# Patient Record
Sex: MALE | Race: WHITE | NOT HISPANIC OR LATINO | Employment: OTHER | URBAN - METROPOLITAN AREA
[De-identification: names, ages, dates, MRNs, and addresses within clinical notes are randomized per-mention and may not be internally consistent; named-entity substitution may affect disease eponyms.]

---

## 2018-07-30 ENCOUNTER — TRANSFERRED RECORDS (OUTPATIENT)
Dept: HEALTH INFORMATION MANAGEMENT | Facility: CLINIC | Age: 73
End: 2018-07-30

## 2018-08-12 ENCOUNTER — TRANSFERRED RECORDS (OUTPATIENT)
Dept: HEALTH INFORMATION MANAGEMENT | Facility: CLINIC | Age: 73
End: 2018-08-12

## 2018-08-24 ENCOUNTER — TRANSFERRED RECORDS (OUTPATIENT)
Dept: HEALTH INFORMATION MANAGEMENT | Facility: CLINIC | Age: 73
End: 2018-08-24

## 2018-08-28 ENCOUNTER — TRANSFERRED RECORDS (OUTPATIENT)
Dept: HEALTH INFORMATION MANAGEMENT | Facility: CLINIC | Age: 73
End: 2018-08-28

## 2018-09-05 ENCOUNTER — TRANSFERRED RECORDS (OUTPATIENT)
Dept: HEALTH INFORMATION MANAGEMENT | Facility: CLINIC | Age: 73
End: 2018-09-05

## 2018-09-07 ENCOUNTER — TELEPHONE (OUTPATIENT)
Dept: BEHAVIORAL HEALTH | Facility: CLINIC | Age: 73
End: 2018-09-07

## 2018-09-07 NOTE — TELEPHONE ENCOUNTER
9-7-18 Anjelica Back. Medicare and BX[employer].    Also recv'd 45 page fax from Gianna Olmedo [facility], [phone] [place].  Forwarded clinical to Adult Dual [facility].  Pool message sen send to Dual [facility]. fb

## 2018-09-07 NOTE — TELEPHONE ENCOUNTER
9-7-18 [facility] Counselor Gianna ROMAN [phone]. Gianna is your contact for this client.  Dr. Dean Sher. Phone: [phone] Fax: [phone]    MH: Depression  CD: ETOH    Medical: faxing medical issues  Med: Faxing.    No Sx ideation.  Legal: June 2018 Off Probation [facility]  Level 2 sex offender.   I told them this will be reviewed on case by case basis.    discharged planned for 9-18-18.  Requested Anjelica. fb

## 2018-09-10 NOTE — TELEPHONE ENCOUNTER
Received Dr Order for DA from Gianna Olmedo, fx [phone] & ph [phone], faxed to [facility]. [facility]

## 2018-09-17 ENCOUNTER — MEDICAL CORRESPONDENCE (OUTPATIENT)
Dept: HEALTH INFORMATION MANAGEMENT | Facility: CLINIC | Age: 73
End: 2018-09-17

## 2018-09-18 ENCOUNTER — TRANSFERRED RECORDS (OUTPATIENT)
Dept: HEALTH INFORMATION MANAGEMENT | Facility: CLINIC | Age: 73
End: 2018-09-18

## 2018-09-19 ENCOUNTER — HOSPITAL ENCOUNTER (OUTPATIENT)
Dept: BEHAVIORAL HEALTH | Facility: CLINIC | Age: 73
Discharge: HOME | End: 2018-09-19
Attending: PSYCHIATRY & NEUROLOGY | Admitting: PSYCHIATRY & NEUROLOGY
Payer: MEDICARE

## 2018-09-19 ENCOUNTER — BEH TREATMENT PLAN (OUTPATIENT)
Dept: BEHAVIORAL HEALTH | Facility: CLINIC | Age: 73
End: 2018-09-19
Attending: PSYCHIATRY & NEUROLOGY

## 2018-09-19 DIAGNOSIS — F33.1 MAJOR DEPRESSIVE DISORDER, RECURRENT EPISODE, MODERATE WITH MELANCHOLIC FEATURES (H): ICD-10-CM

## 2018-09-19 PROCEDURE — 90791 PSYCH DIAGNOSTIC EVALUATION: CPT

## 2018-09-19 ASSESSMENT — PAIN SCALES - GENERAL: PAINLEVEL: NO PAIN (0)

## 2018-09-19 NOTE — PROGRESS NOTES
Acknowledgement of Current Treatment Plan       I have reviewed my treatment plan with my therapist / counselor on 9/19/18. I agree with the plan as it is written in the electronic health record.    Name Signature   Laurent Choi    Name of Therapist / Counselor    [facility] Morgan [facility], [facility]

## 2018-09-19 NOTE — PROGRESS NOTES
"Standard Diagnostic Assessment     CLIENT'S NAME: Laurent Choi  MRN:   [id number]  :   1945 AGE:73 year old SEX: male  ACCT. NUMBER: [id number]  DATE OF SERVICE: 18 Start Time:  12:00 End Time:  2:30      Home Phone [phone]   Work Phone Not on file.   Mobile [phone]     Preferred Phone: [phone]  May we leave a program related message? yes    Yes, the patient has been informed that any other mental health professional providing mental health services to me will need access to this Diagnostic Assessment in order to develop a treatment plan and receive payment.     Identifying Information:  Laurent Choi is a 73 year old, White,  male. Laurent attended the   alone.     Reason for Referral: Laurent was referred to MI/CD Treatment (MICD)  by Staff at [facility]. Laurent reports the reason for referral at this time is \" due to alcohol abuse, depression and anxiety and trauma\".    Laurent verbalizes the following treatment/discharge goals: \" To stay sober, because I do much better with my mental health when I am sober\"     Current Stressors/Losses/Disappointments:   Relationships:\" My spouse  a year ago last march, I feel that I have resolved the grief, but I am beginning a new relationship with a trans gendered person\"  Substance use: \" Alcohol causes stress and depression, I also have terrible withdrawals\"   Loss Disappointments: \" My relationships with my spouse\"    Per Client, Review of Symptoms:  Mood (Depression/Anxiety/Mariaelena/Anger): Gio reports that he often has a depressed mood, has little interest in activities and has fatigue.  He also reports that he is often nervous, tense and shaking due to his anxiety.  He believes that his depression and his anxiety symptoms are his biggest relapse triggers.   Thoughts: Gio reports that he spends a significant amount of time worrying.   Concentration/Memory: Gio reports that he has difficulty concentrating, trouble " remembering things and that he will forget chunks of time.    Appetite/Weight: (see also, Physical Health Screening below) Gio reports that he lost a lot of weight when he was drinking because he did not eat, but has since regained the weight and is at a normal weight.   Sleep: Gio reports that he has difficulty being able to stay asleep and that when he does wake up he does not feel rested.    Motivation/Energy: Gio reports that he has low energy and limited motivation  Behavior: Gio reports that he cries easily, will act out impulsively and make poor decisions.   Psychosis: Gio denies any symptoms in this life area.    Trauma: Gio reports that he experienced trauma when he went through sex offender treatment.  He reports that his  and the counselors of the offender program were abusive towards him and this was traumatic for him.  Other: Gio is classifieid as a level II sex offender.  He reports that he did not do anything wrong.  He reports that he had pictures on his computer starting in 2000, but was caught in 2008.  He reports that he never hurt anyone and that the laws in [place] are bogus.  He reports that his  and the counselors in the sex offender program that he went to were abusive towards him.  He reports that he was initially registered as a level I offender, but his probation was violated and he was moved to level II.  He reports that he got off of probation on June 3rd 2018 and that he has no further restrictions.   He was informed that we would need to verify this before we could place him in a program.     Mental Health History:  Laurent reports first onset of mental health symptoms most of my life  Laurent was first diagnosed 1969 in college  Laurent received the following mental health services in the past: case management, counseling, inpatient mental health services, medication(s) from physician / PCP and psychiatry.   Psychiatric Hospitalizations:  "[facility] 2 times, [facility] 2 times and  .  Last hospitalization was 2007.  Laurent denies a history of civil commitment.      Onset/Duration/Pattern of Symptoms noted above:  Laurent reports the following understanding of his diagnosis: Depression and anxiety    Gio endorses the following DSM 5 criteria for Major depressive disorder : depressed mood,  Most of the day, nearly every day; diminished interest or pleasure in activities; significant  weight loss; insomnia; psychomotor retardation; fatigue and loss of energy; feelings of worthlessness and guilt; diminished ability to think or concentrate; recurrent thoughts of death or suicide.     Gio endorses the following DSM 5 criteria for Generalized Anxiety Disorder: Excessive anxiety, more days that not, for the past 6 months; difficulty controlling worry; restlessness or feeling keyed up; fatigue; difficulty concentrating; irritability; muscle tension; sleep disturbance.         Personal Safety:    [place]-Suicide Severity Rating Scale   Suicide Ideation   1.) Have you ever wished you were dead or that you could go to sleep and not wake up?     Lifetime: Yes, (if yes, please discribe) : reports that he has thoughts on a regular basis,  And has thought of jumping off a bridge, but has no intention to follow through with these thoughts.  Past Month:  No   2.) Have you actually had any thoughts of killing yourself?   Lifetime:  Yes, (if yes, please discribe) :   \" When I was young I did have prolonged thoughts about suicide\"  Past Month:  Reports fleeting ideation at times.    3.) Have you been thinking about how you might do this?     Lifetime:  Yes, (if yes, please discribe) : \" Sometimes I would stand on a bridge all night, but never intended to jump\"  Past Month:  No   4.) Have you had these thoughts and had some intention of acting on them?     Lifetime:  Yes, (if yes, please discribe) : \" I would stand on a bridge, but never " "planned to jump\"  Past Month:  No   5.) Have you started to work out the details of how to kill yourself?   Lifetime:  Yes, (if yes, please discribe) : See above Past Month:  No   6.) Do you intend to carry out this plan?      Lifetime:  No Past Month:  No   Intensity of Ideation   Intensity of ideation (1 being least severe, 5 being most severe):     Lifetime:  3, description of Ideation: \" I've always had thoughts, but I have never tried to kill myself\"                                                                                                 Past Month:  2, description of Ideation: Reports that thoughts were fleeting \" it was a rough month\"    How often do you have these thoughts? 2-5 times in a week  \" I would have a thought of some kind or another\"    When you have the thoughts how long do they last?  Fleeting - few seconds or minutes   Can you stop thinking about killing yourself or wanting to die if you want to?  Easily able to control thoughts   Are there things - anyone or anything (i.e. family, Latter day, pain of death) that stopped you from wanting to die or acting on thoughts of suicide?  Protective factors definately stopped you from attempting suicide      What sort of reasons did you have for thinking about wanting to die or killing yourself (ie end pain, stop how you were feeling, get attention or reaction, revenge)?  Completely to end or stop the pain (youcouldn't go on living with the pain or how you were feeling)   Suicidal Behavior   (Suicide Attempt) - Have you made a suicide attempt?     Lifetime:  No Past Month: No   Have you engaged in self-harm (non-suicidal self-injury)?  No   (Interrupted Attempt) - Has there been a time when you started to do something to end your life but someone or something stopped you before you actually did anything?  No   (Aborted or Self-Interrupted Attempt) - Has there been a time when you started to do something to try to end your life but you stopped " yourself before you actually did anything?  Yes, (if yes, total number of aborted or self interrupted) : 1 time   (Preparatory Acts of Behavior) - Have you taken any steps towards making suicide attempt or preparing to kill yourself (such as collecting pills, getting a gun, giving valuables away or writing a suicide note)? Yes, (if yes, total number of preparatory acts : Went to a bridge with the thought of jumping.    Actual Lethality/Medical Damage:   0. No physical damage or very minor physical damage (e.g., surface scratches).   1. Minor physical damage (e.g., lethargic speech; first-degree burns; mild bleeding; sprains).  2. Moderate physical damage; medical attention needed (e.g., conscious but sleepy, somewhat responsive; second-degree burns; bleeding of major vessel).  3. Moderately severe physical damage; medical hospitalization and likely intensive care required (e.g., comatose with reflexes intact; third-degree burns less than 20% of body; extensive blood loss but can recover; major fractures).  4. Sever physical damage; medical hospitalization with intensive care required (e.g., comatose without reflexes; third-degree burs over 20% of body; extensive blood loss with unstable vital sign; major damage to a vital area).  5. Death    Reports a history of fleeting thoughts of suicide.  He reports one incident of going to a bridge with a thought of jumping, but denies that he planned to follow through.  He denies any current ideation, plans or intention.        2008  [employer]  Used with permission by Alexandra Summers, PhD.               Guide to C-SSRS Risk Ratings   NO IDEATION:  with no active thoughts IDEATION: with a wish to die. IDEATION: with active thoughts. Risk Ratings   If Yes No No 0 - Very Low Risk   If NA Yes No 1 - Low Risk   If NA Yes Yes 2 - Low/moderate risk   IDEATION: associated thoughts of methods without intent or plan INTENT: Intent to follow through on  suicide PLAN: Plan to follow through on suicide Risk Ratings cont...   If Yes No No 3 - Moderate Risk   If Yes Yes No 4 - High Risk   If Yes Yes Yes 5 - High Risk   The patient's ADDITIONAL RISK FACTORS and lack of PROTECTIVE FACTORS may increase their overall suicide risk ratings.      Additional Risk Factors:    Someone close to the patient (family member/friend) completed a suicide     Significant history of having untreated or poorly treated mental health symptoms     Tendency to be socially isolated and/or cut off from the support of others     A recent death of someone close to the patient and/or unresolved grief and loss issues     Significant history of trauma and/or abuse issues     A triggering event(s) leading to humiliation, shame or despair     Significant legal problems including being at risk of incarceration     not being able to work   Protective Factors: Reality testing ability, Positive coping skills, Positive problem-solving skills and Positive social support       Risk Status   Risk Rating: Past= 3 Moderate risk,  Currently= 1 Low risk  DA Staff:  JAYCEAR to Tx team.    Additional information to support suicide risk rating: There was no additional information to provide at this time.  Please see the above suicide risk rating information.       Additional Safety Questions:    Do you have a gun, weapons or other means (including medications) to harm yourself available to you? No   Do you take chances with your safety?   no   Have you ever thought about killing someone else? No   Have you ever heard voices telling you to harm yourself or others? No       Supports:   From whom do you receive support and how often? (family/friends/agency) New friend     Do your support people want/need education/resources? no        Is there anything in your life (current or history) that is satisfying to you (include leisure interests/hobbies)?   yes theatre, film and art, museums, opera.      Hope/Belief System:  Do  you believe things can get better? yes       Personal Safety Summary:          Laurent denies current fears or concerns for personal safety.    Completed safety coping plan? yes        Substance Use History:   MICD Addendum - Comprehensive Assessment     Detox: Laurent reports 45 times lifetime detox admissions. Date of last detox was August of 2018 at the following location: [facility], [facility] and [facility] detox for the following substances: Alcohol.  He reports that [facility] detox will not take him anymore, but did not know why.    Treatment of Substance Use Disorder:   Laurent is currently receiving the following services: CD Treatment at [facility] and one in the 80's, completed a Rule 25 Assessment and participate(s) in AA / NA     Laurent has received chemical dependency treatment in the past at [facility] and one in the 80's that he does not remember.    Dates: 1980  Program: Unsure  Dual: No:   CD Treatment: Inpatient  28 days  Length of Treatment: 28 days  Completion: Yes:   Court Order: No:   Period of Abstinence: unsure  Additional Comments:        Dates: 1990's three times at  Program: [facility]  Dual: No:   CD Treatment: Inpatient     Length of Treatment: 3 months for all treatments  Completion: Yes:  Twice and was discharged unsuccessful one time.   Court Order: No:   Period of Abstinence: stayed sober for about a year each time.   Additional Comments: none      Dates: 1990's  Program: [facility]  Dual: No:   CD Treatment: Inpatient  2 times  Length of Treatment: 10-15 days for both  Completion: Yes:   Court Order: No:   Period of Abstinence: 6 months  Additional Comments:     2008:  Reports that after this he went to outpatient at [facility] and was court ordered.  Client reports that he went to court one day and had alcohol on his breath and was court ordered to do treatment.       Dates:  "2018  Program:[facility]  Dual: Yes:   CD Treatment: Inpatient     Length of Treatment: 21 days  Completion: Yes:   Court Order: No:   Period of Abstinence: was just discharged on 9/18/18  Additional Comments:         Addiction Pharmacology: Laurent reports use of addiction pharmacology. vivitrol     Contraindicated Medication Use: Laurent denies current Rx/use of stimulant, benzodiazapine and/or narcotic medications.     Prioritization Status:   Laurent denies a history of substance use by injection.   Injection of substances occurred: Denies.     Laurent denies current pregnancy.    Discussed the general effects of drugs and alcohol on health and well-being.     Substances Use History:     Substances Used:       Age of First Use Last Use Date / Amount (if available)  Most Recent Pattern of Use & Duration    (both frequency & amounts)  Method of use:       Alcohol    yes beer and vodka     Age of 1st  Intoxication:    15  Date: 8/27/18  Amount: 6-12 beers and a 1/2 liter of Vodka    # Days Used Past 30 Days:  7 Binge drinking.  \" I would drink for 7-10 days and then I would sober up for 5-6 days and then I would binge again\"     6-12 beers per day and 1/2 liter to one liter of vodka.      Oral   Cocaine Powder/  Crack-Cocaine      no    Denies use     Cannabis/Marijuana/  Synthetic/Hashish       no    Denies use     Heroin    no    Denies use     Non-Prescription Methadone    no    Denies use     Other Opiates/Synthetics     no    Denies use     PCP/  Other Hallucinogens    no    Denies use     Meth/Amphetamine  Other Stimulants (Ecstasy/Other Club Drugs)    no    Denies use     Benzodiazapines    yes Valium          32 Date: Age 32  Amount: 1 10 mg pill    # Days Used Past 30 Days:  0    Daily for 1 year.  1 10 mg pill daily.   Oral   Ketamine/  Other Tranquilizers    no    Denies use     Barbiturates/  Sedatives/  Hypnotics    no    Denies use     Inhalants    no    Denies use     Over-the-Counter Drugs    no   " " Denies use     Other    no    Denies use     Nicotine/Tobacco    yes former smoker          16 Date: 2011  Amount: 2 packs    # Days Used Past 30 Days:  0    Daily 2 packs    Quit smoking in 2011  Smoke     Current/Hx of withdrawal symptoms:   Sweating (rapid pulse)  Agitation  Sad/depressed feeling  Irritability  Nausea/vomiting  Diarrhea  Fever  Confused/disrupted speech  Shaky/jittery  Headache  Muscle aches  Sensitive to noise  Dizziness  Dimished appetitie  Unable to eat  Anxiety/worried  Unable to sleep  Fatigue/extremely tired  High Blood Pressure    Current Risk of withdrawal (if yes, identify substance):  Client has not drank for approximately one month.  If he returns to use he is at high risk for withdrawal symptoms.     Client Impressions:   Laurent identifies his primary substance as: Alcohol.      Impact:  Laurent reports the following life areas have been negatively impacted by his substance use:  academic, family problems, occupational / vocational problems, relationship problems and sexual issues.     Laurent reports his substance use has been a problem and has been out of control.    Laurent associates his substance use with the following leisure activities: \" When leisure becomes stressful I drink\" .     Laurent attributes his relapses/continued use to: Anxiety and depression, over expectations, struggling to balance my work and social life.     Laurent reports his substance use and mental health have influenced each other in the following way(s): \" I am not able to be stable with my mental health  When I am drinking.\"    Concern/Support:  Laurent identifies the following people who have been concerned about his substance use and/or have been supportive of his recovery in the past 30 days: New friends    Laurent reports a history of trying to hide his substance use from others.       Laurent does not agree to have  contact collateral resources to obtain information.  Release of " "Information has not been obtained.    Recovery Goals:  Laurent reports a goal to be abstinent.     Laurent reports the following period(s) of abstinence: Lifetime:  3-4 years  In past 6 months:  one month     Laurent identifies the following coping skills/strategies to prevent relapse of substance use or mental health instability: \" I guess I don't have very good coping skills.  I try to exercise and relaxation\"      Laurent reports attending voluntary self-helps support groups.  Last attended in the last 60 days. Laurent does not have a sponsor.     DSM-5 Criteria Substance Use Disorder Criteria: At least two criteria must be met within a twelve month period.    Impaired Control:    Yes  Alcohol    1. Substance is taken in larger amounts or over a longer period than was intended.   Yes  Alcohol    2. There is a persistent desire or unsuccessful efforts to cut down or control use.   Yes  Alcohol    3. A great deal of time is spent in activities necessary to obtain substance, use substance, or recover from its effects.   Yes  Alcohol    4. Craving, or a strong desire or urge to use the substance.    Social Impairment:    Yes  Alcohol    5. Recurrent substance use resulting in failure to fulfill major role obligations at work, school or home.   Yes  Alcohol   6. Continued substance use despite having persistent or recurrent social or interpersonal problems caused or exacerbated by the effects of the substance.   Yes  Alcohol   7.Important social, occupational, or recreational activities are given up or reduced because of the substance use.      Risky Use:   Yes  Alcohol   8. Recurrent substance use in situations in which it is physically hazardous.   Yes    Alcohol 9. Substance use is continued despite knowledge of having a persistent or recurrent physical or psychological problem that is likely to have been caused or exacerbated by the substance.     Pharmacological:  Yes  Alcohol   10. Tolerance, as defined by " "either of the following:   a. A need for markedly increased amounts of the substance to achieve intoxication or  desired effect.   b. A markedly diminished effect with continued use of the same amount of the substance.  Yes  Alcohol   11. Withdrawal, as manifested by either of the following:     a. The characteristic withdrawal syndrome for the substance.   b. Substance (or a closely related substance) is taken to relieve or avoid withdrawal symptoms.     Mild: Presence of 2-3 symptoms  Moderate: Presence of 4-5 symptoms  Severe: Presence of 6 or more symptoms.    Specify if :  In early remission - no criteria met (except craving) for at least 3 months and less than 12 months  In sustained remission - no criteria met (except craving) for 12 months or longer    In a controlled environment - individual is in an environment where access to the substance is restricted.    Laurent met 11 of 11 criteria for a Alcohol use disorder, severe    Laurent does agree to follow treatment recommendations including abstinence and regular attendance.      Laurent reports motivation/primary condition leading to admission to treatment: \" To stay sober, because I do much better with my mental health when I am sober\"     Legal History:    Laurent reported that he has been involved with the legal system.   History of arrests, group home or residential include: He is registered as a level II sex offender.  He reports that in 2000 he had some nude picture on his computer and was caught in 2008 and was then registered as a level one sex offender.  He reports that his  and and the counselors in the offender program were abusive towards him and that his PO violated his probation and he was then moved to a level two sex offender.  He reports that he has completed his probation and has no restrictions at this time.     Laurent denies current/history of having a 's license revoked because of an incident involving alcohol or other " "substances. License is: Currently has a valid license.    Laurent denies currently being under the jurisdiction of the court or on probation or parole. Finished probation on June 3rd of 2018.     Legal Status involving Children:   Laurent denies having children under the age of 18.      Laurent denies current/history of losing his parental rights.     Laurent denies involvement of Child Protection Services.    ________________________________________________________________________    Life Situation (Employment/School/Finances/Basic Needs):  Laurent  is currently living alone in a apartment.   The safety/stability of this environment is described as: He reports that his apartment is safe and stable.     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  Laurent denies a history of gambling and denies a history of gambling treatment.     Laurent reports his highest level of education is college graduate  With a BA and then attended two years of grad school, but did not get an advanced degree.  Laurent did not identify any learning problems   Laurent describes academic performance as:adequate  Laurent describes school social experience as: \" Normal\"     Laurent denies concerns regarding his current ability to meet basic needs.     Social/Family History:  Laurent  reports he grew up in [place].   Laurent was the third born of 6 children.   Laurent reports his biological parents are     Laurent describes his childhood as \" I had anxiety issues as a child and panic attacks.  Otherwise it was very normal and peaceful\"   Laurent describes his current " "relationships with his family of origin as reports that he does not have much contact with his siblings \" they don't approve of me\"  Due to being homosexual, criminal history and they never liked my spouse.     Laurent identifies his relationship status as: .    Laurent identifies his sexual orientation as: same sex   Laurent denies sexual health concerns.     Laurent reports having 0 children.     Laurent describes the quantity/quality of his social relationships as minimal and sort of isolated and I kept my social relationships at a superficial level. 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       Significant Losses / Trauma / Abuse / Neglect Issues / Developmental Incidents:  Laurent reports significant loss/trauma/abuse/neglect issues/developmental incidents Reports that his spouse  approximately a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

## 2018-09-19 NOTE — PROGRESS NOTES
".  Initial Individual Treatment Plan     Patient: Laurent Choi   MRN: [id number]  : 1945  Age: 73 year old  Sex: male    Diagnostic Assessment Date / Date of Initial Individual Treatment Plan: 18      Immediate Health Concerns  Stented Coronary Artery, Pure Hypercholesterolemia, Atherosclerosis of native coronary artery without angina pectoris, gastroesophageal reflux disease with esophagitis, macular degeneration of retina, and hypertension.      Immediate Safety Concerns:  Reports some suicide ideation with no plan or intention.     Identify the issues to be addressed in treatment:  Symptom Management, Personal Safety, Community Resources/Discharge Planning, Abstinence/Relapse Prevention, Develop / Improve Independent Living Skills, Develop Socialization / Interpersonal Relationship Skills, Cultural / Spirituality and Physical Health     Client Initial Individualized Goals for Treatment\" To stay sober, because I do much better with my mental health when I am sober\" :     Initial Treatment suggestions for the client during the time between Diagnostic Assessment and completion of the Individualized Treatment Plan:  Follow Safety Plan  Abstain from Substance Use   Ask for more information, support and/or assistance as needed.  Follow up with providers/community supports as needed: N/A  Report increases or changes in symptoms to staff.  Report any personal safety concerns to staff.   Take medications as prescribed.  Report medication changes and/or side effects to staff.  Attend and participate in groups as scheduled or notify staff if unable to do so.  Report any use of substances to staff as this may impact your symptoms and/or personal safety.  Notify staff if you have any other issues that need to be addressed. This may include any current abuse / neglect / exploitation or other vulnerability.  Follow recommendations of your treatment team and discuss concerns if not in " agreement.     Treatment Team Responsible: MI/CD Treatment (MICD)      Therapeutic Interventions/Treatment Strategies may include:  Support, Redirection, Feedback, Limit/Boundaries, Safety Assessments, Structured Activity, Problem Solving, Clarification, Education, Motivational Enhancement and Relapse Prevention as needed.    GERHARD SELF MA, LPCC, LADC

## 2018-09-19 NOTE — PROGRESS NOTES
"  Adult Outpatient Mental Health Programs    MY COPING PLAN FOR SAFETY  NAME: Laurent Choi  MRN: [id number]      People and things that are most important to me & reasons for living: My art and my work, my goals and the things that I want to accomplish, the ability to have a new relationship with someone.               My relapse Warning Signs:\" impulsive, isolating, crying, not body could get a hold of me, I wouldn't be keeping up my appointments or able to do the necessary things\"   I will make my environment safer by: be around others  When in crisis, I will use the following coping skills:     Distress Tolerance Strategies:  relaxation activities: meditation, arts and crafts: exhibits of painting, drawings, abstract and realistic work and computer art. , listen to positive and upbeat music: jazz, blue grass, classical and R and B, watch a funny movie: classical and foreign movies, pray and paced breathing/progressive muscle relaxation    Physical Activities: go for a walk, exercise: walking, treadmill, gardening, meditation, deep breathing and stretching     Focus on helpful thoughts:  \"This is temporary\", \"I will get through this\" and remind myself of what is important to me: My art and my work, my goals and the things that I want to accomplish, the ability to have a new relationship with someone.  and self compassion statements like \" I'm a good person\"  I will use My Support System:     Personal Supports: I can ask for reminders, support, or for them to stay with me  Trusted Friend/s:  Jasbir Dsouza  Family Member/s :   Oldest sister~ Maria Luisa Cash                Professional Supports: I can ask for med changes, emergency appointments, help  Psychiatrist: None   Therapist: None    Other:None    Crisis Lines I can contact to discuss options and to access support:    Suicide Prevention Lifeline: 4-657-417-TALK (8768)    Crisis Text Line Service (available 24 hours a day, 7 days a week): Text [place] to " 937140    Call  **[facility] (995106) from a cell phone to talk to a team of professionals who can help you.  Crisis Services By [facility]: Phone Number:   [place]     [phone]   [place]    [phone]   [place]    [phone]   [place]    [phone]   [place]    [phone]   [place] [phone]   [place]     [phone]       Call 911 or go to my nearest emergency department    X  I will attend my Treatment Program and talk to my one of my therapists: Brittany AN   I agree that I will report any current / recent thoughts, impulses or plans of suicide,  homicide, self injurious behavior or substance use .   X  I agree that I will not act on the above symptoms, but will follow the above plan instead.

## 2018-09-20 ENCOUNTER — TELEPHONE (OUTPATIENT)
Dept: PSYCHIATRY | Facility: CLINIC | Age: 73
End: 2018-09-20

## 2018-09-20 RX ORDER — NITROGLYCERIN 80 MG/1
1 PATCH TRANSDERMAL DAILY
COMMUNITY
End: 2021-08-25

## 2018-09-20 RX ORDER — AMLODIPINE AND BENAZEPRIL HYDROCHLORIDE 10; 20 MG/1; MG/1
1 CAPSULE ORAL DAILY
COMMUNITY
End: 2022-04-19

## 2018-09-20 NOTE — TELEPHONE ENCOUNTER
"PSYCHIATRY CLINIC PHONE INTAKE     SERVICES REQUESTED / INTERESTED IN          AMP (medication management, therapy)    Presenting Problem and Brief History                              What would you like to be seen for? (brief description): The patient stated that his mood is starting to level out and improve, his concentration is better, and that his anxiety has improved as well (but said that it is still \"moderate\"). He attributes the improvement in his symptoms to sobriety. His drug of choice was alcohol (he stated that he never used any other substances), and he started drinking when he was 16. He plans on finding an AA group to attend and getting a sponsor. He currently lives alone in an apartment, but said that he has lots of neighbors and that living alone is a good thing for him right now. His partner who he used to live with passed away in March 2017. 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     Have you received a mental health diagnosis? Yes   Which one (s): depression, anxiety, KODY  Is there any history of developmental delay?  No   Are you currently seeing a mental health provider?  No             Do you have mental health records elsewhere?  Yes  Will you sign a release so we can obtain them?  Yes    Have you ever been hospitalized for psychiatric reasons?  Yes  Describe: The patient was most recently at [facility] for chemical dependency treatment for 21 days and discharged 2 days ago. He was in detox for 5 days prior to admission. He has previously been hospitalized at [facility] a couple times, [facility], and [facility].    Do you have current thoughts of self-harm?  No    Do you currently have thoughts of harming others?  No       Substance Use History     Do you have any history of alcohol / illicit drug use?  Yes  Describe: See above   Have you ever received treatment for this?  Yes    Describe: He has been to treatment 5 or 6 times " at [facility], [facility], [facility], and [facility]      Social History     Does the patient have a guardian?  No     Have you had an ACT team in last 12 months?  No     Do you have any current or past legal issues?  Yes  Describe: The patient is a Level 2 sex offender (not on parole anymore, but he has to register every year as a sex offender) -- he downloaded pornographic pictures of minors and was arrested in 2010, he went to treatment in 2011 at [facility] and graduated in 2014. The patient said that you can contact him for more information if necessary.  OK to leave a detailed voicmail?  Yes    Medical/ Surgical History                                  The patient has had stints put in his heart. He has an ulcer in his esophagus. Macular degeneration.       Medications             Current Outpatient Prescriptions   Medication Sig Dispense Refill     ACETAMINOPHEN PO Take 325 mg by mouth       amLODIPine-benazepril (LOTREL) 10-20 MG per capsule Take 1 capsule by mouth daily       ASPIRIN PO Take 81 mg by mouth daily       calcium-vitamin D 500-125 MG-UNIT TABS Take 1 tablet by mouth 2 times daily       DIPHENHYDRAMINE HCL PO Take 25 mg by mouth daily as needed       FLUOXETINE HCL PO Take 20 mg by mouth daily       LOSARTAN POTASSIUM PO Take 25 mg by mouth       MIRTAZAPINE PO Take 15 mg by mouth At Bedtime       nitroGLYcerin (NITRODUR) 0.4 MG/HR 24 hr patch Place 1 patch onto the skin daily       NITROGLYCERIN SL Take 0.4 mg by mouth       OMEPRAZOLE PO Take 20 mg by mouth every morning       ROSUVASTATIN CALCIUM PO Take 40 mg by mouth daily       TAMSULOSIN HCL PO Take 0.4 mg by mouth daily         DISPOSITION      The writer explained to the patient that due to his past legal issues, his information will have to be reviewed by the clinic's medical director. He expressed understanding of this. The writer emailed him a blank QUIN to obtain records from [facility], and the patient said that he will fill it out and  send it back as soon as he can. Routed to Alma Kelley MD, for review.  -Oumou Villar,

## 2018-09-21 ENCOUNTER — TELEPHONE (OUTPATIENT)
Dept: PSYCHIATRY | Facility: CLINIC | Age: 73
End: 2018-09-21

## 2018-09-21 NOTE — TELEPHONE ENCOUNTER
Spoke with Gio to inform him that he could start the program on Monday in the 9:00 track as he had requested at the assessment.  Talked with him about being here are 8:30 for an admission.  Also talked with him about getting an individual therapist set up.

## 2018-09-24 ENCOUNTER — TELEPHONE (OUTPATIENT)
Dept: PSYCHIATRY | Facility: CLINIC | Age: 73
End: 2018-09-24

## 2018-09-24 ENCOUNTER — HOSPITAL ENCOUNTER (OUTPATIENT)
Dept: BEHAVIORAL HEALTH | Facility: CLINIC | Age: 73
End: 2018-09-24
Attending: PSYCHIATRY & NEUROLOGY
Payer: MEDICARE

## 2018-09-24 VITALS — HEIGHT: 71 IN | BODY MASS INDEX: 24.22 KG/M2 | WEIGHT: 173 LBS

## 2018-09-24 PROBLEM — F33.1 MAJOR DEPRESSIVE DISORDER, RECURRENT EPISODE, MODERATE WITH MELANCHOLIC FEATURES (H): Status: ACTIVE | Noted: 2018-09-24

## 2018-09-24 LAB
AMPHETAMINES UR QL SCN: NEGATIVE
BARBITURATES UR QL: NEGATIVE
BENZODIAZ UR QL: NEGATIVE
CANNABINOIDS UR QL SCN: NEGATIVE
COCAINE UR QL: NEGATIVE
ETHANOL UR QL SCN: NEGATIVE
OPIATES UR QL SCN: NEGATIVE

## 2018-09-24 PROCEDURE — 80320 DRUG SCREEN QUANTALCOHOLS: CPT | Performed by: PSYCHIATRY & NEUROLOGY

## 2018-09-24 PROCEDURE — H2012 BEHAV HLTH DAY TREAT, PER HR: HCPCS

## 2018-09-24 PROCEDURE — 80307 DRUG TEST PRSMV CHEM ANLYZR: CPT | Performed by: PSYCHIATRY & NEUROLOGY

## 2018-09-24 ASSESSMENT — ANXIETY QUESTIONNAIRES
5. BEING SO RESTLESS THAT IT IS HARD TO SIT STILL: SEVERAL DAYS
2. NOT BEING ABLE TO STOP OR CONTROL WORRYING: SEVERAL DAYS
IF YOU CHECKED OFF ANY PROBLEMS ON THIS QUESTIONNAIRE, HOW DIFFICULT HAVE THESE PROBLEMS MADE IT FOR YOU TO DO YOUR WORK, TAKE CARE OF THINGS AT HOME, OR GET ALONG WITH OTHER PEOPLE: NOT DIFFICULT AT ALL
1. FEELING NERVOUS, ANXIOUS, OR ON EDGE: MORE THAN HALF THE DAYS
3. WORRYING TOO MUCH ABOUT DIFFERENT THINGS: SEVERAL DAYS
GAD7 TOTAL SCORE: 8
6. BECOMING EASILY ANNOYED OR IRRITABLE: SEVERAL DAYS
7. FEELING AFRAID AS IF SOMETHING AWFUL MIGHT HAPPEN: NOT AT ALL

## 2018-09-24 ASSESSMENT — PATIENT HEALTH QUESTIONNAIRE - PHQ9: 5. POOR APPETITE OR OVEREATING: MORE THAN HALF THE DAYS

## 2018-09-24 NOTE — PROGRESS NOTES
"RN Review of Medical History / Physical Health Screen  Outpatient Behavioral Programs      CLIENT'S NAME: Laurent Choi  MRN:   [id number]  :   1945 AGE:73 year old SEX: male    DATE OF DIAGNOSTIC ASSESSMENT: 2018  DATE OF ADMISSION: 2018   PROGRAM: MI/CD Treatment (MICD)      Following admission, the RN reviewed the following:    - Medical History / Physical Health Screen completed during the DA noted above.  - Immediate Health Concerns as noted on the Initial Individual Treatment Plan.     18 1300   Height and Weight   Height 5' 11\" (1.803 m)   Height Method Stated   Weight 173 lb (78.5 kg)   Weight Method Standing scale   BSA (Calculated - sq m) 1.98   BMI (Calculated) 24.18     BMI Review:  Was the patient informed of BMI? yes      Findings Normal, No Intervention       RN Recommendations include: Will continue to monitor.    Cori Marques RN on 2018 at 1:37 PM        "

## 2018-09-24 NOTE — PROGRESS NOTES
"Adult Mental Health Outpatient Group Therapy Progress Note     Client Initial Individualized Goals for Treatment: \" To stay sober, because I do much better with my mental health when I am sober\"       See Initial Treatment suggestions for the client during the time between Diagnostic Assessment and completion of the Master Individualized Treatment Plan.    Treatment Goals:     see above     Area of Treatment Focus:  Symptom Management, Community Resources/Discharge Planning and Abstinence/Relapse Prevention    Therapeutic Interventions/Treatment Strategies:  Support, Feedback, Structured Activity, Problem Solving, Clarification, Education and Motivational Enhancement Therapy    Response to Treatment Strategies:  Accepted Feedback, Listened, Focused on Goals, Attentive and Accepted Support    Name of Group:  Goal Setting 11:00 - 11:50     Group Participants: 5 of 7.      Description and Outcome:  Gio participated in group that focused on learning about the benefits and practicing of setting realistic goals for treatment and management of mental and chemical health, along with self reflection on accomplishments and practicing problem solving obstacles. This was his first goal setting group and he noted that he generally does not practice this for management, but verbalized that he understood the benefits of using this for symptom management as he recognizes that he does struggle with focus, direction, and follow through. He was open to set goals, but did require some assistance in making them measurable. He reported on focus on balancing some of his work and and social activities, focusing on ways he can get of of his house.  Client verbalized understanding of session content by as above - having direction and accountability for engagement in activities and management of health at this time.  Client would benefit from additional opportunities to practice and implement content from this session.  Will continue to " assess.    Is this a Weekly Review of the Progress on the Treatment Plan?  No

## 2018-09-24 NOTE — PROGRESS NOTES
Admission Date: 9/24/2018    The Initial Individual Treatment Plan was reviewed with Laurentjulian Thakur Jimbo upon admission.      Laurent reported his last use of substances as: Over a month ago.  No use since the assessment.     Identify any current concerns with potential to impact admission:     withdrawal/intoxication: Denies     medication/medical concerns: No concerns presently     immediate safety concerns:Denies     Other: Did get an appointment with addiction pharmacology department in order to get vivitrol prescribed.    DIMENSION  ADMIT RATING   1 Acute Intoxication / Withdrawal Potential 0   2 Biomedical Conditions & Complications 1   3 Emotional / Behavioral / Cognitive Conditions & Complications 2   4 Treatment Acceptance / Resistance: 1   5 Continued Use / Relapse Prevention 2   6 Recovery Environment 2         Completed by: Dina Morgan MA, LPCC, LADC

## 2018-09-24 NOTE — TELEPHONE ENCOUNTER
On 9/20/2018 the patient signed an QUIN authorizing the release of all pertinent records from [facility] to [facility] for the purpose of continuing care. This writer faxed this QUIN to [phone] on 9/24/2018, sent the original to scanning and held a copy in psychiatry until scanning completed. Dayanna Jones LPN

## 2018-09-24 NOTE — PROGRESS NOTES
"MICD Progress Note / Treatment Plan Review       Patient: Laurent Choi   MRN: [id number]  : 1945  Age: 73 year old  Sex: male    Week of: 2018-2018    Client Initial Individualized Goals for Treatment:  \" To stay sober, because I do much better with my mental health when I am sober\"     See Initial Treatment suggestions for the client during the time between Diagnostic Assessment and completion of the Master Individualized Treatment Plan.    Treatment Goals:    Treatment plan will be formulated on 10/1/18.    Active Psychiatric Symptoms Impairing:   Mood, Behavior and Perception    Area of Treatment Focus:  Symptom Management, Personal Safety, Community Resources/Discharge Planning and Abstinence/Relapse Prevention    Treatment Strategies:   Support, Feedback, Limit/Boundaries, Safety Assessments, Structured Activity, Problem Solving, Clarification, Education, Motivational Enhancement Therapy and Cognitive Behavioral Therapy    Patient Response:  Accepted Feedback, Gave Feedback, Listened, Focused on Goals, Attentive, Accepted Support and Alert      Dimension Risk Description and Outcome:    Dimension One: Acute Intoxication/Withdrawal Potential   Daily Note: 18: Gio reported last use as one month ago and no withdrawal symptoms. (DE) 2018: Gio reports continued sobriety.  (JACINDA)  2018:  Sobriety continued.  (JACINDA) 2018: Gio reports that he continues to maintain his sobriety.  (JACINDA) 18:  Gio reports that he is feeling \"proud\" and \"grateful\" to continue his sobriety.  (JACINDA)    Dimension Two: Biomedical Conditions and Complications  Daily Note: 18: Gio reported no physical health concerns. (DE) 2018:  No changes. (JACINDA) 2018:  Gio reports that he is sleeping poorly.  (JACINDA) 2018:  Denies physical health concerns at this time. (JACINDA)  18:  Gio reports that he woke up with joint pain.  (JACINDA)    Dimension Three: Emotional/Behavioral / Cognitive " "Conditions & Complications  Daily Note: 9/24/18 (9:00-9:50AM): Gio endorsed absence of suicidal ideation and self-injurious behavior urges. He described that he is \"doing good.\" He shared that he is apprehensive about starting this group. Writer asked what coping skills he uses and Gio shared that he walks and exercises. (DE) 9/25/2018 (10-10:50am):  Gio reports that he is feeling \"tired and anxious\". Today as he feels that he has to adjust his schedule for the program.  He states that he did not realize that the program was as lengthy as it is on a daily basis.  He reports that he took a long nap after leaving the program yesterday.  Today he states that he is not having any cravings to use or thoughts to self harm.  He reports that he does not need time in group.  (JACINDA)  9/26/2018:  (9-9:50am):  Gio checked in in psychotherapy to report that he is feeling tired and frustrated due to poor sleep.  He also states that he does not feel like being in the program but feels that it is \"in his best interest to be here\".  He currently denies thoughts to self harm and states that he does not want time in group.  (JACINDA)  9/27/2018: (9-9:50am):  Gio states that he is feeling \"glad and grateful\" that he is sober today.  He reports that he is feeling more motivated to work on some projects that he has been putting aside and feels that he has \"something to live for\".  He adamantly denies thoughts to self harm.  (JACINDA) 9/28/18: (9-9:50am):  Gio states that he is still thinking about whether or not he should stick with the program however he feels that he is getting some benefit from being in the program.  He denies thoughts to self harm at this time. (JACINDA)    Dimension Four: Treatment Acceptance / Resistance  Daily Note: 9/24/18: Gio participated in first group in program by sharing and listening. (DE) 9/25/2018: (Autobiography) 11-11:50am:  Gio listened attentively to his peer's autobiography and offered supportive feedback in the " "discussion that followed.  (JACINDA) 9/26/2018 Interpersonal Relationships (11-11:50am):  Gio did not participate in the group on \"Handling Conflict\".  He appeared to be following along with the group discussion but did not join in.  He would benefit from more information on conflict management.  (JACINDA) 9/28/2018 Emotions Management (10:00-10:50): Writer taught and facilitated discussion on radical acceptance skill. Gio participated in discussion by sharing examples and asking questions about the skill. Gio demonstrated understanding by identifying how he can work on accepting the fact that people die in life. Gio can benefit from further assistance in applying skill. (DE)       Dimension Five: Continued Use/Relapse Prevention  Daily Note: 9/24/18: Gio acknowledged that he thought about using marijuana as a way to cope with his stressors since he can't drink alcohol. Gio later added he realized after hearing other group members talk about their addiction to marijuana that this would not be a good idea. (DE) 9/25/2018:  Gio states that he is having minimal cravings to drink.  (JACINDA) 9/26/18: Relapse Prevention Group (10:00-10:50):  Gio was present for relapse prevention group on this date. Gio initially denied urges to use and then later reported \" I'm looking forward to the day when I can have a drink\".  Gio reported that he believes that once he has been sober for a while he will be able to drink socially.  Gio particpated in a discussion regarding the relapse of two group peers.  This can help address the treatment plan goal of abstaining from use.  Gio appeared to have a limited understanding of relapse prevention concepts, and would benefit from further education. (EK) 9/27/2018:  Gio states that he is feeling more committed to his sobriety today.  (JACINDA)  9/28/18:  Remains at high risk for relapse.  (JACINDA)    Dimension Six: Recovery Environment  Daily Note: 6/24/18: Gio lives alone in a senior living community. He " plans to search for AA meetings to attend near his home. (DE) 9/25/2018:  No changes at this time. (JACINDA)  9/26/2018:  Gio is encouraged to expand his sober support network.  (JACINDA)      Weekly Progress / Treatment Plan Review      Are there additional progress notes for this week? No    Are Treatment Plan Goals being addressed? Treatment plan will be formulated on 10/1/18.    Are treatment plan strategies to address goals effective? N/A    Are there any current contracts in place? N/A    Client: N/A     Client: N/A    Was client case reviewed with [place] MD and/or Selam Bee MD and the treatment team this week? yes    Psychotherapists contributing to this note:    Group: Group Psychotherapy Date/Time: 9/24/18 / 9:00 to 9:50; Participants: 5 of 7. ;   Facilitated by: Dia Wallace MA, [facility], [facility]     Group: Group Psychotherapy Date/Time: 9/25/2018 / 1000 to 1050; Participants: 6 of 7. Appointments;   Facilitated by: NIKKI Montez(JACINDA)    Group: Autobiography Date/Time: 9/25/2018 / 1100 to 1150; Participants: 7 of 7. ;   Facilitated by: NIKKI Montez(JACINDA)    Group: Relapse Prevention Date/Time: 9/26/18 / 10:00 to 10:50; Participants: 7 of 7.   Facilitated by: Dina Morgan MA, [facility], [facility] (EK)    Group: Group Psychotherapy Date/Time: 9/26/2018 / 900 to 950; Participants: 7 of 7. ;   Facilitated by: NIKKI Montez(JACINDA)    Group: Interpersonal Relationships Date/Time: 9/26/2018 / 1100 to 1150; Participants: 7 of 7. ;   Facilitated by: NIKKI Montez(JACINDA)    Group: Group Psychotherapy Date/Time: 9/27/2018 / 900 to 950; Participants: 6 of 7. Vacation;   Facilitated by: NIKKI Montez(JACINDA)    Group: Emotions Management Date/Time: 9/28/18 / 10:00 to 10:50; Participants: 6 of 7. ;   Facilitated by: Dia Wallace MA, [facility], [facility] (DE)     Group: Group Psychotherapy Date/Time: 9/28/2018 / 900 to 950; Participants: 6 of 7. Vacation;   Facilitated by: Brittany Lockwood,  NIKKI(JACINDA)

## 2018-09-25 ENCOUNTER — HOSPITAL ENCOUNTER (OUTPATIENT)
Dept: BEHAVIORAL HEALTH | Facility: CLINIC | Age: 73
End: 2018-09-25
Attending: PSYCHIATRY & NEUROLOGY
Payer: MEDICARE

## 2018-09-25 PROCEDURE — H2012 BEHAV HLTH DAY TREAT, PER HR: HCPCS

## 2018-09-25 ASSESSMENT — ANXIETY QUESTIONNAIRES: GAD7 TOTAL SCORE: 8

## 2018-09-25 ASSESSMENT — PATIENT HEALTH QUESTIONNAIRE - PHQ9: SUM OF ALL RESPONSES TO PHQ QUESTIONS 1-9: 8

## 2018-09-25 NOTE — PROGRESS NOTES
"Adult Mental Health Outpatient Group Therapy Progress Note     Client Initial Individualized Goals for Treatment: \" To stay sober, because I do much better with my mental health when I am sober\"       See Initial Treatment suggestions for the client during the time between Diagnostic Assessment and completion of the Master Individualized Treatment Plan.    Treatment Goals:     see above     Area of Treatment Focus:  Symptom Management, Community Resources/Discharge Planning and Abstinence/Relapse Prevention    Therapeutic Interventions/Treatment Strategies:  Support, Feedback, Structured Activity, Problem Solving, Clarification, Education and Motivational Enhancement Therapy    Response to Treatment Strategies:  Accepted Feedback, Listened, Focused on Goals, Attentive and Accepted Support    Name of Group:  Life Skills 9:00 - 9:50    Group Participants: 5 of 7.      Description and Outcome:  Gio attended and participated in an experiential Psychoeducation group where rehabilitative intervention focuses on learning, developing through practice, and generalizing independent living skills. The purpose of this group is to stabilize and manage mental health symptoms, reduce/eliminate substance use, and to improve participation and function in valued roles, routines, relationships. He was oriented to the group with focus on practicing skills building and working on ways to manage symptoms and follow through with activities of daily living. He was open to identify problem areas for himself, noting that he thought he had problems in most areas on the list and that he has been struggling with this. He did share more specifics, when asked re: how perfectionistic thinking has impacted him with engagement in, and follow through, with various tasks and activities. He noted some challenges for himself in his home environment. He was open to explore various options for what he could practice in the group. Client verbalized " understanding of session content by relating his challenges with daily living skills and goals he would like to work on. Client would benefit from additional opportunities to practice and implement content from this session.  Will continue to assess.    Is this a Weekly Review of the Progress on the Treatment Plan?  No

## 2018-09-26 ENCOUNTER — HOSPITAL ENCOUNTER (OUTPATIENT)
Dept: BEHAVIORAL HEALTH | Facility: CLINIC | Age: 73
End: 2018-09-26
Attending: PSYCHIATRY & NEUROLOGY
Payer: MEDICARE

## 2018-09-26 PROCEDURE — H2012 BEHAV HLTH DAY TREAT, PER HR: HCPCS

## 2018-09-27 ENCOUNTER — HOSPITAL ENCOUNTER (OUTPATIENT)
Dept: BEHAVIORAL HEALTH | Facility: CLINIC | Age: 73
End: 2018-09-27
Attending: PSYCHIATRY & NEUROLOGY
Payer: MEDICARE

## 2018-09-27 PROCEDURE — H2012 BEHAV HLTH DAY TREAT, PER HR: HCPCS

## 2018-09-27 NOTE — PROGRESS NOTES
"Adult Mental Health Outpatient Group Therapy Progress Note     Client Initial Individualized Goals for Treatment: \" To stay sober, because I do much better with my mental health when I am sober\"       See Initial Treatment suggestions for the client during the time between Diagnostic Assessment and completion of the Master Individualized Treatment Plan.    Treatment Goals:     see above     Area of Treatment Focus:  Symptom Management, Community Resources/Discharge Planning and Abstinence/Relapse Prevention    Therapeutic Interventions/Treatment Strategies:  Support, Feedback, Structured Activity, Problem Solving, Clarification, Education and Motivational Enhancement Therapy    Response to Treatment Strategies:  Accepted Feedback, Listened, Focused on Goals, Attentive and Accepted Support    Name of Group:  Life Skills 11:00 - 11:50    Group Participants: 5 of 7.      Description and Outcome:  Gio attended and participated in an experiential Psychoeducation group where rehabilitative intervention focuses on learning, developing through practice, and generalizing independent living skills. The purpose of this group is to stabilize and manage mental health symptoms, reduce/eliminate substance use, and to improve participation and function in valued roles, routines, relationships. Topic for the group was non-medication techniques that can be utilized to help improve concentration and other executive functioning skills of the prefrontal cortex. He was quiet, but engaged in the discussion and learning of these techniques. With prompts, he did share some challenges for himself. He was open to identify a technique he wanted to practice more, focusing on meditation and mindfulness practice. Client verbalized understanding of session content by relating content to his own challenges and some things he has tried in the past and noted how he noticed benefits for himself. Client would benefit from additional opportunities to " practice and implement content from this session.  Will continue to assess.    Is this a Weekly Review of the Progress on the Treatment Plan?  No

## 2018-09-28 ENCOUNTER — HOSPITAL ENCOUNTER (OUTPATIENT)
Dept: BEHAVIORAL HEALTH | Facility: CLINIC | Age: 73
End: 2018-09-28
Attending: PSYCHIATRY & NEUROLOGY
Payer: MEDICARE

## 2018-09-28 PROCEDURE — H2012 BEHAV HLTH DAY TREAT, PER HR: HCPCS

## 2018-09-28 NOTE — PROGRESS NOTES
"Adult Mental Health Outpatient Group Therapy Progress Note     Client Initial Individualized Goals for Treatment: \" To stay sober, because I do much better with my mental health when I am sober\"       See Initial Treatment suggestions for the client during the time between Diagnostic Assessment and completion of the Master Individualized Treatment Plan.    Treatment Goals:     see above     Area of Treatment Focus:  Symptom Management, Community Resources/Discharge Planning, Abstinence/Relapse Prevention, Develop / Improve Independent Living Skills and Develop Socialization / Interpersonal Relationship Skills    Therapeutic Interventions/Treatment Strategies:  Support, Feedback, Structured Activity, Problem Solving, Clarification, Education and Motivational Enhancement Therapy    Response to Treatment Strategies:  Accepted Feedback, Listened, Focused on Goals, Attentive and Accepted Support    Name of Group:  Life Skills 11:00 - 11:50     Group Participants: 5 of 7.      Description and Outcome:  Gio attended and participated in an experiential Psychoeducation group where rehabilitative intervention focuses on learning, developing through practice, and generalizing independent living skills. The purpose of this group is to stabilize and manage mental health symptoms, reduce/eliminate substance use, and to improve participation and function in valued roles, routines, relationships.  He was open to briefly engage in planning and structuring his time for the weekend and sharing some of his plans and interests with staff. As he talked of one of his interests, affect brightened as he shared and he talked about connecting with a friend doing this activity. He then chose to follow through with a goal he had set for this week and engaged in this activity for himself quietly for remainder of the session. Client verbalized understanding of session content by noting benefits of having structured time and support in focusing on " some of his goals.  Client would benefit from additional opportunities to practice and implement content from this session.      Is this a Weekly Review of the Progress on the Treatment Plan?  Yes.      Are Treatment Plan Goals being addressed?  Yes, continue treatment goals      Are Treatment Plan Strategies to Address Goals Effective?  Yes, continue treatment strategies      Are there any current contracts in place?  No

## 2018-10-01 ENCOUNTER — HOSPITAL ENCOUNTER (OUTPATIENT)
Dept: BEHAVIORAL HEALTH | Facility: CLINIC | Age: 73
End: 2018-10-01
Attending: PSYCHIATRY & NEUROLOGY
Payer: MEDICARE

## 2018-10-01 PROCEDURE — H2012 BEHAV HLTH DAY TREAT, PER HR: HCPCS

## 2018-10-01 NOTE — PROGRESS NOTES
"Adult Dual Disorder Progress Note / Treatment Plan Review       Patient: Laurent Choi   MRN: [id number]  : 1945  Age: 73 year old  Sex: male    Week of: 10/1/18-10/5/18    Client Initial Individualized Goals for Treatment:   \" To stay sober, because I do much better with my mental health when I am sober\"     See Initial Treatment suggestions for the client during the time between Diagnostic Assessment and completion of the Master Individualized Treatment Plan.    Treatment Goals:  Client will notify staff when needing assistance to develop or implement a coping plan to manage suicidal or self injurious urges.  Client will use coping plan for safety, as needed.    Gio will take time 1-2/week in psychotherapy to idenitfy current symptoms and stressors and to develop a coping strategy for each.      Gio will have 0 instances of chemical use reporting outcomes to the group daily.      Gio will take time 1-2/week in psychotherapy to identify ways he can reduce isolation and restore balance to his daily routine.    Will enlist involvement of support network by setting up an appointment with an individual therapist by the target date.    Gio will attend one AA meeting per week reporting outcomes to the group weekly.     Active Psychiatric Symptoms Impairing:   Thought, Mood, Behavior and Perception    Area of Treatment Focus:  Symptom Management, Personal Safety, Community Resources/Discharge Planning and Abstinence/Relapse Prevention    Treatment Strategies:   Support, Feedback, Limit/Boundaries, Safety Assessments, Structured Activity, Problem Solving, Clarification, Education, Motivational Enhancement Therapy and Cognitive Behavioral Therapy    Patient Response:  Accepted Feedback, Gave Feedback, Listened, Focused on Goals, Attentive, Accepted Support and Alert      Dimension Risk Description and Outcome:    Dimension One: Acute Intoxication/Withdrawal Potential   Daily Note:  10/1/2018: Gio reports " "that he continues to maintain his sobriety. (JACINDA)  10/2/2018:  Sobriety maintained.  (JACINDA)  10/3/2018:  Gio reports continued sobriety.  (JACINDA)  10/4/2018:  Gio reports that he continues to maintain his sobriety.  (JACINDA) 10/5/18: iGo states that he continues to stay sober.  (JACINDA)    Dimension Two: Biomedical Conditions and Complications  Daily Note:  10/1/2018:  No new physical health concerns reported.  (JACINDA)  10/3/2018:  Denies any changes in physical health status.  (JACINDA) 10/5/18: Gio reports that he is feeling \"exhausted\" today.  He reports that he slept well last night.  (JACINDA)    Dimension Three: Emotional/Behavioral / Cognitive Conditions & Complications  Daily Note:  10/1/2018: (11-11:50am):  Gio reports that he had a \"good weekend\" and was able to maintain his sobriety.  Gio reports that he is staying busy by spending time with peers and going to the theatre.  He denies thoughts to self harm at this time. Gio states that he is still feeling ambivalent about being in the program but plans to keep coming to group. (JACINDA) 10/2/2018: (9-9:50am):  Gio reports that he is \"feeling pretty good today\" as he was able to sleep through the night.  He states that he is feeling less \"frazzled\" and anxious as he has been maintaining his sobriety and improving his sleep.  He denies current thoughts to self harm.  (JACINDA)  10/3/2018: (9-9:50am):  Gio reports that he is feeling \"euphoric\" and has been putting his energy into his work.  Gio states that he is feeling hopeful about the future and is committed to his sobriety.  He denies thoughts to self harm.  (JACINDA)  10/4/2018: (9-9:50am):  Gio states that he is feeling \"laid back\" today as a result of getting good sleep.  He denies current stressors or thoughts to self harm.  (JACINDA) 10/5/18: (9-9:50am):  Gio reports that he \"did not want to come to group today\" but states that he knows he needed to follow through in order to maintain his sobriety.  He denies thoughts to self harm and " "states that he has plans for the weekend.  He did not wish to take time in group.  (JACINDA)    Dimension Four: Treatment Acceptance / Resistance  Daily Note: 10/1/2018: Goal Setting  (9-9:50am):  Gio completed his Weekly Goal Setting sheet and states that he fulfilled all his goals from the previous week.  He would like to focus on expanding his sober support network by attending AA or another community based support group.  Gio states that he would like to continue to work on regulating his sleep. (JACINDA) 10/2/2018: Autobiography (11-11:50am):  Gio listened attentively to his peer's autobiography and offered supportive feedback in the discussion that followed.  (JACINDA) 10/3/2018 Emotions Management (11:00-11:50): Writer taught and facilitated discussion on identifying values,how living in accordance with values impacts emotional well-being, and how mental health symptoms and substance use impacts ability to stick to values. Gio participated by identifying values he would like to focus on and identifying ways he can live out values. Gio expressed understanding of topic. Gio can benefit from further assistance in managing symptoms that impact ability to live out values. (DE) 10/4/2018:  Gio participated in the group discussion in psychotherapy and shared that he feels that it is disrespectful when other members of the group arrive late for psychotherapy.  (JACINDA)10/4/18 MICD Education Gio was actively engaged in active mindfulness experiential.  He verbalized understanding of session. (SS) 10/5/18: Interpersonal Relationships (11-11:50am):  Gio had a difficult time staying awake during the \"Handling Conflict\" video.  He did not participate in the group discussion.  (JACINDA)    Dimension Five: Continued Use/Relapse Prevention  Daily Note:  10/1/2018:  Moderate to high risk for relapse.  (JACINDA)  10/2/2018: Pt attended Relapse Prevemtion Group wher pt was given an assignment to write a \"Good-Bye\" letter to pts substance(s) of " choice. Pt was active in group discussion and verbalized an understanding of the material (JORGE). 10/3/2018:   Moderate risk for relapse.  (JACINDA)  10/4/18:  Gio states that he is still feeling committed to maintaining his sobriety.  (JACINDA)    Dimension Six: Recovery Environment  Daily Note:  10/1/2018:  Encouraged to attend AA this evening.  (JACINDA)  10/2/2018:  Gio states that he will attend at least one AA meeting per week.  (JACINDA)  10/4/2018:  Gio is encouraged to expand his sober support network.  (JACINDA)  10/5/18:  Gio states that he has plans to spend time with sober peers this weekend.  (JACINDA)      Weekly Progress / Treatment Plan Review      Are there additional progress notes for this week? No    Are Treatment Plan Goals being addressed? Yes, continue treatment goals    Are treatment plan strategies to address goals effective? Yes, continue treatment strategies    Are there any current contracts in place? No    Client: Agrees with treatment plan changes     Client: Received copy of revised treatment plan    Was client case reviewed with [place] MD and/or Selam Bee MD and the treatment team this week? yes    Psychotherapists contributing to this note:    Group: Goal Setting Date/Time: 10/1/2018 / 900 to 950; Participants: 5 of 7. Vacation, No call/no show;   Facilitated by: NIKKI Montez(JACINDA)    Group: Group Psychotherapy Date/Time: 10/1/2018 / 1100 to 1150; Participants: 3 of 7. Vacation, No call/no show, appointments    Facilitated by: NIKKI Montez (JACINDA)    Group: Relapse Prevention Date/Time: 10/2/2018 / 1000 to 1050; Participants: 5 of 7.    Facilitated by: Andrew Flores MA [facility] (JORGE)    Group: Group Psychotherapy Date/Time: 10/2/2018 / 900 to 950; Participants: 5 of 7. Vacation/appointments;   Facilitated by: NIKKI Montez(JACINDA)    Group: Autobiography Date/Time: 10/2/2018 / 1100 to 1150; Participants: 5 of 7. Vacation/appointments;   Facilitated by: Brittany Lockwood  NIKKI(JACINDA)    Group: Group Psychotherapy Date/Time: 10/3/2018 / 900 to 950; Participants: 5 of 7. Inpatient, No call/no show;   Facilitated by: NIKKI Montez(JACINDA)    Group: Emotions Management Date/Time: 10/3/18 / 11:00 to 11:50; Participants: 3 of 7. ;   Facilitated by: Dia Wallace MA, [facility], [facility] (DE)     Group: Group Psychotherapy Date/Time: 10/4/2018 / 900 to 950; Participants: 6 of 7. Hospitalization ;   Facilitated by: NIKKI Montez(JACINDA)    Group: Group Psychotherapy Date/Time: 10/5/18 / 900 to 950; Participants: 7 of 7. ;   Facilitated by: NIKKI Montez(JACINDA)    Group: Interpersonal Relationships Date/Time: 10/5/18 / 1100 to 1150; Participants: 7 of 7. ;   Facilitated by: NIKKI Montez(JACINDA)    Group: MICD Education Date/Time: 10 / 04/2018 1100 to 1150; Participants: 6 of 7. ;   [facility]ilitated by: [facility] ()

## 2018-10-02 ENCOUNTER — HOSPITAL ENCOUNTER (OUTPATIENT)
Dept: BEHAVIORAL HEALTH | Facility: CLINIC | Age: 73
End: 2018-10-02
Attending: PSYCHIATRY & NEUROLOGY
Payer: MEDICARE

## 2018-10-02 PROCEDURE — H2012 BEHAV HLTH DAY TREAT, PER HR: HCPCS

## 2018-10-02 NOTE — PROGRESS NOTES
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 No. Were they invited? No. Gio does not wish to have any family involvement at this time    Areas of Vulnerability:  Suicidal Ideation   Anxiety  Depressive symptoms   Sensory deficit: needs glasses   Physical/medical: heart disease   Trauma/Abuse/Neglect    Long-Term Goals:  Knowledge about illness and management of symptoms   Maintenance of personal safety   Maintenance of sobriety   Effective management of impulsivity   Increased involvement in AA     Abuse Prevention Plan:  Safe, therapeutic environment   Safety coping plan as needed   Education regarding illness and skill development   Coordination with care providers   Impluse control education and intervention   Medication adjustment/management (MI/CD)   Monitor for use of substances    Discharge Criteria:  Satisfactory progress toward treatment goals   Improvement re: identified problems and symptoms   Ability to continue recovery at next level of service   Has a discharge  plan in place   Has safety/coping plan in place   Ability to maintain sobriety  Share autobiography   Complete goodbye letter assignment   Complete coping cards   Regular attendance as scheduled     Areas of Treatment Focus            Area of Treatment Focus:   Personal Safety  Start Date:    10/1/18    Dimension:   III. Emotional / Behavioral Condition    Description:    Gio has experienced suicidal ideation in the past but denies any current SI/SIB.      Goal:  Target Date: 11/26/18 Status: COMPLETED  Client will notify staff when needing assistance to develop or implement a coping plan to manage suicidal or self injurious urges.  Client will use coping plan for safety, as needed.      Progress:   10/29/18  Gio reports that he continues to not experience any of these thoughts. Will CONTINUE goal while in the program.     11/1/18   As above. Goal COMPLETED    Treatment Strategies:   Assist clients in establishing / strengthening support network  Assist to identify treatment goals  Assess / reassess for appropriate therapy program involvement, encourage participation in therapies  Assess / reassess level of potential for harm to self or others  Engage in safety planning when indicated  Facilitate increased self awareness  Teach adaptive coping skills and communication skills        Area of Treatment Focus:   Symptom Stabilization and Management  Start Date:    10/1/18    Dimension:   II. Biomedical Conditions and III. Emotional / Behavioral Condition    Description:    Gio's symptoms include depressed mood, irritability, insomnia, anhedonia, fatigue, low energy, poor concentration, muscle tension, and thoughts of death/suicide.      Goal:  Target Date: 11/26/18 Status: COMPLETED  Gio will take time 1-2/week in psychotherapy to idenitfy current symptoms and stressors and to develop a coping strategy for each.       In Life Skills groups, Gio will identify a skill he has used to help follow through with a goal or  activity of daily living, noting benefits for his mental health, once each week.      Progress:   10/29/18   #1: Gio reports that he has been making progress with this goal. Will CONTINUE this goal for further development.  #2: Gio has been able to follow through with many of his goals. Making progress and will CONTINUE for further development.    11/1/18  #1: Goal COMPLETED.  #2: Goal COMPLETED as written.    Treatment Strategies:   Assist clients in establishing / strengthening support network  Assist to identify treatment goals  Assess / reassess level of potential for harm to self or others  Engage in safety planning when indicated  Facilitate increased self awareness  Teach adaptive coping skills and communication skills        Area of Treatment Focus:   Abstinence / Relapse Prevention  Start Date:    10/1/18    Dimension:   I. Acute Intoxication / Withdrawal Potential, IV. Treatment Acceptance / Resistance and V. Relapse    Description:    Gio's use of chemicals has impacted his relationships, his academics, and has exacerbated his depression and anxiety.    Goal:     Target Date: 11/26/18        Status: COMPLETED  Gio will have 0 instances of chemical use reporting outcomes to the group daily.      Gio will take time 1-2/week in psychotherapy or life skills groups to identify ways he can reduce isolation and restore balance to his daily routine.      Progress:   10/29/18  #1: Gio has remained sober and reports that he is very proud of this and feeling confident about his long term abstinence for himself at this time. Will CONTINUE this goal while in treatment.    #2: Gio is making progress with this. He is working to restore balance and focus on his creativity. Will CONTINUE goal for further development.   11/1/18  #1: As above. Goal COMPLETED.  #2: As above. Goal COMPLETED    Treatment Strategies:   Assist clients in establishing / strengthening support network  Assist to identify treatment goals  Facilitate  increased self awareness  Provide education regarding relapse prevention  Teach adaptive coping skills and communication skills        Area of Treatment Focus:   Community Resources / Support and Discharge Planning  Start Date:    10/1/18    Dimension:   VI. Recovery Environment    Description:    Gio would like to expand his sober support network in the community.    Goal: Target Date: 11/26/18 Status: COMPLETED  Will enlist involvement of support network by setting up an appointment with an individual therapist by the target date.    Gio will attend one AA meeting per week reporting outcomes to the group weekly.      Progress:   10/29/18  #1: Gio has appointments set for psychiatry. He will set up an appointment with [facility] this week, as they have contacted him to set this up. CONTINUE goal until discharge from the program.    #2: Gio has not attending AA at this time, but would like to CONTINUE this goal to still talk about exploring this options for aftercare.   11/1/18    #1: Goal COMPLETED. Gio reported that he did set up individual therapy.  #2: Goal STOPPED. He discharged from program a couple of days earlier than initially planned.    Treatment Strategies:   Assist clients in establishing / strengthening support network  Assist to identify treatment goals  Assist with discharge planning  Facilitate increased self awareness  Teach adaptive coping skills and communication skills

## 2018-10-02 NOTE — PROGRESS NOTES
Adult Dual Disorder Program:   Acknowledgement of Current Treatment Plan       I have reviewed my treatment plan with my therapist / counselor on 10/1/18.   I agree with the plan as it is written in the electronic health record.    Name:      Signature:  Laurent Mills MD  Psychiatrist    Brittany Lockwood, [facility]  Psychotherapist    Dia Wallace, [facility], [facility]  Psychotherapist    Cori Marques RN, PHN  Nurse Liaison    Joie Torres OTR/L  Occupational Therapist    Jasbir Flores MA,   Psychotherapist    Selam Bee MD  Psychiatrist/Medical Director

## 2018-10-03 ENCOUNTER — HOSPITAL ENCOUNTER (OUTPATIENT)
Dept: BEHAVIORAL HEALTH | Facility: CLINIC | Age: 73
End: 2018-10-03
Attending: PSYCHIATRY & NEUROLOGY
Payer: MEDICARE

## 2018-10-03 PROCEDURE — H2012 BEHAV HLTH DAY TREAT, PER HR: HCPCS

## 2018-10-04 ENCOUNTER — HOSPITAL ENCOUNTER (OUTPATIENT)
Dept: BEHAVIORAL HEALTH | Facility: CLINIC | Age: 73
End: 2018-10-04
Attending: PSYCHIATRY & NEUROLOGY
Payer: MEDICARE

## 2018-10-04 PROCEDURE — H2012 BEHAV HLTH DAY TREAT, PER HR: HCPCS

## 2018-10-04 NOTE — PROGRESS NOTES
"Adult Mental Health Outpatient Group Therapy Progress Note     Client Initial Individualized Goals for Treatment: \" To stay sober, because I do much better with my mental health when I am sober\"       See Initial Treatment suggestions for the client during the time between Diagnostic Assessment and completion of the Master Individualized Treatment Plan.    Treatment Goals:     see above     Area of Treatment Focus:  Symptom Management, Community Resources/Discharge Planning and Abstinence/Relapse Prevention    Therapeutic Interventions/Treatment Strategies:  Support, Feedback, Structured Activity, Problem Solving, Clarification, Education and Motivational Enhancement Therapy    Response to Treatment Strategies:  Accepted Feedback, Listened, Focused on Goals, Attentive and Accepted Support    Name of Group:  Life Skills 10:00 - 10:50    Group Participants: 4 of 7.      Description and Outcome:  Gio attended and participated in an experiential Psychoeducation group where rehabilitative intervention focuses on learning, developing through practice, and generalizing independent living skills. The purpose of this group is to stabilize and manage mental health symptoms, reduce/eliminate substance use, and to improve participation and function in valued roles, routines, relationships.  Topic for group was mindfulness practice and clients were provided education on a variety of types and identified a few that they are familiar with. Focus was on practice of distraction and relaxation with 2 different types of structured techniques. He was open to try one of the techniques and engaged in throughout the session. He was quite social and talkative and he engaged in the technique, focusing on information he has learned in the past re: use of creativity for management and some things he has practiced in the past for himself.  Client verbalized understanding of session content by relating his own experiences with techniques he has " learned and tried to practice in the past. He would benefit from focusing on practicing some of these techniques at home at this time and will work toward implementing these toward management.      Is this a Weekly Review of the Progress on the Treatment Plan?  No

## 2018-10-05 ENCOUNTER — HOSPITAL ENCOUNTER (OUTPATIENT)
Dept: BEHAVIORAL HEALTH | Facility: CLINIC | Age: 73
End: 2018-10-05
Attending: PSYCHIATRY & NEUROLOGY
Payer: MEDICARE

## 2018-10-05 PROCEDURE — H2012 BEHAV HLTH DAY TREAT, PER HR: HCPCS

## 2018-10-05 NOTE — PROGRESS NOTES
"Adult Mental Health Outpatient Group Therapy Progress Note     Client Initial Individualized Goals for Treatment: \" To stay sober, because I do much better with my mental health when I am sober\"       See Initial Treatment suggestions for the client during the time between Diagnostic Assessment and completion of the Master Individualized Treatment Plan.    Treatment Goals:  In Life Skills groups, Gio will identify a skill he has used to help follow through with a goal or activity of daily living, noting benefits for his mental health, once each week.  Will enlist involvement of support network by setting up an appointment with an individual therapist by the target date.  Gio will attend one AA meeting per week reporting outcomes to the group weekly.    Gio will take time 1-2/week in psychotherapy or life skills groups to identify ways he can reduce isolation and restore balance to his daily routine.  Gio will take time 1-2/week in psychotherapy to idenitfy current symptoms and stressors and to develop a coping strategy for each.            Area of Treatment Focus:  Symptom Management, Community Resources/Discharge Planning, Abstinence/Relapse Prevention, Develop / Improve Independent Living Skills and Develop Socialization / Interpersonal Relationship Skills    Therapeutic Interventions/Treatment Strategies:  Support, Feedback, Structured Activity, Problem Solving, Clarification, Education and Motivational Enhancement Therapy    Response to Treatment Strategies:  Accepted Feedback, Listened, Focused on Goals, Attentive and Accepted Support    Name of Group:  Life Skills 10:00 - 10:50     Group Participants: 7 of 7.      Description and Outcome:  Gio attended and participated in an experiential Psychoeducation group where rehabilitative intervention focuses on learning, developing through practice, and generalizing independent living skills. The purpose of this group is to stabilize and manage mental health symptoms, " reduce/eliminate substance use, and to improve participation and function in valued roles, routines, relationships.  He noted plan for the session to focus on his individual practice and goal of being more social and reaching out to some people that he had not been doing that with. He was focused for the session, appeared more alert and with good grooming this session. Staff checked in and he noted that he had been able to sleep better that the previous night and was feeling more rested with stable mood. Client verbalized understanding of session content by noting benefits of having structured time and support in focusing on some of his goals as he does struggle at times to do these outside of treatment at this time. He is open to explore techniques to use to start to generalize these skills and practice at home.  Client would benefit from additional opportunities to practice and implement content from this session.      Is this a Weekly Review of the Progress on the Treatment Plan?  Yes.      Are Treatment Plan Goals being addressed?  Yes, continue treatment goals      Are Treatment Plan Strategies to Address Goals Effective?  Yes, continue treatment strategies      Are there any current contracts in place?  No

## 2018-10-08 ENCOUNTER — TELEPHONE (OUTPATIENT)
Dept: PSYCHIATRY | Facility: CLINIC | Age: 73
End: 2018-10-08

## 2018-10-08 ENCOUNTER — HOSPITAL ENCOUNTER (OUTPATIENT)
Dept: BEHAVIORAL HEALTH | Facility: CLINIC | Age: 73
End: 2018-10-08
Attending: PSYCHIATRY & NEUROLOGY
Payer: MEDICARE

## 2018-10-08 PROCEDURE — H2012 BEHAV HLTH DAY TREAT, PER HR: HCPCS

## 2018-10-08 NOTE — PROGRESS NOTES
"Adult Dual Disorder Progress Note / Treatment Plan Review       Patient: Laurent Choi   MRN: [id number]  : 1945  Age: 73 year old  Sex: male    Week of: 10/8/18-10/11/18    Client Initial Individualized Goals for Treatment:   \"To stay sober, because I do much better with my mental health when I am sober\"     See Initial Treatment suggestions for the client during the time between Diagnostic Assessment and completion of the Master Individualized Treatment Plan.    Treatment Goals:  Client will notify staff when needing assistance to develop or implement a coping plan to manage suicidal or self injurious urges.  Client will use coping plan for safety, as needed.    Gio will take time 1-2/week in psychotherapy to idenitfy current symptoms and stressors and to develop a coping strategy for each.      Gio will have 0 instances of chemical use reporting outcomes to the group daily.      Gio will take time 1-2/week in psychotherapy to identify ways he can reduce isolation and restore balance to his daily routine.    Will enlist involvement of support network by setting up an appointment with an individual therapist by the target date.    Gio will attend one AA meeting per week reporting outcomes to the group weekly.     Active Psychiatric Symptoms Impairing:   Thought, Mood, Behavior and Perception    Area of Treatment Focus:  Symptom Management, Personal Safety, Community Resources/Discharge Planning and Abstinence/Relapse Prevention    Treatment Strategies:   Support, Feedback, Limit/Boundaries, Safety Assessments, Structured Activity, Problem Solving, Clarification, Education, Motivational Enhancement Therapy and Cognitive Behavioral Therapy    Patient Response:  Accepted Feedback, Listened, Attentive, Accepted Support and Alert      Dimension Risk Description and Outcome:    Dimension One: Acute Intoxication/Withdrawal Potential   Daily Note:  10/8/2018: Gio reports that he was able to maintain his " "sobriety over the weekend.  (JACINDA)  10/9/2018:  Gio reports continued sobriety. (JACINDA)  10/10/2018:  Gio states that he continues to maintain his sobriety.  (JACINDA)    Dimension Two: Biomedical Conditions and Complications  Daily Note: 10/8/2018: No new physical health concerns reported.  (JACINDA)  10/9/2018:  Gio reports 0 changes.  (JACINDA)  10/10/2018:  Gio reports that he is feeling \"achy\" today.  (JACINDA)    Dimension Three: Emotional/Behavioral / Cognitive Conditions & Complications  Daily Note: 10/8/2018: (9-9:50am): Gio reports that he \"is not sure how he's feeling today\".  He reports that the weekend was \"uneventful\" and that he mostly stayed home instead of going out with friends to the theatre.  Gio states that he was able to talk to a few friends on the phone over the weekend for support.  He denies thoughts to self harm. (JACINDA)  10/9/2018: (10-10:50am): Gio reports that he is feeling \"neither here nor there\" today.  He states that he is feeling \"spacey\" and is not too grounded.  He denies thoughts to self harm and states that he does not need time in group.  (JACINDA)  10/10/2018: (10-10:50am):  Gio reports that he is feeling \"defeated\" and is having \"somewhat of an existential crisis today\".  He shares that he is still feeling grateful to be sober so that is \"a good thing\".  He denies thoughts to self harm and states that he does not wish to take time in group.  (JACINDA)    Dimension Four: Treatment Acceptance / Resistance  Daily Note: 10/8/2018:  Gio needed prompts to participate in group psychotherapy.  (JACINDA)  10/9/2018: Autobiography (11-11:50am):  Gio listened attentively to his peer's autobiography and offered supportive feedback in the discussion that followed.  (JACINDA) 10/10/2018:  Interpersonal Relationships (11-11:50am):  Gio participated in the group on \"Control in Relationships\" stating that he feels that gender adds \"another layer of difficulty\" to this topic.  He did not share any personal information but " "appeared to be attentive to the group conversation.  (JACINDA)    Dimension Five: Continued Use/Relapse Prevention  Daily Note: 10/8/2018:  Gio reports that he had some cravings to drink over the weekend.  (JACINDA)  10/9/2018:  Gio states that he is not having any cravings to drink at this time.  (JACINDA) 10/10/2018: Pt attended Relapse prevention group where we discussed the process of relapse. Pt was active in discussion. Pt reports his last use was 6 weeks ago and denies urges. Pt believes Vivitrol is helpful in controlling his cravings. Pt was read his \"good-Bye letter\" to alcohol and participated in discussion following the reading. Pt verbalized an understanding of the material presented (JORGE).     Dimension Six: Recovery Environment  Daily Note:  10/8/2018:  Did not attend AA over the weekend.  Reports that he stayed home and isolated.  (JACINDA)  10/9/2018:  Gio reports that he is staying in contact with friends over the phone.  (JACINDA)  10/10/2018:  Encouraged to expand sober support network.  (JACINDA)      Weekly Progress / Treatment Plan Review      Are there additional progress notes for this week? No    Are Treatment Plan Goals being addressed? Yes, continue treatment goals    Are treatment plan strategies to address goals effective? Yes, continue treatment strategies    Are there any current contracts in place? No    Client: No changes at this time.       Client: N/A    Was client case reviewed with [place] MD and/or Selam Bee MD and the treatment team this week? yes    Psychotherapists contributing to this note:    Group: Group Psychotherapy Date/Time: 10/8/2018 / 900 to 950; Participants: 3 of 7. No call, no show/ Appointments;   Facilitated by:  NIKKI Montez([facility])    Group: Group Psychotherapy Date/Time: 10/9/2018 / 1000 to 1050; Participants: 6 of 6. ;   Facilitated by: NIKKI Montez(JACINDA)    Group: Autobiography Date/Time: 10/9/2018 / 1100 to 1150; Participants: 6 of 6. ;   Facilitated by: " NIKKI Montez(JACINDA)    Group: 10/10/2018 Date/Time: 0900 to 0950 ; Participants: 5 of 7. ;   Facilitated by: Andrew Flores MA, LP [facility] (JORGE)    Group: Group Psychotherapy Date/Time: 10/10/2018 / 1000 to 1050; Participants: 6 of 7. No call, no show;   Facilitated by: NIKKI Montez(JACINDA)    Group: Interpersonal Relationships Date/Time: 10/10/2018 / 1100 to 1150; Participants: 6 of 7. No call, no show;   Facilitated by: NIKKI Montez(JACINDA)

## 2018-10-08 NOTE — PROGRESS NOTES
"Adult Mental Health Outpatient Group Therapy Progress Note     Client Initial Individualized Goals for Treatment: \" To stay sober, because I do much better with my mental health when I am sober\"       See Initial Treatment suggestions for the client during the time between Diagnostic Assessment and completion of the Master Individualized Treatment Plan.    Treatment Goals:    In Life Skills groups, Gio will identify a skill he has used to help follow through with a goal or activity of daily living, noting benefits for his mental health, once each week.  Will enlist involvement of support network by setting up an appointment with an individual therapist by the target date.  Gio will attend one AA meeting per week reporting outcomes to the group weekly.    Gio will take time 1-2/week in psychotherapy or life skills groups to identify ways he can reduce isolation and restore balance to his daily routine.  Gio will take time 1-2/week in psychotherapy to idenitfy current symptoms and stressors and to develop a coping strategy for each.         Area of Treatment Focus:  Symptom Management, Community Resources/Discharge Planning and Abstinence/Relapse Prevention    Therapeutic Interventions/Treatment Strategies:  Support, Feedback, Structured Activity, Problem Solving, Clarification, Education and Motivational Enhancement Therapy    Response to Treatment Strategies:  Accepted Feedback, Listened, Focused on Goals, Attentive and Accepted Support    Name of Group:  Goal Setting 11:00 - 11:50     Group Participants: 4 of 7.      Description and Outcome:  Gio participated in group that focused on learning about the benefits and practicing of setting realistic goals for treatment and management of mental and chemical health, along with self reflection on accomplishments and practicing problem solving obstacles. He reported that he had followed through with some of his goals, but tended to initially focus on his challenges. He " needed some assistance to make his goal more specific and measurable. He did note a focus to work on balance with his activities, as he is spending a lot of time with his work and continues to struggle to be social. He noted no attendance to an AA meeting, but was open to peer's suggestions re: just going and how this could be a first step for being more social with others. Client verbalized understanding of session content by noting some accomplishments and being open to the process of setting goals into manageable steps, noting these seemed realistic for him to try to do. Client would benefit from additional opportunities to practice and implement content from this session.      Is this a Weekly Review of the Progress on the Treatment Plan?  No

## 2018-10-08 NOTE — TELEPHONE ENCOUNTER
On 10/5/2018, 54 pages of incoming records were received from [facility]. This writer put the originals in Dr. Saldana's folder for review, the originals will be sent to scanning when Dr. Saldana is done reviewing them and a message was routed to Dr. Saldana. Dayanna Jones LPN

## 2018-10-09 ENCOUNTER — HOSPITAL ENCOUNTER (OUTPATIENT)
Dept: BEHAVIORAL HEALTH | Facility: CLINIC | Age: 73
End: 2018-10-09
Attending: PSYCHIATRY & NEUROLOGY
Payer: MEDICARE

## 2018-10-09 PROCEDURE — H2012 BEHAV HLTH DAY TREAT, PER HR: HCPCS

## 2018-10-09 PROCEDURE — 90792 PSYCH DIAG EVAL W/MED SRVCS: CPT | Performed by: PSYCHIATRY & NEUROLOGY

## 2018-10-09 NOTE — H&P
"Admitted:     10/09/2018      INITIAL PSYCHIATRIC EVALUATION      PATIENT IDENTIFICATION:  This is a 73-year-old  white male.      CHIEF COMPLAINT:  The patient is in his third week of the [facility] day treatment program.  He was referred here after completing 21 days of residential chemical dependency treatment at [facility].      HISTORY OF PRESENT ILLNESS:  This patient has had 4 or 5 psychiatric hospitalizations in the past, but he denies any history of suicide attempts.  He has had problems with depression since college, and says that Prozac has generally been very effective for him.  He says that over the past couple of weeks his mood has been \"pretty good\" and he attributes this to sobriety and being back on his Prozac.  He says \"when I drink I go way down in my mood.\"  He says he has been sober now for 6 weeks and \"I'm starting to feel normal and productive again.\"  His drinking tends to be in binges lasting 5-10 days, during which he does not eat or drink, and then he gets very sick and then will go about 5 days without drinking.  His longest period of sobriety since college was 4 years, and this occurred from [id number].  He then returned to social drinking for a couple of years but this spring his drinking became excessive again.  He has not had any difficulty with illicit drugs.  He has done chemical dependency treatment 4-5 times, plus a couple of outpatient programs.  He says that he has had problems with anxiety since grade school and has had panic attacks in the past but he has not had these lately.  He says his anxiety lately has been \"low-grade and very workable.\"  He says that Remeron 15 mg at bedtime is helpful for his anxiety and sleep, and the Prozac is helpful as well.  The patient is glad he is in the program here, and says that he has not been having significant cravings for alcohol.  He got a Vivitrol injection about 1 month ago, and he believes that this has helped reduce his cravings " a great deal.  However, he is concerned about a rash that he developed after getting the injection on his ankles and calves, and he shows me this today.  It is a dry, red, scaly rash, with no concerns for infection, although he does have some edema in his lower legs.  He says he has tried Antabuse in the past but used alcohol while on it, and does not feel that it was helpful.      REVIEW OF SYSTEMS:  A 10-point review of systems was completed.  All systems were negative except as noted above.      PAST MEDICAL HISTORY:  The patient denies any history of liver problems.  He does have hypertension, and takes amlodipine and losartan for that.  He takes omeprazole for GERD.  He was started on Flomax for urinary retention.  He has a primary care doctor outside of the [place] network.      SOCIAL HISTORY:  The patient lives alone in an apartment.  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  He is , and late in life had a second marriage with a man.  He was in a 30-year relationship with this man prior to that.  This partner  last year, and the patient becomes a bit tearful when relating this.      FAMILY HISTORY:  The patient is not aware of any family history of mental health problems, chemical dependence or suicides.      MENTAL STATUS EXAMINATION:  His general appearance is neat, clean and well groomed.  He makes good eye contact and has no psychomotor agitation or retardation.  His speech is of normal rate, tone and volume.  His thought process is linear and logical.  He has no suicidal or homicidal ideations.  There are no symptoms of psychosis.  His associations are intact.  His insight and judgment are good.  He is oriented to time, place and person.  His recent and remote memory are intact.  His attention span and concentration are fair.  His language is appropriate and his fund of  knowledge is average.  His mood is reportedly euthymic and stable.  His affect is calm and appropriate.  His muscle strength and tone are normal, as are his gait and station.      DIAGNOSES:   Axis I:     1.  Major depressive disorder, recurrent, in partial remission.   2.  Generalized anxiety disorder.   3.  History of panic disorder.   4.  Alcohol use disorder, severe.   Axis II:  Deferred.   Axis III:  Hypertension, gastroesophageal reflux disease, urinary retention, no history of liver problems to his knowledge.   Axis IV:  Social isolation, lack of primary support, loss of his partner of 30 years last year and resulting grief.   Axis V:  Global Assessment of Functioning on this assessment is 60.      PLAN:  The patient [facility].  He is participating well according to staff, and he feels he is benefiting from the program.  He plans to continue to completion.  He would like to continue on his Prozac 20 mg daily and Remeron 15 mg at bedtime, as he feels that they are very helpful for depression, anxiety and sleep.  He has an appointment next week in the [facility] here on the [facility], in the professional building, for followup of his Vivitrol injection, which he started last month.  He developed a rash following that injection, and he will bring this up at his appointment next week.  He does feel the Vivitrol has been very helpful in reducing cravings, so he is hopeful that he can continue it.  He is advised to abstain from alcohol and illicit drugs, and he states that this is his plan.  He was further advised to go to an ER or call a crisis number should his symptoms worsen, or if SI or HI should occur.  The patient was agreeable to the above plan and contracted for safety.         FABI WOLF MD             D: 10/09/2018   T: 10/09/2018   MT: HANH      Name:     ENRIKE KENNEY   MRN:      [id number]        Account:      [id number]   :       1945        Admitted:     10/09/2018                   Document: [id number]

## 2018-10-09 NOTE — PROGRESS NOTES
"Adult Mental Health Outpatient Group Therapy Progress Note     Client Initial Individualized Goals for Treatment: \" To stay sober, because I do much better with my mental health when I am sober\"       See Initial Treatment suggestions for the client during the time between Diagnostic Assessment and completion of the Master Individualized Treatment Plan.    Treatment Goals:  In Life Skills groups, Gio will identify a skill he has used to help follow through with a goal or activity of daily living, noting benefits for his mental health, once each week.  Will enlist involvement of support network by setting up an appointment with an individual therapist by the target date.  Gio will attend one AA meeting per week reporting outcomes to the group weekly.    Gio will take time 1-2/week in psychotherapy or life skills groups to identify ways he can reduce isolation and restore balance to his daily routine.  Gio will take time 1-2/week in psychotherapy to idenitfy current symptoms and stressors and to develop a coping strategy for each.           Area of Treatment Focus:  Symptom Management, Community Resources/Discharge Planning and Abstinence/Relapse Prevention    Therapeutic Interventions/Treatment Strategies:  Support, Feedback, Structured Activity, Problem Solving, Clarification, Education and Motivational Enhancement Therapy    Response to Treatment Strategies:  Accepted Feedback, Listened, Focused on Goals, Attentive and Accepted Support    Name of Group:  Life Skills 9:00 - 9:50    Group Participants: 6 of 7.      Description and Outcome:  Gio attended and participated in an experiential Psychoeducation group where rehabilitative intervention focuses on learning, developing through practice, and generalizing independent living skills. The purpose of this group is to stabilize and manage mental health symptoms, reduce/eliminate substance use, and to improve participation and function in valued roles, routines, " relationships. He initiated his goal and technique for the session, but was also open to try 2 other techniques for himself. He practiced one with a peer and noted benefits of helping him feel more alert and focused. He also tried a sensory modulation technique with vestibular sense of alerting and to also help his posture when he spends extended periods of time sitting at his desk doing work tasks. He noted that after some time, he was able to note benefits for himself. He then focused on an individual technique and goal to follow through on some activities for himself. Client verbalized understanding of session content by full engagement in trying techniques for himself and noting benefits for himself. Client would benefit from additional opportunities to practice and implement content from this session.    Is this a Weekly Review of the Progress on the Treatment Plan?  No

## 2018-10-10 ENCOUNTER — HOSPITAL ENCOUNTER (OUTPATIENT)
Dept: BEHAVIORAL HEALTH | Facility: CLINIC | Age: 73
End: 2018-10-10
Attending: PSYCHIATRY & NEUROLOGY
Payer: MEDICARE

## 2018-10-10 PROCEDURE — H2012 BEHAV HLTH DAY TREAT, PER HR: HCPCS

## 2018-10-11 NOTE — PROGRESS NOTES
"Adult Dual Disorder Progress Note / Treatment Plan Review       Patient: Laurent Choi   MRN: [id number]  : 1945  Age: 73 year old  Sex: male    Week of: 10/15/18-10/19/18    Client Initial Individualized Goals for Treatment:  \"To stay sober, because I do much better with my mental health when I am sober\"     See Initial Treatment suggestions for the client during the time between Diagnostic Assessment and completion of the Master Individualized Treatment Plan.    Treatment Goals:  Client will notify staff when needing assistance to develop or implement a coping plan to manage suicidal or self injurious urges.  Client will use coping plan for safety, as needed.    Gio will take time 1-2/week in psychotherapy to idenitfy current symptoms and stressors and to develop a coping strategy for each.      Gio will have 0 instances of chemical use reporting outcomes to the group daily.      Gio will take time 1-2/week in psychotherapy to identify ways he can reduce isolation and restore balance to his daily routine.    Will enlist involvement of support network by setting up an appointment with an individual therapist by the target date.    Gio will attend one AA meeting per week reporting outcomes to the group weekly.     Active Psychiatric Symptoms Impairing:   Thought, Mood, Behavior and Perception    Area of Treatment Focus:  Symptom Management, Personal Safety, Community Resources/Discharge Planning and Abstinence/Relapse Prevention    Treatment Strategies:   Support, Redirection, Feedback, Limit/Boundaries, Safety Assessments, Structured Activity, Problem Solving, Clarification, Education, Motivational Enhancement Therapy and Cognitive Behavioral Therapy    Patient Response:  Accepted Feedback, Gave Feedback, Listened, Focused on Goals, Attentive, Accepted Support and Alert      Dimension Risk Description and Outcome:    Dimension One: Acute Intoxication/Withdrawal Potential   Daily Note: 10/15/18: Gio " reported continued sobriety and no withdrawal symptoms. (DE) 10/16/18: Gio reported continued sobriety and no withdrawal symptoms. (DE) 10/18/18: Gio reported continued sobriety and no withdrawal symptoms. (DE) 10/19/2018:  Gio reports continued sobriety.  (JACINDA)    Dimension Two: Biomedical Conditions and Complications  Daily Note: 10/15/18: Gio reported no physical health changes. (DE) 10/16/18: Gio reported no physical health concerns. (DE) 10/18/18: Gio reported no physical health changes. (DE) 10/19/2018:  No changes at this time.  (JACINDA)    Dimension Three: Emotional/Behavioral / Cognitive Conditions & Complications  Daily Note: 10/15/18 (9:00-9:50AM): Gio endorsed absence of suicidal ideation and self-injurious behavior urges. Gio described feeling tired but sober. Gio shared that he stayed busy with activities al though he was feeling tired. He shared that he also engaged in a social activity with a friend. (DE) 10/16/18 (10:00-10:50AM): Gio endorsed absence of suicidal ideation and self-injurious behavior urges. He described feeling tired today but also hopeful. He shared in group that he realized how coming to group has helped him but he also knows he has not consistently worked on the specific goals he has established. He shared that he feels unstructured and that he focuses on only group and work. Gio took time in group to share that he plans to implement a more balanced routine and structure to his day which includes social activities, sleep routine, and exercise. He received reinforcement and validation from the group. (DE) 10/17/2018: pt missed the first two groups due to psychiatry appointment here. He reports feeling good, likes the charles weather (JORGE). 10/18/18 (9:00-9:50AM): Gio endorsed absence of suicidal ideation and self-injurious behavior urges. He described feeling tired, exhausted, defeated and overwhelmed. He refused to take time in group to discuss more about his emotions. He noted that he  "plans to go for a walk today. (DE) 10/19/2018: (10-10:50am):  Gio reports that it is \"a decent day\" today and that he is looking forward to the weekend.  Gio states that he has plans to \"relax\" and will go to an AA meeting on Saturday.  He takes time to discuss his feelings about being estranged from family.  Gio states that he has come to a place of acceptance that he will not repair some of the relationships he has with them.  He is planning on seeing his sister on Monday and feels grateful to have this time with her.  Gio denies thoughts to self harm.  (JACINDA)     Dimension Four: Treatment Acceptance / Resistance  Daily Note: 10/15/18: Gio declined need for time in group and did not offer feedback to peers. (DE) 10/16/18: Gio participated actively in group. (DE) 10/18/2018 Emotions Management (10:00-10:50): Writer taught and facilitated group on purpose of emotions, specifically why emotions are necessary and how they benefit us. Teaching also included myths about emotions and ways to challenge myths. Gio participated minimally in group. When asked what he learned about the topic Gio responded that he learned that emotions \"are what we are.\"  Gio's understanding of topic is unclear. Gio can benefit from further teaching on benefits of emotions, specifically more painful emotions. (DE) 10/19/2018:  Autobiography (11-11:50am):  Gio listened attentively to his peer's autobiography.  He offered supportive feedback in the discussion that followed.  (JACINDA)    Dimension Five: Continued Use/Relapse Prevention  Daily Note: 10/15/18: Gio noted that he had a \"brief thought\" of using and is unsure what caused the thought. He shared he was busy with activities so did not act on thought or urge. (DE) 10/16/18: Gio reported no urges to use. (DE) 10/18/18: Gio reported no urges to use. (DE) 10/19/2018:  Gio states that he is \"enjoying his sobriety\" and plans to attend an AA meeting this weekend.  (JACINDA)    Dimension Six: Recovery " Environment  Daily Note: 10/15/18: Gio noted no changes to his environment. (DE) 10/16/18: Gio identifies need to increase social interaction and have a balanced routine. (DE) 10/18/18: Gio reported no changes. (DE)       Weekly Progress / Treatment Plan Review      Are there additional progress notes for this week? No    Are Treatment Plan Goals being addressed? Yes, continue treatment goals    Are treatment plan strategies to address goals effective? Yes, continue treatment strategies    Are there any current contracts in place? No    Client: No changes at this time.       Client: N/A    Was client case reviewed with [place] MD and/or Selam Bee MD and the treatment team this week? Yes, discussed difficulty following through with goals     Psychotherapists contributing to this note:    Group: Group Psychotherapy Date/Time: 10/15/18 / 9:00 to 9:50; Participants: 5 of 7. ;   Facilitated by: Dia Wallace MA, LPCC, LAD (DE)     Group: Group Psychotherapy Date/Time: 10/16/18 / 10:00 to 10:50; Participants: 6 of 7. ;   [facility]: [facility]     Group: Relapse Prevention Date/Time: 10/17/2018,   / 1000 to 1050; Participants: 6 of 7.    Facilitated by: Andrew Flores MA, LP [facility]    Group: Psychotherapy Date/Time: 10/17/2018 , 1100 to 1150; Participants: 7 of 7.    Facilitated by: Andrew Flores MA, LP [facility]    Group: Group Psychotherapy Date/Time: 10/18/18 / 9:00 to 9:50; Participants: 7 of 7. ;   Facilitated by: Dia Wallace [facility]     Group: Emotions Management Date/Time: 10/18/18 / 10:00 to 10:50; Participants: 7 of 7. ;   Facilitated by: Dia Wallace MA [facility] (DE)     Group: Group Psychotherapy Date/Time: 10/19/2018 / 1000 to 1050; Participants: 5 of 7. Appointments/no call, no show;   Facilitated by: NIKKI Montez(JACINDA)    Group: Autobiography Date/Time: 10/19/2018 / 1100 to 1150; Participants: 5 of 7. Appointments/no call, no show;   Facilitated  by: NIKKI Montez(JACINDA)

## 2018-10-12 NOTE — TELEPHONE ENCOUNTER
Writer called pt because pt was a no-call no-show on 10/12/2018 and staff was checking on pt safety. Pt did not answer, writer left a voicemail message requesting pt to call back.    Cori Marques RN on 10/12/2018 at 12:07 PM

## 2018-10-15 ENCOUNTER — OFFICE VISIT (OUTPATIENT)
Dept: PSYCHIATRY | Facility: CLINIC | Age: 73
End: 2018-10-15
Attending: PSYCHOLOGIST
Payer: MEDICARE

## 2018-10-15 ENCOUNTER — HOSPITAL ENCOUNTER (OUTPATIENT)
Dept: BEHAVIORAL HEALTH | Facility: CLINIC | Age: 73
End: 2018-10-15
Attending: PSYCHIATRY & NEUROLOGY
Payer: MEDICARE

## 2018-10-15 DIAGNOSIS — F10.20 ALCOHOL DEPENDENCE, CONTINUOUS (H): Primary | ICD-10-CM

## 2018-10-15 PROCEDURE — H2012 BEHAV HLTH DAY TREAT, PER HR: HCPCS

## 2018-10-15 NOTE — MR AVS SNAPSHOT
After Visit Summary   10/15/2018    Laurent Choi    MRN: [id number]           Patient Information     Date Of Birth          1945        Visit Information        Provider Department      10/15/2018 1:00 PM Susi Gilbert, PhD [facility]        Today's Diagnoses     Alcohol dependence, continuous (H)    -  1       Follow-ups after your visit        Your next 10 appointments already scheduled     Oct 22, 2018  9:00 AM CDT   Treatment with DDP GROUP ONE   [facility] ([facility])    [facility] [address] [address]   [phone]            Oct 23, 2018  9:00 AM CDT   Treatment with DDP GROUP ONE   [facility] ([facility])    [facility] [address] [address]   [phone]            Oct 24, 2018  9:00 AM CDT   Treatment with DDP GROUP ONE   [facility] ([facility])    [facility] [address] [address]   [phone]            Oct 25, 2018  9:00 AM CDT   Treatment with DDP GROUP ONE   [facility] ([facility])    [facility] [address] [address]   [phone]            Oct 26, 2018  9:00 AM CDT   Treatment with DDP GROUP ONE   [facility] ([facility])    [facility] [address] [address]   [phone]            Oct 29, 2018  9:00 AM CDT   Treatment with DDP GROUP ONE   [facility] ([facility])    [facility] [address] [address]   [phone]            Oct 30, 2018  9:00 AM CDT   Treatment with DDP GROUP ONE   [facility] ([facility])    [address] [place] [address]   [phone]            Oct 31, 2018  9:00 AM CDT   Treatment with DDP GROUP ONE   [facility] ([facility])    [address] [address]   [phone]            2018  9:00 AM CDT   Treatment with DDP GROUP ONE   [facility] ([facility])    [place] [address] [address]   [phone]            2018  9:00 AM CDT   Treatment with DDP GROUP ONE   [facility] ([facility])    [address] [address]   [phone]              Who to contact     Please call your clinic at [phone] to:    Ask questions about your health    Make or cancel appointments    Discuss your medicines    Learn about your test results    Speak to your doctor            Additional Information About Your Visit        [employer] Information     [facility] is an electronic gateway that provides easy, online access to your medical records. With [facility], you can request a clinic appointment, read your test results, renew a prescription or communicate with your care team.     To sign up for [facility] visit the website at [email or web address]   You will be asked to enter the access code listed below, as well as some personal information. Please follow the directions to create your username and password.     Your access code is: [email or web address]  Expires: 2018 11:51 AM     Your access code will  in 90 days. If you need help or a new code, please contact your [facility] or call [phone] for assistance.        Care EveryWhere ID     This is your Care EveryWhere ID. This could be used by other organizations to access your [place] medical records  [id number]         Blood Pressure from Last 3 Encounters:    10/17/18 122/73    Weight from Last 3 Encounters:   10/17/18 80.6 kg (177 lb 12.8 oz)   09/24/18 78.5 kg (173 lb)              Today, you had the following     No orders found for display       Primary Care Provider    None Specified       No primary provider on file.        Equal Access to Services     FABRIZIO GOLD : Hadii aad ku hadnormcachorro Soca, waaxda luqadaha, qaybta kaalmada marybeth, kathleen álvarez . So [facility] [phone].    ATENCIÓN: Si habla español, tiene a medina disposición servicios gratuitos de asistencia lingüística. Daniiame al [phone].    We comply with applicable federal civil rights laws and [place] laws. We do not discriminate on the basis of race, color, national origin, age, disability, sex, sexual orientation, or gender identity.            Thank you!     Thank you for choosing [facility]  for your care. Our goal is always to provide you with excellent care. Hearing back from our patients is one way we can continue to improve our services. Please take a few minutes to complete the written survey that you may receive in the mail after your visit with us. Thank you!             Your Updated Medication List - Protect others around you: Learn how to safely use, store and throw away your medicines at www.disposemymeds.org.          This list is accurate as of 10/15/18 11:59 PM.  Always use your most recent med list.                   Brand Name Dispense Instructions for use Diagnosis    ACETAMINOPHEN PO      Take 325 mg by mouth        amLODIPine-benazepril 10-20 MG per capsule    LOTREL     Take 1 capsule by mouth daily        ASPIRIN PO      Take 81 mg by mouth daily        calcium-vitamin D 500-125 MG-UNIT Tabs      Take 1 tablet by mouth 2 times daily        DIPHENHYDRAMINE HCL PO      Take 25 mg by mouth daily as needed        FLUOXETINE HCL PO      Take 20 mg by mouth daily        LOSARTAN POTASSIUM PO      Take 25 mg by mouth        MIRTAZAPINE PO       Take 15 mg by mouth At Bedtime        * nitroGLYcerin 0.4 MG/HR 24 hr patch    NITRODUR     Place 1 patch onto the skin daily        * NITROGLYCERIN SL      Take 0.4 mg by mouth        OMEPRAZOLE PO      Take 20 mg by mouth every morning        ROSUVASTATIN CALCIUM PO      Take 40 mg by mouth daily        TAMSULOSIN HCL PO      Take 0.4 mg by mouth daily        * Notice:  This list has 2 medication(s) that are the same as other medications prescribed for you. Read the directions carefully, and ask your doctor or other care provider to review them with you.

## 2018-10-15 NOTE — PROGRESS NOTES
"Department of Psychiatry  Addiction Medicine Program ([facility]) Diagnostic Assessment    Date of Service: 10/15/2018  Care Provider: Susi Gilbert, PhD, LP  Diagnostic interview time with patient: 60 minutes    DSM-5 DIAGNOSES:   Alcohol use disorder, severe  Major Depressive Disorder, in partial remission    IDENTIFYING DATA:  Laurent Choi is a 73 year old male who prefers the name Gio. He presented for the current evaluation as part of the intake process for the Addiction Medicine Program ([facility]). The following information was obtained through a clinical interview. Gio is currently engaged in [place] [facility]/ CD program (starting his 4th week). Reported having received prior diagnoses of AUD, anxiety, and depression.     CHIEF COMPLAINT: \"anxiety, depression, addiction to alcohol which has been a lifelong struggle.\"    MENTAL HEALTH HISTORY AND DIAGNOSTIC INTERVIEW:   The [employer] Neuropsychiatric Interview was completed to aid in diagnostic assessment of current psychological functioning.    Dropped out of college due to depression. Did not see alcohol as a major problem at this time. By 1980, alcohol use was a \"major problem.\" Has had a few times of 2 years of sobriety up to 4 years. Relapse typically occurs because he will try to drink socially, which that will escalate into problematic drinking. Typical drinking pattern involves 5-10 days heavy drinking, detox, followed by 5-10 days sobriety.     Depression: Currently described mood as \"upbeat.\" Improved mood due to sobriety and finding more meaning in life. Described energy as \"good,\" denied depressed mood, hopelessness, anhedonia, appetite changes, feeling worthless or guilty. Reported some concentration and sleep difficulties that have been improving in the past week now that pt has been sober for 6 weeks. Denied suicide ideation. Prior to this recent incident of sobriety, reported depression tied to his heavy drinking. Reported past SI when " "drinking.     Anxiety: Reported anxiety that began around 5th grade. When younger, experienced what may have been panic attacks but denied any within the past 10 years. Experienced high anxiety when withdrawing from alcohol. Now that he is sober, anxiety is \"pretty good.\" Reported some discomfort in social situations, but denied distress or significant impact on his life. Is able to go to the theatre despite large crowds and enjoy himself.     Mariaelena/hypomania: Denied    Obsessions: Denied    Compulsions: Denied    Trauma/ PTSD:   In past year, death of partner and a difficult legal situation. Spent 5-6 weeks in [facility] last Spring. Pt reported that police came into house and aggressively torn it up searching for pornography. Following this incident he felt more fearful. Denied other symptoms consistent with PTSD.     Psychosis: Denied    Nicotine:  Age of first use: 16  Pattern of use: Daily  Date of last use: 8 years ago    Alcohol:  Age of first use: 16 (drinking regularly)  Pattern of use: Daily drinking (all day every day, but could not articulate precise amount)  Date of last use: 6 weeks ago    Denied regular use of other illicit drugs. Denied gambling or pornography addiction.     PREVIOUS PSYCHIATRIC HISTORY:   - Currently in [place] MI/ CD program  - 3 years of legally mandated group treatment for sex offenders  - Multiple inpatient hospitalizations (e.g, [facility] 2x, [facility] in [place]) depression; most recently in 2007/8 at [place]  - 4 inpatient treatments for AUD (e.g., [facility], [facility], [facility])  - >40x in detox    PSYCHIATRIC MEDICATIONS:  Prozac 40mg  Remeron 15mg   Vivitrol    FAMILY PSYCHIATRIC HISTORY:  None pt could recall     SOCIAL HISTORY:  Family Environment: \"Very normal family\"; 5 siblings  Academic: Graduated college, then completed 2 years at [employer] 2 years. Then came to [place] to study visual art.   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  Social relationships: Pt feels isolated but is working on making friends; has one friend in his building who he goes to events with; very little contact with his family  Leisure time and interests: Spends free time creating art, which he really enjoys.    Children: None  Marital status: Heterosexual marriage that lasted 3 years. Then in a relationship for over 30 years with an man who  . They were  in . Pt believes he is coping well, \"the grief has lifted.\"  Legal:  was convicted as a sex offender for watching child pornography. Went through sex offender treatment, but did not like it so dropped out and as a result went to [facility] for 20 months. Spring, 2017 probationary period was ending and  came in and searched his apartment. Violated his probation for having Internet access spent 6 more weeks in [facility].     MENTAL STATUS EXAM:   Patient was dressed casually and appeared his stated age. He was oriented to person, place, and time. Patient was cooperative and answered the questions asked by the examiner. His self-reported mood was  upbeat  and affect was congruent to mood. Speech was normal in tone, rate and volume. Thought process was linear, logical, and goal-oriented. Her attention was appropriate.    PLAN:   - Recommend attend part 2 of AMP assessment with medical provider on 10/17; treatment recommendations will be formulated follow completion of this assessment      "

## 2018-10-15 NOTE — PROGRESS NOTES
"Adult Mental Health Outpatient Group Therapy Progress Note     Client Initial Individualized Goals for Treatment: \" To stay sober, because I do much better with my mental health when I am sober\"       See Initial Treatment suggestions for the client during the time between Diagnostic Assessment and completion of the Master Individualized Treatment Plan.    Treatment Goals:    In Life Skills groups, Gio will identify a skill he has used to help follow through with a goal or activity of daily living, noting benefits for his mental health, once each week.  Will enlist involvement of support network by setting up an appointment with an individual therapist by the target date.  Gio will attend one AA meeting per week reporting outcomes to the group weekly.    Gio will take time 1-2/week in psychotherapy or life skills groups to identify ways he can reduce isolation and restore balance to his daily routine.  Gio will take time 1-2/week in psychotherapy to idenitfy current symptoms and stressors and to develop a coping strategy for each.         Area of Treatment Focus:  Symptom Management, Community Resources/Discharge Planning and Abstinence/Relapse Prevention    Therapeutic Interventions/Treatment Strategies:  Support, Feedback, Structured Activity, Problem Solving, Clarification, Education and Motivational Enhancement Therapy    Response to Treatment Strategies:  Accepted Feedback, Listened, Focused on Goals, Attentive and Accepted Support    Name of Group:  Goal Setting 10/15/18 11:00 - 11:50     Group Participants: 5 of 7.      Description and Outcome:  Gio participated in group that focused on learning about the benefits and practicing of setting realistic goals for treatment and management of mental and chemical health, along with self reflection on accomplishments and practicing problem solving obstacles. He reported that he had difficulty in following through on any of his weekly goals. He noted that he had been " able to go to bed at consistent time for a few days, and then stayed up later on the weekend. He reported he is spending time with his creative pursuits, but limited on other things. He needed assistance in setting more realistic and measurable goals for himself, focusing on trying to exercise twice and engagement in some other tasks. Affect was slightly flat with low energy, but he was engaged in the group. Client verbalized understanding of session content by noting benefits of trying to engage in setting goals, but report of his struggles with balance and follow through. Client would benefit from additional opportunities to practice and implement content from this session, focusing on techniques to help with motivation as he struggles to identify things that will help him.      Is this a Weekly Review of the Progress on the Treatment Plan?  No

## 2018-10-16 ENCOUNTER — HOSPITAL ENCOUNTER (OUTPATIENT)
Dept: BEHAVIORAL HEALTH | Facility: CLINIC | Age: 73
End: 2018-10-16
Attending: PSYCHIATRY & NEUROLOGY
Payer: MEDICARE

## 2018-10-16 PROCEDURE — H2012 BEHAV HLTH DAY TREAT, PER HR: HCPCS

## 2018-10-16 NOTE — PROGRESS NOTES
"Adult Mental Health Outpatient Group Therapy Progress Note     Client Initial Individualized Goals for Treatment: \" To stay sober, because I do much better with my mental health when I am sober\"       See Initial Treatment suggestions for the client during the time between Diagnostic Assessment and completion of the Master Individualized Treatment Plan.    Treatment Goals:  In Life Skills groups, Gio will identify a skill he has used to help follow through with a goal or activity of daily living, noting benefits for his mental health, once each week.  Will enlist involvement of support network by setting up an appointment with an individual therapist by the target date.  Gio will attend one AA meeting per week reporting outcomes to the group weekly.    Gio will take time 1-2/week in psychotherapy or life skills groups to identify ways he can reduce isolation and restore balance to his daily routine.  Gio will take time 1-2/week in psychotherapy to idenitfy current symptoms and stressors and to develop a coping strategy for each.           Area of Treatment Focus:  Symptom Management, Community Resources/Discharge Planning and Abstinence/Relapse Prevention    Therapeutic Interventions/Treatment Strategies:  Support, Feedback, Structured Activity, Problem Solving, Clarification, Education and Motivational Enhancement Therapy    Response to Treatment Strategies:  Accepted Feedback, Listened, Focused on Goals, Attentive and Accepted Support    Name of Group:  Life Skills 10/16/18 9:00 - 9:50    Group Participants: 6 of 8.      Description and Outcome:  Gio attended and participated in an experiential Psychoeducation group where rehabilitative intervention focuses on learning, developing through practice, and generalizing independent living skills. The purpose of this group is to stabilize and manage mental health symptoms, reduce/eliminate substance use, and to improve participation and function in valued roles, " "routines, relationships. He noted that he did not follow through with his goals he had set for yesterday, having gone home and took a nap vs doing them. He discussed ways he might work on motivation for himself. He had missed the topic last week and was open to staff doing brief education on external and internal motivators and how he might use the program and his peers and being in treatment for his accountability and motivation to follow through with his treatment goals. He noted another plan for after treatment today and talked with peers about checking in with them tomorrow. He noted that with the sun out now, he also felt \"more motivated\" to do things. He noted having an appointment with Addiction Medicine provider tomorrow where he can ask for refills for medications as needed. He noted that he had not understood what this appointment was initially for when it was made for him. Client verbalized understanding of session content by engagement in thinking about some ways to improve motivation. He also engaged in one of his goals to increase balance by engagement in a different type of activity that he has not been doing at home. Client would benefit from additional opportunities to practice and implement content from this session.    Is this a Weekly Review of the Progress on the Treatment Plan?  No    "

## 2018-10-17 ENCOUNTER — TELEPHONE (OUTPATIENT)
Dept: BEHAVIORAL HEALTH | Facility: CLINIC | Age: 73
End: 2018-10-17

## 2018-10-17 ENCOUNTER — ALLIED HEALTH/NURSE VISIT (OUTPATIENT)
Dept: PSYCHIATRY | Facility: CLINIC | Age: 73
End: 2018-10-17
Attending: PSYCHIATRY & NEUROLOGY
Payer: MEDICARE

## 2018-10-17 ENCOUNTER — HOSPITAL ENCOUNTER (OUTPATIENT)
Dept: BEHAVIORAL HEALTH | Facility: CLINIC | Age: 73
End: 2018-10-17
Attending: PSYCHIATRY & NEUROLOGY
Payer: MEDICARE

## 2018-10-17 ENCOUNTER — TELEPHONE (OUTPATIENT)
Dept: PSYCHIATRY | Facility: CLINIC | Age: 73
End: 2018-10-17

## 2018-10-17 VITALS
BODY MASS INDEX: 24.8 KG/M2 | WEIGHT: 177.8 LBS | HEART RATE: 82 BPM | SYSTOLIC BLOOD PRESSURE: 122 MMHG | DIASTOLIC BLOOD PRESSURE: 73 MMHG

## 2018-10-17 DIAGNOSIS — Z23 NEED FOR PROPHYLACTIC VACCINATION AND INOCULATION AGAINST INFLUENZA: Primary | ICD-10-CM

## 2018-10-17 DIAGNOSIS — Z23 NEED FOR PROPHYLACTIC VACCINATION AND INOCULATION AGAINST INFLUENZA: ICD-10-CM

## 2018-10-17 DIAGNOSIS — F10.21 ALCOHOL USE DISORDER, SEVERE, IN EARLY REMISSION (H): Primary | ICD-10-CM

## 2018-10-17 DIAGNOSIS — F10.21 ALCOHOL USE DISORDER, SEVERE, IN EARLY REMISSION (H): ICD-10-CM

## 2018-10-17 PROCEDURE — 90662 IIV NO PRSV INCREASED AG IM: CPT | Mod: ZF

## 2018-10-17 PROCEDURE — G0463 HOSPITAL OUTPT CLINIC VISIT: HCPCS | Mod: ZF

## 2018-10-17 PROCEDURE — G0008 ADMIN INFLUENZA VIRUS VAC: HCPCS | Mod: ZF

## 2018-10-17 PROCEDURE — 96372 THER/PROPH/DIAG INJ SC/IM: CPT | Mod: ZF

## 2018-10-17 PROCEDURE — 25000128 H RX IP 250 OP 636: Mod: ZF

## 2018-10-17 PROCEDURE — H2012 BEHAV HLTH DAY TREAT, PER HR: HCPCS

## 2018-10-17 RX ORDER — MIRTAZAPINE 15 MG/1
15 TABLET, FILM COATED ORAL AT BEDTIME
Qty: 30 TABLET | Refills: 1 | Status: SHIPPED | OUTPATIENT
Start: 2018-10-17 | End: 2019-03-06

## 2018-10-17 RX ORDER — FLUOXETINE 10 MG/1
20 TABLET, FILM COATED ORAL DAILY
Qty: 30 TABLET | Refills: 1 | Status: SHIPPED | OUTPATIENT
Start: 2018-10-17 | End: 2018-10-18

## 2018-10-17 ASSESSMENT — PAIN SCALES - GENERAL: PAINLEVEL: NO PAIN (0)

## 2018-10-17 NOTE — MR AVS SNAPSHOT
After Visit Summary   10/17/2018    Laurent Choi    MRN: [id number]           Patient Information     Date Of Birth          1945        Visit Information        Provider Department      10/17/2018 8:10 AM Leatha Saldana MD [facility]        Today's Diagnoses     Alcohol use disorder, severe, in early remission (H)    -  1      Care Instructions    Thank you for coming to the PSYCHIATRY CLINIC.    Lab Testing:  If you had lab testing today and your results are reassuring or normal they will be mailed to you or sent through edPULSE within 7 days.   If the lab tests need quick action we will call you with the results.  The phone number we will call with results is # [phone] (home) . If this is not the best number please call our clinic and change the number.    Medication Refills:  If you need any refills please call your pharmacy and they will contact us. Our fax number for refills is [phone]. Please allow three business for refill processing.   If you need to  your refill at a new pharmacy, please contact the new pharmacy directly. The new pharmacy will help you get your medications transferred.     Scheduling:  If you have any concerns about today's visit or wish to schedule another appointment please call our office during normal business hours [phone] (8-5:00 M-F)    Contact Us:  Please call [phone] during business hours (8-5:00 M-F).  If after clinic hours, or on the weekend, please call  [phone].    Financial Assistance [phone]  [facility] Billing [phone]  [facility]illing [phone]  Medical Records [phone]      MENTAL HEALTH CRISIS NUMBERS:  [facility]:   [facility] - [phone]   Crisis Residence [facility] - [phone]   Walk-In Counseling Center [facility] - [phone]   COPE 24/7 [place] Mobile Team for Adults - [[phone]]; Child - [[phone]]     Crisis Connection -  [phone]     [facility]:   [facility] - [phone]   Walk-in counseling [facility] - [phone]   Walk-in counseling [facility] - [phone]   Crisis Residence [facility] - [phone]   Urgent Care Adult Mental Health:   --Drop-in, 24/7 crisis line, and [place] Mobile Team [[phone]]    CRISIS TEXT LINE: Text 846-426 from anywhere, anytime, any crisis 24/7;    OR SEE www.crisistextline.org     Poison Control Center - [phone]    CHILD: [facility] needs assessment team - [phone]     [facility] - [phone]; or [place] Lif[facility] - [phone]    If you have a medical emergency please call 911or go to the nearest ER.                    _____________________________________________    Again thank you for choosing PSYCHIATRY CLINIC and please let us know how we can best partner with you to improve you and your family's health.  You may be receiving a survey in the mail regarding this appointment. We would love to have your feedback, both positive and negative, so please fill out the survey and return it using the provided envelope. The survey is done by an external company, so your answers are anonymous.             Follow-ups after your visit        Follow-up notes from your care team     Return in about 4 weeks (around 11/14/2018).      Your next 10 appointments already scheduled     Oct 18, 2018  9:00 AM CDT   Treatment with DDP GROUP ONE   [facility] ([facility])    [address] [place] [address]   [phone]            Oct 19, 2018  9:00 AM CDT   Treatment with DDP GROUP ONE   [facility] ([facility])    [address] [place] [address]   [phone]            Oct 22, 2018  9:00 AM CDT   Treatment with DDP GROUP ONE   [facility] ([facility])    [address] [address]   [phone]            Oct 23, 2018  9:00 AM CDT   Treatment with DDP GROUP ONE   [facility] ([facility])    [facility] [address] [address]   [phone]            Oct 24, 2018  9:00 AM CDT   Treatment with DDP GROUP ONE   [facility] ([facility])    [facility] [address] [address]   [phone]            Oct 25, 2018  9:00 AM CDT   Treatment with DDP GROUP ONE   [facility] ([facility])    [facility] [address] [address]   [phone]            Oct 26, 2018  9:00 AM CDT   Treatment with DDP GROUP ONE   [facility] ([facility])    [facility] [address] [address]   [phone]            Oct 29, 2018  9:00 AM CDT   Treatment with DDP GROUP ONE   [facility] ([facility])    [facility] [address] [address]   [phone]            Oct 30, 2018  9:00 AM CDT   Treatment with DDP GROUP ONE   [facility] ([facility])    [facility] [address] [address]   [phone]            Oct 31, 2018  9:00 AM CDT   Treatment with DDP GROUP ONE   [facility] ([facility])    [facility] [address] [address]   [phone]              Who to contact     Please call your clinic at [phone] to:    Ask questions about your health    Make or cancel appointments    Discuss your medicines    Learn about your test results    Speak to your doctor             Additional Information About Your Visit        CakeStyle Information     CakeStyle is an electronic gateway that provides easy, online access to your medical records. With CakeStyle, you can request a clinic appointment, read your test results, renew a prescription or communicate with your care team.     To sign up for [employer]Style visit the website at [email or web address]   You will be asked to enter the access code listed below, as well as some personal information. Please follow the directions to create your username and password.     Your access code is: [email or web address]  Expires: 2018 11:51 AM     Your access code will  in 90 days. If you need help or a new code, please contact your [facility] or call [phone] for assistance.        Care EveryWhere ID     This is your Care EveryWhere ID. This could be used by other organizations to access your [place] medical records  [id number]        Your Vitals Were     Pulse BMI (Body Mass Index)                82 24.8 kg/m2           Blood Pressure from Last 3 Encounters:   10/17/18 122/73    Weight from Last 3 Encounters:   10/17/18 80.6 kg (177 lb 12.8 oz)   18 78.5 kg (173 lb)              Today, you had the following     No orders found for display         Today's Medication Changes          These changes are accurate as of 10/17/18  9:48 AM.  If you have any questions, ask your nurse or doctor.               Start taking these medicines.        Dose/Directions    FLUoxetine 10 MG tablet   Commonly known as:  PROzac   Used for:  Alcohol use disorder, severe, in early remission (H)   Started by:  Leatha Saldana MD        Dose:  20 mg   Take 2 tablets (20 mg) by mouth daily   Quantity:  30 tablet   Refills:  1       naltrexone 380 MG Susr   Commonly known as:  VIVITROL   Used for:  Alcohol use disorder, severe, in early remission (H)   Started by:  Leatha Saldana MD        Dose:  380 mg   Inject 380 mg into  the muscle once for 1 dose   Quantity:  380 mg   Refills:  0         These medicines have changed or have updated prescriptions.        Dose/Directions    * mirtazapine 15 MG tablet   Commonly known as:  REMERON   This may have changed:  You were already taking a medication with the same name, and this prescription was added. Make sure you understand how and when to take each.   Used for:  Alcohol use disorder, severe, in early remission (H)   Changed by:  Leatha Saldana MD        Dose:  15 mg   Take 1 tablet (15 mg) by mouth At Bedtime   Quantity:  30 tablet   Refills:  1       * MIRTAZAPINE PO   This may have changed:  Another medication with the same name was added. Make sure you understand how and when to take each.   Changed by:  Leatha Saldana MD        Dose:  15 mg   Take 15 mg by mouth At Bedtime   Refills:  0       * Notice:  This list has 2 medication(s) that are the same as other medications prescribed for you. Read the directions carefully, and ask your doctor or other care provider to review them with you.         Where to get your medicines      These medications were sent to [facility] [place], [place] - [address], [place] [address]     Phone:  [phone]     FLUoxetine 10 MG tablet    mirtazapine 15 MG tablet         Some of these will need a paper prescription and others can be bought over the counter.  Ask your nurse if you have questions.     You don't need a prescription for these medications     naltrexone 380 MG Susr                Primary Care Provider    None Specified       No primary provider on file.        Equal Access to Services     [facility] AH: Hadii bettina Saldana, waaxda lunati, qaybta kaalmada marybeth, kathleen smith. So [facility] [phone].    ATENCIÓN: Si habla español, tiene a medina disposición servicios gratuitos de asistencia lingüística. Llame al [phone].    We comply with applicable  federal civil rights laws and [place] laws. We do not discriminate on the basis of race, color, national origin, age, disability, sex, sexual orientation, or gender identity.            Thank you!     Thank you for choosing [facility]INIC  for your care. Our goal is always to provide you with excellent care. Hearing back from our patients is one way we can continue to improve our services. Please take a few minutes to complete the written survey that you may receive in the mail after your visit with us. Thank you!             Your Updated Medication List - Protect others around you: Learn how to safely use, store and throw away your medicines at www.disposemymeds.org.          This list is accurate as of 10/17/18  9:48 AM.  Always use your most recent med list.                   Brand Name Dispense Instructions for use Diagnosis    ACETAMINOPHEN PO      Take 325 mg by mouth        amLODIPine-benazepril 10-20 MG per capsule    LOTREL     Take 1 capsule by mouth daily        ASPIRIN PO      Take 81 mg by mouth daily        calcium-vitamin D 500-125 MG-UNIT Tabs      Take 1 tablet by mouth 2 times daily        DIPHENHYDRAMINE HCL PO      Take 25 mg by mouth daily as needed        FLUoxetine 10 MG tablet    PROzac    30 tablet    Take 2 tablets (20 mg) by mouth daily    Alcohol use disorder, severe, in early remission (H)       FLUOXETINE HCL PO      Take 20 mg by mouth daily        LOSARTAN POTASSIUM PO      Take 25 mg by mouth        * mirtazapine 15 MG tablet    REMERON    30 tablet    Take 1 tablet (15 mg) by mouth At Bedtime    Alcohol use disorder, severe, in early remission (H)       * MIRTAZAPINE PO      Take 15 mg by mouth At Bedtime        naltrexone 380 MG Susr    VIVITROL    380 mg    Inject 380 mg into the muscle once for 1 dose    Alcohol use disorder, severe, in early remission (H)       * nitroGLYcerin 0.4 MG/HR 24 hr patch    NITRODUR     Place 1 patch onto the skin daily        * NITROGLYCERIN SL       Take 0.4 mg by mouth        OMEPRAZOLE PO      Take 20 mg by mouth every morning        ROSUVASTATIN CALCIUM PO      Take 40 mg by mouth daily        TAMSULOSIN HCL PO      Take 0.4 mg by mouth daily        * Notice:  This list has 4 medication(s) that are the same as other medications prescribed for you. Read the directions carefully, and ask your doctor or other care provider to review them with you.

## 2018-10-17 NOTE — TELEPHONE ENCOUNTER
Writer contacted patient to inquire about absence from group. Left message requesting return phone call.     Dia Wallace, [facility], [facility]  10/17/2018

## 2018-10-17 NOTE — PATIENT INSTRUCTIONS
Thank you for coming to the PSYCHIATRY CLINIC.    Lab Testing:  If you had lab testing today and your results are reassuring or normal they will be mailed to you or sent through [email or web address] within 7 days.   If the lab tests need quick action we will call you with the results.  The phone number we will call with results is # [phone] (home) . If this is not the best number please call our clinic and change the number.    Medication Refills:  If you need any refills please call your pharmacy and they will contact us. Our fax number for refills is [phone]. Please allow three business for refill processing.   If you need to  your refill at a new pharmacy, please contact the new pharmacy directly. The new pharmacy will help you get your medications transferred.     Scheduling:  If you have any concerns about today's visit or wish to schedule another appointment please call our office during normal business hours [phone] (8-5:00 M-F)    Contact Us:  Please call [phone] during business hours (8-5:00 M-F).  If after clinic hours, or on the weekend, please call  [phone].    Financial Assistance [phone]  [facility] Billing [phone]  [facility] Billing [phone]  Medical Records [phone]      MENTAL HEALTH CRISIS NUMBERS:  [facility]:   [facility] [place] - [phone]   Crisis Residence [facility] - [phone]   Walk-In Counseling [facility] [phone]   COPE 24/7 [place] Mobile Team for Adults - [[phone]]; Child - [[phone]]     Crisis Connection - [phone]     [facility]:   [facility] - [phone]   Walk-in counseling [facility] - [phone]   Walk-in counseling [facility] - [phone]   Crisis Residence [facility] - [phone]   Urgent Care Adult Mental Health:   --Drop-in, 24/7 crisis line, and [place] Mobile Team [[phone]]    CRISIS TEXT  LINE: Text 741-985 from anywhere, anytime, any crisis 24/7;    OR SEE www.crisistextline.org     Poison Control Center - [phone]    CHILD: [facility] needs assessment team - [phone]     [facility] - [phone]; or [employer] Lifeline - [phone]    If you have a medical emergency please call 911or go to the nearest ER.                    _____________________________________________    Again thank you for choosing PSYCHIATRY CLINIC and please let us know how we can best partner with you to improve you and your family's health.  You may be receiving a survey in the mail regarding this appointment. We would love to have your feedback, both positive and negative, so please fill out the survey and return it using the provided envelope. The survey is done by an external company, so your answers are anonymous.

## 2018-10-17 NOTE — MR AVS SNAPSHOT
After Visit Summary   10/17/2018    Laurent Choi    MRN: [id number]           Patient Information     Date Of Birth          1945        Visit Information        Provider Department      10/17/2018 10:15 AM Nurse, [facility]        Today's Diagnoses     Alcohol use disorder, severe, in early remission (H)           Follow-ups after your visit        Follow-up notes from your care team     Return in 4 weeks (on 11/14/2018) for Medication management.      Your next 10 appointments already scheduled     Oct 18, 2018  9:00 AM CDT   Treatment with [facility] ONE   [facility] ([facility])    [address] [place] [address]   [phone]            Oct 19, 2018  9:00 AM CDT   Treatment with DDP GROUP ONE   [facility] ([facility])    [address] [place] [address]   [phone]            Oct 22, 2018  9:00 AM CDT   Treatment with DDP GROUP ONE   [facility] ([facility])    [address] [place] [address]   [phone]            Oct 23, 2018  9:00 AM CDT   Treatment with DDP GROUP ONE   [facility] ([facility])    [address] [place] [address]   [phone]            Oct 24, 2018  9:00 AM CDT   Treatment with DDP GROUP ONE   [facility] ([facility])    [facility] [address] [address]   [phone]            Oct 25, 2018  9:00 AM CDT   Treatment with DDP GROUP ONE   [facility] ([facility])    [facility] [address] [address]   [phone]            Oct 26, 2018  9:00 AM CDT   Treatment with  DDP GROUP ONE   [facility] ([facility])    [address] [address]   [phone]            Oct 29, 2018  9:00 AM CDT   Treatment with DDP GROUP ONE   [facility] ([facility])    [place] [address] [address]   [phone]            Oct 30, 2018  9:00 AM CDT   Treatment with DDP GROUP ONE   [facility] ([facility])    [place] [address] [address]   [phone]            Oct 31, 2018  9:00 AM CDT   Treatment with DDP GROUP ONE   [facility] ([facility])    [facility] [address] [address]   [phone]              Who to contact     Please call your clinic at [phone] to:    Ask questions about your health    Make or cancel appointments    Discuss your medicines    Learn about your test results    Speak to your doctor            Additional Information About Your Visit        P[employer] Information     [employer]ter is an electronic gateway that provides easy, online access to your medical records. With Red[employer]ter, you can request a clinic appointment, read your test results, renew a prescription or communicate with your care team.     To sign up for [employer]ter visit the website at [email or web address]   You will be asked to enter the access code listed below, as well as some personal information. Please follow the directions to create your username and password.     Your access code is: [email or web address]  Expires: 2018 11:51 AM     Your access code will  in 90 days. If you need help or a new code, please contact your [facility] or call [phone] for assistance.        Care EveryWhere ID     This is your Care EveryWhere ID. This could be used by  other organizations to access your [place] medical records  [id number]         Blood Pressure from Last 3 Encounters:   10/17/18 122/73    Weight from Last 3 Encounters:   10/17/18 80.6 kg (177 lb 12.8 oz)   09/24/18 78.5 kg (173 lb)              Today, you had the following     No orders found for display         Today's Medication Changes          These changes are accurate as of 10/17/18  1:06 PM.  If you have any questions, ask your nurse or doctor.               Start taking these medicines.        Dose/Directions    FLUoxetine 10 MG tablet   Commonly known as:  PROzac   Used for:  Alcohol use disorder, severe, in early remission (H)   Started by:  Leatha Saldana MD        Dose:  20 mg   Take 2 tablets (20 mg) by mouth daily   Quantity:  30 tablet   Refills:  1       naltrexone 380 MG Susr   Commonly known as:  VIVITROL   Used for:  Alcohol use disorder, severe, in early remission (H)   Started by:  Leatha Saldana MD        Dose:  380 mg   Inject 380 mg into the muscle once for 1 dose   Quantity:  380 mg   Refills:  0         These medicines have changed or have updated prescriptions.        Dose/Directions    * mirtazapine 15 MG tablet   Commonly known as:  REMERON   This may have changed:  You were already taking a medication with the same name, and this prescription was added. Make sure you understand how and when to take each.   Used for:  Alcohol use disorder, severe, in early remission (H)   Changed by:  Leatha Saldana MD        Dose:  15 mg   Take 1 tablet (15 mg) by mouth At Bedtime   Quantity:  30 tablet   Refills:  1       * MIRTAZAPINE PO   This may have changed:  Another medication with the same name was added. Make sure you understand how and when to take each.   Changed by:  Leatha Saldana MD        Dose:  15 mg   Take 15 mg by mouth At Bedtime   Refills:  0       * Notice:  This list has 2 medication(s) that are the same as other medications prescribed for you. Read the directions  carefully, and ask your doctor or other care provider to review them with you.         Where to get your medicines      These medications were sent to [facility] [place], [place] - [address]  [address], [place] [address]     Phone:  [phone]     FLUoxetine 10 MG tablet    mirtazapine 15 MG tablet         Some of these will need a paper prescription and others can be bought over the counter.  Ask your nurse if you have questions.     You don't need a prescription for these medications     naltrexone 380 MG Susr                Primary Care Provider    None Specified       No primary provider on file.        Equal Access to Services     [facility]: Hadii aad ku hadasho Soomaali, waaxda luqadaha, qaybta kaalmada ademary, kathleen álvarez . So [facility] [phone].    ATENCIÓN: Si habla español, tiene a medina disposición servicios gratuitos de asistencia lingüística. [facility] [phone].    We comply with applicable federal civil rights laws and [place] laws. We do not discriminate on the basis of race, color, national origin, age, disability, sex, sexual orientation, or gender identity.            Thank you!     Thank you for choosing [facility]  for your care. Our goal is always to provide you with excellent care. Hearing back from our patients is one way we can continue to improve our services. Please take a few minutes to complete the written survey that you may receive in the mail after your visit with us. Thank you!             Your Updated Medication List - Protect others around you: Learn how to safely use, store and throw away your medicines at www.disposemymeds.org.          This list is accurate as of 10/17/18  1:06 PM.  Always use your most recent med list.                   Brand Name Dispense Instructions for use Diagnosis    ACETAMINOPHEN PO      Take 325 mg by mouth        amLODIPine-benazepril 10-20 MG per capsule    LOTREL     Take 1  capsule by mouth daily        ASPIRIN PO      Take 81 mg by mouth daily        calcium-vitamin D 500-125 MG-UNIT Tabs      Take 1 tablet by mouth 2 times daily        DIPHENHYDRAMINE HCL PO      Take 25 mg by mouth daily as needed        FLUoxetine 10 MG tablet    PROzac    30 tablet    Take 2 tablets (20 mg) by mouth daily    Alcohol use disorder, severe, in early remission (H)       FLUOXETINE HCL PO      Take 20 mg by mouth daily        LOSARTAN POTASSIUM PO      Take 25 mg by mouth        * mirtazapine 15 MG tablet    REMERON    30 tablet    Take 1 tablet (15 mg) by mouth At Bedtime    Alcohol use disorder, severe, in early remission (H)       * MIRTAZAPINE PO      Take 15 mg by mouth At Bedtime        naltrexone 380 MG Susr    VIVITROL    380 mg    Inject 380 mg into the muscle once for 1 dose    Alcohol use disorder, severe, in early remission (H)       * nitroGLYcerin 0.4 MG/HR 24 hr patch    NITRODUR     Place 1 patch onto the skin daily        * NITROGLYCERIN SL      Take 0.4 mg by mouth        OMEPRAZOLE PO      Take 20 mg by mouth every morning        ROSUVASTATIN CALCIUM PO      Take 40 mg by mouth daily        TAMSULOSIN HCL PO      Take 0.4 mg by mouth daily        * Notice:  This list has 4 medication(s) that are the same as other medications prescribed for you. Read the directions carefully, and ask your doctor or other care provider to review them with you.

## 2018-10-17 NOTE — TELEPHONE ENCOUNTER
- Received incoming call from pharmacy     Pharmacy would like to confirm the patients Prozac 20 mg daily dose. Patient arrived the pharmacy requesting a 40 mg dose. Due to unsigned office visit will route to provider to confirm the dose.

## 2018-10-17 NOTE — PROGRESS NOTES

## 2018-10-17 NOTE — TELEPHONE ENCOUNTER
Imm/Inj            Juan Francisco Villegas 1 minute ago (10:05 AM)                 Oleksandr Branhaml has been secured to treat dx F10.21Alcohol dependence, in remission. Patient is good to go for his injection today.     Thank you,     Juan Francisco Villegas    - [facility]   [facility]   [email or web address]  [email or web address]   Office: [phone]  Fax: [phone]   (Routing comment)                  - Deborah Blanchard LPN notified of the approval for the patient to receive injection in clinic   - Will route to provider as [place]

## 2018-10-17 NOTE — TELEPHONE ENCOUNTER
- Writer notified by provider patient prescribed naltrexone (VIVITROL) 380 MG SUSR to receive in clinic. Will confirm if indeed covered by the insurance. Will route to [facility].

## 2018-10-17 NOTE — TELEPHONE ENCOUNTER
On 10/17/18, I page fax was received from [facility] stating pt had received a flu vaccine on 10/12/2018.Deborah Blanchard LPN

## 2018-10-17 NOTE — NURSING NOTE
"Laurent Choi,  1945, presented to the clinic today at the request of Dr. Saldana,  ordering provider for long-acting injectable Vivitrol 380 mg.    OBSERVATIONS:  1.  Appearance: Casually dressed in clean pants, t-shirt with button up shirt over top. Unshaven and messy hair.This writer asked what side of his glute he received his last injection since this is the first on in this clinic. Pt stated \"You can use my right side today.\" Pt asked to receive the flu shot also.  2.  Mood: Good, talkative  3.  Affect: Congruent  4.  Attitude: Good, engaged, friendly  5.  Cooperation: Full, pt said he would sign up for MY Chart when he got home.  6.  Side Effects: Pt stated he had one day with breakthrough cravings, but was able to dismiss it. This writer encouraged pt to call or MyChart the clinic nurse or his provider with any additional breakthrough  Craving issues he might experience between now and next injection. Pt acknowledged understanding.  7.  Education: Reinforced that we as a clinic are here to help him.   8. Next appointment: 18 -  Dr. Saldana and 1110 injection and 18 with Dr. Saldana and 1110 injection    The service provided today was rendered under the supervising provider of the day, Dr. Guidry, who was available for consultation as needed.    "

## 2018-10-17 NOTE — PROGRESS NOTES
"  ----------------------------------------------------------------------------------------------------------  [facility], [place]   Psychiatry Clinic New Patient Evaluation  Addiction Psychiatry                        IDENTIFICATION   Laurent \"Gio\" Blaze Choi is a 73 year old male who was referred by self for evaluation of alcohol use disorder and desire to continue Vivitrol.  History was provided by patient who was a good historian.       CHIEF COMPLAINT         \" I want to continue on Vivitrol \"      HISTORY OF PRESENT ILLNESS     The patient has a past psychiatric/substance use history of alcohol use disorder, Major Depressive Disorder and Generalized Anxiety Disorder.    Gio has been in detox over 40 times and treatment about 5 times.  His first treatment was in 1980. He gave up daily drinking in late 1990's, then began to binge drink.  His pattern recently  has been drinking 1-2 weeks and not eating most of that time, and getting very sick. He then will stop drinking, will be sober for only 1-2 weeks.  Alcohol has caused withdrawal and tolerance, drinking 6-10 beers and 1 pint a day when on a binge. Alcohol has affected relationships, has affected health with getting sick, not eating and getting depressed. He has lost lots of weight this year due to his drinking. Many attempts to quit have not been sucusseful. He has spent much of his days drinking when on a binge.    He was recently at [facility] multiple times in the last few months for detox and started Vivitrol injection on 9/18/18 and thinks this has been helpful with cravings.  He reports no desire to drink except one time and that was fleeting. He acquired a  rash just a week ago, swollen, in ankles and lower calves and wonders if this could be from Vivitrol.  This would have been 3 weeks after the injection.  Rash is scales and patchy, sore, starting to get better. Not itchy, primarily on ankles.  He does have some exzema " in lower legs anyway (little patches) but never has had the rash before.  He does have some ankle swelling also, but states the rash and sensitivity in the skin is different, but it is improving. We discussed that it does not sound like it would be from the Vivitrol.  He does not find the rash too bothersome and would like to proceed with Vivitrol.  We will watch and see if rash worsens.    He also mentions he is having some fuzzy, high pitched ringing that he is hearing, both sides but maybe left > right, more in evening when things are quiet.  Not really bothering him but going on for a week.  We again discussed this is not likely the Vivitrol and he should watch this.        He is unsure if he tried Vivitrol in the past, thinks maybe 10 years ago but then the doctor moved further away and he did nto follow up.  He tried Antabuse in late 's.  He would take it a few times per week and not daily and would time drinking around it.     Typically relapses have occurred due to thinking he can drink socially because he did well for a while.  He was able to drink 2-3 beers a day for a year and felt he could control it.  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 His partner  in 2017.  The grief from partner's death is resolving, took about a year to improve, for a while he was more depressed, cried a lot.  Had stressful legal situations recently too and he thinks these are resolved now.  Denies any other big stressors currently.     PSYCHIATRIC SYMPTOMS    Gio denies feeling sad, feels mood is good.  However, mood has improved in his 6 weeks of sobriety.  A few months ago he would say he was depressed. Very happy to be sober.  Grateful, able to enjoy things, sleep is good, appetite is good.  Has some social anxiety that is long standing.  States it was years going to AA before he could even say anything.  He does better  "one on one.  Denies any SI/ SIB.  No razia, no hallucinations.    Anxiety has been good lately. When he does have anxiety it is  \"very low grade\" and he feels restlessness, tightness in chest.  Not feeling this now. No panic attacks at all.  No racing thoughts or worry.     He has a history of depression and anxiety, but these have typically gone away when he has been sober and are definitely worse with drinking.  He has been on Prozac 40 mg for many years but sometimes forgot to take his medications on binges.  Remeron 15 mg was restarted 6 weeks ago when last at [facility], but he was on this in the past also.  Remeron is helpful with his sleep. He does not wake up at all, wakes up less agitated.  Tends to nap 30 min to an hour during the day and stay up quite late.         SUBSTANCE USE HISTORY                                                               Primary Drug Used:Alcohol     Detailed Substance Use History:    Substance Age first use Quality/character Age max use Most recent use   Alcohol 16 Daily drinking recently 6 weeks ago   Cannabis 27 Would hallucinate on it and did not like it, tried it numerous socially none 40's   Cocaine Early 30's Tried one time, doesn't rememner it because too drunk none none   Stimulants none none none none   Opioids Early 30's Got girlfriends Rx and ended up in detox due to mixing with alcohol none none   Sedatives 30's Benzos \"were wonderful\", 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 none In his 30's   Hallucinogens none none none none   Inhalants none none none none   Other none none none none   OTC drugs none none none none   Nicotine 18 1-2 packs per day After retired 8 years ago     Chronology of use (pattern, progression, abstinence periods):   Was a daily drinker starting at age 16.  First treatment in 1980 and in late 1990's started binge drinking.  1-2 weeks of drinking followed by 1-2 " "weeks of sobriety.  Has been to detox at least 40 times and treatment 5 times.  Sobriety periods:  1 of 4 years, 2-3 of 2 years and many of less than a year.  His last longer sober period was in [id number].  His last treatment for alcohol was at least 10 years ago.       Treatment History:    First treatment in 1980.  Last treatment 10 years ago.  4-5 treatments total, currently at [facility] with 2 weeks to go.      Substance Use Pharmacotherapies: (History of Antabuse, Methadone, Naltrexone,Vivitrol,  Suboxone, Campral):  Tried antabuse in late 1990's and he would stop taking and \"drink around it\".       RECENT SYMPTOMS   [PSYCH ROS]     PANIC ATTACK:  denies   ANXIETY:  none currently  DEPRESSION:  improved recently but did have the following, depressed mood, anhedonia and low energy;  DENIES- suicidal ideation  DYSREGULATION:  \none;  DENIES- suicidal ideation, violent ideation and SIB  PSYCHOSIS:  none;  DENIES- none  MARILEE/HYPOMANIA:  none;  DENIES- none  TRAUMA RELATED:  none  EATING DISORDER:  none  COMPULSIVE:  none  Grief:  improving     PSYCHIATRIC HISTORY     Previous diagnoses: Major Depressive Disorder, Generalized Anxiety Disorder    Narrative Summary: Has had 3-4 psych hospitalizations two at [facility] and one at [facility].  Did have a 72 hour hold at one point.  All for depression, SI in the setting of drinking.  2007/8 was last one. Tried numerous serotonin specific reuptake inhibitor's in past and Prozac has worked best, been on it for a very long time.  Has been on 40 mg in Prozac and Remeron 15 mg for a long time, Remeron was recently restarted.  Was at one time on 80 mg Prozac which helped with some obsessive thinking.  He was on tricyclics a long time ago and did not do well on those.   Thinks he was given vivitrol about 10 years ago.  Not sure if it helped.  He stopped going to dr because the doctor moved 45 min away.      Dr. Corona at [facility] prescribes his medications for the " "last few years.      Symptoms in relation to substance use (symptoms present without use?): depression    Childhood behavioral problems: denies  Past court commitments: denies  SIB [method, most recent]- none  Violence/Aggression Hx- none  Eating Disorder: none    Suicidal Ideation Hx [passive, active]- History of with drinking but denies any time recently  Suicide Attempt [#, recent, method]:   #- N/A   Most Recent- N/A    Psych Hosp [ #, most recent, committed]- none  Outpatient Programs [ DBT, Day Treatment, Eating Disorder Tx etc] : Currently in [facility], 4 weeks in, 2 weeks to go.     PAST PSYCH MED TRIALS     OR complete the table to the extent possible:    Drug /  Start Date Dose (mg) Helpful Adverse Effects   DC Reason / Date   effecxor   Doesn't remember    Prozac 40 mg YES  Still taking   A number of other SSRI's   Didn't work    wellbutrin   Made him anxious      Tricyclics   Tricyclics were \"horrendous\" Didn't help       SOCIAL HISTORY                                                                         Financial Support- retired  HOBBY:  Computer generated art now, used to paint   Living Situation/Family/Relationships- lives alone in a condo, has a sister he talks to and sees in frequently, has a few friends in the building he lives in   Trauma History (self-report)- None  Legal- some legal issues this past year but he thinks that is resolved (didn't say what); records show prior issues with child pornography and served time in assisted remotely.   Recovery Support- Attended AA in past, has only been to one AA meeting this year; plans to start attending.  We discussed going to one this week, consider getting a sponsor.  He has never worked with a sponsor or done steps.     Early History/Education- Grew up in [place]. 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      FAMILY HISTORY                                                                       patient reported     Family Mental Health History-  Substance Use Problems -   Medical-   Will ask at next appointment    MEDICAL / SURGICAL HISTORY                                     Neurologic Hx:   [head injury/ LOC/ seizure/ other]-   None  Patient Active Problem List   Diagnosis     Major depressive disorder, recurrent episode, moderate with melancholic features (H)     Other Medical Hx: hypertension, cholesterolemia, recent elevation of  PSA,  history of stent for CAD, GERD, macular degeneration    MEDICAL ROS:    ROS: 10 point ROS neg other than the symptoms noted above in the HPI, does have some urinary obstruction, started flowmax this summer.         ALLERGY                                Review of patient's allergies indicates no known allergies.  MEDICATIONS                               Current Outpatient Prescriptions   Medication Sig Dispense Refill     ACETAMINOPHEN PO Take 325 mg by mouth       amLODIPine-benazepril (LOTREL) 10-20 MG per capsule Take 1 capsule by mouth daily       ASPIRIN PO Take 81 mg by mouth daily       calcium-vitamin D 500-125 MG-UNIT TABS Take 1 tablet by mouth 2 times daily       DIPHENHYDRAMINE HCL PO Take 25 mg by mouth daily as needed       FLUOXETINE HCL PO Take 20 mg by mouth daily       LOSARTAN POTASSIUM PO Take 25 mg by mouth       MIRTAZAPINE PO Take 15 mg by mouth At Bedtime       nitroGLYcerin (NITRODUR) 0.4 MG/HR 24 hr patch Place 1 patch onto the skin daily       NITROGLYCERIN SL Take 0.4 mg by mouth       OMEPRAZOLE PO Take 20 mg by mouth every morning       ROSUVASTATIN CALCIUM PO Take 40 mg by mouth daily       TAMSULOSIN HCL PO Take 0.4 mg by mouth daily         VITALS     MENTAL STATUS EXAM                                                           Orientation: full, x3  Alertness: alert   Appearance: well groomed and casually groomed  Behavior/Demeanor:  cooperative, pleasant and calm, with good  eye contact   Speech: regular rate and rhythm  Language: intact  Psychomotor: normal or unremarkable  Mood: dysthymic  Affect: full range and appropriate; was congruent to mood; was congruent to content  Thought Process/Associations: unremarkable  Thought Content:  Denies suicidal and violent ideation, delusions and preoccupations  Perception:    Denies auditory hallucinations and visual hallucinations  Insight: good  Judgment: good  Cognition: does  appear grossly intact; formal cognitive testing was not done  Gait: Normal   MSK: extremities normal, no peripheral edema    LABS and DATA   Creatinine, BUN, GFR, LFT's all normal on 8/28/18 labs from [facility]    PHQ9 TODAY = 3   On 10/17/18; CAGE= 3  (no to did people annoy you)  PHQ-9 SCORE 9/24/2018   Total Score 8          RATING SCALES:  PHQ9    SUBSTANCE USE/PSYCHIATRIC DIAGNOSES                                                                                                    Alcohol Use Disorder, severe.    Major Depressive Disorder, recurrent, in partial remission.  Generalized Anxiety Disorder.      ASSESSMENT                                           See 'Plan' section for specific dosing info             This patient is a 73 year old male who has alcohol use disorder since 1980 with a handful of years of sobriety since.  He has been in detox more than 40 times and 4-5 treatments, current in the Dual Program here.  Depression worsens with drinking and now he has 6 weeks of sobriety and his mood is improving.  He wants to continue with Vivitrol, which appears to help with cravings and appears motivated to stay sober.  He would also like to stay on his Prozac 40 mg and needs a refill for Remeron 15 mg for before bed.  He will get his Vivitrol injection today.        Further diagnostic clarification is not needed.  There are no medical comorbidities which impact this treatment [his liver function tests from [facility]  hospital a month ago were completely in normal range].    [place] [facility]IT[facility] PRO[facility] [] was checked today:  indicates no controlled subtances reported in previous 12 months.     PLAN                                                                                                       1) PSYCHOTROPIC MEDICATIONS:   - Prozac 40 mg daily, refill given today ( 20 mg two pills daily)   -Remeron 15 mg at bedtime, refill given today   -Vivitrol injection monthly, given today     2) THERAPY:    - Currently in the [facility] Program here at [place], continue to attend.    -Start attending AA at least one meeting a week, and increase to 2-3 meetings per week when   treatment is over.  Encouraged to get a sponsor.     -Spend time with friends and family and do not isolate.           3) NEXT DUE:  LABS- None needed.  Labs from [facility] were reviewed from 8/28/2018.  Liver and kidney function normal.  Normal CBC/CMP.                                  4) REFERRALS:    None currently    5) RTC: 4 weeks    6) CRISIS NUMBERS:   Provided routinely in AVS.         TREATMENT RISK STATEMENT:  The risks, benefits, alternatives and potential adverse effects have been explained and are understood by the pt. The pt agrees to the treatment plan with the ability to do so. The pt knows to call the clinic for any problems or to access emergency care if needed.  Medical and CD concerns are documented above.  Psychotropic drug interaction check was done, including changes made today, and is discussed above.    ADDICTION FELLOW: Leatha Saldana      Patient was seen and staffed in clinic with Dr. Bee who will sign the note.    Supervisor is Dr. Bee    I saw the patient with the fellow, and participated in key portions of the service, including the mental status examination and developing the plan of care. I reviewed key portions of the history with the fellow. I agree with the findings and plan as documented in this note.    Selam EDUARDO  Specker

## 2018-10-18 ENCOUNTER — HOSPITAL ENCOUNTER (OUTPATIENT)
Dept: BEHAVIORAL HEALTH | Facility: CLINIC | Age: 73
End: 2018-10-18
Attending: PSYCHIATRY & NEUROLOGY
Payer: MEDICARE

## 2018-10-18 DIAGNOSIS — F10.21 ALCOHOL USE DISORDER, SEVERE, IN EARLY REMISSION (H): ICD-10-CM

## 2018-10-18 PROCEDURE — H2012 BEHAV HLTH DAY TREAT, PER HR: HCPCS

## 2018-10-18 RX ORDER — FLUOXETINE 20 MG/1
40 TABLET, FILM COATED ORAL DAILY
Qty: 60 TABLET | Refills: 3 | Status: SHIPPED | OUTPATIENT
Start: 2018-10-18 | End: 2018-12-12

## 2018-10-18 NOTE — TELEPHONE ENCOUNTER
Yes, that was an error.  It was supposed to be 20 mg 2 tabs per day.  I am not sure what happened with the script.  I have changed it if you can let the patient know.     Thanks,    Leatha

## 2018-10-18 NOTE — TELEPHONE ENCOUNTER
- Writer returned a call to [facility] and spoke with pharmacistRafia. Order called into [facility] for Prozac 20 mg tab- take 2 tab (40 mg) daily #60 with 3 refills. Unable to locate script printed by provider.     Call to patient   - Patient called at [phone]  - No answer at number provided   - LVM, requesting a call back   - Clinic number provided

## 2018-10-18 NOTE — PROGRESS NOTES
"Adult Mental Health Outpatient Group Therapy Progress Note     Client Initial Individualized Goals for Treatment: \" To stay sober, because I do much better with my mental health when I am sober\"       See Initial Treatment suggestions for the client during the time between Diagnostic Assessment and completion of the Master Individualized Treatment Plan.    Treatment Goals:     see above     Area of Treatment Focus:  Symptom Management, Community Resources/Discharge Planning and Abstinence/Relapse Prevention    Therapeutic Interventions/Treatment Strategies:  Support, Feedback, Structured Activity, Problem Solving, Clarification, Education and Motivational Enhancement Therapy    Response to Treatment Strategies:  Accepted Feedback, Listened, Focused on Goals, Attentive and Accepted Support    Name of Group:  Life Skills 10/18/18 11:00 - 11:50    Group Participants: 6 of 7.      Description and Outcome:  Gio attended and participated in an experiential Psychoeducation group where rehabilitative intervention focuses on learning, developing through practice, and generalizing independent living skills. The purpose of this group is to stabilize and manage mental health symptoms, reduce/eliminate substance use, and to improve participation and function in valued roles, routines, relationships. Topic for the session was to begin to develop a relapse management kit for both mental and chemical health by creating individualized coping cards. He was engaged in the process, noting that early warning signs for him were not engaging in personal grooming and hygiene tasks. He was more verbal and affect was brighter, using some humor. He also reported in on follow through with one of his goals he had set earlier in the week, noting benefits for himself. Client verbalized understanding of session content by report of having coping cards to look at would help with memory problems and not being able to recall coping skills to use in " the moment.  Client would benefit from additional opportunities to practice and implement content from this session.      Is this a Weekly Review of the Progress on the Treatment Plan?  No

## 2018-10-19 ENCOUNTER — HOSPITAL ENCOUNTER (OUTPATIENT)
Dept: BEHAVIORAL HEALTH | Facility: CLINIC | Age: 73
End: 2018-10-19
Attending: PSYCHIATRY & NEUROLOGY
Payer: MEDICARE

## 2018-10-19 PROCEDURE — H2012 BEHAV HLTH DAY TREAT, PER HR: HCPCS

## 2018-10-19 NOTE — PROGRESS NOTES
"Adult Mental Health Outpatient Group Therapy Progress Note     Client Initial Individualized Goals for Treatment: \" To stay sober, because I do much better with my mental health when I am sober\"       See Initial Treatment suggestions for the client during the time between Diagnostic Assessment and completion of the Master Individualized Treatment Plan.    Treatment Goals:  In Life Skills groups, Gio will identify a skill he has used to help follow through with a goal or activity of daily living, noting benefits for his mental health, once each week.  Will enlist involvement of support network by setting up an appointment with an individual therapist by the target date.  Gio will attend one AA meeting per week reporting outcomes to the group weekly.    Gio will take time 1-2/week in psychotherapy or life skills groups to identify ways he can reduce isolation and restore balance to his daily routine.  Gio will take time 1-2/week in psychotherapy to idenitfy current symptoms and stressors and to develop a coping strategy for each.            Area of Treatment Focus:  Symptom Management, Community Resources/Discharge Planning, Abstinence/Relapse Prevention, Develop / Improve Independent Living Skills and Develop Socialization / Interpersonal Relationship Skills    Therapeutic Interventions/Treatment Strategies:  Support, Feedback, Structured Activity, Problem Solving, Clarification, Education and Motivational Enhancement Therapy    Response to Treatment Strategies:  Accepted Feedback, Listened, Focused on Goals, Attentive and Accepted Support    Name of Group:  Life Skills   10/19/18 9:00 - 9:50     Group Participants: 4 of 7.      Description and Outcome:  Gio attended and participated in an experiential Psychoeducation group where rehabilitative intervention focuses on learning, developing through practice, and generalizing independent living skills. The purpose of this group is to stabilize and manage mental health " symptoms, reduce/eliminate substance use, and to improve participation and function in valued roles, routines, relationships.  Topic for the group was focused on practicing planning and time management skills for unstructured time outside of treatment, along with identifying skills being used to help manage both mental and chemical health. He engaged in this, noting that he did struggle a bit with identifying some options of activities for him to engage in. He did note focus on trying to follow through with some of his treatment goals, focusing on exercise and getting out. He did note plans for an AA meeting and asked about the location of one that he knew about and had attended in the past. He set goal to follow through with this. Client verbalized understanding of session content by noting benefits of having structured time and support in focusing on some of his goals as he does struggle at times to do these outside of treatment at this time. He is open to explore techniques to use to start to generalize these skills and practice at home.  Client would benefit from additional opportunities to practice and implement content from this session.      Is this a Weekly Review of the Progress on the Treatment Plan?  Yes.      Are Treatment Plan Goals being addressed?  Yes, continue treatment goals      Are Treatment Plan Strategies to Address Goals Effective?  Yes, continue treatment strategies      Are there any current contracts in place?  No

## 2018-10-19 NOTE — PROGRESS NOTES
"Adult Dual Disorder Progress Note / Treatment Plan Review       Patient: Laurent Choi   MRN: [id number]  : 1945  Age: 73 year old  Sex: male    Week of: 10/22/18-10/26/18     Client Initial Individualized Goals for Treatment:  \"To stay sober, because I do much better with my mental health when I am sober\"     See Initial Treatment suggestions for the client during the time between Diagnostic Assessment and completion of the Master Individualized Treatment Plan.    Treatment Goals:  Client will notify staff when needing assistance to develop or implement a coping plan to manage suicidal or self injurious urges.  Client will use coping plan for safety, as needed.    Gio will take time 1-2/week in psychotherapy to idenitfy current symptoms and stressors and to develop a coping strategy for each.      Gio will have 0 instances of chemical use reporting outcomes to the group daily.      Gio will take time 1-2/week in psychotherapy to identify ways he can reduce isolation and restore balance to his daily routine.    Will enlist involvement of support network by setting up an appointment with an individual therapist by the target date.    Gio will attend one AA meeting per week reporting outcomes to the group weekly.     Active Psychiatric Symptoms Impairing:   Thought, Mood, Behavior and Perception    Area of Treatment Focus:  Symptom Management, Personal Safety, Community Resources/Discharge Planning and Abstinence/Relapse Prevention    Treatment Strategies:   Support, Feedback, Limit/Boundaries, Safety Assessments, Structured Activity, Problem Solving, Clarification, Education, Motivational Enhancement Therapy and Cognitive Behavioral Therapy    Patient Response:  Accepted Feedback, Gave Feedback, Listened, Focused on Goals, Attentive, Accepted Support and Alert      Dimension Risk Description and Outcome:    Dimension One: Acute Intoxication/Withdrawal Potential   Daily Note:  10/23/2018:  Gio reports " "that he continues to maintain his sobriety at this time.  (JACINDA)  10/25/2018:  Gio reports continued sobriety at this time. (JACINDA)  10/26/2018:  Sobriety maintained.  (JACINDA)    Dimension Two: Biomedical Conditions and Complications  Daily Note:  10/23/2018:  Gio states that he is making time to take walks throughout the week to improve his physical health. (JACINDA)  10/25/2018:  Gio reports that he has been getting better sleep.  (JACINDA)  10/26/2018:  No new concerns reported.  (JACINDA)    Dimension Three: Emotional/Behavioral / Cognitive Conditions & Complications  Daily Note: 10/23/2018: (10-10:50am): Goi states that he is feeling \"hard pressed to get work done\" as he has been taking time off to meet up with his sister.  Gio states that he decided not to attend AA as he instead went to spend time with a friend.  He denies any thoughts to self harm and states that his mood has been even keeled.  (JACINDA)  10/25/2018:  (10-10:50am):  Gio reports that he is \"feeling upbeat today\".  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  Gio states that his sister has asked to see his work however he does not feel comfortable showing it to her and will have to create a boundary around this.  He currently denies thoughts to self harm.  (JACINDA)  10/26/2018: (9-9:50am):  Gio reports that he is feeling \"happy\" today and that he is grateful for his sobriety.  He denies any bothersome symptoms or thoughts to self harm.  Gio states that he does not need time in group today.  (JACINDA)    Dimension Four: Treatment Acceptance / Resistance  Daily Note: 10/23/2018 MICD Education (9:00-9:50AM): Writer taught and facilitated discussion on the DBT states of mind (emotion mind, wise mind, and reason mind). Topic included discussion on how the states of mind impact our behavior and thoughts as well as ways we can use skills to move to batista mind. Gio participated in discussion by " "sharing examples of behaviors and thoughts in each state of mind category. Gio also participated by sharing how people tend to rationalize thoughts and behaviors that occur from emotion mind. He did not share specifically how he can apply teaching to his own life. Gio can benefit from further instruction on ways to identify what state of mind he is in and how to use skills to achieve wise mind. (DE) 10/25/2018 Emotions Management (9:00-9:50): Writer taught on model of emotions including interpretations of events, action urges, actions, and results. Gio participated in teaching by sharing examples when prompted. Gio expressed understanding by sharing how he learned that mindfulness can assist in putting situations into perspective. Gio can benefit from further assistance increasing mindfulness of emotions and skills to effectively challenge interpretations and respond to events. (DE) 10/26/2018: Interpersonal Relationships (11-11:50am):  Gio completed his Relationship Map.  He states that he is content with his friendships but is hoping that his recovery efforts bring him closer to his family in the up coming months.  (JACINDA)    Dimension Five: Continued Use/Relapse Prevention  Daily Note: 10/23/2018:  Gio states that he remains committed to his sobriety at this time.  (JACINDA)  10/25/2018:  Moderate risk for relapse.  Gio states that his Vivitrol shot takes care of his cravings.  (JACINDA)  10/26/2018: Gio plans to avoid using peers and venues this weekend.  He rates his risk for relapse as \"low\".  (JACINDA)    Dimension Six: Recovery Environment  Daily Note:  10/23/2018:  No changes at this time.  Gio has not attended AA over the weekend as recently planned. (JACINDA)  10/25/2018:  Encouraged to expand sober support network.  (JACINDA)    Weekly Progress / Treatment Plan Review      Are there additional progress notes for this week? No    Are Treatment Plan Goals being addressed? Yes, continue treatment goals    Are treatment plan " strategies to address goals effective? Yes, continue treatment strategies    Are there any current contracts in place? No    Client: No changes at this time.       Client: N/A    Was client case reviewed with [place] MD and/or Selam Bee MD and the treatment team this week? yes    Psychotherapists contributing to this note:    Group: MICD Education Date/Time: 10/23/18  / 9:00 to 9:50; Participants: 5 of 7. ;   Facilitated by: Dia Wallace MA, [facility], [facility] ([place])     Group: Group Psychotherapy Date/Time: 10/23/2018 / 1000 to 1050; Participants: 5 of 7. No call, no show;   Facilitated by: NIKKI Montez(JACINDA)    Group: Emotions Management Date/Time: 10/25/18 / 9:00 to 9:50; Participants: 5 of 6. ;   Facilitated by: Dia Wallace MA, [facility], [facility] ([place])     Group: Group Psychotherapy Date/Time: 10/25/2018 / 1000 to 1050; Participants: 4 of 6. Appointments;   Facilitated by: NIKKI Montez(JACINDA)    Group: Group Psychotherapy Date/Time: 10/26/2018 / 900 to 950; Participants: 6 of 6. ;   Facilitated by: NIKKI Montez(JACINDA)    Group: Interpersonal Relationships Date/Time: 10/26/2018 / 1100 to 1150; Participants: 6 of 6. ;   Facilitated by: NIKKI Montez(JCAINDA)

## 2018-10-23 ENCOUNTER — HOSPITAL ENCOUNTER (OUTPATIENT)
Dept: BEHAVIORAL HEALTH | Facility: CLINIC | Age: 73
End: 2018-10-23
Attending: PSYCHIATRY & NEUROLOGY
Payer: MEDICARE

## 2018-10-23 PROCEDURE — H2012 BEHAV HLTH DAY TREAT, PER HR: HCPCS

## 2018-10-23 NOTE — PROGRESS NOTES
"Adult Mental Health Outpatient Group Therapy Progress Note     Client Initial Individualized Goals for Treatment: \" To stay sober, because I do much better with my mental health when I am sober\"       See Initial Treatment suggestions for the client during the time between Diagnostic Assessment and completion of the Master Individualized Treatment Plan.    Treatment Goals:  In Life Skills groups, Gio will identify a skill he has used to help follow through with a goal or activity of daily living, noting benefits for his mental health, once each week.  Will enlist involvement of support network by setting up an appointment with an individual therapist by the target date.  Gio will attend one AA meeting per week reporting outcomes to the group weekly.    Gio will take time 1-2/week in psychotherapy or life skills groups to identify ways he can reduce isolation and restore balance to his daily routine.  Gio will take time 1-2/week in psychotherapy to idenitfy current symptoms and stressors and to develop a coping strategy for each.           Area of Treatment Focus:  Symptom Management, Community Resources/Discharge Planning and Abstinence/Relapse Prevention    Therapeutic Interventions/Treatment Strategies:  Support, Feedback, Structured Activity, Problem Solving, Clarification, Education and Motivational Enhancement Therapy    Response to Treatment Strategies:  Accepted Feedback, Listened, Focused on Goals, Attentive and Accepted Support    Name of Group:  Life Skills 10/23/18 11:00 -11:50    Group Participants: 5 of 7      Description and Outcome:  Gio attended and participated in an experiential Psychoeducation group where rehabilitative intervention focuses on learning, developing through practice, and generalizing independent living skills. The purpose of this group is to stabilize and manage mental health symptoms, reduce/eliminate substance use, and to improve participation and function in valued roles, " "routines, relationships. Topic for the group was learning about various time management techniques for engagement in daily living activities, and learning techniques to help with motivation, procrastination and management of perfectionistic thinking for initiating and follow through. He listened to the brief discussion and highlighted techniques for management of procrastination, but did not identify any specific things that he might try to use for management for himself. He noted that he does tend to follow through with things and prefers to have things be more \"free flowing\" for himself. He did focus on ways he might work on regularly scheduling social activities for himself.  Client verbalized understanding of session content by report that this is not an area that is as challenging for him in many of his activities, but able to utilize ideas for some areas of his life management.        Is this a Weekly Review of the Progress on the Treatment Plan?  No    "

## 2018-10-25 ENCOUNTER — HOSPITAL ENCOUNTER (OUTPATIENT)
Dept: BEHAVIORAL HEALTH | Facility: CLINIC | Age: 73
End: 2018-10-25
Attending: PSYCHIATRY & NEUROLOGY
Payer: MEDICARE

## 2018-10-25 PROCEDURE — H2012 BEHAV HLTH DAY TREAT, PER HR: HCPCS

## 2018-10-25 NOTE — PROGRESS NOTES
"Adult Mental Health Outpatient Group Therapy Progress Note     Client Initial Individualized Goals for Treatment: \" To stay sober, because I do much better with my mental health when I am sober\"       See Initial Treatment suggestions for the client during the time between Diagnostic Assessment and completion of the Master Individualized Treatment Plan.    Treatment Goals:     see above     Area of Treatment Focus:  Symptom Management, Community Resources/Discharge Planning and Abstinence/Relapse Prevention    Therapeutic Interventions/Treatment Strategies:  Support, Feedback, Structured Activity, Problem Solving, Clarification, Education and Motivational Enhancement Therapy    Response to Treatment Strategies:  Accepted Feedback, Listened, Focused on Goals, Attentive and Accepted Support    Name of Group:  Life Skills 10/25/18 11:00 - 11:50    Group Participants: 4 of 6.      Description and Outcome:  Gio attended and participated in an experiential Psychoeducation group where rehabilitative intervention focuses on learning, developing through practice, and generalizing independent living skills. The purpose of this group is to stabilize and manage mental health symptoms, reduce/eliminate substance use, and to improve participation and function in valued roles, routines, relationships. Topic for the group was leisure with focus on the definition, benefits, challenges as it relates to mental and chemical health, and problem solving these with ways to make changes and try to implement some strategies for healthful engagement in daily life. He chose benefits he was wanting for himself and noted that from the list offered, he chose those that were about being along and doing by himself. He was able to identify some activities that he does engage in, but also was open to explore some other activities from a resource list that might help him expand his repertoire. Client verbalized understanding of session content by " being able to note the difference between leisure and work activities and how he can utilize these types of activities for management. Client would benefit from additional opportunities to practice and implement content from this session, focusing on sober leisure for himself as he noted his current activities are associated with drinking and are triggers for himself.      Is this a Weekly Review of the Progress on the Treatment Plan?  No

## 2018-10-26 ENCOUNTER — HOSPITAL ENCOUNTER (OUTPATIENT)
Dept: BEHAVIORAL HEALTH | Facility: CLINIC | Age: 73
End: 2018-10-26
Attending: PSYCHIATRY & NEUROLOGY
Payer: MEDICARE

## 2018-10-26 PROCEDURE — H2012 BEHAV HLTH DAY TREAT, PER HR: HCPCS

## 2018-10-26 NOTE — PROGRESS NOTES
"Adult Mental Health Outpatient Group Therapy Progress Note     Client Initial Individualized Goals for Treatment: \" To stay sober, because I do much better with my mental health when I am sober\"       See Initial Treatment suggestions for the client during the time between Diagnostic Assessment and completion of the Master Individualized Treatment Plan.    Treatment Goals:  In Life Skills groups, Gio will identify a skill he has used to help follow through with a goal or activity of daily living, noting benefits for his mental health, once each week.  Will enlist involvement of support network by setting up an appointment with an individual therapist by the target date.  Gio will attend one AA meeting per week reporting outcomes to the group weekly.    Gio will take time 1-2/week in psychotherapy or life skills groups to identify ways he can reduce isolation and restore balance to his daily routine.  Gio will take time 1-2/week in psychotherapy to idenitfy current symptoms and stressors and to develop a coping strategy for each.            Area of Treatment Focus:  Symptom Management, Community Resources/Discharge Planning, Abstinence/Relapse Prevention, Develop / Improve Independent Living Skills and Develop Socialization / Interpersonal Relationship Skills    Therapeutic Interventions/Treatment Strategies:  Support, Feedback, Structured Activity, Problem Solving, Clarification, Education and Motivational Enhancement Therapy    Response to Treatment Strategies:  Accepted Feedback, Listened, Focused on Goals, Attentive and Accepted Support    Name of Group:  Life Skills   10/26/18 10:00 - 10:50     Group Participants: 6 of 6    Description and Outcome:  Gio attended and participated in an experiential Psychoeducation group where rehabilitative intervention focuses on learning, developing through practice, and generalizing independent living skills. The purpose of this group is to stabilize and manage mental health " "symptoms, reduce/eliminate substance use, and to improve participation and function in valued roles, routines, relationships.  Topic for the group was focused on practicing planning and time management skills for unstructured time outside of treatment, along with identifying skills being used to help manage both mental and chemical health. He joked that he wanted to have a \"mindless\" weekend vs being mindful and more active and social. He was able to note some specific alone and quiet activities that would be relaxing and beneficial to him. He noted that he would try to go to the AA meeting again this weekend, which he had not followed through with the previous weekend. He talked of a few obstacles and noted that he would just try to \"think\" about going. Client verbalized understanding of session content by engagement in the process as suggested, although he tended to be more focused on not doing this and desire to just \"wing it\". Client would benefit from additional opportunities to practice and implement content from this session as he does struggle with planning and follow through at times.      Is this a Weekly Review of the Progress on the Treatment Plan?  Yes.      Are Treatment Plan Goals being addressed?  Yes, continue treatment goals      Are Treatment Plan Strategies to Address Goals Effective?  Yes, continue treatment strategies      Are there any current contracts in place?  No        "

## 2018-10-26 NOTE — PROGRESS NOTES
"Adult Dual Disorder Progress Note / Treatment Plan Review       Patient: Laurent Choi   MRN: [id number]  : 1945  Age: 73 year old  Sex: male    Week of: 10/29/18-18    Client Initial Individualized Goals for Treatment:  \"To stay sober, because I do much better with my mental health when I am sober\"     See Initial Treatment suggestions for the client during the time between Diagnostic Assessment and completion of the Master Individualized Treatment Plan.    Treatment Goals:  Client will notify staff when needing assistance to develop or implement a coping plan to manage suicidal or self injurious urges.  Client will use coping plan for safety, as needed.    Gio will take time 1-2/week in psychotherapy to idenitfy current symptoms and stressors and to develop a coping strategy for each.      Gio will have 0 instances of chemical use reporting outcomes to the group daily.      Gio will take time 1-2/week in psychotherapy to identify ways he can reduce isolation and restore balance to his daily routine.    Will enlist involvement of support network by setting up an appointment with an individual therapist by the target date.    Gio will attend one AA meeting per week reporting outcomes to the group weekly.     Active Psychiatric Symptoms Impairing:   Thought, Mood, Behavior and Perception    Area of Treatment Focus:  Symptom Management, Personal Safety, Community Resources/Discharge Planning and Abstinence/Relapse Prevention    Treatment Strategies:   Support, Feedback, Limit/Boundaries, Safety Assessments, Structured Activity, Problem Solving, Clarification, Education, Motivational Enhancement Therapy and Cognitive Behavioral Therapy    Patient Response:  Accepted Feedback, Gave Feedback, Listened, Focused on Goals, Attentive, Accepted Support and Alert      Dimension Risk Description and Outcome:    Dimension One: Acute Intoxication/Withdrawal Potential   Daily Note:  10/30/2018:  Gio reports " "that he has achieved 2 months of sobriety.  (JACINDA) 11/1/2018:  Gio has been sober for the entire duration of the program.  (JACINDA)    Dimension Two: Biomedical Conditions and Complications  Daily Note: 10/30/2018:  Gio states that he has a rash on his feet which he feels is a reaction to his medication.  He plans to address this with his primary care physician.  (JACINDA)  11/1/2018:  No changes.  (JACINDA)    Dimension Three: Emotional/Behavioral / Cognitive Conditions & Complications  Daily Note:  10/30/2018:  (10-10:50am):  Gio reports that he is feeling \"happy and working to his hardest potential\" today.   He shares that he is getting a lot done at home and feels accomplished.  He currently denies thoughts to self harm.  (JACINDA)  11/1/2018:  (10-10:50am):  Gio reports that he is feeling good today and is positive about the future.  He expresses pride at successfully completing the program and intends to work full time and follow up with his individual therapist weekly for aftercare.  Gio denies thoughts to self harm.  (JACINDA)    Dimension Four: Treatment Acceptance / Resistance  Daily Note:  10/30/2018:  Gio offered supportive feedback to his peers.  (JACINDA) 11/1/2018 Emotions Management (9:00-9:50): Writer taught and facilitated discussion on misinterpretations of events (cogitive distortions) and ways to reframe and challenge distortions. Gio participated actively in discussion by sharing examples and reading out loud to the group. Gio expressed understanding by sharing that he noticed how judgments are the root of his \"problems.\"  Gio can benefit from continued assistance in identifying and reframing cognitive distortions. (DE) 11/1/2018:  Gio has successfully completed the program.  (JACINDA)    Dimension Five: Continued Use/Relapse Prevention  Daily Note:  10/30/2018:  Gio reports that he is having minimal cravings to drink at this time.  (JACINDA)  11/1/2018:  Gio states that he plans to maintain his sobriety in \"the long term\".  " (JACINDA)    Dimension Six: Recovery Environment  Daily Note:  10/30/2018:  No change.  (JACINDA)  11/1/2018:  Gio plans to follow up with his PCP and his individual therapist for after care.  (JACINDA)      Weekly Progress / Treatment Plan Review      Are there additional progress notes for this week? No    Are Treatment Plan Goals being addressed? Yes, Gio has completed his Treatment Plan Goals and has completed the program.      Are treatment plan strategies to address goals effective? Client discharged    Are there any current contracts in place? No    Client: Agrees with D/C plan.     Client: Received copy of D/C Instruction Sheet.    Was client case reviewed with [place] MD and/or Selam Bee MD and the treatment team this week? yes    Psychotherapists contributing to this note:    Group: Group Psychotherapy Date/Time: 10/30/2018 / 1000 to 1050; Participants: 5 of 7. Appointments;   Facilitated by: NIKKI Montez(JACINDA)      Group: Emotions Management Date/Time: 11/1/18 / 9:00 to 9:50; Participants: 6 of 7. ;   Facilitated by: Dia Wallace MA, [facility], [facility] ([place])     Group: Group Psychotherapy Date/Time: 11/1/2018 / 1000 to 1050; Participants: 6 of 7. Appointments;   Facilitated by: NIKKI Montez(JACINDA)

## 2018-10-29 ENCOUNTER — HOSPITAL ENCOUNTER (OUTPATIENT)
Dept: BEHAVIORAL HEALTH | Facility: CLINIC | Age: 73
End: 2018-10-29
Attending: PSYCHIATRY & NEUROLOGY
Payer: MEDICARE

## 2018-10-29 PROCEDURE — H2012 BEHAV HLTH DAY TREAT, PER HR: HCPCS

## 2018-10-29 NOTE — PROGRESS NOTES
"Adult Mental Health Outpatient Group Therapy Progress Note     Client Initial Individualized Goals for Treatment: \" To stay sober, because I do much better with my mental health when I am sober\"       See Initial Treatment suggestions for the client during the time between Diagnostic Assessment and completion of the Master Individualized Treatment Plan.    Treatment Goals:  In Life Skills groups, Gio will identify a skill he has used to help follow through with a goal or activity of daily living, noting benefits for his mental health, once each week.  Will enlist involvement of support network by setting up an appointment with an individual therapist by the target date.  Gio will attend one AA meeting per week reporting outcomes to the group weekly.    Gio will take time 1-2/week in psychotherapy or life skills groups to identify ways he can reduce isolation and restore balance to his daily routine.  Gio will take time 1-2/week in psychotherapy to idenitfy current symptoms and stressors and to develop a coping strategy for each.           Area of Treatment Focus:  Symptom Management, Community Resources/Discharge Planning and Abstinence/Relapse Prevention    Therapeutic Interventions/Treatment Strategies:  Support, Feedback, Structured Activity, Problem Solving, Clarification, Education and Motivational Enhancement Therapy    Response to Treatment Strategies:  Accepted Feedback, Listened, Focused on Goals, Attentive and Accepted Support    Name of Group:  Life Skills 10/29/18 9:00 -9:50    Group Participants: 7 of 7      Description and Outcome:  Gio attended and participated in an experiential Psychoeducation group where rehabilitative intervention focuses on learning, developing through practice, and generalizing independent living skills. The purpose of this group is to stabilize and manage mental health symptoms, reduce/eliminate substance use, and to improve participation and function in valued roles, routines, " relationships.  Topic for the group was practicing follow through with goals and plans and focus on practicing distraction interventions. He noted that he had followed through wit many of his plans and was more engaged in relaxation activities and his work activities. He reported that he had not attended AA as he slept instead. Client verbalized understanding of session content by report that he is engaging in activities of his choice and making future plans with others. He did not his own distraction technique that he practice at the end of the session.      Is this a Weekly Review of the Progress on the Treatment Plan?  No

## 2018-10-29 NOTE — PROGRESS NOTES
"Group Therapy Progress Notes     Area of Treatment Focus:  Symptom Management, Abstinence/Relapse Prevention and Develop / Improve Independent Living Skills    Therapeutic Interventions/Treatment Strategies:  Support, Feedback and Clarification    Response to Treatment Strategies:  Accepted Feedback, Listened, Focused on Goals, Attentive, Accepted Support and Alert    Name of Group:  Psychotherapy     Progress Note  Gio reports that he slept more than usual over the weekend but he still feels tired today. He also feels a sense of accomplishment because he got a lot of work done, completing two projects. He also reports feeling \"satisfied\". He says he has no suicidal thoughts, no urges to self harm or to use chemicals.          Is this a Weekly Review of the Progress on the Treatment Plan?  No     "

## 2018-10-29 NOTE — PROGRESS NOTES
Adult Dual Disorder Program:   Acknowledgement of Current Treatment Plan       I have reviewed my treatment plan with my therapist / counselor on 10/29/18.   I agree with the plan as it is written in the electronic health record.    Name:      Signature:  Laurent Mills MD  Psychiatrist    Brittany Lockwood, [facility]  Psychotherapist    Dia Wallace, [facility], [facility]  Psychotherapist    Cori Marques RN, PHN  Nurse Liaison    Joie Torres OTR/L  Occupational Therapist    Jasbir Flores MA,   Psychotherapist    Selam Bee MD  Psychiatrist/Medical Director

## 2018-10-30 ENCOUNTER — HOSPITAL ENCOUNTER (OUTPATIENT)
Dept: BEHAVIORAL HEALTH | Facility: CLINIC | Age: 73
End: 2018-10-30
Attending: PSYCHIATRY & NEUROLOGY
Payer: MEDICARE

## 2018-10-30 PROCEDURE — H2012 BEHAV HLTH DAY TREAT, PER HR: HCPCS

## 2018-10-30 NOTE — PROGRESS NOTES
"Adult Mental Health Outpatient Group Therapy Progress Note         Client Initial Individualized Goals for Treatment: \" To stay sober, because I do much better with my mental health when I am sober\"         See Initial Treatment suggestions for the client during the time between Diagnostic Assessment and completion of the Master Individualized Treatment Plan.     Treatment Goals:  In Life Skills groups, Gio will identify a skill he has used to help follow through with a goal or activity of daily living, noting benefits for his mental health, once each week.  Will enlist involvement of support network by setting up an appointment with an individual therapist by the target date.  Gio will attend one AA meeting per week reporting outcomes to the group weekly.    Gio will take time 1-2/week in psychotherapy or life skills groups to identify ways he can reduce isolation and restore balance to his daily routine.  Gio will take time 1-2/week in psychotherapy to idenitfy current symptoms and stressors and to develop a coping strategy for each.              Area of Treatment Focus:  Symptom Management, Community Resources/Discharge Planning and Abstinence/Relapse Prevention     Therapeutic Interventions/Treatment Strategies:  Support, Feedback, Structured Activity, Problem Solving, Clarification, Education and Motivational Enhancement Therapy     Response to Treatment Strategies:  Accepted Feedback, Listened, Focused on Goals, Attentive and Accepted Support     Name of Group:  Life Skills 10/30/2018   9:00 -9:50     Group Participants: 4 of 7       Description and Outcome:  Gio attended and participated in an experiential Psychoeducation group where rehabilitative intervention focuses on learning, developing through practice, and generalizing independent living skills. The purpose of this group is to stabilize and manage mental health symptoms, reduce/eliminate substance use, and to improve participation and function in " valued roles, routines, relationships.  Topic for the group was practicing follow through with goals and plans and focus on practicing distraction interventions. 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  He talked to the group about his ability to vote and how he thought that human dignity was important.    Client verbalized understanding of session content by report that he is engaging in activities of his choice and making future plans with others. He did not his own distraction technique that he practice at the end of the session.        Is this a Weekly Review of the Progress on the Treatment Plan?  No

## 2018-11-01 ENCOUNTER — HOSPITAL ENCOUNTER (OUTPATIENT)
Dept: BEHAVIORAL HEALTH | Facility: CLINIC | Age: 73
End: 2018-11-01
Attending: PSYCHIATRY & NEUROLOGY
Payer: MEDICARE

## 2018-11-01 PROCEDURE — H2012 BEHAV HLTH DAY TREAT, PER HR: HCPCS

## 2018-11-01 ASSESSMENT — PATIENT HEALTH QUESTIONNAIRE - PHQ9
5. POOR APPETITE OR OVEREATING: NOT AT ALL
SUM OF ALL RESPONSES TO PHQ QUESTIONS 1-9: 3

## 2018-11-01 ASSESSMENT — ANXIETY QUESTIONNAIRES
7. FEELING AFRAID AS IF SOMETHING AWFUL MIGHT HAPPEN: NOT AT ALL
6. BECOMING EASILY ANNOYED OR IRRITABLE: NOT AT ALL
IF YOU CHECKED OFF ANY PROBLEMS ON THIS QUESTIONNAIRE, HOW DIFFICULT HAVE THESE PROBLEMS MADE IT FOR YOU TO DO YOUR WORK, TAKE CARE OF THINGS AT HOME, OR GET ALONG WITH OTHER PEOPLE: NOT DIFFICULT AT ALL
2. NOT BEING ABLE TO STOP OR CONTROL WORRYING: NOT AT ALL
1. FEELING NERVOUS, ANXIOUS, OR ON EDGE: SEVERAL DAYS
5. BEING SO RESTLESS THAT IT IS HARD TO SIT STILL: NOT AT ALL
GAD7 TOTAL SCORE: 1
3. WORRYING TOO MUCH ABOUT DIFFERENT THINGS: NOT AT ALL

## 2018-11-01 NOTE — DISCHARGE SUMMARY
MICD Discharge Summary/Instructions     Patient: Laurent Choi  MRN: [id number]   : 1945 Age: 73 year old Sex: male   -  Focus of Treatment / Discharge Recommendations    Personal Safety/ Management of Symptoms    * Follow your safety plan.  Report increased symptoms to your care team  and /or go to the nearest Emergency Department.    * Call crisis lines as needed    [facility] [phone]                [facility] [phone]  [facility] [phone]              [facility] [phone]  [facility] [phone]              [facility] [phone]  [facility] [phone]          National Suicide Prevention [phone]  [facility] [phone]            Suicide Prevention [phone]  [facility] [phone]    Abstinence/Relapse Prevention  * Take all medicines as directed.  Carry a current list of medicines with you.  * Use coping skills: Art, Exercise, Urge Surfing, Grounding, Mindfulness, AA, Reach out to others  * Do not use illicit (street) drugs, controlled substances (narcotics) or alcohol.    Develop/Improve Independent Living/Socialization Skills: Increase Sober Support Network    Community Resources/Supports and Discharge Planning:    Follow up with psychiatrist / main caregiver: Dr. West at     Next visit: Call to schedule at [phone]    Follow up with your therapist: Weekly  Next visit: Call to schedule    Go to group therapy and / or support groups at: , [facility]    See your medical doctor about:  Annual physicals    Other:  Gio has completed the Adult Dual Disorder Program.  He will be following up with his primary care physician, Dr. West, and seeing his individual therapist weekly for aftercare.  If you have questions or need additional resources, please contact Brittany Locwkood at [phone].      Your treatment team appreciates having the opportunity to work with you and wishes you the  best.    Client Signature:_______________________   Date / Time:___________  NIKKI Montez

## 2018-11-01 NOTE — PROGRESS NOTES
"Adult Mental Health Outpatient Group Therapy Progress Note     Client Initial Individualized Goals for Treatment: \" To stay sober, because I do much better with my mental health when I am sober\"       See Initial Treatment suggestions for the client during the time between Diagnostic Assessment and completion of the Master Individualized Treatment Plan.    Treatment Goals:  In Life Skills groups, Gio will identify a skill he has used to help follow through with a goal or activity of daily living, noting benefits for his mental health, once each week.  Will enlist involvement of support network by setting up an appointment with an individual therapist by the target date.  Gio will attend one AA meeting per week reporting outcomes to the group weekly.    Gio will take time 1-2/week in psychotherapy or life skills groups to identify ways he can reduce isolation and restore balance to his daily routine.  Gio will take time 1-2/week in psychotherapy to idenitfy current symptoms and stressors and to develop a coping strategy for each.           Area of Treatment Focus:  Symptom Management, Community Resources/Discharge Planning and Abstinence/Relapse Prevention    Therapeutic Interventions/Treatment Strategies:  Support, Feedback, Structured Activity, Problem Solving, Clarification, Education and Motivational Enhancement Therapy    Response to Treatment Strategies:  Accepted Feedback, Listened, Focused on Goals, Attentive and Accepted Support    Name of Group:  Life Skills 11/1/18  11:00-11:50    Group Participants: 6 of 7      Description and Outcome:  Gio attended and participated in an experiential Psychoeducation group where rehabilitative intervention focuses on learning, developing through practice, and generalizing independent living skills. The purpose of this group is to stabilize and manage mental health symptoms, reduce/eliminate substance use, and to improve participation and function in valued roles, " "routines, relationships.  Emphasis of group was placed on an experiential, structured group activity which was used to facilitate participation and prompt reflection and discussion on a variety of topics including self-support skills, mindfulness, management of stress and emotions, strategies for finding balance, leisure and finding and using resources. Group concluded with time for each individual to share reaction to content. Client request closing ceremony as today was his last day. He discussed transitioning to individual therapy, the productivity of sobriety, having his \"ducks in a row\", plans for attending sobriety support groups. He shared and received positive feedback about the program. He demonstrated slightly reduced working memory toward the end of group, forgetting questions and asking them to be repeated. He identified plans for regular exercise and attention to his spiritual being. He endorsed the value of play and leisure. His participation and ability to identify plans and coping techniques demonstrated understanding content. His affect was bright and he was supportive and supported.    Is this a Weekly Review of the Progress on the Treatment Plan?  No    "

## 2018-11-02 ASSESSMENT — ANXIETY QUESTIONNAIRES: GAD7 TOTAL SCORE: 1

## 2018-11-02 NOTE — DISCHARGE SUMMARY
Adult Dual Disorder Program Discharge Summary / Instructions     Patient: Laurent Choi MRN: [id number]  : 1945 Age:  73 year old Sex:  male    Admission Date: 18  Discharge Date:     Reason for Discharge: Completed treatment            Prognosis: Guarded      Client Progress Toward AchievingTreatment Plan Goals / Dimensions Risk Scale       Laurent attended 69 of 87 scheduled MICD groups. Absences were due to appointments, oversleeping, illness, and spending time with family.  Gio initially expressed discontent with the program, stating that he did not feel that he needed to be here and did not benefit from the set curriculum.  Despite his feelings, he continued to maintain attendance and gradually found himself increasing his participation in programming and making connections with his peers.  Gio worked on reducing his isolation, maintaining his sobriety, and challenging his negative thoughts throughout the program.  He discovered that he is more productive and inspired to work on his art with prolonged sobriety and improved mood.  Gio was able to complete the program and states that he feels more committed to his sobriety moving forward.  He plans to follow up with his Primary Care Physician and individual therapist for aftercare.      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 Emotional / Behavioral Condition    Description:    Gio has experienced suicidal ideation in the past but denies any current SI/SIB.      Goal:  Target Date: 18 Status: COMPLETED  Client will notify staff  when needing assistance to develop or implement a coping plan to manage suicidal or self injurious urges.  Client will use coping plan for safety, as needed.      Progress:   10/29/18  Gio reports that he continues to not experience any of these thoughts. Will CONTINUE goal while in the program.     11/1/18   As above. Goal COMPLETED    Treatment Strategies:   Assist clients in establishing / strengthening support network  Assist to identify treatment goals  Assess / reassess for appropriate therapy program involvement, encourage participation in therapies  Assess / reassess level of potential for harm to self or others  Engage in safety planning when indicated  Facilitate increased self awareness  Teach adaptive coping skills and communication skills        Area of Treatment Focus:   Symptom Stabilization and Management  Start Date:    10/1/18    Dimension:   II. Biomedical Conditions and III. Emotional / Behavioral Condition    Description:    Gio's symptoms include depressed mood, irritability, insomnia, anhedonia, fatigue, low energy, poor concentration, muscle tension, and thoughts of death/suicide.      Goal:  Target Date: 11/26/18 Status: COMPLETED  Gio will take time 1-2/week in psychotherapy to idenitfy current symptoms and stressors and to develop a coping strategy for each.       In Life Skills groups, Gio will identify a skill he has used to help follow through with a goal or activity of daily living, noting benefits for his mental health, once each week.      Progress:   10/29/18   #1: Gio reports that he has been making progress with this goal. Will CONTINUE this goal for further development.  #2: Gio has been able to follow through with many of his goals. Making progress and will CONTINUE for further development.    11/1/18  #1: Goal COMPLETED.  #2: Goal COMPLETED as written.    Treatment Strategies:   Assist clients in establishing / strengthening support network  Assist to identify treatment  goals  Assess / reassess level of potential for harm to self or others  Engage in safety planning when indicated  Facilitate increased self awareness  Teach adaptive coping skills and communication skills        Area of Treatment Focus:   Abstinence / Relapse Prevention  Start Date:    10/1/18    Dimension:   I. Acute Intoxication / Withdrawal Potential, IV. Treatment Acceptance / Resistance and V. Relapse    Description:    Gio's use of chemicals has impacted his relationships, his academics, and has exacerbated his depression and anxiety.    Goal:     Target Date: 11/26/18        Status: COMPLETED  Gio will have 0 instances of chemical use reporting outcomes to the group daily.      Gio will take time 1-2/week in psychotherapy or life skills groups to identify ways he can reduce isolation and restore balance to his daily routine.      Progress:   10/29/18  #1: Gio has remained sober and reports that he is very proud of this and feeling confident about his long term abstinence for himself at this time. Will CONTINUE this goal while in treatment.    #2: Gio is making progress with this. He is working to restore balance and focus on his creativity. Will CONTINUE goal for further development.   11/1/18  #1: As above. Goal COMPLETED.  #2: As above. Goal COMPLETED    Treatment Strategies:   Assist clients in establishing / strengthening support network  Assist to identify treatment goals  Facilitate increased self awareness  Provide education regarding relapse prevention  Teach adaptive coping skills and communication skills        Area of Treatment Focus:   Community Resources / Support and Discharge Planning  Start Date:    10/1/18    Dimension:   VI. Recovery Environment    Description:    Gio would like to expand his sober support network in the community.    Goal: Target Date: 11/26/18 Status: COMPLETED  Will enlist involvement of support network by setting up an appointment with an individual therapist by the  target date.    Gio will attend one AA meeting per week reporting outcomes to the group weekly.      Progress:   10/29/18  #1: Gio has appointments set for psychiatry. He will set up an appointment with [facility] this week, as they have contacted him to set this up. CONTINUE goal until discharge from the program.    #2: Gio has not attending AA at this time, but would like to CONTINUE this goal to still talk about exploring this options for aftercare.   11/1/18    #1: Goal COMPLETED. Gio reported that he did set up individual therapy.  #2: Goal STOPPED. He discharged from program a couple of days earlier than initially planned.    Treatment Strategies:   Assist clients in establishing / strengthening support network  Assist to identify treatment goals  Assist with discharge planning  Facilitate increased self awareness  Teach adaptive coping skills and communication skills          Admit Discharge   PHQ-9 8 3   RODRIGO-7 8 1   CGI 5/4 3/3       DIMENSION  ADMIT RATING DISCHARGE RATING   1 Acute Intoxication / Withdrawal Potential 0 0   2 Biomedical Conditions & Complications 2 2   3 Emotional / Behavioral / Cognitive Conditions & Complications 2 1   4 Treatment Acceptance / Resistance: 3 1   5 Continued Use / Relapse Prevention 3 2   6 Recovery Environment 2 1       Additional Comments: Gio plans to see his PCP for medication management and will attend individual therapy weekly for aftercare.    Completed By: NIKKI Montez

## 2018-11-14 ENCOUNTER — ALLIED HEALTH/NURSE VISIT (OUTPATIENT)
Dept: PSYCHIATRY | Facility: CLINIC | Age: 73
End: 2018-11-14
Attending: PSYCHIATRY & NEUROLOGY
Payer: MEDICARE

## 2018-11-14 VITALS
HEART RATE: 88 BPM | DIASTOLIC BLOOD PRESSURE: 75 MMHG | BODY MASS INDEX: 25.13 KG/M2 | WEIGHT: 180.2 LBS | SYSTOLIC BLOOD PRESSURE: 129 MMHG

## 2018-11-14 DIAGNOSIS — F10.21 ALCOHOL USE DISORDER, SEVERE, IN EARLY REMISSION (H): Primary | ICD-10-CM

## 2018-11-14 DIAGNOSIS — F10.21 ALCOHOL USE DISORDER, SEVERE, IN EARLY REMISSION (H): ICD-10-CM

## 2018-11-14 PROCEDURE — 25000128 H RX IP 250 OP 636: Mod: ZF

## 2018-11-14 PROCEDURE — G0463 HOSPITAL OUTPT CLINIC VISIT: HCPCS | Mod: ZF

## 2018-11-14 PROCEDURE — 96372 THER/PROPH/DIAG INJ SC/IM: CPT | Mod: ZF

## 2018-11-14 ASSESSMENT — PAIN SCALES - GENERAL: PAINLEVEL: NO PAIN (0)

## 2018-11-14 ASSESSMENT — PATIENT HEALTH QUESTIONNAIRE - PHQ9: SUM OF ALL RESPONSES TO PHQ QUESTIONS 1-9: 3

## 2018-11-14 NOTE — NURSING NOTE
"Laurent Choi,  1945, presented to the clinic today at the request of Dr. Saldana,  ordering provider for long-acting injectable Vivitrol 380 mg.    OBSERVATIONS:  1.  Appearance: Casually dressed in clean slacks, button shirt. This writer asked about any break through cravings and pt Stated \" about a week before my next shot, I do get some cravings, but I am able to disregard them. Someone told me that there was a pill I could take if I get those cravings. This writer explained that oral naltrexone is available and he should talk to Dr. Saldana regarding it. Writer also told pt that it would be reported back to Dr. Saldana. Pt also had a concern about recent weight gain, again, writer would report it to provider, which was done after patient left the clinic.  2.  Mood: Good, talkative  3.  Affect: Congruent  4.  Attitude: Good, friendly  5.  Cooperation: Full  6.  Side Effects: Some breakthrough cravings and increased weight concerns. Writer did discuss this with provider.  7.  Education: Pt to use My Chart to talk with Dr. Saldana or her RN  8. Next appointment: 18 at 1040 with injection to follow    The service provided today was rendered under the supervising provider of the day, Dr. Guidry, who was available for consultation as needed.    "

## 2018-11-14 NOTE — MR AVS SNAPSHOT
After Visit Summary   11/14/2018    Laurent Choi    MRN: [id number]           Patient Information     Date Of Birth          1945        Visit Information        Provider Department      11/14/2018 10:40 AM Leatha Saldana MD Psychiatry Clinic        Today's Diagnoses     Alcohol use disorder, severe, in early remission (H)           Follow-ups after your visit        Follow-up notes from your care team     Return in about 4 weeks (around 12/12/2018).      Your next 10 appointments already scheduled     Dec 12, 2018 10:40 AM CST   Chemical Dependency Follow Up with Leatha Saldana MD   Psychiatry Clinic ([facility])    [place] [id number] [address] [phone]            Dec 12, 2018 11:00 AM CST   Nurse Visit with [facility] Psychiatry Nurse   Psychiatry Clinic ([facility])    [place] [id number]  [facility] [address] [phone]              Who to contact     Please call your clinic at [phone] to:    Ask questions about your health    Make or cancel appointments    Discuss your medicines    Learn about your test results    Speak to your doctor            Additional Information About Your Visit        [employer] Information     [employer] gives you secure access to your electronic health record. If you see a primary care provider, you can also send messages to your care team and make appointments. If you have questions, please call your primary care clinic.  If you do not have a primary care provider, please call [phone] and they will assist you.      [employer] is an electronic gateway that provides easy, online access to your medical records. With [employer], you can request a clinic appointment, read your test results, renew a prescription or communicate with your care team.     To access your existing account, please contact your [facility] or call [phone] for assistance.        Care EveryWhere ID     This is your Care EveryWhere ID. This could be used by other organizations to access your [place] medical records  [id number]        Your Vitals Were     Pulse BMI (Body Mass Index)                88 25.13 kg/m2           Blood Pressure from Last 3 Encounters:   11/14/18 129/75   10/17/18 122/73    Weight from Last 3 Encounters:   11/14/18 81.7 kg (180 lb 3.2 oz)   10/17/18 80.6 kg (177 lb 12.8 oz)   09/24/18 78.5 kg (173 lb)              Today, you had the following     No orders found for display         Today's Medication Changes          These changes are accurate as of 11/14/18 11:59 PM.  If you have any questions, ask your nurse or doctor.               These medicines have changed or have updated prescriptions.        Dose/Directions    naltrexone 380 MG Susr   Commonly known as:  VIVITROL   This may have changed:  when to take this   Used for:  Alcohol use disorder, severe, in early remission (H)   Changed by:  Leatha Saldana MD        Dose:  380 mg   Inject 380 mg into the muscle every 30 days   Quantity:  380 mg   Refills:  0            Where to get your medicines      Some of these will need a paper prescription and others can be bought over the counter.  Ask your nurse if you have questions.     You don't need a prescription for these medications     naltrexone 380 MG Susr                Primary Care Provider    None Specified       No primary provider on file.        Equal Access to Services     FABRIZIO GOLD AH: Clinton Saldana, gila villarreal, kathleen coffey . [facility] [phone].    ATENCIÓN: Si habla español, tiene a medina disposición servicios gratuitos de asistencia lingüística. Jonathan al [phone].    We comply with applicable federal civil rights laws and [place] laws. We do not discriminate on the basis of race, color, national origin, age,  disability, sex, sexual orientation, or gender identity.            Thank you!     Thank you for choosing PSYCHIATRY CLINIC  for your care. Our goal is always to provide you with excellent care. Hearing back from our patients is one way we can continue to improve our services. Please take a few minutes to complete the written survey that you may receive in the mail after your visit with us. Thank you!             Your Updated Medication List - Protect others around you: Learn how to safely use, store and throw away your medicines at www.disposemymeds.org.          This list is accurate as of 11/14/18 11:59 PM.  Always use your most recent med list.                   Brand Name Dispense Instructions for use Diagnosis    ACETAMINOPHEN PO      Take 325 mg by mouth        amLODIPine-benazepril 10-20 MG per capsule    LOTREL     Take 1 capsule by mouth daily        ASPIRIN PO      Take 81 mg by mouth daily        calcium-vitamin D 500-125 MG-UNIT Tabs      Take 1 tablet by mouth 2 times daily        DIPHENHYDRAMINE HCL PO      Take 25 mg by mouth daily as needed        FLUoxetine 20 MG tablet     60 tablet    Take 2 tablets (40 mg) by mouth daily    Alcohol use disorder, severe, in early remission (H)       FLUOXETINE HCL PO      Take 20 mg by mouth daily        LOSARTAN POTASSIUM PO      Take 25 mg by mouth        * mirtazapine 15 MG tablet    REMERON    30 tablet    Take 1 tablet (15 mg) by mouth At Bedtime    Alcohol use disorder, severe, in early remission (H)       * MIRTAZAPINE PO      Take 15 mg by mouth At Bedtime        naltrexone 380 MG Susr    VIVITROL    380 mg    Inject 380 mg into the muscle every 30 days    Alcohol use disorder, severe, in early remission (H)       * nitroGLYcerin 0.4 MG/HR 24 hr patch    NITRODUR     Place 1 patch onto the skin daily        * NITROGLYCERIN SL      Take 0.4 mg by mouth        OMEPRAZOLE PO      Take 20 mg by mouth every morning        ROSUVASTATIN CALCIUM PO      Take 40  mg by mouth daily        TAMSULOSIN HCL PO      Take 0.4 mg by mouth daily        * Notice:  This list has 4 medication(s) that are the same as other medications prescribed for you. Read the directions carefully, and ask your doctor or other care provider to review them with you.

## 2018-11-14 NOTE — PROGRESS NOTES
"  ----------------------------------------------------------------------------------------------------------  [facility]   Psychiatry Clinic   Addiction Medicine                        IDENTIFICATION   Laurent \"Gio\" Blaze Choi is a 73 year old male who was referred by self for evaluation of alcohol use disorder and desire to continue Vivitrol.  History was provided by patient who was a good historian. He is here for a follow up visit. His initial visit was 10/17/18.        CHIEF COMPLAINT        \" I am still sober and feeling good\"     Brief Summary     The patient has a past psychiatric/substance use history of alcohol use disorder, Major Depressive Disorder and Generalized Anxiety Disorder.    Gio has been in detox over 40 times and treatment about 5 times.  His first treatment was in 1980. He gave up daily drinking in late 1990's, then began to binge drink.  His pattern recently  has been drinking 1-2 weeks and not eating most of that time, and getting very sick. He then will stop drinking, will be sober for only 1-2 weeks.  Alcohol has caused withdrawal and tolerance, drinking 6-10 beers and 1 pint a day when on a binge. Alcohol has affected relationships, has affected health with getting sick, not eating and getting depressed. He has lost lots of weight this year due to his drinking. Many attempts to quit have not been sucusseful. He has spent much of his days drinking when on a binge.    Interval History  Gio reports he is doing well since he was last seen.  He has not had any alcohol and his cravings are few and far between.  He feels the vivitrol is helpful and would like to continue with these injections.  He completed his IOP at [facility] a week and a half ago and felt he was ready to be done.  Although he knows he needs to work on not isolating he was ready to be done with groups.  He has not attended an AA meeting yet.  We discussed this and he can commit to " attending the meeting on Thursday afternoon, which would be tomorrow.  Then he will try a new meeting on Monday afternoon, which someone from his recent treatment told him about.  He can only commit to attend one meeting per week. 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  He is staying up until midnight working and then needs time to wind down so often is not going to sleep until 1 or 2 am.  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  He states he is not afraid of down time but admits he does not give himself much.  He did attend a theater event with a friend and plans to have Thanksgiving with this same friend.      He reports the ringing in his ears and the rash on his legs is improving.  He still has some swelling in his feet and we discussed getting compression stockings.  We did not think these things were related to vivitrol but he finds it reassuring they are getting better.      Gio discussed that he is a sex-offender and the nature of his offense.  He reports he had a CD with pornography on it that was left in the rafters of the basement of a house he owned.  When he moved the next occupant found the CD and gave it to police.  He reports this experience was awful, he was treated horribly and he heard stories from other people like him how poorly they were treated.  He served 20 months in penitentiary.  When he only had a few months of probation left he was arrested and sent to penitentiary for a few weeks for having internet access. He feels there is much injustice and some of these charges are taken too far, such as for public urination, and he contacted a  today who has been working with sex offenders.  He does not feel right now that he wants to see a therapist.      We discussed the importance of him attending AA and being social, seeing friends and feeling connected.  He expressed understanding and agreed.  Although  "he wants to isolate often he knows he needs to be around people at least some. We discussed his sleep schedule and he agreed to try and quit working earlier and get to sleep earlier.     Gio denies any sadness, states his mood remains good.  He knows he feels better when he stays sober and say she remains motivated to do so. His sleep is good other than his schedule.       He continues to take his 40 mg prozac and his 15 mg of Remeron.         SUBSTANCE USE HISTORY                                                               Primary Drug Used:Alcohol     Detailed Substance Use History:    Substance Age first use Quality/character Age max use Most recent use   Alcohol 16 Daily drinking recently 6 weeks ago   Cannabis 27 Would hallucinate on it and did not like it, tried it numerous socially none 40's   Cocaine Early 30's Tried one time, doesn't rememner it because too drunk none none   Stimulants none none none none   Opioids Early 30's Got girlfriends Rx and ended up in detox due to mixing with alcohol none none   Sedatives 30's Benzos \"were wonderful\", 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 none In his 30's   Hallucinogens none none none none   Inhalants none none none none   Other none none none none   OTC drugs none none none none   Nicotine 18 1-2 packs per day After retired 8 years ago         Treatment History:    First treatment in 1980.  Last treatment 10 years ago.  4-5 treatments total, currently at [facility] with 2 weeks to go.      Substance Use Pharmacotherapies:   Tried antabuse in late 1990's and he would stop taking and \"drink around it\".       RECENT SYMPTOMS   [PSYCH ROS]     PANIC ATTACK:  denies   ANXIETY:  none currently  DEPRESSION:  improved recently but did have the following, depressed mood, anhedonia and low energy;  DENIES- suicidal ideation  DYSREGULATION:  \none;  DENIES- suicidal ideation, " violent ideation and SIB  PSYCHOSIS:  none;  DENIES- none  MARILEE/HYPOMANIA:  none;  DENIES- none  TRAUMA RELATED:  none  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    Living Situation/Family/Relationships- lives alone in a condo, has a sister he talks to and sees in frequently, has a few friends in the building he lives in   Trauma History (self-report)- None  Legal- some legal issues this past year but he thinks that is resolved (didn't say what); records show prior issues with child pornography and served time in care home remotely.   Recovery Support- Attended AA in past, has only been to one AA meeting this year; plans to start attending.  We discussed going to one this week, consider getting a sponsor.  He has never worked with a sponsor or done steps.     Early History/Education- Grew up in [place]. Went to college and finished college with BA in literature, was also ministry and studied 2 years and realized he was not meant for it , then studied art at [employer] for a year.           ALLERGY                                Review of patient's allergies indicates no known allergies.  MEDICATIONS                               Current Outpatient Prescriptions   Medication Sig Dispense Refill     ACETAMINOPHEN PO Take 325 mg by mouth       amLODIPine-benazepril (LOTREL) 10-20 MG per capsule Take 1 capsule by mouth daily       ASPIRIN PO Take 81 mg by mouth daily       calcium-vitamin D 500-125 MG-UNIT TABS Take 1 tablet by mouth 2 times daily       DIPHENHYDRAMINE HCL PO Take 25 mg by mouth daily as needed       FLUoxetine 20 MG tablet Take 2 tablets (40 mg) by mouth daily 60 tablet 3     FLUOXETINE HCL PO Take 20 mg by mouth daily       LOSARTAN POTASSIUM PO Take 25 mg by  mouth       mirtazapine (REMERON) 15 MG tablet Take 1 tablet (15 mg) by mouth At Bedtime 30 tablet 1     MIRTAZAPINE PO Take 15 mg by mouth At Bedtime       naltrexone (VIVITROL) 380 MG SUSR Inject 380 mg into the muscle every 30 days 380 mg 0     nitroGLYcerin (NITRODUR) 0.4 MG/HR 24 hr patch Place 1 patch onto the skin daily       NITROGLYCERIN SL Take 0.4 mg by mouth       OMEPRAZOLE PO Take 20 mg by mouth every morning       ROSUVASTATIN CALCIUM PO Take 40 mg by mouth daily       TAMSULOSIN HCL PO Take 0.4 mg by mouth daily       [DISCONTINUED] naltrexone (VIVITROL) 380 MG SUSR Inject 380 mg into the muscle once for 1 dose 380 mg 0       VITALS   /75, pulse 88, weight 180 lbs, pain 0/10  MENTAL STATUS EXAM                                                           Orientation: full, x3  Alertness: alert   Appearance: well groomed and casually groomed, smiles more today  Behavior/Demeanor: cooperative, pleasant and calm, with good  eye contact   Speech: regular rate and rhythm  Language: intact  Psychomotor: normal or unremarkable  Mood: dysthymic  Affect: full range and appropriate; was congruent to mood; was congruent to content  Thought Process/Associations: unremarkable  Thought Content:  Denies suicidal and violent ideation, delusions and preoccupations  Perception:    Denies auditory hallucinations and visual hallucinations  Insight: good  Judgment: good  Cognition: does  appear grossly intact; formal cognitive testing was not done  Gait: Normal   MSK: extremities normal, no peripheral edema    LABS and DATA   Creatinine, BUN, GFR, LFT's all normal on 8/28/18 labs from [facility]    PHQ9 TODAY = 3   On 10/17/18; CAGE= 5  (no to did people annoy you)  PHQ-9 SCORE 9/24/2018 10/17/2018 11/1/2018   Total Score 8 3 3          RATING SCALES:  PHQ9, not done today    SUBSTANCE USE/PSYCHIATRIC DIAGNOSES                                                                                                    Alcohol  Use Disorder, severe, in early remission on vivitrol.    Major Depressive Disorder, recurrent, in partial remission.  Generalized Anxiety Disorder.      ASSESSMENT                                           See 'Plan' section for specific dosing info             This patient is a 73 year old male who has alcohol use disorder since 1980 with a handful of years of sobriety since.  He has been in detox more than 40 times and 4-5 treatments, [facility].  Depression worsens with drinking and now he has 10 weeks of sobriety and his mood continues to improve.  He will work on his sleep schedule, taking only very short naps if needed and getting to bed earlier. He is agreeable to attend at least one AA meeting per week and be social with friends.   He wants to continue with Vivitrol, which appears to help with cravings and appears motivated to stay sober.  He will  stay on his Prozac 40 mg and Remeron 15 mg for before bed and reports mood is doing well but he does tend to want to isolate.  He will get his Vivitrol injection today.  We will check his liver functions at next visit.        Further diagnostic clarification is not needed.  There are no medical comorbidities which impact this treatment [his liver function tests from [facility] a month ago were completely in normal range].    [place] [facility] [] was checked today:  indicates no controlled subtances reported in previous 12 months.     PLAN                                                                                                       1) PSYCHOTROPIC MEDICATIONS:No changes today   - Prozac 40 mg daily, refill given today ( 20 mg two pills daily)   -Remeron 15 mg at bedtime   -Vivitrol injection monthly, given today (his 3rd one)    2) THERAPY:    - Completed [facility] less than 2 weeks ago.     -Start attending AA at least one meeting a week, ideally he will attend a minimum of 2 meetings per week.  Encouraged to get a sponsor.     -Spend time with friends and family and do not isolate.           3) NEXT DUE:  LABS- Check liver function at next visit.                                    4) REFERRALS:    None currently    5) RTC: 4 weeks    6) CRISIS NUMBERS:   Provided routinely in AVS.         TREATMENT RISK STATEMENT:  The risks, benefits, alternatives and potential adverse effects have been explained and are understood by the pt. The pt agrees to the treatment plan with the ability to do so. The pt knows to call the clinic for any problems or to access emergency care if needed.  Medical and CD concerns are documented above.  Psychotropic drug interaction check was done, including changes made today, and is discussed above.    ADDICTION FELLOW: Leatha Saldana      Patient was seen and staffed in clinic with Dr. Bee who will sign the note.    Supervisor is Dr. Bee     I saw the patient with the fellow, and participated in key portions of the service, including the mental status examination and developing the plan of care. I reviewed key portions of the history with the fellow. I agree with the findings and plan as documented in this note.    Selam Bee

## 2018-11-14 NOTE — NURSING NOTE
Chief Complaint   Patient presents with     Recheck Medication     Alcohol use disorder, severe, in early remission (H)

## 2018-11-14 NOTE — MR AVS SNAPSHOT
After Visit Summary   11/14/2018    Laurent Choi    MRN: [id number]           Patient Information     Date Of Birth          1945        Visit Information        Provider Department      11/14/2018 11:00 AM Nurse, [facility]        Today's Diagnoses     Alcohol use disorder, severe, in early remission (H)    -  1       Follow-ups after your visit        Follow-up notes from your care team     Return in 4 weeks (on 12/12/2018) for Medication management.      Your next 10 appointments already scheduled     Dec 12, 2018 10:40 AM CST   Chemical Dependency Follow Up with Leatha Saldana MD   Psychiatry Clinic ([facility])    [place] [id number] [address] [phone]            Dec 12, 2018 11:00 AM CST   Nurse Visit with [facility] Psychiatry Nurse   Psychiatry Clinic ([facility])    [place] [id number] [address] [address] [phone]              Who to contact     Please call your clinic at [phone] to:    Ask questions about your health    Make or cancel appointments    Discuss your medicines    Learn about your test results    Speak to your doctor            Additional Information About Your Visit        [employer] the [employer] Information     [employer] gives you secure access to your electronic health record. If you see a primary care provider, you can also send messages to your care team and make appointments. If you have questions, please call your primary care clinic.  If you do not have a primary care provider, please call [phone] and they will assist you.      [employer] is an electronic gateway that provides easy, online access to your medical records. With [employer], you can request a clinic appointment, read your test results, renew a prescription or communicate with your care team.     To access your existing account, please contact your [facility]  [place] [facility] or call [phone] for assistance.        Care EveryWhere ID     This is your Care EveryWhere ID. This could be used by other organizations to access your [place] medical records  [id number]         Blood Pressure from Last 3 Encounters:   11/14/18 129/75   10/17/18 122/73    Weight from Last 3 Encounters:   11/14/18 81.7 kg (180 lb 3.2 oz)   10/17/18 80.6 kg (177 lb 12.8 oz)   09/24/18 78.5 kg (173 lb)              Today, you had the following     No orders found for display         Today's Medication Changes          These changes are accurate as of 11/14/18  2:54 PM.  If you have any questions, ask your nurse or doctor.               These medicines have changed or have updated prescriptions.        Dose/Directions    naltrexone 380 MG Susr   Commonly known as:  VIVITROL   This may have changed:  when to take this   Used for:  Alcohol use disorder, severe, in early remission (H)   Changed by:  Leatha Saldana MD        Dose:  380 mg   Inject 380 mg into the muscle every 30 days   Quantity:  380 mg   Refills:  0            Where to get your medicines      Some of these will need a paper prescription and others can be bought over the counter.  Ask your nurse if you have questions.     You don't need a prescription for these medications     naltrexone 380 MG Susr                Primary Care Provider    None Specified       No primary provider on file.        Equal Access to Services     FABRIZIO GOLD AH: Clinton Saldana, waamelia villarreal, qaybalberta kakathleen wiseman. So [facility] [phone].    ATENCIÓN: Si habla español, tiene a medina disposición servicios gratuitos de asistencia lingüística. Llame al [phone].    We comply with applicable federal civil rights laws and [place] laws. We do not discriminate on the basis of race, color, national origin, age, disability, sex, sexual orientation, or gender identity.            Thank  you!     Thank you for choosing PSYCHIATRY CLINIC  for your care. Our goal is always to provide you with excellent care. Hearing back from our patients is one way we can continue to improve our services. Please take a few minutes to complete the written survey that you may receive in the mail after your visit with us. Thank you!             Your Updated Medication List - Protect others around you: Learn how to safely use, store and throw away your medicines at www.disposemymeds.org.          This list is accurate as of 11/14/18  2:54 PM.  Always use your most recent med list.                   Brand Name Dispense Instructions for use Diagnosis    ACETAMINOPHEN PO      Take 325 mg by mouth        amLODIPine-benazepril 10-20 MG per capsule    LOTREL     Take 1 capsule by mouth daily        ASPIRIN PO      Take 81 mg by mouth daily        calcium-vitamin D 500-125 MG-UNIT Tabs      Take 1 tablet by mouth 2 times daily        DIPHENHYDRAMINE HCL PO      Take 25 mg by mouth daily as needed        FLUoxetine 20 MG tablet     60 tablet    Take 2 tablets (40 mg) by mouth daily    Alcohol use disorder, severe, in early remission (H)       FLUOXETINE HCL PO      Take 20 mg by mouth daily        LOSARTAN POTASSIUM PO      Take 25 mg by mouth        * mirtazapine 15 MG tablet    REMERON    30 tablet    Take 1 tablet (15 mg) by mouth At Bedtime    Alcohol use disorder, severe, in early remission (H)       * MIRTAZAPINE PO      Take 15 mg by mouth At Bedtime        naltrexone 380 MG Susr    VIVITROL    380 mg    Inject 380 mg into the muscle every 30 days    Alcohol use disorder, severe, in early remission (H)       * nitroGLYcerin 0.4 MG/HR 24 hr patch    NITRODUR     Place 1 patch onto the skin daily        * NITROGLYCERIN SL      Take 0.4 mg by mouth        OMEPRAZOLE PO      Take 20 mg by mouth every morning        ROSUVASTATIN CALCIUM PO      Take 40 mg by mouth daily        TAMSULOSIN HCL PO      Take 0.4 mg by mouth daily         * Notice:  This list has 4 medication(s) that are the same as other medications prescribed for you. Read the directions carefully, and ask your doctor or other care provider to review them with you.

## 2018-12-12 ENCOUNTER — ALLIED HEALTH/NURSE VISIT (OUTPATIENT)
Dept: PSYCHIATRY | Facility: CLINIC | Age: 73
End: 2018-12-12
Attending: PSYCHIATRY & NEUROLOGY
Payer: MEDICARE

## 2018-12-12 ENCOUNTER — TELEPHONE (OUTPATIENT)
Dept: PSYCHIATRY | Facility: CLINIC | Age: 73
End: 2018-12-12

## 2018-12-12 VITALS
HEART RATE: 98 BPM | BODY MASS INDEX: 25.19 KG/M2 | DIASTOLIC BLOOD PRESSURE: 69 MMHG | SYSTOLIC BLOOD PRESSURE: 103 MMHG | WEIGHT: 180.6 LBS

## 2018-12-12 DIAGNOSIS — F10.21 ALCOHOL USE DISORDER, SEVERE, IN EARLY REMISSION (H): ICD-10-CM

## 2018-12-12 DIAGNOSIS — F10.21 ALCOHOL USE DISORDER, SEVERE, IN EARLY REMISSION (H): Primary | ICD-10-CM

## 2018-12-12 PROCEDURE — G0463 HOSPITAL OUTPT CLINIC VISIT: HCPCS | Mod: ZF

## 2018-12-12 PROCEDURE — 25000128 H RX IP 250 OP 636: Mod: ZF | Performed by: PSYCHIATRY & NEUROLOGY

## 2018-12-12 PROCEDURE — 96372 THER/PROPH/DIAG INJ SC/IM: CPT | Mod: ZF

## 2018-12-12 RX ORDER — FLUOXETINE 20 MG/1
60 TABLET, FILM COATED ORAL DAILY
Qty: 60 TABLET | Refills: 1 | Status: SHIPPED | OUTPATIENT
Start: 2018-12-12 | End: 2018-12-12

## 2018-12-12 RX ADMIN — NALTREXONE 380 MG: KIT at 12:55

## 2018-12-12 ASSESSMENT — PAIN SCALES - GENERAL: PAINLEVEL: NO PAIN (0)

## 2018-12-12 NOTE — TELEPHONE ENCOUNTER
12/12/2018 - VORB - Continue with Vivitrol 380 mg, q28d, IM Injection, per Dr. Saldana.Deborah Blanchard LPN

## 2018-12-12 NOTE — PROGRESS NOTES
"  ----------------------------------------------------------------------------------------------------------  [facility], [place]   Psychiatry Clinic   Addiction Medicine                        IDENTIFICATION   Laurent \"Gio\" Blaze Choi is a 73 year old male who was referred by self for evaluation of alcohol use disorder and desire to continue Vivitrol.  History was provided by patient who was a good historian. He is here for a follow up visit. His initial visit was 10/17/18 and last visit was 11/14/18.        CHIEF COMPLAINT        \" I am still sober\"     Brief Summary     The patient has a past psychiatric/substance use history of alcohol use disorder, Major Depressive Disorder and Generalized Anxiety Disorder.    Gio has been in detox over 40 times and treatment about 5 times.  His first treatment was in 1980. He gave up daily drinking in late 1990's, then began to binge drink.  His pattern recently  has been drinking 1-2 weeks and not eating most of that time, and getting very sick. He then will stop drinking, will be sober for only 1-2 weeks.  Alcohol has caused withdrawal and tolerance, drinking 6-10 beers and 1 pint a day when on a binge. Alcohol has affected relationships, has affected health with getting sick, not eating and getting depressed. He has lost lots of weight this year due to his drinking. Many attempts to quit have not been sucusseful. He has spent much of his days drinking when on a binge.    Interval History    He had a craving to drink about two weeks ago and was almost out the door but he stopped himself, not sure of what triggered this.  He does recall that he was telling himself that he could handle just one drink. He used his tools and questioned this thought and told himself he could not handle one drink.   The craving went away but he does think of alcohol often but has not otherwise come close to drinking. He does think the Naltrexone helps.       When " questioned about his mood he is understated in his answers.  He states initially his mood is maybe not as good as it was.  With more coaxing he admits his mood is down.  He has been feeling more sad and is having passive suicidal ideation 3-4 times per week. He has no plan to kill himself and says he would not do that. He does not desire to hurt himself or others.  He feels his mood is volatile.  He finds that little things can upset him.  If someone frowns at him he takes it personally.  He feels he is being percsecuted. He is sleeping too much, sometimes 12 hours. If he gets frustrated he will go back to bed to escape.     His mood is maybe down, a little sad at times, appetite is good, doing some cooking, enjoys cooking, and he has gotten out to do some things with friend (theater and yoga on Sundays).  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  He starts something and then doesn't finish and goes onto another project but doesn't finish that either.      His sleep pattern has been that he usually falls asleep after 1-2 AM,  gets up at 11 or 12pm, naps again at 4-5  PM, sometimes taking a 2 hour nap.  He wonders if naltrexone is afecting sleep or moods.  He has been taking 1/2 tab of Remeron instead of a full tablet.  He is feeling more irritable as well.      Gio went to one AA meeting but felt more upset afterwards as his phone went off during the meeting and  he feels he said something silly in the meeting and was beating himself up on th way home and has not gotten the nerve to go back to a meeting.  We discussed this and noted how he is being very hard on himself, expecting perfection and had negative self talk.          He reports the ringing in his ears and the rash on his legs is improving.  He still has some swelling in his feet and we discussed getting  compression stockings but has not gotten these yet.  He continues to say these problems are getting better and we are not certain they are related to vivitrol.        We discussed the importance of him attending AA and being social, seeing friends and feeling connected. He did commit to attending an AA meeting tomorrow and another one in 5 days. We discussed his sleep schedule and he agreed to try and keep afternoon naps to no more than 20-30 minutes and to try and get to sleep earlier and set an alarm if needed to wake after 8 hours.      We discussed that he is now dealing with life sober and initially there can be an elation when first sober and he now has do the routine of life and does not have the escape he had previously with alcohol.  We discussed mood and sleep and his reaction to the AA meeting and he did agree to go to therapy.  There is a relapse prevention group that will be started soon by Susi Gilbert and he said he would like to do this, even though he does not readily love groups.  Also we discussed that individual therapy would be helpful and he agrees.  He did try to go to therapy a few times and did not connect with the therapist and felt that the therapist did not want to see him.       He has been taking the prozac 40 mg.  He did go up to 80 mg of prozac during a sober period previously.  He has been taking 7.5 mg of Remeron in the interval.     He has gone to yoga on a few Sundays with his friend (he found out he has a free [employer] membership) and is finding that helpful to notice how he feels. He was encouraged to continue to attend yoga. We discussed noticing negative self talk and questioning it.  We also discussed for him to check in a few times a day regarding how he feels.  He can close his eyes and try to name any emotion he feels and then let it grow and feel it.  Then let it flow through him.  He can also journal about how he feels.  We discussed that he used to have alcohol to avoid how  "he feels and he has to learn to feel and let things go as well. Gio seemed to find these things are true and maybe found some comfort in understanding why things have been harder for him lately.  He does admit he should see a therapist. He was resistant to this at his last visit but is now open to therapy.       His partner  over a year ago and he feels his grief is improved but he has not really processed this loss fully either.        SUBSTANCE USE HISTORY                                                               Primary Drug Used:Alcohol     Detailed Substance Use History:    Substance Age first use Quality/character Age max use Most recent use   Alcohol 16 Daily drinking recently 6 weeks ago   Cannabis 27 Would hallucinate on it and did not like it, tried it numerous socially none 40's   Cocaine Early 30's Tried one time, doesn't rememner it because too drunk none none   Stimulants none none none none   Opioids Early 's Got girlfriends Rx and ended up in detox due to mixing with alcohol none none   Sedatives 30's Benzos \"were wonderful\", 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 none In his 30's   Hallucinogens none none none none   Inhalants none none none none   Other none none none none   OTC drugs none none none none   Nicotine 18 1-2 packs per day After retired 8 years ago         Treatment History:    First treatment in .  Last treatment 10 years ago.  4-5 treatments total, currently at [facility] with 2 weeks to go.      Substance Use Pharmacotherapies:   Tried antabuse in late  and he would stop taking and \"drink around it\".       RECENT SYMPTOMS   [PSYCH ROS]     PANIC ATTACK:  denies   ANXIETY:  none currently   DEPRESSION:  Worsened in the interval, some sadness, sleeping more, decreased focus, more irritable, has had some passive suicidal ideation, denies that he would kill or harm himself, " no plan.   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    Living Situation/Family/Relationships- lives alone in a condo, has a sister he talks to and sees in frequently, has a few friends in the building he lives in   Trauma History (self-report)- None  Legal- some legal issues this past year but he thinks that is resolved (didn't say what); records show prior issues with child pornography and served time in assisted remotely.   Recovery Support- Attended AA in past, has only been to one AA meeting this year; plans to start attending.  We discussed going to one this week, consider getting a sponsor.  He has never worked with a sponsor or done steps.     Early History/Education- G[place] up in [place]. 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           ALLERGY                                Patient has no known allergies.  MEDICATIONS                               Current Outpatient Medications   Medication Sig Dispense Refill     ACETAMINOPHEN PO Take 325 mg by mouth       amLODIPine-benazepril (LOTREL) 10-20 MG per capsule Take 1 capsule by mouth daily       ASPIRIN PO Take 81 mg by mouth daily       calcium-vitamin D 500-125 MG-UNIT TABS Take 1 tablet by mouth 2 times daily       DIPHENHYDRAMINE HCL PO Take 25 mg by mouth daily as needed       FLUoxetine 20 MG tablet Take 2 tablets (40 mg) by mouth daily 60 tablet 3     FLUOXETINE HCL PO Take 20 mg by mouth daily       LOSARTAN POTASSIUM PO Take 25 mg by mouth        mirtazapine (REMERON) 15 MG tablet Take 1 tablet (15 mg) by mouth At Bedtime 30 tablet 1     MIRTAZAPINE PO Take 15 mg by mouth At Bedtime       naltrexone (VIVITROL) 380 MG SUSR Inject 380 mg into the muscle every 30 days 380 mg 0     NITROGLYCERIN SL Take 0.4 mg by mouth       OMEPRAZOLE PO Take 20 mg by mouth every morning       ROSUVASTATIN CALCIUM PO Take 40 mg by mouth daily       TAMSULOSIN HCL PO Take 0.4 mg by mouth daily       nitroGLYcerin (NITRODUR) 0.4 MG/HR 24 hr patch Place 1 patch onto the skin daily         VITALS   /75, pulse 88, weight 180 lbs, pain 0/10  MENTAL STATUS EXAM                                                           Orientation: full, x3  Alertness: alert   Appearance: well groomed and casually groomed, smiles more today  Behavior/Demeanor: cooperative, pleasant and calm, with good  eye contact   Speech: regular rate and rhythm  Language: intact  Psychomotor: normal or unremarkable  Mood: dysthymic  Affect: full range and appropriate; was congruent to mood; was congruent to content  Thought Process/Associations: unremarkable  Thought Content:  Denies suicidal and violent ideation, delusions and preoccupations  Perception:    Denies auditory hallucinations and visual hallucinations  Insight: good  Judgment: good  Cognition: does  appear grossly intact; formal cognitive testing was not done  Gait: Normal   MSK: extremities normal, no peripheral edema    LABS and DATA   Creatinine, BUN, GFR, LFT's all normal on 8/28/18 labs [facility]      PHQ9 TODAY = not done today  On 10/17/18; CAGE= 5  (no to did people annoy you)  PHQ-9 SCORE 9/24/2018 10/17/2018 11/1/2018   PHQ-9 Total Score 8 3 3          RATING SCALES:  PHQ9, not done today    SUBSTANCE USE/PSYCHIATRIC DIAGNOSES                                                                                                    Alcohol Use Disorder, severe, in early remission on vivitrol.    Major Depressive Disorder, recurrent,  worsened in the interval.   Generalized Anxiety Disorder.      ASSESSMENT                                           See 'Plan' section for specific dosing info             This patient is a 73 year old male who has alcohol use disorder since 1980 with a handful of years of sobriety since.  He has been in detox more than 40 times and 4-5 treatments, now 4 months sober.  Depression was initially improved with sobriety but worsened in the past month.  He will work on his sleep schedule, taking only very short naps if needed and getting to bed earlier. He is agreeable to attend at least 1-2 AA meeting per week and be social with friends.   He wants to continue with Vivitrol, which appears to help with cravings and appears motivated to stay sober.  He will  increase Prozac to 60 mg and we will stop the remeron due to his excessive sleep.    He will get his Vivitrol injection today.  He is agreeable to see a therapist.  I recommend an individual therapist as well as relapse prevention group.  Susi Gilbert was briefed on this patient and she will have him contacted to start relapse prevention group which is scheduled to start 1/3/19.         Further diagnostic clarification is not needed.  There are no medical comorbidities which impact this treatment [his liver function tests from [facility] a month ago were completely in normal range].    [place] PRES[facility] MONIT[facility]ING PROGRAM [] was checked today:  indicates no controlled subtances reported in previous 12 months.     PLAN                                                                                                       1) PSYCHOTROPIC MEDICATIONS:No changes today   - Increase Prozac to 60 mg, increased from 40 mg.     -Discontinue Remeron due to excessive sleep.     -Vivitrol injection monthly, given today     2) THERAPY:    - Gio will be contacted to start Relapse Prevention Group with Susi Gilbert, Susi is aware to contact him.  To start 1/3/19.   -At  follow up will assess how group is going and see if Susi has recommendations for individual therapy.       -Start attending AA at least one meeting a week, ideally he will attend a minimum of 2 meetings per week. Encouraged to get a sponsor. He committed to attend an AA meeting tomorrow and in 5 days.     -Spend time with friends and family and do not isolate.           3) NEXT DUE:  LABS- consider liver function tests                                   4) REFERRALS:    None currently    5) RTC: 4 weeks    6) CRISIS NUMBERS:   Provided routinely in AVS.         TREATMENT RISK STATEMENT:  The risks, benefits, alternatives and potential adverse effects have been explained and are understood by the pt. The pt agrees to the treatment plan with the ability to do so. The pt knows to call the clinic for any problems or to access emergency care if needed.  Medical and CD concerns are documented above.  Psychotropic drug interaction check was done, including changes made today, and is discussed above.    ADDICTION FELLOW: Leatha Saldana      Patient was seen and staffed in clinic with Dr. Bee who will sign the note.    Supervisor is Dr. Bee     I saw the patient with the fellow, and participated in key portions of the service, including the mental status examination and developing the plan of care. I reviewed key portions of the history with the fellow. I agree with the findings and plan as documented in this note.    Selam Bee      PSYCHIATRY CLINIC INDIVIDUAL PSYCHOTHERAPY NOTE                                                  [16]   Start time - 10:45          End time - 11:03  Date last reviewed - 12/12/18       Date next due - 3/12/19 (or 12 months if Medicare)    Subjective: This supportive psychotherapy session addressed issues related to goals of therapy  and current stressors consisting of  mood and excessive sleep and negative self talk.  Patient's reaction: Contemplation in the context of mental  status appropriate for ambulatory setting.  Progress: will assess at next visit.   Plan: RTC 1 month  Psychotherapy services during this visit included  myself and the patient.   TREATMENT  PLAN          SYMPTOMS; PROBLEMS   MEASURABLE GOALS;    FUNCTIONAL IMPROVEMENT INTERVENTIONS;   GAINS MADE DISCHARGE CRITERIA   Substance Use: alcohol    make a plan to manage 2-3 anxiety-provoking situations, reduce feeling overwhelmed/ improve decision making skills and recognize delusional thinking increase coping skills   Recognition that he has skills as was able to not use when craving was strong.  Build confidence that can stay sober.    sustained sobriety and reduction in cravings   Depression: depressed mood, low energy, concentration problems and excessive sleep   reduce depressive symptoms, reduce suicidal thoughts, report feeling more positive about self , develop strategies for thought distraction when ruminating and make a plan to manage 2-3 anxiety-provoking situations Awareness of feelings  Recognition that excessive sleep and lack of focus may be signs of depression  Awareness of negative self talk  Discussed checking in with feelings 2-3 times per day, letting feelings come up and name them, let them grow and then let them go, also can try journaling marked symptom improvement, symptom resolution and significant improvement in self-reports for 5 consecutive visits

## 2018-12-12 NOTE — NURSING NOTE
"Laurent Choi,  1945, presented to the clinic today at the request of Dr. Saldana,  ordering provider for long-acting injectable Vivitrol 380 mg.    OBSERVATIONS:  1.  Appearance: Casually dressed in clean jeans, pullover and jacket. Pt had concerns regarding breakthrough cravings. Pt stated \"I had a really bad craving a couple of weeks ago, but I was able to work through it and I did not drink.\" \"I talked to Dr. Saldana about maybe starting the naltrexone pill, but she said to give the injections a few more months before talking about the pills.\" Writer encouraged pt to either call the clinic or use [facility] to talk to a nurse regarding these cravings. Pt acknowledged understanding.   2.  Mood: Good  3.  Affect: Congruent  4.  Attitude: Good  5.  Cooperation: Full  6.  Side Effects: Slight pain at injection site.   7.  Education: Ice at injection site for pain.  8. Next appointment: 2019 - Dr. Saldana 1040 and injection to follow at 1115    The service provided today was rendered under the supervising provider of the day, Dr. Guidry, who was available for consultation as needed.    "

## 2018-12-12 NOTE — TELEPHONE ENCOUNTER
Deborah Blanchard LPN asked that writer double check naltrexone (Vivitrol) 380 mg suspension and medication orders prior to administration. Writer reviewed med orders and it appears it was inadvertently e-prescribed to [facility] in [place]. Writer called pharmacy at [phone] to have med discontinued. TORB given to pharmacist, Tarah. Tarah also stated that current Rx for Prozac tabs is not covered by insurance, but capsules are covered. Writer agreed to send new Rx for the capsules to avoid the insurance issue. Tarah also confirmed that despite the past Rx in October being for tabs, capsules were dispensed by the pharmacy.    Med tab changed to reflect the changes mentioned above. Notified provider of this.

## 2019-01-03 ENCOUNTER — OFFICE VISIT (OUTPATIENT)
Dept: PSYCHIATRY | Facility: CLINIC | Age: 74
End: 2019-01-03
Attending: PSYCHOLOGIST
Payer: COMMERCIAL

## 2019-01-03 DIAGNOSIS — F10.21 ALCOHOL USE DISORDER, SEVERE, IN EARLY REMISSION (H): Primary | ICD-10-CM

## 2019-01-09 ENCOUNTER — ALLIED HEALTH/NURSE VISIT (OUTPATIENT)
Dept: PSYCHIATRY | Facility: CLINIC | Age: 74
End: 2019-01-09
Attending: PSYCHIATRY & NEUROLOGY
Payer: COMMERCIAL

## 2019-01-09 VITALS
DIASTOLIC BLOOD PRESSURE: 86 MMHG | SYSTOLIC BLOOD PRESSURE: 159 MMHG | WEIGHT: 179.6 LBS | HEART RATE: 81 BPM | BODY MASS INDEX: 25.05 KG/M2

## 2019-01-09 DIAGNOSIS — F10.21 ALCOHOL USE DISORDER, SEVERE, IN EARLY REMISSION (H): ICD-10-CM

## 2019-01-09 DIAGNOSIS — F10.21 ALCOHOL USE DISORDER, SEVERE, IN EARLY REMISSION (H): Primary | ICD-10-CM

## 2019-01-09 PROCEDURE — 25000128 H RX IP 250 OP 636: Mod: ZF | Performed by: PSYCHIATRY & NEUROLOGY

## 2019-01-09 PROCEDURE — G0463 HOSPITAL OUTPT CLINIC VISIT: HCPCS | Mod: ZF

## 2019-01-09 PROCEDURE — 96372 THER/PROPH/DIAG INJ SC/IM: CPT | Mod: ZF

## 2019-01-09 RX ORDER — FLUOXETINE 40 MG/1
80 CAPSULE ORAL DAILY
Qty: 60 CAPSULE | Refills: 3 | Status: SHIPPED | OUTPATIENT
Start: 2019-01-09 | End: 2019-04-24

## 2019-01-09 RX ADMIN — NALTREXONE 380 MG: KIT at 12:26

## 2019-01-09 ASSESSMENT — PATIENT HEALTH QUESTIONNAIRE - PHQ9: SUM OF ALL RESPONSES TO PHQ QUESTIONS 1-9: 9

## 2019-01-09 ASSESSMENT — PAIN SCALES - GENERAL: PAINLEVEL: NO PAIN (0)

## 2019-01-09 NOTE — NURSING NOTE
Chief Complaint   Patient presents with     Recheck Medication     Alcohol use disorder, severe, in early remission

## 2019-01-09 NOTE — PATIENT INSTRUCTIONS
Thank you for coming to the PSYCHIATRY CLINIC.    Lab Testing:  If you had lab testing today and your results are reassuring or normal they will be mailed to you or sent through [employer] within 7 days.   If the lab tests need quick action we will call you with the results.  The phone number we will call with results is # [phone] (home) . If this is not the best number please call our clinic and change the number.    Medication Refills:  If you need any refills please call your pharmacy and they will contact us. Our fax number for refills is [phone]. Please allow three business for refill processing.   If you need to  your refill at a new pharmacy, please contact the new pharmacy directly. The new pharmacy will help you get your medications transferred.     Scheduling:  If you have any concerns about today's visit or wish to schedule another appointment please call our office during normal business hours [phone] (8-5:00 M-F)    Contact Us:  Please call [phone] during business hours (8-5:00 M-F).  If after clinic hours, or on the weekend, please call  [phone].    Financial Assistance [phone]  [facility] [phone]  [email or web address] Billing [phone]  Medical Records [phone]      MENTAL HEALTH CRISIS NUMBERS:  [facility]:   [facility] - [phone]   Crisis Residence [facility] - [phone]   Walk-In Counseling [facility] [phone]   COPE 24/7 [facility] Mobile Team for Adults - [[phone]]; Child - [[phone]]     Crisis Connection - [phone]     [facility]:   [facility] - [phone]   Walk-in counseling [facility] - [phone]   Walk-in counseling [facility] - [phone]   Crisis Residence [facility] - [phone]   Urgent Care Adult Mental Health:   --Drop-in, 24/7 crisis line, and [place] Mobile Team [[phone]]    CRISIS TEXT  LINE: Text [phone] from anywhere, anytime, any crisis 24/7;    OR SEE [email or web address].org     Poison Control Center - [phone]    CHILD: [facility] needs assessment team - [phone]     [facility] - [phone]; or [employer] [facility] - [phone]    If you have a medical emergency please call 911or go to the nearest ER.                    _____________________________________________    Again thank you for choosing PSYCHIATRY CLINIC and please let us know how we can best partner with you to improve you and your family's health.  You may be receiving a survey in the mail regarding this appointment. We would love to have your feedback, both positive and negative, so please fill out the survey and return it using the provided envelope. The survey is done by an external company, so your answers are anonymous.

## 2019-01-09 NOTE — NURSING NOTE
"Laurent Choi,  1945, presented to the clinic today at the request of Dr. Saldana, ordering provider for long-acting injectable Vivitrol 380 mg.    Observations:  1. Appearance: Wearing clean slacks, button down shirt and weather appropriate jacket.   2. Mood: Good, very talkative, smiling and joking with writer  3. Affect: Congruent  4. Attitude: Good  5. Cooperation: Full  6. Side Effects: Stated \"my side effects that I have been having have been diminishing. Things have been going really well\"  7. Education: Ice at injection site and to contact provider or provider's RN with any concern's  8. Next Appointment: 19 at 1430 with Dr. Saldana and injection to follow at 1500    The service provider today was rendered under the supervising provider of the day, Dr. Kelley, who was available for consultation as needed.    "

## 2019-01-09 NOTE — PROGRESS NOTES
"  ----------------------------------------------------------------------------------------------------------  [facility], [place]   Psychiatry Clinic   Addiction Medicine                        IDENTIFICATION   Laurent \"Gio\" Blaze Choi is a 73 year old male who was referred by self for evaluation of alcohol use disorder and desire to continue Vivitrol.  History was provided by patient who was a good historian. He is here for a follow up visit with his initial visit on 10/17/18 and last visit was 12/12/18.        CHIEF COMPLAINT       \"It was a rough month\".      Brief Summary     The patient has a past psychiatric/substance use history of alcohol use disorder, Major Depressive Disorder and Generalized Anxiety Disorder.    Gio has been in detox over 40 times and treatment about 5 times.  His first treatment was in 1980. He gave up daily drinking in late 1990's, then began to binge drink.  His pattern recently  has been drinking 1-2 weeks and not eating most of that time, and getting very sick. He then will stop drinking, will be sober for only 1-2 weeks.  Alcohol has caused withdrawal and tolerance, drinking 6-10 beers and 1 pint a day when on a binge. Alcohol has affected relationships, has affected health with getting sick, not eating and getting depressed. He has lost lots of weight this year due to his drinking. Many attempts to quit have not been sucusseful. He has spent much of his days drinking when on a binge.    Interval History    He had a craving to drink again about two weeks ago on a Saturday night.  He tried to tell himself he could have one drink but he managed to talk himself out of this.  He recognized last month when he was about to walk out the door and drink that this was also a Saturday night.  We discussed trying to have some plans for Saturday nights so he can stay busy.      Gio says this last month was \"rough\" but also states the last 10 days or so have been " better with his mood.  He did increase his dose of prozac.  He does feel less down now.  He continues to struggle with sleeping too much.  He discontinued the Remeron and maybe has a little more issue with falling asleep but overall he still goes to late, but is trying to go to sleep by midnight.  However, he will still sleep until noon the next day, 12 hours of sleep is not unusual for him.  He will often still want to nap later in the day and sometimes he will for 1-2 hours.  He has been exercising more, using the treadmill in the gym or getting outside if it is [place] and [place], which he knows helps.  He has noticed if he wants to nap but instead exercises he feels more energy and does not need to nap.  He denies any SIB and is not currently having any SI.  He states if he did have any SI he would not act on it.     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  He sometimes feels his work is not valuable and he lacks motivation.  He feels afraid that he will get in trouble for his artwork as he is expressing his frustrations with the legal system and his sexual predator charges in his artwork.  He discusses at some length the injustice in how those accused of child pornography or sexual predator charges are treated.  He is considering getting involved with some political groups and giving speeches.  He discusses a friend he has who is a dual citizen from [place] who is in detention for such charges and who maintains his innocence.  He wants to move to [place] with this friend when he can because that way he can escape the label that will forever be on him here.  He is no longer on probation but still worries the police can come into his apartment and take his computer.  He reports he has had 4 computers confiscated through the years and has never gotten them back.        He has seen a friend and one of his sisters recently.  He knows it is good for him to socialize.  He does state that aside from this one  "sister, his whole family has disowned him due to the legal charges.    His sleep pattern has been that he usually falls asleep after 1-2 AM,  gets up at 11 or 12pm, naps again at 4-5  PM, sometimes taking a 2 hour nap.  He wonders if naltrexone is afecting sleep or moods.  He has been taking 1/2 tab of Remeron instead of a full tablet.  He is feeling more irritable as well.         He reports the ringing in his ears and the rash on his legs is improving again but he noted increased ringing in the ears after his last vivitrol injection but it has subsided.  He has concerns that his increased sleep and his decreased motivation are also from the Vivitrol.  We discussed this and do not think that is likely but he should continue to monitor.  He asked how long he should be on Vivitrol and we stated for at least 6 months to a year.  We can assess at each visit.        He attended the relapse prevention group with Susi Gilbert last week and enjoyed it and plans to continue.  There were only 2 people in attendance so this week Susi is attempting to recruit more people and it will resume next week.     He has not made it to yoga in the interval.  We will check in on AA meetings at his next visit.       Treatment History:    First treatment in 1980.  Last treatment at [facility] finished  November 2018.     Substance Use Pharmacotherapies:   Tried antabuse in late 1990's and he would stop taking and \"drink around it\".       RECENT SYMPTOMS   [PSYCH ROS]     PANIC ATTACK:  denies   ANXIETY:  none currently   DEPRESSION:  Worsened in the interval but improving the past 10 days.  Sleeping excessively and decreased energy and motivation but feels this is improving.  Denies SI/SIB.   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    Living Situation/Family/Relationships- lives alone in a condo, has a sister he talks to and sees in frequently, has a few friends in the building he lives in   Trauma History (self-report)- None  Legal- some legal issues this past year but he thinks that is resolved (didn't say what); records show prior issues with child pornography and served time in assisted remotely. He feels much injustice over his treatment in the legal system.   Recovery Support- needs to start attending regularly    Early History/Education- Grew up in [place]. 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           ALLERGY                                Patient has no known allergies.  MEDICATIONS                               Current Outpatient Medications   Medication Sig Dispense Refill     ACETAMINOPHEN PO Take 325 mg by mouth       amLODIPine-benazepril (LOTREL) 10-20 MG per capsule Take 1 capsule by mouth daily       ASPIRIN PO Take 81 mg by mouth daily       calcium-vitamin D 500-125 MG-UNIT TABS Take 1 tablet by mouth 2 times daily       DIPHENHYDRAMINE HCL PO Take 25 mg by mouth daily as needed       FLUoxetine (PROZAC) 20 MG capsule Take 3 capsules (60 mg) by mouth daily 90 capsule 1     FLUOXETINE HCL PO Take 20 mg by mouth daily       LOSARTAN POTASSIUM PO Take 25 mg by mouth       mirtazapine (REMERON) 15 MG tablet Take 1 tablet (15 mg) by mouth At Bedtime 30 tablet 1     MIRTAZAPINE PO Take 15 mg by mouth At Bedtime       naltrexone (VIVITROL) 380 MG SUSR Inject 380 mg into the muscle every 30 days 380 mg 11     nitroGLYcerin (NITRODUR) 0.4 MG/HR 24 hr patch Place 1 patch onto the skin daily       NITROGLYCERIN SL Take 0.4 mg  by mouth       OMEPRAZOLE PO Take 20 mg by mouth every morning       ROSUVASTATIN CALCIUM PO Take 40 mg by mouth daily       TAMSULOSIN HCL PO Take 0.4 mg by mouth daily         VITALS   /75, pulse 88, weight 180 lbs, pain 0/10  MENTAL STATUS EXAM                                                           Orientation: full, x3  Alertness: alert   Appearance: well groomed and casually groomed, smiles and laughs more today, appears more animated than last visit.   Behavior/Demeanor: cooperative, pleasant and calm, with good  eye contact   Speech: regular rate and rhythm  Language: intact  Psychomotor: normal or unremarkable  Mood: down, but improving.   Affect: full range and appropriate; was congruent to mood; was congruent to content  Thought Process/Associations: unremarkable  Thought Content:  Denies suicidal and violent ideation, delusions and preoccupations  Perception:    Denies auditory hallucinations and visual hallucinations  Insight: good  Judgment: good  Cognition: does  appear grossly intact; formal cognitive testing was not done  Gait: Normal   MSK: extremities normal, no peripheral edema    LABS and DATA   Creatinine, BUN, GFR, LFT's all normal on 8/28/18 labs from [facility]      PHQ9 TODAY  1/919 = 9 (trouble with sleep 3and energy issue 2)  PHQ-9 SCORE 9/24/2018 10/17/2018 11/1/2018   PHQ-9 Total Score 8 3 3          RATING SCALES:  PHQ9, see above    SUBSTANCE USE/PSYCHIATRIC DIAGNOSES                                                                                                    Alcohol Use Disorder, severe, in early remission on vivitrol.    Major Depressive Disorder, recurrent, worsened in the interval.   Generalized Anxiety Disorder.      ASSESSMENT                                           See 'Plan' section for specific dosing info             This patient is a 73 year old male who has alcohol use disorder since 1980 with a handful of years of sobriety since.  He has been in detox more  than 40 times and 4-5 treatments, now 4 months sober.  Depression was initially improved with sobriety but worsened in the past month.  He will work on his sleep schedule, taking only very short naps if needed and getting to bed earlier and continue with exercise.   He wants to continue with Vivitrol, which appears to help with cravings and appears motivated to stay sober.  He will  increase Prozac to 80 mg and not restart Remeron.    He will get his Vivitrol injection today.   He will continue with the relapse prevention group weekly.         Further diagnostic clarification is not needed.  There are no medical comorbidities which impact this treatment.    [place] PRESCRIPTION MONITORING PROGRAM [] was checked today:  indicates no controlled subtances reported in previous 12 months.     PLAN                                                                                                       1) PSYCHOTROPIC MEDICATIONS:No changes today   - Increase Prozac from 60 to 80 mg, last increase was a month ago (40mg-60mg).       -Vivitrol injection monthly, given today.  Gio thinks he has some side effects such as increased sleep, decreased motivation, ringing in ears and rash on lower legs/ankles from Vivitrol.  He will monitor these issues. We discussed unlikely related but to monitor and now benefit outweighs the risk.      2) THERAPY:    - Gio attended the Relapse Prevention Group with Susi Gilbert on 1/3/19 and plans to continue this weekly, although there is no group this week but it will resume next week.    -Consider sleep psychologist and individual therapist in future.         -Start attending AA at least one meeting a week, ideally he will attend a minimum of 2 meetings per week.    -Spend time with friends and family and do not isolate.  Try to have plans for Saturday nights and be aware of triggers.           3) NEXT DUE:  LABS- none                                  4) REFERRALS:    None currently    5) RTC: 4  weeks    6) CRISIS NUMBERS:   Provided routinely in AVS.         TREATMENT RISK STATEMENT:  The risks, benefits, alternatives and potential adverse effects have been explained and are understood by the pt. The pt agrees to the treatment plan with the ability to do so. The pt knows to call the clinic for any problems or to access emergency care if needed.  Medical and CD concerns are documented above.  Psychotropic drug interaction check was done, including changes made today, and is discussed above.    ADDICTION FELLOW: Leatha Saldana      Patient was seen and staffed in clinic with Dr. Bee who will sign the note.    Supervisor is Dr. Bee      I saw the patient with the fellow, and participated in key portions of the service, including the mental status examination and developing the plan of care. I reviewed key portions of the history with the fellow. I agree with the findings and plan as documented in this note.    Selam Bee        PSYCHIATRY CLINIC INDIVIDUAL PSYCHOTHERAPY NOTE                                                  [16]   Start time - 10:45          End time - 11:15  Date last reviewed - 12/12/18       Date next due - 3/12/19 (or 12 months if Medicare)    Subjective: This supportive psychotherapy session addressed issues related to goals of therapy  and current stressors consisting of  mood and excessive sleep and negative self talk.  Patient's reaction: Contemplation in the context of mental status appropriate for ambulatory setting.  Progress: will assess at next visit.   Plan: RTC 1 month  Psychotherapy services during this visit included  myself and the patient.   TREATMENT  PLAN          SYMPTOMS; PROBLEMS   MEASURABLE GOALS;    FUNCTIONAL IMPROVEMENT INTERVENTIONS;   GAINS MADE DISCHARGE CRITERIA   Substance Use: alcohol    make a plan to manage 2-3 anxiety-provoking situations, reduce feeling overwhelmed/ improve decision making skills and recognize delusional thinking increase  coping skills   Recognition that he has skills as was able to not use when craving was strong.  Build confidence that can stay sober.    sustained sobriety and reduction in cravings   Depression: depressed mood, low energy, concentration problems and excessive sleep   reduce depressive symptoms, reduce suicidal thoughts, report feeling more positive about self , develop strategies for thought distraction when ruminating and make a plan to manage 2-3 anxiety-provoking situations Awareness of feelings  Recognition that excessive sleep and lack of focus may be signs of depression  Awareness of negative self talk  Discussed checking in with feelings 2-3 times per day, letting feelings come up and name them, let them grow and then let them go, also can try journaling marked symptom improvement, symptom resolution and significant improvement in self-reports for 5 consecutive visits

## 2019-01-09 NOTE — PROGRESS NOTES
Relapse Prevention Group for Substance Use Disorders                                Time of Service: 4:00 - 5:00pm  Length of Appointment: 60 mins  Care Providers: Susi Gilbert, PhD, LP  Total number of patients present: 2    DSM-5 Diagnosis: Alcohol Use Disorder, severe, in early remission     GROUP CONTENT:  Writer oriented patients to structure of group and reviewed rules. Today's introductory session focused on individual members formulating their individual goals for group. Writer also provided psychoeducation about CBT model.     PATIENT ENGAGEMENT: Active and engaged    MENTAL STATUS EXAM:   Appearance: Casually dressed and appropriately groomed   Motor activity: Within normal range  Speech rate: Within normal range  Speech volume: Within normal range  Speech articulation:  Within normal range  Speech coherence:  Within normal range  Speech spontaneity:  Within normal range  Affect (objective appearance):  Euthymic  Thought process: Linear, logical, goal-oriented  Suicide/ violence risk: Not individually assessed given group content, but no signs of risk were observed    PLAN:   Continue to engage in Relapse Prevention Group weekly on Thursdays at 4pm

## 2019-01-17 ENCOUNTER — OFFICE VISIT (OUTPATIENT)
Dept: PSYCHIATRY | Facility: CLINIC | Age: 74
End: 2019-01-17
Attending: PSYCHOLOGIST
Payer: COMMERCIAL

## 2019-01-17 DIAGNOSIS — F32.9 MDD (MAJOR DEPRESSIVE DISORDER): ICD-10-CM

## 2019-01-17 DIAGNOSIS — F41.1 GAD (GENERALIZED ANXIETY DISORDER): ICD-10-CM

## 2019-01-17 DIAGNOSIS — F10.21 ALCOHOL USE DISORDER, SEVERE, IN EARLY REMISSION (H): Primary | ICD-10-CM

## 2019-01-23 NOTE — PROGRESS NOTES
Relapse Prevention Group for Substance Use Disorders                                Time of Service: 4:00 - 5:00pm  Length of Appointment: 60 mins  Care Providers: Susi Gilbert, PhD, LP  Total number of patients present: 3    DSM-5 Diagnosis:   Alcohol Use Disorder, severe, in early remission   Major Depressive Disorder, recurrent (per chart review)  Generalized Anxiety Disorder (per chart review)    GROUP CONTENT:  Writer oriented patients to structure of group and reviewed rules. Writer then provided psychoeducation about behavioral activation and helped patients identify pleasant activities.     PATIENT ENGAGEMENT: Active and engaged. For HW, Gio committed to: 1) Going to the  for a swim aerobic class on Friday morning    MENTAL STATUS EXAM:   Appearance: Casually dressed and appropriately groomed   Motor activity: Within normal range  Speech rate: Within normal range  Speech volume: Within normal range  Speech articulation:  Within normal range  Speech coherence:  Within normal range  Speech spontaneity:  Within normal range  Affect (objective appearance):  Euthymic  Thought process: Linear, logical, goal-oriented  Suicide/ violence risk: Not individually assessed given group content, but no signs of risk were observed    PLAN:   Continue to engage in Relapse Prevention Group weekly on Thursdays at 4pm

## 2019-01-31 ENCOUNTER — OFFICE VISIT (OUTPATIENT)
Dept: PSYCHIATRY | Facility: CLINIC | Age: 74
End: 2019-01-31
Attending: PSYCHOLOGIST
Payer: COMMERCIAL

## 2019-01-31 DIAGNOSIS — F10.21 ALCOHOL USE DISORDER, SEVERE, IN EARLY REMISSION (H): Primary | ICD-10-CM

## 2019-02-06 ENCOUNTER — TELEPHONE (OUTPATIENT)
Dept: PSYCHIATRY | Facility: CLINIC | Age: 74
End: 2019-02-06

## 2019-02-06 ENCOUNTER — OFFICE VISIT (OUTPATIENT)
Dept: PSYCHIATRY | Facility: CLINIC | Age: 74
End: 2019-02-06
Attending: PSYCHIATRY & NEUROLOGY
Payer: COMMERCIAL

## 2019-02-06 VITALS
DIASTOLIC BLOOD PRESSURE: 79 MMHG | WEIGHT: 177 LBS | BODY MASS INDEX: 24.69 KG/M2 | SYSTOLIC BLOOD PRESSURE: 121 MMHG | HEART RATE: 91 BPM

## 2019-02-06 DIAGNOSIS — F10.20 ALCOHOL DEPENDENCE, CONTINUOUS (H): Primary | ICD-10-CM

## 2019-02-06 DIAGNOSIS — F10.21 ALCOHOL USE DISORDER, SEVERE, IN EARLY REMISSION (H): Primary | ICD-10-CM

## 2019-02-06 PROCEDURE — 25000128 H RX IP 250 OP 636: Mod: ZF | Performed by: PSYCHIATRY & NEUROLOGY

## 2019-02-06 PROCEDURE — G0463 HOSPITAL OUTPT CLINIC VISIT: HCPCS | Mod: ZF,25

## 2019-02-06 PROCEDURE — 96372 THER/PROPH/DIAG INJ SC/IM: CPT | Mod: ZF

## 2019-02-06 RX ADMIN — NALTREXONE 380 MG: KIT at 16:38

## 2019-02-06 ASSESSMENT — PAIN SCALES - GENERAL: PAINLEVEL: NO PAIN (0)

## 2019-02-06 NOTE — PROGRESS NOTES
"  ----------------------------------------------------------------------------------------------------------  [facility], [place]   Psychiatry Clinic   Addiction Medicine                        IDENTIFICATION   Laurent \"Gio\" Blaze Choi is a 73 year old male who was referred by self for evaluation of alcohol use disorder and desire to continue Vivitrol.  History was provided by patient who was a good historian. He is here for a follow up visit with his initial visit on 10/17/18 and last visit was 1/9/2019.       CHIEF COMPLAINT       \"I had a few slips this month\".      Brief Summary     The patient has a past psychiatric/substance use history of alcohol use disorder, Major Depressive Disorder and Generalized Anxiety Disorder.    Gio has been in detox over 40 times and treatment about 5 times.  His first treatment was in 1980. He gave up daily drinking in late 1990's, then began to binge drink.  His pattern recently  has been drinking 1-2 weeks and not eating most of that time, and getting very sick. He then will stop drinking, will be sober for only 1-2 weeks.  Alcohol has caused withdrawal and tolerance, drinking 6-10 beers and 1 pint a day when on a binge. Alcohol has affected relationships, has affected health with getting sick, not eating and getting depressed. He has lost lots of weight this year due to his drinking. Many attempts to quit have not been sucusseful. He has spent much of his days drinking when on a binge.    Interval History    Gio reports he had two separate occasions this month where he drank.  A few weeks ago he drank 4 beers on a Saturday night and a week ago on a Tuesday he drank 6 beers. He did not find the drinking pleasurable at all and was able to stop, which is different than previously where he would often continue on a binge for week or two.  We discussed that the Vivitrol likely helped to curb how much he drank, and he thinks this is true.    He has " concerns about the Vivitrol, believing it is causing him to sleep excessively still.  He will sleep 12 or more hours and also take a nap during the day.  This is his main concern.  Previously he had some swelling in the ankles and some ringing in the ears which are still there some but not bothersome.  We have discussed that these symptoms are not likely related to Vivitrol, but currently the benefit of Vivitrol appears to outweigh any of these side effects.  Gio agrees and wants to continue the Vivitrol.  We discussed his relapses and did a chain analysis for one of the days.  He recalls he watched a movie that was upsetting, he put his coat on and off multiple times, we discussed what he could have done differently, such as not watch emotionally intense movies, distract by cooking, calling someone.  He realizes he has no one really to call in instances like that, which we pointed out is why he should go to AA and get a sponsor and he connected with this and said he would go.    He has been hesitant to go to AA meetings because the last time he went he beat himself up for thinking he said some stupid things there.  He feels a little intimidated by AA, and we can explore this more at future visits if he continues to be resistant, but he did say he is planning on going to a LGBTQ meeting on Saturday morning and also a Monday meeting where he knows someone who attends.    He has been going to the weekly relapse prevention group on Thursdays with Susi Gilbert and is enjoying this.  I spoke with Susi and she said Gio participates well and has regular attendance.   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 We discussed other creative things he can do, such as cook, listen to music, go to art exhibits, museums. He is still isolating but did a few social things this month.  He started a  "weekly swim exercises class that he is enjoying and is still walking on the treadmill.     He denies any SI/SIB.    He has been feeling more anxious and has had more cravings.  He notices he looks at the clock in the evening and notes if the liquor store is still open and feels relief when he knows the store is closed. He has been feeling a little more sad this month and still feels less motivation and energy.     He increased his dose of prozac to 80 mg in the interval and has not noticed any negative or positive effects of this.     We discussed getting out of bed after a max of 10 hours of sleep, set an alarm and if there is an afternoon nap to limit that to 30 minutes.  He agrees.       Treatment History:    First treatment in 1980.  Last treatment at [facility] finished  November 2018.     Substance Use Pharmacotherapies:   Tried antabuse in late 1990's and he would stop taking and \"drink around it\".       RECENT SYMPTOMS   [PSYCH ROS]     PANIC ATTACK:  denies   ANXIETY:  none currently   DEPRESSION:  Still has low mood.   Sleeping excessively and decreased energy and motivation.  Denies SI/SIB.   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    Living Situation/Family/Relationships- lives alone in a condo, has a sister he talks to and sees in frequently, has a few friends in the building he lives in   Trauma History (self-report)- None  Legal- some legal issues this past year but he thinks that is resolved (didn't say what); records show prior issues with child pornography and served time in [facility]. He feels much injustice over his " treatment in the legal system.   Recovery Support- needs to start attending regularly    Early History/Education- Grew up in [place]. Went to college and finished college with BA in literature, was also ministry and studied 2 years and realized he was not meant for it , then studied art at [employer] for a year.           ALLERGY                                Patient has no known allergies.  MEDICATIONS                               Current Outpatient Medications   Medication Sig Dispense Refill     ACETAMINOPHEN PO Take 325 mg by mouth       amLODIPine-benazepril (LOTREL) 10-20 MG per capsule Take 1 capsule by mouth daily       ASPIRIN PO Take 81 mg by mouth daily       calcium-vitamin D 500-125 MG-UNIT TABS Take 1 tablet by mouth 2 times daily       DIPHENHYDRAMINE HCL PO Take 25 mg by mouth daily as needed       FLUoxetine (PROZAC) 40 MG capsule Take 2 capsules (80 mg) by mouth daily 60 capsule 3     FLUOXETINE HCL PO Take 20 mg by mouth daily       LOSARTAN POTASSIUM PO Take 25 mg by mouth       mirtazapine (REMERON) 15 MG tablet Take 1 tablet (15 mg) by mouth At Bedtime 30 tablet 1     MIRTAZAPINE PO Take 15 mg by mouth At Bedtime       naltrexone (VIVITROL) 380 MG SUSR Inject 380 mg into the muscle every 30 days 380 mg 11     nitroGLYcerin (NITRODUR) 0.4 MG/HR 24 hr patch Place 1 patch onto the skin daily       NITROGLYCERIN SL Take 0.4 mg by mouth       OMEPRAZOLE PO Take 20 mg by mouth every morning       ROSUVASTATIN CALCIUM PO Take 40 mg by mouth daily       TAMSULOSIN HCL PO Take 0.4 mg by mouth daily         VITALS   /75, pulse 88, weight 180 lbs, pain 0/10  MENTAL STATUS EXAM                                                           Orientation: full, x3  Alertness: alert   Appearance: well groomed and casually groomed, smiles and laughs at times   Behavior/Demeanor: cooperative, pleasant and calm, with good  eye contact   Speech: regular rate and rhythm  Language: intact  Psychomotor: normal  or unremarkable  Mood: depressed  Affect: full range and appropriate; was congruent to mood; was congruent to content  Thought Process/Associations: unremarkable  Thought Content:  Denies suicidal and violent ideation, delusions and preoccupations  Perception:    Denies auditory hallucinations and visual hallucinations  Insight: good  Judgment: good  Cognition: does  appear grossly intact; formal cognitive testing was not done  Gait: Normal   MSK: extremities normal, no peripheral edema    LABS and DATA   Creatinine, BUN, GFR, LFT's all normal on 8/28/18 labs from [facility]      PHQ9 TODAY  1/919 = 9 (trouble with sleep 3and energy issue 2)  PHQ-9 SCORE 10/17/2018 11/1/2018 1/9/2019   PHQ-9 Total Score 3 3 9          RATING SCALES:  PHQ9, see above    SUBSTANCE USE/PSYCHIATRIC DIAGNOSES                                                                                                    Alcohol Use Disorder, severe, with interval brief episodes of retuning to drinking.   Major Depressive Disorder, recurrent, worsened in the interval.   Generalized Anxiety Disorder.      ASSESSMENT                                           See 'Plan' section for specific dosing info             This patient is a 73 year old male who has alcohol use disorder since 1980 with a handful of years of sobriety since.  He has been in detox more than 40 times and 4-5 treatments.  He had two episodes of returning to drinking of 4 beers on day and 6 the other. Continues to have cravings, but noted that drinking was not pleasurable on Vivitrol, and noted he drank less and was able to stop.  Depression continues as he works on limiting his excessive sleep and finding a routine that involves going to AA and being more social. He has not been going to AA and this was discussed, that a sponsor can help when he is having cravings.  He will work on his sleep schedule, taking only very short naps if needed and getting to bed earlier and continue with  "exercise.   He wants to continue with Vivitrol, which appears to help with cravings and appears motivated to stay sober.  He will contimnue Prozac 80 mg.  He will get his Vivitrol injection today.   He will continue with the relapse prevention group weekly and attend 2 AA meetings per week.  He said he would go to one either on Sat or this Monday to start.         Further diagnostic clarification is not needed.  There are no medical comorbidities which impact this treatment.    [place] PRESCRIPTION MONITORING PROGRAM [] was checked today:  indicates no controlled subtances reported in previous 12 months.     PLAN                                                                                                       1) PSYCHOTROPIC MEDICATIONS:No changes today   - Continue Prozac 80 mg, last increase was a month ago (60-80 mg).       -Vivitrol injection monthly, given today.  Gio thinks he has some side effects such as increased sleep, decreased motivation, ringing in ears and rash on lower legs/ankles from Vivitrol.  He will monitor these issues. We discussed unlikely related but to monitor and now benefit outweighs the risk.     -Consider discussing adding antabuse at next visit for days when cravings are higher.  He used remotely and \"drank around it\" but appears motivated now to stay sober so could be helpful.     2) THERAPY:    - Gio attends the Relapse Prevention Group with Susi Gilbert weekly and this is encouraged.      -Start attending AA at least one meeting a week, get a sponsor, ideally he will attend a minimum of 2 meetings per week.    -Spend time with friends and family and do not isolate.  Try to have plans for Saturday nights and be aware of triggers.    -Consider sleep psychologist and individual therapist in future.             3) NEXT DUE:  LABS- consider LFT's                                   4) REFERRALS:    None currently    5) RTC: 4 weeks    6) CRISIS NUMBERS:   Provided routinely in AVS.     "     TREATMENT RISK STATEMENT:  The risks, benefits, alternatives and potential adverse effects have been explained and are understood by the pt. The pt agrees to the treatment plan with the ability to do so. The pt knows to call the clinic for any problems or to access emergency care if needed.  Medical and CD concerns are documented above.  Psychotropic drug interaction check was done, including changes made today, and is discussed above.    ADDICTION FELLOW: Leatha EDUARDO [facility]      Patient was seen and staffed in clinic with Dr. Bee who will sign the note.    Supervisor is Dr. Bee      I saw the patient with the fellow, and participated in key portions of the service, including the mental status examination and developing the plan of care. I reviewed key portions of the history with the fellow. I agree with the findings and plan as documented in this note.    Selam Bee            PSYCHIATRY CLINIC INDIVIDUAL PSYCHOTHERAPY NOTE                                                  [16]   Start time - 3:00     End time - 3:20  Date last reviewed - 12/12/18       Date next due - 3/12/19 (or 12 months if Medicare)    Subjective: This supportive psychotherapy session addressed issues related to goals of therapy  and current stressors consisting of  recent relapses, excessive sleep and attending AA.  Patient's reaction: Prepared to execute in the context of mental status appropriate for ambulatory setting.  Progress: will assess at next visit.   Plan: RTC 1 month  Psychotherapy services during this visit included  myself and the patient.   TREATMENT  PLAN          SYMPTOMS; PROBLEMS   MEASURABLE GOALS;    FUNCTIONAL IMPROVEMENT INTERVENTIONS;   GAINS MADE DISCHARGE CRITERIA   Substance Use: alcohol    make a plan to manage 2-3 anxiety-provoking situations, reduce feeling overwhelmed/ improve decision making skills and recognize delusional thinking 2 recent relapses of short one day use.  Completed behavirol chain  analysis and realized triggers of watching emotionally intense movie, and not calling someone, or not distracting with another activity.  Agrees to go to AA, not watch upsetting things, and do activities he enjoys such as cooking, going to see artwork.    sustained sobriety and reduction in cravings   Depression: depressed mood, low energy, concentration problems and excessive sleep   reduce depressive symptoms, reduce suicidal thoughts, report feeling more positive about self , develop strategies for thought distraction when ruminating and make a plan to manage 2-3 anxiety-provoking situations Awareness of feelings  Recognition that excessive sleep and lack of focus may be signs of depression  Awareness of negative self talk  Reach out to friends to do social activities, such as go to the museum.  marked symptom improvement, symptom resolution and significant improvement in self-reports for 5 consecutive visits

## 2019-02-06 NOTE — NURSING NOTE
"Laurent Choi,  1945, presented to the clinic today at the request of Dr. Saldana,  ordering provider for long-acting injectable Vivitrol 380 mg.    OBSERVATIONS:  1.  Appearance: Casually dressed in clean jeans and long sleeve shirt, with winter jacket. Pt was unshaven and unkempt hair. Pt stated \"I really like all the staff here at this clinic. The doctor's, you nurses, and the girls that take my blood pressure and all are so nice and that makes me feel better when I come in. I really like Susi (Ofelia) my therapist for group.\"  2.  Mood: Fair, pt mentioned that he \"messed up, but I was able to stop. They weren't enjoyable.\" Writer asked pt if he has access to My Chart, which he acknowledged he does. Writer explained that it is easiest to communicate with Dr. Saldana and her nurse through My Chart than it is to call the Clinic and the long hold times. Pt acknowledged understanding.  3.  Affect: Moderate  4.  Attitude: Fair  5.  Cooperation: Full   6.  Side Effects: Denied  7.  Education: Ice at injection site for pain  8. Next appointment: 3/6/2019 1430 Dr. Saldana and 1530 injection to follow    The service provided today was rendered under the supervising provider of the day, Dr Guidry, who was available for consultation as needed.    "

## 2019-02-07 ENCOUNTER — OFFICE VISIT (OUTPATIENT)
Dept: PSYCHIATRY | Facility: CLINIC | Age: 74
End: 2019-02-07
Attending: PSYCHOLOGIST
Payer: COMMERCIAL

## 2019-02-07 DIAGNOSIS — F10.21 ALCOHOL USE DISORDER, SEVERE, IN EARLY REMISSION (H): Primary | ICD-10-CM

## 2019-02-13 NOTE — PROGRESS NOTES
Relapse Prevention Group for Substance Use Disorders                                Time of Service: 4:00 - 5:00pm  Length of Appointment: 60 mins  Care Providers: Susi Gilbert, PhD, LP  Total number of patients present: 3    DSM-5 Diagnosis:   Alcohol Use Disorder, severe, in early remission   Major Depressive Disorder, recurrent (per chart review)  Generalized Anxiety Disorder (per chart review)    GROUP CONTENT:  Group leaders oriented patients to structure of group and reviewed rules. Group leaders led patients in discussion of the relapse prevention strategies for addiction.     PATIENT ENGAGEMENT: Active and engaged. Gio reported that he successfully completed his HW of going to [employer] class.    MENTAL STATUS EXAM:   Appearance: Casually dressed and appropriately groomed   Motor activity: Within normal range  Speech rate: Within normal range  Speech volume: Within normal range  Speech articulation:  Within normal range  Speech coherence:  Within normal range  Speech spontaneity:  Within normal range  Affect (objective appearance):  Euthymic  Thought process: Linear, logical, goal-oriented  Suicide/ violence risk: Not individually assessed given group content, but no signs of risk were observed    PLAN:   Continue to engage in Relapse Prevention Group weekly on Thursdays at 4pm

## 2019-02-13 NOTE — PROGRESS NOTES
Relapse Prevention Group for Substance Use Disorders                                Time of Service: 4:00 - 5:00pm  Length of Appointment: 60 mins  Care Providers: Susi Gilbert, PhD, LP  Total number of patients present: 2 (3 group members were scheduled, but 1 unexpectedly no-showed appointment)    DSM-5 Diagnosis:   Alcohol Use Disorder, severe, in early remission   Major Depressive Disorder, recurrent (per chart review)  Generalized Anxiety Disorder (per chart review)    GROUP CONTENT:  Group leaders oriented patients to structure of group and reviewed rules. Group leaders led patients in discussion of the relapse prevention strategies for addiction with a focus on relationship between mood and substance use.     PATIENT ENGAGEMENT: Active and engaged.    MENTAL STATUS EXAM:   Appearance: Casually dressed and appropriately groomed   Motor activity: Within normal range  Speech rate: Within normal range  Speech volume: Within normal range  Speech articulation:  Within normal range  Speech coherence:  Within normal range  Speech spontaneity:  Within normal range  Affect (objective appearance):  Euthymic  Thought process: Linear, logical, goal-oriented  Suicide/ violence risk: Not individually assessed given group content, but no signs of risk were observed    PLAN:   Continue to engage in Relapse Prevention Group weekly on Thursdays at 4pm

## 2019-02-14 ENCOUNTER — OFFICE VISIT (OUTPATIENT)
Dept: PSYCHIATRY | Facility: CLINIC | Age: 74
End: 2019-02-14
Attending: PSYCHOLOGIST
Payer: COMMERCIAL

## 2019-02-14 DIAGNOSIS — F10.21 ALCOHOL USE DISORDER, SEVERE, IN EARLY REMISSION (H): Primary | ICD-10-CM

## 2019-02-15 NOTE — PROGRESS NOTES
Relapse Prevention Group for Substance Use Disorders                                Time of Service: 4:00 - 5:00pm  Length of Appointment: 60 mins  Care Providers: Susi Gilbert, PhD, LP and psychiatry resident Elias Ackerman MD  Total number of patients present: 3    DSM-5 Diagnosis:   Alcohol Use Disorder, severe, in early remission   Major Depressive Disorder, recurrent (per chart review)  Generalized Anxiety Disorder (per chart review)    GROUP CONTENT:  Group leaders oriented patients to structure of group and reviewed rules. Group leaders led patients in discussion of the relapse prevention strategies for addiction with a focus on strategies for finding sober communities. Helped Gio identify barriers to complete his homework of attending AA meeting.     PATIENT ENGAGEMENT: Active and engaged. For HW, Gio agreed to attend 1 AA meeting.     MENTAL STATUS EXAM:   Appearance: Casually dressed and appropriately groomed   Motor activity: Within normal range  Speech rate: Within normal range  Speech volume: Within normal range  Speech articulation:  Within normal range  Speech coherence:  Within normal range  Speech spontaneity:  Within normal range  Affect (objective appearance):  Euthymic  Thought process: Linear, logical, goal-oriented  Suicide/ violence risk: Not individually assessed given group content, but no signs of risk were observed    PLAN:   Continue to engage in Relapse Prevention Group weekly on Thursdays at 4pm

## 2019-02-21 ENCOUNTER — OFFICE VISIT (OUTPATIENT)
Dept: PSYCHIATRY | Facility: CLINIC | Age: 74
End: 2019-02-21
Attending: PSYCHOLOGIST
Payer: COMMERCIAL

## 2019-02-21 DIAGNOSIS — F10.21 ALCOHOL USE DISORDER, SEVERE, IN EARLY REMISSION (H): Primary | ICD-10-CM

## 2019-02-22 NOTE — PROGRESS NOTES
"  Relapse Prevention Group for Substance Use Disorders                                Time of Service: 4:00 - 5:00pm  Length of Appointment: 60 mins  Care Providers: Susi Gilbert, PhD, LP and psychiatry resident Elias Ackerman MD  Total number of patients present: 2 (4 group members were scheduled, but 2 unexpectedly no-showed)    DSM-5 Diagnosis:   Alcohol Use Disorder, severe, in early remission   Major Depressive Disorder, recurrent (per chart review)  Generalized Anxiety Disorder (per chart review)    GROUP CONTENT:  Group leaders oriented patients to structure of group and reviewed rules. Group leaders led patients in discussion of the relapse prevention strategies for addiction with a focus behavioral activation. Completed \"pleasure predicting\" worksheet together.    PATIENT ENGAGEMENT: Active and engaged. For HW, Gio agreed to attend 2 AA meetings and [place] to workout.    MENTAL STATUS EXAM:   Appearance: Casually dressed and appropriately groomed   Motor activity: Within normal range  Speech rate: Within normal range  Speech volume: Within normal range  Speech articulation:  Within normal range  Speech coherence:  Within normal range  Speech spontaneity:  Within normal range  Affect (objective appearance):  Euthymic  Thought process: Linear, logical, goal-oriented  Suicide/ violence risk: Not individually assessed given group content, but no signs of risk were observed    PLAN:   Continue to engage in Relapse Prevention Group weekly on Thursdays at 4pm  "

## 2019-02-28 ENCOUNTER — OFFICE VISIT (OUTPATIENT)
Dept: PSYCHIATRY | Facility: CLINIC | Age: 74
End: 2019-02-28
Attending: PSYCHOLOGIST
Payer: COMMERCIAL

## 2019-02-28 DIAGNOSIS — F10.20 ALCOHOL DEPENDENCE, CONTINUOUS (H): Primary | ICD-10-CM

## 2019-03-01 NOTE — PROGRESS NOTES
"  Relapse Prevention Group for Substance Use Disorders                                Time of Service: 4:05 - 5:00pm  Length of Appointment: 55 mins  Care Providers: Susi Gilbert, PhD, LP  Total number of patients present: 5    DSM-5 Diagnosis:   Alcohol Use Disorder, severe, in early remission   Major Depressive Disorder, recurrent (per chart review)  Generalized Anxiety Disorder (per chart review)    GROUP CONTENT:   oriented patients to structure of group and reviewed rules.  led patients in discussion of relapse prevention strategies for addiction with a focus on identifying strategies to cope with cravings. Reviewed \"facts about cravings\" handout.     PATIENT ENGAGEMENT: Active and engaged. Reported alcohol use 2 days over the weekend and group members offered support. For HW, Gio agreed to attend AA meetings during the week.     MENTAL STATUS EXAM:   Appearance: Casually dressed and appropriately groomed   Motor activity: Within normal range  Speech rate: Within normal range  Speech volume: Within normal range  Speech articulation:  Within normal range  Speech coherence:  Within normal range  Speech spontaneity:  Within normal range  Affect (objective appearance):  Euthymic  Thought process: Linear, logical, goal-oriented  Suicide/ violence risk: Not individually assessed given group content, but no signs of risk were observed    PLAN:   Continue to engage in Relapse Prevention Group weekly on Thursdays at 4pm  "

## 2019-03-06 ENCOUNTER — OFFICE VISIT (OUTPATIENT)
Dept: PSYCHIATRY | Facility: CLINIC | Age: 74
End: 2019-03-06
Attending: PSYCHIATRY & NEUROLOGY
Payer: COMMERCIAL

## 2019-03-06 ENCOUNTER — ALLIED HEALTH/NURSE VISIT (OUTPATIENT)
Dept: PSYCHIATRY | Facility: CLINIC | Age: 74
End: 2019-03-06
Payer: COMMERCIAL

## 2019-03-06 VITALS
SYSTOLIC BLOOD PRESSURE: 134 MMHG | HEART RATE: 92 BPM | WEIGHT: 178.6 LBS | DIASTOLIC BLOOD PRESSURE: 80 MMHG | BODY MASS INDEX: 24.91 KG/M2

## 2019-03-06 DIAGNOSIS — F32.1 CURRENT MODERATE EPISODE OF MAJOR DEPRESSIVE DISORDER, UNSPECIFIED WHETHER RECURRENT (H): Primary | ICD-10-CM

## 2019-03-06 DIAGNOSIS — F10.21 ALCOHOL USE DISORDER, SEVERE, IN EARLY REMISSION (H): Primary | ICD-10-CM

## 2019-03-06 PROCEDURE — 96372 THER/PROPH/DIAG INJ SC/IM: CPT | Mod: ZF

## 2019-03-06 PROCEDURE — 25000128 H RX IP 250 OP 636: Mod: ZF | Performed by: PSYCHIATRY & NEUROLOGY

## 2019-03-06 PROCEDURE — G0463 HOSPITAL OUTPT CLINIC VISIT: HCPCS | Mod: ZF

## 2019-03-06 RX ORDER — ARIPIPRAZOLE 2 MG/1
TABLET ORAL
Qty: 30 TABLET | Refills: 1 | Status: SHIPPED | OUTPATIENT
Start: 2019-03-06 | End: 2019-03-27

## 2019-03-06 RX ADMIN — NALTREXONE 380 MG: KIT at 16:04

## 2019-03-06 ASSESSMENT — PATIENT HEALTH QUESTIONNAIRE - PHQ9: SUM OF ALL RESPONSES TO PHQ QUESTIONS 1-9: 14

## 2019-03-06 ASSESSMENT — PAIN SCALES - GENERAL: PAINLEVEL: NO PAIN (0)
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
## 2019-03-06 NOTE — NURSING NOTE
"Laurent Choi,  1945, presented to the clinic today at the request of Dr. Saldana,  ordering provider for long-acting injectable Vivitrol 380 mg.    OBSERVATIONS:  1.  Appearance: Casually dressed in clean pants and sweatshirt. Left hand/arm visably shaking. Pt stated \"I'm hoping to get on the pill form of this medication, but I'll be coming in next month for the shot again.\"  2.  Mood: Fair, Patient stated \"It seems like I'm tired all the time, but the  Doesn't think it's from the shots.\"   3.  Affect: Fair  4.  Attitude: Fair, pt stated \"I really like the group I'm in for therapy.\"  5.  Cooperation: Full  6.  Side Effects: Pt stated, \"tiredness, but I think the shot does help me.\"  7.  Education: None needed  8. Next appointment: 2019 at 1500     The service provided today was rendered under the supervising provider of the day, Dr. Guidry, who was available for consultation as needed.    "

## 2019-03-06 NOTE — PATIENT INSTRUCTIONS
Thank you for coming to the PSYCHIATRY CLINIC.    Lab Testing:  If you had lab testing today and your results are reassuring or normal they will be mailed to you or sent through [employer] within 7 days.   If the lab tests need quick action we will call you with the results.  The phone number we will call with results is # [phone] (home) . If this is not the best number please call our clinic and change the number.    Medication Refills:  If you need any refills please call your pharmacy and they will contact us. Our fax number for refills is [phone]. Please allow three business for refill processing.   If you need to  your refill at a new pharmacy, please contact the new pharmacy directly. The new pharmacy will help you get your medications transferred.     Scheduling:  If you have any concerns about today's visit or wish to schedule another appointment please call our office during normal business hours [phone] (8-5:00 M-F)    Contact Us:  Please call [phone] during business hours (8-5:00 M-F).  If after clinic hours, or on the weekend, please call  [phone].    Financial Assistance [phone]  [employer] Billing [phone]  [employer] Billing [phone]  Medical Records [phone]      MENTAL HEALTH CRISIS NUMBERS:  [facility]:   [facility] - [phone]   Crisis Residence [facility] - [phone]   Walk-In Counseling [facility] [phone]   COPE 24/7 [place] Mobile Team for Adults - [[phone]]; Child - [[phone]]     Crisis Connection - [phone]     [facility]:   [facility] - [phone]   Walk-in counseling [facility] - [phone]   Walk-in counseling [facility] - [phone]   Crisis Residence [facility] - [phone]   Urgent Care Adult Mental Health:   --Drop-in, 24/7 crisis line, and [place] Mobile Team [[phone]]    CRISIS TEXT  LINE: Text [phone] from anywhere, anytime, any crisis 24/7;    OR SEE www.crisistextline.org     Poison Control Center - [phone]    CHILD: [facility] needs assessment team - [phone]     [facility] [employer] - [phone]; or [employer] [facility] - [phone]    If you have a medical emergency please call 911or go to the nearest ER.                    _____________________________________________    Again thank you for choosing PSYCHIATRY CLINIC and please let us know how we can best partner with you to improve you and your family's health.  You may be receiving a survey in the mail regarding this appointment. We would love to have your feedback, both positive and negative, so please fill out the survey and return it using the provided envelope. The survey is done by an external company, so your answers are anonymous.

## 2019-03-07 ENCOUNTER — OFFICE VISIT (OUTPATIENT)
Dept: PSYCHIATRY | Facility: CLINIC | Age: 74
End: 2019-03-07
Attending: PSYCHOLOGIST
Payer: COMMERCIAL

## 2019-03-07 ENCOUNTER — TELEPHONE (OUTPATIENT)
Dept: PSYCHIATRY | Facility: CLINIC | Age: 74
End: 2019-03-07

## 2019-03-07 DIAGNOSIS — F10.20 ALCOHOL DEPENDENCE, CONTINUOUS (H): Primary | ICD-10-CM

## 2019-03-07 NOTE — TELEPHONE ENCOUNTER
On 3/6/2019, the patient gave Dr. Saldana forms from [employer] [place] - Tier Exception request. The patient signed an QUIN for release of information to complete form. The QUIN and completed forms were sent to scanning and copies are held in Psych until scanning is complete. The completed forms were returned to this writer on 3/7/2019 and faxed to [employer] at [phone].Deborah Blanchard LPN

## 2019-03-12 NOTE — TELEPHONE ENCOUNTER
Writer received incoming call from clinic review pharmacist Kosta from [facility]. Per Kosta informing this writer the tier exception for naltrexone oral formulation should be approved. We should be receiving an approval letter within 1-2 days. Writer agreed with the plan. Post reviewing the chart, appears patient will need an active order for the medication.     Will route to provider

## 2019-03-13 NOTE — TELEPHONE ENCOUNTER
Forms ([employer] - Tier Exception Request and QUIN)      Leatha Saldana MD   You; Selam Bee MD 14 hours ago (5:50 PM)      Gio decided at the last visit to stay on Vivitrol, so we are not switching him to oral at this point.  We will assess at his next visit in a few weeks.  Thanks.      Routing Comment      No further action needed by this writer

## 2019-03-13 NOTE — PROGRESS NOTES
Relapse Prevention Group for Substance Use Disorders                                Time of Service: 4:05 - 5:00pm  Length of Appointment: 55 mins  Care Providers: Susi Gilbert, PhD, LP and psychiatry resident Elias Ackerman MD  Total number of patients present: 3    DSM-5 Diagnosis:   Alcohol Use Disorder, severe, in early remission   Major Depressive Disorder, recurrent (per chart review)  Generalized Anxiety Disorder (per chart review)    GROUP CONTENT:  Group leaders oriented patients to structure of group and reviewed rules. Group leaders led patients in discussion of relapse prevention strategies for addiction with a focus on identifying triggers and coping with specific triggers.       PATIENT ENGAGEMENT: Active and engaged. For HW, Gio agreed to 1) continue to attend AA and 2) attend morning [employer] class    MENTAL STATUS EXAM:   Appearance: Casually dressed and appropriately groomed   Motor activity: Within normal range  Speech rate: Within normal range  Speech volume: Within normal range  Speech articulation:  Within normal range  Speech coherence:  Within normal range  Speech spontaneity:  Within normal range  Affect (objective appearance):  Euthymic  Thought process: Linear, logical, goal-oriented  Suicide/ violence risk: Not individually assessed given group content, but no signs of risk were observed    PLAN:   Continue to engage in Relapse Prevention Group weekly on Thursdays at 4pm

## 2019-03-14 ENCOUNTER — TELEPHONE (OUTPATIENT)
Dept: PSYCHIATRY | Facility: CLINIC | Age: 74
End: 2019-03-14

## 2019-03-14 ENCOUNTER — OFFICE VISIT (OUTPATIENT)
Dept: PSYCHIATRY | Facility: CLINIC | Age: 74
End: 2019-03-14
Attending: PSYCHOLOGIST
Payer: COMMERCIAL

## 2019-03-14 DIAGNOSIS — F10.20 ALCOHOL DEPENDENCE, CONTINUOUS (H): Primary | ICD-10-CM

## 2019-03-14 NOTE — TELEPHONE ENCOUNTER
Writer received an approval letter for Naltrexone HCL 50 mg Tablet from [facility].     Effective Dates: 1/1/19 - 3/14/20  Case Number: [id number]    Per encounter note on 3/7/19 the patient is requesting to stay on Vivitrol injection. Writer sent the original letter to scanning and kept a copy in psychiatry until scanning complete.

## 2019-03-19 NOTE — PROGRESS NOTES
Relapse Prevention Group for Substance Use Disorders                                Time of Service: 4:05 - 5:00pm  Length of Appointment: 55 mins  Care Providers: Susi Gilbert, PhD, LP and psychiatry resident Elias Ackerman MD  Total number of patients present: 2 (note: 3 group members were scheduled, but 1 unexpectedly no-showed appointment)    DSM-5 Diagnosis:   Alcohol Use Disorder, severe, in early remission   Major Depressive Disorder, recurrent (per chart review)  Generalized Anxiety Disorder (per chart review)    GROUP CONTENT:  Group leaders oriented patients to structure of group and reviewed rules. Group leaders led patients in discussion of relapse prevention strategies for addiction with a focus on coping with specific triggers.       PATIENT ENGAGEMENT: Active and engaged.    MENTAL STATUS EXAM:   Appearance: Casually dressed and appropriately groomed   Motor activity: Within normal range  Speech rate: Within normal range  Speech volume: Within normal range  Speech articulation:  Within normal range  Speech coherence:  Within normal range  Speech spontaneity:  Within normal range  Affect (objective appearance):  Euthymic  Thought process: Linear, logical, goal-oriented  Suicide/ violence risk: Not individually assessed given group content, but no signs of risk were observed    PLAN:   Continue to engage in Relapse Prevention Group weekly on Thursdays at 4pm

## 2019-03-21 ENCOUNTER — OFFICE VISIT (OUTPATIENT)
Dept: PSYCHIATRY | Facility: CLINIC | Age: 74
End: 2019-03-21
Attending: PSYCHOLOGIST
Payer: COMMERCIAL

## 2019-03-21 DIAGNOSIS — F10.20 ALCOHOL DEPENDENCE, CONTINUOUS (H): Primary | ICD-10-CM

## 2019-03-26 NOTE — PROGRESS NOTES
Relapse Prevention Group for Substance Use Disorders                                Time of Service: 4:05 - 5:00pm  Length of Appointment: 55 mins  Care Providers: Susi Gilbert, PhD, LP  Total number of patients present: 4    DSM-5 Diagnosis:   Alcohol Use Disorder, severe, in early remission   Major Depressive Disorder, recurrent (per chart review)  Generalized Anxiety Disorder (per chart review)    GROUP CONTENT:   oriented patients to structure of group and reviewed rules.  led patients in discussion of relapse prevention strategies for addiction with a focus on setting small behavioral goals to improve mood.      PATIENT ENGAGEMENT: Active and engaged. For HW, plans to attend AA meeting.     MENTAL STATUS EXAM:   Appearance: Casually dressed and appropriately groomed   Motor activity: Within normal range  Speech rate: Within normal range  Speech volume: Within normal range  Speech articulation:  Within normal range  Speech coherence:  Within normal range  Speech spontaneity:  Within normal range  Affect (objective appearance):  Euthymic  Thought process: Linear, logical, goal-oriented  Suicide/ violence risk: Not individually assessed given group context, but no signs of risk were observed    PLAN:   Continue to engage in Relapse Prevention Group weekly on Thursdays at 4pm

## 2019-03-27 ENCOUNTER — OFFICE VISIT (OUTPATIENT)
Dept: PSYCHIATRY | Facility: CLINIC | Age: 74
End: 2019-03-27
Attending: PSYCHOLOGIST
Payer: COMMERCIAL

## 2019-03-27 VITALS
BODY MASS INDEX: 24.85 KG/M2 | DIASTOLIC BLOOD PRESSURE: 83 MMHG | SYSTOLIC BLOOD PRESSURE: 123 MMHG | WEIGHT: 178.2 LBS | HEART RATE: 99 BPM

## 2019-03-27 DIAGNOSIS — G47.00 INSOMNIA, UNSPECIFIED TYPE: Primary | ICD-10-CM

## 2019-03-27 DIAGNOSIS — F32.1 CURRENT MODERATE EPISODE OF MAJOR DEPRESSIVE DISORDER, UNSPECIFIED WHETHER RECURRENT (H): ICD-10-CM

## 2019-03-27 PROCEDURE — G0463 HOSPITAL OUTPT CLINIC VISIT: HCPCS | Mod: ZF

## 2019-03-27 RX ORDER — LANOLIN ALCOHOL/MO/W.PET/CERES
CREAM (GRAM) TOPICAL
Qty: 90 TABLET | Refills: 1 | Status: SHIPPED | OUTPATIENT
Start: 2019-03-27 | End: 2021-01-06

## 2019-03-27 RX ORDER — ARIPIPRAZOLE 5 MG/1
TABLET ORAL
Qty: 30 TABLET | Refills: 1 | Status: SHIPPED | OUTPATIENT
Start: 2019-03-27 | End: 2019-04-24

## 2019-03-27 ASSESSMENT — PATIENT HEALTH QUESTIONNAIRE - PHQ9: SUM OF ALL RESPONSES TO PHQ QUESTIONS 1-9: 15

## 2019-03-27 ASSESSMENT — PAIN SCALES - GENERAL: PAINLEVEL: NO PAIN (0)

## 2019-03-27 NOTE — PROGRESS NOTES
"  ----------------------------------------------------------------------------------------------------------  [facility], [place]   Psychiatry Clinic   Addiction Medicine                        IDENTIFICATION   Laurent \"Gio\" Blaze Choi is a 73 year old male who was referred by self for evaluation of alcohol use disorder and desire to continue Vivitrol.  History was provided by patient who was a good historian. He is here for a follow up visit with his initial visit on 10/17/18 and last visit was 2/6/19.         CHIEF COMPLAINT      \"I was feeling better but this week I feel more down\".       Brief Summary     The patient has a past psychiatric/substance use history of alcohol use disorder, Major Depressive Disorder and Generalized Anxiety Disorder.    Gio has been in detox over 40 times and treatment about 5 times.  His first treatment was in 1980. He gave up daily drinking in late 1990's, then began to binge drink.  His pattern recently  has been drinking 1-2 weeks and not eating most of that time, and getting very sick. He then will stop drinking, will be sober for 1-2 weeks.  Alcohol has caused withdrawal and tolerance, drinking 6-10 beers and 1 pint a day when on a binge. Alcohol has affected relationships, has affected health with getting sick, not eating and getting depressed.     Interval History      Gio continues to think Vivitrol is making him sleep excessively but he knows it helps him from drinking.  He has had no cravings or use of alcohol in the the past 3 weeks.   He is going to one AA meeting that he is actually enjoying and he got a sponsor in the interval and has met with him.  He is getting assignments to read the big book from his sponsor and he is finding it hard to focus when reading so might get an audio version of the big book.   We discussed how far he has come, now going to meetings, having a sponsor and continuing to attend the relapse prevention group and " he admits he almost can't believe he is doing these things.  He was feeling better last week and noticed then that his sleep and ringing in ears were less so he is considering that maybe it is not the Vivitrol that are causing these things.  He wants to continue on the Vivitrol for now.  He would have to find out how much the oral naltrexone would cost him before he would consider making the switch to the oral form as he is on a limited budget and the Vivitrol does not cost him anything.  He is going to the theater with a friend from his condo this weekend.         Gio's mood was improved the last two weeks but this week he feels more down again.  He had more energy and more motivation for 3-5 days last week and actually did some art work and framed 3 projects.  He thought maybe the Abilify was helping, but this week he feels a lack of focus and low energy and motivation again.  He denies any SI/SIB.  He has had some insomnia, waking up after 4 or 5 hours and then not able to fall asleep so then he will be in a bed a long time, trying to sleep an hour at a time.  He is still taking 1-3 hour naps per day. We discussed going to bed at the same time and also getting up at the same time regardless of how he slept so he can set a routine that his body gets used to and also talked about other sleep hygiene issues such as not using the bed for anything other than sleep, getting out of bed after 20-30 min of not being able to fall asleep.  Also he was told to not take naps but if he needs to he should set an alarm and limit these to 30 minutes.   Overall he does feel he is improving slowly, but the excessive sleep and low motivation continue to be the most problematic issues.  He says he does not feel as sad lately.  He asks if Remeron should be started again but at a higher dose.  We discussed if he got some benefit from Abilify that it would be best to go up in that first.  We can always consider adding Remeron back in if  "Abilify does not give adequate benefit.  He is maxed out on his Prozac at 80 mg.      Treatment History:    First treatment in 1980.  Last treatment at [facility] finished  November 2018.     Substance Use Pharmacotherapies:   Tried antabuse in late 1990's and he would stop taking and \"drink around it\".       RECENT SYMPTOMS   [PSYCH ROS]     PANIC ATTACK:  denies   ANXIETY:  none currently   DEPRESSION:  Still has low mood but was improved last week.   Sleeping excessively and decreased energy and motivation.  Denies SI/SIB.   DYSREGULATION:  \none;    PSYCHOSIS:  none;  DENIES- none  MARILEE/HYPOMANIA:  none;  DENIES- none  TRAUMA RELATED:  Doing better in the interval.   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    Living Situation/Family/Relationships- lives alone in a condo, has a sister he talks to and sees in frequently, has a few friends in the building he lives in   Trauma History (self-report)- None  Legal- some legal issues this past year but he thinks that is resolved (didn't say what); records show prior issues with child pornography and served time in [facility]. He feels much injustice over his treatment in the legal system.   Recovery Support- just started going to one AA meeting a week and attends Relapse prevention group once per week.     Early History/Education- Grew up in [place]. Went to college and finished college with BA in literature, was also in [employer] and studied 2 years and realized he was not meant for it , then studied art at [employer] for a year.           ALLERGY                                Patient has no known allergies.  MEDICATIONS                               Current Outpatient Medications   Medication Sig Dispense " Refill     ACETAMINOPHEN PO Take 325 mg by mouth       amLODIPine-benazepril (LOTREL) 10-20 MG per capsule Take 1 capsule by mouth daily       ARIPiprazole (ABILIFY) 2 MG tablet Take one 2 mg tab daily 30 tablet 1     ASPIRIN PO Take 81 mg by mouth daily       calcium-vitamin D 500-125 MG-UNIT TABS Take 1 tablet by mouth 2 times daily       DIPHENHYDRAMINE HCL PO Take 25 mg by mouth daily as needed       FLUoxetine (PROZAC) 40 MG capsule Take 2 capsules (80 mg) by mouth daily 60 capsule 3     LOSARTAN POTASSIUM PO Take 25 mg by mouth       naltrexone (VIVITROL) 380 MG SUSR Inject 380 mg into the muscle every 30 days 380 mg 11     nitroGLYcerin (NITRODUR) 0.4 MG/HR 24 hr patch Place 1 patch onto the skin daily       NITROGLYCERIN SL Take 0.4 mg by mouth       OMEPRAZOLE PO Take 20 mg by mouth every morning       ROSUVASTATIN CALCIUM PO Take 40 mg by mouth daily       TAMSULOSIN HCL PO Take 0.4 mg by mouth daily         VITALS   /83   Pulse 99   Wt 80.8 kg (178 lb 3.2 oz)   BMI 24.85 kg/m      MENTAL STATUS EXAM                                                           Orientation: full, x3  Alertness: alert   Appearance: well groomed and casually groomed, smiles and laughs at times   Behavior/Demeanor: cooperative, pleasant and calm, with good  eye contact   Speech: regular rate and rhythm  Language: intact  Psychomotor: normal or unremarkable  Mood: depressed  Affect: full range and appropriate; was congruent to mood; was congruent to content  Thought Process/Associations: unremarkable  Thought Content:  Denies suicidal and violent ideation, delusions and preoccupations  Perception:    Denies auditory hallucinations and visual hallucinations  Insight: good  Judgment: good  Cognition: does  appear grossly intact; formal cognitive testing was not done  Gait: Normal   MSK: extremities normal, no peripheral edema    LABS and DATA   Creatinine, BUN, GFR, LFT's all normal on 8/28/18 labs from [facility]      PHQ9  TODAY  3/27/19 = 15 (trouble with sleep 3and energy issue 3, at least one for all other categories)  PHQ-9 SCORE 11/1/2018 1/9/2019 3/6/2019   PHQ-9 Total Score 3 9 14          RATING SCALES:  PHQ9, see above    SUBSTANCE USE/PSYCHIATRIC DIAGNOSES                                                                                                    Alcohol Use Disorder, severe, in early remission.   Major Depressive Disorder, recurrent, fluctuating in the interval.   Generalized Anxiety Disorder.   R/o PTSD  (partner suicide attempts,his incarcerations)     ASSESSMENT                                           See 'Plan' section for specific dosing info             This patient is a 73 year old male who has alcohol use disorder since 1980 with a handful of years of sobriety since.  He has been in detox more than 40 times and 4-5 treatments.  He has had any cravings or drinking in the interval, but did have a few brief 1-2 day episodes of returning to drinking in the past few months but Vivitrol appears to have helped stop these episodes.     Depression continues as he works on limiting his excessive sleep and finding a routine that involves going to AA and being more social. We discussed that his fatigue is likely due to depression. He had some improvement for a few weeks with Abilify which was started 3 weeks ago.  He has started attending AA and has 3 people's numbers from there and feels comfortable with them that he would call if needed.   He wants to continue with Vivitrol, which appears to help with cravings and appears motivated to stay sober but may want to switch to oral form in the future if he feels he keeps having side effects and depending on the cost.  He will get his Vivitrol injection next week.   He will continue with the relapse prevention group weekly and attend 1-2 AA meetings per week.         Further diagnostic clarification is not needed.  There are no medical comorbidities which impact this  treatment.    [place] PRESCRIPTION MONITORING PROGRAM [] was checked today:  indicates no controlled subtances reported in previous 12 months.     PLAN                                                                                                       1) PSYCHOTROPIC MEDICATIONS:No changes today   - Continue Prozac 80 mg, last increase was almost 2 months ago (60-80 mg).     -Increase Abilify to 5 mg daily for depression.      -Vivitrol injection monthly, will get next week, appointment scheduled.         2) THERAPY:    - Gio attends the Relapse Prevention Group with Susi Gilbert weekly and this is encouraged.      -Attend  AA at 1-2 meetings a week,continue working with sponsor, do assignments from sponsor.    -Spend time with friends and family and do not isolate.  Try to have plans for Saturday nights and be aware of triggers.    -Consider individual therapist in future-will address again at next visit.             3) NEXT DUE:  LABS- consider LFT's                                   4) REFERRALS:    None currently    5) RTC: 4 weeks    6) CRISIS NUMBERS:   Provided routinely in AVS.         TREATMENT RISK STATEMENT:  The risks, benefits, alternatives and potential adverse effects have been explained and are understood by the pt. The pt agrees to the treatment plan with the ability to do so. The pt knows to call the clinic for any problems or to access emergency care if needed.  Medical and CD concerns are documented above.  Psychotropic drug interaction check was done, including changes made today, and is discussed above.    ADDICTION FELLOW: Leatha Saldana      Patient was seen and staffed in clinic with Dr. Bee who will sign the note.    Supervisor is Dr. Bee     I saw the patient with the fellow, and participated in key portions of the service, including the mental status examination and developing the plan of care. I reviewed key portions of the history with the fellow. I agree with the findings and  plan as documented in this note.    Selam Bee          PSYCHIATRY CLINIC INDIVIDUAL PSYCHOTHERAPY NOTE                                                  [16]   Start time - 10:20     End time - 10:40  Date last reviewed - 12/2018       Date next due - 3/12/19 (or 12 months if Medicare)    Subjective: This supportive psychotherapy session addressed issues related to goals of therapy  and current stressors consisting of  recent relapses, excessive sleep and sleep hygiene, previous trauma with partner and attending AA.  Patient's reaction: Prepared to execute in the context of mental status appropriate for ambulatory setting.  Progress: will assess at next visit.   Plan: RTC 1 month  Psychotherapy services during this visit included  myself and the patient.   TREATMENT  PLAN          SYMPTOMS; PROBLEMS   MEASURABLE GOALS;    FUNCTIONAL IMPROVEMENT INTERVENTIONS;   GAINS MADE DISCHARGE CRITERIA   Substance Use: alcohol    make a plan to manage 2-3 anxiety-provoking situations, reduce feeling overwhelmed/ improve decision making skills and recognize delusional thinking 2 recent relapses of short one day use.  Completed behavirol chain analysis and realized triggers of watching emotionally intense movie, and not calling someone, or not distracting with another activity.  Continue AA, not watch upsetting things, and do activities he enjoys such as cooking, going to see artwork.    sustained sobriety and reduction in cravings   Depression: depressed mood, low energy, concentration problems and excessive sleep   reduce depressive symptoms, reduce suicidal thoughts, report feeling more positive about self , develop strategies for thought distraction when ruminating and make a plan to manage 2-3 anxiety-provoking situations Awareness of feelings  Recognition that excessive sleep and lack of focus may be signs of depression  Awareness of negative self talk  Reach out to friends to do social activities, such as go to the  museum.  Will call a friend if feeling down or doesn't know what to do. Will not take naps, will go to bed and wake up at same time.  marked symptom improvement, symptom resolution and significant improvement in self-reports for 5 consecutive visits

## 2019-03-27 NOTE — NURSING NOTE
Chief Complaint   Patient presents with     Recheck Medication     Current moderate episode of major depressive disorder, unspecified whether recurrent (H)

## 2019-03-27 NOTE — PATIENT INSTRUCTIONS
Thank you for coming to the PSYCHIATRY CLINIC.    Lab Testing:  If you had lab testing today and your results are reassuring or normal they will be mailed to you or sent through [facility] [employer] within 7 days.   If the lab tests need quick action we will call you with the results.  The phone number we will call with results is # [phone] (home) . If this is not the best number please call our clinic and change the number.    Medication Refills:  If you need any refills please call your pharmacy and they will contact us. Our fax number for refills is [phone]. Please allow three business for refill processing.   If you need to  your refill at a new pharmacy, please contact the new pharmacy directly. The new pharmacy will help you get your medications transferred.     Scheduling:  If you have any concerns about today's visit or wish to schedule another appointment please call our office during normal business hours [phone] (8-5:00 M-F)    Contact Us:  Please call [phone] during business hours (8-5:00 M-F).  If after clinic hours, or on the weekend, please call  [phone].    Financial Assistance [phone]  [facility] [phone]  [facility] [phone]  Medical Records [phone]      MENTAL HEALTH CRISIS NUMBERS:  [facility]:   [facility] - [phone]   Crisis Residence [facility] - [phone]   Walk-In Counseling [facility] [phone]   COPE 24/7 [place] Mobile Team for Adults - [[phone]]; Child - [[phone]]        [facility]:   [facility] - [phone]   Walk-in counseling [facility] - [phone]   Walk-in counseling [facility] - [phone]   Crisis Residence [facility] - [phone]   Urgent Care Adult Mental Health:   --Drop-in, 24/7 crisis line, and [place] Mobile Team [[phone]]    CRISIS TEXT LINE: Text [phone] from anywhere,  anytime, any crisis 24/7;    OR SEE [email or web address]     Poison Control Center - [phone]    CHILD: [facility] needs assessment team - [phone]     [facility] - [phone]; or [facility] - [phone]    If you have a medical emergency please call 911or go to the nearest ER.                    _____________________________________________    Again thank you for choosing PSYCHIATRY CLINIC and please let us know how we can best partner with you to improve you and your family's health.  You may be receiving a survey in the mail regarding this appointment. We would love to have your feedback, both positive and negative, so please fill out the survey and return it using the provided envelope. The survey is done by an external company, so your answers are anonymous.

## 2019-03-28 ENCOUNTER — OFFICE VISIT (OUTPATIENT)
Dept: PSYCHIATRY | Facility: CLINIC | Age: 74
End: 2019-03-28
Attending: PSYCHOLOGIST
Payer: COMMERCIAL

## 2019-03-28 DIAGNOSIS — F10.20 ALCOHOL DEPENDENCE, CONTINUOUS (H): Primary | ICD-10-CM

## 2019-04-01 NOTE — PROGRESS NOTES
Relapse Prevention Group for Substance Use Disorders                                Time of Service: 4:15 - 5:00pm (patient presented late to group)  Length of Appointment: 55 mins  Care Providers: Susi Gilbert, PhD,  and Elias Ackerman MD (psychiatry resident)  Total number of patients present: 3    DSM-5 Diagnosis:   Alcohol Use Disorder, severe, in early remission   Major Depressive Disorder, recurrent (per chart review)  Generalized Anxiety Disorder (per chart review)    GROUP CONTENT:   oriented patients to structure of group and reviewed rules.  led patients in discussion of relapse prevention strategies for addiction with a focus on identifying a sober community.      PATIENT ENGAGEMENT: Active and engaged.     MENTAL STATUS EXAM:   Appearance: Casually dressed and appropriately groomed   Motor activity: Within normal range  Speech rate: Within normal range  Speech volume: Within normal range  Speech articulation:  Within normal range  Speech coherence:  Within normal range  Speech spontaneity:  Within normal range  Affect (objective appearance):  Euthymic  Thought process: Linear, logical, goal-oriented  Suicide/ violence risk: Not individually assessed given group context, but no signs of risk were observed    PLAN:   Continue to engage in Relapse Prevention Group weekly on Thursdays at 4pm

## 2019-04-08 ENCOUNTER — TELEPHONE (OUTPATIENT)
Dept: PSYCHIATRY | Facility: CLINIC | Age: 74
End: 2019-04-08

## 2019-04-09 ENCOUNTER — TELEPHONE (OUTPATIENT)
Dept: PSYCHIATRY | Facility: CLINIC | Age: 74
End: 2019-04-09

## 2019-04-09 NOTE — TELEPHONE ENCOUNTER
Yes, 50 mg daily by mouth is good and he can take what he has at home.  Please tell him to take it every day.    Thank you.  Leatha

## 2019-04-09 NOTE — TELEPHONE ENCOUNTER
" RE: Vivitrol injection   Received: Yesterday   Message Contents   Leatha Saldana MD Koon, Teresa, LPN; Venus Vanegas RN Cc: Selam Bee MD             I saw your message that Gio wants to go without vivitrol this month.  Does he want oral naltrexone?  My concern is this is sudden in the sense that he never indicated at all that he would just try a month with no naltrexone at all.     Venus or Deborah are you able to check in with him and see if he wants the oral form at least? If you feel I need to talk to him let me know but I won't be able to check my messages until [facility]     Thanks much.   Leatha      Previous Messages      ----- Message -----   From: Deborah Blanchard LPN   Sent: 4/4/2019   3:33 PM   To: Leatha Saldana MD   Subject: Vivitrol injection                               Hi,     Gio cancelled his scheduled Vivitrol injection today via [employer]. I reached out to him via [employer] to reschedule. I will keep you updated     Thanks!     Deborah         Writer placed a call to patient at [phone]. Patient reports wanting to hold off on the Vivitrol injection for one month and be reassessed by the provider during the next office visit. Patient reports staying sober but did mention experiencing craving. Went on by stating the cravings are tolerable and not alarming. He also shared some improvements in fatigue, sleep and ringing in the ears since stopping the injection. \" I was just very depressed and fatigue when I was getting the injection and I would like to try to go without it for a month.\" He also reports improvement in mood. Denies safety concerns at this time. Willing to try naltrexone. Reports previously Naltrexone 50 mg- take 1 tab daily was prescribed by PCP for cravings. He has never took the medication but had a bottle at home last refilled on 6/15/18 with 11 additional refills. Patient willing to start the medication if approved by provider. Writer " informed patient to hold off on starting the medication until heard back from provider. Patient agreed with the plan.     Writer to provider

## 2019-04-10 NOTE — TELEPHONE ENCOUNTER
Writer placed a call to patient at [phone]. Relayed providers approval to start Naltrexone 50 mg daily from bottle at home. Patient agreed with the plan. Writer informed patient to call back in a week with for an update.     No further action needed by this writer.

## 2019-04-17 NOTE — TELEPHONE ENCOUNTER
Writer placed a call to patient at [phone] to follow up one week after starting Naltrexone. Patient has not started the medication as prescribed and was hoping to stay off med for 3 weeks. Reports the ringing in the ears and sleep have improved since discontinuing Vivitrol injection. Also reports experiencing difficulty concentrating when completing tasks. Stated being sober since and continues to participate in relapse prevention group. Denies safety concerns at this time. Patient agreed to start Naltrexone today and will update provider as the next office visit in a week. Writer agreed with the plan.    Routed to provider as [place]

## 2019-04-17 NOTE — TELEPHONE ENCOUNTER
Medication Question (Naltrexone )      Leatha Saldana MD  You 6 minutes ago (2:30 PM)      Thank you    Routing comment      No further action needed by this writer

## 2019-04-24 ENCOUNTER — OFFICE VISIT (OUTPATIENT)
Dept: PSYCHIATRY | Facility: CLINIC | Age: 74
End: 2019-04-24
Attending: PSYCHIATRY & NEUROLOGY
Payer: COMMERCIAL

## 2019-04-24 VITALS
SYSTOLIC BLOOD PRESSURE: 128 MMHG | WEIGHT: 176 LBS | BODY MASS INDEX: 24.55 KG/M2 | HEART RATE: 80 BPM | DIASTOLIC BLOOD PRESSURE: 78 MMHG

## 2019-04-24 DIAGNOSIS — F33.1 MODERATE EPISODE OF RECURRENT MAJOR DEPRESSIVE DISORDER (H): Primary | ICD-10-CM

## 2019-04-24 DIAGNOSIS — F10.21 ALCOHOL USE DISORDER, SEVERE, IN EARLY REMISSION (H): ICD-10-CM

## 2019-04-24 DIAGNOSIS — F32.1 CURRENT MODERATE EPISODE OF MAJOR DEPRESSIVE DISORDER, UNSPECIFIED WHETHER RECURRENT (H): ICD-10-CM

## 2019-04-24 PROCEDURE — G0463 HOSPITAL OUTPT CLINIC VISIT: HCPCS | Mod: ZF

## 2019-04-24 RX ORDER — ARIPIPRAZOLE 5 MG/1
TABLET ORAL
Qty: 45 TABLET | Refills: 0 | Status: SHIPPED | OUTPATIENT
Start: 2019-04-24 | End: 2019-05-22

## 2019-04-24 RX ORDER — ESCITALOPRAM OXALATE 5 MG/1
5 TABLET ORAL DAILY
Qty: 50 TABLET | Refills: 0 | Status: SHIPPED | OUTPATIENT
Start: 2019-04-24 | End: 2019-05-22

## 2019-04-24 RX ORDER — GABAPENTIN 100 MG/1
100 CAPSULE ORAL 3 TIMES DAILY
Qty: 90 CAPSULE | Refills: 0 | Status: SHIPPED | OUTPATIENT
Start: 2019-04-24 | End: 2019-05-22

## 2019-04-24 ASSESSMENT — PAIN SCALES - GENERAL: PAINLEVEL: NO PAIN (0)

## 2019-04-24 NOTE — PROGRESS NOTES
"  ----------------------------------------------------------------------------------------------------------  [facility], [facility]   Addiction Medicine                        IDENTIFICATION   Laurent \"Gio\" Blaze Choi is a 73 year old male who was referred by self for evaluation of alcohol use disorder and desire to continue Vivitrol.  History was provided by patient who was a good historian. He is here for a follow up visit with his initial visit on 10/17/18 and last visit was 3/27/19.         CHIEF COMPLAINT      \"I stopped Vivitrol but I am still really tired and I am having more cravings\".       Brief Summary     The patient has a past psychiatric/substance use history of alcohol use disorder, Major Depressive Disorder and Generalized Anxiety Disorder.    Gio has been in detox over 40 times and treatment about 5 times.  His first treatment was in 1980. He gave up daily drinking in late 1990's, then began to binge drink.  His pattern recently  has been drinking 1-2 weeks and not eating most of that time, and getting very sick. He then will stop drinking, will be sober for 1-2 weeks.  Alcohol has caused withdrawal and tolerance, drinking 6-10 beers and 1 pint a day when on a binge. Alcohol has affected relationships, has affected health with getting sick, not eating and getting depressed.     Interval History    In the interval I was notified by nursing staff that Gio decided to not get his Vivitrol injection the week after he was seen.   I asked the nurse to call him and offer oral naltrexone, and he said he would try this, so this was ordered.      He only took the naltrexone orally for one day and had increased ringing in his ears so he stopped it. He does notice more cravings but has been able to deal with these but did drink on one night.   He drank 4 beers on a Saturday night, with Saturdays always being hard for him.  He only bought a 6 pack but gave 2 " "away.  He realizes this is risky.  He says \"this sort of thing might happen now and then\".  However, he has been going to 2 AA meetings per week and knows this is good for him.  He got his 1 month medallion the other day.  He has not gone to relapse prevention group for 2 weeks, one week because it was the day he was supposed to get the injection and he didn't want to be talked into it.  The other he says it was cancelled, but he intends on going tomorrow.      His mood and sleep pattern is not significantly changed. He denies suicidal thoughts, he just wants to feel more rested and have more motivation.  He feels his focus/concentration is bad, he is distracted easy.   He has not been exercising as much lately but did walk yesterday. He endorses anhedonia.   His sleep is maybe a little better at night.  Has been able to sleep 4-6 hours.  He is taking a nap for an hour for most days because he is exhausted but not taking longer naps like he was before. .  He is taking melatonin at night which also seems to help.   He still feels fatigued all the time and realizes now it wasn't the Vivitrol making him so tired.  However he still does not want to start it again due to the ringing of the ears that is made worse for 1-2 weeks.       He has been able to do some artwork and did feel the increased dose of the Abilify gave him an energy boost for a week but that wore off.       Gio shared some pictures of his digital artwork today that he had on his phone.            Treatment History:    First treatment in 1980.  Last treatment at [facility] finished  November 2018.     Substance Use Pharmacotherapies:   Tried antabuse in late 1990's and he would stop taking and \"drink around it\".       RECENT SYMPTOMS   [PSYCH ROS]     PANIC ATTACK:  denies   ANXIETY:  none currently   DEPRESSION:  Still has low mood.  Sleeping improved a little but still has decreased energy and motivation.  Denies SI/SIB.   DYSREGULATION:  " \none;    PSYCHOSIS:  none;  DENIES- none  MARILEE/HYPOMANIA:  none;  DENIES- none  TRAUMA RELATED:  Doing better in the interval.   EATING DISORDER:  none  COMPULSIVE:  none  Grief:  improving              ALLERGY                                Patient has no known allergies.  MEDICATIONS                               Current Outpatient Medications   Medication Sig Dispense Refill     ACETAMINOPHEN PO Take 325 mg by mouth       amLODIPine-benazepril (LOTREL) 10-20 MG per capsule Take 1 capsule by mouth daily       ARIPiprazole (ABILIFY) 5 MG tablet Take one 5 mg tab daily 30 tablet 1     ASPIRIN PO Take 81 mg by mouth daily       calcium-vitamin D 500-125 MG-UNIT TABS Take 1 tablet by mouth 2 times daily       DIPHENHYDRAMINE HCL PO Take 25 mg by mouth daily as needed       FLUoxetine (PROZAC) 40 MG capsule Take 2 capsules (80 mg) by mouth daily 60 capsule 3     LOSARTAN POTASSIUM PO Take 25 mg by mouth       melatonin 3 MG tablet Take 1-3 tabs before bed PRN 90 tablet 1     naltrexone (VIVITROL) 380 MG SUSR Inject 380 mg into the muscle every 30 days 380 mg 11     nitroGLYcerin (NITRODUR) 0.4 MG/HR 24 hr patch Place 1 patch onto the skin daily       NITROGLYCERIN SL Take 0.4 mg by mouth       OMEPRAZOLE PO Take 20 mg by mouth every morning       ROSUVASTATIN CALCIUM PO Take 40 mg by mouth daily       TAMSULOSIN HCL PO Take 0.4 mg by mouth daily         VITALS   /78   Pulse 80   Wt 79.8 kg (176 lb)   BMI 24.55 kg/m        MENTAL STATUS EXAM                                                           Orientation: full, x3  Alertness: alert   Appearance: well groomed and casually groomed, smiles and laughs at times   Behavior/Demeanor: cooperative, pleasant and calm, with good  eye contact   Speech: regular rate and rhythm  Language: intact  Psychomotor: normal or unremarkable  Mood: depressed  Affect: full range and appropriate; was congruent to mood; was congruent to content  Thought Process/Associations:  unremarkable  Thought Content:  Denies suicidal and violent ideation, delusions and preoccupations  Perception:    Denies auditory hallucinations and visual hallucinations  Insight: good  Judgment: good  Cognition: does  appear grossly intact; formal cognitive testing was not done  Gait: Normal   MSK: extremities normal, no peripheral edema    LABS and DATA   Creatinine, BUN, GFR, LFT's all normal on 8/28/18 labs from [facility]      PHQ9 TODAY = Not done  PHQ-9 SCORE 1/9/2019 3/6/2019 3/27/2019   PHQ-9 Total Score 9 14 15          RATING SCALES:  PHQ9, see above    SUBSTANCE USE/PSYCHIATRIC DIAGNOSES                                                                                                    Alcohol Use Disorder, severe, in early remission.   Major Depressive Disorder, recurrent, fluctuating in the interval.   Generalized Anxiety Disorder.   R/o PTSD  (partner suicide attempts,his incarcerations)     ASSESSMENT                                           See 'Plan' section for specific dosing info             This patient is a 73 year old male who has alcohol use disorder since 1980 with a handful of years of sobriety since.  He has been in detox more than 40 times and 4-5 treatments.  He stopped taking Vivitrol injections 3 weeks ago and only took oral naltrexone once due to ringing in the ears and he does not want to restart this. He does note increased cravings since stopping and one night of drinking 4 beers.  We discussed other medications to help with cravings and he was open to trying gabapentin. Depression continues and his sleep is improving slightly with using melatonin and limiting his naps and keeping regular sleep and wake times.  He is going to 2  AA meetings per week and usually the relapse prevention group but did not go the past 2 weeks but will go tomorrow. We again discussed that his fatigue is likely due to depression but he should also consider getting a sleep study to assure we are not  missing another sleep problem.  He was told to discuss this with his primary care doctor. We discussed he has been on Prozac for over a decade and he is at the max dose so we recommend switching to another serotonin specific reuptake inhibitor.  He is agreeable and the taper was discussed. We will also increase his Abilify as he feels there is a some benefit.         Further diagnostic clarification is not needed.  There are no medical comorbidities which impact this treatment.    [place] PRESCRIPTION MONITORING PROGRAM [] was checked today:  indicates no controlled subtances reported in previous 12 months.     PLAN                                                                                                       1) PSYCHOTROPIC MEDICATIONS:No changes today   - Discontinue prozac, go down by 20mg every 2 days then stop.    -Start Lexapro when down to 20 mg of prozac, start Lexapro at 5 mg daily x 1 week and then go up to 10 mg daily.      -Increase Abilify to 7.5 mg  daily for depression (take 1.5 of 5 mg tabs)   -Gabapentin 100 mg TID will be started to help with anxiety and alcohol cravings. He can start with taking 1-2 per day for a few days then go to 3 per day. We can adjust upward further at future visits.        -Discontinue naltrexone/vivitrol as patient wants to stop due to side effects.         2) THERAPY:    - Gio attends the Relapse Prevention Group with Susi Gilbert weekly and this is encouraged.      -Attend  AA at 1-2 meetings a week,continue working with sponsor, do assignments from sponsor.    -Spend time with friends and family and do not isolate.  Try to have plans for Saturday nights and be aware of triggers.    -Consider individual therapist in future-will address again at next visit.             3) NEXT DUE:  LABS- consider LFT's                                   4) REFERRALS:    Recommend getting a sleep study--talk to primary care about this to rule out another sleep problem affecting  quality of sleep.     5) RTC: 4 weeks    6) CRISIS NUMBERS:   Provided routinely in AVS.         TREATMENT RISK STATEMENT:  The risks, benefits, alternatives and potential adverse effects have been explained and are understood by the pt. The pt agrees to the treatment plan with the ability to do so. The pt knows to call the clinic for any problems or to access emergency care if needed.  Medical and CD concerns are documented above.  Psychotropic drug interaction check was done, including changes made today, and is discussed above.    ADDICTION FELLOW: Leatha Saldana      Patient was seen and staffed in clinic with Dr. Bee who will sign the note.    Supervisor is Dr. Bee     I saw the patient with the fellow, and participated in key portions of the service, including the mental status examination and developing the plan of care. I reviewed key portions of the history with the fellow. I agree with the findings and plan as documented in this note.    Selam Bee          PSYCHIATRY CLINIC INDIVIDUAL PSYCHOTHERAPY NOTE                                                  [16]   Start time - 1:10     End time - 1:30  Date last reviewed - 12/2018       Date next due - 6/12/19 (or 12 months if Medicare)    Subjective: This supportive psychotherapy session addressed issues related to goals of therapy  and current stressors consisting of  recent relapses, excessive sleep and sleep hygiene, and attending AA.  Patient's reaction: Prepared to execute in the context of mental status appropriate for ambulatory setting.  Progress: will assess at next visit.   Plan: RTC 1 month  Psychotherapy services during this visit included  myself and the patient.   TREATMENT  PLAN          SYMPTOMS; PROBLEMS   MEASURABLE GOALS;    FUNCTIONAL IMPROVEMENT INTERVENTIONS;   GAINS MADE DISCHARGE CRITERIA   Substance Use: alcohol    make a plan to manage 2-3 anxiety-provoking situations, reduce feeling overwhelmed/ improve decision making  skills and recognize delusional thinking 1 recent day of use.  Call sponsor, Continue AA, not watch upsetting things, and do activities he enjoys such as cooking, going to see artwork.    sustained sobriety and reduction in cravings   Depression: depressed mood, low energy, concentration problems and excessive sleep   reduce depressive symptoms, reduce suicidal thoughts, report feeling more positive about self , develop strategies for thought distraction when ruminating and make a plan to manage 2-3 anxiety-provoking situations Awareness of feelings  Recognition that excessive sleep and lack of focus may be signs of depression  Awareness of negative self talk  Reach out to friends to do social activities, such as go to the museum.  Will call a friend if feeling down or doesn't know what to do. Will not take naps, will go to bed and wake up at same time.  marked symptom improvement, symptom resolution and significant improvement in self-reports for 5 consecutive visits

## 2019-04-24 NOTE — PATIENT INSTRUCTIONS
Thank you for coming to the PSYCHIATRY CLINIC.    Lab Testing:  If you had lab testing today and your results are reassuring or normal they will be mailed to you or sent through [employer] within 7 days.   If the lab tests need quick action we will call you with the results.  The phone number we will call with results is # [phone] (home) . If this is not the best number please call our clinic and change the number.    Medication Refills:  If you need any refills please call your pharmacy and they will contact us. Our fax number for refills is [phone]. Please allow three business for refill processing.   If you need to  your refill at a new pharmacy, please contact the new pharmacy directly. The new pharmacy will help you get your medications transferred.     Scheduling:  If you have any concerns about today's visit or wish to schedule another appointment please call our office during normal business hours [phone] (8-5:00 M-F)    Contact Us:  Please call [phone] during business hours (8-5:00 M-F).  If after clinic hours, or on the weekend, please call  [phone].    Financial Assistance [phone]  [employer] Billing [phone]  [facility] Billing [phone]  Medical Records [phone]      MENTAL HEALTH CRISIS NUMBERS:  [facility]:   [facility] - [phone]   Crisis Residence [facility] - [phone]   Walk-In Counseling [facility] [phone]   COPE 24/7 [place] Mobile Team for Adults - [[phone]]; Child - [[phone]]        [facility]:   [facility] - [phone]   Walk-in counseling [facility] - [phone]   Walk-in counseling [facility] - [phone]   Crisis Residence [facility] - [phone]   Urgent Care Adult Mental Health:   --Drop-in, 24/7 crisis line, and [place] Mobile Team [[phone]]    CRISIS TEXT LINE: Text [phone] from anywhere,  anytime, any crisis 24/7;    OR SEE [email or web address]     Poison Control Center - [phone]    CHILD: [facility] needs assessment team - [phone]     [facility] - [phone]; or [employer] [facility] - [phone]    If you have a medical emergency please call 911or go to the nearest ER.                    _____________________________________________    Again thank you for choosing PSYCHIATRY CLINIC and please let us know how we can best partner with you to improve you and your family's health.  You may be receiving a survey in the mail regarding this appointment. We would love to have your feedback, both positive and negative, so please fill out the survey and return it using the provided envelope. The survey is done by an external company, so your answers are anonymous.

## 2019-04-24 NOTE — NURSING NOTE
Chief Complaint   Patient presents with     Recheck Medication     Current moderate episode of major depressive disorder, unspecified whether recurrent

## 2019-04-25 ENCOUNTER — OFFICE VISIT (OUTPATIENT)
Dept: PSYCHIATRY | Facility: CLINIC | Age: 74
End: 2019-04-25
Attending: PSYCHOLOGIST
Payer: COMMERCIAL

## 2019-04-25 DIAGNOSIS — F10.21 ALCOHOL USE DISORDER, SEVERE, IN EARLY REMISSION (H): Primary | ICD-10-CM

## 2019-04-25 DIAGNOSIS — F33.1 MODERATE EPISODE OF RECURRENT MAJOR DEPRESSIVE DISORDER (H): ICD-10-CM

## 2019-04-25 NOTE — PROGRESS NOTES
Relapse Prevention Group for Substance Use Disorders                                Time of Service: 4:00 - 5:00   Length of Appointment: 60 mins  Care Providers: Susi Gilbert, PhD, LP  Total number of patients present: 3    DSM-5 Diagnosis:   Alcohol Use Disorder, severe, in early remission   Major Depressive Disorder, recurrent (per chart review)  Generalized Anxiety Disorder (per chart review)    GROUP CONTENT:   oriented patients to structure of group and reviewed rules.  led patients in discussion of relapse prevention strategies for addiction.      PATIENT ENGAGEMENT: Active and engaged. For HW, Gio agreed to go to [employer] aerobic class tomorrow at 9am.     MENTAL STATUS EXAM:   Appearance: Casually dressed and appropriately groomed   Motor activity: Within normal range  Speech rate: Within normal range  Speech volume: Within normal range  Speech articulation:  Within normal range  Speech coherence:  Within normal range  Speech spontaneity:  Within normal range  Affect (objective appearance):  Euthymic  Thought process: Linear, logical, goal-oriented  Suicide/ violence risk: Not individually assessed given group context, but no signs of risk were observed    PLAN:   Continue to engage in Relapse Prevention Group weekly on Thursdays at 4pm

## 2019-05-02 ENCOUNTER — OFFICE VISIT (OUTPATIENT)
Dept: PSYCHIATRY | Facility: CLINIC | Age: 74
End: 2019-05-02
Attending: PSYCHOLOGIST
Payer: COMMERCIAL

## 2019-05-02 DIAGNOSIS — F33.1 MODERATE EPISODE OF RECURRENT MAJOR DEPRESSIVE DISORDER (H): Primary | ICD-10-CM

## 2019-05-02 NOTE — PROGRESS NOTES
Relapse Prevention Group for Substance Use Disorders                                Time of Service: 4:00 - 5:00   Length of Appointment: 60 mins  Care Providers: Susi Gilbert, PhD, LP  Total number of patients present: 4    DSM-5 Diagnosis:   Alcohol Use Disorder, severe, in early remission   Major Depressive Disorder, recurrent (per chart review)  Generalized Anxiety Disorder (per chart review)    GROUP CONTENT:   oriented patients to structure of group and reviewed rules.  led patients in discussion of relapse prevention strategies for addiction with a focus on increasing social support.     PATIENT ENGAGEMENT: Active and engaged. For HW, Gio agreed to contact his sponsors and re-engage with an AA meeting. He also plans to ask his friend to stop drinking around him. This is the same friend who he drank alcohol with earlier in the month.     MENTAL STATUS EXAM:   Appearance: Casually dressed and appropriately groomed   Motor activity: Within normal range  Speech rate: Within normal range  Speech volume: Within normal range  Speech articulation:  Within normal range  Speech coherence:  Within normal range  Speech spontaneity:  Within normal range  Affect (objective appearance):  Euthymic  Thought process: Linear, logical, goal-oriented  Suicide/ violence risk: Not individually assessed given group context, but no signs of risk were observed    PLAN:   Continue to engage in Relapse Prevention Group weekly on Thursdays at 4pm

## 2019-05-09 ENCOUNTER — OFFICE VISIT (OUTPATIENT)
Dept: PSYCHIATRY | Facility: CLINIC | Age: 74
End: 2019-05-09
Attending: PSYCHOLOGIST
Payer: COMMERCIAL

## 2019-05-09 DIAGNOSIS — F10.21 ALCOHOL USE DISORDER, SEVERE, IN EARLY REMISSION (H): Primary | ICD-10-CM

## 2019-05-13 NOTE — PROGRESS NOTES
"  Relapse Prevention Group for Substance Use Disorders                                Time of Service: 4:00 - 5:00   Length of Appointment: 60 mins  Care Providers: Susi Gilbert, PhD, LP  Total number of patients present: 4    DSM-5 Diagnosis:   Alcohol Use Disorder, severe, in early remission   Major Depressive Disorder, recurrent (per chart review)  Generalized Anxiety Disorder (per chart review)    GROUP CONTENT:   oriented patients to structure of group and reviewed rules.  led patients in discussion of relapse prevention strategies for addiction with a focus on managing interpersonal conflict.      PATIENT ENGAGEMENT: Active and engaged. Successfully spoke with his friend about not bringing alcohol to his apartment, and the conversation went well. For HW, Gio plans to spend time in his garden, exercise, and \"be good to myself\" by eating well.     MENTAL STATUS EXAM:   Appearance: Casually dressed and appropriately groomed   Motor activity: Within normal range  Speech rate: Within normal range  Speech volume: Within normal range  Speech articulation:  Within normal range  Speech coherence:  Within normal range  Speech spontaneity:  Within normal range  Affect (objective appearance):  Euthymic  Thought process: Linear, logical, goal-oriented  Suicide/ violence risk: Not individually assessed given group context, but no signs of risk were observed    PLAN:   Continue to engage in Relapse Prevention Group weekly on Thursdays at 4pm  "

## 2019-05-21 ENCOUNTER — TELEPHONE (OUTPATIENT)
Dept: PSYCHIATRY | Facility: CLINIC | Age: 74
End: 2019-05-21

## 2019-05-21 NOTE — TELEPHONE ENCOUNTER
Writer received incoming call from Tameka from [facility] wanting to confirm a diagnosis code of Abilify 10 mg tab. Writer informed her patient is prescribed Abilify 5 mg tab- take 1.5 tab daily. Tameka verbalized understanding and informed this writer pharmacy has been running 5 mg tab which is going though.

## 2019-05-22 ENCOUNTER — ALLIED HEALTH/NURSE VISIT (OUTPATIENT)
Dept: PSYCHIATRY | Facility: CLINIC | Age: 74
End: 2019-05-22
Attending: PSYCHIATRY & NEUROLOGY
Payer: COMMERCIAL

## 2019-05-22 ENCOUNTER — TELEPHONE (OUTPATIENT)
Dept: PSYCHIATRY | Facility: CLINIC | Age: 74
End: 2019-05-22

## 2019-05-22 VITALS
BODY MASS INDEX: 24.99 KG/M2 | SYSTOLIC BLOOD PRESSURE: 104 MMHG | WEIGHT: 179.2 LBS | HEART RATE: 87 BPM | DIASTOLIC BLOOD PRESSURE: 69 MMHG

## 2019-05-22 DIAGNOSIS — F32.1 CURRENT MODERATE EPISODE OF MAJOR DEPRESSIVE DISORDER, UNSPECIFIED WHETHER RECURRENT (H): Primary | ICD-10-CM

## 2019-05-22 DIAGNOSIS — F10.21 ALCOHOL USE DISORDER, SEVERE, IN EARLY REMISSION (H): ICD-10-CM

## 2019-05-22 DIAGNOSIS — F10.21 ALCOHOL USE DISORDER, SEVERE, IN EARLY REMISSION (H): Primary | ICD-10-CM

## 2019-05-22 DIAGNOSIS — F32.1 CURRENT MODERATE EPISODE OF MAJOR DEPRESSIVE DISORDER, UNSPECIFIED WHETHER RECURRENT (H): ICD-10-CM

## 2019-05-22 DIAGNOSIS — F10.21 ACUTE ALCOHOLIC INTOXICATION IN ALCOHOLISM, IN REMISSION (H): Primary | ICD-10-CM

## 2019-05-22 DIAGNOSIS — F33.1 MODERATE EPISODE OF RECURRENT MAJOR DEPRESSIVE DISORDER (H): ICD-10-CM

## 2019-05-22 PROCEDURE — 25000128 H RX IP 250 OP 636: Mod: ZF | Performed by: PSYCHIATRY & NEUROLOGY

## 2019-05-22 PROCEDURE — G0463 HOSPITAL OUTPT CLINIC VISIT: HCPCS | Mod: ZF,25

## 2019-05-22 PROCEDURE — 96372 THER/PROPH/DIAG INJ SC/IM: CPT | Mod: ZF

## 2019-05-22 RX ORDER — GABAPENTIN 300 MG/1
CAPSULE ORAL
Qty: 60 CAPSULE | Refills: 1 | Status: SHIPPED | OUTPATIENT
Start: 2019-05-22 | End: 2019-07-25

## 2019-05-22 RX ORDER — ARIPIPRAZOLE 10 MG/1
TABLET ORAL
Qty: 30 TABLET | Refills: 1 | Status: SHIPPED | OUTPATIENT
Start: 2019-05-22 | End: 2019-05-22

## 2019-05-22 RX ORDER — ESCITALOPRAM OXALATE 10 MG/1
TABLET ORAL
Qty: 30 TABLET | Refills: 1 | Status: SHIPPED | OUTPATIENT
Start: 2019-05-22 | End: 2019-06-19 | Stop reason: DRUGHIGH

## 2019-05-22 RX ORDER — GABAPENTIN 100 MG/1
100 CAPSULE ORAL 3 TIMES DAILY
Qty: 90 CAPSULE | Refills: 0 | Status: CANCELLED | OUTPATIENT
Start: 2019-05-22

## 2019-05-22 RX ORDER — ARIPIPRAZOLE 10 MG/1
TABLET ORAL
Qty: 30 TABLET | Refills: 1 | Status: SHIPPED | OUTPATIENT
Start: 2019-05-22 | End: 2019-06-19

## 2019-05-22 RX ORDER — ARIPIPRAZOLE 10 MG/1
10 TABLET ORAL DAILY
Qty: 30 TABLET | Refills: 1 | Status: SHIPPED | OUTPATIENT
Start: 2019-05-22 | End: 2019-06-19

## 2019-05-22 RX ADMIN — NALTREXONE 380 MG: KIT at 15:16

## 2019-05-22 ASSESSMENT — PATIENT HEALTH QUESTIONNAIRE - PHQ9: SUM OF ALL RESPONSES TO PHQ QUESTIONS 1-9: 9

## 2019-05-22 ASSESSMENT — PAIN SCALES - GENERAL: PAINLEVEL: NO PAIN (0)

## 2019-05-22 NOTE — PROGRESS NOTES
"  ----------------------------------------------------------------------------------------------------------  [facility], [place]   Psychiatry Clinic   Addiction Medicine                        IDENTIFICATION   Laurent \"Gio\" Blaze Choi is a 73 year old male who was referred by self for evaluation of alcohol use disorder and desire to continue Vivitrol.  History was provided by patient who was a good historian. He is here for a follow up visit with his initial visit on 10/17/18 and last visit was 4/24/19.        CHIEF COMPLAINT      \"I am feeling better and less depressed\"     Brief Summary     The patient has a past psychiatric/substance use history of alcohol use disorder, Major Depressive Disorder and Generalized Anxiety Disorder.    Gio has been in detox over 40 times and treatment about 5 times.  His first treatment was in 1980. He gave up daily drinking in late 1990's, then began to binge drink.  His pattern recently  has been drinking 1-2 weeks and not eating most of that time, and getting very sick. He then will stop drinking, will be sober for 1-2 weeks.  Alcohol has caused withdrawal and tolerance, drinking 6-10 beers and 1 pint a day when on a binge. Alcohol has affected relationships, has affected health with getting sick, not eating and getting depressed.     Interval History    At the last visit we told the patient to taper off prozac and to start lexapro.  We also increased abilify to 7.5 mg daily and started gabapentin.      Gio feels better, he has better focus, concentration and feels more optimistic.  He planted his garden and has had moments of being productive, with mental clarity, able to start projects and stay on them.  He did skip AA this past week as well as his relapse prevention group as he feels he needed a break.  He feels he might need a new AA sponsor who is willing to work with him where he is at.   He does not fully believe in AA, we discussed smart " "recovery or getting a different sponsor.  His sleep is better.  He is sleeping through the night more, if he does wake up can more often go back to sleep.  He is napping for an hour each day and has not hurt his sleep at night.  He was told to make sure he does not sleep more than an hour, ideally not more than 30 minutes.   He feels the medication changes have helped.  He did feels a little down the first week of decreasing the prozac and starting the lexapro but since the second week has felt better.  He does have occasional bad days where he is not motivated or productive and feels a little lost as to what to do, but those days are fewer and not as bad as before.  He denies any SI.  His abilify with taking 1.5 tabs, increased in cost a lot.  We discussed going up to 10 mg and just taking one tab.       He has drank alcohol on about 3 occasions in the past month, about a 6 pack each time.  He was able to stop.  These episodes were  by at least a week.  He knows his cravings are greater and he has been almost out the door a number of times but was able to stop.   Reason has stopped him, he knows where it will go.  He is willing to try Vivitrol again as he thinks he can better tolerate any side effects and he knows it helps his cravings.  He really does not want to drink.  He will keep going to his meetings and the relapse prevention group.           Treatment History:    First treatment in 1980.  Last treatment at [facility] finished  November 2018.     Substance Use Pharmacotherapies:   Tried antabuse in late 1990's and he would stop taking and \"drink around it\".       RECENT SYMPTOMS   [PSYCH ROS]     PANIC ATTACK:  denies   ANXIETY:  none currently   DEPRESSION:  Improving mood. Sleep, motivation and energy are improving.   Denies SI/SIB.   DYSREGULATION:  \none;    PSYCHOSIS:  none;  DENIES- none  MARILEE/HYPOMANIA:  none;  DENIES- none  TRAUMA RELATED:  Doing better in the interval.   EATING " DISORDER:  none  COMPULSIVE:  none  Grief:  improving              ALLERGY                                Patient has no known allergies.  MEDICATIONS                               Current Outpatient Medications   Medication Sig Dispense Refill     ACETAMINOPHEN PO Take 325 mg by mouth       amLODIPine-benazepril (LOTREL) 10-20 MG per capsule Take 1 capsule by mouth daily       ARIPiprazole (ABILIFY) 5 MG tablet Take one and a half tabs daily 45 tablet 0     ASPIRIN PO Take 81 mg by mouth daily       calcium-vitamin D 500-125 MG-UNIT TABS Take 1 tablet by mouth 2 times daily       DIPHENHYDRAMINE HCL PO Take 25 mg by mouth daily as needed       escitalopram (LEXAPRO) 5 MG tablet Take 1 tablet (5 mg) by mouth daily When prozac is down to 20 mg per day, Start 1 tablet daily for 7 days then increase to 2 tablets daily. 50 tablet 0     FLUoxetine (PROZAC) 20 MG capsule Take 1 capsule (20 mg) by mouth daily Take 3 capsules (60 mg) x 2 days, then 2 capsules x 2 days then 1 capsule for 2 days then discontinue. 12 capsule 0     gabapentin (NEURONTIN) 100 MG capsule Take 1 capsule (100 mg) by mouth 3 times daily Take 1-2 capsules for the first 2 days then can increase to 3 capsules daily. 90 capsule 0     LOSARTAN POTASSIUM PO Take 25 mg by mouth       melatonin 3 MG tablet Take 1-3 tabs before bed PRN 90 tablet 1     nitroGLYcerin (NITRODUR) 0.4 MG/HR 24 hr patch Place 1 patch onto the skin daily       NITROGLYCERIN SL Take 0.4 mg by mouth       OMEPRAZOLE PO Take 20 mg by mouth every morning       ROSUVASTATIN CALCIUM PO Take 40 mg by mouth daily       TAMSULOSIN HCL PO Take 0.4 mg by mouth daily         VITALS   /69   Pulse 87   Wt 81.3 kg (179 lb 3.2 oz)   BMI 24.99 kg/m        MENTAL STATUS EXAM                                                           Orientation: full, x3  Alertness: alert   Appearance: well groomed and casually groomed, smiles and laughs at times   Behavior/Demeanor: cooperative, pleasant  and calm, with good  eye contact   Speech: regular rate and rhythm  Language: intact  Psychomotor: normal or unremarkable  Mood: depressed  Affect: full range and appropriate; was congruent to mood; was congruent to content  Thought Process/Associations: unremarkable  Thought Content:  Denies suicidal and violent ideation, delusions and preoccupations  Perception:    Denies auditory hallucinations and visual hallucinations  Insight: good  Judgment: good  Cognition: does  appear grossly intact; formal cognitive testing was not done  Gait: Normal   MSK: extremities normal, no peripheral edema    LABS and DATA   Creatinine, BUN, GFR, LFT's all normal on 8/28/18 labs from [facility]      PHQ9 TODAY = 9, improved from previous  PHQ-9 SCORE 1/9/2019 3/6/2019 3/27/2019   PHQ-9 Total Score 9 14 15          RATING SCALES:  PHQ9, see above    SUBSTANCE USE/PSYCHIATRIC DIAGNOSES                                                                                                    Alcohol Use Disorder, severe, in early remission with some brief episodes of drinking.   Major Depressive Disorder, recurrent, improving.   Generalized Anxiety Disorder, improving.   R/o PTSD  (partner suicide attempts,his incarcerations)     ASSESSMENT                                           See 'Plan' section for specific dosing info             This patient is a 73 year old male who has alcohol use disorder since 1980 with a handful of years of sobriety since.  He has been in detox more than 40 times and 4-5 treatments. Vivitrol was used previously with good response regarding cravings but Gio thought it was making his depression worse.  Since stopping the Vivitrol he realizes it may not have been affecting his mood as he thought so he would like to try again as he has been having strong cravings and has drank a few times in the interval.  He has responded well to medication changes made at the last visit where we tapered off his prozac and started  lexapro, started low dose gabapentin which he took 300 mg at bedtime, and we increased his Abilify to 7.5 mg.  His cost of Abilify went up significantly so we will increase to 10 mg so he can take one pill.  He feels less depressed, is sleeping better, has more mental clarity and feels more productive, but is still not exactly where he wants to be.  He still has some bad days but those seem to be improving.  We will continue with the plan below and see how he is affected by the Vivitrol this month.        Further diagnostic clarification is not needed.  There are no medical comorbidities which impact this treatment.    [place] PRESCRIPTION MONITORING PROGRAM [] was checked today:  indicates no controlled subtances reported in previous 12 months.     PLAN                                                                                                       1) PSYCHOTROPIC MEDICATIONS:No changes today   -Prozac was discontinued at last visit.    -Continue Lexapro 10 mg daily.      -Increase Abilify to 10 mg daily.     -Increase gabapentin to 300 mg, up to TID PRN, he has been using it at bedtime primarily.        -Resume vivitirol injection due to improved mood, energy and we will see how he responds with any side effects but this has helped cravings significantly in the past.        2) THERAPY:    - Gio attends the Relapse Prevention Group with Susi Gilbert weekly and this is encouraged.      -Attend  AA at 1-2 meetings a week,continue working with sponsor, do assignments from sponsor.    -Spend time with friends and family and do not isolate.  Try to have plans for Saturday nights and be aware of triggers.    -Consider individual therapist in future, does not want to do right now.             3) NEXT DUE:  LABS- consider LFT's                                   4) REFERRALS:    Recommend getting a sleep study--talk to primary care about this to rule out another sleep problem affecting quality of sleep.     5) RTC: 4  weeks    6) CRISIS NUMBERS:   Provided routinely in AVS.         TREATMENT RISK STATEMENT:  The risks, benefits, alternatives and potential adverse effects have been explained and are understood by the pt. The pt agrees to the treatment plan with the ability to do so. The pt knows to call the clinic for any problems or to access emergency care if needed.  Medical and CD concerns are documented above.  Psychotropic drug interaction check was done, including changes made today, and is discussed above.    ADDICTION FELLOW: Leatha Saldana      Patient was seen and staffed in clinic with Dr. Bee who will sign the note.    Supervisor is Dr. Bee     I saw the patient with the fellow, and participated in key portions of the service, including the mental status examination and developing the plan of care. I reviewed key portions of the history with the fellow. I agree with the findings and plan as documented in this note.    Selam Bee            PSYCHIATRY CLINIC INDIVIDUAL PSYCHOTHERAPY NOTE                                                  [16]   Start time - 1:40   End time - 1:58  Date last reviewed - 12/2018       Date next due - 6/12/19 (or 12 months if Medicare)    Subjective: This supportive psychotherapy session addressed issues related to goals of therapy  and current stressors consisting of  recent relapses, excessive sleep and sleep hygiene, and attending AA.  Patient's reaction: Prepared to execute in the context of mental status appropriate for ambulatory setting.  Progress: will assess at next visit.   Plan: RTC 1 month  Psychotherapy services during this visit included  myself and the patient.   TREATMENT  PLAN          SYMPTOMS; PROBLEMS   MEASURABLE GOALS;    FUNCTIONAL IMPROVEMENT INTERVENTIONS;   GAINS MADE DISCHARGE CRITERIA   Substance Use: alcohol    make a plan to manage 2-3 anxiety-provoking situations, reduce feeling overwhelmed/ improve decision making skills and recognize delusional  thinking 3 episodes of use. Will get Vivitrol again.  Call sponsor, Continue AA, not watch upsetting things, and do activities he enjoys such as cooking, going to see artwork.    sustained sobriety and reduction in cravings   Depression: depressed mood, low energy, concentration problems and excessive sleep   reduce depressive symptoms, reduce suicidal thoughts, report feeling more positive about self , develop strategies for thought distraction when ruminating and make a plan to manage 2-3 anxiety-provoking situations Awareness of feelings  Recognition that excessive sleep and lack of focus may be signs of depression.  Will monitor mood this month with Vivitrol.   Awareness of negative self talk  Reach out to friends to do social activities, such as go to the museum.  Will call a friend if feeling down or doesn't know what to do. Will not take naps, will go to bed and wake up at same time.  marked symptom improvement, symptom resolution and significant improvement in self-reports for 5 consecutive visits

## 2019-05-22 NOTE — TELEPHONE ENCOUNTER
Will reach out to finical specialist to confirm if Vivitrol 380 mg IM every 28 days is covered to be administered in clinic

## 2019-05-22 NOTE — NURSING NOTE
"Laurent Choi,  1945, presented to the clinic today at the request of Dr. Saldana, ordering provider for long-acting injectable Vivitrol 380 mg.    OBSERVATIONS:  1.  Appearance: Casually dressed in clean jeans, tshirt and light weight jacket. Writer asked pt when was the last time he had anything to drink and pt stated \"I had a six pack last Friday (6 days ago). Pt said he will contact clinic through My Chart for any questions or concerns before next clinic appointment. Writer agreed.  2.  Mood: Fair - Pt stated \"I'm glad I get to start these shots again. I can't afford the pills and I can't do this on my own without some help.\" Writer agreed.  3.  Affect: Flat  4.  Attitude: Fair  5.  Cooperation: Full  6.  Side Effects: Pt instructed to use My Chart or call clinic for any concerns or questions. Patient agreed.  7.  Education: Ice at injection site for pain.  8. Next appointment: 2019 1330 Dr. Saldana injection after at 1400    The service provided today was rendered under the supervising provider of the day, Dr. Guidry, who was available for consultation as needed.    "

## 2019-05-22 NOTE — TELEPHONE ENCOUNTER
[facility] Call Center    Phone Message    May a detailed message be left on voicemail: yes    Reason for Call: Other: Pharmacy called stating the prescription sent over for aripiprazole will not be covered by ins, and wondering if the prescription can be sent over in a different dosage. [facility]      Action Taken: Other: Venus Vanegas

## 2019-05-22 NOTE — TELEPHONE ENCOUNTER
Medication Question (Abilify )     [facility]  You 26 minutes ago (2:36 PM)      We changed his Abilify dose to 10 mg today.  He was taking 1.5 mg of 5 mg tabs before for a total of 7.5 mg.      Routing comment      Writer placed a call to patient at [phone] and spoke with pharmacist Linsey and confirmed patient is taking Abilify 10 mg daily. Linsey requested this writer to submit a new order. Writer agreed with the plan.     - Abilify 10 mg tab- take 1 tab daily #30 with 1 refill   - Med tab changed to reflect this     No further action needed by this writer

## 2019-05-22 NOTE — TELEPHONE ENCOUNTER
Received incoming phone call from Rula with [facility] [place], [place] - [address] [place] [address]. She was calling to clarify the order as the pt's insurance will only cover 1 tablet daily. Writer reconfirmed with the pharmacy that the pt is to be taking Abilify 10mg daily. The order was corrected to reflect this, however, it was inadvertently ordered as local print, and so the pharmacy has not received the correct order. Writer requested pharmacy cancel out the order for Abilify 10mg tablets with instructions to take 1.5 tabs daily.     Writer resubmitted correct order electronically to pharmacy.

## 2019-05-22 NOTE — TELEPHONE ENCOUNTER
Medication Question (Abilify )     [facility]  You 45 minutes ago (2:36 PM)      We changed his Abilify dose to 10 mg today.  He was taking 1.5 mg of 5 mg tabs before for a total of 7.5 mg.      Routing comment      Pharmacy notified

## 2019-05-23 ENCOUNTER — OFFICE VISIT (OUTPATIENT)
Dept: PSYCHIATRY | Facility: CLINIC | Age: 74
End: 2019-05-23
Attending: PSYCHOLOGIST
Payer: COMMERCIAL

## 2019-05-23 DIAGNOSIS — F10.21 ALCOHOL USE DISORDER, SEVERE, IN EARLY REMISSION (H): Primary | ICD-10-CM

## 2019-05-24 NOTE — PROGRESS NOTES
Relapse Prevention Group for Substance Use Disorders                                Time of Service: 4:00 - 5:00   Length of Appointment: 60 mins  Care Providers: Susi Gilbert, PhD, LP  Total number of patients present: 4    DSM-5 Diagnosis:   Alcohol Use Disorder, severe, in early remission   Major Depressive Disorder, recurrent (per chart review)  Generalized Anxiety Disorder (per chart review)    GROUP CONTENT:   oriented patients to structure of group and reviewed rules.  led patients in discussion of relapse prevention strategies for addiction with a focus on relationship between mood and alcohol use.      PATIENT ENGAGEMENT: Active and engaged. Gio drank 4 beer on Friday, which he stated is a typical pattern ever 2ish weeks when experiencing low mood. For HW, Gio plans to attend recovery yoga tomorrow.     MENTAL STATUS EXAM:   Appearance: Casually dressed and appropriately groomed   Motor activity: Within normal range  Speech rate: Within normal range  Speech volume: Within normal range  Speech articulation:  Within normal range  Speech coherence:  Within normal range  Speech spontaneity:  Within normal range  Affect (objective appearance):  Euthymic  Thought process: Linear, logical, goal-oriented  Suicide/ violence risk: Not individually assessed given group context, but no signs of risk were observed    PLAN:   Continue to engage in Relapse Prevention Group weekly on Thursdays at 4pm

## 2019-06-06 ENCOUNTER — OFFICE VISIT (OUTPATIENT)
Dept: PSYCHIATRY | Facility: CLINIC | Age: 74
End: 2019-06-06
Attending: PSYCHOLOGIST
Payer: COMMERCIAL

## 2019-06-06 DIAGNOSIS — F10.21 ALCOHOL USE DISORDER, SEVERE, IN EARLY REMISSION (H): Primary | ICD-10-CM

## 2019-06-19 ENCOUNTER — ALLIED HEALTH/NURSE VISIT (OUTPATIENT)
Dept: PSYCHIATRY | Facility: CLINIC | Age: 74
End: 2019-06-19
Attending: PSYCHIATRY & NEUROLOGY
Payer: COMMERCIAL

## 2019-06-19 ENCOUNTER — TELEPHONE (OUTPATIENT)
Dept: PSYCHIATRY | Facility: CLINIC | Age: 74
End: 2019-06-19

## 2019-06-19 VITALS
DIASTOLIC BLOOD PRESSURE: 75 MMHG | SYSTOLIC BLOOD PRESSURE: 116 MMHG | WEIGHT: 187 LBS | BODY MASS INDEX: 26.08 KG/M2 | HEART RATE: 84 BPM

## 2019-06-19 DIAGNOSIS — F10.21 ACUTE ALCOHOLIC INTOXICATION IN ALCOHOLISM, IN REMISSION (H): ICD-10-CM

## 2019-06-19 DIAGNOSIS — K21.9 GASTROESOPHAGEAL REFLUX DISEASE WITHOUT ESOPHAGITIS: Primary | ICD-10-CM

## 2019-06-19 DIAGNOSIS — F32.1 CURRENT MODERATE EPISODE OF MAJOR DEPRESSIVE DISORDER, UNSPECIFIED WHETHER RECURRENT (H): ICD-10-CM

## 2019-06-19 DIAGNOSIS — F33.1 MODERATE EPISODE OF RECURRENT MAJOR DEPRESSIVE DISORDER (H): ICD-10-CM

## 2019-06-19 DIAGNOSIS — F10.21 ALCOHOL USE DISORDER, SEVERE, IN EARLY REMISSION (H): Primary | ICD-10-CM

## 2019-06-19 PROCEDURE — 96372 THER/PROPH/DIAG INJ SC/IM: CPT | Mod: ZF

## 2019-06-19 PROCEDURE — G0463 HOSPITAL OUTPT CLINIC VISIT: HCPCS | Mod: ZF

## 2019-06-19 PROCEDURE — 25000128 H RX IP 250 OP 636: Mod: ZF | Performed by: PSYCHIATRY & NEUROLOGY

## 2019-06-19 RX ORDER — ESCITALOPRAM OXALATE 20 MG/1
TABLET ORAL
Qty: 30 TABLET | Refills: 3 | Status: SHIPPED | OUTPATIENT
Start: 2019-06-19 | End: 2019-10-24

## 2019-06-19 RX ORDER — NICOTINE POLACRILEX 4 MG/1
GUM, CHEWING ORAL
Qty: 60 TABLET | Refills: 1 | Status: SHIPPED | OUTPATIENT
Start: 2019-06-19 | End: 2019-06-19

## 2019-06-19 RX ORDER — ARIPIPRAZOLE 10 MG/1
TABLET ORAL
Qty: 30 TABLET | Refills: 1 | Status: SHIPPED | OUTPATIENT
Start: 2019-06-19 | End: 2019-07-25

## 2019-06-19 RX ORDER — NICOTINE POLACRILEX 4 MG/1
GUM, CHEWING ORAL
Qty: 60 TABLET | Refills: 1 | Status: SHIPPED | OUTPATIENT
Start: 2019-06-19 | End: 2023-04-08

## 2019-06-19 RX ADMIN — NALTREXONE 380 MG: KIT at 14:30

## 2019-06-19 ASSESSMENT — PAIN SCALES - GENERAL: PAINLEVEL: NO PAIN (0)

## 2019-06-19 NOTE — TELEPHONE ENCOUNTER
[facility] Pharmacy Issue   Received: Today   Message Contents   [facility] Psychiatry [facility]   Phone Number: [phone]             VIvitrol - wondering if it's supposed to be in an clinic shot since it has a $600 copay     Caller: [address] [facility], [phone]      Writer placed a call to pharmacist Rula and asked her discontinue naltrexone (VIVITROL) 380 MG SUSR- Inject 380 mg into the muscle every 30 days - Intramuscular with 11 refills. Patient received this injection today in clinic which is covered. Med tab changed to reflect this.      No further action needed by this writer

## 2019-06-19 NOTE — PROGRESS NOTES
Relapse Prevention Group for Substance Use Disorders                                Time of Service: 4:00 - 5:00   Length of Appointment: 60 mins  Care Providers: Susi Gilbert, PhD, LP  Total number of patients present: 3    DSM-5 Diagnosis:   Alcohol Use Disorder, severe, in early remission   Major Depressive Disorder, recurrent (per chart review)  Generalized Anxiety Disorder (per chart review)    GROUP CONTENT:   oriented patients to structure of group and reviewed rules.  led patients in discussion of relapse prevention strategies for addiction with a focus on identifying and challenging unhelpful thoughts related to drinking.      PATIENT ENGAGEMENT: Active and engaged. .     MENTAL STATUS EXAM:   Appearance: Casually dressed and appropriately groomed   Motor activity: Within normal range  Speech rate: Within normal range  Speech volume: Within normal range  Speech articulation:  Within normal range  Speech coherence:  Within normal range  Speech spontaneity:  Within normal range  Affect (objective appearance):  Euthymic  Thought process: Linear, logical, goal-oriented  Suicide/ violence risk: Not individually assessed given group context, but no signs of risk were observed    PLAN:   Gio reported that due to finances he needed to take a break from Relapse Prevention Group. Writer encouraged him to re-engage in the future if he is able to.

## 2019-06-19 NOTE — PATIENT INSTRUCTIONS
Take up to 2 omeprazole as needed to help with heartburn  Increasing lexapro to 20 mg today  Return in 4 weeks

## 2019-06-19 NOTE — TELEPHONE ENCOUNTER
[facility] - Continue with 380 mg Vivitrol, IM Injection, q28d. Dr. Saldana also entered 12 refills.Deborah Blanchard LPN

## 2019-06-19 NOTE — PROGRESS NOTES
"  ----------------------------------------------------------------------------------------------------------  [facility], [place]   Psychiatry Clinic   Addiction Medicine                        IDENTIFICATION   Laurent \"Gio\" Blaze Choi is a 73 year old male who was referred by self for evaluation of alcohol use disorder and desire to continue Vivitrol.  History was provided by patient who was a good historian. He is here for a follow up visit with his initial visit on 10/17/18 and last visit was 5/22/19.        CHIEF COMPLAINT      \"I am feeling better and less depressed\"     Brief Summary     The patient has a past psychiatric/substance use history of alcohol use disorder, Major Depressive Disorder and Generalized Anxiety Disorder.    Gio has been in detox over 40 times and treatment about 5 times.  His first treatment was in 1980. He gave up daily drinking in late 1990's, then began to binge drink.  His pattern recently  has been drinking 1-2 weeks and not eating most of that time, and getting very sick. He then will stop drinking, will be sober for 1-2 weeks.  Alcohol has caused withdrawal and tolerance, drinking 6-10 beers and 1 pint a day when on a binge. Alcohol has affected relationships, has affected health with getting sick, not eating and getting depressed.     Interval History    At Gio's last visit we increased his Abilify dose from 7.6 yo 10 mg and increased his gabapentin dose.  He also restarted Vivitrol after taking a few months off as he thought it was making him very tired.      The VIvitrol this month has gone well. He did not really have any ringing in his ears or rash on his legs.  He knows it helps with cravings and he has not had any cravings and had not had any drinks.  He did have 3 episodes of drinking a 6 pack each time last month when he was off the Vivitrol so this is an improvement. He has been drooling some and he thinks it is one of his meds but It is " better if he keeps his teeth in and is not that bothersome.  We went up on the Abilify from 7.5 to 10 mg and thought he was doing better but this week feels less motivated.  He feels irritable and angry but he keeps it inside.  He will curse quietly with no one around.  He has been going to AA but getting annoyed with meetings the last few weeks as he gets sick of hearing everyone's stories.  He is open to looking into [facility].  He has been going to the relapse prevention group but now he owes money for his medical bills and does not want to increase what he has to pay.  He plans to stop the group and will restart it in a few weeks or months when he gets caught up with his payments..  He will go to 2 meetings per week with AA or [facility], which he said he wanted to try.  He went out with AA people last week and enjoyed that.  He is an introvert and it is hard for him to want to be social but he knows he should not isolate.  He has been having acid reflux lately and is already on omeprazole 20 mg daily.  He is wondering if his medications could be causing this.  He has also gained weight and has been  making him more desserts and has gained 9 lbs in the past month.  He has been baking a lot lately.  We discussed not eating late and not eating big meals, he could even raise his head some with extra pillows. He could also go up to 40 mg of Omeprazole and see if that helps but he should check with his doctor if he wants to keep taking 40 mg.    He feels his mood is ok sometimes and sometimes he feels down, his main complaint is being tired and feeling low energy.  His sleep is better, he is able to sleep more continuously and his anxiety has been better. He does feel like the lexapro is helping and has been at 10 mg for about 2 months now after we tapered him off Prozac.  He was agreeable to go up to 20 mg of Lexapro since he is still feeling low in energy and sometimes feeling down.    He has had some  "stress due to financial concerns but he seems to feel he has a plan for how to deal with things. Despite feeling stressed by financial issues he did not drink and we discussed this and that he is learning new ways to cope.           Treatment History:    First treatment in 1980.  Last treatment at [facility] finished  November 2018.     Substance Use Pharmacotherapies:   Tried antabuse in late 1990's and he would stop taking and \"drink around it\".       RECENT SYMPTOMS   [PSYCH ROS]     PANIC ATTACK:  denies   ANXIETY:  none currently   DEPRESSION:  Improving mood. Sleep, motivation and energy are improving.   Denies SI/SIB.   DYSREGULATION:  \none;    PSYCHOSIS:  none;  DENIES- none  MARILEE/HYPOMANIA:  none;  DENIES- none  TRAUMA RELATED:  Doing better in the interval.   EATING DISORDER:  none  COMPULSIVE:  none  Grief:  improving              ALLERGY                                Patient has no known allergies.  MEDICATIONS                               Current Outpatient Medications   Medication Sig Dispense Refill     ACETAMINOPHEN PO Take 325 mg by mouth       amLODIPine-benazepril (LOTREL) 10-20 MG per capsule Take 1 capsule by mouth daily       ARIPiprazole (ABILIFY) 10 MG tablet Take one tab (10 mg) PO daily 30 tablet 1     ARIPiprazole (ABILIFY) 10 MG tablet Take 1 tablet (10 mg) by mouth daily 30 tablet 1     ASPIRIN PO Take 81 mg by mouth daily       calcium-vitamin D 500-125 MG-UNIT TABS Take 1 tablet by mouth 2 times daily       DIPHENHYDRAMINE HCL PO Take 25 mg by mouth daily as needed       escitalopram (LEXAPRO) 10 MG tablet Take one tablet daily. 30 tablet 1     FLUoxetine (PROZAC) 20 MG capsule Take 1 capsule (20 mg) by mouth daily Take 3 capsules (60 mg) x 2 days, then 2 capsules x 2 days then 1 capsule for 2 days then discontinue. 12 capsule 0     gabapentin (NEURONTIN) 300 MG capsule Take 1 capsule before bed, can take it up to 3 times a day as needed. 60 capsule 1     LOSARTAN " POTASSIUM PO Take 25 mg by mouth       melatonin 3 MG tablet Take 1-3 tabs before bed PRN 90 tablet 1     naltrexone (VIVITROL) 380 MG SUSR Inject 380 mg into the muscle every 28 days 380 mg 0     nitroGLYcerin (NITRODUR) 0.4 MG/HR 24 hr patch Place 1 patch onto the skin daily       NITROGLYCERIN SL Take 0.4 mg by mouth       OMEPRAZOLE PO Take 20 mg by mouth every morning       ROSUVASTATIN CALCIUM PO Take 40 mg by mouth daily       TAMSULOSIN HCL PO Take 0.4 mg by mouth daily         VITALS   /75   Pulse 84   Wt 84.8 kg (187 lb)   BMI 26.08 kg/m        MENTAL STATUS EXAM                                                           Orientation: full, x3  Alertness: alert   Appearance: well groomed and casually groomed, smiles and laughs at times   Behavior/Demeanor: cooperative, pleasant and calm, with good  eye contact   Speech: regular rate and rhythm  Language: intact  Psychomotor: normal or unremarkable  Mood: depressed  Affect: full range and appropriate; was congruent to mood; was congruent to content  Thought Process/Associations: unremarkable  Thought Content:  Denies suicidal and violent ideation, delusions and preoccupations  Perception:    Denies auditory hallucinations and visual hallucinations  Insight: good  Judgment: good  Cognition: does  appear grossly intact; formal cognitive testing was not done  Gait: Normal   MSK: extremities normal, no peripheral edema    LABS and DATA   Creatinine, BUN, GFR, LFT's all normal on 8/28/18 labs from [facility]      PHQ9 TODAY = 10  PHQ-9 SCORE 3/6/2019 3/27/2019 5/22/2019   PHQ-9 Total Score 14 15 9          RATING SCALES:  PHQ9, see above    SUBSTANCE USE/PSYCHIATRIC DIAGNOSES                                                                                                    Alcohol Use Disorder, severe, in early remission with some brief episodes of drinking.   Major Depressive Disorder, recurrent, improving.   Generalized Anxiety Disorder, improving.   R/o  PTSD  (partner suicide attempts,his incarcerations)     ASSESSMENT                                           See 'Plan' section for specific dosing info             This patient is a 73 year old male who has alcohol use disorder since 1980 with a handful of years of sobriety since.  He has been in detox more than 40 times and 4-5 treatments. Vivitrol was used previously with good response regarding cravings but Gio thought it was making his depression worse but he has restarted it last month and realizes the Vivitrol helps with cravings and he does not think it makes him more depressed.  He has responded well to medication changes made over the last two visits where we tapered off his prozac and started lexapro at 10 mg, started gabapentin which he takes 300 mg at bedtime and maybe once more in the day, and we increased his Abilify to 10 mg.  He feels less depressed, is sleeping better, has more mental clarity and feels more productive, but is still not exactly where he wants to be.  He still has some bad days but those seem to be improving.  We will continue with the plan below and see how he is affected by the Vivitrol this month.        Further diagnostic clarification is not needed.  There are no medical comorbidities which impact this treatment.    [place] PRESCRIPTION MONITORING PROGRAM [] was checked today:  indicates no controlled subtances reported in previous 12 months.     PLAN                                                                                                       1) PSYCHOTROPIC MEDICATIONS:No changes today     -Increase Lexapro to 20 mg daily.      -Continue Abilify at 10 mg daily.     -Continue gabapentin to 300 mg, up to TID PRN, he has been using it at bedtime primarily and sometimes once more in the daytime.       -Omeprazole was refilled as increased reflux recently, unclear if due to meds, can take 20-40 mg bj, usually gets from primary but may run out if using 40 mg. He should  consult with primary care if reflux is not improved or he needs to continue to take 40 mg to manage.      -Continue vivitirol injection, due today.      2) THERAPY:    - Gio attends the Relapse Prevention Group with Susi Gilbert most weeks but plans to take a hiatus due to financial concerns as he has a copay.  He plans to return in a few weeks or months, once he pays off some of his medical bills.     -Attend  AA at 1-2 meetings a week,continue working with sponsor, do assignments from sponsor.  Also encouraged to try a [facility] meeting and see if he finds those helpful. He will look up meeting times online.    -Spend time with friends and family and do not isolate.  Try to have plans for Saturday nights and be aware of triggers.    -Consider individual therapist in future, does not want to do right now.             3) NEXT DUE:  LABS- consider LFT's                                   4) REFERRALS:    Recommend getting a sleep study--talk to primary care about this to rule out another sleep problem affecting quality of sleep.     5) RTC: 4 weeks    6) CRISIS NUMBERS:   Provided routinely in AVS.         TREATMENT RISK STATEMENT:  The risks, benefits, alternatives and potential adverse effects have been explained and are understood by the pt. The pt agrees to the treatment plan with the ability to do so. The pt knows to call the clinic for any problems or to access emergency care if needed.  Medical and CD concerns are documented above.  Psychotropic drug interaction check was done, including changes made today, and is discussed above.    ADDICTION FELLOW: Leatha Saldana      Patient was not seen by Dr. Bee,  who will co-sign the note.    Supervisor is Dr. Bee               PSYCHIATRY CLINIC INDIVIDUAL PSYCHOTHERAPY NOTE                                                  [16]   Start time - 1:42  End time - 2:00  Date last reviewed - 12/2018       Date next due - 6/12/19 (or 12 months if  Medicare)    Subjective: This supportive psychotherapy session addressed issues related to goals of therapy  and current stressors consisting of  recent relapses, excessive sleep and sleep hygiene, and attending AA.  Patient's reaction: Prepared to execute in the context of mental status appropriate for ambulatory setting.  Progress: will assess at next visit.   Plan: RTC 1 month  Psychotherapy services during this visit included  myself and the patient.   TREATMENT  PLAN          SYMPTOMS; PROBLEMS   MEASURABLE GOALS;    FUNCTIONAL IMPROVEMENT INTERVENTIONS;   GAINS MADE DISCHARGE CRITERIA   Substance Use: alcohol    make a plan to manage 2-3 anxiety-provoking situations, reduce feeling overwhelmed/ improve decision making skills and recognize delusional thinking 3 episodes of use. Continue Vivitrol. Call sponsor, Continue AA, not watch upsetting things, and do activities he enjoys such as cooking, going to see artwork.    sustained sobriety and reduction in cravings   Depression: depressed mood, low energy, concentration problems and excessive sleep   reduce depressive symptoms, reduce suicidal thoughts, report feeling more positive about self , develop strategies for thought distraction when ruminating and make a plan to manage 2-3 anxiety-provoking situations Awareness of feelings  Recognition that excessive sleep and lack of focus may be signs of depression.  Will monitor mood this month with Vivitrol.   Awareness of negative self talk  Reach out to friends to do social activities, such as go to the museum.  Will call a friend if feeling down or doesn't know what to do. Will not take naps, will go to bed and wake up at same time.   Monitor food intake as recent weight gain over last month.  marked symptom improvement, symptom resolution and significant improvement in self-reports for 5 consecutive visits   I saw the patient with the fellow, and participated in key portions of the service, including the mental  status examination and developing the plan of care. I reviewed key portions of the history with the fellow. I agree with the findings and plan as documented in this note.    Selam Bee

## 2019-06-19 NOTE — NURSING NOTE
"Laurent Choi,  1945, presented to the clinic today at the request of Dr. Saldana,  ordering provider for long-acting injectable Vivitrol 380 mg.    OBSERVATIONS:  1.  Appearance: Casually dressed in weather appropriate clothing. Good body hygiene  2.  Mood: Good - Pt stated \"I haven't had anything to drink for 5 to 6 weeks now. I haven't had any cravings at all. The Vivitrol is really working great.  3.  Affect: Confruent  4.  Attitude: Good  5.  Cooperation: Full  6.  Side Effects: Denied  7.  Education: Call clinic or use MyChart for any concerns between appointments  8. Next appointment: 2019 1330 with Dr. Saldana and injection to follow at 1400    The service provided today was rendered under the supervising provider of the day, Dr. Guidyr, who was available for consultation as needed.    "

## 2019-06-20 ASSESSMENT — PATIENT HEALTH QUESTIONNAIRE - PHQ9: SUM OF ALL RESPONSES TO PHQ QUESTIONS 1-9: 10

## 2019-07-17 ENCOUNTER — ALLIED HEALTH/NURSE VISIT (OUTPATIENT)
Dept: PSYCHIATRY | Facility: CLINIC | Age: 74
End: 2019-07-17
Attending: PSYCHIATRY & NEUROLOGY
Payer: COMMERCIAL

## 2019-07-17 ENCOUNTER — OFFICE VISIT - HEALTHEAST (OUTPATIENT)
Dept: FAMILY MEDICINE | Facility: CLINIC | Age: 74
End: 2019-07-17

## 2019-07-17 VITALS
HEART RATE: 86 BPM | WEIGHT: 188.4 LBS | DIASTOLIC BLOOD PRESSURE: 78 MMHG | SYSTOLIC BLOOD PRESSURE: 122 MMHG | BODY MASS INDEX: 26.28 KG/M2

## 2019-07-17 DIAGNOSIS — R06.9 ABNORMALITY OF BREATHING: ICD-10-CM

## 2019-07-17 DIAGNOSIS — F10.20 UNCOMPLICATED ALCOHOL DEPENDENCE (H): ICD-10-CM

## 2019-07-17 DIAGNOSIS — F10.21 ALCOHOL USE DISORDER, SEVERE, IN EARLY REMISSION (H): ICD-10-CM

## 2019-07-17 DIAGNOSIS — I25.10 ATHEROSCLEROSIS OF NATIVE CORONARY ARTERY OF NATIVE HEART WITHOUT ANGINA PECTORIS: ICD-10-CM

## 2019-07-17 DIAGNOSIS — R73.03 PREDIABETES: ICD-10-CM

## 2019-07-17 DIAGNOSIS — F32.1 CURRENT MODERATE EPISODE OF MAJOR DEPRESSIVE DISORDER, UNSPECIFIED WHETHER RECURRENT (H): ICD-10-CM

## 2019-07-17 DIAGNOSIS — M79.89 LOCALIZED SWELLING OF BOTH LOWER EXTREMITIES: ICD-10-CM

## 2019-07-17 DIAGNOSIS — F33.1 MAJOR DEPRESSIVE DISORDER, RECURRENT, MODERATE (H): ICD-10-CM

## 2019-07-17 DIAGNOSIS — R60.9 EDEMA, UNSPECIFIED TYPE: ICD-10-CM

## 2019-07-17 DIAGNOSIS — Z95.5 STENTED CORONARY ARTERY: ICD-10-CM

## 2019-07-17 DIAGNOSIS — F10.21 ALCOHOL USE DISORDER, SEVERE, IN EARLY REMISSION (H): Primary | ICD-10-CM

## 2019-07-17 DIAGNOSIS — F10.20 SEVERE ALCOHOL USE DISORDER (H): ICD-10-CM

## 2019-07-17 LAB
ALBUMIN SERPL-MCNC: 4 G/DL (ref 3.5–5)
ALP SERPL-CCNC: 44 U/L (ref 45–120)
ALT SERPL W P-5'-P-CCNC: 16 U/L (ref 0–45)
ANION GAP SERPL CALCULATED.3IONS-SCNC: 10 MMOL/L (ref 5–18)
AST SERPL W P-5'-P-CCNC: 16 U/L (ref 0–40)
BILIRUB SERPL-MCNC: 0.4 MG/DL (ref 0–1)
BUN SERPL-MCNC: 13 MG/DL (ref 8–28)
CALCIUM SERPL-MCNC: 9.1 MG/DL (ref 8.5–10.5)
CHLORIDE BLD-SCNC: 107 MMOL/L (ref 98–107)
CO2 SERPL-SCNC: 23 MMOL/L (ref 22–31)
CREAT SERPL-MCNC: 0.88 MG/DL (ref 0.7–1.3)
GFR SERPL CREATININE-BSD FRML MDRD: >60 ML/MIN/1.73M2
GGT SERPL-CCNC: 10 U/L (ref 0–50)
GLUCOSE BLD-MCNC: 89 MG/DL (ref 70–125)
INR PPP: 0.97 (ref 0.9–1.1)
POTASSIUM BLD-SCNC: 4.8 MMOL/L (ref 3.5–5)
PROT SERPL-MCNC: 6.9 G/DL (ref 6–8)
SODIUM SERPL-SCNC: 140 MMOL/L (ref 136–145)
TSH SERPL DL<=0.005 MIU/L-ACNC: 0.63 UIU/ML (ref 0.3–5)
VIT B12 SERPL-MCNC: 294 PG/ML (ref 213–816)

## 2019-07-17 PROCEDURE — 25000128 H RX IP 250 OP 636: Mod: ZF | Performed by: PSYCHIATRY & NEUROLOGY

## 2019-07-17 PROCEDURE — G0463 HOSPITAL OUTPT CLINIC VISIT: HCPCS | Mod: 25

## 2019-07-17 PROCEDURE — 96372 THER/PROPH/DIAG INJ SC/IM: CPT | Mod: ZF

## 2019-07-17 RX ADMIN — NALTREXONE 380 MG: KIT at 15:14

## 2019-07-17 ASSESSMENT — MIFFLIN-ST. JEOR: SCORE: 1588.24

## 2019-07-17 ASSESSMENT — PATIENT HEALTH QUESTIONNAIRE - PHQ9: SUM OF ALL RESPONSES TO PHQ QUESTIONS 1-9: 7

## 2019-07-17 ASSESSMENT — PAIN SCALES - GENERAL: PAINLEVEL: NO PAIN (0)

## 2019-07-17 NOTE — PATIENT INSTRUCTIONS
Thank you for coming to the PSYCHIATRY CLINIC.    Lab Testing:  If you had lab testing today and your results are reassuring or normal they will be mailed to you or sent through [facility] within 7 days.   If the lab tests need quick action we will call you with the results.  The phone number we will call with results is # [phone] (home) . If this is not the best number please call our clinic and change the number.    Medication Refills:  If you need any refills please call your pharmacy and they will contact us. Our fax number for refills is [phone]. Please allow three business for refill processing.   If you need to  your refill at a new pharmacy, please contact the new pharmacy directly. The new pharmacy will help you get your medications transferred.     Scheduling:  If you have any concerns about today's visit or wish to schedule another appointment please call our office during normal business hours [phone] (8-5:00 M-F)    Contact Us:  Please call [phone] during business hours (8-5:00 M-F).  If after clinic hours, or on the weekend, please call  [phone].    Financial Assistance [phone]  [facility] Billing [phone]  Central Billing Office, [facility]: [phone]  [facility] Billing [phone]  Medical Records [phone]      MENTAL HEALTH CRISIS NUMBERS:  [facility]:   [facility] - [phone]   Crisis Residence [facility] - [phone]   Walk-In Counseling [facility] [phone]   COPE 24/7 [facility] Mobile Team for Adults - [[phone]]; Child - [[phone]]        [facility]:   [facility] - [phone]   Walk-in counseling [facility] - [phone]   Walk-in counseling [facility] - [phone]   Crisis Residence [facility] - [phone]   Urgent Care Adult Mental Health:   --Drop-in, 24/7 crisis line, and [place] Mobile Team  [[phone]]    CRISIS TEXT LINE: Text [phone] from anywhere, anytime, any crisis 24/7;    OR SEE [email or web address]     Poison Control Center - [phone]    CHILD: [facility] needs assessment team - [phone]     [facility] - [phone]; or [facility] Lif[facility] - [phone]    If you have a medical emergency please call 911or go to the nearest ER.                    _____________________________________________    Again thank you for choosing PSYCHIATRY CLINIC and please let us know how we can best partner with you to improve you and your family's health.  You may be receiving a survey in the mail regarding this appointment. We would love to have your feedback, both positive and negative, so please fill out the survey and return it using the provided envelope. The survey is done by an external company, so your answers are anonymous.

## 2019-07-17 NOTE — NURSING NOTE
Laurent Choi,  1945, presented to the clinic today at the request of Dr. Saldana,  ordering provider for long-acting injectable Vivitrol 380 mg.    OBSERVATIONS:  1.  Appearance: Casually dressed in clean jeans and polo shirt   2.  Mood: Good, talkative and friendly  3.  Affect: Congruent  4.  Attitude: Good, engaged  5.  Cooperation: Full  6.  Side Effects: Denied  7.  Education: None needed  8. Next appointment: 2019 at 1100    The service provided today was rendered under the supervising provider of the day, Dr. Guidry, who was available for consultation as needed.]

## 2019-07-17 NOTE — PROGRESS NOTES
"  ----------------------------------------------------------------------------------------------------------  [facility], [place]   Psychiatry Clinic Progress Note  Addiction Medicine                        IDENTIFICATION   Laurent \"Gio\" Blaze Choi is a 73 year old male who was referred by self for evaluation of alcohol use disorder and desire to continue Vivitrol.  History was provided by patient who was a good historian. He is here for a follow up visit with his initial visit on 10/17/18 and last visit was 6/19/19.        CHIEF COMPLAINT      \"I am feeling better and less depressed\"     Brief Summary     The patient has a past psychiatric/substance use history of alcohol use disorder, Major Depressive Disorder and Generalized Anxiety Disorder.    Gio has been in detox over 40 times and treatment about 5 times.  His first treatment was in 1980. He gave up daily drinking in late 1990's, then began to binge drink.  His pattern recently  has been drinking 1-2 weeks and not eating most of that time, and getting very sick. He then will stop drinking, will be sober for 1-2 weeks.  Alcohol has caused withdrawal and tolerance, drinking 6-10 beers and 1 pint a day when on a binge. Alcohol has affected relationships, has affected health with getting sick, not eating and getting depressed.     Interval History  Gio is feeling \"...more mellow\". He thinks his medications are helping him and knows the Vivitrol really helps with cravings. He thought vivitrol caused ringing in his ears in the past but has not been experiencing that lately. He denies any use of alcohol in the interval but did have some cravings during a stressful get together with his family. He had not seen his siblings for 12 years so was quite nervous.  He reports the visit went well and he feels that he would like to continue to see them more regularly.  His family was drinking around him but he said it did not really bother " him.  We discussed relapse prevention and what he can do when he is around others who are drinking.  He went fishing with his brothers and really enjoyed that.   He was given positive feedback about how he was able to get through a stressful situation without alcohol. Going to AA once a week.  He was encouraged to go twice a week or once is ok if he restarts the relapse prevention group here, which he has taken a break from due to the cost, but he says he will restart that very soon.         He is having issues with drooling and thinks this is from Abilify and we discussed it could be.  We discussed cutting down to 5 mg as he has been at 10 mg.    He is still having fatigue and thinks the Abilify also makes him tired and this is possible, so he can try to take it later in the day.  He is sleeping better at night  , 6 hour blocks, still taking naps of around 1-2 hours during the day, going to bed around 11pm.  He wakes up feeling refreshed..  This is much improved as sleep was a major issue for much of the time he has been coming here.  He asks if Abilify can cause weight gain. He gained 10 lbs when he was weighed here last month but was baking quite a bit.  He has been watching what he eats this past month and did not gain any weight.  We discussed th Abilify is not likely what caused his weight gain and to continue to monitor his food intake.     His acid reflux is overall doing better.     His mood is better, he is less agitated. We increased his Lexapro to 20 mg at his last visit.  He had been on prozac previously for many years.  He is still bothered by fatigue at times but has been noticing he is getting short of breath when walking and can only walk 1/2 mile versus 2 miles or so.  He has a history of cardiac stents and saw  today and will have a stress test next month.   NHe denies chest pain.  He has an appointment to see cardiologist next month.  We discussed that if he has a narrowing in one of his heart  "vessels this would cause fatigAt Gio's last visit we increased his Abilify dose from 7.4 yo 10 mg and increased his gabapentin dose.  He also restarue.  He denies any SI/SIB.  .           Treatment History:    First treatment in 1980.  Last treatment at [facility] finished  November 2018.     Substance Use Pharmacotherapies:   Tried antabuse in late 1990's and he would stop taking and \"drink around it\".       RECENT SYMPTOMS   [PSYCH ROS]     PANIC ATTACK:  denies   ANXIETY:  none currently   DEPRESSION:  Improving mood. Sleep, motivation and energy are improving.   Denies SI/SIB.   DYSREGULATION:  \none;    PSYCHOSIS:  none;  DENIES- none  MARILEE/HYPOMANIA:  none;  DENIES- none  TRAUMA RELATED:  Doing better in the interval.   EATING DISORDER:  none  COMPULSIVE:  none  Grief:  improving              ALLERGY                                Patient has no known allergies.  MEDICATIONS                               Current Outpatient Medications   Medication Sig Dispense Refill     ACETAMINOPHEN PO Take 325 mg by mouth       amLODIPine-benazepril (LOTREL) 10-20 MG per capsule Take 1 capsule by mouth daily       ARIPiprazole (ABILIFY) 10 MG tablet Take one tab (10 mg) PO daily 30 tablet 1     ASPIRIN PO Take 81 mg by mouth daily       calcium-vitamin D 500-125 MG-UNIT TABS Take 1 tablet by mouth 2 times daily       DIPHENHYDRAMINE HCL PO Take 25 mg by mouth daily as needed       escitalopram (LEXAPRO) 20 MG tablet Take one tablet daily. 30 tablet 3     gabapentin (NEURONTIN) 300 MG capsule Take 1 capsule before bed, can take it up to 3 times a day as needed. 60 capsule 1     LOSARTAN POTASSIUM PO Take 25 mg by mouth       melatonin 3 MG tablet Take 1-3 tabs before bed PRN 90 tablet 1     naltrexone (VIVITROL) 380 MG SUSR Inject 380 mg into the muscle every 30 days 380 mg 11     nitroGLYcerin (NITRODUR) 0.4 MG/HR 24 hr patch Place 1 patch onto the skin daily       NITROGLYCERIN SL Take 0.4 mg by mouth       " omeprazole 20 MG tablet Take 1-2 daily 60 tablet 1     ROSUVASTATIN CALCIUM PO Take 40 mg by mouth daily       TAMSULOSIN HCL PO Take 0.4 mg by mouth daily         VITALS   /78   Pulse 86   Wt 85.5 kg (188 lb 6.4 oz)   BMI 26.28 kg/m        MENTAL STATUS EXAM                                                           Orientation: full, x3  Alertness: alert   Appearance: well groomed and casually groomed, smiles and laughs at times   Behavior/Demeanor: cooperative, pleasant and calm, with good  eye contact   Speech: regular rate and rhythm  Language: intact  Psychomotor: normal or unremarkable  Mood: depressed  Affect: full range and appropriate; was congruent to mood; was congruent to content  Thought Process/Associations: unremarkable  Thought Content:  Denies suicidal and violent ideation, delusions and preoccupations  Perception:    Denies auditory hallucinations and visual hallucinations  Insight: good  Judgment: good  Cognition: does  appear grossly intact; formal cognitive testing was not done  Gait: Normal   MSK: extremities normal, no peripheral edema    LABS and DATA   Creatinine, BUN, GFR, LFT's all normal on 8/28/18 labs from [facility]      PHQ9 TODAY =7, improved from 10  PHQ-9 SCORE 3/27/2019 5/22/2019 6/20/2019   PHQ-9 Total Score 15 9 10          RATING SCALES:  PHQ9, see above    SUBSTANCE USE/PSYCHIATRIC DIAGNOSES                                                                                                    Alcohol Use Disorder, severe, in early remission with some brief episodes of drinking, none recently.   Major Depressive Disorder, recurrent, improving.   Generalized Anxiety Disorder, improving.   R/o PTSD  (partner suicide attempts,his incarcerations)     ASSESSMENT                                           See 'Plan' section for specific dosing info             This patient is a 73 year old male who has alcohol use disorder since 1980 with a handful of years of sobriety since.  He  has been in detox more than 40 times and 4-5 treatments. Vivitrol was used previously with good response regarding cravings but Gio thought it was making his depression worse but he has restarted it last month and realizes the Vivitrol helps with cravings and he does not think it makes him more depressed.  He has responded well to medication changes made over the last two visits where we tapered off his prozac and started lexapro which was increased to 20 mg last month, started gabapentin which he takes 300 mg at bedtime and maybe once more in the day.  We will have him decrease the Abilify to 5 mg daily due to drooling.  He feels less depressed, is sleeping better, has more mental clarity and feels more productive, and this has improved further over the past month.    We will continue with the plan below.         Further diagnostic clarification is not needed.  There are no medical comorbidities which impact this treatment.    [place] PRESCRIPTION MONITORING PROGRAM [] was checked today:  indicates no controlled subtances reported in previous 12 months.     PLAN                                                                                                       1) PSYCHOTROPIC MEDICATIONS:No changes today     -Continue Lexapro 20 mg daily.      -decrease Abilify to 5 mg daily.     -Continue gabapentin to 300 mg, up to TID PRN, he has been using it at bedtime primarily and sometimes once more in the daytime.       -Omeprazole was refilled as increased reflux recently, unclear if due to meds, can take 20-40 mg bj, usually gets from primary but may run out if using 40 mg. He should consult with primary care if reflux is not improved or he needs to continue to take 40 mg to manage.      -Continue vivitirol injection, due today.      2) THERAPY:    - Gio attends the Relapse Prevention [facility] with Susi Gilbert most weeks but plans to take a hiatus due to financial concerns as he has a copay.  He plans to return in a few  weeks or months, once he pays off some of his medical bills.     -Attend  AA at 1-2 meetings a week,continue working with sponsor, do assignments from sponsor.  Also encouraged to try a [facility] meeting and see if he finds those helpful. He will look up meeting times online.    -Spend time with friends and family and do not isolate.  Try to have plans for Saturday nights and be aware of triggers.    -Consider individual therapist in future, does not want to do right now.             3) NEXT DUE:  LABS- consider LFT's                                   4) REFERRALS:    Recommend getting a sleep study--talk to primary care about this to rule out another sleep problem affecting quality of sleep, sleep is now improving but still consider due to long standing issues with sleep.     5) RTC: 4 weeks    6) CRISIS NUMBERS:   Provided routinely in AVS.         TREATMENT RISK STATEMENT:  The risks, benefits, alternatives and potential adverse effects have been explained and are understood by the pt. The pt agrees to the treatment plan with the ability to do so. The pt knows to call the clinic for any problems or to access emergency care if needed.  Medical and CD concerns are documented above.  Psychotropic drug interaction check was done, including changes made today, and is discussed above.    ADDICTION FELLOW: Leatha Saldana      Patient was seen by Dr. Bee,  who will co-sign the note.    Supervisor is Dr. Bee     I saw the patient with the fellow, and participated in key portions of the service, including the mental status examination and developing the plan of care. I reviewed key portions of the history with the fellow. I agree with the findings and plan as documented in this note.    Selam Bee                  PSYCHIATRY CLINIC INDIVIDUAL PSYCHOTHERAPY NOTE                                                  [16]   Start time - 1:42  End time - 2:00  Date last reviewed - 7/17/19       Date next due -  1/17/20 (or 12 months if Medicare)    Subjective: This supportive psychotherapy session addressed issues related to goals of therapy  and current stressors consisting of  recent relapses, excessive sleep and sleep hygiene, and attending AA.  Patient's reaction: Executing the plan.  Progress: good  Plan: RTC 1 month  Psychotherapy services during this visit included  myself and the patient.   TREATMENT  PLAN          SYMPTOMS; PROBLEMS   MEASURABLE GOALS;    FUNCTIONAL IMPROVEMENT INTERVENTIONS;   GAINS MADE DISCHARGE CRITERIA   Substance Use: alcohol    No use of alcohol  Using relapse prevention skills  Do not isolate  Continue with vivitrol No use of alcohol in interval  Handled stressful situations around family who were drinking  Has been more social  Realizes vivitrol helps cravings sustained sobriety and reduction in cravings   Depression: depressed mood, low energy, concentration problems and excessive sleep   reduce depressive symptoms, reduce suicidal thoughts, report feeling more positive about self , develop strategies for thought distraction when ruminating and make a plan to manage 2-3 anxiety-provoking situations Awareness of feelings  Recognition that excessive sleep and lack of focus may be signs of depression.    Awareness of negative self talk  Reach out to friends to do social activities, such as go to the museum.  Will call a friend if feeling down or doesn't know what to do.will go to bed and wake up at same time.   Monitor food intake as recent weight gain over last month.  marked symptom improvement, symptom resolution and significant improvement in self-reports for 5 consecutive visits

## 2019-07-18 ENCOUNTER — COMMUNICATION - HEALTHEAST (OUTPATIENT)
Dept: FAMILY MEDICINE | Facility: CLINIC | Age: 74
End: 2019-07-18

## 2019-07-18 LAB — NT-PROBNP SERPL-MCNC: 59 PG/ML (ref 0–125)

## 2019-07-25 RX ORDER — ARIPIPRAZOLE 5 MG/1
TABLET ORAL
Qty: 30 TABLET | Refills: 1 | Status: SHIPPED | OUTPATIENT
Start: 2019-07-25 | End: 2019-09-04 | Stop reason: SINTOL

## 2019-07-25 RX ORDER — GABAPENTIN 300 MG/1
CAPSULE ORAL
Qty: 60 CAPSULE | Refills: 1 | Status: SHIPPED | OUTPATIENT
Start: 2019-07-25 | End: 2019-11-12

## 2019-08-01 ENCOUNTER — HOSPITAL ENCOUNTER (OUTPATIENT)
Dept: NUCLEAR MEDICINE | Facility: CLINIC | Age: 74
Discharge: HOME | End: 2019-08-01
Attending: FAMILY MEDICINE

## 2019-08-01 ENCOUNTER — HOSPITAL ENCOUNTER (OUTPATIENT)
Dept: CARDIOLOGY | Facility: CLINIC | Age: 74
Discharge: HOME | End: 2019-08-01
Attending: FAMILY MEDICINE

## 2019-08-01 DIAGNOSIS — Z95.5 STENTED CORONARY ARTERY: ICD-10-CM

## 2019-08-01 DIAGNOSIS — I25.10 ATHEROSCLEROSIS OF NATIVE CORONARY ARTERY OF NATIVE HEART WITHOUT ANGINA PECTORIS: ICD-10-CM

## 2019-08-01 LAB
CV STRESS CURRENT BP HE: NORMAL
CV STRESS CURRENT HR HE: 101
CV STRESS CURRENT HR HE: 102
CV STRESS CURRENT HR HE: 103
CV STRESS CURRENT HR HE: 105
CV STRESS CURRENT HR HE: 106
CV STRESS CURRENT HR HE: 111
CV STRESS CURRENT HR HE: 111
CV STRESS CURRENT HR HE: 123
CV STRESS CURRENT HR HE: 124
CV STRESS CURRENT HR HE: 124
CV STRESS CURRENT HR HE: 126
CV STRESS CURRENT HR HE: 126
CV STRESS CURRENT HR HE: 137
CV STRESS CURRENT HR HE: 140
CV STRESS CURRENT HR HE: 80
CV STRESS CURRENT HR HE: 81
CV STRESS CURRENT HR HE: 82
CV STRESS CURRENT HR HE: 83
CV STRESS CURRENT HR HE: 85
CV STRESS CURRENT HR HE: 86
CV STRESS CURRENT HR HE: 89
CV STRESS CURRENT HR HE: 90
CV STRESS CURRENT HR HE: 91
CV STRESS CURRENT HR HE: 95
CV STRESS CURRENT HR HE: 98
CV STRESS DEVIATION TIME HE: NORMAL
CV STRESS ECHO PERCENT HR HE: NORMAL
CV STRESS EXERCISE STAGE HE: NORMAL
CV STRESS EXERCISE STAGE REACHED HE: NORMAL
CV STRESS FINAL RESTING BP HE: NORMAL
CV STRESS FINAL RESTING HR HE: 85
CV STRESS MAX HR HE: 139
CV STRESS MAX TREADMILL GRADE HE: 14
CV STRESS MAX TREADMILL SPEED HE: 3.4
CV STRESS PEAK DIA BP HE: NORMAL
CV STRESS PEAK SYS BP HE: NORMAL
CV STRESS PHASE HE: NORMAL
CV STRESS PROTOCOL HE: NORMAL
CV STRESS RESTING PT POSITION HE: NORMAL
CV STRESS RESTING PT POSITION HE: NORMAL
CV STRESS ST DEVIATION AMOUNT HE: NORMAL
CV STRESS ST DEVIATION ELEVATION HE: NORMAL
CV STRESS ST EVELATION AMOUNT HE: NORMAL
CV STRESS TEST TYPE HE: NORMAL
CV STRESS TOTAL STAGE TIME MIN 1 HE: NORMAL
NUC STRESS EJECTION FRACTION: >75 %
STRESS ECHO BASELINE BP: NORMAL
STRESS ECHO BASELINE HR: 82
STRESS ECHO CALCULATED PERCENT HR: 94 %
STRESS ECHO LAST STRESS BP: NORMAL
STRESS ECHO LAST STRESS HR: 137
STRESS ECHO POST ESTIMATED WORKLOAD: 7.9
STRESS ECHO POST EXERCISE DUR MIN: 6
STRESS ECHO POST EXERCISE DUR SEC: 30
STRESS ECHO TARGET HR: 126

## 2019-08-13 ENCOUNTER — OFFICE VISIT - HEALTHEAST (OUTPATIENT)
Dept: CARDIOLOGY | Facility: CLINIC | Age: 74
End: 2019-08-13

## 2019-08-13 DIAGNOSIS — E78.2 MIXED HYPERLIPIDEMIA: ICD-10-CM

## 2019-08-13 DIAGNOSIS — R06.09 DOE (DYSPNEA ON EXERTION): ICD-10-CM

## 2019-08-13 DIAGNOSIS — I10 ESSENTIAL HYPERTENSION: ICD-10-CM

## 2019-08-13 DIAGNOSIS — I25.84 CORONARY ARTERY DISEASE DUE TO CALCIFIED CORONARY LESION: ICD-10-CM

## 2019-08-13 DIAGNOSIS — I25.10 CORONARY ARTERY DISEASE DUE TO CALCIFIED CORONARY LESION: ICD-10-CM

## 2019-08-13 ASSESSMENT — MIFFLIN-ST. JEOR: SCORE: 1576.9

## 2019-08-14 ENCOUNTER — ALLIED HEALTH/NURSE VISIT (OUTPATIENT)
Dept: PSYCHIATRY | Facility: CLINIC | Age: 74
End: 2019-08-14
Attending: PSYCHIATRY & NEUROLOGY
Payer: COMMERCIAL

## 2019-08-14 DIAGNOSIS — F10.21 ALCOHOL USE DISORDER, SEVERE, IN EARLY REMISSION (H): Primary | ICD-10-CM

## 2019-08-14 PROCEDURE — 96372 THER/PROPH/DIAG INJ SC/IM: CPT | Mod: ZF

## 2019-08-14 PROCEDURE — 25000128 H RX IP 250 OP 636: Mod: ZF | Performed by: PSYCHIATRY & NEUROLOGY

## 2019-08-14 RX ADMIN — NALTREXONE 380 MG: KIT at 11:00

## 2019-08-14 NOTE — NURSING NOTE
"Clinic Administered Medication Documentation      Injectable Medication Documentation    Patient was given Vivitrol 380 mg. Prior to medication administration, verified patients identity using patient s name and date of birth. Please see MAR and medication order for additional information. Patient instructed to remain in clinic for 15 minutes and report any adverse reaction to staff immediately .      Was entire vial of medication used? Yes  Vial/Syringe: Single dose vial  Expiration Date:  2021  Was this medication supplied by the patient? No      Laurent Choi,  1945, presented to the clinic today at the request of Dr. Lin,  ordering provider for long-acting injectable Vivitrol 380 mg.    OBSERVATIONS:  1.  Appearance: Casually dressed in clean jeans & button shirt. Pt stated \"I had a couple of thoughts about drinking, but I was able to handle it.\" Writer asked if pt had any oral naltrexone to take for the cravings and pt said \"no, I don't need it. I've been able to deal with it.\" Writer asked if pt would like writer to talk to Dr. Lin about refills and pt stated \"no, I've been able to talk myself through it.\" Writer verified next appt with Dr. Lin on 19,  pt was aware of appt. Writer asked pt to call the clinic or use My Chart if he needs to get a refill on his oral naltrexone or for any other concerns. Pt agreed  2.  Mood: Good, talkative, engaged  3.  Affect: Congruent  4.  Attitude: Good, friendly, talkative  5.  Cooperation: Ful  6.  Side Effects: See above  7.  Education: Call clinic or use My Chart if needed between appointments. Pt agreed  8. Next appointment: 2019 at 1100    The service provided today was rendered under the supervising provider of the day, Dr. Guidry, who was available for consultation as needed.        "

## 2019-09-04 ENCOUNTER — OFFICE VISIT (OUTPATIENT)
Dept: PSYCHIATRY | Facility: CLINIC | Age: 74
End: 2019-09-04
Attending: PSYCHIATRY & NEUROLOGY
Payer: COMMERCIAL

## 2019-09-04 VITALS
HEART RATE: 106 BPM | DIASTOLIC BLOOD PRESSURE: 82 MMHG | SYSTOLIC BLOOD PRESSURE: 119 MMHG | WEIGHT: 187 LBS | BODY MASS INDEX: 26.08 KG/M2

## 2019-09-04 DIAGNOSIS — F10.21 ALCOHOL USE DISORDER, SEVERE, IN EARLY REMISSION (H): ICD-10-CM

## 2019-09-04 DIAGNOSIS — R53.82 CHRONIC FATIGUE: Primary | ICD-10-CM

## 2019-09-04 DIAGNOSIS — F33.1 MAJOR DEPRESSIVE DISORDER, RECURRENT EPISODE, MODERATE WITH MELANCHOLIC FEATURES (H): ICD-10-CM

## 2019-09-04 LAB
ALBUMIN SERPL-MCNC: 3.5 G/DL (ref 3.4–5)
ALP SERPL-CCNC: 49 U/L (ref 40–150)
ALT SERPL W P-5'-P-CCNC: 21 U/L (ref 0–70)
ANION GAP SERPL CALCULATED.3IONS-SCNC: 8 MMOL/L (ref 3–14)
AST SERPL W P-5'-P-CCNC: 17 U/L (ref 0–45)
BILIRUB SERPL-MCNC: 0.9 MG/DL (ref 0.2–1.3)
BUN SERPL-MCNC: 12 MG/DL (ref 7–30)
CALCIUM SERPL-MCNC: 8.2 MG/DL (ref 8.5–10.1)
CHLORIDE SERPL-SCNC: 107 MMOL/L (ref 94–109)
CO2 SERPL-SCNC: 25 MMOL/L (ref 20–32)
CREAT SERPL-MCNC: 0.78 MG/DL (ref 0.66–1.25)
GFR SERPL CREATININE-BSD FRML MDRD: 90 ML/MIN/{1.73_M2}
GLUCOSE SERPL-MCNC: 123 MG/DL (ref 70–99)
POTASSIUM SERPL-SCNC: 4 MMOL/L (ref 3.4–5.3)
PROT SERPL-MCNC: 7 G/DL (ref 6.8–8.8)
SODIUM SERPL-SCNC: 140 MMOL/L (ref 133–144)
TSH SERPL DL<=0.005 MIU/L-ACNC: 0.46 MU/L (ref 0.4–4)

## 2019-09-04 PROCEDURE — 84443 ASSAY THYROID STIM HORMONE: CPT | Performed by: PSYCHIATRY & NEUROLOGY

## 2019-09-04 PROCEDURE — 36415 COLL VENOUS BLD VENIPUNCTURE: CPT | Performed by: PSYCHIATRY & NEUROLOGY

## 2019-09-04 PROCEDURE — 80053 COMPREHEN METABOLIC PANEL: CPT | Performed by: PSYCHIATRY & NEUROLOGY

## 2019-09-04 ASSESSMENT — PAIN SCALES - GENERAL: PAINLEVEL: NO PAIN (0)

## 2019-09-04 ASSESSMENT — PATIENT HEALTH QUESTIONNAIRE - PHQ9: SUM OF ALL RESPONSES TO PHQ QUESTIONS 1-9: 10

## 2019-09-04 NOTE — PROGRESS NOTES
"  ----------------------------------------------------------------------------------------------------------  [facility], [place]   Psychiatry Clinic Progress Note  Addiction Medicine                        IDENTIFICATION   Laurent \"Gio\" Blaze Choi is a 73 year old male who was referred by self for evaluation of alcohol use disorder and desire to continue Vivitrol.  History was provided by patient who was a good historian. He is here for a follow up visit with his initial visit on 10/17/18 and last visit was 7/17/19.        CHIEF COMPLAINT      \"I am not doing too well. I'm sleeping all the time\"     Brief Summary     The patient has a past psychiatric/substance use history of alcohol use disorder, MDD and RODRIGO.    Gio has been in detox over 40 times and treatment about 5 times.  His first treatment was in 1980. He gave up daily drinking in late 1990's, then began to binge drink.  In recent years, his pattern has been drinking 1-2 weeks, not eating most of that time, and getting very sick. He then will stop drinking, will be sober for 1-2 weeks.  Alcohol has caused withdrawal and tolerance, drinking 6-10 beers and 1 pint a day when on a binge. Alcohol has affected relationships, has affected health with getting sick, not eating and getting depressed.     Interval History  Gio is feeling increasing fatigue. He has a hard time falling asleep at night but after he falls asleep he will sleep for up to 12 hours at a time. After that he can take an additional hours long nap in the afternoon. He also struggles to get started on art projects. He is motivated to do the projects but can't muster the energy to get working on them. He also reports a lack of inspiration. When he is able to do work he just doesn't feel any spark from it.    He wonders whether his fatigue is from the vivitrol. We reviewed his past notes together and noted that even though he stopped vivitrol last Spring his fatigue " "did not change. He also complains of continued drooling from aripiprazole despite the recent decrease. It is not clear from prior notes that aripiprazole had the mood augmenting effect that was hoped for.    He does note relapse to drinking since his last visit. He had a 6 pack about a week ago, but felt nothing as happens when he is on vivitrol. Shortly after his previous visit he had 2 beers from  [employer] with the same results. He always regrets drinking and stops after his binges. He does say that he wishes that he could pass out from drinking as a way of avoiding the fatigue and lack of inspiration he has. He denies that he wants to die and that he has had any other suicidal thoughts.        Treatment History:    First treatment in 1980.  Last treatment at [facility] finished  November 2018.     Substance Use Pharmacotherapies:   Tried antabuse in late 1990's and he would stop taking and \"drink around it\".       RECENT SYMPTOMS   [PSYCH ROS]     PANIC ATTACK:  denies   ANXIETY:  none currently   DEPRESSION:  Improving mood. Sleep, motivation and energy are improving.   Denies SI/SIB.   DYSREGULATION:  \none;    PSYCHOSIS:  none;  DENIES- none  MARILEE/HYPOMANIA:  none;  DENIES- none  TRAUMA RELATED:  Doing better in the interval.   EATING DISORDER:  none  COMPULSIVE:  none  Grief:  none       ALLERGY                                Patient has no known allergies.  MEDICATIONS                               Current Outpatient Medications   Medication Sig Dispense Refill     ACETAMINOPHEN PO Take 325 mg by mouth       amLODIPine-benazepril (LOTREL) 10-20 MG per capsule Take 1 capsule by mouth daily       ARIPiprazole (ABILIFY) 5 MG tablet Take one tab (5mg) PO daily 30 tablet 1     ASPIRIN PO Take 81 mg by mouth daily       calcium-vitamin D 500-125 MG-UNIT TABS Take 1 tablet by mouth 2 times daily       DIPHENHYDRAMINE HCL PO Take 25 mg by mouth daily as needed       escitalopram (LEXAPRO) 20 MG tablet " Take one tablet daily. 30 tablet 3     gabapentin (NEURONTIN) 300 MG capsule Take 1 capsule before bed, can take it up to 3 times a day as needed. 60 capsule 1     LOSARTAN POTASSIUM PO Take 25 mg by mouth       melatonin 3 MG tablet Take 1-3 tabs before bed PRN 90 tablet 1     naltrexone (VIVITROL) 380 MG SUSR Inject 380 mg into the muscle every 30 days 380 mg 11     nitroGLYcerin (NITRODUR) 0.4 MG/HR 24 hr patch Place 1 patch onto the skin daily       NITROGLYCERIN SL Take 0.4 mg by mouth       omeprazole 20 MG tablet Take 1-2 daily 60 tablet 1     ROSUVASTATIN CALCIUM PO Take 40 mg by mouth daily       TAMSULOSIN HCL PO Take 0.4 mg by mouth daily         VITALS   /82   Pulse 106   Wt 84.8 kg (187 lb)   BMI 26.08 kg/m        MENTAL STATUS EXAM                                                           Orientation: full, x3  Alertness: alert   Appearance: well groomed and casually groomed,   Behavior/Demeanor: cooperative, pleasant and calm, with good  eye contact   Speech: slowed  Language: intact  Psychomotor: normal or unremarkable, mildly slowed  Mood: low  Affect: full range and appropriate; was congruent to mood; was congruent to content  Thought Process/Associations: unremarkable  Thought Content:  Denies suicidal and violent ideation, delusions and preoccupations  Perception:    Denies auditory hallucinations and visual hallucinations  Insight: good  Judgment: good  Cognition: does  appear grossly intact; formal cognitive testing was not done  Gait: Normal   MSK: extremities normal, no peripheral edema    LABS and DATA     PHQ9 TODAY =10, from 7 at past visit.  Feeling tired and poor sleep gives 6 points.  PHQ-9 SCORE 5/22/2019 6/20/2019 7/17/2019   PHQ-9 Total Score 9 10 7       SUBSTANCE USE/PSYCHIATRIC DIAGNOSES                                                                                                    Alcohol Use Disorder, severe, in early remission with some brief episodes of drinking.    Major Depressive Disorder, recurrent, improving.   Generalized Anxiety Disorder, improving.   R/o PTSD  (partner suicide attempts,his incarcerations)     ASSESSMENT                                           See 'Plan' section for specific dosing info             This patient is a 73 year old male who has alcohol use disorder since 1980 with a handful of years of sobriety since.  He has been in detox more than 40 times and 4-5 treatments. He continues to have intermittent use, but while on vivitrol this does not provide the feeling it did previously and does not become consistent. He had 2 one-day relapses since his last visit. He has severe fatigue with an unclear source. He expresses low mood, but does have motivation that his fatigue impedes. He is working with his primary care to assess for medical causes of his fatigue, though worsening depression is possible.     #AUD: Severe, without complete remission  #MDD: Current depressed mood and meets criteria for moderate episode based on poor sleep and fatigue. Unclear if these are the result of depression or another cause.  #Fatigue: unclear cause. He should investigate breathing and sleep troubles with PCP. Will check CMP and TSH today.      [place] PRESCRIPTION MONITORING PROGRAM [] was checked today:  indicates no controlled subtances reported in previous 12 months.     PLAN                                                                                                       1) PSYCHOTROPIC MEDICATIONS:   -Continue Lexapro 20 mg daily.      - Stop Abilify because of minimal response to depression and side effects of drooling and possibly fatigue.     -Continue gabapentin to 300 mg, up to TID PRN, he has been using it at bedtime primarily and sometimes once more in the daytime.         -Continue vivitirol injection monthly as due    2) THERAPY:    - Gio has attended the Relapse Prevention Group with Susi Gilbert but took a break break due to financial concerns as  he has a copay.  He says he will look at his finances and call to get back into the group.    - Continue to attend  [facility] on Monday's. Continue to encourage to try a Smart Recovery meeting and see if he finds those helpful. He will look up meeting times online.    -Spend time with friends and family and do not isolate. Try to have plans for Saturday nights and be aware of triggers.    -Consider individual therapist in future, does not want to do right now.             3) NEXT DUE:  LABS- will check LFTs since they have not been checked on vivitrol and TSH for fatigue                                  4) REFERRALS:    Recommend getting a sleep study--talk to primary care about this to rule out another sleep problem affecting quality of sleep, sleep is now improving but still consider due to long standing issues with sleep.     5) RTC: 4 weeks    6) CRISIS NUMBERS:   Provided routinely in AVS.       TREATMENT RISK STATEMENT:  The risks, benefits, alternatives and potential adverse effects have been explained and are understood by the pt. The pt agrees to the treatment plan with the ability to do so. The pt knows to call the clinic for any problems or to access emergency care if needed.  Medical and CD concerns are documented above.  Psychotropic drug interaction check was done, including changes made today, and is discussed above.    ADDICTION FELLOW: Michael Lin      Patient was seen by Dr. Bee,  who will co-sign the note.    Supervisor is Dr. Bee     I saw the patient with the fellow, and participated in key portions of the service, including the mental status examination and developing the plan of care. I reviewed key portions of the history with the fellow. I agree with the findings and plan as documented in this note.    Selam Bee MD            PSYCHIATRY CLINIC INDIVIDUAL PSYCHOTHERAPY NOTE                                                  [16]   Start time - 1:42  End time - 2:00  Date last  reviewed - 7/17/19       Date next due - 1/17/20 (or 12 months if Medicare)    Subjective: This supportive psychotherapy session addressed issues related to goals of therapy  and current stressors consisting of  recent relapses, excessive sleep and sleep hygiene, and attending AA.  Patient's reaction: Executing the plan.  Progress: good  Plan: RTC 1 month  Psychotherapy services during this visit included  myself and the patient.   TREATMENT  PLAN          SYMPTOMS; PROBLEMS   MEASURABLE GOALS;    FUNCTIONAL IMPROVEMENT INTERVENTIONS;   GAINS MADE DISCHARGE CRITERIA   Substance Use: alcohol    No use of alcohol  Using relapse prevention skills  Do not isolate  Continue with vivitrol No use of alcohol in interval  Handled stressful situations around family who were drinking  Has been more social  Realizes vivitrol helps cravings sustained sobriety and reduction in cravings   Depression: depressed mood, low energy, concentration problems and excessive sleep   reduce depressive symptoms, reduce suicidal thoughts, report feeling more positive about self , develop strategies for thought distraction when ruminating and make a plan to manage 2-3 anxiety-provoking situations Awareness of feelings  Recognition that excessive sleep and lack of focus may be signs of depression.    Awareness of negative self talk  Reach out to friends to do social activities, such as go to the museum.  Will call a friend if feeling down or doesn't know what to do.will go to bed and wake up at same time.   Monitor food intake as recent weight gain over last month.  marked symptom improvement, symptom resolution and significant improvement in self-reports for 5 consecutive visits

## 2019-09-11 ENCOUNTER — ALLIED HEALTH/NURSE VISIT (OUTPATIENT)
Dept: PSYCHIATRY | Facility: CLINIC | Age: 74
End: 2019-09-11
Payer: COMMERCIAL

## 2019-09-11 DIAGNOSIS — F10.21 ALCOHOL USE DISORDER, SEVERE, IN EARLY REMISSION (H): Primary | ICD-10-CM

## 2019-09-11 PROCEDURE — 96372 THER/PROPH/DIAG INJ SC/IM: CPT | Mod: ZF

## 2019-09-11 PROCEDURE — 25000128 H RX IP 250 OP 636: Mod: ZF | Performed by: PSYCHIATRY & NEUROLOGY

## 2019-09-11 RX ADMIN — NALTREXONE 380 MG: KIT at 11:30

## 2019-09-11 NOTE — NURSING NOTE
"  Clinic Administered Medication Documentation      Injectable Medication Documentation    Patient was given Vivitrol 380 mg. Prior to medication administration, verified patients identity using patient s name and date of birth. Please see MAR and medication order for additional information. Patient instructed to remain in clinic for 15 minutes and report any adverse reaction to staff immediately .      Was entire vial of medication used? Yes  Vial/Syringe: Single dose vial  Expiration Date:  2021  Was this medication supplied by the patient? No      Laurent Choi,  1945, presented to the clinic today at the request of Dr. Lin,  ordering provider for long-acting injectable Vivitrol 380 mg.    OBSERVATIONS:  1.  Appearance: Casually dressed in clean jeans, polo shirt and light weight jacket. Writer asked pt when was the last time he had anything to drink, pt stated \"I had 2 beers one day and another time I had a six-pack within the last month. It really didn't do anything and I felt bad after I drank.\"  \"I've been really fatigued also and I've been getting really winded lately. Like I am right now, from the lobby to this room, short of breath.\" \" I have an appointment with my PCP next week so I'll talk to him about it.\"Pt also stated, \"I stopped the Abilify and I have a little more energy, but I am still feeling real tired. I go to bed between 11:00pm - midnight and I'm sleeping until 11:00 or noon. If I take a nap in the afternoon, I can sleep upwards of 2 to 3 hours, but if I lay down around dinner time I sleep about an hour, and I don't have any problem going to sleep when I go to bed for the night.\" \"The drooling has decreased somewhat, so I think it might have been the Abilify.\" Pt does not have any SI or HI.  2.  Mood: Fair  3.  Affect: Flat  4.  Attitude: Fair  5.  Cooperation: Full  6.  Side Effects: See above  7.  Education: Continue to use MyChart to contact the clinic, pt agreed  8. Next " appointment: 10/9/2019 at 1330    The service provided today was rendered under the supervising provider of the day, Dr. Guidry, who was available for consultation as needed.

## 2019-09-17 ENCOUNTER — OFFICE VISIT - HEALTHEAST (OUTPATIENT)
Dept: FAMILY MEDICINE | Facility: CLINIC | Age: 74
End: 2019-09-17

## 2019-09-17 ENCOUNTER — COMMUNICATION - HEALTHEAST (OUTPATIENT)
Dept: ADMINISTRATIVE | Facility: CLINIC | Age: 74
End: 2019-09-17

## 2019-09-17 ENCOUNTER — AMBULATORY - HEALTHEAST (OUTPATIENT)
Dept: PULMONOLOGY | Facility: OTHER | Age: 74
End: 2019-09-17

## 2019-09-17 DIAGNOSIS — I10 HYPERTENSION, ESSENTIAL: ICD-10-CM

## 2019-09-17 DIAGNOSIS — R06.02 SOB (SHORTNESS OF BREATH): ICD-10-CM

## 2019-09-17 DIAGNOSIS — J43.9 PULMONARY EMPHYSEMA, UNSPECIFIED EMPHYSEMA TYPE (H): ICD-10-CM

## 2019-09-17 DIAGNOSIS — R53.83 FATIGUE, UNSPECIFIED TYPE: ICD-10-CM

## 2019-09-29 ENCOUNTER — HEALTH MAINTENANCE LETTER (OUTPATIENT)
Age: 74
End: 2019-09-29

## 2019-10-09 ENCOUNTER — OFFICE VISIT (OUTPATIENT)
Dept: PSYCHIATRY | Facility: CLINIC | Age: 74
End: 2019-10-09
Attending: PSYCHIATRY & NEUROLOGY
Payer: COMMERCIAL

## 2019-10-09 VITALS
SYSTOLIC BLOOD PRESSURE: 137 MMHG | WEIGHT: 188.6 LBS | HEART RATE: 109 BPM | DIASTOLIC BLOOD PRESSURE: 80 MMHG | BODY MASS INDEX: 26.3 KG/M2

## 2019-10-09 DIAGNOSIS — F10.21 ALCOHOL USE DISORDER, SEVERE, IN EARLY REMISSION (H): Primary | ICD-10-CM

## 2019-10-09 PROCEDURE — G0463 HOSPITAL OUTPT CLINIC VISIT: HCPCS | Mod: 25

## 2019-10-09 PROCEDURE — 25000128 H RX IP 250 OP 636: Mod: ZF | Performed by: PSYCHIATRY & NEUROLOGY

## 2019-10-09 PROCEDURE — 96372 THER/PROPH/DIAG INJ SC/IM: CPT | Mod: ZF

## 2019-10-09 RX ADMIN — NALTREXONE 380 MG: KIT at 14:00

## 2019-10-09 ASSESSMENT — PATIENT HEALTH QUESTIONNAIRE - PHQ9: SUM OF ALL RESPONSES TO PHQ QUESTIONS 1-9: 11

## 2019-10-09 ASSESSMENT — PAIN SCALES - GENERAL: PAINLEVEL: NO PAIN (0)

## 2019-10-09 NOTE — PROGRESS NOTES
"  ----------------------------------------------------------------------------------------------------------  [facility], [place]   Psychiatry Clinic Progress Note  Addiction Medicine                        IDENTIFICATION   Laurent \"Gio\" Blaze Choi is a 73 year old male who was referred by self for evaluation of alcohol use disorder and desire to continue Vivitrol.  History was provided by patient who was a good historian. He is here for a follow up visit with his initial visit on 10/17/18 and last visit was 9/4/19.        CHIEF COMPLAINT      \"I'm doing fine today, but not much has changed\"     Brief Summary     The patient has a past psychiatric/substance use history of alcohol use disorder, MDD and RODRIGO.    Gio has been in detox over 40 times and treatment about 5 times.  His first treatment was in 1980. He gave up daily drinking in late 1990's, then began to binge drink.  In recent years, his pattern has been drinking 1-2 weeks, not eating most of that time, and getting very sick. He then will stop drinking, will be sober for 1-2 weeks. Alcohol has caused withdrawal and tolerance, drinking 6-10 beers and 1 pint a day when on a binge. Alcohol has affected relationships, has affected health with getting sick, not eating and getting depressed.     Interval History  Still feeling tired and unmotivated all the time.     He buys a couple of beers a week at  [employer]. There he can buy just 2 or 3 beers instead of a whole 6 pack. He was typically doing it Friday or Saturday nights when he was feeling most desperate for some purpose. He reports that he is 8 out of 10 for wanting to quit drinking and 5/10 in his belief that he can do it. He thinks attending meetings can move him to 7/10.    There is a SMART recovery meeting on Saturday mornings near [facility] that he will look into. There is a men's AA group on Saturday morning as well that he has been interested in.     He has " "arranged for some financial assistance from the business office at [facility] to go to Dr. Gilbert's group.    Gio continues to feel fatigue and sleep over 12 hours a day. He still has not gotten a sleep study from his doctor, though he was asked to get PFTs. He wasn't sure he is going to follow through and get them because he's not sure anything can be done anyway    He again asks whether his fatigue is from the vivitrol. We reviewed that even though he stopped vivitrol last Spring his fatigue did not change.       Treatment History:    First treatment in 1980.  Last treatment at [facility] finished  November 2018.     Substance Use Pharmacotherapies:   Tried antabuse in late 1990's and he would stop taking and \"drink around it\".       RECENT SYMPTOMS   [PSYCH ROS]     PANIC ATTACK:  denies   ANXIETY:  none currently   DEPRESSION:  Improving mood. Sleep, motivation and energy are improving.   Denies SI/SIB.   DYSREGULATION:  \none;    PSYCHOSIS:  none;  DENIES- none  MARILEE/HYPOMANIA:  none;  DENIES- none  TRAUMA RELATED:  none.   EATING DISORDER:  none  COMPULSIVE:  none  Grief:  none       ALLERGY                                Patient has no known allergies.  MEDICATIONS                               Current Outpatient Medications   Medication Sig Dispense Refill     ACETAMINOPHEN PO Take 325 mg by mouth       amLODIPine-benazepril (LOTREL) 10-20 MG per capsule Take 1 capsule by mouth daily       ASPIRIN PO Take 81 mg by mouth daily       calcium-vitamin D 500-125 MG-UNIT TABS Take 1 tablet by mouth 2 times daily       DIPHENHYDRAMINE HCL PO Take 25 mg by mouth daily as needed       escitalopram (LEXAPRO) 20 MG tablet Take one tablet daily. 30 tablet 3     gabapentin (NEURONTIN) 300 MG capsule Take 1 capsule before bed, can take it up to 3 times a day as needed. 60 capsule 1     LOSARTAN POTASSIUM PO Take 25 mg by mouth       melatonin 3 MG tablet Take 1-3 tabs before bed PRN 90 tablet 1     naltrexone " (VIVITROL) 380 MG SUSR Inject 380 mg into the muscle every 30 days 380 mg 11     nitroGLYcerin (NITRODUR) 0.4 MG/HR 24 hr patch Place 1 patch onto the skin daily       NITROGLYCERIN SL Take 0.4 mg by mouth       omeprazole 20 MG tablet Take 1-2 daily 60 tablet 1     ROSUVASTATIN CALCIUM PO Take 40 mg by mouth daily       TAMSULOSIN HCL PO Take 0.4 mg by mouth daily         VITALS   /80   Pulse 109   Wt 85.5 kg (188 lb 9.6 oz)   BMI 26.30 kg/m        MENTAL STATUS EXAM                                                           Orientation: full, x3  Alertness: alert   Appearance: well groomed and casually groomed,   Behavior/Demeanor: cooperative, pleasant and calm, with good  eye contact   Speech: slowed  Language: intact  Psychomotor: normal or unremarkable,  Mood: low  Affect: full range and appropriate; was congruent to mood; was congruent to content  Thought Process/Associations: unremarkable  Thought Content:  Denies suicidal and violent ideation, delusions and preoccupations  Perception:    Denies auditory hallucinations and visual hallucinations  Insight: good  Judgment: good  Cognition: does  appear grossly intact; formal cognitive testing was not done  Gait: Normal   MSK: extremities normal, no peripheral edema    LABS and DATA     PHQ9 TODAY =11, from 9 at past visit.  Feeling tired and poor sleep gives 6 points.  PHQ-9 SCORE 7/17/2019 9/4/2019 10/9/2019   PHQ-9 Total Score 7 10 11       SUBSTANCE USE/PSYCHIATRIC DIAGNOSES                                                                                                    Alcohol Use Disorder, severe, in early remission with some brief episodes of drinking.   Major Depressive Disorder, recurrent, improving.   Generalized Anxiety Disorder, improving.   R/o PTSD  (partner suicide attempts,his incarcerations)     ASSESSMENT                                           See 'Plan' section for specific dosing info             This patient is a 73 year old  male who has alcohol use disorder since 1980 with a handful of years of sobriety since.  He has been in detox more than 40 times and 4-5 treatments. He continues to have intermittent use, but while on vivitrol this does not provide the feeling it did previously and does not become consistent. He had weekly instances of drinking since his last visit. He has severe fatigue with an unclear source, but concerning for sleep apnea. He is working with his primary care to assess for medical causes of his fatigue, though worsening depression is possible.     #AUD: Severe, without complete remission  #MDD: Current depressed mood and meets criteria for moderate episode based on poor sleep and fatigue. Unclear if these are the result of depression or another cause.  #Fatigue: unclear cause. Encouraged again to investigate breathing and sleep troubles with PCP..      [place] PRESCRIPTION MONITORING PROGRAM [] was checked today:  indicates no controlled subtances reported in previous 12 months.     PLAN                                                                                                       1) PSYCHOTROPIC MEDICATIONS:   -Continue Lexapro 20 mg daily.       -Continue gabapentin to 300 mg, up to TID PRN, he has been using it at bedtime primarily and sometimes once more in the daytime.         -Continue vivitirol injection monthly as due    2) THERAPY:    - Gio has attended the Relapse Prevention Group with Susi Gilbert but took a break due to financial concerns as he has a copay. Since his last visit he has called and been told he will get financial assistance to go to the group. He plans to return to the group     - Continue to attend  AA on Monday's. Made a plan to attend SMART recovery on Saturdays. And make it by setting an alarm.   -Spend time with friends and family and do not isolate. Try to have plans for Saturday nights and be aware of triggers.    -Consider individual therapist in future, does not want to do  right now.         Set an alarm for Saturday morning to go to the SMART recovery meeting.     3) NEXT DUE:  LABS- none                                4) REFERRALS:    Recommend getting a sleep study--talk to primary care about this to rule out another sleep problem affecting quality of sleep, sleep is now improving but still consider due to long standing issues with sleep.     5) RTC: 4 weeks    6) CRISIS NUMBERS:   Provided routinely in AVS.       TREATMENT RISK STATEMENT:  The risks, benefits, alternatives and potential adverse effects have been explained and are understood by the pt. The pt agrees to the treatment plan with the ability to do so. The pt knows to call the clinic for any problems or to access emergency care if needed.  Medical and CD concerns are documented above.  Psychotropic drug interaction check was done, including changes made today, and is discussed above.    ADDICTION FELLOW: Michael Lin      Patient was seen by Dr. Bee,  who will co-sign the note.    Supervisor is Dr. Bee     I saw the patient with the fellow, and participated in key portions of the service, including the mental status examination and developing the plan of care. I reviewed key portions of the history with the fellow. I agree with the findings and plan as documented in this note.    Selam Bee MD        PSYCHIATRY CLINIC INDIVIDUAL PSYCHOTHERAPY NOTE                                                  [16]   Start time - 1:10  End time - 1:30  Date last reviewed - 10/9/19       Date next due - 1/17/20 (or 12 months if Medicare)    Subjective: This supportive psychotherapy session addressed issues related to goals of therapy  and current stressors consisting of  recent relapses, excessive fatigue and time asleep, and attending AA.  Patient's reaction: Executing the plan.  Progress: good  Plan: RTC 1 month  Psychotherapy services during this visit included  myself and the patient.   TREATMENT  PLAN           SYMPTOMS; PROBLEMS   MEASURABLE GOALS;    FUNCTIONAL IMPROVEMENT INTERVENTIONS;   GAINS MADE DISCHARGE CRITERIA   Substance Use: alcohol    No use of alcohol  Using relapse prevention skills  Do not isolate  Continue with vivitrol Has relapsed to intermittent use of alcohol  Feels he is isolating and not going to meetings  Realizes vivitrol helps cravings sustained sobriety and reduction in cravings   Depression: depressed mood, low energy, concentration problems and excessive sleep   reduce depressive symptoms, reduce suicidal thoughts, report feeling more positive about self , develop strategies for thought distraction when ruminating and make a plan to manage 2-3 anxiety-provoking situations Awareness of feelings  Recognition that fatigue may come from lack of sleep or poor quality sleep. Plans to talk to PCP about sleep and breathing studies   Reach out to friends to do social activities, such as go to the museum.  Will call a friend if feeling down or doesn't know what to do..  marked symptom improvement, symptom resolution and significant improvement in self-reports for 5 consecutive visits

## 2019-10-09 NOTE — PATIENT INSTRUCTIONS
Go to the SMART Recovery meeting on Saturday mornings.    Rejoin Dr. Gilbert's group therapy now that you have received financial assistance.      For your fatigue  Get the pulmonary function tests recommended by your doctor.  Tell you primary care doctor about your difficulty breathing at night and your fatigue during the day. I think you should be tested for sleep apnea.    Thank you for coming to the PSYCHIATRY CLINIC.    Lab Testing:  If you had lab testing today and your results are reassuring or normal they will be mailed to you or sent through [employer] within 7 days.   If the lab tests need quick action we will call you with the results.  The phone number we will call with results is # [phone] (home) . If this is not the best number please call our clinic and change the number.    Medication Refills:  If you need any refills please call your pharmacy and they will contact us. Our fax number for refills is [phone]. Please allow three business for refill processing.   If you need to  your refill at a new pharmacy, please contact the new pharmacy directly. The new pharmacy will help you get your medications transferred.     Scheduling:  If you have any concerns about today's visit or wish to schedule another appointment please call our office during normal business hours [phone] (8-5:00 M-F)    Contact Us:  Please call [phone] during business hours (8-5:00 M-F).  If after clinic hours, or on the weekend, please call  [phone].    Financial Assistance [phone]  [facility] Billing [phone]  [place] Billing Office, [facility]: [phone]  [facility] Billing [phone]  Medical Records [phone]      MENTAL HEALTH CRISIS NUMBERS:  [facility]:   [facility] - [phone]   Crisis Residence [facility] - [phone]   Walk-In Counseling Center [facility] - [phone]   COPE 24/7 [place] Mobile Team for Adults - [[phone]]; Child -  [[phone]]        [facility]:   [facility] - [phone]   Walk-in counseling [facility] - [phone]   Walk-in counseling [facility] - [phone]   Crisis Residence [facility] - [phone]   Urgent Care Adult Mental Health:   --Drop-in, 24/7 crisis line, and [facility] Mobile Team [[phone]]    CRISIS TEXT LINE: Text [phone] from anywhere, anytime, any crisis 24/7;    OR SEE [email or web address]     Poison Control Center - [phone]    CHILD: [facility] needs assessment team - [phone]     [facility] Lifeline - [phone]; or [facility] Lifeline - [phone]    If you have a medical emergency please call 911or go to the nearest ER.                    _____________________________________________    Again thank you for choosing PSYCHIATRY CLINIC and please let us know how we can best partner with you to improve you and your family's health.  You may be receiving a survey in the mail regarding this appointment. We would love to have your feedback, both positive and negative, so please fill out the survey and return it using the provided envelope. The survey is done by an external company, so your answers are anonymous.

## 2019-10-09 NOTE — NURSING NOTE
"Clinic Administered Medication Documentation      Injectable Medication Documentation    Patient was given Vivitrol 380 mg. Prior to medication administration, verified patients identity using patient s name and date of birth. Please see MAR and medication order for additional information. Patient instructed to remain in clinic for 15 minutes and report any adverse reaction to staff immediately .      Was entire vial of medication used? Yes  Vial/Syringe: Single dose vial  Expiration Date:  2022  Was this medication supplied by the patient? No      Gio Choi,  1945, presented to the clinic today at the request of Dr. Lin,  ordering provider for long-acting injectable Vivitrol 380 mg.    OBSERVATIONS:  1.  Appearance: Casually dressed in clean jeans and button shirt. Pt stated he \"had a few beers this past Monday. (10/7/19) Pt stated, \"I got no pleasure out of drinking the beers. Which is good, so I know the shot is working.\" Writer encouraged going to AA which pt said he usually goes to on , just not this past Monday. Pt said he really likes his new Dr (Delia) since Dr Leatha Saldana is no longer here in clinic.  2.  Mood: Good, talkative, pleasant  3.  Affect: Congruent  4.  Attitude: Good, engaged, good eye contact  5.  Cooperation: Full  6.  Side Effects: Denied  7.  Education: Call or use My Chart to communicate with Clinic for any questions or concerns. Pt agreed  8. Next appointment: 2019 at 1100    The service provided today was rendered under the supervising provider of the day, Dr. Guidry, who was available for consultation as needed.        "

## 2019-10-14 ENCOUNTER — COMMUNICATION - HEALTHEAST (OUTPATIENT)
Dept: FAMILY MEDICINE | Facility: CLINIC | Age: 74
End: 2019-10-14

## 2019-10-14 DIAGNOSIS — G47.10 SLEEPING EXCESSIVE: ICD-10-CM

## 2019-10-14 DIAGNOSIS — R53.83 FATIGUE, UNSPECIFIED TYPE: ICD-10-CM

## 2019-10-17 ENCOUNTER — OFFICE VISIT (OUTPATIENT)
Dept: PSYCHIATRY | Facility: CLINIC | Age: 74
End: 2019-10-17
Attending: PSYCHOLOGIST
Payer: COMMERCIAL

## 2019-10-17 DIAGNOSIS — F10.20 ALCOHOL DEPENDENCE, CONTINUOUS (H): Primary | ICD-10-CM

## 2019-10-21 ENCOUNTER — RECORDS - HEALTHEAST (OUTPATIENT)
Dept: ADMINISTRATIVE | Facility: OTHER | Age: 74
End: 2019-10-21

## 2019-10-21 ENCOUNTER — OFFICE VISIT - HEALTHEAST (OUTPATIENT)
Dept: PULMONOLOGY | Facility: OTHER | Age: 74
End: 2019-10-21

## 2019-10-21 ENCOUNTER — RECORDS - HEALTHEAST (OUTPATIENT)
Dept: PULMONOLOGY | Facility: OTHER | Age: 74
End: 2019-10-21

## 2019-10-21 DIAGNOSIS — R06.02 SHORTNESS OF BREATH: ICD-10-CM

## 2019-10-21 DIAGNOSIS — R91.8 PULMONARY NODULES: ICD-10-CM

## 2019-10-21 DIAGNOSIS — R53.83 FATIGUE, UNSPECIFIED TYPE: ICD-10-CM

## 2019-10-21 DIAGNOSIS — R06.02 SOB (SHORTNESS OF BREATH): ICD-10-CM

## 2019-10-21 DIAGNOSIS — J43.9 PULMONARY EMPHYSEMA, UNSPECIFIED EMPHYSEMA TYPE (H): ICD-10-CM

## 2019-10-21 ASSESSMENT — MIFFLIN-ST. JEOR: SCORE: 1589.15

## 2019-10-21 NOTE — PROGRESS NOTES
Relapse Prevention Group for Substance Use Disorders                                Time of Service: 4:05 - 5:00   Length of Appointment: 55 mins  Care Providers: Susi Gilbert, PhD, LP  Total number of patients present: 3    DSM-5 Diagnosis:   Alcohol Use Disorder, severe  Major Depressive Disorder, recurrent (per chart review)  Generalized Anxiety Disorder (per chart review)    GROUP CONTENT:  Writer led patients in discussion of relapse prevention strategies for addiction with a focus on how to say no to peer pressure whether subtle or overt.      PATIENT ENGAGEMENT: Active and engaged. For HW, Gio plans to attend 1 group during the week.     MENTAL STATUS EXAM:   Appearance: Casually dressed and appropriately groomed   Motor activity: Within normal range  Speech rate: Within normal range  Speech volume: Within normal range  Speech articulation:  Within normal range  Speech coherence:  Within normal range  Speech spontaneity:  Within normal range  Affect (objective appearance):  Euthymic  Thought process: Linear, logical, goal-oriented  Suicide/ violence risk: Not individually assessed given group context, but no signs of risk were observed    PLAN:   - Attend Relapse Prevention Group on Thursdays at 4pm   - Gio requested referral for individual therapy. Writer will schedule him with psychology learner for therapy.

## 2019-10-24 ENCOUNTER — OFFICE VISIT (OUTPATIENT)
Dept: PSYCHIATRY | Facility: CLINIC | Age: 74
End: 2019-10-24
Attending: PSYCHOLOGIST
Payer: COMMERCIAL

## 2019-10-24 DIAGNOSIS — F33.1 MODERATE EPISODE OF RECURRENT MAJOR DEPRESSIVE DISORDER (H): ICD-10-CM

## 2019-10-24 DIAGNOSIS — F10.20 ALCOHOL DEPENDENCE, CONTINUOUS (H): Primary | ICD-10-CM

## 2019-10-24 NOTE — PROGRESS NOTES
Relapse Prevention Group for Substance Use Disorders                                Time of Service: 4:05 - 5:00   Length of Appointment: 55 mins  Care Providers: Susi Gilbert, PhD, LP  Total number of patients present: 3    DSM-5 Diagnosis:   Alcohol Use Disorder, severe  Major Depressive Disorder, recurrent (per chart review)  Generalized Anxiety Disorder (per chart review)    GROUP CONTENT:  Writer led patients in discussion of relapse prevention strategies for addiction with a focus on coping with slips.      PATIENT ENGAGEMENT: Active and engaged. For HW, Gio plans to  1x as a BA goal to improve mood.     MENTAL STATUS EXAM:   Appearance: Casually dressed and appropriately groomed   Motor activity: Within normal range  Speech rate: Within normal range  Speech volume: Within normal range  Speech articulation:  Within normal range  Speech coherence:  Within normal range  Speech spontaneity:  Within normal range  Affect (objective appearance):  Euthymic  Thought process: Linear, logical, goal-oriented  Suicide/ violence risk: Not individually assessed given group context, but no signs of risk were observed    PLAN:   - Attend Relapse Prevention Group on Thursdays at 4pm

## 2019-10-25 NOTE — TELEPHONE ENCOUNTER
Medication requested: escitalopram (LEXAPRO) 20 MG tablet  Last refilled: 9/23/19  Qty: 30      Last seen: 10/9/19  RTC: 4 weeks  Cancel: 0  No-show: 0  Next appt: 11/20/19    Refill decision:   Refill pended and routed to the provider for review/determination due to   Last note from 10/9/19 is not signed

## 2019-10-30 ENCOUNTER — OFFICE VISIT (OUTPATIENT)
Dept: PSYCHIATRY | Facility: CLINIC | Age: 74
End: 2019-10-30
Attending: PSYCHOLOGIST
Payer: COMMERCIAL

## 2019-10-30 DIAGNOSIS — F10.20 ALCOHOL USE DISORDER, SEVERE, DEPENDENCE (H): ICD-10-CM

## 2019-10-30 DIAGNOSIS — F33.2 SEVERE EPISODE OF RECURRENT MAJOR DEPRESSIVE DISORDER, WITHOUT PSYCHOTIC FEATURES (H): Primary | ICD-10-CM

## 2019-10-30 DIAGNOSIS — F41.1 GAD (GENERALIZED ANXIETY DISORDER): ICD-10-CM

## 2019-10-30 RX ORDER — ESCITALOPRAM OXALATE 20 MG/1
20 TABLET ORAL DAILY
Qty: 30 TABLET | Refills: 0 | Status: SHIPPED | OUTPATIENT
Start: 2019-10-30 | End: 2019-11-20

## 2019-10-30 ASSESSMENT — PATIENT HEALTH QUESTIONNAIRE - PHQ9: SUM OF ALL RESPONSES TO PHQ QUESTIONS 1-9: 17

## 2019-10-31 ENCOUNTER — OFFICE VISIT (OUTPATIENT)
Dept: PSYCHIATRY | Facility: CLINIC | Age: 74
End: 2019-10-31
Attending: PSYCHOLOGIST
Payer: COMMERCIAL

## 2019-10-31 DIAGNOSIS — F10.20 ALCOHOL USE DISORDER, SEVERE, DEPENDENCE (H): Primary | ICD-10-CM

## 2019-11-06 ENCOUNTER — OFFICE VISIT (OUTPATIENT)
Dept: PSYCHIATRY | Facility: CLINIC | Age: 74
End: 2019-11-06
Attending: PSYCHOLOGIST
Payer: COMMERCIAL

## 2019-11-06 ENCOUNTER — ALLIED HEALTH/NURSE VISIT (OUTPATIENT)
Dept: PSYCHIATRY | Facility: CLINIC | Age: 74
End: 2019-11-06
Payer: COMMERCIAL

## 2019-11-06 DIAGNOSIS — F10.21 ALCOHOL USE DISORDER, SEVERE, IN EARLY REMISSION (H): Primary | ICD-10-CM

## 2019-11-06 DIAGNOSIS — F10.20 ALCOHOL USE DISORDER, SEVERE, DEPENDENCE (H): ICD-10-CM

## 2019-11-06 DIAGNOSIS — Z23 NEED FOR PROPHYLACTIC VACCINATION AND INOCULATION AGAINST INFLUENZA: ICD-10-CM

## 2019-11-06 DIAGNOSIS — F33.2 SEVERE EPISODE OF RECURRENT MAJOR DEPRESSIVE DISORDER, WITHOUT PSYCHOTIC FEATURES (H): Primary | ICD-10-CM

## 2019-11-06 DIAGNOSIS — F41.1 GAD (GENERALIZED ANXIETY DISORDER): ICD-10-CM

## 2019-11-06 PROCEDURE — G0008 ADMIN INFLUENZA VIRUS VAC: HCPCS | Mod: ZF

## 2019-11-06 PROCEDURE — 90662 IIV NO PRSV INCREASED AG IM: CPT | Mod: ZF

## 2019-11-06 PROCEDURE — 25000128 H RX IP 250 OP 636: Mod: ZF | Performed by: PSYCHIATRY & NEUROLOGY

## 2019-11-06 PROCEDURE — 96372 THER/PROPH/DIAG INJ SC/IM: CPT | Mod: ZF

## 2019-11-06 PROCEDURE — G0463 HOSPITAL OUTPT CLINIC VISIT: HCPCS

## 2019-11-06 PROCEDURE — 25000128 H RX IP 250 OP 636: Mod: ZF

## 2019-11-06 RX ADMIN — NALTREXONE 380 MG: KIT at 11:30
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
## 2019-11-06 NOTE — PROGRESS NOTES
SESSION TYPE: Individual psychotherapy  LENGTH OF APPOINTMENT: 55 minutes  APPOINTMENT TIME: 10:00 am - 10:55 am  PARTICIPANTS: Writer (Lynda Olmedo MA) and patient  SUPERVISOR: Susi Gilbert, PhD, LP    DSM-5 DIAGNOSIS  Major Depressive Disorder, Recurrent, Severe  Generalized Anxiety Disorder  Alcohol Use Disorder, Severe  R/O Social Anxiety Disorder    SUBJECTIVE: Mr. Choi reported a good week. He described engaging in activating activities, such as exercise and attending AA meetings. He reported continued fatigue and trouble sleeping. Mr. Choi stated he would like to stop taking his Vivitrol because he thinks it is contributing to his depressed mood.    TREATMENT: This session was spent treatment planning and goal setting. Mr. Choi identified the following treatment goals: to obtain better sleep patterns, get off of Vivitrol, improve overall general health, increase motivation and overall life satisfaction, decrease depressive symptoms, maintain sobriety and reengage with his hobbies. Mr. Choi was introduced to CBT concepts. His homework is to think about the CBT skills he is learning in his group therapy for his alcohol use disorder and how he can apply those skills to achieve his desired treatment goals.    ASSESSMENT: Mr. Choi arrived on-time for the session. He was well groomed and appropriately dressed for the session. He appeared his stated age. He engaged in spontaneous conversation with the therapist. Eye contact was appropriate. Speech and thought were unremarkable/fluent/linear and organized throughout the session. Mr. Choi demonstrated some insight into triggers for his substance use.     PLAN:    -RTC 1 week for psychotherapy   -Think about CBT application to achieve his treatment goals   -Continue medication management with addiction medicine fellow    I did not see this pt directly. This pt was discussed with me in individual psychotherapy supervision, and I agree with the plan  as documented.     Susi Gilbert, Ph.D., L.P.

## 2019-11-06 NOTE — NURSING NOTE
Clinic Administered Medication Documentation    MEDICATION LIST:   Injectable Medication Documentation    Patient was given Fluzone. Prior to medication administration, verified patients identity using patient s name and date of birth. Please see MAR and medication order for additional information. Patient instructed to remain in clinic for 15 minutes and report any adverse reaction to staff immediately .      Was entire vial of medication used? Yes  Vial/Syringe: Syringe  Expiration Date:  5/6/20  Was this medication supplied by the patient? No

## 2019-11-06 NOTE — NURSING NOTE
"Clinic Administered Medication Documentation      Injectable Medication Documentation    Patient was given Vivitrol 380 mg. Prior to medication administration, verified patients identity using patient s name and date of birth. Please see MAR and medication order for additional information. Patient instructed to remain in clinic for 15 minutes and report any adverse reaction to staff immediately .      Was entire vial of medication used? Yes  Vial/Syringe: Single dose vial  Expiration Date:  2021  Was this medication supplied by the patient? No      Gio Choi,  1945, presented to the clinic today at the request of Dr. Lin,  ordering provider for long-acting injectable Vivitrol 380 mg.    OBSERVATIONS:  1.  Appearance: Casually dressed in clean jeans, flannel shirt and jacket. Pt stated \"it's been 6 weeks since my last drink\" Writer congratulated pt  2.  Mood: Good, talkative, engaged  3.  Affect: Congruent  4.  Attitude: Good, good eye contact, pleasant  5.  Cooperation: Full  6.  Side Effects: Denied  7.  Education: Ice at injection site for pain. Contact clinic via My Chart or call with any questions or concerns. Pt agreed  8. Next appointment: 2019 at 1100    The service provided today was rendered under the supervising provider of the day, Dr. Guidry, who was available for consultation as needed.        "

## 2019-11-11 NOTE — PROGRESS NOTES
Relapse Prevention Group for Substance Use Disorders                                Time of Service: 4:05 - 5:00   Length of Appointment: 55 mins  Care Providers: Susi Gilbert, PhD, LP  Total number of patients present: 2 (4 group members were scheduled but 2 no-showed)    DSM-5 Diagnosis:   Alcohol Use Disorder, severe  Major Depressive Disorder, recurrent (per chart review)  Generalized Anxiety Disorder (per chart review)    GROUP CONTENT:  Writer led patients in discussion of relapse prevention strategies for addiction with a focus on identification of unhelpful thoughts.     PATIENT ENGAGEMENT: Active and engaged.     MENTAL STATUS EXAM:   Appearance: Casually dressed and appropriately groomed   Motor activity: Within normal range  Speech rate: Within normal range  Speech volume: Within normal range  Speech articulation:  Within normal range  Speech coherence:  Within normal range  Speech spontaneity:  Within normal range  Affect (objective appearance):  Euthymic  Thought process: Linear, logical, goal-oriented  Suicide/ violence risk: Not individually assessed given group context, but no signs of risk were observed    PLAN:   - Attend Relapse Prevention Group on Thursdays at 4pm   - Individual therapy with Lynda Olmedo MA  - Medication management with addiction medicine fellow

## 2019-11-12 DIAGNOSIS — F10.21 ALCOHOL USE DISORDER, SEVERE, IN EARLY REMISSION (H): ICD-10-CM

## 2019-11-13 ENCOUNTER — OFFICE VISIT (OUTPATIENT)
Dept: PSYCHIATRY | Facility: CLINIC | Age: 74
End: 2019-11-13
Attending: PSYCHOLOGIST
Payer: COMMERCIAL

## 2019-11-13 DIAGNOSIS — F41.1 GAD (GENERALIZED ANXIETY DISORDER): ICD-10-CM

## 2019-11-13 DIAGNOSIS — F33.2 SEVERE EPISODE OF RECURRENT MAJOR DEPRESSIVE DISORDER, WITHOUT PSYCHOTIC FEATURES (H): Primary | ICD-10-CM

## 2019-11-13 DIAGNOSIS — F10.20 ALCOHOL USE DISORDER, SEVERE, DEPENDENCE (H): ICD-10-CM

## 2019-11-13 RX ORDER — GABAPENTIN 300 MG/1
CAPSULE ORAL
Qty: 60 CAPSULE | Refills: 1 | Status: SHIPPED | OUTPATIENT
Start: 2019-11-13 | End: 2019-11-20

## 2019-11-13 NOTE — PROGRESS NOTES
"SESSION TYPE: Individual psychotherapy  LENGTH OF APPOINTMENT: 57 minutes  APPOINTMENT TIME: 10:00 am - 10:57 am  PARTICIPANTS: Writer (Lynda Olmedo MA) and patient  SUPERVISOR: Susi Gilbert, PhD, LP    DSM-5 DIAGNOSIS  Major Depressive Disorder, Recurrent, Severe  Generalized Anxiety Disorder  Alcohol Use Disorder, Severe  R/O Social Anxiety Disorder    SUBJECTIVE: Mr. Choi reported a \"busy\" week. He described engaging in activating activities, such as exercise, attending AA meetings, viewing a movie, going to a concert, and going to the txtr. He reported continued fatigue and trouble sleeping. Mr. Choi stated he would like to stop taking his Vivitrol because he thinks it is contributing to his fatigue. The writer encourage Mr. Choi to continue taking his Vivitrol until otherwise recommended by his prescribing doctor.    TREATMENT: This session was spent discussing Mr. Choi's morals and values. Mr. Choi used the session to express his frustrations around his experience being labeled as a sexual offender. He discussed feeling ostracized by that title and by identifying as melendrez. Mr. Choi expressed an attraction to pubescent young boys, noting he is \"proud\" of his attraction. Mr. Choi discussed his attempts to reconnect with his family. He was challenged to contact his sister regarding Thanksgiving plans. 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 He noted his art is very personal and he feels vulnerable showing it to others.    ASSESSMENT: Mr. Choi arrived on-time for the session. He was well groomed and appropriately dressed for the session. He appeared his stated age. He engaged in spontaneous conversation with the therapist. Eye contact was appropriate. Speech and thought were unremarkable/fluent/linear and organized throughout the session. Mr. Choi appeared irritable and upset when discussing his label and experience as " a sexual offender.    PLAN:    -RTC 1 week for psychotherapy   -Attempt other forms of exercise, such as swimming and/or yoga   -Continue medication management with addiction medicine fellow    I did not see this pt directly. This pt was discussed with me in individual psychotherapy supervision, and I agree with the plan as documented.     Susi Gilbert, Ph.D., L.P.

## 2019-11-13 NOTE — TELEPHONE ENCOUNTER
Last seen: 10/9/19  RTC: 4 weeks   Cancel: none   No-show: none   Next appt: 11/20/19    Incoming refill from pharmacy via Rx auth     Medication requested: gabapentin (NEURONTIN) 300 MG capsule  Directions: Take 1 capsule before bed, can take it up to 3 times a day as needed  Qty: 60  Last refilled: 9/23/19, 7/25/19, 7/12/19 per      Will route to provider for approval

## 2019-11-14 ENCOUNTER — OFFICE VISIT (OUTPATIENT)
Dept: PSYCHIATRY | Facility: CLINIC | Age: 74
End: 2019-11-14
Attending: PSYCHOLOGIST
Payer: COMMERCIAL

## 2019-11-14 DIAGNOSIS — F10.20 ALCOHOL USE DISORDER, SEVERE, DEPENDENCE (H): Primary | ICD-10-CM

## 2019-11-14 ASSESSMENT — PATIENT HEALTH QUESTIONNAIRE - PHQ9: SUM OF ALL RESPONSES TO PHQ QUESTIONS 1-9: 11

## 2019-11-18 NOTE — PROGRESS NOTES
Relapse Prevention Group for Substance Use Disorders                                Time of Service: 4:05 - 5:00   Length of Appointment: 55 mins  Care Providers: Susi Gilbert, PhD, LP  Total number of patients present: 3    DSM-5 Diagnosis:   Alcohol Use Disorder, severe  Major Depressive Disorder, recurrent (per chart review)  Generalized Anxiety Disorder (per chart review)    GROUP CONTENT:  Writer led patients in discussion of relapse prevention strategies for addiction with a focus on strategies sober support, barriers to doing so, and strategies for overcoming barriers.      PATIENT ENGAGEMENT: Active and engaged.    MENTAL STATUS EXAM:   Appearance: Casually dressed and appropriately groomed   Motor activity: Within normal range  Speech rate: Within normal range  Speech volume: Within normal range  Speech articulation:  Within normal range  Speech coherence:  Within normal range  Speech spontaneity:  Within normal range  Affect (objective appearance):  Euthymic  Thought process: Linear, logical, goal-oriented  Suicide/ violence risk: Not individually assessed given group context, but no signs of risk were observed    PLAN:   - Attend Relapse Prevention Group on Thursdays at 4pm   - Individual therapy with Lynda Olmedo MA  - Medication management with addiction medicine fellow

## 2019-11-20 ENCOUNTER — OFFICE VISIT (OUTPATIENT)
Dept: PSYCHIATRY | Facility: CLINIC | Age: 74
End: 2019-11-20
Attending: PSYCHOLOGIST
Payer: COMMERCIAL

## 2019-11-20 ENCOUNTER — OFFICE VISIT (OUTPATIENT)
Dept: PSYCHIATRY | Facility: CLINIC | Age: 74
End: 2019-11-20
Attending: PSYCHIATRY & NEUROLOGY
Payer: COMMERCIAL

## 2019-11-20 VITALS
BODY MASS INDEX: 25.8 KG/M2 | DIASTOLIC BLOOD PRESSURE: 78 MMHG | SYSTOLIC BLOOD PRESSURE: 120 MMHG | HEART RATE: 88 BPM | WEIGHT: 185 LBS

## 2019-11-20 DIAGNOSIS — F10.21 ALCOHOL USE DISORDER, SEVERE, IN EARLY REMISSION (H): ICD-10-CM

## 2019-11-20 DIAGNOSIS — F10.21 ALCOHOL USE DISORDER, SEVERE, IN EARLY REMISSION (H): Primary | ICD-10-CM

## 2019-11-20 DIAGNOSIS — F33.2 SEVERE EPISODE OF RECURRENT MAJOR DEPRESSIVE DISORDER, WITHOUT PSYCHOTIC FEATURES (H): ICD-10-CM

## 2019-11-20 DIAGNOSIS — F41.1 GAD (GENERALIZED ANXIETY DISORDER): ICD-10-CM

## 2019-11-20 DIAGNOSIS — F33.1 MODERATE EPISODE OF RECURRENT MAJOR DEPRESSIVE DISORDER (H): ICD-10-CM

## 2019-11-20 PROCEDURE — G0463 HOSPITAL OUTPT CLINIC VISIT: HCPCS | Mod: ZF

## 2019-11-20 RX ORDER — ESCITALOPRAM OXALATE 20 MG/1
20 TABLET ORAL DAILY
Qty: 90 TABLET | Refills: 3 | Status: SHIPPED | OUTPATIENT
Start: 2019-11-20 | End: 2020-12-02

## 2019-11-20 RX ORDER — GABAPENTIN 300 MG/1
300 CAPSULE ORAL
Qty: 60 CAPSULE | Refills: 1
Start: 2019-11-20 | End: 2020-01-30

## 2019-11-20 ASSESSMENT — PAIN SCALES - GENERAL: PAINLEVEL: NO PAIN (0)

## 2019-11-20 NOTE — PROGRESS NOTES
"SESSION TYPE: Individual psychotherapy  LENGTH OF APPOINTMENT: 58 minutes  APPOINTMENT TIME: 1:00 pm - 1:58 pm  PARTICIPANTS: Writer (Lynda Olmedo MA) and patient  SUPERVISOR: Susi Gilbert, PhD, LP    DSM-5 DIAGNOSIS  Major Depressive Disorder, Recurrent, Severe  Generalized Anxiety Disorder  Alcohol Use Disorder, Severe  R/O Social Anxiety Disorder    SUBJECTIVE: Mr. Choi reported a \"less active\" week. He stated he slept more this week, attributing it to a busy week the previous week.     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 Much of his artwork revolves around his sexual identity and how it is viewed by society. Mr. Choi processed his feelings of vulnerability surrounding showing the writer his art. He explored his passivity and conflict avoidance behaviors. He was challenged to talk more about his own personal experiences, rather than deflecting questions by talking about other people and their opinions. Mr. Choi was provided the opportunity to ask the writer questions, so as to aid in safety building. He asked the question, \"Do you think I am a bad person?\" This question was explored.    ASSESSMENT: Mr. Choi arrived on-time for the session. He was well groomed and appropriately dressed for the session. He appeared his stated age. He engaged in spontaneous conversation with the therapist. Eye contact was appropriate. Speech and thought were unremarkable/fluent/linear and organized throughout the session. Mr. Choi has good insight into his personal deflections.    PLAN:    -RTC 1 week for psychotherapy   -Go to the [facility] on Friday and bake banana bread   -Continue medication management with addiction medicine fellow    I did not see this pt directly. This pt was discussed with me in individual psychotherapy supervision, and I agree with the plan as documented.     Susi Gilbert, Ph.D., L.P.  "

## 2019-11-20 NOTE — PROGRESS NOTES
"  ----------------------------------------------------------------------------------------------------------  [facility], [place]   Psychiatry Clinic Progress Note  Addiction Medicine                        IDENTIFICATION   Laurent \"Gio\" Blaze Choi is a 73 year old male who was referred by self for evaluation of alcohol use disorder and desire to continue Vivitrol.  History was provided by patient who was a good historian. He is here for a follow up visit with his initial visit on 10/17/18 and last visit was 10/9/19.        CHIEF COMPLAINT      \"Doing much better\"     Brief Summary     The patient has a past psychiatric/substance use history of alcohol use disorder, MDD and RODRIGO.    Gio has been in detox over 40 times and treatment about 5 times.  His first treatment was in 1980. He gave up daily drinking in late 1990's, then began to binge drink.  In recent years, his pattern has been drinking 1-2 weeks, not eating most of that time, and getting very sick. He then will stop drinking, will be sober for 1-2 weeks. Alcohol has caused withdrawal and tolerance, drinking 6-10 beers and 1 pint a day when on a binge. Alcohol has affected relationships, has affected health with getting sick, not eating and getting depressed.     Interval History  He reports feeling much better than he had been. His depression is much improved.    He has been sober for the entire 6 weeks since the last appointment. He is getting up and getting to his AA meetings. He goes to one meeting a week on Monday nights. He did go to the [facility] meeting once but it was across town and early in the morning, so he's not going to do it for now.  He isn't having many cravings at all. He is trying to do more things and stay more active. He is trying to do some cooking, going to some theater, going to concerts with friends in his building. He is also doing the treadmill and plans to start doing aerobics at the . He is " "aiming for 3 times per week.     He has started going to weekly therapy at our clinic with a new psychologist. He has also started attending Dr. Gilbert's group.    Normally goes to bed around 1am and gets out of bed around noon, but even then it is very hard to get up. He is tired a lot and he really have to motivate himself to do any activity. He feels rejuvenated by a nap sometimes. \"I think the depression is better but the fatigue isn't.\"He attended his appointment with the pulmonologist who didn't have an explanation for his fatigue.He has also made an appointment with sleep doctor. It is not until January of February.       Treatment History:    First treatment in 1980.  Last treatment at [facility] finished  November 2018.     Substance Use Pharmacotherapies:   Tried antabuse in late 1990's and he would stop taking and \"drink around it\".     Psych pharm:  He was on prozac for about 10 years prior to January 2019 when he was switched to lexapro.     Had a trial of aripiprazole in Spring/Summer 2019, but stopped because it was making him drool.    Gabapentin was started in Spring 2019 during a period of abstinence for anxiety.     RECENT SYMPTOMS   [PSYCH ROS]     PANIC ATTACK:  denies   ANXIETY:  none currently   DEPRESSION:  Improving mood. Sleep, motivation and energy are improving.   Denies SI/SIB.   DYSREGULATION:  \none;    PSYCHOSIS:  none;  DENIES- none  MARILEE/HYPOMANIA:  none;  DENIES- none  TRAUMA RELATED:  none.   EATING DISORDER:  none  COMPULSIVE:  none  Grief:  none       ALLERGY                                Patient has no known allergies.  MEDICATIONS                               Current Outpatient Medications   Medication Sig Dispense Refill     ACETAMINOPHEN PO Take 325 mg by mouth       amLODIPine-benazepril (LOTREL) 10-20 MG per capsule Take 1 capsule by mouth daily       ASPIRIN PO Take 81 mg by mouth daily       calcium-vitamin D 500-125 MG-UNIT TABS Take 1 tablet by mouth 2 " times daily       DIPHENHYDRAMINE HCL PO Take 25 mg by mouth daily as needed       escitalopram (LEXAPRO) 20 MG tablet Take 1 tablet (20 mg) by mouth daily 30 tablet 0     gabapentin (NEURONTIN) 300 MG capsule TAKE 1 CAPSULE BY MOUTH BEFORE BED. CAN TAKE UP TO THREE TIMES A DAY AS NEEDED 60 capsule 1     LOSARTAN POTASSIUM PO Take 25 mg by mouth       melatonin 3 MG tablet Take 1-3 tabs before bed PRN 90 tablet 1     naltrexone (VIVITROL) 380 MG SUSR Inject 380 mg into the muscle every 30 days 380 mg 11     nitroGLYcerin (NITRODUR) 0.4 MG/HR 24 hr patch Place 1 patch onto the skin daily       NITROGLYCERIN SL Take 0.4 mg by mouth       omeprazole 20 MG tablet Take 1-2 daily 60 tablet 1     ROSUVASTATIN CALCIUM PO Take 40 mg by mouth daily       TAMSULOSIN HCL PO Take 0.4 mg by mouth daily         VITALS   /78   Pulse 88   Wt 83.9 kg (185 lb)   BMI 25.80 kg/m        MENTAL STATUS EXAM                                                           Orientation: full, x3  Alertness: alert   Appearance: well groomed and casually groomed,   Behavior/Demeanor: cooperative, pleasant and calm, with good  eye contact   Speech: slowed  Language: intact  Psychomotor: normal or unremarkable,  Mood: low  Affect: full range and appropriate; was congruent to mood; was congruent to content  Thought Process/Associations: unremarkable  Thought Content:  Denies suicidal and violent ideation, delusions and preoccupations  Perception:    Denies auditory hallucinations and visual hallucinations  Insight: good  Judgment: good  Cognition: does  appear grossly intact; formal cognitive testing was not done  Gait: Normal   MSK: extremities normal, no peripheral edema    LABS and DATA     PHQ9 TODAY =11, from 9 at past visit.  Feeling tired and poor sleep gives 6 points.  PHQ-9 SCORE 10/9/2019 10/30/2019 11/13/2019   PHQ-9 Total Score 11 17 11       SUBSTANCE USE/PSYCHIATRIC DIAGNOSES                                                                                                     Alcohol Use Disorder, severe, in early remission with some brief episodes of drinking.   Major Depressive Disorder, recurrent, improving.   Generalized Anxiety Disorder, improving.   R/o PTSD  (partner suicide attempts,his incarcerations)     ASSESSMENT                                           See 'Plan' section for specific dosing info             This patient is a 73 year old male who has alcohol use disorder since 1980 with a handful of years of sobriety since.  He has been in detox more than 40 times and 4-5 treatments. He continues to have intermittent use, but while on vivitrol this does not provide the feeling it did previously and does not become consistent. He has 6 weeks of sobriety since his last visit. He has severe fatigue with an unclear source, but concerning for sleep apnea. He is working with his primary care to assess for medical causes of his fatigue. The fatigue did not improve with improved mood, it is likely organic.     #AUD: Severe, without complete remission  #MDD: Current depressed mood and meets criteria for mild episode, but only based on poor sleep and fatigue, which may be unrelated.  #Fatigue: unclear cause. Encouraged again to investigate sleep troubles with PCP and specialist.    [place] PRESCRIPTION MONITORING PROGRAM [] was checked today:  indicates no controlled subtances reported in previous 12 months.     PLAN                                                                                                       1) PSYCHOTROPIC MEDICATIONS:   -Continue Lexapro 20 mg daily.       Reduce gabapentin to 300 mg,QHS PRN         -Continue vivitrol injection monthly as due    2) THERAPY:    - Gio got financial assistance and has started attending Dr. Gilbert's group again.    -Started seeing a therapist in our office weekly. He likes it so far.     - Continue to attend  AA on Monday's. Attended a [facility] on Saturday, but will stop because  it's too far and too early.   -Has been successfully spending more time with people.   3) NEXT DUE:  LABS- none                                4) REFERRALS:    Recommend getting a sleep study--talk to primary care about this to rule out another sleep problem affecting quality of sleep, sleep is now improving but still consider due to long standing issues with sleep.     5) RTC: 8 weeks    6) CRISIS NUMBERS:   Provided routinely in AVS.       TREATMENT RISK STATEMENT:  The risks, benefits, alternatives and potential adverse effects have been explained and are understood by the pt. The pt agrees to the treatment plan with the ability to do so. The pt knows to call the clinic for any problems or to access emergency care if needed.  Medical and CD concerns are documented above.  Psychotropic drug interaction check was done, including changes made today, and is discussed above.    ADDICTION FELLOW: Michael Lin      Patient was seen by Dr. Bee,  who will co-sign the note.    Supervisor is Dr. Bee     I saw the patient with the fellow, and participated in key portions of the service, including the mental status examination and developing the plan of care. I reviewed key portions of the history with the fellow. I agree with the findings and plan as documented in this note.    Selam Bee MD        PSYCHIATRY CLINIC INDIVIDUAL PSYCHOTHERAPY NOTE                                                  [16]   Start time - 1:10  End time - 1:30  Date last reviewed - 10/9/19       Date next due - 1/17/20 (or 12 months if Medicare)    Subjective: This supportive psychotherapy session addressed issues related to goals of therapy  and current stressors consisting of  recent relapses, excessive fatigue and time asleep, and attending AA.  Patient's reaction: Executing the plan.  Progress: good  Plan: RTC 1 month  Psychotherapy services during this visit included  myself and the patient.   TREATMENT  PLAN          SYMPTOMS;  PROBLEMS   MEASURABLE GOALS;    FUNCTIONAL IMPROVEMENT INTERVENTIONS;   GAINS MADE DISCHARGE CRITERIA   Substance Use: alcohol    No use of alcohol  Using relapse prevention skills  Do not isolate  Continue with vivitrol Has relapsed to intermittent use of alcohol  Feels he is isolating and not going to meetings  Realizes vivitrol helps cravings sustained sobriety and reduction in cravings   Depression: depressed mood, low energy, concentration problems and excessive sleep   reduce depressive symptoms, reduce suicidal thoughts, report feeling more positive about self , develop strategies for thought distraction when ruminating and make a plan to manage 2-3 anxiety-provoking situations Awareness of feelings  Recognition that fatigue may come from lack of sleep or poor quality sleep. Plans to talk to PCP about sleep and breathing studies   Reach out to friends to do social activities, such as go to the museum.  Will call a friend if feeling down or doesn't know what to do..  marked symptom improvement, symptom resolution and significant improvement in self-reports for 5 consecutive visits

## 2019-11-21 ASSESSMENT — PATIENT HEALTH QUESTIONNAIRE - PHQ9
SUM OF ALL RESPONSES TO PHQ QUESTIONS 1-9: 9
SUM OF ALL RESPONSES TO PHQ QUESTIONS 1-9: 9

## 2019-12-04 ENCOUNTER — OFFICE VISIT (OUTPATIENT)
Dept: PSYCHIATRY | Facility: CLINIC | Age: 74
End: 2019-12-04
Attending: PSYCHOLOGIST
Payer: COMMERCIAL

## 2019-12-04 ENCOUNTER — ALLIED HEALTH/NURSE VISIT (OUTPATIENT)
Dept: PSYCHIATRY | Facility: CLINIC | Age: 74
End: 2019-12-04
Payer: COMMERCIAL

## 2019-12-04 DIAGNOSIS — F33.2 SEVERE EPISODE OF RECURRENT MAJOR DEPRESSIVE DISORDER, WITHOUT PSYCHOTIC FEATURES (H): ICD-10-CM

## 2019-12-04 DIAGNOSIS — F10.21 ALCOHOL USE DISORDER, SEVERE, IN EARLY REMISSION (H): Primary | ICD-10-CM

## 2019-12-04 DIAGNOSIS — F41.1 GAD (GENERALIZED ANXIETY DISORDER): ICD-10-CM

## 2019-12-04 PROCEDURE — 96372 THER/PROPH/DIAG INJ SC/IM: CPT | Mod: ZF

## 2019-12-04 PROCEDURE — 25000128 H RX IP 250 OP 636: Mod: ZF | Performed by: PSYCHIATRY & NEUROLOGY

## 2019-12-04 RX ADMIN — NALTREXONE 380 MG: KIT at 14:00

## 2019-12-04 NOTE — PROGRESS NOTES
"SESSION TYPE: Individual psychotherapy  LENGTH OF APPOINTMENT: 58 minutes  APPOINTMENT TIME: 1:00 pm - 1:58 pm  PARTICIPANTS: Writer (Lynda Olmedo MA) and patient  SUPERVISOR: Susi Gilbert, PhD, LP    DSM-5 DIAGNOSIS  Major Depressive Disorder, Recurrent, Severe  Generalized Anxiety Disorder  Alcohol Use Disorder, Severe  R/O Social Anxiety Disorder    SUBJECTIVE: Mr. Choi stated he was supposed to spend Thanksgiving with his sister, but due to the weather he was unable to. However, he stated he went to a [employer] meal and a movie with a friend instead. He described not sleeping well, but noted he is feeling more energetic and hopeful despite his difficulties sleeping. He stated he did not go to the [facility], but has continued to exercise two or three times per week.    TREATMENT: This session was spent discussing vulnerability. Mr. Choi noted he attempts to avoid conflict as he is \"frightened\" by it. He discussed his fear of speaking in group settings, as he worries he is incapable of articulating himself well and worries others might  him. Mr. Choi discussed that his art is often inspired by his anger, frustration, and sadness. The writer encouraged Mr. Choi to attempt some art inspired by his newfound hope.    ASSESSMENT: Mr. Choi arrived on-time for the session. He was well groomed and appropriately dressed for the session. He appeared his stated age. He engaged in spontaneous conversation with the therapist. Eye contact was appropriate. Speech and thought were unremarkable/fluent/linear and organized throughout the session.     PLAN:    -RTC 1 week for psychotherapy   -Try a new AA meeting   -Do some baking   -Clean house   -Continue medication management with addiction medicine fellow    I did not see this pt directly. This pt was discussed with me in individual psychotherapy supervision, and I agree with the plan as documented.     Susi Gilbert, Ph.D., L.P.  "

## 2019-12-04 NOTE — NURSING NOTE
"Clinic Administered Medication Documentation      Injectable Medication Documentation    Patient was given Vivitrol 380 mg. Prior to medication administration, verified patients identity using patient s name and date of birth. Please see MAR and medication order for additional information. Patient instructed to remain in clinic for 15 minutes and report any adverse reaction to staff immediately .      Was entire vial of medication used? Yes  Vial/Syringe: Single dose vial  Expiration Date:  2022  Was this medication supplied by the patient? No      Gio Choi,  1945, presented to the clinic today at the request of Dr. Lin,  ordering provider for long-acting injectable Vivitrol 380 mg.    OBSERVATIONS:  1.  Appearance: Casually dressed in weather appropriate clothing. Unshaven. Pt shared that it has been \"it's been months since I last had anything to drink.\"  2.  Mood: Good, talkative, engaged  3.  Affect: Congruent  4.  Attitude: Good, friendly, good eye contact  5.  Cooperation: Full  6.  Side Effects: Denied  7.  Education: Ice at injection site for pain  8. Next appointment: 2020 at 1400  9. Location: [facility]    The service provided today was rendered under the supervising provider of the day, Dr. Guidry, who was available for consultation as needed.      "

## 2019-12-12 ENCOUNTER — OFFICE VISIT (OUTPATIENT)
Dept: PSYCHIATRY | Facility: CLINIC | Age: 74
End: 2019-12-12
Attending: PSYCHOLOGIST
Payer: COMMERCIAL

## 2019-12-12 DIAGNOSIS — F10.21 ALCOHOL USE DISORDER, SEVERE, IN EARLY REMISSION (H): Primary | ICD-10-CM

## 2019-12-16 NOTE — PROGRESS NOTES
Relapse Prevention Group for Substance Use Disorders                                Time of Service: 3:55 - 4:35   Length of Appointment: 40 mins  Care Providers: Susi Gilbert, PhD, LP  Total number of patients present: 2 (3 scheduled, but 1 patient did not attend)    DSM-5 Diagnosis:   Alcohol Use Disorder, severe  Major Depressive Disorder, recurrent (per chart review)  Generalized Anxiety Disorder (per chart review)    GROUP CONTENT: Writer led patients in discussion of relapse prevention strategies for addiction with a focus on identifying a sponsor and barriers to engaging with self-help groups.      PATIENT ENGAGEMENT: Active and engaged. For HW, Gio agreed to reach out to his sponsor for coffee.    MENTAL STATUS EXAM:   Appearance: Casually dressed and appropriately groomed   Motor activity: Within normal range  Speech rate: Within normal range  Speech volume: Within normal range  Speech articulation:  Within normal range  Speech coherence:  Within normal range  Speech spontaneity:  Within normal range  Affect (objective appearance):  Euthymic  Thought process: Linear, logical, goal-oriented  Suicide/ violence risk: Not individually assessed given group context, but no signs of risk were observed    PLAN:   - Attend Relapse Prevention Group on Thursdays at 4pm   - Individual therapy with Lynda Olmedo MA  - Medication management with addiction medicine fellow

## 2019-12-18 ENCOUNTER — OFFICE VISIT (OUTPATIENT)
Dept: PSYCHIATRY | Facility: CLINIC | Age: 74
End: 2019-12-18
Attending: PSYCHOLOGIST
Payer: COMMERCIAL

## 2019-12-18 DIAGNOSIS — F33.2 SEVERE EPISODE OF RECURRENT MAJOR DEPRESSIVE DISORDER, WITHOUT PSYCHOTIC FEATURES (H): ICD-10-CM

## 2019-12-18 DIAGNOSIS — F10.21 ALCOHOL USE DISORDER, SEVERE, IN EARLY REMISSION (H): Primary | ICD-10-CM

## 2019-12-18 DIAGNOSIS — F41.1 GAD (GENERALIZED ANXIETY DISORDER): ICD-10-CM

## 2019-12-18 NOTE — PROGRESS NOTES
"SESSION TYPE: Individual psychotherapy  LENGTH OF APPOINTMENT: 53 minutes  APPOINTMENT TIME: 12:58 pm - 1:51 pm  PARTICIPANTS: Writer (Lynda Olmedo MA) and patient  SUPERVISOR: Susi Gilbert, PhD, LP    DSM-5 DIAGNOSIS  Major Depressive Disorder, Recurrent, Severe  Generalized Anxiety Disorder  Alcohol Use Disorder, Severe  R/O Social Anxiety Disorder    SUBJECTIVE: Mr. Choi reported feeling \"better\" this past week, noting he has felt more energetic and less fatigued. He stated he has been able to get several of his household chores completed. He described spending time with a neighbor and has plans to spend Vinh with this neighbor. He reported he is exercising one to two times per week. He noted he has lost some weight due to exercising and eating one meal a day.    TREATMENT: Mr. Choi spent the majority of the session discussing the Vinh season. He reminisced on past Christmases and discussed how Vinh is usually a difficult time for him. He became tearful when discussing his past Christmases with his  partner, Chriss. Mr. Choi noted he is feeling optimistic about this [place] and has plans in place to spend the holiday with someone. Mr. Choi stated he dislikes his AA group's steps, but finds support in the members of the group.     ASSESSMENT: Mr. Choi arrived on-time for the session. He was well groomed and appropriately dressed for the session. He appeared his stated age. He engaged in spontaneous conversation with the therapist. Eye contact was appropriate. Speech and thought were unremarkable/fluent/linear and organized throughout the session. He denied SI.     PLAN:    -RTC in January, due to the holidays interfering with usual appointment times.    -Utilize his support network throughout the holiday season   -Continue medication management with addiction medicine fellow    I did not see this pt directly. This pt was discussed with me in individual psychotherapy " supervision, and I agree with the plan as documented.     Susi Gilbert, Ph.D., L.P.

## 2020-01-02 ENCOUNTER — ALLIED HEALTH/NURSE VISIT (OUTPATIENT)
Dept: PSYCHIATRY | Facility: CLINIC | Age: 75
End: 2020-01-02
Payer: COMMERCIAL

## 2020-01-02 DIAGNOSIS — F10.20 ALCOHOL USE DISORDER, SEVERE, DEPENDENCE (H): Primary | ICD-10-CM

## 2020-01-02 DIAGNOSIS — F10.21 ALCOHOL USE DISORDER, SEVERE, IN EARLY REMISSION (H): ICD-10-CM

## 2020-01-02 PROCEDURE — 25000128 H RX IP 250 OP 636: Mod: ZF | Performed by: PSYCHIATRY & NEUROLOGY

## 2020-01-02 PROCEDURE — 96372 THER/PROPH/DIAG INJ SC/IM: CPT | Mod: ZF

## 2020-01-02 RX ADMIN — NALTREXONE 380 MG: KIT at 14:00

## 2020-01-02 NOTE — NURSING NOTE
Clinic Administered Medication Documentation      Injectable Medication Documentation    Patient was given Vivitrol 380 mg. Prior to medication administration, verified patients identity using patient s name and date of birth. Please see MAR and medication order for additional information. Patient instructed to remain in clinic for 15 minutes and report any adverse reaction to staff immediately .      Was entire vial of medication used? Yes  Vial/Syringe: Single dose vial  Expiration Date:  2022  Was this medication supplied by the patient? No      Gio Choi,  1945, presented to the clinic today at the request of Dr. Lin,  ordering provider for long-acting injectable Vivitrol 380 mg.    OBSERVATIONS:  1.  Appearance: Casually dressed in clean pants and sweat shirt. Unshaven, unkempt hair. Pt shared that he is happy the Holidays are over. Pt did not get any cravings over the holidays  2.  Mood: Good, talkative, engaged  3.  Affect: Congruent  4.  Attitude: Good, friendly, good eye contact  5.  Cooperation: Full  6.  Side Effects: Denied  7.  Education: Ice at injection site for pain  8. Next appointment: 2020 at 1530  9. [facility]    The service provided today was rendered under the supervising provider of the day, Dr. Kelley, who was available for consultation as needed.

## 2020-01-08 ENCOUNTER — OFFICE VISIT (OUTPATIENT)
Dept: PSYCHIATRY | Facility: CLINIC | Age: 75
End: 2020-01-08
Attending: PSYCHOLOGIST
Payer: COMMERCIAL

## 2020-01-08 DIAGNOSIS — F10.21 ALCOHOL USE DISORDER, SEVERE, IN EARLY REMISSION (H): Primary | ICD-10-CM

## 2020-01-08 DIAGNOSIS — F33.2 SEVERE EPISODE OF RECURRENT MAJOR DEPRESSIVE DISORDER, WITHOUT PSYCHOTIC FEATURES (H): ICD-10-CM

## 2020-01-08 DIAGNOSIS — F41.1 GAD (GENERALIZED ANXIETY DISORDER): ICD-10-CM

## 2020-01-15 ENCOUNTER — OFFICE VISIT (OUTPATIENT)
Dept: PSYCHIATRY | Facility: CLINIC | Age: 75
End: 2020-01-15
Attending: PSYCHOLOGIST
Payer: COMMERCIAL

## 2020-01-15 DIAGNOSIS — F41.1 GAD (GENERALIZED ANXIETY DISORDER): ICD-10-CM

## 2020-01-15 DIAGNOSIS — F33.2 SEVERE EPISODE OF RECURRENT MAJOR DEPRESSIVE DISORDER, WITHOUT PSYCHOTIC FEATURES (H): ICD-10-CM

## 2020-01-15 DIAGNOSIS — F10.21 ALCOHOL USE DISORDER, SEVERE, IN EARLY REMISSION (H): Primary | ICD-10-CM

## 2020-01-15 NOTE — PROGRESS NOTES
"SESSION TYPE: Individual psychotherapy  LENGTH OF APPOINTMENT: 55 minutes  APPOINTMENT TIME: 12:55 pm - 1:50 pm  PARTICIPANTS: Writer (Lynda Olmedo MA) and patient  SUPERVISOR: Susi Gilbert, PhD, LP    DSM-5 DIAGNOSIS  Major Depressive Disorder, Recurrent, Severe  Generalized Anxiety Disorder  Alcohol Use Disorder, Severe  R/O Social Anxiety Disorder    SUBJECTIVE: Mr. Choi reported having a \"bad\" week. He stated he felt \"generally okay\" but had a \"rough\" week.  He stated he felt more fatigued this week. He described sleeping more and exercising less. He reported not having water for a few days due to some maintenance issues where he lives.     TREATMENT: Mr. Choi discussed his dissatisfaction with his most recent doctor visit. He was challenged to address his dissatisfaction in an assertive, rather than passive, manner. He engaged in a role play activity to help him practice what he would like to say to his doctor. Mr. Choi discussed his attempts at writing poetry. He identified three topics for his poem, including life without water, whether to care for others or not, and being old and opinionated. Mr. Choi explored times in his life when he felt cared for by others and when he cared for others. Mr. Choi gained insight into the lack of care he shows for himself. He reported feeling as though \"most people don't like me,\" only to later to realize that \"in truth, I don't like me.\" This sentiment will be explored in greater depth in future sessions.    ASSESSMENT: Mr. Choi arrived on-time for the session. He was well groomed and appropriately dressed for the session. He appeared his stated age. He engaged in spontaneous conversation with the therapist. Eye contact was appropriate. Speech and thought were unremarkable/fluent/linear and organized throughout the session. He denied SI. Mr. Choi demonstrated further insight into his low self-image.    PLAN:    -RTC in 1 week   -For homework, write a " poem about his self-hate, go to the [facility], and schedule an appointment with his doctor.   -Continue medication management with addiction medicine fellow      I did not see this pt directly. This pt was discussed with me in individual psychotherapy supervision, and I agree with the plan as documented.     Susi Gilbert, Ph.D., L.P.

## 2020-01-16 ENCOUNTER — OFFICE VISIT (OUTPATIENT)
Dept: PSYCHIATRY | Facility: CLINIC | Age: 75
End: 2020-01-16
Attending: PSYCHOLOGIST
Payer: COMMERCIAL

## 2020-01-16 ENCOUNTER — OFFICE VISIT - HEALTHEAST (OUTPATIENT)
Dept: SLEEP MEDICINE | Facility: CLINIC | Age: 75
End: 2020-01-16

## 2020-01-16 DIAGNOSIS — G47.8 NON-RESTORATIVE SLEEP: ICD-10-CM

## 2020-01-16 DIAGNOSIS — G47.62 SLEEP RELATED LEG CRAMPS: ICD-10-CM

## 2020-01-16 DIAGNOSIS — G47.00 FREQUENT NOCTURNAL AWAKENING: ICD-10-CM

## 2020-01-16 DIAGNOSIS — R06.00 DYSPNEA, PAROXYSMAL NOCTURNAL: ICD-10-CM

## 2020-01-16 DIAGNOSIS — R53.82 CHRONIC FATIGUE: ICD-10-CM

## 2020-01-16 DIAGNOSIS — R06.00 PND (PAROXYSMAL NOCTURNAL DYSPNEA): ICD-10-CM

## 2020-01-16 DIAGNOSIS — R53.82 CHRONIC FATIGUE: Primary | ICD-10-CM

## 2020-01-16 DIAGNOSIS — F10.21 ALCOHOL USE DISORDER, SEVERE, IN EARLY REMISSION (H): Primary | ICD-10-CM

## 2020-01-16 ASSESSMENT — MIFFLIN-ST. JEOR: SCORE: 1572.36

## 2020-01-16 ASSESSMENT — PATIENT HEALTH QUESTIONNAIRE - PHQ9: SUM OF ALL RESPONSES TO PHQ QUESTIONS 1-9: 8

## 2020-01-17 NOTE — PROGRESS NOTES
Relapse Prevention Group for Substance Use Disorders                                Time of Service: 4:00 - 4:30   Length of Appointment: 30 mins  Care Providers: Susi Gilbert, PhD, LP  Total number of patients present: 2 (3 scheduled, but 1 patient did not attend)    DSM-5 Diagnosis:   Alcohol Use Disorder, severe  Major Depressive Disorder, recurrent (per chart review)  Generalized Anxiety Disorder (per chart review)    GROUP CONTENT: Writer led patients in discussion of relapse prevention strategies for addiction with a focus on challenging unhelpful thinking.     PATIENT ENGAGEMENT: Active and engaged. [facility]     MENTAL STATUS EXAM:   Appearance: Casually dressed and appropriately groomed   Motor activity: Within normal range  Speech rate: Within normal range  Speech volume: Within normal range  Speech articulation:  Within normal range  Speech coherence:  Within normal range  Speech spontaneity:  Within normal range  Affect (objective appearance):  Euthymic  Thought process: Linear, logical, goal-oriented  Suicide/ violence risk: Not individually assessed given group context, but no signs of risk were observed    PLAN:   - Attend Relapse Prevention Group on Thursdays at 4pm   - Individual therapy with Lynda Olmedo MA  - Medication management with addiction medicine fellow

## 2020-01-22 ENCOUNTER — OFFICE VISIT (OUTPATIENT)
Dept: PSYCHIATRY | Facility: CLINIC | Age: 75
End: 2020-01-22
Attending: PSYCHOLOGIST
Payer: COMMERCIAL

## 2020-01-22 DIAGNOSIS — F10.21 ALCOHOL USE DISORDER, SEVERE, IN EARLY REMISSION (H): ICD-10-CM

## 2020-01-22 DIAGNOSIS — F33.2 SEVERE EPISODE OF RECURRENT MAJOR DEPRESSIVE DISORDER, WITHOUT PSYCHOTIC FEATURES (H): Primary | ICD-10-CM

## 2020-01-22 DIAGNOSIS — F41.1 GAD (GENERALIZED ANXIETY DISORDER): ICD-10-CM

## 2020-01-23 ENCOUNTER — OFFICE VISIT - HEALTHEAST (OUTPATIENT)
Dept: FAMILY MEDICINE | Facility: CLINIC | Age: 75
End: 2020-01-23

## 2020-01-23 ENCOUNTER — OFFICE VISIT (OUTPATIENT)
Dept: PSYCHIATRY | Facility: CLINIC | Age: 75
End: 2020-01-23
Attending: PSYCHOLOGIST
Payer: COMMERCIAL

## 2020-01-23 DIAGNOSIS — F10.21 ALCOHOL USE DISORDER, SEVERE, IN EARLY REMISSION (H): Primary | ICD-10-CM

## 2020-01-23 DIAGNOSIS — M19.019 OSTEOARTHRITIS OF SHOULDER, UNSPECIFIED LATERALITY, UNSPECIFIED OSTEOARTHRITIS TYPE: ICD-10-CM

## 2020-01-23 DIAGNOSIS — F10.20 UNCOMPLICATED ALCOHOL DEPENDENCE (H): ICD-10-CM

## 2020-01-23 DIAGNOSIS — J43.9 PULMONARY EMPHYSEMA, UNSPECIFIED EMPHYSEMA TYPE (H): ICD-10-CM

## 2020-01-23 DIAGNOSIS — F33.1 MAJOR DEPRESSIVE DISORDER, RECURRENT, MODERATE (H): ICD-10-CM

## 2020-01-23 ASSESSMENT — ANXIETY QUESTIONNAIRES
5. BEING SO RESTLESS THAT IT IS HARD TO SIT STILL: NOT AT ALL
IF YOU CHECKED OFF ANY PROBLEMS ON THIS QUESTIONNAIRE, HOW DIFFICULT HAVE THESE PROBLEMS MADE IT FOR YOU TO DO YOUR WORK, TAKE CARE OF THINGS AT HOME, OR GET ALONG WITH OTHER PEOPLE: SOMEWHAT DIFFICULT
4. TROUBLE RELAXING: NOT AT ALL
GAD7 TOTAL SCORE: 2
3. WORRYING TOO MUCH ABOUT DIFFERENT THINGS: SEVERAL DAYS
6. BECOMING EASILY ANNOYED OR IRRITABLE: NOT AT ALL
1. FEELING NERVOUS, ANXIOUS, OR ON EDGE: SEVERAL DAYS
2. NOT BEING ABLE TO STOP OR CONTROL WORRYING: NOT AT ALL
7. FEELING AFRAID AS IF SOMETHING AWFUL MIGHT HAPPEN: NOT AT ALL

## 2020-01-23 ASSESSMENT — PATIENT HEALTH QUESTIONNAIRE - PHQ9: SUM OF ALL RESPONSES TO PHQ QUESTIONS 1-9: 5

## 2020-01-27 NOTE — PROGRESS NOTES
"SESSION TYPE: Individual psychotherapy  LENGTH OF APPOINTMENT: 45 minutes  APPOINTMENT TIME: 9:58 am - 10:43 am  PARTICIPANTS: Writer (Lynda Olmedo MA) and patient  SUPERVISOR: Susi Gilbert, PhD, LP    DSM-5 DIAGNOSIS  Major Depressive Disorder, Recurrent, Severe  Generalized Anxiety Disorder  Alcohol Use Disorder, Severe  R/O Social Anxiety Disorder    SUBJECTIVE: Mr. Choi reported feeling \"very exhausted\" this week, which he attributed to having a \"busy\" weekend with his neighbors. He reported reaching out to someone for a ride to , only to discover there was no meeting that day. He stated he will be talking to his AA on Monday to begin working on step four.     TREATMENT: Mr. Choi stated he dislikes himself when he feels trapped, helpless, hopeless, and stuck. 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 Mr. Choi reported he is \"ready to get started unstucking.\" He reported having goals for the summer, including gardening with his neighbor and going swimming.    ASSESSMENT: Mr. Choi arrived on-time for the session. He was well groomed and appropriately dressed for the session. He appeared his stated age. He engaged in spontaneous conversation with the therapist. Eye contact was appropriate. Speech and thought were unremarkable/fluent/linear and organized throughout the session. He denied SI.     PLAN:    -RTC in 1 week   -For homework, go to the [facility].   -Continue medication management with addiction medicine fellow      I did not see this pt directly. This pt was discussed with me in individual psychotherapy supervision, and I agree with the plan as documented.     Susi Gilbert, Ph.D., L.P.  "

## 2020-01-29 ENCOUNTER — OFFICE VISIT (OUTPATIENT)
Dept: PSYCHIATRY | Facility: CLINIC | Age: 75
End: 2020-01-29
Attending: PSYCHOLOGIST
Payer: COMMERCIAL

## 2020-01-29 DIAGNOSIS — F33.2 SEVERE EPISODE OF RECURRENT MAJOR DEPRESSIVE DISORDER, WITHOUT PSYCHOTIC FEATURES (H): Primary | ICD-10-CM

## 2020-01-29 DIAGNOSIS — F41.1 GAD (GENERALIZED ANXIETY DISORDER): ICD-10-CM

## 2020-01-29 DIAGNOSIS — F10.21 ALCOHOL USE DISORDER, SEVERE, IN EARLY REMISSION (H): ICD-10-CM

## 2020-01-29 NOTE — PROGRESS NOTES
"SESSION TYPE: Individual psychotherapy  LENGTH OF APPOINTMENT: 50 minutes  APPOINTMENT TIME: 1:07 pm - 1:57 pm  PARTICIPANTS: Writer (Lynda Olmedo MA) and patient  SUPERVISOR: Susi Gilbert, PhD, LP    DSM-5 DIAGNOSIS  Major Depressive Disorder, Recurrent, Severe  Generalized Anxiety Disorder  Alcohol Use Disorder, Severe    SUBJECTIVE: Mr. Choi reported experiencing a \"hard\" week, describing feeling \"exhausted\" for much of the week. His primary complaint was fatigue.  He reported wanting to stop taking his Vivitrol, as he is no longer an \"alcoholic.\" Mr. Choi endorsed some passive ideation around killing people from his past. He denied any plan or intent. No identifiable victim was named. A risk assessment was completed and Mr. Choi is considered to be of low risk to take homicidal action. He denied any suicidal ideation, plan, or intent.    TREATMENT: Mr. Choi stated he wanted to \"bolt\" from the addiction medicine clinic and start over with his life. He identified being labeled as an alcoholic as his primary reason for wanting to \"bolt.\" Mr. Choi defined \"alcoholic\" for the writer, and discussed all the ways in which he was not an alcoholic (as his definition carried a negative connotation). The writer spent part of the session working with Mr. Choi to redefine alcoholic in a manner that he felt fit with him better. Mr. Choi stated he often has the thought, \"I want to delete myself.\" The writer engaged Mr. Choi in a thought-challenging exercise. Mr. Choi admitted to having issues with authority and disliking when he has no control over things in his life. Mr. Choi generated a lists of the things over which he did have control and the things over which he did not have control. He was encouraged to focus on the areas of his life that he is able to control.    ASSESSMENT: Mr. Choi arrived on-time for the session. He was well groomed and appropriately dressed for the session. He " appeared his stated age. He engaged in spontaneous conversation with the therapist. Eye contact was appropriate. Speech and thought were unremarkable/fluent/linear and organized throughout the session.    PLAN:    -RTC in 1 week   -For homework, create a list of the hate messages he tells himself.   -Continue medication management with addiction medicine fellow      I did not see this pt directly. This pt was discussed with me in individual psychotherapy supervision, and I agree with the plan as documented.     Susi Gilbert, Ph.D., L.P.

## 2020-01-30 ENCOUNTER — OFFICE VISIT (OUTPATIENT)
Dept: PSYCHIATRY | Facility: CLINIC | Age: 75
End: 2020-01-30
Attending: PSYCHOLOGIST
Payer: COMMERCIAL

## 2020-01-30 ENCOUNTER — ALLIED HEALTH/NURSE VISIT (OUTPATIENT)
Dept: PSYCHIATRY | Facility: CLINIC | Age: 75
End: 2020-01-30
Payer: COMMERCIAL

## 2020-01-30 DIAGNOSIS — F10.21 ALCOHOL USE DISORDER, SEVERE, IN EARLY REMISSION (H): ICD-10-CM

## 2020-01-30 DIAGNOSIS — F33.2 SEVERE EPISODE OF RECURRENT MAJOR DEPRESSIVE DISORDER, WITHOUT PSYCHOTIC FEATURES (H): Primary | ICD-10-CM

## 2020-01-30 DIAGNOSIS — F10.21 ALCOHOL USE DISORDER, SEVERE, IN EARLY REMISSION (H): Primary | ICD-10-CM

## 2020-01-30 PROCEDURE — 25000128 H RX IP 250 OP 636: Mod: ZF | Performed by: PSYCHIATRY & NEUROLOGY

## 2020-01-30 PROCEDURE — 96372 THER/PROPH/DIAG INJ SC/IM: CPT | Mod: ZF

## 2020-01-30 RX ORDER — GABAPENTIN 300 MG/1
CAPSULE ORAL
Qty: 30 CAPSULE | Refills: 1 | Status: SHIPPED | OUTPATIENT
Start: 2020-01-30 | End: 2020-03-18

## 2020-01-30 RX ADMIN — NALTREXONE 380 MG: KIT at 15:30

## 2020-01-30 NOTE — TELEPHONE ENCOUNTER
Received RF request from refill team     Last seen: 11/20/2019  RTC: 8 weeks  Cancel: one - 1/15/2020  No-show: none  Next appt: 3/18/2020     Medication requested: gabapentin (NEURONTIN) 300 MG capsule  Directions: Take 1 capsule (300 mg) by mouth nightly as needed (for sleep)  Qty: 60  Last Rx written 11/20/2019 for 60 ds with 1 rf (total 4 months) - No Print Out per med tab     [place]  checked: 11/13 Rx filled on 11/13 and 12/11 for #60, 20 ds    Plan per 11/20 office visit:     -Continue Lexapro 20 mg daily.       - Reduce gabapentin to 300 mg,QHS PRN        -Continue vivitrol injection monthly as due       Per med tab, Rx written on 11/20 was not printed out or called in.  Confirmed with Rafia, pharmacist at [facility], that there was no Rx called in with the above date.  She said the last Rx received was dated 11/13, and was filled on that date, and again on 12/11.    Sent replacement Rx for 30 ds with 1 rf electronically to the pharmacy and routed to provider for FYI.    Called and left VM for patient informing him that refill has been sent to the pharmacy.  Invited him to call back for questions.

## 2020-01-30 NOTE — NURSING NOTE
"Clinic Administered Medication Documentation      Injectable Medication Documentation    Patient was given Vivitrol 380 mg. Prior to medication administration, verified patients identity using patient s name and date of birth. Please see MAR and medication order for additional information. Patient instructed to remain in clinic for 15 minutes and report any adverse reaction to staff immediately .      Was entire vial of medication used? Yes  Vial/Syringe: Single dose vial  Expiration Date:  2022  Was this medication supplied by the patient? No      Gio Choi,  1945, presented to the clinic today at the request of Dr. Lin,  ordering provider for long-acting injectable Vivitrol 380 mg.    OBSERVATIONS:  1.  Appearance: Casually dressed in clean, weather appropriate clothing. Unshaven, messy hair. Pt shared that \"it has been several months since I last had a drink. I try to go to AA every week, but I wasn't able to last week.\" Pt reported that \"I've been feeling kind of low lately. I don't like these cloudy days. It's suppose to get to 40 degrees with sunshine on  and I'm really looking forward to that. I really want to start my garden. I've been looking at a lot of catalogs.\"  2.  Mood: Good, talkative, engaged  3.  Affect: Congruent  4.  Attitude: Good, pt shared that he enjoys going to Group Therapy with Susi every week  5.  Cooperation: Full  6.  Side Effects: Denied  7.  Education: Call the Clinic or use My Chart for any questions or concerns. Pt agreed  8. Next appointment: 2020 at 1530 and 3/26/2020 at 1530  9. Location: [facility]    The service provided today was rendered under the supervising provider of the day, Dr. Guidry, who was available for consultation as needed.        "

## 2020-02-04 NOTE — PROGRESS NOTES
Individual Psychotherapy Session                                Date of Service: 1/23/2020   Length of Appointment: 4-4:50 (50 minutes)  Care Provider: Susi Gilbert, PhD, LP  Participants: Patient and writer    DSM-5 Diagnoses: DSM-5 Diagnosis:   Alcohol Use Disorder, severe, in early remission   Major Depressive Disorder, recurrent (per chart review)  Generalized Anxiety Disorder (per chart review)     SUBJECTIVE: Gio continues to report abstinence from alcohol. Mood improving over the past few weeks. He is experiencing significant fatigue.     TREATMENT: Gio presented to group, but other group members no-showed. I offered an individual session instead, which Gio agreed to. Gio wanted to focus on sleep and we identified strategies to improve sleep hygiene.     ASSESSMENT: Pt was well groomed and appropriately dressed for the session. Pt engaged in spontaneous conversation with writer. Eye contact was appropriate. Speech and thought were unremarkable/fluent/linear/goal-directed/organized throughout the session. Pt denied suicidal and homicidal ideation.     PLAN: Continue to engage in Relapse Prevention Group weekly on Thursdays at 4pm    Performed and documented by: Susi Gilbert, PhD, LP

## 2020-02-05 ENCOUNTER — OFFICE VISIT (OUTPATIENT)
Dept: PSYCHIATRY | Facility: CLINIC | Age: 75
End: 2020-02-05
Attending: PSYCHOLOGIST
Payer: COMMERCIAL

## 2020-02-05 DIAGNOSIS — F10.21 ALCOHOL USE DISORDER, SEVERE, IN EARLY REMISSION (H): ICD-10-CM

## 2020-02-05 DIAGNOSIS — F41.1 GAD (GENERALIZED ANXIETY DISORDER): ICD-10-CM

## 2020-02-05 DIAGNOSIS — F33.2 SEVERE EPISODE OF RECURRENT MAJOR DEPRESSIVE DISORDER, WITHOUT PSYCHOTIC FEATURES (H): Primary | ICD-10-CM

## 2020-02-09 NOTE — PROGRESS NOTES
Relapse Prevention Group for Substance Use Disorders                                Time of Service: 4:05 - 5:00   Length of Appointment: 55 mins  Care Providers: Susi Gilbert, PhD, LP  Total number of patients present: 3 (one group member needed to leave after 10 minutes)     DSM-5 Diagnosis:   Alcohol Use Disorder, severe  Major Depressive Disorder, recurrent (per chart review)  Generalized Anxiety Disorder (per chart review)    GROUP CONTENT: Group members introduced themselves to new group member, and we identified short-term goals.      PATIENT ENGAGEMENT: Active and engaged.    MENTAL STATUS EXAM:   Appearance: Casually dressed and appropriately groomed   Motor activity: Within normal range  Speech rate: Within normal range  Speech volume: Within normal range  Speech articulation:  Within normal range  Speech coherence:  Within normal range  Speech spontaneity:  Within normal range  Affect (objective appearance):  Euthymic  Thought process: Linear, logical, goal-oriented  Suicide/ violence risk: Not individually assessed given group context, but no signs of risk were observed    PLAN:   - Attend Relapse Prevention Group on Thursdays at 4pm   - Individual therapy with Lynda Olmedo MA  - Medication management with addiction medicine fellow

## 2020-02-12 ENCOUNTER — OFFICE VISIT (OUTPATIENT)
Dept: PSYCHIATRY | Facility: CLINIC | Age: 75
End: 2020-02-12
Attending: PSYCHOLOGIST
Payer: COMMERCIAL

## 2020-02-12 DIAGNOSIS — F41.1 GAD (GENERALIZED ANXIETY DISORDER): ICD-10-CM

## 2020-02-12 DIAGNOSIS — F10.21 ALCOHOL USE DISORDER, SEVERE, IN EARLY REMISSION (H): ICD-10-CM

## 2020-02-12 DIAGNOSIS — F33.2 SEVERE EPISODE OF RECURRENT MAJOR DEPRESSIVE DISORDER, WITHOUT PSYCHOTIC FEATURES (H): Primary | ICD-10-CM

## 2020-02-12 NOTE — PROGRESS NOTES
"SESSION TYPE: Individual psychotherapy  LENGTH OF APPOINTMENT: 56 minutes  APPOINTMENT TIME: 1:00 pm - 1:56 pm  PARTICIPANTS: Writer (Lynda Olmedo MA) and patient  SUPERVISOR: Susi Gilbert, PhD, LP    DSM-5 DIAGNOSIS  Major Depressive Disorder, Recurrent, Severe  Generalized Anxiety Disorder  Alcohol Use Disorder, Severe    SUBJECTIVE: Mr. Choi stated he spent much of his week sleeping, noting he has been having difficulties waking up in the morning. He reported he missed his AA meeting on Monday due to oversleeping. However, he stated he met with an AA member on Tuesday, noting the AA member expressed concern for Mr. Choi as Mr. Choi has not been attending meetings on a regular basis.     TREATMENT: Mr. Choi spent the session processing his relationship with his ex-partner, Chriss. He recanted the \"ups and downs\" he experienced with Chriss, including the many near death experiences Mr. Choi witnessed. 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 When questioned on where he is drawing inspiration for his artwork, Mr. Choi reported he continues to draw his inspiration from his anger towards sex offender treatment. Mr. Choi was challenged to move beyond this anger towards happiness and contentment. Mr. Choi agreed to work on moving past his anger rather than perpetuating his cycle of anger.    ASSESSMENT: Mr. Choi arrived on-time for the session. He was well groomed and appropriately dressed for the session. He appeared his stated age. He engaged in spontaneous conversation with the therapist. Eye contact was appropriate. Speech and thought were unremarkable/fluent/linear and organized throughout the session. Mr. Choi has spent many of his sessions engaging in reminiscence of his past life events. He appears to fixate on his relationship with Chriss.    PLAN:    -RTC in 1 week   -For homework, begin working on art projects.   -Continue medication management with " addiction medicine fellow      I did not see this pt directly. This pt was discussed with me in individual psychotherapy supervision, and I agree with the plan as documented.     Susi Gilbert, Ph.D., L.P.

## 2020-02-17 NOTE — PROGRESS NOTES
"SESSION TYPE: Individual psychotherapy  LENGTH OF APPOINTMENT: 56 minutes  APPOINTMENT TIME: 1:00 pm - 1:56 pm  PARTICIPANTS: Writer (Lynda Olmedo MA) and patient  SUPERVISOR: Susi Gilbert, PhD, LP    DSM-5 DIAGNOSIS  Major Depressive Disorder, Recurrent, Severe  Generalized Anxiety Disorder  Alcohol Use Disorder, Severe    SUBJECTIVE: Mr. Choi stated he attended his AA meeting on Monday. He also attended a concert with a neighbor over the weekend. Mr. Choi reported he has not been exercising over the past two weeks. He noted he would like to resume exercising. He stated his sleep study is scheduled for next Thursday; therefore, he will not be attending group therapy next week.     TREATMENT: The writer asked Mr. Choi if he would participate in an activity which would require him to address his unsaid feelings towards Chriss. However, Mr. Choi stated he did not want to do the activity because he began to feel anxious. Mr. Choi discussed his insecurity around others and his internal self-hate, endorsing a self-loathing internal dialogue. The writer engaged Mr. Choi in a therapuetic activity that asked Mr. Choi to identify the people in his life who support him and channel their feedback to him. From this activity he was able to identify positive self-talk statements he can tell himself. Mr. Choi described feeling \"positive tension\" at the conclusion of the session.    ASSESSMENT: Mr. Choi arrived on-time for the session. He was well groomed and appropriately dressed for the session. He appeared his stated age. He engaged in spontaneous conversation with the therapist. Eye contact was appropriate. Speech and thought were unremarkable/fluent/linear and organized throughout the session. Mr. Choi was an active participant throughout the therapeutic session.     PLAN:    -RTC in 1 week   -For homework, go to the [facility] on either Monday or Tuesday and engage in positive self-talk using the " identified statements               discussed in session.    -Continue medication management with addiction medicine fellow      I did not see this pt directly. This pt was discussed with me in individual psychotherapy supervision, and I agree with the plan as documented.     Susi Gilbert, Ph.D., L.P.

## 2020-02-19 ENCOUNTER — OFFICE VISIT (OUTPATIENT)
Dept: PSYCHIATRY | Facility: CLINIC | Age: 75
End: 2020-02-19
Attending: PSYCHOLOGIST
Payer: COMMERCIAL

## 2020-02-19 ENCOUNTER — THERAPY VISIT (OUTPATIENT)
Dept: SLEEP MEDICINE | Facility: CLINIC | Age: 75
End: 2020-02-19
Payer: COMMERCIAL

## 2020-02-19 DIAGNOSIS — F10.21 ALCOHOL USE DISORDER, SEVERE, IN EARLY REMISSION (H): ICD-10-CM

## 2020-02-19 DIAGNOSIS — G47.8 NON-RESTORATIVE SLEEP: ICD-10-CM

## 2020-02-19 DIAGNOSIS — R53.82 CHRONIC FATIGUE: ICD-10-CM

## 2020-02-19 DIAGNOSIS — R06.00 DYSPNEA, PAROXYSMAL NOCTURNAL: ICD-10-CM

## 2020-02-19 DIAGNOSIS — F33.2 SEVERE EPISODE OF RECURRENT MAJOR DEPRESSIVE DISORDER, WITHOUT PSYCHOTIC FEATURES (H): Primary | ICD-10-CM

## 2020-02-19 DIAGNOSIS — G47.00 FREQUENT NOCTURNAL AWAKENING: ICD-10-CM

## 2020-02-19 DIAGNOSIS — F41.1 GAD (GENERALIZED ANXIETY DISORDER): ICD-10-CM

## 2020-02-19 DIAGNOSIS — G47.62 SLEEP RELATED LEG CRAMPS: ICD-10-CM

## 2020-02-19 PROCEDURE — 95810 POLYSOM 6/> YRS 4/> PARAM: CPT | Performed by: INTERNAL MEDICINE

## 2020-02-19 NOTE — PROGRESS NOTES
"SESSION TYPE: Individual psychotherapy  LENGTH OF APPOINTMENT: 55 minutes  APPOINTMENT TIME: 1:00 pm - 1:55 pm  PARTICIPANTS: Writer (Lynda Olmedo MA) and patient  SUPERVISOR: Susi Gilbert, PhD, LP    DSM-5 DIAGNOSIS  Major Depressive Disorder, Recurrent, Severe  Generalized Anxiety Disorder  Alcohol Use Disorder, Severe    SUBJECTIVE: Mr. Choi reported he \"pretty much stayed at home\" during the past week. He stated he was asked to go to a breakfast Thursday morning, but is unsure if he will be able to attend due to his sleep study appointment this evening. He reported he did not attend his AA meeting on Monday because he \"forgot it was Monday.\" He did not complete his homework assignments for week, noting he felt tired.     TREATMENT: Mr. Choi spent the session discussing reconnecting with his sister and increasing his social support network. Ways to increase his motivation were discussed. 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 Reasons for why he could not do this were identified and challenged. Mr. Choi was challenged on his lack of motivation and incomplete homework assignments. He received this feedback well and appeared determined to make changes. Mr. Choi spent some time exploring his internalized self-hate. He appeared to gain more insight on this subject.    ASSESSMENT: Mr. Choi arrived on-time for the session. He was well groomed and appropriately dressed for the session. He appeared his stated age. He engaged in spontaneous conversation with the therapist. Eye contact was appropriate. Speech and thought were unremarkable/fluent/linear and organized throughout the session. Mr. Choi was an active participant throughout the therapeutic session.     PLAN:    -RTC in 1 week   -For homework, watch a video depicting someone who harbored self-hate.   -Continue medication management with addiction medicine fellow      I did not see this pt directly. This pt was discussed with me in " individual psychotherapy supervision, and I agree with the plan as documented.     Susi Gilbert, Ph.D., L.P.

## 2020-02-19 NOTE — PATIENT INSTRUCTIONS
Thank you for coming to the PSYCHIATRY CLINIC.    Lab Testing:  If you had lab testing today and your results are reassuring or normal they will be mailed to you or sent through [employer] within 7 days.   If the lab tests need quick action we will call you with the results.  The phone number we will call with results is # [phone] (home) . If this is not the best number please call our clinic and change the number.    Medication Refills:  If you need any refills please call your pharmacy and they will contact us. Our fax number for refills is [phone]. Please allow three business for refill processing.   If you need to  your refill at a new pharmacy, please contact the new pharmacy directly. The new pharmacy will help you get your medications transferred.     Scheduling:  If you have any concerns about today's visit or wish to schedule another appointment please call our office during normal business hours [phone] (8-5:00 M-F)    Contact Us:  Please call [phone] during business hours (8-5:00 M-F).  If after clinic hours, or on the weekend, please call  [phone].    Financial Assistance [phone]  [facility] Billing [phone]  Central Billing Office, [facility]: [phone]  [facility] Billing [phone]  Medical Records [phone]      MENTAL HEALTH CRISIS NUMBERS:  [facility]:   [facility] - [phone]   Crisis Residence [facility] - [phone]   Walk-In Counseling [facility] [phone]   COPE 24/7 [facility] Mobile Team for Adults - [[phone]]; Child - [[phone]]        [facility]:   [facility] - [phone]   Walk-in counseling [facility] - [phone]   Walk-in counseling [facility] - [phone]   Crisis Residence [facility] - [phone]   Urgent Care Adult Mental Health:   --Drop-in, 24/7 crisis line, and [place] Mobile Team  [[phone]]    CRISIS TEXT LINE: Text 919-347 from anywhere, anytime, any crisis 24/7;    OR SEE www.crisistextline.org     National Suicide Prevention Lifeline: 282-099-CIAO (309-141-0166)    Poison Control Center - [phone]    CHILD: [facility] needs assessment team - [phone]     [facility] Lifeline - [phone]; or [facility] Lifeline - [phone]    If you have a medical emergency please call 911or go to the nearest ER.                    _____________________________________________    Again thank you for choosing [facility]AT[facility] CLINIC and please let us know how we can best partner with you to improve you and your family's health.  You may be receiving a survey regarding this appointment. We would love to have your feedback, both positive and negative. The survey is done by an external company, so your answers are anonymous.

## 2020-02-20 ENCOUNTER — COMMUNICATION - HEALTHEAST (OUTPATIENT)
Dept: PULMONOLOGY | Facility: OTHER | Age: 75
End: 2020-02-20

## 2020-02-20 NOTE — TELEPHONE ENCOUNTER
----- Message from [facility] sent at 9/21/2018  8:42 AM CDT -----  Regarding: DDP New Start  Gio Kelsey will be starting in the DDP on 9/24/18  Track: 9 am track  Provider: Dr Mills  Insurance: BCBS and medicare  Does insurance need a pre cert:No   Obstetric HPI:  PPROM abx D#9. No issues overnight. Patient denies HA, vision changes, SOB, CP, RUQ pain.     Patient reports no contractions, active fetal movement, no vaginal bleeding or loss of fluid.     Objective:     Vital Signs (Most Recent):  Temp: 98.1 °F (36.7 °C) (02/20/20 0312)  Pulse: 91 (02/20/20 0318)  Resp: 16 (02/20/20 0312)  BP: 122/79 (02/20/20 0312)  SpO2: 98 % (02/20/20 0313) Vital Signs (24h Range):  Temp:  [96.3 °F (35.7 °C)-98.2 °F (36.8 °C)] 98.1 °F (36.7 °C)  Pulse:  [] 91  Resp:  [16-19] 16  SpO2:  [96 %-99 %] 98 %  BP: (120-139)/(79-91) 122/79     Weight: 83.6 kg (184 lb 4.9 oz)  Body mass index is 32.65 kg/m².    PM FHT: Cat 2 (overall reassuring) 140 bpm, moderate BTBV, + accels, intermittent variable decels  TOCO: No contractions/irritability    No intake or output data in the 24 hours ending 02/20/20 0700    Cervical Exam: deferred      Significant Labs:  Recent Lab Results     None        Physical Exam:   Constitutional: She is oriented to person, place, and time. She appears well-developed and well-nourished. No distress.    HENT:   Head: Normocephalic and atraumatic.    Eyes: Conjunctivae and EOM are normal.    Neck: Normal range of motion. Neck supple. No thyromegaly present.    Cardiovascular: Normal rate and regular rhythm.     Pulmonary/Chest: Effort normal. No respiratory distress.        Abdominal: Soft. There is no tenderness.   Gravid, no fundal tenderness             Musculoskeletal: Normal range of motion and moves all extremeties. She exhibits no edema or tenderness.       Neurological: She is alert and oriented to person, place, and time.    Skin: Skin is warm and dry. No rash noted.    Psychiatric: She has a normal mood and affect. Her behavior is normal. Judgment and thought content normal.

## 2020-02-24 ENCOUNTER — HOSPITAL ENCOUNTER (OUTPATIENT)
Dept: CT IMAGING | Facility: CLINIC | Age: 75
Discharge: HOME | End: 2020-02-24
Attending: INTERNAL MEDICINE

## 2020-02-24 ENCOUNTER — AMBULATORY - HEALTHEAST (OUTPATIENT)
Dept: SLEEP MEDICINE | Facility: CLINIC | Age: 75
End: 2020-02-24

## 2020-02-24 DIAGNOSIS — R91.8 PULMONARY NODULES: ICD-10-CM

## 2020-02-25 LAB — SLPCOMP: NORMAL

## 2020-02-26 ENCOUNTER — OFFICE VISIT (OUTPATIENT)
Dept: PSYCHIATRY | Facility: CLINIC | Age: 75
End: 2020-02-26
Attending: PSYCHOLOGIST
Payer: COMMERCIAL

## 2020-02-26 DIAGNOSIS — F33.2 SEVERE EPISODE OF RECURRENT MAJOR DEPRESSIVE DISORDER, WITHOUT PSYCHOTIC FEATURES (H): Primary | ICD-10-CM

## 2020-02-26 DIAGNOSIS — F41.1 GAD (GENERALIZED ANXIETY DISORDER): ICD-10-CM

## 2020-02-26 DIAGNOSIS — F10.21 ALCOHOL USE DISORDER, SEVERE, IN EARLY REMISSION (H): ICD-10-CM

## 2020-02-27 ENCOUNTER — ALLIED HEALTH/NURSE VISIT (OUTPATIENT)
Dept: PSYCHIATRY | Facility: CLINIC | Age: 75
End: 2020-02-27
Payer: COMMERCIAL

## 2020-02-27 ENCOUNTER — OFFICE VISIT - HEALTHEAST (OUTPATIENT)
Dept: PULMONOLOGY | Facility: OTHER | Age: 75
End: 2020-02-27

## 2020-02-27 DIAGNOSIS — F17.211 CIGARETTE NICOTINE DEPENDENCE IN REMISSION: ICD-10-CM

## 2020-02-27 DIAGNOSIS — F10.21 ALCOHOL USE DISORDER, SEVERE, IN EARLY REMISSION (H): Primary | ICD-10-CM

## 2020-02-27 PROCEDURE — 96372 THER/PROPH/DIAG INJ SC/IM: CPT | Mod: ZF

## 2020-02-27 PROCEDURE — 25000128 H RX IP 250 OP 636: Mod: ZF | Performed by: PSYCHIATRY & NEUROLOGY

## 2020-02-27 RX ADMIN — NALTREXONE 380 MG: KIT at 15:30

## 2020-02-27 NOTE — NURSING NOTE
Clinic Administered Medication Documentation      Injectable Medication Documentation    Patient was given Vivitrol 380 mg. Prior to medication administration, verified patients identity using patient s name and date of birth. Please see MAR and medication order for additional information. Patient instructed to remain in clinic for 15 minutes and report any adverse reaction to staff immediately .      Was entire vial of medication used? Yes  Vial/Syringe: Single dose vial  Expiration Date:  2022  Was this medication supplied by the patient? No      Gio Choi,  1945, presented to the clinic today at the request of Dr. Lin,  ordering provider for long-acting injectable Vivitrol 380 mg.    OBSERVATIONS:  1.  Appearance: Casually dressed in clean, weather appropriate clothing. Pt shared that he has not had any break through cravings.   2.  Mood: Good, talkative, engaged  3.  Affect: Congruent  4.  Attitude: Good, friendly, good eye contact  5.  Cooperation: Full  6.  Side Effects: Denied  7.  Education: Call clinic or use My Chart for any questions or concerns, pt agreed.  8. Next appointment: 3/26/2020  9. Location: [facility]    The service provided today was rendered under the supervising provider of the day, Dr. Allen, who was available for consultation as needed.

## 2020-03-04 ENCOUNTER — OFFICE VISIT (OUTPATIENT)
Dept: PSYCHIATRY | Facility: CLINIC | Age: 75
End: 2020-03-04
Attending: PSYCHOLOGIST
Payer: COMMERCIAL

## 2020-03-04 DIAGNOSIS — F41.1 GAD (GENERALIZED ANXIETY DISORDER): ICD-10-CM

## 2020-03-04 DIAGNOSIS — F33.2 SEVERE EPISODE OF RECURRENT MAJOR DEPRESSIVE DISORDER, WITHOUT PSYCHOTIC FEATURES (H): Primary | ICD-10-CM

## 2020-03-04 DIAGNOSIS — F10.20 ALCOHOL USE DISORDER, SEVERE, DEPENDENCE (H): ICD-10-CM

## 2020-03-04 NOTE — PROGRESS NOTES
"SESSION TYPE: Individual psychotherapy  LENGTH OF APPOINTMENT: 53 minutes  APPOINTMENT TIME: 1:00 pm - 1:53 pm  PARTICIPANTS: Writer (Lynda Olmedo MA) and patient  SUPERVISOR: Susi Gilbert, PhD, LP    DSM-5 DIAGNOSIS  Major Depressive Disorder, Recurrent, Severe  Generalized Anxiety Disorder  Alcohol Use Disorder, Severe    SUBJECTIVE: Mr. Choi reported having a \"good\" week, noting his energy seems to be improving. He stated he met with his AA sponsor before his meeting on Monday. 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     TREATMENT: Mr. Choi stated he met with his sister over the weekend. He noted, \"It was a disaster.\" He stated his sister seemed preoccupied and as though she did not want to be there with him. He attributed her coldness to his sister not liking him.  Examined accuracy of this belief. The writer challenged Mr. Choi to not simply assume what others are thinking or what their actions mean.Ways to be assertive and ask when confused were discussed. Mr. Choi discussed past jobs and identified what about each job contributed to his self-hate internal messages and which made him feel better. The remainder of the session was spent discussing sleep health and ways for Ms. Choi to plan his days in advance so he can get what he needs done without feeling overwhelmed.    ASSESSMENT: Mr. Choi arrived on-time for the session. He was well groomed and appropriately dressed for the session. He appeared his stated age. He engaged in spontaneous conversation with the therapist. Eye contact was appropriate. Speech and thought were unremarkable/fluent/linear and organized throughout the session. Mr. Choi demonstrated insight into the necessity to get his sleep onto a routine schedule.    PLAN:    -RTC in 1 week   -For homework, follow schedule planned for Thursday.   -Continue medication management with addiction medicine " fellow      I did not see this pt directly. This pt was discussed with me in individual psychotherapy supervision, and I agree with the plan as documented.     Susi Gilbert, Ph.D., L.P.

## 2020-03-05 ENCOUNTER — OFFICE VISIT (OUTPATIENT)
Dept: PSYCHIATRY | Facility: CLINIC | Age: 75
End: 2020-03-05
Attending: PSYCHOLOGIST
Payer: COMMERCIAL

## 2020-03-05 DIAGNOSIS — F10.20 ALCOHOL DEPENDENCE, CONTINUOUS (H): Primary | ICD-10-CM

## 2020-03-09 NOTE — PROGRESS NOTES
SESSION TYPE: Individual psychotherapy  LENGTH OF APPOINTMENT: 53 minutes  APPOINTMENT TIME: 1:07 pm - 2:00 pm  PARTICIPANTS: Writer (Lynda Olmedo MA) and patient  SUPERVISOR: Susi Gilbert, PhD, LP    DSM-5 DIAGNOSIS  Major Depressive Disorder, Recurrent, Severe  Generalized Anxiety Disorder  Alcohol Use Disorder, Severe, in early remission    SUBJECTIVE: Mr. Choi stated he completed his sleep study last week. He noted he suspects the results will be normal. He will find out the results in two weeks. Mr. Choi stated he completed his goals for the week, noting he resonated with the assignment. He stated he appreciated the differentiation between humility and humiliation, noting he needs to work to apply that to his own life. He stated his neighbor hugged him on his birthday, stating he really appreciated and enjoyed the hug.     TREATMENT: Mr. Choi spent the session discussing his newfound enthusiasm for creating art. He stated his energy continues to improve, which allows him to spend more time on his art. He discussed the times when he quit art and discovered a common theme. He stated he often quit art in order to rediscover himself. Mr. Choi stated he feels undeserving of the success that art has brought him. He noted at times he sets expectations for himself he cannot fulfill. Ways to set more reasonable expectations were identified.    ASSESSMENT: Mr. Choi arrived on-time for the session. He was well groomed and appropriately dressed for the session. He appeared his stated age. He engaged in spontaneous conversation with the therapist. Eye contact was appropriate. Speech and thought were unremarkable/fluent/linear and organized throughout the session. Mr. Choi was an active participant throughout the therapeutic session.     PLAN:    -RTC in 1 week   -For homework, attend all of his schedule events and practice vulnerability with his sister   -Continue medication management with addiction  medicine fellow      I did not see this pt directly. This pt was discussed with me in individual psychotherapy supervision, and I agree with the plan as documented.     Susi Gilbert, Ph.D., L.P.

## 2020-03-09 NOTE — PROGRESS NOTES
Relapse Prevention Group for Substance Use Disorders                                Time of Service: 4:05 - 5:00   Length of Appointment: 55 mins  Care Providers: Susi Gilbert, PhD, LP  Total number of patients present: 3    DSM-5 Diagnosis:   Alcohol Use Disorder, severe  Major Depressive Disorder, recurrent (per chart review)  Generalized Anxiety Disorder (per chart review)    GROUP CONTENT: I led group in a discussion of coping with setbacks, and increasing sober support.      PATIENT ENGAGEMENT: Active and engaged.    MENTAL STATUS EXAM:   Appearance: Casually dressed and appropriately groomed   Motor activity: Within normal range  Speech rate: Within normal range  Speech volume: Within normal range  Speech articulation:  Within normal range  Speech coherence:  Within normal range  Speech spontaneity:  Within normal range  Affect (objective appearance):  Euthymic  Thought process: Linear, logical, goal-oriented  Suicide/ violence risk: Not individually assessed given group context, but no signs of risk were observed    PLAN:   - Attend Relapse Prevention Group on Thursdays at 4pm   - Individual therapy with Lynda Olmedo MA  - Medication management with addiction medicine fellow

## 2020-03-11 ENCOUNTER — OFFICE VISIT (OUTPATIENT)
Dept: PSYCHIATRY | Facility: CLINIC | Age: 75
End: 2020-03-11
Attending: PSYCHOLOGIST
Payer: COMMERCIAL

## 2020-03-11 DIAGNOSIS — F33.2 SEVERE EPISODE OF RECURRENT MAJOR DEPRESSIVE DISORDER, WITHOUT PSYCHOTIC FEATURES (H): Primary | ICD-10-CM

## 2020-03-11 DIAGNOSIS — F10.21 ALCOHOL USE DISORDER, SEVERE, IN EARLY REMISSION (H): ICD-10-CM

## 2020-03-11 DIAGNOSIS — F41.1 GAD (GENERALIZED ANXIETY DISORDER): ICD-10-CM

## 2020-03-11 NOTE — PATIENT INSTRUCTIONS
Sleep Tips    -Stop caffeine intake early in the afternoon  -Stop screen time at least 2 hours before bed  -Sleep in a cool, dark room  -When you have trouble falling asleep, after 30 min. Of restless sleep, get out of bed and read or journal until you are feeling tired again.  -Keep a journal of you sleep    Remember to be mindful of the length and time of day of your naps!

## 2020-03-11 NOTE — PROGRESS NOTES
"SESSION TYPE: Individual psychotherapy  LENGTH OF APPOINTMENT: 53 minutes  APPOINTMENT TIME: 1:00 pm - 1:53 pm  PARTICIPANTS: Writer (Lynda Olmedo MA) and patient  SUPERVISOR: Susi Gilbert, PhD, LP    DSM-5 DIAGNOSIS  Major Depressive Disorder, Recurrent, Severe  Generalized Anxiety Disorder  Alcohol Use Disorder, Severe    SUBJECTIVE: Mr. 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 He reported taking precautions due to COVID-19. He stated he will be cutting down on his budget so he can afford more art supplies. Mr. Choi stated he did not complete his homework for the week, noting he was unable to stick to the schedule planned. He reported he has had a terrible week regarding his sleep habits.    TREATMENT: This session was spent exploring Mr. Choi's sleep habits. He discussed engaging in a substantial amount of screen time just before bed. Ways to improve sleep hygiene were discussed, including limiting screen time and caffeine intake before bed as well as improving nap hygiene. The writer demonstrated ways for Mr. Choi to journal about his sleep. Mr. Choi and the writer discussed a plan for termination of therapeutic services given symptoms improvement. Weekly sessions will continue until April, at which time biweekly sessions will begin. Starting in June sessions will move to once per month, with termination occurring in July. Adjustments to the plan can be made either to increase or decrease sessions, or to terminate earlier than planned. This will be determined based on symptom presence and severity.     ASSESSMENT: Mr. Choi arrived on-time for the session. He was well groomed and appropriately dressed for the session. He appeared his stated age. He engaged in spontaneous conversation with the therapist. Eye contact was appropriate. Speech and thought were unremarkable/fluent/linear and organized throughout the session. Mr." Toddelenarr appeared optimistic about life.    PLAN:    -RTC in 1 week   -For homework, begin sleep journal   -Continue medication management with addiction medicine fellow    I did not see this pt directly. This pt was discussed with me in individual psychotherapy supervision, and I agree with the plan as documented.     Susi Gilbert, Ph.D., L.P.

## 2020-03-12 ENCOUNTER — OFFICE VISIT - HEALTHEAST (OUTPATIENT)
Dept: SLEEP MEDICINE | Facility: CLINIC | Age: 75
End: 2020-03-12

## 2020-03-12 DIAGNOSIS — G47.10 HYPERSOMNIA: ICD-10-CM

## 2020-03-12 DIAGNOSIS — G47.34 SLEEP RELATED HYPOXIA: ICD-10-CM

## 2020-03-12 DIAGNOSIS — G47.8 UPPER AIRWAY RESISTANCE SYNDROME: ICD-10-CM

## 2020-03-12 ASSESSMENT — MIFFLIN-ST. JEOR: SCORE: 1572.36

## 2020-03-14 ENCOUNTER — TELEPHONE (OUTPATIENT)
Dept: PSYCHIATRY | Facility: CLINIC | Age: 75
End: 2020-03-14

## 2020-03-14 NOTE — TELEPHONE ENCOUNTER
The writer messaged Mr. Choi via [employer] to inform him of the move to telehealth services. Instructions for accessing these services were included.

## 2020-03-15 ENCOUNTER — HEALTH MAINTENANCE LETTER (OUTPATIENT)
Age: 75
End: 2020-03-15

## 2020-03-18 ENCOUNTER — ALLIED HEALTH/NURSE VISIT (OUTPATIENT)
Dept: PSYCHIATRY | Facility: CLINIC | Age: 75
End: 2020-03-18
Attending: PSYCHIATRY & NEUROLOGY
Payer: COMMERCIAL

## 2020-03-18 DIAGNOSIS — F10.21 ALCOHOL USE DISORDER, SEVERE, IN EARLY REMISSION (H): ICD-10-CM

## 2020-03-18 RX ORDER — GABAPENTIN 300 MG/1
CAPSULE ORAL
Qty: 90 CAPSULE | Refills: 3 | Status: SHIPPED | OUTPATIENT
Start: 2020-03-18 | End: 2021-01-06

## 2020-03-18 NOTE — PROGRESS NOTES
"  ----------------------------------------------------------------------------------------------------------  [facility], [place]   Psychiatry Clinic Progress Note  Addiction Medicine                        IDENTIFICATION   Laurent \"Gio\" Blaze Choi is a 73 year old male who was referred by self for evaluation of alcohol use disorder and desire to continue Vivitrol.  History was provided by patient who was a good historian. He is here for a follow up visit with his initial visit on 10/17/18 and last visit was 10/9/19.        CHIEF COMPLAINT      \"I'm doing fine, I'm doing good\"     Brief Summary     The patient has a past psychiatric/substance use history of alcohol use disorder, MDD and RODRIGO.    Gio has been in detox over 40 times and treatment about 5 times.  His first treatment was in 1980. He gave up daily drinking in late 1990's, then began to binge drink.  In recent years, his pattern has been drinking 1-2 weeks, not eating most of that time, and getting very sick. He then will stop drinking, will be sober for 1-2 weeks. Alcohol has caused withdrawal and tolerance, drinking 6-10 beers and 1 pint a day when on a binge. Alcohol has affected relationships, has affected health with getting sick, not eating and getting depressed.     Interval History    Fatigue has improved to some extent. I have gone back to work at home. 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 He is feels much better and is feeling rewarded about his work.     He has been completely sober for over 5 months. He has a sponsor that he has been meeting every week. Their meeting was canceled this week because the [employer] was closed. He was attending one meeting per week, but they have now been cancelled because of corona virus. His sponsor was his for a few months, but they hadn't been meeting that often until Gio started missing AA meetings and his sponsor wanted " "to get him on track.     He has been seeing Lynda Olmedo who is an intern here and a student at [employer]. He has found these meetings very helpful. They have made a plan about how to go on with their sessions. Unfortunately, they had to cancel today's appointment as they figure out what to do around the corona virus. This is the first therapist he has seen since college.     They are still working on some things including his sleep, but he feels like his attitude has really improved.      He has started going to weekly therapy at our clinic with a new psychologist. He has also started attending Dr. Gilbert's group.    Normally goes to bed around 1am and gets out of bed around noon, but even then it is very hard to get up. He is tired a lot and he really have to motivate himself to do any activity. He feels rejuvenated by a nap sometimes. \"I think the depression is better but the fatigue isn't.\"He attended his appointment with the pulmonologist who didn't have an explanation for his fatigue.He has also made an appointment with sleep doctor. It is not until January of February.       Treatment History:    First treatment in 1980.  Last treatment at [facility] finished  November 2018.     Substance Use Pharmacotherapies:   Tried antabuse in late 1990's and he would stop taking and \"drink around it\".     Psych pharm:  He was on prozac for about 10 years prior to January 2019 when he was switched to lexapro.     Had a trial of aripiprazole in Spring/Summer 2019, but stopped because it was making him drool.    Gabapentin was started in Spring 2019 during a period of abstinence for anxiety.     RECENT SYMPTOMS   [PSYCH ROS]     PANIC ATTACK:  denies   ANXIETY:  none currently   DEPRESSION:  Improving mood. Sleep, motivation and energy are improving.   Denies SI/SIB.   DYSREGULATION:  \none;    PSYCHOSIS:  none;  DENIES- none  MARILEE/HYPOMANIA:  none;  DENIES- none  TRAUMA RELATED:  none.   EATING DISORDER:  " none  COMPULSIVE:  none  Grief:  none       ALLERGY                                Patient has no known allergies.  MEDICATIONS                               Current Outpatient Medications   Medication Sig Dispense Refill     ACETAMINOPHEN PO Take 325 mg by mouth       amLODIPine-benazepril (LOTREL) 10-20 MG per capsule Take 1 capsule by mouth daily       ASPIRIN PO Take 81 mg by mouth daily       calcium-vitamin D 500-125 MG-UNIT TABS Take 1 tablet by mouth 2 times daily       DIPHENHYDRAMINE HCL PO Take 25 mg by mouth daily as needed       escitalopram (LEXAPRO) 20 MG tablet Take 1 tablet (20 mg) by mouth daily 90 tablet 3     gabapentin (NEURONTIN) 300 MG capsule Take 1 capsule by mouth at bedtime as needed. 30 capsule 1     LOSARTAN POTASSIUM PO Take 25 mg by mouth       melatonin 3 MG tablet Take 1-3 tabs before bed PRN 90 tablet 1     naltrexone (VIVITROL) 380 MG SUSR Inject 380 mg into the muscle every 30 days 380 mg 11     nitroGLYcerin (NITRODUR) 0.4 MG/HR 24 hr patch Place 1 patch onto the skin daily       NITROGLYCERIN SL Take 0.4 mg by mouth       omeprazole 20 MG tablet Take 1-2 daily 60 tablet 1     ROSUVASTATIN CALCIUM PO Take 40 mg by mouth daily       TAMSULOSIN HCL PO Take 0.4 mg by mouth daily         VITALS   There were no vitals taken for this visit.      MENTAL STATUS EXAM                                                           Orientation: full, x3  Alertness: alert   Appearance: unable to assess,   Behavior/Demeanor: cooperative, pleasant and calm, with unable to assess eye contact   Speech: slowed  Language: intact  Psychomotor: unable to assess,  Mood: good  Affect: full range and appropriate; was congruent to mood; was congruent to content  Thought Process/Associations: unremarkable  Thought Content:  Denies suicidal and violent ideation, delusions and preoccupations  Perception:    Denies auditory hallucinations and visual hallucinations  Insight: good  Judgment: good  Cognition: does   appear grossly intact; formal cognitive testing was not done  Gait: Normal   MSK: extremities normal, no peripheral edema    LABS and DATA     PHQ9 TODAY =11, from 9 at past visit.  Feeling tired and poor sleep gives 6 points.  PHQ-9 SCORE 11/20/2019 11/20/2019 1/15/2020   PHQ-9 Total Score 9 9 8       SUBSTANCE USE/PSYCHIATRIC DIAGNOSES                                                                                                    Alcohol Use Disorder, severe, in early remission with some brief episodes of drinking.   Major Depressive Disorder, recurrent, improving.   Generalized Anxiety Disorder, improving.   R/o PTSD  (partner suicide attempts,his incarcerations)     ASSESSMENT                                           See 'Plan' section for specific dosing info             This patient is a 73 year old male who has alcohol use disorder since 1980 with a handful of years of sobriety since.  He has been in detox more than 40 times and 4-5 treatments. He continues to have intermittent use, but while on vivitrol this does not provide the feeling it did previously and does not become consistent. He has 6 weeks of sobriety since his last visit. He has severe fatigue with an unclear source, but concerning for sleep apnea. He is working with his primary care to assess for medical causes of his fatigue. The fatigue did not improve with improved mood, it is likely organic.     #AUD: Severe, without complete remission  #MDD: Current depressed mood and meets criteria for mild episode, but only based on poor sleep and fatigue, which may be unrelated.  #Fatigue: unclear cause. Encouraged again to investigate sleep troubles with PCP and specialist.    [place] PRESCRIPTION MONITORING PROGRAM [] was checked today:  indicates no controlled subtances reported in previous 12 months.     PLAN                                                                                                       1) PSYCHOTROPIC MEDICATIONS:   -  Continue Lexapro 20 mg daily.       - Continue gabapentin to 300 mg,QHS PRN         - Continue vivitrol injection monthly as due   - Recently started taking melatonin 3mg at night    2) THERAPY:    - Gio got financial assistance and has started attending Dr. Gilbert's group again.    - Is attending therapy with Lynda Olmedo in our clinic and really likes it.    - Continue to attend  AA on Monday's and see his sponsor    3) NEXT DUE:  LABS- none                                4) REFERRALS:    Recommend getting a sleep study--talk to primary care about this to rule out another sleep problem affecting quality of sleep, sleep is now improving but still consider due to long standing issues with sleep.     5) RTC: 8 weeks    6) CRISIS NUMBERS:   Provided routinely in AVS.       TREATMENT RISK STATEMENT:  The risks, benefits, alternatives and potential adverse effects have been explained and are understood by the pt. The pt agrees to the treatment plan with the ability to do so. The pt knows to call the clinic for any problems or to access emergency care if needed.  Medical and CD concerns are documented above.  Psychotropic drug interaction check was done, including changes made today, and is discussed above.    ADDICTION FELLOW: Michael Lin      Patient was not seen by Dr. Bee,  who will co-sign the note.    Supervisor is Dr. Bee     I did not see this pt directly. I have reviewed the documentation, and I agree with the fellow's plan of care.    Selam Bee MD      Type of service: Telemedicine Office Visit for AUD  Time of service:     Date: 3/18/20    Time Service Began: 2:35    Time Service Ended: 2:59  Reason that telemedicine is appropriate: corona virus travel restrictions  Mode of transmission: phone  Location of originating and distant sites:    Originating site (patient location): home    Distant site (provider site): office    Patient has agreed to receiving services via telemedicine  technology.      PSYCHIATRY CLINIC INDIVIDUAL PSYCHOTHERAPY NOTE                                                  [16]   Start time - 2:35  End time - 2:59  Date last reviewed - 3/18/20       Date next due - 5/17/20 (or 12 months if Medicare)    Subjective: This supportive psychotherapy session addressed issues related to goals of therapy  and current stressors consisting of  recent relapses, excessive fatigue and time asleep, and attending AA.  Patient's reaction: Executing the plan.  Progress: good  Plan: RTC 3 month  Psychotherapy services during this visit included  myself and the patient.   TREATMENT  PLAN          SYMPTOMS; PROBLEMS   MEASURABLE GOALS;    FUNCTIONAL IMPROVEMENT INTERVENTIONS;   GAINS MADE DISCHARGE CRITERIA   Substance Use: alcohol    No use of alcohol  Using relapse prevention skills  Do not isolate  Continue with vivitrol Has relapsed to intermittent use of alcohol  Feels he is isolating and not going to meetings  Realizes vivitrol helps cravings sustained sobriety and reduction in cravings   Depression: depressed mood, low energy, concentration problems and excessive sleep   reduce depressive symptoms, reduce suicidal thoughts, report feeling more positive about self , develop strategies for thought distraction when ruminating and make a plan to manage 2-3 anxiety-provoking situations Awareness of feelings  Recognition that fatigue may come from lack of sleep or poor quality sleep. Plans to talk to PCP about sleep and breathing studies   Reach out to friends to do social activities, such as go to the museum.  Will call a friend if feeling down or doesn't know what to do..  marked symptom improvement, symptom resolution and significant improvement in self-reports for 5 consecutive visits

## 2020-03-20 ENCOUNTER — COMMUNICATION - HEALTHEAST (OUTPATIENT)
Dept: FAMILY MEDICINE | Facility: CLINIC | Age: 75
End: 2020-03-20

## 2020-03-20 DIAGNOSIS — R73.03 PREDIABETES: ICD-10-CM

## 2020-03-25 ENCOUNTER — TELEPHONE (OUTPATIENT)
Dept: PSYCHIATRY | Facility: CLINIC | Age: 75
End: 2020-03-25

## 2020-03-26 ENCOUNTER — ALLIED HEALTH/NURSE VISIT (OUTPATIENT)
Dept: PSYCHIATRY | Facility: CLINIC | Age: 75
End: 2020-03-26
Payer: COMMERCIAL

## 2020-03-26 ENCOUNTER — TELEPHONE (OUTPATIENT)
Dept: PSYCHIATRY | Facility: CLINIC | Age: 75
End: 2020-03-26

## 2020-03-26 DIAGNOSIS — F10.21 ALCOHOL USE DISORDER, SEVERE, IN EARLY REMISSION (H): Primary | ICD-10-CM

## 2020-03-26 PROCEDURE — 96372 THER/PROPH/DIAG INJ SC/IM: CPT | Mod: ZF

## 2020-03-26 PROCEDURE — 25000128 H RX IP 250 OP 636: Mod: ZF | Performed by: PSYCHIATRY & NEUROLOGY

## 2020-03-26 RX ADMIN — NALTREXONE 380 MG: KIT at 14:30

## 2020-03-26 NOTE — NURSING NOTE
Clinic Administered Medication Documentation      Injectable Medication Documentation    Patient was given Vivitrol 380 mg. Prior to medication administration, verified patients identity using patient s name and date of birth. Please see MAR and medication order for additional information. Patient instructed to remain in clinic for 15 minutes and report any adverse reaction to staff immediately .      Was entire vial of medication used? Yes  Vial/Syringe: Single dose vial  Expiration Date:  2022  Was this medication supplied by the patient? No      Gio Choi,  1945, presented to the clinic today at the request of Dr. Lin,  ordering provider for long-acting injectable Vivitrol 380 mg.    OBSERVATIONS:  1.  Appearance: Casually dressed in weather appropriate clothing. Pt shared that his last drink of alcohol was Oct. 2019 over 5 months.  2.  Mood: Good, talkative, friendly  3.  Affect: Congruent  4.  Attitude: Good, engaged, good eye contact  5.  Cooperation: Full  6.  Side Effects: Denied  7.  Education: Ice at injection site for pain if needed. Call or use [employer] to contact the clinic with any questions or concerns. Pt agreed  8. Next appointment: 2020  9. Location: [facility]    The service provided today was rendered under the supervising provider of the day, Dr. Kelley, who was available for consultation as needed.

## 2020-04-01 ENCOUNTER — VIRTUAL VISIT (OUTPATIENT)
Dept: PSYCHIATRY | Facility: CLINIC | Age: 75
End: 2020-04-01
Attending: PSYCHOLOGIST
Payer: COMMERCIAL

## 2020-04-01 DIAGNOSIS — F10.21 ALCOHOL USE DISORDER, SEVERE, IN EARLY REMISSION (H): ICD-10-CM

## 2020-04-01 DIAGNOSIS — F33.41 RECURRENT MAJOR DEPRESSIVE DISORDER, IN PARTIAL REMISSION (H): ICD-10-CM

## 2020-04-01 DIAGNOSIS — F41.1 GAD (GENERALIZED ANXIETY DISORDER): Primary | ICD-10-CM

## 2020-04-03 NOTE — PROGRESS NOTES
"TELEHEALTH PSYCHOTHERAPY PROGRESS NOTE    Video- Visit Details  Type of service:  video visit for psychotherapy  Time of service:    Date:  4/1/20    Reason for video visit: Patient unable to travel due to Covid-19    Originating Site (patient location): Patient's home    Distant Site (provider location): Remote location    Mode of Communication:  Video Conference via [email or web address]    SESSION TYPE: Individual psychotherapy  LENGTH OF APPOINTMENT: 30 minutes  APPOINTMENT TIME: 2:00 pm - 2:30 pm  PARTICIPANTS: Writer (Lynda Olmedo MA) and patient  SUPERVISOR: Susi Gilbert, PhD, LP    DSM-5 DIAGNOSIS  Generalized Anxiety Disorder  Alcohol Use Disorder, Severe, In early remission  Major Depressive Disorder, Recurrent, In partial remission    SUBJECTIVE: Mr. Choi stated he met with Dr. Lin to discuss his medications. He said it \"went fine.\" He reported an increase in his energy level. 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 He reported he has been communicating with his sister regularly via email.    TREATMENT: Mr. Choi stated he has been anxious about the news and COVID-19, stating he is concerned about his health. This seems reasonable given the current climate. He reported he has been cooking at home. The majority of the session was spent discussing Mr. Choi's new sleep routine. He reported he has not been taking naps on most days which he finds helps him get to sleep earlier at night.     ASSESSMENT: Mr. Choi arrived on-time for the session. He was well groomed and appropriately dressed for the session. He appeared his stated age. He engaged in spontaneous conversation with the therapist. Eye contact was appropriate. Speech and thought were unremarkable/fluent/linear and organized throughout the session. Mr. Choi optimistic about his new sleep routine. He chose to end the session early, stating \"I'm doing good. I have nothing else.\"    PLAN:    -RTC in 2 weeks   -For " homework, continue sleep routine and engage in baking.   -Continue medication management with addiction medicine fellow    I did not see this pt directly. This pt was discussed with me in individual psychotherapy supervision, and I agree with the plan as documented.     Susi Gilbert, Ph.D., L.P.

## 2020-04-03 NOTE — TELEPHONE ENCOUNTER
Mr. Choi did not attend his appointment today. The writer called Mr. Choi to inquire if he was having technical difficulties. Mr. Choi stated he had not yet set up for doxy[employer]me. The writer discussed the process of engaging in telehealth. Mr. Choi stated he would look at the directions sent to him in [facility] and would set up [email or web address] for his next appointment on 4/1/20.

## 2020-04-15 ENCOUNTER — VIRTUAL VISIT (OUTPATIENT)
Dept: PSYCHIATRY | Facility: CLINIC | Age: 75
End: 2020-04-15
Attending: PSYCHOLOGIST
Payer: COMMERCIAL

## 2020-04-15 DIAGNOSIS — F41.1 GAD (GENERALIZED ANXIETY DISORDER): Primary | ICD-10-CM

## 2020-04-15 DIAGNOSIS — F33.41 RECURRENT MAJOR DEPRESSIVE DISORDER, IN PARTIAL REMISSION (H): ICD-10-CM

## 2020-04-15 DIAGNOSIS — F10.21 ALCOHOL USE DISORDER, SEVERE, IN EARLY REMISSION (H): ICD-10-CM

## 2020-04-23 ENCOUNTER — ALLIED HEALTH/NURSE VISIT (OUTPATIENT)
Dept: PSYCHIATRY | Facility: CLINIC | Age: 75
End: 2020-04-23
Payer: COMMERCIAL

## 2020-04-23 DIAGNOSIS — F10.21 ALCOHOL USE DISORDER, SEVERE, IN EARLY REMISSION (H): Primary | ICD-10-CM

## 2020-04-23 PROCEDURE — 25000128 H RX IP 250 OP 636: Mod: ZF | Performed by: PSYCHIATRY & NEUROLOGY

## 2020-04-23 PROCEDURE — 96372 THER/PROPH/DIAG INJ SC/IM: CPT

## 2020-04-23 RX ADMIN — NALTREXONE 380 MG: KIT at 16:07

## 2020-04-23 NOTE — NURSING NOTE
"Clinic Administered Medication Documentation      Injectable Medication Documentation    Patient was given Vivitrol 380MG. Prior to medication administration, verified patients identity using patient s name and date of birth. Please see MAR and medication order for additional information. Patient instructed to Due to the pandemic and the need for social distancing, the pt was allowed to leave the clinic after his injection. Pt was instructed to contact the clinic at [phone] if he experienced any adverse reaction. .      Was entire vial of medication used? Yes  Vial/Syringe: Single dose vial  Expiration Date:  22  Was this medication supplied by the patient? No      Laurent Choi,  1945, presented to the clinic today at the request of Dr. Lin,  ordering provider for long-acting injectable Vivitrol 380MG.    OBSERVATIONS:  1.  Appearance: good personal hygiene, dressed appropriately for the weather in jeans and a sweatshirt. Pt denied any cravings and was proud of the fact that he's been sober for 6 months.   3.  Affect: congruent  4.  Attitude: good  5.  Cooperation: full  6.  Side Effects: pt. denied - pt stated \"this medication has been good for me\".  7.  Education: instructed pt to use ice or heat for injection site discomfort.   8. Next appointment: 20 [id number]  [place] [facility] [place]    The service provided today was rendered under the supervising provider of the day, Dr. Kelley, who was available for consultation as needed.      "

## 2020-04-24 NOTE — PROGRESS NOTES
"TELEHEALTH PSYCHOTHERAPY PROGRESS NOTE    Video- Visit Details  Type of service:  video visit for psychotherapy  Time of service:    Date:  4/15/20    Reason for video visit: Patient unable to travel due to Covid-19    Originating Site (patient location): Patient's home    Distant Site (provider location): Remote location    Mode of Communication:  Video Conference via [email or web address]    SESSION TYPE: Individual psychotherapy  LENGTH OF APPOINTMENT: 41 minutes  APPOINTMENT TIME: 2:02 pm - 2:43 pm  PARTICIPANTS: Writer (Lynda Olmedo MA) and patient  SUPERVISOR: Susi Gilbert, PhD, LP    DSM-5 DIAGNOSIS  Generalized Anxiety Disorder  Alcohol Use Disorder, Severe, In early remission  Major Depressive Disorder, Recurrent, In partial remission    SUBJECTIVE: Mr. Choi stated he has returned to taking naps, which have disrupted his sleep routine. He declared his fatigue \"cured.\" He reported texting with his AA sponsor. Mr. Choi is currently six months sober.     TREATMENT: Mr. Choi discussed his stress around having to stay indoors all of the time. Mr. Choi is currently six months sober. He stated he has been thinking about drinking alcohol. He reported his triggers have been anxiety and isolation. He does not have alcohol in the home and stated he is committed to staying sober. Interventions to prevent alcohol use were provided. The writer provided Mr. Choi with links to online AA resources and meditation exercises. Finally, Mr. 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     ASSESSMENT: Mr. Choi arrived on-time for the session. He was well groomed and appropriately dressed for the session. He appeared his stated age. He engaged in spontaneous conversation with the therapist. Eye contact was appropriate. Speech and thought were unremarkable/fluent/linear and organized throughout the session.     PLAN:    -RTC in 2 weeks   -For homework, utilize " online AA resources and engage in meditation practices   -Continue medication management with addiction medicine fellow    I did not see this pt directly. This pt was discussed with me in individual psychotherapy supervision, and I agree with the plan as documented.     Susi Gilbert, Ph.D., L.P.

## 2020-04-29 ENCOUNTER — VIRTUAL VISIT (OUTPATIENT)
Dept: PSYCHIATRY | Facility: CLINIC | Age: 75
End: 2020-04-29
Attending: PSYCHOLOGIST
Payer: COMMERCIAL

## 2020-04-29 DIAGNOSIS — F41.1 GAD (GENERALIZED ANXIETY DISORDER): ICD-10-CM

## 2020-04-29 DIAGNOSIS — F10.21 ALCOHOL USE DISORDER, SEVERE, IN EARLY REMISSION (H): Primary | ICD-10-CM

## 2020-05-02 NOTE — PROGRESS NOTES
"TELEHEALTH PSYCHOTHERAPY PROGRESS NOTE    Video- Visit Details  Type of service:  video visit for psychotherapy  Time of service:    Date:  4/29/20    Reason for video visit: Patient unable to travel due to Covid-19    Originating Site (patient location): Patient's home    Distant Site (provider location): Remote location    Mode of Communication:  Video Conference via [email or web address]    SESSION TYPE: Individual psychotherapy  LENGTH OF APPOINTMENT: 53 minutes  APPOINTMENT TIME: 2:00 pm - 2:53 pm  PARTICIPANTS: Writer (Lynda Omledo MA) and patient  SUPERVISOR: Susi Gilbert, PhD, LP    DSM-5 DIAGNOSIS  Generalized Anxiety Disorder  Alcohol Use Disorder, Severe, In early remission  Major Depressive Disorder, Recurrent, In partial remission    SUBJECTIVE: Mr. Choi reported having an \"okay\" mood. He report anxiety surrounding the COVID-19 pandemic. He reported feeling more easily annoyed. He stated he has been sleeping more, a lack of motivation, feeling discouraged, more anxious, and struggling to find the positive in the world. He denied depressed mood or suicidal ideation, plan, or intent. Mr. Choi did not complete his homework for the week.       TREATMENT: Mr. Choi discussed his struggles to find the positive in the world. He reported heightened anxiety. He stated there are new rules in his community garden. He stated these are aggravating. Strategies to operate within the new rules in a manner that still brings him pleasure were explored. Mr. Choi discussed struggles with a friend, noting his friend does not wear a mask and ignores Mr. Choi's boundaries. Strategies to assert his boundaries were provided. Mr. Choi denied any alcohol cravings, but stated he watched a movie with alcohol in it which then made him crave alcohol. Mr. Choi reviewed his relapse prevention plan. The writer provided Mr. Choi with suggestions to help him stay connected with people in order to decrease his anxiety. He stated " he would attempt to video call with a friend.     ASSESSMENT: Mr. Choi arrived on-time for the session. He was well groomed and appropriately dressed for the session. He appeared his stated age. He engaged in spontaneous conversation with the therapist. Eye contact was appropriate. Speech and thought were unremarkable/fluent/linear and organized throughout the session.     PLAN:    -RTC in 2 weeks   -For homework, utilize online AA resources and engage in meditation practices              -Video call with a friend   -Continue medication management with addiction medicine fellow    I did not see this pt directly. This pt was discussed with me in individual psychotherapy supervision, and I agree with the plan as documented.     Susi Gilbert, Ph.D., L.P.

## 2020-05-13 ENCOUNTER — VIRTUAL VISIT (OUTPATIENT)
Dept: PSYCHIATRY | Facility: CLINIC | Age: 75
End: 2020-05-13
Attending: PSYCHOLOGIST
Payer: COMMERCIAL

## 2020-05-13 DIAGNOSIS — F10.21 ALCOHOL USE DISORDER, SEVERE, IN EARLY REMISSION (H): ICD-10-CM

## 2020-05-13 DIAGNOSIS — F33.41 RECURRENT MAJOR DEPRESSIVE DISORDER, IN PARTIAL REMISSION (H): ICD-10-CM

## 2020-05-13 DIAGNOSIS — F41.1 GAD (GENERALIZED ANXIETY DISORDER): Primary | ICD-10-CM

## 2020-05-16 NOTE — PROGRESS NOTES
"TELEHEALTH PSYCHOTHERAPY PROGRESS NOTE    SESSION TYPE: Video visit for individual psychotherapy  LENGTH OF APPOINTMENT: 44 minutes  APPOINTMENT TIME: 2:06 pm - 2:50 pm  PARTICIPANTS: Writer (Lynda Olmedo MA) and patient  SUPERVISOR: Susi Gilbert, PhD, LP    Reason for video visit: Patient unable to travel due to Covid-19    Originating Site (patient location): Patient's home    Distant Site (provider location): Remote location    Mode of Communication:  Video Conference via [email or web address]    Patient has given verbal consent for video visit? Yes    DSM-5 DIAGNOSIS  Generalized Anxiety Disorder  Alcohol Use Disorder, Severe, In early remission  Major Depressive Disorder, Recurrent, In partial remission    SUBJECTIVE: Mr. Choi reported having an \"very good\" week. He reported an decrease in cravings for alcohol and improved mood, including a decrease in distressing anxiety. He reported staying busy with gardening. He did not complete his homework.      TREATMENT: Mr. Choi reported continued exercise, and the writer encouraged him to get outside every day provided he was able. The writer engaged in behavioral activation exercises with Mr. Choi to continue to have him cook, bake, clean, and do art in order to feel less couped-up in his apartment. Mr. Choi endorsed poor sleep hygiene. The writer spent the session creating a sleep routine with Mr. Choi, including adjusting the amount of lighting in his apartment at certain times of the day, limiting the use of electronics at night, exercise, and if he must nap, to do it earlier in the day.    ASSESSMENT: Mr. Choi arrived on-time for the session. He was well groomed and appropriately dressed for the session. He appeared his stated age. He engaged in spontaneous conversation with the therapist. Eye contact was appropriate. Speech and thought were unremarkable/fluent/linear and organized throughout the session.     PLAN:    -RTC in 2 weeks   -Video chat with a " friend    -Track sleep   -Implement sleep strategies   -Continue medication management with addiction medicine fellow    I did not see this pt directly. This pt was discussed with me in individual psychotherapy supervision, and I agree with the plan as documented.     Susi Gilbert, Ph.D., L.P.

## 2020-05-20 ENCOUNTER — TELEPHONE (OUTPATIENT)
Dept: PSYCHIATRY | Facility: CLINIC | Age: 75
End: 2020-05-20

## 2020-05-20 ENCOUNTER — VIRTUAL VISIT (OUTPATIENT)
Dept: PSYCHIATRY | Facility: CLINIC | Age: 75
End: 2020-05-20
Attending: PSYCHIATRY & NEUROLOGY
Payer: COMMERCIAL

## 2020-05-20 DIAGNOSIS — F33.40 RECURRENT MAJOR DEPRESSIVE DISORDER, IN REMISSION (H): ICD-10-CM

## 2020-05-20 DIAGNOSIS — F10.21 ALCOHOL USE DISORDER, SEVERE, IN EARLY REMISSION (H): Primary | ICD-10-CM

## 2020-05-20 ASSESSMENT — PAIN SCALES - GENERAL: PAINLEVEL: NO PAIN (0)
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
## 2020-05-20 NOTE — TELEPHONE ENCOUNTER
Writer spoke with pt via phone, reminding him that he is scheduled tomorrow at 1000 for his Vivitrol injection. Writer also reminded him to come to the clinic by himself as we are allowing only patients in the clinic area due to Covid 19. Jaylin Parada LPN

## 2020-05-21 ENCOUNTER — ALLIED HEALTH/NURSE VISIT (OUTPATIENT)
Dept: PSYCHIATRY | Facility: CLINIC | Age: 75
End: 2020-05-21
Payer: COMMERCIAL

## 2020-05-21 DIAGNOSIS — F10.21 ALCOHOL USE DISORDER, SEVERE, IN EARLY REMISSION (H): Primary | ICD-10-CM

## 2020-05-21 PROCEDURE — 25000128 H RX IP 250 OP 636: Mod: ZF | Performed by: PSYCHIATRY & NEUROLOGY

## 2020-05-21 PROCEDURE — 96372 THER/PROPH/DIAG INJ SC/IM: CPT | Mod: ZF

## 2020-05-21 RX ADMIN — NALTREXONE 380 MG: KIT at 09:30
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
## 2020-05-27 ENCOUNTER — VIRTUAL VISIT (OUTPATIENT)
Dept: PSYCHIATRY | Facility: CLINIC | Age: 75
End: 2020-05-27
Attending: PSYCHOLOGIST
Payer: COMMERCIAL

## 2020-05-27 DIAGNOSIS — F41.1 GAD (GENERALIZED ANXIETY DISORDER): Primary | ICD-10-CM

## 2020-05-27 DIAGNOSIS — F33.40 RECURRENT MAJOR DEPRESSIVE DISORDER, IN REMISSION (H): ICD-10-CM

## 2020-05-27 DIAGNOSIS — F10.21 ALCOHOL USE DISORDER, SEVERE, IN EARLY REMISSION (H): ICD-10-CM
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
## 2020-06-09 ENCOUNTER — COMMUNICATION - HEALTHEAST (OUTPATIENT)
Dept: FAMILY MEDICINE | Facility: CLINIC | Age: 75
End: 2020-06-09

## 2020-06-09 DIAGNOSIS — I10 HYPERTENSION, ESSENTIAL: ICD-10-CM

## 2020-06-17 ENCOUNTER — VIRTUAL VISIT (OUTPATIENT)
Dept: PSYCHIATRY | Facility: CLINIC | Age: 75
End: 2020-06-17
Attending: PSYCHOLOGIST
Payer: COMMERCIAL

## 2020-06-17 DIAGNOSIS — F10.21 ALCOHOL USE DISORDER, SEVERE, IN EARLY REMISSION (H): ICD-10-CM

## 2020-06-17 DIAGNOSIS — F41.1 GAD (GENERALIZED ANXIETY DISORDER): Primary | ICD-10-CM

## 2020-06-17 DIAGNOSIS — F33.40 RECURRENT MAJOR DEPRESSIVE DISORDER, IN REMISSION (H): ICD-10-CM

## 2020-06-22 ENCOUNTER — ALLIED HEALTH/NURSE VISIT (OUTPATIENT)
Dept: PSYCHIATRY | Facility: CLINIC | Age: 75
End: 2020-06-22
Payer: COMMERCIAL

## 2020-06-22 DIAGNOSIS — F10.21 ALCOHOL USE DISORDER, SEVERE, IN EARLY REMISSION (H): Primary | ICD-10-CM

## 2020-06-22 PROCEDURE — 25000128 H RX IP 250 OP 636: Mod: ZF | Performed by: PSYCHIATRY & NEUROLOGY

## 2020-06-22 PROCEDURE — 96372 THER/PROPH/DIAG INJ SC/IM: CPT | Mod: ZF

## 2020-06-22 RX ADMIN — NALTREXONE 380 MG: KIT at 11:00
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
## 2020-07-15 ENCOUNTER — VIRTUAL VISIT (OUTPATIENT)
Dept: PSYCHIATRY | Facility: CLINIC | Age: 75
End: 2020-07-15
Attending: PSYCHOLOGIST
Payer: COMMERCIAL

## 2020-07-15 DIAGNOSIS — F33.41 RECURRENT MAJOR DEPRESSIVE DISORDER, IN PARTIAL REMISSION (H): ICD-10-CM

## 2020-07-15 DIAGNOSIS — F41.1 GAD (GENERALIZED ANXIETY DISORDER): ICD-10-CM

## 2020-07-15 DIAGNOSIS — F10.21 ALCOHOL USE DISORDER, SEVERE, IN EARLY REMISSION (H): Primary | ICD-10-CM

## 2020-07-20 ENCOUNTER — ALLIED HEALTH/NURSE VISIT (OUTPATIENT)
Dept: PSYCHIATRY | Facility: CLINIC | Age: 75
End: 2020-07-20
Payer: COMMERCIAL

## 2020-07-20 DIAGNOSIS — F10.21 ALCOHOL USE DISORDER, SEVERE, IN EARLY REMISSION (H): Primary | ICD-10-CM

## 2020-07-20 PROCEDURE — 25000128 H RX IP 250 OP 636: Mod: ZF | Performed by: PSYCHIATRY & NEUROLOGY

## 2020-07-20 PROCEDURE — 96372 THER/PROPH/DIAG INJ SC/IM: CPT | Mod: ZF

## 2020-07-20 RX ADMIN — NALTREXONE 380 MG: KIT at 11:30
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
## 2020-07-23 ENCOUNTER — COMMUNICATION - HEALTHEAST (OUTPATIENT)
Dept: FAMILY MEDICINE | Facility: CLINIC | Age: 75
End: 2020-07-23

## 2020-07-23 DIAGNOSIS — R39.9 LOWER URINARY TRACT SYMPTOMS (LUTS): ICD-10-CM

## 2020-07-23 DIAGNOSIS — R97.20 ELEVATED PROSTATE SPECIFIC ANTIGEN LESS THAN 10 NG/ML: ICD-10-CM
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
## 2020-07-29 ENCOUNTER — VIRTUAL VISIT (OUTPATIENT)
Dept: PSYCHIATRY | Facility: CLINIC | Age: 75
End: 2020-07-29
Attending: PSYCHIATRY & NEUROLOGY
Payer: COMMERCIAL

## 2020-07-29 DIAGNOSIS — F10.21 ALCOHOL USE DISORDER, SEVERE, IN EARLY REMISSION (H): Primary | ICD-10-CM

## 2020-07-29 ASSESSMENT — PAIN SCALES - GENERAL: PAINLEVEL: NO PAIN (0)
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
## 2020-07-29 NOTE — PROGRESS NOTES
"VIDEO VISIT  Laurent Choi is a 75 year old patient who is being evaluated via a billable video visit.      The patient has been notified of following:   \"This video visit will be conducted via a call between you and your physician/provider. We have found that certain health care needs can be provided without the need for an in-person physical exam. This service lets us provide the care you need with a video conversation. If a prescription is necessary we can send it directly to your pharmacy. If lab work is needed we can place an order for that and you can then stop by our lab to have the test done at a later time. Insurers are generally covering virtual visits as they would in-office visits so billing should not be different than normal.  If for some reason you do get billed incorrectly, you should contact the billing office to correct it and that number is in the AVS .    Video Conference to be completed via:  Madalyn    Patient has given verbal consent for video visit?:  Yes    Patient would prefer that any video invitations be sent by: Text to cell phone: [phone]      How would patient like to obtain AVS?:  dooyoo    AVS SmartPhrase [PsychAVS] has been placed in 'Patient Instructions':  Yes    "

## 2020-07-29 NOTE — PATIENT INSTRUCTIONS
Thank you for coming to the PSYCHIATRY CLINIC.    Lab Testing:  If you had lab testing today and your results are reassuring or normal they will be mailed to you or sent through [employer] within 7 days. If the lab tests need quick action we will call you with the results. The phone number we will call with results is # [phone] (home) . If this is not the best number please call our clinic and change the number.    Medication Refills:  If you need any refills please call your pharmacy and they will contact us. Our fax number for refills is [phone]. Please allow three business for refill processing. If you need to  your refill at a new pharmacy, please contact the new pharmacy directly. The new pharmacy will help you get your medications transferred.     Scheduling:  If you have any concerns about today's visit or wish to schedule another appointment please call our office during normal business hours [phone] (8-5:00 M-F)    Contact Us:  Please call [phone] during business hours (8-5:00 M-F).  If after clinic hours, or on the weekend, please call  [phone].    Financial Assistance [phone]  [facility] Billing [phone]  Central Billing Office, [facility]: [phone]  [facility]illing [phone]  Medical Records [phone]      MENTAL HEALTH CRISIS NUMBERS:  For a medical emergency please call  911 or go to the nearest ER.     [facility]:   [facility] -[phone]   Crisis Residence [facility] -[phone]   Walk-In Counseling [facility] -[phone]   COPE 24/7 [facility] Mobile Team -[phone] (adults)/[phone] (child)  CHILD: [facility] needs assessment team - [phone]      [facility]:   [facility] - [phone]   Walk-in counseling [facility] - [phone]   Walk-in counseling [facility] - [phone]   Crisis Residence [facility] - [phone]  Urgent  [facility] Adult Mental Xptpjw-[phone] mobile unit/ 24/7 crisis line    National Crisis Numbers:   National Suicide Prevention Lifeline: 2-871-807-TALK (764-410-5854)  Poison Control Center - 0-475-283-1403  [employer] system/resources for a list of additional resources (SOS)  Trans Lifeline a hotline for transgender people 3-190-197-5667  The Oni Project a hotline for LGBT youth 1-657.994.9184  Crisis Text Line: For any crisis 24/7   To: 190408  see www.crisistextline.org  - IF MAKING A CALL FEELS TOO HARD, send a text!         Again thank you for choosing PSYCHIATRY CLINIC and please let us know how we can best partner with you to improve you and your family's health.    You may be receiving a survey regarding this appointment. We would love to have your feedback, both positive and negative. The survey is done by an external company, so your answers are anonymous.

## 2020-07-30 ENCOUNTER — COMMUNICATION - HEALTHEAST (OUTPATIENT)
Dept: FAMILY MEDICINE | Facility: CLINIC | Age: 75
End: 2020-07-30

## 2020-07-30 DIAGNOSIS — I25.10 ATHEROSCLEROSIS OF NATIVE CORONARY ARTERY OF NATIVE HEART WITHOUT ANGINA PECTORIS: ICD-10-CM

## 2020-07-31 ENCOUNTER — TELEPHONE (OUTPATIENT)
Dept: PSYCHIATRY | Facility: CLINIC | Age: 75
End: 2020-07-31
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
## 2020-08-03 ENCOUNTER — COMMUNICATION - HEALTHEAST (OUTPATIENT)
Dept: FAMILY MEDICINE | Facility: CLINIC | Age: 75
End: 2020-08-03

## 2020-08-03 DIAGNOSIS — K21.00 GASTROESOPHAGEAL REFLUX DISEASE WITH ESOPHAGITIS: ICD-10-CM

## 2020-08-17 ENCOUNTER — ALLIED HEALTH/NURSE VISIT (OUTPATIENT)
Dept: PSYCHIATRY | Facility: CLINIC | Age: 75
End: 2020-08-17
Payer: COMMERCIAL

## 2020-08-17 DIAGNOSIS — F10.21 ALCOHOL USE DISORDER, SEVERE, IN EARLY REMISSION (H): Primary | ICD-10-CM

## 2020-08-17 PROCEDURE — 25000128 H RX IP 250 OP 636: Mod: ZF | Performed by: PSYCHIATRY & NEUROLOGY

## 2020-08-17 PROCEDURE — 96372 THER/PROPH/DIAG INJ SC/IM: CPT | Mod: ZF

## 2020-08-17 RX ADMIN — NALTREXONE 380 MG: KIT at 11:30
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
## 2020-08-26 ENCOUNTER — OFFICE VISIT - HEALTHEAST (OUTPATIENT)
Dept: FAMILY MEDICINE | Facility: CLINIC | Age: 75
End: 2020-08-26

## 2020-08-26 DIAGNOSIS — J43.9 PULMONARY EMPHYSEMA, UNSPECIFIED EMPHYSEMA TYPE (H): ICD-10-CM

## 2020-08-26 DIAGNOSIS — Z95.5 STENTED CORONARY ARTERY: ICD-10-CM

## 2020-08-26 DIAGNOSIS — E78.00 PURE HYPERCHOLESTEROLEMIA: ICD-10-CM

## 2020-08-26 DIAGNOSIS — I10 HYPERTENSION, ESSENTIAL: ICD-10-CM

## 2020-08-26 DIAGNOSIS — Z12.5 ENCOUNTER FOR SCREENING FOR MALIGNANT NEOPLASM OF PROSTATE: ICD-10-CM

## 2020-08-26 DIAGNOSIS — K21.00 GASTROESOPHAGEAL REFLUX DISEASE WITH ESOPHAGITIS: ICD-10-CM

## 2020-08-26 DIAGNOSIS — Z12.5 SCREENING FOR PROSTATE CANCER: ICD-10-CM

## 2020-08-26 DIAGNOSIS — Z11.59 ENCOUNTER FOR HCV SCREENING TEST FOR LOW RISK PATIENT: ICD-10-CM

## 2020-08-26 DIAGNOSIS — L30.8 OTHER ECZEMA: ICD-10-CM

## 2020-08-26 DIAGNOSIS — F10.20 SEVERE ALCOHOL USE DISORDER (H): ICD-10-CM

## 2020-08-26 DIAGNOSIS — I25.10 ATHEROSCLEROSIS OF NATIVE CORONARY ARTERY OF NATIVE HEART WITHOUT ANGINA PECTORIS: ICD-10-CM

## 2020-08-26 DIAGNOSIS — R73.03 PREDIABETES: ICD-10-CM

## 2020-08-26 LAB
ALBUMIN SERPL-MCNC: 4 G/DL (ref 3.5–5)
ALP SERPL-CCNC: 37 U/L (ref 45–120)
ALT SERPL W P-5'-P-CCNC: 14 U/L (ref 0–45)
ANION GAP SERPL CALCULATED.3IONS-SCNC: 10 MMOL/L (ref 5–18)
AST SERPL W P-5'-P-CCNC: 15 U/L (ref 0–40)
BILIRUB SERPL-MCNC: 0.4 MG/DL (ref 0–1)
BUN SERPL-MCNC: 10 MG/DL (ref 8–28)
CALCIUM SERPL-MCNC: 9 MG/DL (ref 8.5–10.5)
CHLORIDE BLD-SCNC: 105 MMOL/L (ref 98–107)
CHOLEST SERPL-MCNC: 164 MG/DL
CO2 SERPL-SCNC: 26 MMOL/L (ref 22–31)
CREAT SERPL-MCNC: 0.84 MG/DL (ref 0.7–1.3)
CREAT UR-MCNC: 50.6 MG/DL
ERYTHROCYTE [DISTWIDTH] IN BLOOD BY AUTOMATED COUNT: 12.7 % (ref 11–14.5)
FASTING STATUS PATIENT QL REPORTED: YES
GFR SERPL CREATININE-BSD FRML MDRD: >60 ML/MIN/1.73M2
GLUCOSE BLD-MCNC: 94 MG/DL (ref 70–125)
HBA1C MFR BLD: 5.9 %
HCT VFR BLD AUTO: 42.7 % (ref 40–54)
HCV AB SERPL QL IA: NEGATIVE
HDLC SERPL-MCNC: 68 MG/DL
HGB BLD-MCNC: 14.3 G/DL (ref 14–18)
LDLC SERPL CALC-MCNC: 78 MG/DL
MCH RBC QN AUTO: 31.4 PG (ref 27–34)
MCHC RBC AUTO-ENTMCNC: 33.6 G/DL (ref 32–36)
MCV RBC AUTO: 93 FL (ref 80–100)
MICROALBUMIN UR-MCNC: <0.5 MG/DL (ref 0–1.99)
MICROALBUMIN/CREAT UR: NORMAL MG/G{CREAT}
PLATELET # BLD AUTO: 230 THOU/UL (ref 140–440)
PMV BLD AUTO: 9.3 FL (ref 7–10)
POTASSIUM BLD-SCNC: 4.4 MMOL/L (ref 3.5–5)
PROT SERPL-MCNC: 6.7 G/DL (ref 6–8)
PSA SERPL-MCNC: 3.6 NG/ML (ref 0–6.5)
RBC # BLD AUTO: 4.56 MILL/UL (ref 4.4–6.2)
SODIUM SERPL-SCNC: 141 MMOL/L (ref 136–145)
TRIGL SERPL-MCNC: 89 MG/DL
TSH SERPL DL<=0.005 MIU/L-ACNC: 0.49 UIU/ML (ref 0.3–5)
VIT B12 SERPL-MCNC: 315 PG/ML (ref 213–816)
WBC: 4.7 THOU/UL (ref 4–11)

## 2020-08-28 ENCOUNTER — COMMUNICATION - HEALTHEAST (OUTPATIENT)
Dept: FAMILY MEDICINE | Facility: CLINIC | Age: 75
End: 2020-08-28

## 2020-09-14 ENCOUNTER — TELEPHONE (OUTPATIENT)
Dept: PSYCHIATRY | Facility: CLINIC | Age: 75
End: 2020-09-14

## 2020-09-14 ENCOUNTER — ALLIED HEALTH/NURSE VISIT (OUTPATIENT)
Dept: PSYCHIATRY | Facility: CLINIC | Age: 75
End: 2020-09-14
Payer: COMMERCIAL

## 2020-09-14 DIAGNOSIS — F10.21 ACUTE ALCOHOLIC INTOXICATION IN ALCOHOLISM, IN REMISSION (H): ICD-10-CM

## 2020-09-14 DIAGNOSIS — F10.21 ALCOHOL USE DISORDER, SEVERE, IN EARLY REMISSION (H): Primary | ICD-10-CM

## 2020-09-14 DIAGNOSIS — Z23 NEED FOR PROPHYLACTIC VACCINATION AND INOCULATION AGAINST INFLUENZA: Primary | ICD-10-CM

## 2020-09-14 PROCEDURE — G0008 ADMIN INFLUENZA VIRUS VAC: HCPCS | Mod: ZF

## 2020-09-14 PROCEDURE — 96372 THER/PROPH/DIAG INJ SC/IM: CPT | Mod: ZF

## 2020-09-14 PROCEDURE — 25000128 H RX IP 250 OP 636: Mod: ZF | Performed by: PSYCHIATRY & NEUROLOGY

## 2020-09-14 PROCEDURE — G0008 ADMIN INFLUENZA VIRUS VAC: HCPCS

## 2020-09-14 PROCEDURE — 90662 IIV NO PRSV INCREASED AG IM: CPT | Mod: ZF

## 2020-09-14 PROCEDURE — 25000128 H RX IP 250 OP 636: Mod: ZF

## 2020-09-14 RX ORDER — NALTREXONE 380 MG
380 KIT INTRAMUSCULAR
Qty: 380 MG | Refills: 11 | Status: SHIPPED | OUTPATIENT
Start: 2020-09-14 | End: 2020-09-14

## 2020-09-14 RX ORDER — NALTREXONE 380 MG
380 KIT INTRAMUSCULAR
Qty: 380 MG | Refills: 3 | Status: SHIPPED | OUTPATIENT
Start: 2020-09-14 | End: 2020-12-08

## 2020-09-14 RX ADMIN — NALTREXONE 380 MG: KIT at 11:30

## 2020-09-14 NOTE — TELEPHONE ENCOUNTER
3 refills of naltrexone (VIVITROL) injection 380 mg ok per Dr. Cruz.Deborah Blanchard, HANHN

## 2020-09-14 NOTE — NURSING NOTE
Clinic Administered Medication Documentation      Injectable Medication Documentation    Patient was given Vivitrol 380 mg. Prior to medication administration, verified patients identity using patient s name and date of birth. Please see MAR and medication order for additional information. Patient instructed to stay in clinic after the injection but patient declined.      Was entire vial of medication used? Yes  Vial/Syringe: Single dose vial  Expiration Date:  2021  Was this medication supplied by the patient? No      Gio Choi,  1945, presented to the clinic today at the request of Dr. Cruz,  ordering provider for long-acting injectable Vivitrol 380 mg.    OBSERVATIONS:  1.  Appearance: Casually dressed in clean weather appropriate clothing. Pt was talking about discontinuing his injectable since he has been sober for a little over a year. Writer asked pt to talk to Dr. Cruz regarding this. Pt agreed. Pt reported he will be looking for a new place to live since his condo is not yet completed and the additional costs that he can not afford. Pt agreed to a flu shot today  2.  Mood: Good, talkative, friendly  3.  Affect: Congruent  4.  Attitude: Good, engaged, good eye contact  5.  Cooperation: Full  6.  Side Effects: Denied  7.  Education: None needed  8. Next appointment: 10/12/2020  9. Location: [facility]    The service provided today was rendered under the supervising provider of the day, Dr. Gaspar, who was available for consultation as needed.

## 2020-09-14 NOTE — TELEPHONE ENCOUNTER
----- Message from Danelle Cruz MD sent at 9/12/2020  4:54 PM CDT -----  Regarding: RE: Refill BARRETO Vivitrol  HI- Sorry for the delay! He's seeing me this month but I would go ahead and refill for him 3 months.    Thanks!    Dr. Cruz  ----- Message -----  From: Deborah Blanchard LPN  Sent: 9/11/2020   7:17 AM CDT  To: Danelle Cruz MD  Subject: Refill BARRETO Vivitrol                              Good morning,    Gio is out of Vivitrol refills. I can enter a new order, just let me know how many to enter. 6? 11?  Let me know. His injection is scheduled for Monday 9/14/2020    ThanksDeborah

## 2020-09-15 NOTE — NURSING NOTE
Clinic Administered Medication Documentation      Injectable Medication Documentation    Patient was given FluZone. Prior to medication administration, verified patients identity using patient s name and date of birth. Please see MAR and medication order for additional information. Patient instructed to stay in clinic after the injection but patient declined.      Was entire vial of medication used? Yes  Vial/Syringe: Syringe  Expiration Date:  6/30/2021  Was this medication supplied by the patient? No

## 2020-09-23 ENCOUNTER — VIRTUAL VISIT (OUTPATIENT)
Dept: PSYCHIATRY | Facility: CLINIC | Age: 75
End: 2020-09-23
Attending: PSYCHIATRY & NEUROLOGY
Payer: COMMERCIAL

## 2020-09-23 DIAGNOSIS — F10.21 ALCOHOL USE DISORDER, SEVERE, IN EARLY REMISSION (H): Primary | ICD-10-CM

## 2020-09-23 ASSESSMENT — PAIN SCALES - GENERAL: PAINLEVEL: NO PAIN (0)

## 2020-09-23 NOTE — PROGRESS NOTES
"  VIDEO VISIT  aLurent Choi is a 75 year old patient who is being evaluated via a billable video visit.      The patient has been notified of following:   \"We have found that certain health care needs can be provided without the need for an in-person physical exam. This service lets us provide the care you need with a video conversation. If a prescription is necessary we can send it directly to your pharmacy. If lab work is needed we can place an order for that and you can then stop by our lab to have the test done at a later time. Insurers are generally covering virtual visits as they would in-office visits so billing should not be different than normal.  If for some reason you do get billed incorrectly, you should contact the billing office to correct it and that number is in the AVS .    Patient has given verbal consent for video visit?: Yes   How would you like to obtain your AVS?: [employer] Smart[employer]rase [PsychAVS] has been placed in 'Patient Instructions': Yes      Video- Visit Details  Type of service:  video visit for medication management  Time of service:    Date:  09/23/2020    Video Start Time:  1:20pm        Video End Time: 2;20pm    Reason for video visit:  Patient unable to travel due to Covid-19  Originating Site (patient location):  [facility]   Location- Patient's home  Distant Site (provider location):  [facility]  Mode of Communication:  Video Conference via [employer]  Consent:  Patient has given verbal consent for video visit?: Yes                           ----------------------------------------------------------------------------------------------------------  [facility] [place]   Psychiatry Clinic Progress Note  Addiction Medicine                        IDENTIFICATION   Laurent \"Gio\" Blaze Choi is a 73 year old melendrez, , white male who was referred by self for evaluation of alcohol use disorder and desire to continue Vivitrol.  " "History was provided by patient who was a good historian. He is here for a follow up visit with his initial visit on 10/17/18 and last visit was 5/17/20.        CHIEF COMPLAINT      \"I'm doing fine, I'm doing good\"     Brief Summary     The patient has a past psychiatric/substance use history of alcohol use disorder, MDD and RODRIGO.    Gio has been in detox over 40 times and treatment about 5 times.  His first treatment was in 1980. He gave up daily drinking in late 1990's, then began to binge drink.  In recent years, his pattern has been drinking 1-2 weeks, not eating most of that time, and getting very sick. He then will stop drinking, will be sober for 1-2 weeks. Alcohol has caused withdrawal and tolerance, drinking 6-10 beers and 1 pint a day when on a binge. Alcohol has affected relationships, has affected health with getting sick, not eating and getting depressed.     Interval History    Patient says he can return to his condo Nov/Dec or sell it We discussed him staying on the Vitriol till the beginning of next year. Patient says he slept all day the other day because he needs sleep. Struggling to stay motivated but have glimmer of hope. \"not vunerable to using- feels comfortable in his sobriety. He reports no cravings. Sometimes the movies trigger him.     Last use ETOH 10/19.    No suicidal ideation.    Anxiety-OK but still ruminates a lot. But he's working on his art and exercising. Socializes with a neighbor lady.    .      Treatment History:    First treatment in 1980.  Last treatment at [facility] finished  November 2018.     Substance Use Pharmacotherapies:   Tried antabuse in late 1990's and he would stop taking and \"drink around it\".     Psych pharm:  He was on prozac for about 10 years prior to January 2019 when he was switched to lexapro.     Had a trial of aripiprazole in Spring/Summer 2019, but stopped because it was making him drool.    Gabapentin was started in Spring 2019 during a " period of abstinence for anxiety.     RECENT SYMPTOMS   [PSYCH ROS]     PANIC ATTACK:  denies   ANXIETY:  Fixated on things of the past.  DEPRESSION:  Improving mood. Sleep, motivation and energy are improving.   Denies SI/SIB.   DYSREGULATION: None  PSYCHOSIS:  none;  DENIES- none  MARILEE/HYPOMANIA:  none;  DENIES- none  TRAUMA RELATED:  none.   EATING DISORDER:  none  COMPULSIVE:  none  Grief:  none       ALLERGY                                Patient has no known allergies.  MEDICATIONS                               Current Outpatient Medications   Medication Sig Dispense Refill     ACETAMINOPHEN PO Take 325 mg by mouth       amLODIPine-benazepril (LOTREL) 10-20 MG per capsule Take 1 capsule by mouth daily       ASPIRIN PO Take 81 mg by mouth daily       calcium-vitamin D 500-125 MG-UNIT TABS Take 1 tablet by mouth 2 times daily       DIPHENHYDRAMINE HCL PO Take 25 mg by mouth daily as needed       escitalopram (LEXAPRO) 20 MG tablet Take 1 tablet (20 mg) by mouth daily 90 tablet 3     gabapentin (NEURONTIN) 300 MG capsule Take 1 capsule by mouth at bedtime as needed. 90 capsule 3     LOSARTAN POTASSIUM PO Take 25 mg by mouth       melatonin 3 MG tablet Take 1-3 tabs before bed PRN 90 tablet 1     naltrexone (VIVITROL) 380 MG SUSR Inject 380 mg into the muscle every 30 days 380 mg 3     NITROGLYCERIN SL Take 0.4 mg by mouth       omeprazole 20 MG tablet Take 1-2 daily 60 tablet 1     ROSUVASTATIN CALCIUM PO Take 40 mg by mouth daily       TAMSULOSIN HCL PO Take 0.4 mg by mouth daily       nitroGLYcerin (NITRODUR) 0.4 MG/HR 24 hr patch Place 1 patch onto the skin daily         VITALS   There were no vitals taken for this visit.      MENTAL STATUS EXAM                                                           Orientation: full, x3  Alertness: alert   Appearance: Good,   Behavior/Demeanor: cooperative, pleasant and calm,   Speech: normal  Language: intact  Psychomotor:ok  Mood: off because he didn't sleep all  night.  Affect: full range and appropriate; was congruent to mood; was congruent to content  Thought Process/Associations: unremarkable  Thought Content:  Denies suicidal and violent ideation, delusions and preoccupations  Perception:    Denies auditory hallucinations and visual hallucinations  Insight: good  Judgment: good  Cognition: does  appear grossly intact; formal cognitive testing was not done  Gait: Normal   MSK: extremities normal, no peripheral edema    LABS and DATA     PHQ9 TODAY =11, from 9 at past visit.  Feeling tired and poor sleep gives 6 points.  PHQ-9 SCORE 11/20/2019 11/20/2019 1/15/2020   PHQ-9 Total Score 9 9 8       SUBSTANCE USE/PSYCHIATRIC DIAGNOSES                                                                                                    Alcohol Use Disorder, severe, in remission 1 year Oct 2020.   Major Depressive Disorder, recurrent, improving.   Generalized Anxiety Disorder, Stable   R/o PTSD  (partner suicide attempts) No nightmares/flashbacks     ASSESSMENT                                           See 'Plan' section for specific dosing info             This patient is a 73 year old male who has alcohol use disorder since 1980 with a handful of years of sobriety since.  He has been in detox more than 40 times and 4-5 treatments.He has been sober for 10 months and reports no interest in using alcohol at this time.    #AUD: Severe,  remission  #MDD: Current depressed mood and meets criteria for mild episode, but only based on poor sleep and fatigue, someday's lack of motivation to do something.  #Fatigue: improved since last appointment    [place] PRESCRIPTION MONITORING PROGRAM [] was  notchecked today:  indicates no controlled subtances reported in previous 12 months.     PLAN                                                                                                       1) PSYCHOTROPIC MEDICATIONS:   - Continue Lexapro 20 mg daily.       - Continue gabapentin to 300  "mg,QHS PRN         - Continue vivitrol injection monthly as due   - Recently started taking melatonin 3mg at night    2) THERAPY:    1- Continue with current medications and re-evaluate in Oct 2020               2- Patient was encouraged to re- start therapy session in our clinic    3) Next appointment in two months                                       TREATMENT RISK STATEMENT:  The risks, benefits, alternatives and potential adverse effects have been explained and are understood by the pt. The pt agrees to the treatment plan with the ability to do so. The pt knows to call the clinic for any problems or to access emergency care if needed.  Medical and CD concerns are documented above.  Psychotropic drug interaction check was done, including changes made today, and is discussed above.    ADDICTION FELLOW: Danelle Cruz      Patient was seent seen by Dr. Bee,  who will co-sign the note.    Supervisor is Dr. Bee     TELEHEALTH ATTESTATION  Following the ACGME guidelines on telemedicine and direct supervision due to COVID-19, I was concurrently participating in and/or monitoring the patient care through appropriate telecommunication technology.  I discussed the key portions of the service with the fellow, including the mental status examination and developing the plan of care. I reviewed key portions of the history with the fellow. I agree with the findings and plan as documented in this note.\"     MD Emmett             "

## 2020-09-23 NOTE — PROGRESS NOTES
"VIDEO VISIT  Laurent Choi is a 75 year old patient who is being evaluated via a billable video visit.      The patient has been notified of following:   \"This video visit will be conducted via a call between you and your physician/provider. We have found that certain health care needs can be provided without the need for an in-person physical exam. This service lets us provide the care you need with a video conversation. If a prescription is necessary we can send it directly to your pharmacy. If lab work is needed we can place an order for that and you can then stop by our lab to have the test done at a later time. Insurers are generally covering virtual visits as they would in-office visits so billing should not be different than normal.  If for some reason you do get billed incorrectly, you should contact the billing office to correct it and that number is in the AVS .    Video Conference to be completed via:  Madalyn    Patient has given verbal consent for video visit?:  Yes    Patient would prefer that any video invitations be sent by: Send to e-mail at: [email or web address]      How would patient like to obtain AVS?:  Soco    AVS SmartPhrase [PsychAVS] has been placed in 'Patient Instructions':  Yes    "

## 2020-09-23 NOTE — PATIENT INSTRUCTIONS
Thank you for coming to the PSYCHIATRY CLINIC.    Lab Testing:  If you had lab testing today and your results are reassuring or normal they will be mailed to you or sent through [employer] within 7 days. If the lab tests need quick action we will call you with the results. The phone number we will call with results is # [phone] (home) . If this is not the best number please call our clinic and change the number.    Medication Refills:  If you need any refills please call your pharmacy and they will contact us. Our fax number for refills is [phone]. Please allow three business for refill processing. If you need to  your refill at a new pharmacy, please contact the new pharmacy directly. The new pharmacy will help you get your medications transferred.     Scheduling:  If you have any concerns about today's visit or wish to schedule another appointment please call our office during normal business hours [phone] (8-5:00 M-F)    Contact Us:  Please call [phone] during business hours (8-5:00 M-F).  If after clinic hours, or on the weekend, please call  [phone].    Financial Assistance [phone]  [facility] Billing [phone]  Central Billing Office, [facility]: [phone]  [facility] Billing [phone]  Medical Records [phone]      MENTAL HEALTH CRISIS NUMBERS:  For a medical emergency please call  911 or go to the nearest ER.     [facility]:   [facility] -[phone]   Crisis Residence [facility] -[phone]   Walk-In Counseling [facility] -[phone]   COPE 24/7 [place] Mobile Team -[phone] (adults)/[phone] (child)  CHILD: [facility] needs assessment team - [phone]      [facility]:   [facility] - [phone]   Walk-in counseling [facility] - [phone]   Walk-in counseling [facility] - [phone]   Crisis Residence [facility] - [phone]  Urgent  [facility] Adult Mental Wxrhdh-[phone] mobile unit/ 24/7 crisis line    National Crisis Numbers:   National Suicide Prevention Lifeline: 4-815-708-TALK (015-708-2627)  Poison Control Center - 8-554-083-3529  [employer]/resources for a list of additional resources (SOS)  Trans Lifeline a hotline for transgender people 4-361-936-4026  The Oni Project a hotline for LGBT youth 1-152.337.7446  Crisis Text Line: For any crisis 24/7   To: 769827  see www.crisistextline.org  - IF MAKING A CALL FEELS TOO HARD, send a text!         Again thank you for choosing PSYCHIATRY CLINIC and please let us know how we can best partner with you to improve you and your family's health.    You may be receiving a survey regarding this appointment. We would love to have your feedback, both positive and negative. The survey is done by an external company, so your answers are anonymous.

## 2020-10-12 ENCOUNTER — ALLIED HEALTH/NURSE VISIT (OUTPATIENT)
Dept: PSYCHIATRY | Facility: CLINIC | Age: 75
End: 2020-10-12
Payer: COMMERCIAL

## 2020-10-12 DIAGNOSIS — F10.21 ACUTE ALCOHOLIC INTOXICATION IN ALCOHOLISM, IN REMISSION (H): Primary | ICD-10-CM

## 2020-10-12 PROCEDURE — 250N000011 HC RX IP 250 OP 636: Performed by: PSYCHIATRY & NEUROLOGY

## 2020-10-12 PROCEDURE — 96372 THER/PROPH/DIAG INJ SC/IM: CPT | Performed by: PSYCHIATRY & NEUROLOGY

## 2020-10-12 RX ADMIN — NALTREXONE 380 MG: KIT at 13:00

## 2020-10-12 NOTE — NURSING NOTE
Clinic Administered Medication Documentation      Injectable Medication Documentation    Patient was given Vivitrol 380 mg. Prior to medication administration, verified patients identity using patient s name and date of birth. Please see MAR and medication order for additional information. Patient instructed to stay in clinic after the injection but patient declined.      Was entire vial of medication used? Yes  Vial/Syringe: Single dose vial  Expiration Date:  2022  Was this medication supplied by the patient? No      Gio Choi,  1945, presented to the clinic today at the request of Danelle Cruz,  ordering provider for long-acting injectable Vivitrol 380 mg.    OBSERVATIONS:  1.  Appearance: Casually dressed in weather permitting clothing, wearing a cloth mask properly covering his mouth and nose.  He informed writer he is doing really good and has not had a drink for over 1 year and is very proud of himself for that.    2.  Mood: good, happy, relaxed  3.  Affect: congruent  4.  Attitude: good eye contact, engaging  5.  Cooperation: full  6.  Side Effects: denied  7.  Education: not at this time  8. Next appointment: 20 1130  9. Location: [facility]    The service provided today was rendered under the supervising provider of the day, Dr. Selam Bee, who was available for consultation as needed.

## 2020-11-06 ENCOUNTER — TELEPHONE (OUTPATIENT)
Dept: PSYCHIATRY | Facility: CLINIC | Age: 75
End: 2020-11-06

## 2020-11-06 NOTE — TELEPHONE ENCOUNTER
Called patient, reminded him of his injection appointment on Monday 11/09/20 at 1130.  .Shilpi West LPN

## 2020-11-09 ENCOUNTER — ALLIED HEALTH/NURSE VISIT (OUTPATIENT)
Dept: PSYCHIATRY | Facility: CLINIC | Age: 75
End: 2020-11-09
Payer: COMMERCIAL

## 2020-11-09 DIAGNOSIS — F10.21 ACUTE ALCOHOLIC INTOXICATION IN ALCOHOLISM, IN REMISSION (H): Primary | ICD-10-CM

## 2020-11-09 PROCEDURE — 250N000011 HC RX IP 250 OP 636: Performed by: PSYCHIATRY & NEUROLOGY

## 2020-11-09 PROCEDURE — 96372 THER/PROPH/DIAG INJ SC/IM: CPT | Performed by: PSYCHIATRY & NEUROLOGY

## 2020-11-09 RX ADMIN — NALTREXONE 380 MG: KIT at 11:30

## 2020-11-09 NOTE — NURSING NOTE
Clinic Administered Medication Documentation      Injectable Medication Documentation    Patient was given Vivitrol 380 mg . Prior to medication administration, verified patients identity using patient s name and date of birth. Please see MAR and medication order for additional information. Patient instructed to stay in clinic after the injection but patient declined.      Was entire vial of medication used? Yes  Vial/Syringe: Syringe  Expiration Date:  2021  Was this medication supplied by the patient? No      Gio Choi,  1945, presented to the clinic today at the request of Danelle Cruz,  ordering provider for long-acting injectable Vivitrol 380 mg.    OBSERVATIONS:  1.  Appearance: casually dressed in weather permitting clothing, wearing a cloth mask properly covering his mouth and nose.  He shared his frustration with writer about how long it is taking for his condo association to get his condo back to normal so he can move back in to his house.  Which he expressed how much he misses.  He informed writer he may not be moving back into his condo for another 3-4 months.  Other than that he is still very proud of his sobriety still.  He denies and urges to drink and has not had any alcohol or beer for more that 1.5 years.  2.  Mood: good, kind, happy  3.  Affect: congruent  4.  Attitude: good eye contact, engaging, talkative, slightly frustrated  5.  Cooperation: full  6.  Side Effects: denied  7.  Education: refused  8. Next appointment: 20 at 1130  9. [place] [facility] [place]    The service provided today was rendered under the supervising provider of the day, Dr. Gaspar, who was available for consultation as needed.

## 2020-12-02 ENCOUNTER — TELEPHONE (OUTPATIENT)
Dept: PSYCHIATRY | Facility: CLINIC | Age: 75
End: 2020-12-02

## 2020-12-02 ENCOUNTER — VIRTUAL VISIT (OUTPATIENT)
Dept: PSYCHIATRY | Facility: CLINIC | Age: 75
End: 2020-12-02
Attending: PSYCHIATRY & NEUROLOGY
Payer: COMMERCIAL

## 2020-12-02 DIAGNOSIS — F33.1 MODERATE EPISODE OF RECURRENT MAJOR DEPRESSIVE DISORDER (H): ICD-10-CM

## 2020-12-02 PROCEDURE — 99214 OFFICE O/P EST MOD 30 MIN: CPT | Mod: GC | Performed by: RADIOLOGY

## 2020-12-02 RX ORDER — ESCITALOPRAM OXALATE 20 MG/1
20 TABLET ORAL DAILY
Qty: 30 TABLET | Refills: 1 | Status: SHIPPED | OUTPATIENT
Start: 2020-12-02 | End: 2021-02-14

## 2020-12-02 ASSESSMENT — PAIN SCALES - GENERAL: PAINLEVEL: NO PAIN (0)

## 2020-12-02 NOTE — TELEPHONE ENCOUNTER
Writer verbally notified by provider to refill the following medications:   1. Refill escitalopram (LEXAPRO) 20 MG tablet- Take 1 tablet (20 mg) by mouth daily - Oral #30 with 1 refill     - Meds refilled per providers approval   - Med tab changed

## 2020-12-02 NOTE — PATIENT INSTRUCTIONS
Thank you for coming to the [facility] MENTAL HEALTH & ADDICTION [place] CLINIC.    Lab Testing:  If you had lab testing today and your results are reassuring or normal they will be mailed to you or sent through [employer] within 7 days. If the lab tests need quick action we will call you with the results. The phone number we will call with results is # [phone] (home) . If this is not the best number please call our clinic and change the number.    Medication Refills:  If you need any refills please call your pharmacy and they will contact us. Our fax number for refills is [phone]. Please allow three business for refill processing. If you need to  your refill at a new pharmacy, please contact the new pharmacy directly. The new pharmacy will help you get your medications transferred.     Scheduling:  If you have any concerns about today's visit or wish to schedule another appointment please call our office during normal business hours [phone] (8-5:00 M-F)    Contact Us:  Please call [phone] during business hours (8-5:00 M-F).  If after clinic hours, or on the weekend, please call  [phone].    Financial Assistance [phone]  [email or web address] Billing [phone]  Central Billing Office, [facility]: [phone]  [facility] Billing [phone]  Medical Records [phone]  [facility] Patient Bill of Rights [email or web address]       MENTAL HEALTH CRISIS NUMBERS:  For a medical emergency please call  911 or go to the nearest ER.     [facility]:   [facility] -[phone]   Crisis Residence [facility] -[phone]   Walk-In Counseling Center [facility] -[phone]   COPE 24/7 [place] Mobile Team -[phone] (adults)/[phone] (child)  CHILD: [facility] needs assessment team - [phone]      [facility]:   [facility] - [phone]   Walk-in counseling [facility]  [facility] - [phone]   Walk-in counseling [facility] - [phone]   Crisis Residence [facility] - [phone]  Urgent Care Adult Mental [place]-[phone] mobile unit/ 24/7 crisis line    National Crisis Numbers:   National Suicide Prevention Lifeline: 0-782-121-TALK (676-016-3312)  Poison Control Center - 1-550.255.4902  [employer]resources for a list of additional resources (SOS)  Trans Lifeline a hotline for transgender people 6-460-454-1383  [employer] a hotline for LGBT youth 1-239.780.9351  Crisis Text Line: For any crisis 24/7   To: 838816  see www.crisistextline.org  - IF MAKING A CALL FEELS TOO HARD, send a text!         Again thank you for choosing [facility] MENTAL HEALTH & ADDICTION [facility] and please let us know how we can best partner with you to improve you and your family's health.    You may be receiving a survey regarding this appointment. We would love to have your feedback, both positive and negative. The survey is done by an external company, so your answers are anonymous.

## 2020-12-02 NOTE — PROGRESS NOTES
"  VIDEO VISIT  Laurent Choi is a 75 year old patient who is being evaluated via a billable video visit.      The patient has been notified of following:   \"We have found that certain health care needs can be provided without the need for an in-person physical exam. This service lets us provide the care you need with a video conversation. If a prescription is necessary we can send it directly to your pharmacy. If lab work is needed we can place an order for that and you can then stop by our lab to have the test done at a later time. Insurers are generally covering virtual visits as they would in-office visits so billing should not be different than normal.  If for some reason you do get billed incorrectly, you should contact the billing office to correct it and that number is in the AVS .    Patient has given verbal consent for video visit?: Yes   How would you like to obtain your AVS?: P[employer] SmartPhrase [PsychAVS] has been placed in 'Patient Instructions': Yes      Video- Visit Details  Type of service:  video visit for medication management  Time of service:    Date:  12/02/2020    Video Start Time:  1:15pm        Video End Time: 2;20pm    Reason for video visit:  Patient unable to travel due to Covid-19  Originating Site (patient location):  [facility]   Location- Patient's home  Distant Site (provider location):  [facility]  Mode of Communication:  Video Conference via [employer]Well  Consent:  Patient has given verbal consent for video visit?: Yes                           ----------------------------------------------------------------------------------------------------------  [facility] [place]   Psychiatry Clinic Progress Note  Addiction Medicine                        IDENTIFICATION   Laurent \"Gio\" Blaze Choi is a 73 year old melendrez, , white male who was referred by self for evaluation of alcohol use disorder and desire to continue Vivitrol.  " "History was provided by patient who was a good historian. He is here for a follow up visit with his initial visit on 10/17/18 and last visit was 5/17/20.        CHIEF COMPLAINT    Living situation is tenuous.      Brief Summary     The patient has a past psychiatric/substance use history of alcohol use disorder, MDD and RODRIGO.    Gio has been in detox over 40 times and treatment about 5 times.  His first treatment was in 1980. He gave up daily drinking in late 1990's, then began to binge drink.  In recent years, his pattern has been drinking 1-2 weeks, not eating most of that time, and getting very sick. He then will stop drinking, will be sober for 1-2 weeks and the cycle is cpntinued. Alcohol has caused withdrawal and tolerance, drinking 6-10 beers and 1 pint a day when on a binge. Alcohol has affected relationships, has affected health with getting sick, not eating and getting depressed.     Interval History    Patient reports managing his fatigue well. He is busy painting his place. Every couple days has to crash for a bit. He has remained sober ( he's grateful he's sober and he has the help to stay sober).  The holidays  Did not trigger him. He was alone but everyone  Was at home too.    He walk with his friend Susi.     Art has been meaningful to him throughout his life but right now he's too busy.     He has been completely sober for over 15 months. He has a sponsor that he has been meeting every month. He has not participated in zoom AA meetings as of yet but he's considering it. He states he has no desire to drink at all.    Has not signed for therapy at this time.     He feels like his attitude has really improved. His outbursts are limited.    Treatment History:    First treatment in 1980.  Last treatment at [facility] finished  November 2018.     Substance Use Pharmacotherapies:   Tried antabuse in late 1990's and he would stop taking and \"drink around it\".     Psych pharm:  He was on prozac " for about 10 years prior to January 2019 when he was switched to lexapro.     Had a trial of aripiprazole in Spring/Summer 2019, but stopped because it was making him drool.    Gabapentin was started in Spring 2019 during a period of abstinence for anxiety.     RECENT SYMPTOMS   [PSYCH ROS]     PANIC ATTACK:  denies   ANXIETY:  none currently   DEPRESSION:  Improving mood. Sleep, motivation and energy are improving.   Denies SI/SIB.   DYSREGULATION:  \none;    PSYCHOSIS:  none;  DENIES- none  MARILEE/HYPOMANIA:  none;  DENIES- none  TRAUMA RELATED:  none.   EATING DISORDER:  none  COMPULSIVE:  none  Grief:  none       ALLERGY                                Patient has no known allergies.  MEDICATIONS                               Current Outpatient Medications   Medication Sig Dispense Refill     ACETAMINOPHEN PO Take 325 mg by mouth       amLODIPine-benazepril (LOTREL) 10-20 MG per capsule Take 1 capsule by mouth daily       ASPIRIN PO Take 81 mg by mouth daily       calcium-vitamin D 500-125 MG-UNIT TABS Take 1 tablet by mouth 2 times daily       DIPHENHYDRAMINE HCL PO Take 25 mg by mouth daily as needed       escitalopram (LEXAPRO) 20 MG tablet Take 1 tablet (20 mg) by mouth daily 90 tablet 3     gabapentin (NEURONTIN) 300 MG capsule Take 1 capsule by mouth at bedtime as needed. 90 capsule 3     LOSARTAN POTASSIUM PO Take 25 mg by mouth       melatonin 3 MG tablet Take 1-3 tabs before bed PRN 90 tablet 1     naltrexone (VIVITROL) 380 MG SUSR Inject 380 mg into the muscle every 30 days 380 mg 3     NITROGLYCERIN SL Take 0.4 mg by mouth       omeprazole 20 MG tablet Take 1-2 daily 60 tablet 1     ROSUVASTATIN CALCIUM PO Take 40 mg by mouth daily       TAMSULOSIN HCL PO Take 0.4 mg by mouth daily       nitroGLYcerin (NITRODUR) 0.4 MG/HR 24 hr patch Place 1 patch onto the skin daily         VITALS   There were no vitals taken for this visit.      MENTAL STATUS EXAM                                                            Orientation: full, x3  Alertness: alert   Appearance: Good,   Behavior/Demeanor: cooperative, pleasant and calm,   Speech: normal  Language: intact  Psychomotor:ok  Mood: anxious because of moving issues  Affect: full range and appropriate; was congruent to mood; was congruent to content  Thought Process/Associations: unremarkable  Thought Content:  Denies suicidal and violent ideation, delusions and preoccupations  Perception: none  Denies auditory hallucinations and visual hallucinations  Insight: good  Judgment: good  Cognition: does  appear grossly intact; formal cognitive testing was not done  Gait: Normal   MSK: extremities normal, no peripheral edema    LABS and DATA     PHQ9 TODAY =11, from 9 at past visit.  Feeling tired and poor sleep gives 6 points.  PHQ-9 SCORE 11/20/2019 11/20/2019 1/15/2020   PHQ-9 Total Score 9 9 8       SUBSTANCE USE/PSYCHIATRIC DIAGNOSES                                                                                                    Alcohol Use Disorder, severe, in early remission with some brief episodes of drinking.   Major Depressive Disorder, recurrent, improving.   Generalized Anxiety Disorder, improving.   R/o PTSD  (partner suicide attempts,his incarcerations)     ASSESSMENT                                           See 'Plan' section for specific dosing info             This patient is a 73 year old male who has alcohol use disorder since 1980 with a handful of years of sobriety since.  He has been in detox more than 40 times and 4-5 treatments.He has been sober for 10 months and reports no interest in using alcohol at this time.    #AUD: Severe, in sustained remission 15  months  #MDD: In early remissopm  #Fatigue: improved significantly since last appointment    [place] PRESCRIPTION MONITORING PROGRAM [] was  notchecked today:  indicates no controlled subtances reported in previous 12 months.     PLAN                                                                        "                                1) PSYCHOTROPIC MEDICATIONS:   - Continue Lexapro 20 mg daily.       - Continue gabapentin to 300 mg,QHS PRN         - Continue vivitrol injection monthly    - Recently started taking melatonin 3mg at night    2) THERAPY:    1- Continue with current medications and re-evaluate in Jan2021               2- Patient was encouraged to re- start therapy session in our clinic when ready    3) Next appointment in month                                       TREATMENT RISK STATEMENT:  The risks, benefits, alternatives and potential adverse effects have been explained and are understood by the pt. The pt agrees to the treatment plan with the ability to do so. The pt knows to call the clinic for any problems or to access emergency care if needed.  Medical and CD concerns are documented above.  Psychotropic drug interaction check was done, including changes made today, and is discussed above.    ADDICTION FELLOW: Danelle Cruz      Patient was seent seen by Dr. Bee,  who will co-sign the note.    Supervisor is Dr. Bee     TELEHEALTH ATTESTATION  Following the ACGME guidelines on telemedicine and direct supervision due to COVID-19, I was concurrently participating in and/or monitoring the patient care through appropriate telecommunication technology.  I discussed the key portions of the service with the fellow, including the mental status examination and developing the plan of care. I reviewed key portions of the history with the fellow. I agree with the findings and plan as documented in this note.\"     MD Emmett             "

## 2020-12-02 NOTE — PROGRESS NOTES
"VIDEO VISIT  Laurent Choi is a 75 year old patient who is being evaluated via a billable video visit.      The patient has been notified of following:   \"This video visit will be conducted via a call between you and your physician/provider. We have found that certain health care needs can be provided without the need for an in-person physical exam. This service lets us provide the care you need with a video conversation. If a prescription is necessary we can send it directly to your pharmacy. If lab work is needed we can place an order for that and you can then stop by our lab to have the test done at a later time. Insurers are generally covering virtual visits as they would in-office visits so billing should not be different than normal.  If for some reason you do get billed incorrectly, you should contact the billing office to correct it and that number is in the AVS .    Video Conference to be completed via:  [email or web address]    Patient has given verbal consent for video visit?:  Yes    Patient would prefer that any video invitations be sent by: Send to e-mail at: [email or web address]      How would patient like to obtain AVS?:  Soco    AVS SmartPhrase [PsychAVS] has been placed in 'Patient Instructions':  Yes    "

## 2020-12-07 RX ORDER — ESCITALOPRAM OXALATE 20 MG/1
20 TABLET ORAL DAILY
Qty: 90 TABLET | Refills: 3 | Status: SHIPPED | OUTPATIENT
Start: 2020-12-07 | End: 2021-01-06

## 2020-12-08 ENCOUNTER — ALLIED HEALTH/NURSE VISIT (OUTPATIENT)
Dept: PSYCHIATRY | Facility: CLINIC | Age: 75
End: 2020-12-08
Payer: COMMERCIAL

## 2020-12-08 ENCOUNTER — TELEPHONE (OUTPATIENT)
Dept: PSYCHIATRY | Facility: CLINIC | Age: 75
End: 2020-12-08

## 2020-12-08 VITALS
WEIGHT: 179.8 LBS | BODY MASS INDEX: 25.08 KG/M2 | HEART RATE: 70 BPM | SYSTOLIC BLOOD PRESSURE: 129 MMHG | OXYGEN SATURATION: 94 % | DIASTOLIC BLOOD PRESSURE: 80 MMHG

## 2020-12-08 DIAGNOSIS — F10.21 ALCOHOL USE DISORDER, SEVERE, IN EARLY REMISSION (H): Primary | ICD-10-CM

## 2020-12-08 DIAGNOSIS — F10.21 ACUTE ALCOHOLIC INTOXICATION IN ALCOHOLISM, IN REMISSION (H): ICD-10-CM

## 2020-12-08 PROCEDURE — 250N000011 HC RX IP 250 OP 636: Performed by: PSYCHIATRY & NEUROLOGY

## 2020-12-08 PROCEDURE — 96372 THER/PROPH/DIAG INJ SC/IM: CPT | Performed by: PSYCHIATRY & NEUROLOGY

## 2020-12-08 PROCEDURE — 99207 PR NO CHARGE NURSE ONLY: CPT

## 2020-12-08 RX ORDER — NALTREXONE 380 MG
380 KIT INTRAMUSCULAR
Qty: 380 MG | Refills: 6 | OUTPATIENT
Start: 2020-12-08 | End: 2021-09-01

## 2020-12-08 RX ADMIN — NALTREXONE 380 MG: KIT at 11:00

## 2020-12-08 NOTE — TELEPHONE ENCOUNTER
----- Message from Danelle Cruz MD sent at 12/7/2020  3:07 PM CST -----  Regarding: FW: Vivitrol refills  Yes please refill.    Dr. Cruz  ----- Message -----  From: Deborah Blanchard LPN  Sent: 12/4/2020  10:00 AM CST  To: Danelle Cruz MD  Subject: Vivitrol refills                                 Gio Kelsey will be out of refills for his Vivitrol 380 mg IM Injection q28d after his next injection in December. Is it ok to enter refills? 3?, 6? Let me know and I will take care of the rest    Thanks    Deborah

## 2020-12-08 NOTE — TELEPHONE ENCOUNTER
Authorization for IM Injection, q28d, naltrexone (VIVITROL) 380 MG SUSR, per Dr Danelle Cruz 6 refills.Deborah Blanchard LPN
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
## 2020-12-09 ENCOUNTER — COMMUNICATION - HEALTHEAST (OUTPATIENT)
Dept: FAMILY MEDICINE | Facility: CLINIC | Age: 75
End: 2020-12-09

## 2020-12-09 DIAGNOSIS — R06.02 SOB (SHORTNESS OF BREATH): ICD-10-CM

## 2020-12-09 DIAGNOSIS — J43.9 PULMONARY EMPHYSEMA, UNSPECIFIED EMPHYSEMA TYPE (H): ICD-10-CM

## 2020-12-09 DIAGNOSIS — I10 HYPERTENSION, ESSENTIAL: ICD-10-CM

## 2020-12-09 DIAGNOSIS — R53.83 FATIGUE, UNSPECIFIED TYPE: ICD-10-CM

## 2021-01-06 ENCOUNTER — VIRTUAL VISIT (OUTPATIENT)
Dept: PSYCHIATRY | Facility: CLINIC | Age: 76
End: 2021-01-06
Attending: PSYCHIATRY & NEUROLOGY
Payer: COMMERCIAL

## 2021-01-06 ENCOUNTER — TELEPHONE (OUTPATIENT)
Dept: PSYCHIATRY | Facility: CLINIC | Age: 76
End: 2021-01-06

## 2021-01-06 DIAGNOSIS — F33.1 MODERATE EPISODE OF RECURRENT MAJOR DEPRESSIVE DISORDER (H): ICD-10-CM

## 2021-01-06 DIAGNOSIS — G47.00 INSOMNIA, UNSPECIFIED TYPE: ICD-10-CM

## 2021-01-06 DIAGNOSIS — F10.21 ALCOHOL USE DISORDER, SEVERE, IN EARLY REMISSION (H): ICD-10-CM

## 2021-01-06 PROCEDURE — 99214 OFFICE O/P EST MOD 30 MIN: CPT | Mod: GC | Performed by: RADIOLOGY

## 2021-01-06 RX ORDER — ESCITALOPRAM OXALATE 20 MG/1
20 TABLET ORAL DAILY
Qty: 90 TABLET | Refills: 0 | Status: SHIPPED | OUTPATIENT
Start: 2021-01-06 | End: 2021-04-14

## 2021-01-06 RX ORDER — GABAPENTIN 300 MG/1
CAPSULE ORAL
Qty: 90 CAPSULE | Refills: 3 | Status: SHIPPED | OUTPATIENT
Start: 2021-01-06 | End: 2021-11-17

## 2021-01-06 RX ORDER — LANOLIN ALCOHOL/MO/W.PET/CERES
CREAM (GRAM) TOPICAL
Qty: 90 TABLET | Refills: 1 | Status: SHIPPED | OUTPATIENT
Start: 2021-01-06 | End: 2023-03-29

## 2021-01-06 ASSESSMENT — PAIN SCALES - GENERAL: PAINLEVEL: NO PAIN (0)

## 2021-01-06 NOTE — PATIENT INSTRUCTIONS
Thank you for coming to the [facility] MENTAL HEALTH & ADDICTION [place] CLINIC.    Lab Testing:  If you had lab testing today and your results are reassuring or normal they will be mailed to you or sent through [facility] within 7 days. If the lab tests need quick action we will call you with the results. The phone number we will call with results is # [phone] (home) . If this is not the best number please call our clinic and change the number.    Medication Refills:  If you need any refills please call your pharmacy and they will contact us. Our fax number for refills is [phone]. Please allow three business for refill processing. If you need to  your refill at a new pharmacy, please contact the new pharmacy directly. The new pharmacy will help you get your medications transferred.     Scheduling:  If you have any concerns about today's visit or wish to schedule another appointment please call our office during normal business hours [phone] (8-5:00 M-F)    Contact Us:  Please call [phone] during business hours (8-5:00 M-F).  If after clinic hours, or on the weekend, please call  [phone].    Financial Assistance [phone]  [facility] Billing [phone]  Central Billing Office, [facility]: [phone]  [place] Billing [phone]  Medical Records [phone]  [place] Patient Bill of Rights [email or web address]       MENTAL HEALTH CRISIS NUMBERS:  For a medical emergency please call  911 or go to the nearest ER.     [facility]:   [facility] -[phone]   Crisis Residence [facility] Residence -[phone]   Walk-In Counseling Center [facility] -[phone]   COPE 24/7 [place] Mobile Team -[phone] (adults)/[phone] (child)  CHILD: [facility] needs assessment team - [phone]      [facility]:   [facility] - [phone]   Walk-in counseling [facility]  [facility] - [phone]   Walk-in counseling [facility] - [phone]   Crisis Residence [facility] Residence - [phone]  Urgent Care Adult Mental [facility]-[phone] mobile unit/ 24/7 crisis line    National Crisis Numbers:   National Suicide Prevention Lifeline: 5-127-002-TALK (157-811-7376)  Poison Control Center - 1-163.815.2252  [facility]/resources for a list of additional resources (SOS)  Trans Lifeline a hotline for transgender people 9-291-924-1212  [employer] a hotline for LGBT youth 1-157.478.6669  Crisis Text Line: For any crisis 24/7   To: 113725  see www.crisistextline.org  - IF MAKING A CALL FEELS TOO HARD, send a text!         Again thank you for choosing [facility] MENTAL HEALTH & ADDICTION [facility] and please let us know how we can best partner with you to improve you and your family's health.    You may be receiving a survey regarding this appointment. We would love to have your feedback, both positive and negative. The survey is done by an external company, so your answers are anonymous.

## 2021-01-06 NOTE — TELEPHONE ENCOUNTER
Called patient, spoke to him reminding him of his injection appointment tomorrow at 1000.  .Shilpi West LPN

## 2021-01-06 NOTE — PROGRESS NOTES
"  VIDEO VISIT  Laurent Choi is a 75 year old patient who is being evaluated via a billable video visit.      The patient has been notified of following:   \"We have found that certain health care needs can be provided without the need for an in-person physical exam. This service lets us provide the care you need with a video conversation. If a prescription is necessary we can send it directly to your pharmacy. If lab work is needed we can place an order for that and you can then stop by our lab to have the test done at a later time. Insurers are generally covering virtual visits as they would in-office visits so billing should not be different than normal.  If for some reason you do get billed incorrectly, you should contact the billing office to correct it and that number is in the AVS .    Patient has given verbal consent for video visit?: Yes   How would you like to obtain your AVS?: [employer] SmartPhrase [PsychAVS] has been placed in 'Patient Instructions': Yes      Video- Visit Details  Type of service:  video visit for medication management  Time of service:    Date:  01/06/2021    Video Start Time:  1:15pm        Video End Time: 1:45 pm    Reason for video visit:  Patient unable to travel due to Covid-19  Originating Site (patient location):  [facility]   Location- Patient's home  Distant Site (provider location):  [facility]  Mode of Communication:  Video Conference via AmWell  Consent:  Patient has given verbal consent for video visit?: Yes           ----------------------------------------------------------------------------------------------------------  [facility], [place]   Psychiatry Clinic Progress Note  Addiction Medicine                        IDENTIFICATION   Laurent \"Gio\" Blaze Choi is a 73 year old, melendrez, ,  male who was referred by self for evaluation of alcohol use disorder and desire to continue Vivitrol.  History was " "provided by patient who was a good historian. He is here for a follow up visit with his initial visit on 10/17/18 and last visit was 5/17/20.        CHIEF COMPLAINT   Here for follow up and medication mangement     Brief Summary     The patient has a past psychiatric/substance use history of alcohol use disorder, MDD and RODRIGO.    Gio has been in detox over 40 times and treatment about 5 times.  His first treatment was in 1980. He gave up daily drinking in late 1990's, then began to binge drink.  In recent years, his pattern has been drinking 1-2 weeks, not eating most of that time, and getting very sick. He then will stop drinking, will be sober for 1-2 weeks and the cycle is continued. Alcohol has caused withdrawal and tolerance, drinking 6-10 beers and 1 pint a day when on a binge. Alcohol has affected relationships, has affected health with getting sick, not eating and getting depressed.     Interval History    Patient reports managing. He moved back into his place during the holidays  And he did 98% of the work himself. He's a bit tired but feeling good about his accomplishments. He did state he thought about drinking during the holidays but he changed his mindset and has remained sober. He continues to get exercise by walking with  his friend Susi. He hasn't attended any meetings and we discussed how this would be a good idea now that he's now settled back home.    He has been sober for over 16 months.      He feels like his attitude has really improved. He said he's done with any outbursts..    Treatment History:    First treatment in 1980.  Last treatment at [facility] finished  November 2018.     Substance Use Pharmacotherapies:   Tried antabuse in late 1990's and he would stop taking and \"drink around it\".     Psych pharm:  He was on prozac for about 10 years prior to January 2019 when he was switched to lexapro.     Had a trial of aripiprazole in Spring/Summer 2019, but stopped because it was " making him drool.    Gabapentin was started in Spring 2019 during a period of abstinence for anxiety.     RECENT SYMPTOMS   [PSYCH ROS]     PANIC ATTACK:  denies   ANXIETY:  none currently   DEPRESSION:  Improving mood. Sleep, motivation and energy are improving.   Denies SI/SIB.   DYSREGULATION:  \none;    PSYCHOSIS:  none;  DENIES- none  MARILEE/HYPOMANIA:  none;  DENIES- none  TRAUMA RELATED:  none.   EATING DISORDER:  none  COMPULSIVE:  none  Grief:  none       ALLERGY                                Patient has no known allergies.  MEDICATIONS                               Current Outpatient Medications   Medication Sig Dispense Refill     ACETAMINOPHEN PO Take 325 mg by mouth       amLODIPine-benazepril (LOTREL) 10-20 MG per capsule Take 1 capsule by mouth daily       ASPIRIN PO Take 81 mg by mouth daily       calcium-vitamin D 500-125 MG-UNIT TABS Take 1 tablet by mouth 2 times daily       DIPHENHYDRAMINE HCL PO Take 25 mg by mouth daily as needed       escitalopram (LEXAPRO) 20 MG tablet Take 1 tablet (20 mg) by mouth daily 90 tablet 3     escitalopram (LEXAPRO) 20 MG tablet Take 1 tablet (20 mg) by mouth daily 30 tablet 1     gabapentin (NEURONTIN) 300 MG capsule Take 1 capsule by mouth at bedtime as needed. 90 capsule 3     LOSARTAN POTASSIUM PO Take 25 mg by mouth       melatonin 3 MG tablet Take 1-3 tabs before bed PRN 90 tablet 1     naltrexone (VIVITROL) 380 MG SUSR Inject 380 mg into the muscle every 30 days 380 mg 6     NITROGLYCERIN SL Take 0.4 mg by mouth       omeprazole 20 MG tablet Take 1-2 daily 60 tablet 1     ROSUVASTATIN CALCIUM PO Take 40 mg by mouth daily       TAMSULOSIN HCL PO Take 0.4 mg by mouth daily       nitroGLYcerin (NITRODUR) 0.4 MG/HR 24 hr patch Place 1 patch onto the skin daily         VITALS   There were no vitals taken for this visit.      MENTAL STATUS EXAM                                                           Orientation: full, x3  Alertness: alert   Appearance: Good,    Behavior/Demeanor: cooperative, pleasant and calm,   Speech: normal  Language: intact  Psychomotor:ok  Mood: anxious because of moving issues  Affect: full range and appropriate; was congruent to mood; was congruent to content  Thought Process/Associations: unremarkable  Thought Content:  Denies suicidal and violent ideation, delusions and preoccupations  Perception: none  Denies auditory hallucinations and visual hallucinations  Insight: good  Judgment: good  Cognition: does  appear grossly intact; formal cognitive testing was not done  Gait: Normal   MSK: extremities normal, no peripheral edema    SUBSTANCE USE/PSYCHIATRIC DIAGNOSES                                                                                                    Alcohol Use Disorder, severe, in sustained remission.   Major Depressive Disorder- improved./ partial remission   Generalized Anxiety Disorder- improved.   R/o PTSD  (partner suicide attempts,his incarcerations) seems not to be an issue currently     ASSESSMENT                                           See 'Plan' section for specific dosing info             This patient is a 73 year old male who has alcohol use disorder since 1980 with a handful of years of sobriety since.  He has been in detox more than 40 times and 4-5 treatments.He has been sober for 16 months and is commited to his recovery although sometimes it can be challenging.    #AUD: Severe, in sustained remission 16 months  #MDD: In partial remission  #Fatigue: improved significantly since last appointment    [place] PRESCRIPTION MONITORING PROGRAM [] was  notchecked today:  indicates no controlled subtances reported in previous 12 months.     PLAN                                                                                                       1) PSYCHOTROPIC MEDICATIONS:   - Continue Lexapro 20 mg daily.       - Continue gabapentin to 300 mg,QHS PRN         - Continue vivitrol injection monthly    - Continue with  "melatonin 3mg at night    2) THERAPY:    1- Continue with current medications               2- Patient was encouraged to re- start therapy sessions and AA meetings     3) Next appointment in month  Level of Medical Decision Making:   Problems addressed: - At least 2 stable chronic problems  - Moderate due to: Engaged in prescription drug management during visit (discussed any medication benefits, side effects, alternatives, etc.)                                       TREATMENT RISK STATEMENT:  The risks, benefits, alternatives and potential adverse effects have been explained and are understood by the pt. The pt agrees to the treatment plan with the ability to do so. The pt knows to call the clinic for any problems or to access emergency care if needed.  Medical and CD concerns are documented above.  Psychotropic drug interaction check was done, including changes made today, and is discussed above.    ADDICTION FELLOW: Danelle Cruz      Patient was seent seen by Dr. Bee,  who will co-sign the note.    Supervisor is Dr. Bee     TELEHEALTH ATTESTATION  Following the ACGME guidelines on telemedicine and direct supervision due to COVID-19, I was concurrently participating in and/or monitoring the patient care through appropriate telecommunication technology.  I discussed the key portions of the service with the fellow, including the mental status examination and developing the plan of care. I reviewed key portions of the history with the fellow. I agree with the findings and plan as documented in this note.\"     MD Emmett           "

## 2021-01-06 NOTE — PROGRESS NOTES
"VIDEO VISIT  Laurent Choi is a 75 year old patient who is being evaluated via a billable video visit.      The patient has been notified of following:   \"This video visit will be conducted via a call between you and your physician/provider. We have found that certain health care needs can be provided without the need for an in-person physical exam. This service lets us provide the care you need with a video conversation. If a prescription is necessary we can send it directly to your pharmacy. If lab work is needed we can place an order for that and you can then stop by our lab to have the test done at a later time. Insurers are generally covering virtual visits as they would in-office visits so billing should not be different than normal.  If for some reason you do get billed incorrectly, you should contact the billing office to correct it and that number is in the AVS .    Video Conference to be completed via:  [email or web address]    Patient has given verbal consent for video visit?:  Yes    Patient would prefer that any video invitations be sent by: Send to e-mail at: [email or web address]      How would patient like to obtain AVS?:  Soco    AVS SmartPhrase [PsychAVS] has been placed in 'Patient Instructions':  Yes    "

## 2021-01-07 ENCOUNTER — ALLIED HEALTH/NURSE VISIT (OUTPATIENT)
Dept: PSYCHIATRY | Facility: CLINIC | Age: 76
End: 2021-01-07
Payer: COMMERCIAL

## 2021-01-07 VITALS
BODY MASS INDEX: 24.52 KG/M2 | DIASTOLIC BLOOD PRESSURE: 79 MMHG | HEART RATE: 89 BPM | WEIGHT: 175.8 LBS | SYSTOLIC BLOOD PRESSURE: 127 MMHG

## 2021-01-07 DIAGNOSIS — F10.21 ALCOHOL USE DISORDER, SEVERE, IN EARLY REMISSION (H): Primary | ICD-10-CM

## 2021-01-07 PROCEDURE — 99207 PR NO CHARGE NURSE ONLY: CPT

## 2021-01-07 PROCEDURE — 250N000011 HC RX IP 250 OP 636: Performed by: PSYCHIATRY & NEUROLOGY

## 2021-01-07 PROCEDURE — 96372 THER/PROPH/DIAG INJ SC/IM: CPT | Performed by: PSYCHIATRY & NEUROLOGY

## 2021-01-07 RX ADMIN — NALTREXONE 380 MG: KIT at 10:00

## 2021-01-07 ASSESSMENT — PAIN SCALES - GENERAL: PAINLEVEL: NO PAIN (0)

## 2021-01-07 NOTE — NURSING NOTE
Clinic Administered Medication Documentation      Injectable Medication Documentation    Patient was given Vivitrol 380 mg. Prior to medication administration, verified patients identity using patient s name and date of birth. Please see MAR and medication order for additional information. Patient instructed to remain in clinic for 15 minutes, report any adverse reaction to staff immediately  and stay in clinic after the injection but patient declined.      Was entire vial of medication used? Yes  Vial/Syringe: Single dose vial  Expiration Date:  2022  Was this medication supplied by the patient? No      Gio Choi,  1945, presented to the clinic today at the request of Dr Cruz,  ordering provider for long-acting injectable Vivitrol 380 mg.    OBSERVATIONS:  1.  Appearance: Casually dressed in clean, weather appropriate clothing. Properly  Wearing face mask. Pt shared he move back into his apt over the holidays which he appreciates. However, he is still waiting for cupboards and countertops to be delivered so he is able to use his kitchen. Pt also shared that he tries to go for walks a few days a week with one of his friends from his apt building.   2.  Mood: Good, talkative, engaged  3.  Affect: Congruent  4.  Attitude: Good, pleasant, friendly, good eye contact  5.  Cooperation: Full  6.  Side Effects: Denied  7.  Education: None needed  8. Next appointment: 2021 1000  9. Location: [facility]    The service provided today was rendered under the supervising provider of the day, Dr Gaspar, who was available for consultation as needed.

## 2021-01-16 ENCOUNTER — COMMUNICATION - HEALTHEAST (OUTPATIENT)
Dept: FAMILY MEDICINE | Facility: CLINIC | Age: 76
End: 2021-01-16

## 2021-01-16 DIAGNOSIS — R39.9 LOWER URINARY TRACT SYMPTOMS (LUTS): ICD-10-CM

## 2021-01-22 ENCOUNTER — COMMUNICATION - HEALTHEAST (OUTPATIENT)
Dept: FAMILY MEDICINE | Facility: CLINIC | Age: 76
End: 2021-01-22

## 2021-01-22 DIAGNOSIS — I25.10 ATHEROSCLEROSIS OF NATIVE CORONARY ARTERY OF NATIVE HEART WITHOUT ANGINA PECTORIS: ICD-10-CM

## 2021-02-03 ENCOUNTER — TELEPHONE (OUTPATIENT)
Dept: PSYCHIATRY | Facility: CLINIC | Age: 76
End: 2021-02-03

## 2021-02-03 NOTE — TELEPHONE ENCOUNTER
Called and spoke to the patient, he is hoping to make it in tomorrow for his injection appointment but if the weather is bad he may not.  He will call in the morning if the weather is to bad to drive in.  .Shilpi West LPN

## 2021-02-04 ENCOUNTER — TELEPHONE (OUTPATIENT)
Dept: PSYCHIATRY | Facility: CLINIC | Age: 76
End: 2021-02-04

## 2021-02-04 ENCOUNTER — ALLIED HEALTH/NURSE VISIT (OUTPATIENT)
Dept: PSYCHIATRY | Facility: CLINIC | Age: 76
End: 2021-02-04
Payer: COMMERCIAL

## 2021-02-04 VITALS
DIASTOLIC BLOOD PRESSURE: 76 MMHG | WEIGHT: 176.8 LBS | SYSTOLIC BLOOD PRESSURE: 127 MMHG | HEART RATE: 73 BPM | BODY MASS INDEX: 24.66 KG/M2

## 2021-02-04 DIAGNOSIS — F10.21 ALCOHOL USE DISORDER, SEVERE, IN EARLY REMISSION (H): Primary | ICD-10-CM

## 2021-02-04 PROCEDURE — 250N000011 HC RX IP 250 OP 636: Performed by: PSYCHIATRY & NEUROLOGY

## 2021-02-04 PROCEDURE — 99207 PR NO CHARGE NURSE ONLY: CPT

## 2021-02-04 PROCEDURE — 96372 THER/PROPH/DIAG INJ SC/IM: CPT | Performed by: PSYCHIATRY & NEUROLOGY

## 2021-02-04 RX ADMIN — NALTREXONE 380 MG: KIT at 10:00

## 2021-02-04 ASSESSMENT — PAIN SCALES - GENERAL: PAINLEVEL: NO PAIN (0)

## 2021-02-04 NOTE — NURSING NOTE
Laurent Choi,  1945, presented to the clinic today at the request of Danelle Cruz,  ordering provider for long-acting injectable [id number] mg.    OBSERVATIONS:  1.  Appearance: Patient appeared in clean, weather appropriate clothing showing good hygiene. Patient denied any alcohol or opiate use, suicidal idea, thoughts of harming self or others.  2.  Mood: happy  3.  Affect: Congruent  4.  Attitude: Friendly  5.  Cooperation: full  6.  Side Effects: Denied  7.  Education: None needed  8. Next appointment: Next appointment with Dr. Cruz on 02/10/2021 at 1300 and next injection on 2021 at 1000  9. Location: [facility]    The service provided today was rendered under the supervising provider of the day, Antonino Gaspar, who was available for consultation as needed.  Clinic Administered Medication Documentation      Injectable Medication Documentation    Patient was given Vivitrol 380mg. Prior to medication administration, verified patients identity using patient s name and date of birth. Please see MAR and medication order for additional information. Patient instructed to report any adverse reaction to staff immediately .      Was entire vial of medication used? Yes  Vial/Syringe: Single dose vial  Expiration Date:  2022  Was this medication supplied by the patient? No

## 2021-02-04 NOTE — TELEPHONE ENCOUNTER
VORB - Ok to administer Vivitrol 380 mg, IM Injection, q28d per Dr Selam Bee.Deborah Blanchard LPN

## 2021-02-10 ENCOUNTER — VIRTUAL VISIT (OUTPATIENT)
Dept: PSYCHIATRY | Facility: CLINIC | Age: 76
End: 2021-02-10
Payer: COMMERCIAL

## 2021-02-10 ENCOUNTER — TELEPHONE (OUTPATIENT)
Dept: PSYCHIATRY | Facility: CLINIC | Age: 76
End: 2021-02-10

## 2021-02-10 DIAGNOSIS — F10.21 ALCOHOL USE DISORDER, SEVERE, IN SUSTAINED REMISSION (H): Primary | ICD-10-CM

## 2021-02-10 DIAGNOSIS — F33.40 RECURRENT MAJOR DEPRESSIVE DISORDER, IN REMISSION (H): ICD-10-CM

## 2021-02-10 PROCEDURE — 99214 OFFICE O/P EST MOD 30 MIN: CPT | Mod: GC | Performed by: RADIOLOGY

## 2021-02-10 NOTE — PROGRESS NOTES
"Video- Visit Details  Type of service:  video visit for medication management  Time of service:    Date:  02/10/2021    Video Start Time:  1:00 pm       Video End Time:  1:30pm    Reason for video visit:  Patient unable to travel due to Covid-19  Originating Site (patient location):  [facility]   Location- Patient's home  Distant Site (provider location):  [facility]  Mode of Communication:  Video Conference via AmWell  Consent:  Patient has given verbal consent for video visit?: Yes        ----------------------------------------------------------------------------------------------------------  [facility], [place]   Psychiatry Clinic Progress Note  Addiction Medicine                        IDENTIFICATION   Laurent \"Gio\" Blaze Choi is a 73 year old, melendrez, ,  male who was referred by self for evaluation of alcohol use disorder and desire to continue Vivitrol.  History was provided by patient who was a good historian. He is here for a follow up visit with his initial visit on 10/17/18 and last visit was 5/17/20.        CHIEF COMPLAINT   Here for follow up and medication mangement     Brief Summary     The patient has a past psychiatric/substance use history of alcohol use disorder, MDD and RODRIGO.    Gio has been in detox over 40 times and treatment about 5 times.  His first treatment was in 1980. He gave up daily drinking in late 1990's, then began to binge drink.  In recent years, his pattern has been drinking 1-2 weeks, not eating most of that time, and getting very sick. He then will stop drinking, will be sober for 1-2 weeks and the cycle is continued. Alcohol has caused withdrawal and tolerance, drinking 6-10 beers and 1 pint a day when on a binge. Alcohol has affected relationships, has affected health with getting sick, not eating and getting depressed.     Interval History    Gio reports really enjoying his sobriety and has no cravings. He is fully " "moved in to his place and feels more settled. He's waiting to get his vaccine so he can start going out.    He reports no depression but he had a \"panic attack\" last on of the last piece of the move but he prayed himself through it.     Treatment History:    First treatment in 1980.  Last treatment at [facility] finished  November 2018.     Substance Use Pharmacotherapies:   Tried antabuse in late 1990's and he would stop taking and \"drink around it\".     Psych pharm:  He was on prozac for about 10 years prior to January 2019 when he was switched to lexapro.     Had a trial of aripiprazole in Spring/Summer 2019, but stopped because it was making him drool.    Gabapentin was started in Spring 2019 during a period of abstinence for anxiety.     RECENT SYMPTOMS   [PSYCH ROS]     PANIC ATTACK:  Last Mon had an angry outburst and wanted to kick the door in.  ANXIETY:  none currently   DEPRESSION: Mood is good.   Denies SI/SIB.   DYSREGULATION: None   PSYCHOSIS:  none;  DENIES- none  MARILEE/HYPOMANIA:  none;  DENIES- none  TRAUMA RELATED:  none.   EATING DISORDER:  none  COMPULSIVE:  none  Grief:  none       ALLERGY                                Patient has no known allergies.  MEDICATIONS                               Current Outpatient Medications   Medication Sig Dispense Refill     ACETAMINOPHEN PO Take 325 mg by mouth       amLODIPine-benazepril (LOTREL) 10-20 MG per capsule Take 1 capsule by mouth daily       ASPIRIN PO Take 81 mg by mouth daily       calcium-vitamin D 500-125 MG-UNIT TABS Take 1 tablet by mouth 2 times daily       DIPHENHYDRAMINE HCL PO Take 25 mg by mouth daily as needed       escitalopram (LEXAPRO) 20 MG tablet Take 1 tablet (20 mg) by mouth daily 90 tablet 0     escitalopram (LEXAPRO) 20 MG tablet Take 1 tablet (20 mg) by mouth daily 30 tablet 1     gabapentin (NEURONTIN) 300 MG capsule Take 1 capsule by mouth at bedtime as needed. 90 capsule 3     LOSARTAN POTASSIUM PO Take 25 mg by " mouth       melatonin 3 MG tablet Take 1-3 tabs before bed PRN 90 tablet 1     naltrexone (VIVITROL) 380 MG SUSR Inject 380 mg into the muscle every 30 days 380 mg 6     nitroGLYcerin (NITRODUR) 0.4 MG/HR 24 hr patch Place 1 patch onto the skin daily       NITROGLYCERIN SL Take 0.4 mg by mouth       omeprazole 20 MG tablet Take 1-2 daily 60 tablet 1     ROSUVASTATIN CALCIUM PO Take 40 mg by mouth daily       TAMSULOSIN HCL PO Take 0.4 mg by mouth daily         VITALS   There were no vitals taken for this visit.      MENTAL STATUS EXAM                                                           Orientation: full, x3  Alertness: alert   Appearance: Excellent   Behavior/Demeanor: cooperative, pleasant and calm,   Speech: normal  Language: intact  Psychomotor:ok  Mood: anxious because of moving issues  Affect: full range and appropriate; was congruent to mood; was congruent to content  Thought Process/Associations: unremarkable  Thought Content:  Denies suicidal and violent ideation, delusions and preoccupations  Perception: none  Denies auditory hallucinations and visual hallucinations  Insight: good  Judgment: good  Cognition: does  appear grossly intact; formal cognitive testing was not done  Gait: Normal   MSK: extremities normal, no peripheral edema    SUBSTANCE USE/PSYCHIATRIC DIAGNOSES                                                                                                    Alcohol Use Disorder, severe, in sustained remission.   Major Depressive Disorder- early remission   Generalized Anxiety Disorder- improved.   R/o PTSD  (partner suicide attempts,his incarcerations) seems not to be an issue currently     ASSESSMENT                                           See 'Plan' section for specific dosing info             This patient is a 73 year old male who has alcohol use disorder since 1980 with a handful of years of sobriety since.  He has been in detox more than 40 times and 4-5 treatments.He has been sober  for 17 months and is commited to his recovery although sometimes it can be challenging.    #AUD: Severe, in sustained remission 17 months  #MDD: In partial remission  #Fatigue: improved significantly since last appointment    [place] PRESCRIPTION MONITORING PROGRAM [] was  notchecked today:  indicates no controlled subtances reported in previous 12 months.     PLAN                                                                                                       1) PSYCHOTROPIC MEDICATIONS:   - Continue Lexapro 20 mg daily.       - Continue gabapentin to 300 mg,QHS PRN         - Continue vivitrol injection monthly    - Continue with melatonin 3mg at night    Patient did not need refills at this time.    2) THERAPY:    1- Continue with current medications               2- Patient was encouraged to re- start therapy sessions and AA meetings     3) Next appointment in two month  Level of Medical Decision Making:   Problems addressed: - At least 2 stable chronic problems  - Moderate due to: Engaged in prescription drug management during visit (discussed any medication benefits, side effects, alternatives, etc.)                                       TREATMENT RISK STATEMENT:  The risks, benefits, alternatives and potential adverse effects have been explained and are understood by the pt. The pt agrees to the treatment plan with the ability to do so. The pt knows to call the clinic for any problems or to access emergency care if needed.  Medical and CD concerns are documented above.  Psychotropic drug interaction check was done, including changes made today, and is discussed above.    ADDICTION FELLOW: Danelle ANDERSON Anthony      Patient was seent seen by Dr. Bee,  who will co-sign the note.    Supervisor is Dr. Bee     TELEHEALTH ATTESTATION  Following the ACGME guidelines on telemedicine and direct supervision due to COVID-19, I was concurrently participating in and/or monitoring the patient care through appropriate  "telecommunication technology.  I discussed the key portions of the service with the fellow, including the mental status examination and developing the plan of care. I reviewed key portions of the history with the fellow. I agree with the findings and plan as documented in this note.\"     MD Emmett         "

## 2021-02-10 NOTE — TELEPHONE ENCOUNTER
On February 10, 2021, at 11:25 AM, writer called patient at [phone] to confirm Virtual Visit. Writer unable to make contact with patient. Writer left detailed voice message for call back. [phone] left as call back number. Bethany Green, [facility]

## 2021-02-11 ENCOUNTER — HOSPITAL ENCOUNTER (OUTPATIENT)
Dept: CT IMAGING | Facility: HOSPITAL | Age: 76
Discharge: HOME | End: 2021-02-11
Attending: INTERNAL MEDICINE

## 2021-02-11 DIAGNOSIS — F17.211 CIGARETTE NICOTINE DEPENDENCE IN REMISSION: ICD-10-CM

## 2021-02-22 ENCOUNTER — OFFICE VISIT - HEALTHEAST (OUTPATIENT)
Dept: FAMILY MEDICINE | Facility: CLINIC | Age: 76
End: 2021-02-22

## 2021-02-22 DIAGNOSIS — F10.20 SEVERE ALCOHOL USE DISORDER (H): ICD-10-CM

## 2021-02-22 DIAGNOSIS — H66.001 ACUTE SUPPURATIVE OTITIS MEDIA OF RIGHT EAR WITHOUT SPONTANEOUS RUPTURE OF TYMPANIC MEMBRANE, RECURRENCE NOT SPECIFIED: ICD-10-CM

## 2021-02-22 DIAGNOSIS — F33.1 MAJOR DEPRESSIVE DISORDER, RECURRENT, MODERATE (H): ICD-10-CM

## 2021-02-22 DIAGNOSIS — J43.9 PULMONARY EMPHYSEMA, UNSPECIFIED EMPHYSEMA TYPE (H): ICD-10-CM

## 2021-02-22 ASSESSMENT — PATIENT HEALTH QUESTIONNAIRE - PHQ9: SUM OF ALL RESPONSES TO PHQ QUESTIONS 1-9: 6

## 2021-02-22 ASSESSMENT — MIFFLIN-ST. JEOR: SCORE: 1527.57

## 2021-02-26 ENCOUNTER — TELEPHONE (OUTPATIENT)
Dept: PSYCHIATRY | Facility: CLINIC | Age: 76
End: 2021-02-26

## 2021-02-26 DIAGNOSIS — F10.21 ALCOHOL USE DISORDER, SEVERE, IN SUSTAINED REMISSION (H): Primary | ICD-10-CM

## 2021-03-01 ENCOUNTER — TELEPHONE (OUTPATIENT)
Dept: PSYCHIATRY | Facility: CLINIC | Age: 76
End: 2021-03-01

## 2021-03-01 ENCOUNTER — IMMUNIZATION (OUTPATIENT)
Dept: NURSING | Facility: CLINIC | Age: 76
End: 2021-03-01
Payer: COMMERCIAL

## 2021-03-01 PROCEDURE — 0011A PR COVID VAC MODERNA 100 MCG/0.5 ML IM: CPT

## 2021-03-01 PROCEDURE — 91301 PR COVID VAC MODERNA 100 MCG/0.5 ML IM: CPT

## 2021-03-01 NOTE — TELEPHONE ENCOUNTER
VORB - Continue with Vivitrol 380 mg IM Injection, q28d per Dr Danelle Cruz.Deborah Blanchard, [facility]    See note below

## 2021-03-01 NOTE — TELEPHONE ENCOUNTER
----- Message from Danelle Cruz MD sent at 2/26/2021  4:44 PM CST -----  Regarding: RE: Verbal Needed  Please give Gio his shot an dsay hello to him for me :)  ----- Message -----  From: Deborah Blanchard LPN  Sent: 2/26/2021  11:02 AM CST  To: Danelle Cruz MD  Subject: Verbal Needed                                    Hi Gio Mcclendon will be coming in next week for his Vivitrol injection. Your last notes are not yet signed so protocol says I need a verbal ok from you to give the BARRETO.     Thanks    Deborah

## 2021-03-03 ENCOUNTER — TELEPHONE (OUTPATIENT)
Dept: PSYCHIATRY | Facility: CLINIC | Age: 76
End: 2021-03-03

## 2021-03-03 NOTE — TELEPHONE ENCOUNTER
Writer called [phone]. Phone rang 4 times, someone picked up and then hung up. Writer was unable to leave a message.Deborah Blanchard LPN

## 2021-03-04 ENCOUNTER — ALLIED HEALTH/NURSE VISIT (OUTPATIENT)
Dept: PSYCHIATRY | Facility: CLINIC | Age: 76
End: 2021-03-04
Payer: COMMERCIAL

## 2021-03-04 VITALS
HEART RATE: 72 BPM | WEIGHT: 176.4 LBS | SYSTOLIC BLOOD PRESSURE: 119 MMHG | DIASTOLIC BLOOD PRESSURE: 78 MMHG | BODY MASS INDEX: 24.6 KG/M2

## 2021-03-04 DIAGNOSIS — F10.21 ALCOHOL USE DISORDER, SEVERE, IN SUSTAINED REMISSION (H): Primary | ICD-10-CM

## 2021-03-04 PROCEDURE — 250N000011 HC RX IP 250 OP 636: Performed by: PSYCHIATRY & NEUROLOGY

## 2021-03-04 PROCEDURE — 96372 THER/PROPH/DIAG INJ SC/IM: CPT | Performed by: PSYCHIATRY & NEUROLOGY

## 2021-03-04 RX ADMIN — NALTREXONE 380 MG: KIT at 10:00

## 2021-03-04 ASSESSMENT — PAIN SCALES - GENERAL: PAINLEVEL: NO PAIN (0)

## 2021-03-04 NOTE — NURSING NOTE
"Clinic Administered Medication Documentation      Injectable Medication Documentation    Patient was given Vivitrol 380 mg. Prior to medication administration, verified patients identity using patient s name and date of birth. Please see MAR and medication order for additional information. Patient instructed to stay in clinic after the injection but patient declined.      Was entire vial of medication used? Yes  Vial/Syringe: Single dose vial  Expiration Date:  2022  Was this medication supplied by the patient? No      Gio Choi,  1945, presented to the clinic today at the request of Dr Danelle Cruz,  ordering provider for long-acting injectable Vivitrol 380 mg.    OBSERVATIONS:  1.  Appearance: Casually dressed in clean, weather appropriate clothing. Pt shared \"things are going good right now. I moved back into my place after I got everything fixed. I did a lot of the work myself. It felt good to do something like that again. I decided that I am not going to bother with a garden this year. Too much time and the Management company keeps raises the rates every year. It'll probably be cheaper to go to the [employer] to get my fruit and veggies.\" Pt shared he received the 1st Moderna vaccine last week and will get the 2nd one at the end of the month. Reports no SE from vaccine. Pt denies SI, HI, AH, VH, and SIB. Last alcohol use \"over 1 1/2 years ago.\"   2.  Mood: Good, talkative, friendly  3.  Affect: Congruent  4.  Attitude: Good, engaged, good eye contact  5.  Cooperation: Full  6.  Side Effects: Denied  7.  Education: None needed  8. Next appointment: 2021 1000  [place] [facility] [place]    The service provided today was rendered under the supervising provider of the day, Dr Kelley, who was available for consultation as needed.        "

## 2021-03-10 ENCOUNTER — OFFICE VISIT - HEALTHEAST (OUTPATIENT)
Dept: PULMONOLOGY | Facility: OTHER | Age: 76
End: 2021-03-10

## 2021-03-10 DIAGNOSIS — F17.211 CIGARETTE NICOTINE DEPENDENCE IN REMISSION: ICD-10-CM

## 2021-03-29 ENCOUNTER — COMMUNICATION - HEALTHEAST (OUTPATIENT)
Dept: FAMILY MEDICINE | Facility: CLINIC | Age: 76
End: 2021-03-29

## 2021-03-29 ENCOUNTER — IMMUNIZATION (OUTPATIENT)
Dept: NURSING | Facility: CLINIC | Age: 76
End: 2021-03-29
Attending: INTERNAL MEDICINE
Payer: COMMERCIAL

## 2021-03-29 DIAGNOSIS — R73.03 PREDIABETES: ICD-10-CM

## 2021-03-29 PROCEDURE — 0012A PR COVID VAC MODERNA 100 MCG/0.5 ML IM: CPT

## 2021-03-29 PROCEDURE — 91301 PR COVID VAC MODERNA 100 MCG/0.5 ML IM: CPT

## 2021-03-31 ENCOUNTER — OFFICE VISIT - HEALTHEAST (OUTPATIENT)
Dept: FAMILY MEDICINE | Facility: CLINIC | Age: 76
End: 2021-03-31

## 2021-03-31 DIAGNOSIS — H65.91 FLUID LEVEL BEHIND TYMPANIC MEMBRANE OF RIGHT EAR: ICD-10-CM

## 2021-04-01 ENCOUNTER — ALLIED HEALTH/NURSE VISIT (OUTPATIENT)
Dept: PSYCHIATRY | Facility: CLINIC | Age: 76
End: 2021-04-01
Payer: COMMERCIAL

## 2021-04-01 VITALS
DIASTOLIC BLOOD PRESSURE: 80 MMHG | HEART RATE: 85 BPM | SYSTOLIC BLOOD PRESSURE: 135 MMHG | WEIGHT: 177.4 LBS | BODY MASS INDEX: 24.74 KG/M2

## 2021-04-01 DIAGNOSIS — F10.21 ALCOHOL USE DISORDER, SEVERE, IN SUSTAINED REMISSION (H): Primary | ICD-10-CM

## 2021-04-01 PROCEDURE — 250N000011 HC RX IP 250 OP 636: Performed by: PSYCHIATRY & NEUROLOGY

## 2021-04-01 PROCEDURE — 96372 THER/PROPH/DIAG INJ SC/IM: CPT | Performed by: PSYCHIATRY & NEUROLOGY

## 2021-04-01 RX ADMIN — NALTREXONE 380 MG: KIT at 10:00

## 2021-04-01 ASSESSMENT — PAIN SCALES - GENERAL: PAINLEVEL: NO PAIN (0)

## 2021-04-01 NOTE — PROGRESS NOTES
"Laurent Choi,  1945, presented to the clinic today at the request of Danelle Cruz,  ordering provider for long-acting injectable Nopfjqrc343 mg.    OBSERVATIONS:  1.  Appearance: Patient appeared in clean weather appropriate clothing and showed good hygiene. Patient denied any opiate use and was given a emergency medicine drug warning card to alert first responders of his medication. Patient stated he has been sober 18 moths but said, \"I must be doing something wrong since I am still on this medication.\" Writer explained that the fact he is still sober means he is doing everything right and that being on the medication is not an indication of weakness or failure. It is a tool that has so far been effective and that as long as that it continues to work with no side effects that there is no concern with length of use. Patient verbalized acceptance of this.  2.  Mood: friendly  3.  Affect: congruent  4.  Attitude: engaged  5.  Cooperation: full  6.  Side Effects: Denied  7.  Education: None needed  8. Next appointment: 2021 at 1300 with Dr. Cruz and 2021 at 1000 for next injection.  9. Location: [facility]    The service provided today was rendered under the supervising provider of the day, Alma Kelley, who was available for consultation as needed.  Clinic Administered Medication Documentation      Injectable Medication Documentation    Patient was given Vivitrol 380 mg. Prior to medication administration, verified patients identity using patient s name and date of birth. Please see MAR and medication order for additional information. Patient instructed to report any adverse reaction to staff immediately .      Was entire vial of medication used? Yes  Vial/Syringe: Single dose vial  Expiration Date:  2022  Was this medication supplied by the patient? No    "

## 2021-04-14 ENCOUNTER — VIRTUAL VISIT (OUTPATIENT)
Dept: PSYCHIATRY | Facility: CLINIC | Age: 76
End: 2021-04-14
Attending: PSYCHIATRY & NEUROLOGY
Payer: COMMERCIAL

## 2021-04-14 ENCOUNTER — OFFICE VISIT - HEALTHEAST (OUTPATIENT)
Dept: FAMILY MEDICINE | Facility: CLINIC | Age: 76
End: 2021-04-14

## 2021-04-14 DIAGNOSIS — F10.21 ALCOHOL USE DISORDER, SEVERE, IN SUSTAINED REMISSION (H): Primary | ICD-10-CM

## 2021-04-14 DIAGNOSIS — H65.91 FLUID LEVEL BEHIND TYMPANIC MEMBRANE OF RIGHT EAR: ICD-10-CM

## 2021-04-14 DIAGNOSIS — F33.1 MODERATE EPISODE OF RECURRENT MAJOR DEPRESSIVE DISORDER (H): ICD-10-CM

## 2021-04-14 DIAGNOSIS — F41.1 GAD (GENERALIZED ANXIETY DISORDER): ICD-10-CM

## 2021-04-14 PROCEDURE — 99214 OFFICE O/P EST MOD 30 MIN: CPT | Mod: 95 | Performed by: RADIOLOGY

## 2021-04-14 RX ORDER — ESCITALOPRAM OXALATE 20 MG/1
20 TABLET ORAL DAILY
Qty: 90 TABLET | Refills: 0 | Status: SHIPPED | OUTPATIENT
Start: 2021-04-14 | End: 2021-10-06

## 2021-04-14 ASSESSMENT — PAIN SCALES - GENERAL: PAINLEVEL: NO PAIN (0)

## 2021-04-14 NOTE — PATIENT INSTRUCTIONS
**For crisis resources, please see the information at the end of this document**     Patient Education      Thank you for coming to the [facility] MENTAL HEALTH & ADDICTION [place] CLIN[facility].    Lab Testing:  If you had lab testing today and your results are reassuring or normal they will be mailed to you or sent through [email or web address] within 7 days. If the lab tests need quick action we will call you with the results. The phone number we will call with results is # [phone] (home) . If this is not the best number please call our clinic and change the number.    Medication Refills:  If you need any refills please call your pharmacy and they will contact us. Our fax number for refills is [phone]. Please allow three business for refill processing. If you need to  your refill at a new pharmacy, please contact the new pharmacy directly. The new pharmacy will help you get your medications transferred.     Scheduling:  If you have any concerns about today's visit or wish to schedule another appointment please call our office during normal business hours [phone] (8-5:00 M-F)    Contact Us:  Please call [phone] during business hours (8-5:00 M-F).  If after clinic hours, or on the weekend, please call  [phone].    Financial Assistance [phone]  [facility] Billing [phone]  Central Billing Office, [facility]: [phone]  [facility] Billing [phone]  Medical Records [phone]  [facility] Patient Bill of Rights [email or web address]       MENTAL HEALTH CRISIS NUMBERS:  For a medical emergency please call  911 or go to the nearest ER.     [facility]:   [facility] -[phone]   Crisis Residence [facility] -[phone]   Walk-In Counseling Center [facility] -[phone]   COPE 24/7 [place] Mobile Team -[phone] (adults)/[phone] (child)  CHILD: [facility] needs assessment  team - [phone]      [facility]:   [facility] - [phone]   Walk-in counseling [facility] - [phone]   Walk-in counseling [facility] - [phone]   Crisis Residence [facility] - [phone]  Urgent Care Adult Mental Jialqp-[phone] mobile unit/ 24/7 crisis line    National Crisis Numbers:   National Suicide Prevention Lifeline: 7-153-758-TALK (458-043-5268)  Poison Control Center - 7-330-330-5216  [employer]/resources for a list of additional resources (SOS)  Trans Lifeline a hotline for transgender people 1-692.896.4878  The Oni Project a hotline for LGBT youth 3-857-173-3116  Crisis Text Line: For any crisis 24/7   To: 224066  see www.crisistextline.org  - IF MAKING A CALL FEELS TOO HARD, send a text!         Again thank you for choosing [facility] MENTAL HEALTH & ADDICTION [place] and please let us know how we can best partner with you to improve you and your family's health.    You may be receiving a survey regarding this appointment. We would love to have your feedback, both positive and negative. The survey is done by an external company, so your answers are anonymous.

## 2021-04-14 NOTE — PROGRESS NOTES
"VIDEO VISIT  Laurent Choi is a 76 year old patient who is being evaluated via a billable video visit.      The patient has been notified of following:   \"This video visit will be conducted via a call between you and your physician/provider. We have found that certain health care needs can be provided without the need for an in-person physical exam. This service lets us provide the care you need with a video conversation. If a prescription is necessary we can send it directly to your pharmacy. If lab work is needed we can place an order for that and you can then stop by our lab to have the test done at a later time. Insurers are generally covering virtual visits as they would in-office visits so billing should not be different than normal.  If for some reason you do get billed incorrectly, you should contact the billing office to correct it and that number is in the AVS .    Video Conference to be completed via:  Madalyn    Patient has given verbal consent for video visit?:  Yes    Patient would prefer that any video invitations be sent by: Send to e-mail at: [email or web address]      How would patient like to obtain AVS?:  Soco    AVS SmartPhrase [PsychAVS] has been placed in 'Patient Instructions':  Yes  "

## 2021-04-14 NOTE — PROGRESS NOTES
"Video- Visit Details  Type of service:  video visit for medication management  Time of service:    Date:  04/14/2021    Video Start Time::1:00 pm       Video End Time::1:30pm    Reason for video visit:  Patient unable to travel due to Covid-19  Originating Site (patient location):  [facility]   Location- Patient's home  Distant Site (provider location):  [facility]  Mode of Communication:  Video Conference via [employer]Well  Consent:  Patient has given verbal consent for video visit?: Yes        ----------------------------------------------------------------------------------------------------------  [facility] [place]   Psychiatry Clinic Progress Note  Addiction Medicine                        IDENTIFICATION   Laurent \"Gio\" Blaze Choi is a 73 year old, melendrez, , male who was referred by self for evaluation of alcohol use disorder and desire to continue Vivitrol.  History was provided by patient who was a good historian. He is here for a follow up visit with his initial visit on 10/17/18. His last visit was 2/10/21       CHIEF COMPLAINT   Here for follow up and medication mangement     Brief Summary     The patient has a past psychiatric/substance use history of alcohol use disorder, MDD and RODRIGO.    Gio has been in detox over 40 times and treatment about 5 times.  His first treatment was in 1980. He gave up daily drinking in late 1990's, then began to binge drink.  In recent years, his pattern has been drinking 1-2 weeks, not eating most of that time, and getting very sick. He then will stop drinking, will be sober for 1-2 weeks and the cycle is continued. Alcohol has caused withdrawal and tolerance, drinking 6-10 beers and 1 pint a day when on a binge. Alcohol has affected relationships, has affected health with getting sick, not eating and getting depressed.     Last visit: There were no changes. Gio remained abstinent from alcohol use and was not experiencing " "depression.  There were no medications changes.    Interval History   Gio was in a very good mood. He has received both his Covid's shots. He has been walking daily when possible up to 2 miles an dis actively painting.     He wants to go to AA meetings but in person. There are no in person meetings close to him at this time but he's looking forward to the social interaction. He met with his sponsor two weeks ago and keeps up frequent dialog with him.    Treatment History:    First treatment in 1980.  Last treatment at [facility] finished  November 2018.     Substance Use Pharmacotherapies:   Tried antabuse in late 1990's and he would stop taking and \"drink around it\".     Psych pharm:  He was on prozac for about 10 years prior to January 2019 when he was switched to lexapro.     Had a trial of aripiprazole in Spring/Summer 2019, but stopped because it was making him drool.    Gabapentin was started in Spring 2019 during a period of abstinence for anxiety.     RECENT SYMPTOMS   [PSYCH ROS]     PANIC ATTACK:  No new episodes.  ANXIETY:  none currently   DEPRESSION: Mood is good.   Denies SI/SIB.   DYSREGULATION: None   PSYCHOSIS:  none;  DENIES- none  MARILEE/HYPOMANIA:  none;  DENIES- none  TRAUMA RELATED:  none.   EATING DISORDER:  none  COMPULSIVE:  none  Grief:  none       ALLERGY                                Patient has no known allergies.  MEDICATIONS                               Current Outpatient Medications   Medication Sig Dispense Refill     ACETAMINOPHEN PO Take 325 mg by mouth       amLODIPine-benazepril (LOTREL) 10-20 MG per capsule Take 1 capsule by mouth daily       ASPIRIN PO Take 81 mg by mouth daily       calcium-vitamin D 500-125 MG-UNIT TABS Take 1 tablet by mouth 2 times daily       DIPHENHYDRAMINE HCL PO Take 25 mg by mouth daily as needed       escitalopram (LEXAPRO) 20 MG tablet Take 1 tablet (20 mg) by mouth daily 90 tablet 0     gabapentin (NEURONTIN) 300 MG capsule Take 1 " capsule by mouth at bedtime as needed. 90 capsule 3     LOSARTAN POTASSIUM PO Take 25 mg by mouth       melatonin 3 MG tablet Take 1-3 tabs before bed PRN 90 tablet 1     naltrexone (VIVITROL) 380 MG SUSR Inject 380 mg into the muscle every 30 days 380 mg 6     NITROGLYCERIN SL Take 0.4 mg by mouth       omeprazole 20 MG tablet Take 1-2 daily 60 tablet 1     ROSUVASTATIN CALCIUM PO Take 40 mg by mouth daily       TAMSULOSIN HCL PO Take 0.4 mg by mouth daily       nitroGLYcerin (NITRODUR) 0.4 MG/HR 24 hr patch Place 1 patch onto the skin daily         VITALS   There were no vitals taken for this visit.      MENTAL STATUS EXAM                                                           Orientation: full, x3  Alertness: alert   Appearance: Excellent   Behavior/Demeanor: cooperative, pleasant and calm,   Speech: normal  Language: intact  Psychomotor:ok  Mood: anxious because of moving issues  Affect: full range and appropriate; was congruent to mood; was congruent to content  Thought Process/Associations: unremarkable  Thought Content:  Denies suicidal and violent ideation, delusions and preoccupations  Perception: none  Denies auditory hallucinations and visual hallucinations  Insight: good  Judgment: good  Cognition: does  appear grossly intact; formal cognitive testing was not done  Gait: Normal   MSK: extremities normal, no peripheral edema    SUBSTANCE USE/PSYCHIATRIC DIAGNOSES                                                                                                    Alcohol Use Disorder, severe, in sustained remission.   Major Depressive Disorder- in remission   Generalized Anxiety Disorder- in remission.   R/o PTSD  (partner suicide attempts,his incarcerations) seems not to be an issue currently     ASSESSMENT                                                       This patient is a 73 year old male who has alcohol use disorder since 1980 with a handful of years of sobriety since.  He has been in detox more  than 40 times and 4-5 treatments.He has been sober for 19 months.    #AUD: Severe, in sustained remission  #MDD: In remission  #Fatigue: improved significantly since last appointment    [place] PRESCRIPTION MONITORING PROGRAM [] was  notchecked today:  indicates no controlled subtances reported in previous 12 months.     PLAN                                                                                                       1) PSYCHOTROPIC MEDICATIONS:   - Continue Lexapro 20 mg daily.       - Continue gabapentin to 300 mg,QHS PRN         - Continue vivitrol injection monthly    - Continue with melatonin 3mg at night    Patient did not need refills at this time.    2) THERAPY:    1- Continue with current medications               2- Patient was encouraged to re- start therapy sessions and AA meetings     3) Next appointment in two month  Level of Medical Decision Making:   Problems addressed: - At least 2 stable chronic problems  - Moderate due to: Engaged in prescription drug management during visit (discussed any medication benefits, side effects, alternatives, etc.)                                       TREATMENT RISK STATEMENT:  The risks, benefits, alternatives and potential adverse effects have been explained and are understood by the pt. The pt agrees to the treatment plan with the ability to do so. The pt knows to call the clinic for any problems or to access emergency care if needed.  Medical and CD concerns are documented above.  Psychotropic drug interaction check was done, including changes made today, and is discussed above.    ADDICTION FELLOW: Danelle ANDERSON Zuni      Patient was seent seen by Dr. Bee,  who will co-sign the note.    Supervisor is Dr. Bee     TELEHEALTH ATTESTATION  Following the ACGME guidelines on telemedicine and direct supervision due to COVID-19, I was concurrently participating in and/or monitoring the patient care through appropriate telecommunication technology.  I  "discussed the key portions of the service with the fellow, including the mental status examination and developing the plan of care. I reviewed key portions of the history with the fellow. I agree with the findings and plan as documented in this note.\"     MD Emmett           "

## 2021-04-19 PROBLEM — F33.1 MODERATE EPISODE OF RECURRENT MAJOR DEPRESSIVE DISORDER (H): Status: ACTIVE | Noted: 2021-04-19

## 2021-04-28 ENCOUNTER — TELEPHONE (OUTPATIENT)
Dept: PSYCHIATRY | Facility: CLINIC | Age: 76
End: 2021-04-28

## 2021-04-28 NOTE — TELEPHONE ENCOUNTER
Writer called [phone] and spoke to the patient as a reminder of his BARRETO tomorrow at 1000. Pt acknowledged.Deborah Blanchard LPN

## 2021-04-29 ENCOUNTER — ALLIED HEALTH/NURSE VISIT (OUTPATIENT)
Dept: PSYCHIATRY | Facility: CLINIC | Age: 76
End: 2021-04-29
Payer: COMMERCIAL

## 2021-04-29 VITALS
WEIGHT: 177.8 LBS | BODY MASS INDEX: 24.8 KG/M2 | HEART RATE: 73 BPM | DIASTOLIC BLOOD PRESSURE: 74 MMHG | SYSTOLIC BLOOD PRESSURE: 109 MMHG

## 2021-04-29 DIAGNOSIS — F10.21 ALCOHOL USE DISORDER, SEVERE, IN SUSTAINED REMISSION (H): Primary | ICD-10-CM

## 2021-04-29 PROCEDURE — 250N000011 HC RX IP 250 OP 636: Performed by: PSYCHIATRY & NEUROLOGY

## 2021-04-29 PROCEDURE — 96372 THER/PROPH/DIAG INJ SC/IM: CPT | Performed by: PSYCHIATRY & NEUROLOGY

## 2021-04-29 RX ADMIN — NALTREXONE 380 MG: KIT at 10:00

## 2021-04-29 ASSESSMENT — PAIN SCALES - GENERAL: PAINLEVEL: NO PAIN (0)

## 2021-04-29 NOTE — NURSING NOTE
"Gio Choi,  1945, presented to the clinic today at the request of Dr. Cruz,  ordering provider for long-acting injectable Vivitrol 380mg.    OBSERVATIONS:  1.  Appearance: Dressed in clean, weather appropriate clothing, good body hygiene, good eye contact and responded to questions accordingly.    2.  Mood: Friendly, soft spoken but spoke clearly, he stated that he has now gone 18 months with out drinking alcohol.  Writer congratulated him and he replied with \"thanks.\"    3.  Affect: Congruent  4.  Attitude: Engaged, he mentioned not gardening this summer but plans to take long walks outside instead. No SI or SIB  5.  Cooperation: Full  6.  Side Effects: Denied  7.  Education: none needed  8. Next appointment: 2021 @ 10AM  9. Location: [facility] [place]    The service provided today was rendered under the supervising provider of the day, Dr. Kelley, who was available for consultation as needed.  Clinic Administered Medication Documentation      Injectable Medication Documentation    Patient was given Vivitrol 380mg. Prior to medication administration, verified patients identity using patient s name and date of birth. Please see MAR and medication order for additional information. Patient instructed to report any adverse reaction to staff immediately .      Was entire vial of medication used? Yes  Vial/Syringe: Single dose vial  Expiration Date:  2023  Was this medication supplied by the patient? No        "

## 2021-04-29 NOTE — PATIENT INSTRUCTIONS
**For crisis resources, please see the information at the end of this document**     Patient Education      Thank you for coming to the [facility] MENTAL HEALTH & ADDICTION [place] CLINIC.    Lab Testing:  If you had lab testing today and your results are reassuring or normal they will be mailed to you or sent through [employer] within 7 days. If the lab tests need quick action we will call you with the results. The phone number we will call with results is # [phone] (home) . If this is not the best number please call our clinic and change the number.    Medication Refills:  If you need any refills please call your pharmacy and they will contact us. Our fax number for refills is [phone]. Please allow three business for refill processing. If you need to  your refill at a new pharmacy, please contact the new pharmacy directly. The new pharmacy will help you get your medications transferred.     Scheduling:  If you have any concerns about today's visit or wish to schedule another appointment please call our office during normal business hours [phone] (8-5:00 M-F)    Contact Us:  Please call [phone] during business hours (8-5:00 M-F).  If after clinic hours, or on the weekend, please call  [phone].    Financial Assistance [phone]  [facility] Billing [phone]  Central Billing Office, [facility]: [phone]  [facility] Billing [phone]  Medical Records [phone]  [facility] Patient Bill of Rights [email or web address]       MENTAL HEALTH CRISIS NUMBERS:  For a medical emergency please call  911 or go to the nearest ER.     [facility]:   [facility] -[phone]   Crisis Residence [facility] Residence -[phone]   Walk-In Counseling Center [facility] -[phone]   COPE 24/7 [place] Mobile Team -[phone] (adults)/[phone] (child)  CHILD: [facility] needs assessment  team - [phone]      [facility]:   [facility] - [phone]   Walk-in counseling [facility] - [phone]   Walk-in counseling [facility] - [phone]   Crisis Residence [facility] - [phone]  Urgent Care Adult Mental Letwtv-[phone] mobile unit/ 24/7 crisis line    National Crisis Numbers:   National Suicide Prevention Lifeline: 9-978-205-TALK (746-602-3060)  Poison Control Center - 5-845-463-5415  [employer]/resources for a list of additional resources (SOS)  Trans Lifeline a hotline for transgender people 1-708.552.4973  The Oni Project a hotline for LGBT youth 7-841-689-1868  Crisis Text Line: For any crisis 24/7   To: 348486  see www.crisistextline.org  - IF MAKING A CALL FEELS TOO HARD, send a text!         Again thank you for choosing [facility] MENTAL HEALTH & ADDICTION [facility] and please let us know how we can best partner with you to improve you and your family's health.    You may be receiving a survey regarding this appointment. We would love to have your feedback, both positive and negative. The survey is done by an external company, so your answers are anonymous.

## 2021-05-09 ENCOUNTER — HEALTH MAINTENANCE LETTER (OUTPATIENT)
Age: 76
End: 2021-05-09

## 2021-05-12 ENCOUNTER — COMMUNICATION - HEALTHEAST (OUTPATIENT)
Dept: FAMILY MEDICINE | Facility: CLINIC | Age: 76
End: 2021-05-12

## 2021-05-12 DIAGNOSIS — K21.00 GASTROESOPHAGEAL REFLUX DISEASE WITH ESOPHAGITIS: ICD-10-CM

## 2021-05-27 ASSESSMENT — PATIENT HEALTH QUESTIONNAIRE - PHQ9
SUM OF ALL RESPONSES TO PHQ QUESTIONS 1-9: 6
SUM OF ALL RESPONSES TO PHQ QUESTIONS 1-9: 5

## 2021-05-28 ASSESSMENT — ANXIETY QUESTIONNAIRES: GAD7 TOTAL SCORE: 2

## 2021-05-30 NOTE — PATIENT INSTRUCTIONS - HE
Decrease Amlodipine to 5 mg daily  Start metformin xl 500mg  daily  Schedule a stress test and follow up with cardioogy

## 2021-05-30 NOTE — PROGRESS NOTES
OFFICE VISIT - FAMILY MEDICINE     ASSESSMENT AND PLAN     1. Atherosclerosis of native coronary artery of native heart without angina pectoris  Ambulatory referral to Cardiology    NM Exercise Stress Test   2. Uncomplicated alcohol dependence (H)  Vitamin B12    Vitamin B1 (Thiamine), Whole Blood (VIT B1 WB)   3. Major depressive disorder, recurrent, moderate (H)     4. Prediabetes  Comprehensive Metabolic Panel    metFORMIN (GLUCOPHAGE XR) 500 MG 24 hr tablet   5. Localized swelling of both lower extremities  [facility] BNP Outpatient (NTBNP)   6. Severe alcohol use disorder (H)  Comprehensive Metabolic Panel    Thyroid Cascade    INR    GGT (Gamma GT)   7. Stented coronary artery  Ambulatory referral to Cardiology    NM Exercise Stress Test   8. Prediabetes   Thyroid [place]   9. Edema, unspecified type   INR   10. Abnormality of breathing   [facility] BNP Outpatient (NTBNP)   Coronary artery disease with ongoing chest pain shortness of breath, referral for stress test and cardiology appointment.  Severe alcohol abuse, remission for the past 8 months, continue with CD treatment, check vitamin B12 Tamine.  Depression, mild to moderate, on Abilify and Lexapro, seems to be responding well.  Swelling lower extremities, differential diagnosis early CHF, side effect of amlodipine, because of fatigue and low blood pressure with lower amlodipine from 10 mg a day to 5 mg daily and continue to monitor.  Prediabetes, with A1c on May 2019 at 6.3%, discussed about diabetes and pathophysiology, low-dose metformin was started at 500 mg extended release daily.  Swelling limp with shortness of breath , differential diagnosis discussed, could be cardiovascular, stress test result pending.      CHIEF COMPLAINT   Establish Care; Referral (Cardiology in the [facility], is due for an exam); Shortness of Breath (some chest tightness, discomfort in lt. shoulder; started this spring. Patient does carry nitroglycerin.); Fatigue;  "and Tinnitus (both ears; maybe due to medication Naltrexone)    HPI   Laurnet Choi is a 74 y.o. male.  No Patient Care Coordination Note on file.  New patient, he is here to establish care, past medical history significant for coronary artery disease status post stenting, hypertension, depression, LUTSwith BPH, GERD's, Hyperlipidemia,Prediabetes, severe alcohol abuse in remission for the past 9-month, patient has been having shortness of breath for the past few months, asking to be seen by cardiologist.  Occasional chest pain but not all the time, but he does have nitroglycerin, but has not taken that for long.  Of time, has been seen by cardiologist at his previous clinic at [facility].  Currently getting outpatient chemical dependency treatment at [facility], he that he does get monthly Vivitriol injection.  Mild depression, followed by psychiatrist, Abilify was added about 3 months ago, went to see weight gain.  He is on the prediabetes range denies any excessive urination or polydipsia.  Also taking Lexapro 20 mg daily.  Has been taking amlodipine 10 mg daily and losartan 25 mg daily for hypertension, stating that has been fatigued lately, has noticed progressive swelling to his lower extremity.  Occasional ringing the ears, patient thinking related to vivitriol  Review of Systems As per HPI, otherwise negative.    OBJECTIVE   BP 95/61 (Patient Site: Right Arm, Patient Position: Sitting, Cuff Size: Adult Regular)   Pulse 79   Ht 5' 9.75\" (1.772 m)   Wt 186 lb 8 oz (84.6 kg)   SpO2 93%   BMI 26.95 kg/m    Physical Exam   Constitutional: He is oriented to person, place, and time. He appears well-developed and well-nourished.   HENT:   Head: Normocephalic and atraumatic.   Neck: Normal range of motion. Neck supple. No JVD present. No tracheal deviation present. No thyromegaly present.   Cardiovascular: Normal rate, regular rhythm, normal heart sounds and intact distal pulses. Exam reveals no " gallop and no friction rub.   No murmur heard.  Pulmonary/Chest: Effort normal and breath sounds normal. No respiratory distress. He has no wheezes. He has no rales.   Musculoskeletal: He exhibits edema. He exhibits no tenderness.   Lymphadenopathy:     He has no cervical adenopathy.   Neurological: He is alert and oriented to person, place, and time. Coordination normal.   Psychiatric: He has a normal mood and affect. Judgment and thought content normal.       PFSH   No family history on file.  Social History     Socioeconomic History     Marital status:      Spouse name: Not on file     Number of children: Not on file     Years of education: Not on file     Highest education level: Not on file   Occupational History     Not on file   Social Needs     Financial resource strain: Not on file     Food insecurity:     Worry: Not on file     Inability: Not on file     Transportation needs:     Medical: Not on file     Non-medical: Not on file   Tobacco Use     Smoking status: Former Smoker     Smokeless tobacco: Never Used   Substance and Sexual Activity     Alcohol use: Yes     Comment: 6 beers and 1-2 0.75L of vodka daily     Drug use: No     Sexual activity: Never     Partners: Male   Lifestyle     Physical activity:     Days per week: Not on file     Minutes per session: Not on file     Stress: Not on file   Relationships     Social connections:     Talks on phone: Not on file     Gets together: Not on file     Attends Alevism service: Not on file     Active member of club or organization: Not on file     Attends meetings of clubs or organizations: Not on file     Relationship status: Not on file     Intimate partner violence:     Fear of current or ex partner: Not on file     Emotionally abused: Not on file     Physically abused: Not on file     Forced sexual activity: Not on file   Other Topics Concern     Not on file   Social History Narrative    Resides in [facility].     Relevant history was  reviewed with the patient today, unless noted in HPI, nothing is pertinent for this visit.  [facility]     Patient Active Problem List    Diagnosis Date Noted     Severe alcohol use disorder (H) 08/28/2018     Macular degeneration (senile) of retina 08/28/2018     Moderate episode of recurrent major depressive disorder (H) 08/28/2018     Elevated prostate specific antigen less than 10 ng/ml 06/21/2018     Gastroesophageal reflux disease with esophagitis 06/21/2018     Overview Note:     Overview:   egd 9/2017.   4 cm HH. Grade A esophagitis.        Atherosclerosis of native coronary artery of native heart without angina pectoris 01/31/2017     Pure hypercholesterolemia 08/10/2016     Hypertension, essential 08/10/2016     Stented coronary artery 04/27/2015     Overview Note:     Overview:   Patient is on Clopidogrel (Plavix) following stent placement.  Date of Intervention:  4/27/2015   Type of Stent:  PORSHA  Patient is expected to continue taking Clopidogrel (Plavix) for one year (until 4/26/16) unless otherwise advised due to subsequent stent placement or continued bleeding.       Past Surgical History:   Procedure Laterality Date     CARDIAC SURGERY         RESULTS/CONSULTS (Lab/Rad)   No results found for this or any previous visit (from the past 168 hour(s)).  No results found.  MEDICATIONS     Current Outpatient Medications on File Prior to Visit   Medication Sig Dispense Refill     acetaminophen (TYLENOL) 325 MG tablet Take 650 mg by mouth every 6 (six) hours as needed.       ARIPiprazole (ABILIFY) 10 MG tablet Take one tab (10 mg) PO daily       aspirin 81 mg chewable tablet Chew 1 tablet (81 mg total) daily. 3 tablet 0     calcium-vitamin D (CALCIUM-VITAMIN D) 500 mg(1,250mg) -200 unit per tablet Take 1 tablet by mouth 2 (two) times a day.       escitalopram oxalate (LEXAPRO) 20 MG tablet Take one tablet daily.       gabapentin (NEURONTIN) 300 MG capsule Take 1 capsule before bed, can take it up to 3 times a day as  needed.       losartan (COZAAR) 25 MG tablet Take 25 mg by mouth daily.       naltrexone microspheres (VIVITROL) 380 mg SERR injection Inject 380 mg into the gluteal muscle.       nitroglycerin (NITROSTAT) 0.4 MG SL tablet Place 0.4 mg under the tongue every 5 (five) minutes as needed for chest pain. For up to 3 doses per episode as needed.       omeprazole (PRILOSEC) 20 MG capsule Take 1 capsule (20 mg total) by mouth daily before breakfast. Take 1 hour before a meal. 3 capsule 0     rosuvastatin (CRESTOR) 40 MG tablet Take 40 mg by mouth at bedtime.       tamsulosin (FLOMAX) 0.4 mg cap Take 1 capsule (0.4 mg total) by mouth Daily after breakfast. 3 capsule 0     [DISCONTINUED] amLODIPine-benazepril (LOTREL) 10-20 mg per capsule Take 1 capsule by mouth.       amLODIPine (NORVASC) 10 MG tablet Take 1 tablet (10 mg total) by mouth daily. 30 tablet 0     [DISCONTINUED] diphenhydrAMINE (BENADRYL) 25 mg tablet Take 50 mg by mouth daily as needed.       [DISCONTINUED] FLUoxetine (PROZAC) 20 MG capsule Take 2 capsules (40 mg total) by mouth daily. 60 capsule 0     [DISCONTINUED] mirtazapine (REMERON) 15 MG tablet Take 1 tablet (15 mg total) by mouth at bedtime. 30 tablet 0     No current facility-administered medications on file prior to visit.        HEALTH MAINTENANCE / SCREENING   PHQ-2 Total Score: 2 (7/17/2019 12:00 PM)  , PHQ-9 Total Score: 7 (7/17/2019 12:00 PM)  ,RODRIGO 7 Total Score: 6 (7/17/2019 12:00 PM)    Immunization History   Administered Date(s) Administered     Hep B, Adult 02/27/1987, 03/27/1987, 08/28/1987     Hep B, historic 11/03/1999     Influenza high dose, seasonal 11/03/2015, 11/16/2016, 10/20/2017, 10/17/2018     Influenza, Seasonal, Inj PF IIV3 11/09/2009     Influenza, inj, historic,unspecified 12/03/2003     Influenza,seasonal, Inj IIV3 10/13/1999, 11/08/2000, 11/27/2001, 11/25/2002, 11/08/2005, 11/02/2006, 10/25/2011, 11/09/2011, 02/25/2013     Pneumo Conj 13-V (2010&after) 04/11/2016     Pneumo  Polysac 23-V 11/12/1996, 06/02/2017     Td, Adult, Absorbed 01/01/2000     Tdap 10/25/2011, 06/15/2016     ZOSTER, LIVE 06/15/2016     Health Maintenance   Topic     ADVANCE DIRECTIVES DISCUSSED WITH PATIENT      ZOSTER VACCINES (2 of 3)     COLONOSCOPY      INFLUENZA VACCINE RULE BASED (1)     DEPRESSION FOLLOW UP      FALL RISK ASSESSMENT      TD 18+ HE      PNEUMOCOCCAL POLYSACCHARIDE VACCINE AGE 65 AND OVER      PNEUMOCOCCAL CONJUGATE VACCINE FOR ADULTS (PCV13 OR PREVNAR)      The following are part of a depression follow up plan for the patient:  under care of mental health counselor, emotional support assessment, emotional support education, emotional support management and under care of mental health team  Amara Smith MD  Family Medicine, [facility]     This note was dictated using a voice recognition software.  Any grammatical or context distortion are unintentional and inherent to the software.

## 2021-05-31 NOTE — PROGRESS NOTES
[facility] Consultation Note    Thank you, Dr. Smith, Amara PAREDES MD, for asking the [facility] team to see Laurent L Jimbo in consultation today at our clinic to evaluate dyspnea on exertion.      Assessment:   1.  Dyspnea on exertion suspect due to underlying COPD from greater than a 50-pack-year history tobacco abuse  2.  Coronary artery disease with no ischemia on recent stress Cardiolite testing  3.  Essential hypertension controlled  4.  Hyperlipidemia target LDL cholesterol levels     Plan:   Continue the patient's current medical regiment.  Would not proceed with any additional cardiac evaluation at this time.  If his dyspnea on exertion persist or worsens would consider pulmonary consultation with complete pulmonary function studies            Current History:   74-year-old male whose cardiac history dates back over 4 years ago.  Patient had a stent placed in his proximal left anterior descending artery and first diagonal at [facility] in April 2015.  He had mild to moderate proximal and mid right coronary artery stenosis and a moderate mid circumflex stenosis also noted at that time that were not intervened on.  Patient for the last 3 to 4 months is developed dyspnea on exertion with some occasional chest pain.  His main symptom has been dyspnea with no other symptoms of congestive heart failure.    Patient had an echocardiogram in October 2018 that was essentially normal study except for moderate left atrial enlargement.  He underwent stress Cardiolite imaging on August 1.  He exercised for 6-1/2 minutes on a Mann protocol with no ischemic EKG changes.  He had no evidence of ischemia with ejection fraction greater than 75%    Past Medical History:     Past Medical History:   Diagnosis Date     Alcohol abuse      Arthritis      Chest pain      Clogged artery (heart)      GERD (gastroesophageal reflux disease)      Hypertension      Macular degeneration   "      Past Surgical History:     Past Surgical History:   Procedure Laterality Date     CARDIAC SURGERY     Tonsillectomy and left inguinal hernia repair    Family History:   No family history on file.   2 brothers and 3 sisters none of whom have known coronary artery disease    Social History:    reports that he has quit smoking. He has never used smokeless tobacco. He reports that he drinks alcohol. He reports that he does not use drugs.  Patient states he was a heavy smoker up to 2 packs a day starting in his teenage years and quitting 10 years ago with well over 50-pack-year history tobacco abuse.  Patient is  with a past history of alcohol abuse    Meds:   Scheduled Meds:  PRN Meds:.    Allergies:   Aspirin; Nsaids (non-steroidal anti-inflammatory drug); Plavix [clopidogrel]; and Statins-hmg-coa reductase inhibitors    Review of Systems:   General: WNL  Eyes: WNL  Ears/Nose/Throat: WNL  Lungs: Shortness of Breath  Heart: Chest Pain, Shortness of Breath with activity, Leg Swelling  Stomach: WNL  Bladder: WNL  Muscle/Joints: Muscle Weakness  Skin: WNL  Nervous System: Daytime Sleepiness  Mental Health: Depression     Blood: WNL      Objective:      Physical Exam  184 lb (83.5 kg)  5' 9.75\" (1.772 m)  Body mass index is 26.59 kg/m .  /74 (Patient Site: Left Arm, Patient Position: Sitting, Cuff Size: Adult Large)   Pulse 88   Resp 18   Ht 5' 9.75\" (1.772 m)   Wt 184 lb (83.5 kg) Comment: With shoes  BMI 26.59 kg/m      General Appearance:   alert, no apparent distress   HEENT:  no scleral icterus; the mucous membranes were pink and moist                                  Neck: jugular venous pressure is normal, no thyromegaly   Chest: the spine was straight and the chest was symmetric   Lungs:   respirations unlabored; the lungs are clear to auscultation   Cardiovascular:   regular rhythm with normal first and second heart sounds and no murmurs, clicks, or gallops. Carotid, radial, femoral, and " posterior tibial pulses are intact; there are no carotid or femoral bruits.   Abdomen:  no organomegaly, masses, bruits, or tenderness; bowel sounds are present   Extremities: no cyanosis, clubbing, or edema   Skin: no xanthelasma   Neurologic: mood and affect are appropriate         echocardiogram October 11, 2018 results reviewed as above      Cardiolite (Tc-99m Sestamibi) stress test from August 1, 2019 results reviewed as above          Lab Review   Lab Results   Component Value Date     07/17/2019    K 4.8 07/17/2019     07/17/2019    CO2 23 07/17/2019    BUN 13 07/17/2019    CREATININE 0.88 07/17/2019    CALCIUM 9.1 07/17/2019     Lab Results   Component Value Date    WBC 8.4 08/28/2018    HGB 14.4 08/28/2018    HCT 42.6 08/28/2018    MCV 97 08/28/2018     08/28/2018     No results found for: CHOL, TRIG, HDL, LDLDIRECT      Kervin Carrasco M.D., F.A.C.C.  [facility]  [phone]

## 2021-06-01 ENCOUNTER — OFFICE VISIT - HEALTHEAST (OUTPATIENT)
Dept: FAMILY MEDICINE | Facility: CLINIC | Age: 76
End: 2021-06-01

## 2021-06-01 DIAGNOSIS — Z12.5 SCREENING FOR PROSTATE CANCER: ICD-10-CM

## 2021-06-01 DIAGNOSIS — Z12.11 SCREEN FOR COLON CANCER: ICD-10-CM

## 2021-06-01 DIAGNOSIS — E78.00 PURE HYPERCHOLESTEROLEMIA: ICD-10-CM

## 2021-06-01 DIAGNOSIS — I25.10 ATHEROSCLEROSIS OF NATIVE CORONARY ARTERY OF NATIVE HEART WITHOUT ANGINA PECTORIS: ICD-10-CM

## 2021-06-01 DIAGNOSIS — R97.20 ELEVATED PROSTATE SPECIFIC ANTIGEN (PSA): ICD-10-CM

## 2021-06-01 DIAGNOSIS — R73.03 PREDIABETES: ICD-10-CM

## 2021-06-01 DIAGNOSIS — R35.0 BENIGN PROSTATIC HYPERPLASIA WITH URINARY FREQUENCY: ICD-10-CM

## 2021-06-01 DIAGNOSIS — N40.1 BENIGN PROSTATIC HYPERPLASIA WITH URINARY FREQUENCY: ICD-10-CM

## 2021-06-01 DIAGNOSIS — F33.1 MODERATE EPISODE OF RECURRENT MAJOR DEPRESSIVE DISORDER (H): ICD-10-CM

## 2021-06-01 DIAGNOSIS — I10 HYPERTENSION, ESSENTIAL: ICD-10-CM

## 2021-06-01 DIAGNOSIS — J43.9 PULMONARY EMPHYSEMA, UNSPECIFIED EMPHYSEMA TYPE (H): ICD-10-CM

## 2021-06-01 DIAGNOSIS — H90.3 ASYMMETRICAL SENSORINEURAL HEARING LOSS: ICD-10-CM

## 2021-06-01 DIAGNOSIS — Z00.00 ROUTINE GENERAL MEDICAL EXAMINATION AT A HEALTH CARE FACILITY: ICD-10-CM

## 2021-06-01 LAB
ALBUMIN SERPL-MCNC: 4 G/DL (ref 3.5–5)
ALP SERPL-CCNC: 41 U/L (ref 45–120)
ALT SERPL W P-5'-P-CCNC: 15 U/L (ref 0–45)
ANION GAP SERPL CALCULATED.3IONS-SCNC: 12 MMOL/L (ref 5–18)
AST SERPL W P-5'-P-CCNC: 16 U/L (ref 0–40)
BILIRUB SERPL-MCNC: 0.6 MG/DL (ref 0–1)
BUN SERPL-MCNC: 11 MG/DL (ref 8–28)
CALCIUM SERPL-MCNC: 9 MG/DL (ref 8.5–10.5)
CHLORIDE BLD-SCNC: 100 MMOL/L (ref 98–107)
CHOLEST SERPL-MCNC: 166 MG/DL
CO2 SERPL-SCNC: 25 MMOL/L (ref 22–31)
CREAT SERPL-MCNC: 0.86 MG/DL (ref 0.7–1.3)
CREAT UR-MCNC: 93.6 MG/DL
ERYTHROCYTE [DISTWIDTH] IN BLOOD BY AUTOMATED COUNT: 13.4 % (ref 11–14.5)
FASTING STATUS PATIENT QL REPORTED: YES
GFR SERPL CREATININE-BSD FRML MDRD: >60 ML/MIN/1.73M2
GLUCOSE BLD-MCNC: 95 MG/DL (ref 70–125)
HBA1C MFR BLD: 6 %
HCT VFR BLD AUTO: 41.6 % (ref 40–54)
HDLC SERPL-MCNC: 75 MG/DL
HGB BLD-MCNC: 13.6 G/DL (ref 14–18)
LDLC SERPL CALC-MCNC: 74 MG/DL
MCH RBC QN AUTO: 30.2 PG (ref 27–34)
MCHC RBC AUTO-ENTMCNC: 32.7 G/DL (ref 32–36)
MCV RBC AUTO: 92 FL (ref 80–100)
MICROALBUMIN UR-MCNC: 0.8 MG/DL (ref 0–1.99)
MICROALBUMIN/CREAT UR: 8.5 MG/G
PLATELET # BLD AUTO: 208 THOU/UL (ref 140–440)
PMV BLD AUTO: 10.9 FL (ref 7–10)
POTASSIUM BLD-SCNC: 4.6 MMOL/L (ref 3.5–5)
PROT SERPL-MCNC: 6.8 G/DL (ref 6–8)
PSA SERPL-MCNC: 7.7 NG/ML (ref 0–6.5)
RBC # BLD AUTO: 4.5 MILL/UL (ref 4.4–6.2)
SODIUM SERPL-SCNC: 137 MMOL/L (ref 136–145)
TRIGL SERPL-MCNC: 87 MG/DL
WBC: 6.2 THOU/UL (ref 4–11)

## 2021-06-01 ASSESSMENT — MIFFLIN-ST. JEOR: SCORE: 1561.14

## 2021-06-01 NOTE — PROGRESS NOTES
OFFICE VISIT - FAMILY MEDICINE     ASSESSMENT AND PLAN     1. SOB (shortness of breath)  amLODIPine (NORVASC) 5 MG tablet    Ambulatory referral to Pulmonology   2. Fatigue, unspecified type  amLODIPine (NORVASC) 5 MG tablet    Ambulatory referral to Pulmonology   3. Pulmonary emphysema, unspecified emphysema type (H)  amLODIPine (NORVASC) 5 MG tablet    Ambulatory referral to Pulmonology   4. Hypertension, essential  amLODIPine (NORVASC) 5 MG tablet   Progressive shortness of breath, fatigue, history of pulmonary emphysema, referred to pulmonologist for further work-up and treatment.  He did stop taking Abilify after discussion with his psychiatrist, still doing Vivitrol.  Hypertension, appears stable with current regimen, amlodipine was decreased to 5 mg daily because of low blood pressure reading.  Was instructed to start taking vitamin B12 supplement.    CHIEF COMPLAINT   Follow-up (sob; fatigue. Still having SOB, when walked 1 block was having sob. Still having fatigue through the whole day, can sleep till 12-1pm and takes nap. ); Medication Problem (d/c abilify; is sleeping better.); and Medication Management (amlodipine 1/2 tablet is working for patient.)    HPI   Laurent Choi is a 74 y.o. male.  New patient as of July 17, 2019,past medical history significant for coronary artery disease status post stenting, hypertension, depression, LUTS  with BPH, GERD's, Hyperlipidemia,Prediabetes, severe alcohol abuse in remission for the past 9-month    Still feeling short of breath and fatigue, saw cardiologist with negative work-up, cardiology thought shortness of breath probably from his lung,he  interested on following up with a pulmonologist.  Denies any fever chills or hemoptysis.  Has been a smoker in the past.  Hypertension, controlled with amlodipine, dose was decreased a few weeks ago seems to be tolerating well.      Review of Systems As per HPI, otherwise negative.    OBJECTIVE   BP 92/62 (Patient  Site: Left Arm, Patient Position: Sitting, Cuff Size: Adult Regular)   Pulse 69   Wt 187 lb 8 oz (85 kg) Comment: shoes on  SpO2 94%   BMI 27.10 kg/m    Physical Exam   Constitutional: He is oriented to person, place, and time. He appears well-developed and well-nourished.   HENT:   Head: Normocephalic and atraumatic.   Neck: Normal range of motion. Neck supple. No JVD present. No tracheal deviation present. No thyromegaly present.   Cardiovascular: Normal rate, regular rhythm, normal heart sounds and intact distal pulses. Exam reveals no gallop and no friction rub.   No murmur heard.  Pulmonary/Chest: Effort normal and breath sounds normal. No respiratory distress. He has no wheezes. He has no rales.   Abdominal: He exhibits no distension. There is no tenderness. There is no rebound.   Musculoskeletal: He exhibits no edema or tenderness.   Lymphadenopathy:     He has no cervical adenopathy.   Neurological: He is alert and oriented to person, place, and time. Coordination normal.   Psychiatric: He has a normal mood and affect. Judgment and thought content normal.       PFSH   No family history on file.  Social History     Socioeconomic History     Marital status:      Spouse name: Not on file     Number of children: Not on file     Years of education: Not on file     Highest education level: Not on file   Occupational History     Not on file   Social Needs     Financial resource strain: Not on file     Food insecurity:     Worry: Not on file     Inability: Not on file     Transportation needs:     Medical: Not on file     Non-medical: Not on file   Tobacco Use     Smoking status: Former Smoker     Smokeless tobacco: Never Used   Substance and Sexual Activity     Alcohol use: Yes     Comment: 6 beers and 1-2 0.75L of vodka daily     Drug use: No     Sexual activity: Never     Partners: Male   Lifestyle     Physical activity:     Days per week: Not on file     Minutes per session: Not on file     Stress:  Not on file   Relationships     Social connections:     Talks on phone: Not on file     Gets together: Not on file     Attends Protestant service: Not on file     Active member of club or organization: Not on file     Attends meetings of clubs or organizations: Not on file     Relationship status: Not on file     Intimate partner violence:     Fear of current or ex partner: Not on file     Emotionally abused: Not on file     Physically abused: Not on file     Forced sexual activity: Not on file   Other Topics Concern     Not on file   Social History Narrative    Resides in [facility].     Relevant history was reviewed with the patient today, unless noted in HPI, nothing is pertinent for this visit.  [facility]     Patient Active Problem List    Diagnosis Date Noted     Severe alcohol use disorder (H) 08/28/2018     Macular degeneration (senile) of retina 08/28/2018     Moderate episode of recurrent major depressive disorder (H) 08/28/2018     Elevated prostate specific antigen less than 10 ng/ml 06/21/2018     Gastroesophageal reflux disease with esophagitis 06/21/2018     Overview Note:     Overview:   egd 9/2017.   4 cm HH. Grade A esophagitis.        Atherosclerosis of native coronary artery of native heart without angina pectoris 01/31/2017     Pure hypercholesterolemia 08/10/2016     Hypertension, essential 08/10/2016     Stented coronary artery 04/27/2015     Overview Note:     Overview:   Patient is on Clopidogrel (Plavix) following stent placement.  Date of Intervention:  4/27/2015   Type of Stent:  PORSHA  Patient is expected to continue taking Clopidogrel (Plavix) for one year (until 4/26/16) unless otherwise advised due to subsequent stent placement or continued bleeding.       Past Surgical History:   Procedure Laterality Date     CARDIAC SURGERY         RESULTS/CONSULTS (Lab/Rad)   No results found for this or any previous visit (from the past 168 hour(s)).  No results found.     EXAM: CT CHEST DIAG WO IV  CONT LCS F/U  LOCATION:  [facility]  DATE/TIME: 2/14/2019 2:38 PM    INDICATION: Multiple bilateral pulmonary nodules. Personal history of tobacco use.  TECHNIQUE: Routine noncontrast CT chest. Dose reduction techniques were used.   COMPARISON: CT chest June 29, 2018.    FINDINGS:  LUNGS AND PLEURA: Mild to moderate centrilobular emphysema. Biapical scarring. Mild bronchiectasis at the right lung base.    3 mm nodule in the right lower lobe along the major fissure on series 3: image 90, unchanged. 5 mm x 2 mm nodule on series 3: image 107 in the right lower lobe adjacent to the fissure, unchanged. 2 mm nodule in the right lower lobe on series 3: image 105 which is new. Small calcified granuloma in the right middle lobe on image 117. Mild atelectasis.    On the left the previously visualized 2 mm nonsolid nodule seen in the left upper lobe on image 60 is no longer visualized. There are two 1.5 mm nodules in the left lower lobe on image 86, unchanged. 2 mm nodule in the left lower lobe on image 91 is unchanged. 2 mm nodule in the left lower lobe on image 97 is unchanged.    No pleural effusion.    MEDIASTINUM: No mediastinal or hilar adenopathy. There is coronary artery calcification. No pericardial effusion. Moderate to large hiatal hernia is increased in size from the prior exam.    CORONARY ARTERY CALCIFICATION: Severe    LIMITED UPPER ABDOMEN: The liver, spleen, pancreas, gallbladder and adrenal glands are unremarkable. Vascular calcification involving the aorta. Please note these organs are not imaged in their entirety.    MUSCULOSKELETAL: Osteopenia. Mild kyphosis. Compression fracture at T12, unchanged with mild kyphosis at this level.    CONCLUSION:  1. All of the nodules seen previously are unchanged. There is a single new nodule measuring 2 mm in the right lower lobe.    RADIOLOGIST RECOMMENDATION: Recommend followup CT chest in 12 months.    LungRADS Category: 2: Benign. Significant incidental  findings: Severe coronary artery calcification. Mild to moderate centrilobular emphysema and mild bronchiectasis.    REFERENCE:    Solid Nodules  <=4 mm: CT scan at 12 months; if unchanged no further imaging necessary    Size is average of length and width.    Recommendations for evaluation of incidental nodules derived from guidelines developed by the Fleischner Society (2005, 2013). All follow-up CT scans should use non-contrast, low-dose technique.    These recommendations do not necessarily apply to pregnant women, patients with immunosuppression or a prior history of cancer, patients with multiple nodules that are suspicious for metastasis or infection, or patients with mediastinal lymphadenopathy or pleural effusion in whom cancer is strongly suspected.    CTDlvol 76 mGy  Number of nodules: 7   Size of largest nodule in longest dimension 5 mm  Attenuation of largest nodule: Solid  Index nodule reported: Yes  MEDICATIONS     Current Outpatient Medications on File Prior to Visit   Medication Sig Dispense Refill     acetaminophen (TYLENOL) 325 MG tablet Take 650 mg by mouth every 6 (six) hours as needed.       aspirin 81 mg chewable tablet Chew 1 tablet (81 mg total) daily. 3 tablet 0     escitalopram oxalate (LEXAPRO) 20 MG tablet Take one tablet daily.       gabapentin (NEURONTIN) 300 MG capsule Take 1 capsule before bed, can take it up to 3 times a day as needed.       losartan (COZAAR) 25 MG tablet Take 25 mg by mouth daily.       metFORMIN (GLUCOPHAGE XR) 500 MG 24 hr tablet Take 1 tablet (500 mg total) by mouth daily with breakfast. 30 tablet 11     naltrexone microspheres (VIVITROL) 380 mg SERR injection Inject 380 mg into the gluteal muscle.       nitroglycerin (NITROSTAT) 0.4 MG SL tablet Place 0.4 mg under the tongue every 5 (five) minutes as needed for chest pain. For up to 3 doses per episode as needed.       omeprazole (PRILOSEC) 20 MG capsule Take 1 capsule (20 mg total) by mouth daily before  breakfast. Take 1 hour before a meal. 3 capsule 0     rosuvastatin (CRESTOR) 40 MG tablet Take 40 mg by mouth at bedtime.       tamsulosin (FLOMAX) 0.4 mg cap Take 1 capsule (0.4 mg total) by mouth Daily after breakfast. 3 capsule 0     calcium-vitamin D (CALCIUM-VITAMIN D) 500 mg(1,250mg) -200 unit per tablet Take 1 tablet by mouth 2 (two) times a day.       No current facility-administered medications on file prior to visit.        HEALTH MAINTENANCE / SCREENING   PHQ-2 Total Score: 2 (7/17/2019 12:00 PM)  , PHQ-9 Total Score: 7 (7/17/2019 12:00 PM)  ,RODRIGO 7 Total Score: 6 (7/17/2019 12:00 PM)    Immunization History   Administered Date(s) Administered     Hep B, Adult 02/27/1987, 03/27/1987, 08/28/1987     Hep B, historic 11/03/1999     Influenza high dose,seasonal,PF, 65+ yrs 11/03/2015, 11/16/2016, 10/20/2017, 10/17/2018     Influenza, Seasonal, Inj PF IIV3 11/09/2009     Influenza, inj, historic,unspecified 12/03/2003     Influenza,seasonal, Inj IIV3 10/13/1999, 11/08/2000, 11/27/2001, 11/25/2002, 11/08/2005, 11/02/2006, 10/25/2011, 11/09/2011, 02/25/2013     Pneumo Conj 13-V (2010&after) 04/11/2016     Pneumo Polysac 23-V 11/12/1996, 06/02/2017     Td, Adult, Absorbed 01/01/2000     Tdap 10/25/2011, 06/15/2016     ZOSTER, LIVE 06/15/2016     Health Maintenance   Topic     HEPATITIS C SCREENING      MEDICARE ANNUAL WELLNESS VISIT      ZOSTER VACCINES (2 of 3)     INFLUENZA VACCINE RULE BASED (1)     DEPRESSION FOLLOW UP      FALL RISK ASSESSMENT      COLONOSCOPY      ADVANCE CARE PLANNING      TD 18+ HE      PNEUMOCOCCAL POLYSACCHARIDE VACCINE AGE 65 AND OVER      PNEUMOCOCCAL CONJUGATE VACCINE FOR ADULTS (PCV13 OR PREVNAR)        Amara Smith MD  Family Medicine, [facility]     This note was dictated using a voice recognition software.  Any grammatical or context distortion are unintentional and inherent to the software.

## 2021-06-02 NOTE — TELEPHONE ENCOUNTER
10-17-19  Hello just connecting back, pt is scheduled for sleep study 1-6-20 and the Pulmonary doctor on 10-21-19  Thanks  Amy

## 2021-06-02 NOTE — PROGRESS NOTES
"Pulmonary Clinic Consult Note  Date of Service: 10/21/2019    Reason for Consultation: Shortness of breath    History:     HPI: Patient is a pleasant 75-year-old male, former smoker, who was referred here for evaluation of shortness of breath.    Pt notes that he has shortness of breath that he noted more in the spring of 2019 when he went out and started walking. He notes that it is slowly progressing. He says he can walk about 1-2 miles before he would have to rest. He walks about a mile a few times of week over the summer. He notes that he walks on the treadmill for about 1/2 hour. He does that more in the winter. He notes that he has been more fatigued lately. He feels that overall he is more limited by the fatigue rather than the shortness of breath. He has occasional nasal congestion. He has PND. He denies seasonal allergies. He denies any previous sinus issues. Pt denies any wheezing.    PMHx/PSHx:  CAD s/p PCI back 2015  History of alcohol abuse  DJD  GERD  Hypertension  Macular degeneration    Social Hx:  Former smoker.  Used to smoke 1 pack per day for 30-40 years.  Quit approximately 10 years ago.   He used to work as a . Now he does sedentary work at computer.     Review of Systems - 10 point review of system negative except for what is mentioned in the HPI.    Meds and Allergies:  See EHR for the updated medication list and Allergies. These were reviewed.     Family Hx:   Father had emphysema. He was smoker.     Exam/Data:   /60   Pulse 88   Resp 24   Ht 5' 9.75\" (1.772 m)   Wt 186 lb 11.2 oz (84.7 kg)   SpO2 91% Comment: RA  BMI 26.98 kg/m      EXAM:  GEN: comfortable, NAD  HEENT: NCAT, EMOI, mmm  LN: no cervical LAD   CVS: S1S2, RRR  Lung: good air entry bilaterally, no wheezing  Abd: round, obese, soft  Ext: no c/c/e  Vasc: intact radial pulses bilaterally  Neuro: nonfocal  Skin: no visible rash  Musculoskeletal: FROM all extremities  Psych: normal affect    DATA      PFT " DATA 10/2019:  FEV1 75% predicted, FVC 84% predicted, ratio 67.  No reversibility.  % predicted, % predicted, DLCO 82% predicted.    CT chest done on 2/2019 at [facility]:  EXAM: CT CHEST DIAG WO IV CONT LCS F/U  LOCATION:  [facility]  DATE/TIME: 2/14/2019 2:38 PM    INDICATION: Multiple bilateral pulmonary nodules. Personal history of tobacco use.  TECHNIQUE: Routine noncontrast CT chest. Dose reduction techniques were used.   COMPARISON: CT chest June 29, 2018.    FINDINGS:  LUNGS AND PLEURA: Mild to moderate centrilobular emphysema. Biapical scarring. Mild bronchiectasis at the right lung base.    3 mm nodule in the right lower lobe along the major fissure on series 3: image 90, unchanged. 5 mm x 2 mm nodule on series 3: image 107 in the right lower lobe adjacent to the fissure, unchanged. 2 mm nodule in the right lower lobe on series 3: image 105 which is new. Small calcified granuloma in the right middle lobe on image 117. Mild atelectasis.    On the left the previously visualized 2 mm nonsolid nodule seen in the left upper lobe on image 60 is no longer visualized. There are two 1.5 mm nodules in the left lower lobe on image 86, unchanged. 2 mm nodule in the left lower lobe on image 91 is unchanged. 2 mm nodule in the left lower lobe on image 97 is unchanged.    No pleural effusion.    MEDIASTINUM: No mediastinal or hilar adenopathy. There is coronary artery calcification. No pericardial effusion. Moderate to large hiatal hernia is increased in size from the prior exam.    CORONARY ARTERY CALCIFICATION: Severe    LIMITED UPPER ABDOMEN: The liver, spleen, pancreas, gallbladder and adrenal glands are unremarkable. Vascular calcification involving the aorta. Please note these organs are not imaged in their entirety.    MUSCULOSKELETAL: Osteopenia. Mild kyphosis. Compression fracture at T12, unchanged with mild kyphosis at this level.    CONCLUSION:  1. All of the nodules seen previously  "are unchanged. There is a single new nodule measuring 2 mm in the right lower lobe.        Assessment/Plan:   Laurent Choi is a 74 y.o. male, former smoker, who is referred here for evaluation of shortness of breath and fatigue. It seems that fatigue is mainly reason why he stops, however, having said that he does pretty well walking a mile a few times a week. PFT\"s show mild obstruction.  CT chest report from  reviewed (2/2019) with mild to moderate centrilobular emphysema. His DLCO is preserved.     Walking desaturation clinic on 10/2019 negative.    Recommendations:  Albuterol inhaler as needed  Need to stay active  Diet, weight loss and exercise  repeat ct chest on 2/2020 to ensure nodules are stable  Consider a sleep study - he tells me that he has a sleep study schedule in January.     FOLLOW UP: 4 months    Rose Sepulveda MD  Pulmonary and Critical Care Medicine  10/21/2019        Allergies   Allergen Reactions     Aspirin Other (See Comments)     Avoids taking any aspirin besides his daily 81 mg aspirin regimen due to acid reflux/esophagus problem(s).     Nsaids (Non-Steroidal Anti-Inflammatory Drug) Other (See Comments)     Avoids due to acid reflux/esophagus problem(s).     Plavix [Clopidogrel] Other (See Comments) and Myalgia     Reaction(s): Bad bruising all over his body and leg cramps.     Statins-Hmg-Coa Reductase Inhibitors Myalgia     Per the patient, he tolerates rosuvastatin.       Medications:     Current Outpatient Medications   Medication Sig Dispense Refill     acetaminophen (TYLENOL) 325 MG tablet Take 650 mg by mouth every 6 (six) hours as needed.       amLODIPine (NORVASC) 5 MG tablet Take 1 tablet (5 mg total) by mouth daily. 90 tablet 3     aspirin 81 mg chewable tablet Chew 1 tablet (81 mg total) daily. 3 tablet 0     calcium-vitamin D (CALCIUM-VITAMIN D) 500 mg(1,250mg) -200 unit per tablet Take 1 tablet by mouth 2 (two) times a day.       escitalopram oxalate (LEXAPRO) 20 MG " tablet Take one tablet daily.       gabapentin (NEURONTIN) 300 MG capsule Take 1 capsule before bed, can take it up to 3 times a day as needed.       losartan (COZAAR) 25 MG tablet Take 25 mg by mouth daily.       metFORMIN (GLUCOPHAGE XR) 500 MG 24 hr tablet Take 1 tablet (500 mg total) by mouth daily with breakfast. 30 tablet 11     naltrexone microspheres (VIVITROL) 380 mg SERR injection Inject 380 mg into the gluteal muscle.       nitroglycerin (NITROSTAT) 0.4 MG SL tablet Place 0.4 mg under the tongue every 5 (five) minutes as needed for chest pain. For up to 3 doses per episode as needed.       omeprazole (PRILOSEC) 20 MG capsule Take 1 capsule (20 mg total) by mouth daily before breakfast. Take 1 hour before a meal. 3 capsule 0     rosuvastatin (CRESTOR) 40 MG tablet Take 40 mg by mouth at bedtime.       tamsulosin (FLOMAX) 0.4 mg cap Take 1 capsule (0.4 mg total) by mouth Daily after breakfast. 3 capsule 0     No current facility-administered medications for this visit.        Much or all of the text in this note was generated through the use of the Dragon Dictate voice-to-text software. Errors in spelling or words which seem out of context are unintentional. Sound alike errors, in particular, may have escaped editing.

## 2021-06-02 NOTE — PROGRESS NOTES
Oxygen saturation walk test    Patient oxygen saturation on RA at rest is 91%.  Oxygen saturation while ambulating 300ft on RA is 90%.      Patient is ambulatory within his/her home.

## 2021-06-02 NOTE — TELEPHONE ENCOUNTER
Orders being requested: sleep study recommended per   Dr Lin psychiatrist [facility]. Patient does have pulmo appt scheduled.  Did not want to talk to RN. Not having SOB now.   Reason service is needed/diagnosis: loss of breath while sleeping ' excessive sleeping (14 hours a day) and fatigue  When are orders needed by: asap  Where to send Orders: ok with [facility].  [facility] or [facility] clinic if possible.   Okay to leave detailed message?  Yes [phone]

## 2021-06-03 VITALS
BODY MASS INDEX: 26.73 KG/M2 | SYSTOLIC BLOOD PRESSURE: 102 MMHG | HEART RATE: 88 BPM | OXYGEN SATURATION: 91 % | HEIGHT: 70 IN | RESPIRATION RATE: 24 BRPM | DIASTOLIC BLOOD PRESSURE: 60 MMHG | WEIGHT: 186.7 LBS

## 2021-06-03 VITALS
SYSTOLIC BLOOD PRESSURE: 92 MMHG | WEIGHT: 187.5 LBS | HEART RATE: 69 BPM | BODY MASS INDEX: 27.1 KG/M2 | OXYGEN SATURATION: 94 % | DIASTOLIC BLOOD PRESSURE: 62 MMHG

## 2021-06-03 VITALS — HEIGHT: 70 IN | WEIGHT: 184 LBS | BODY MASS INDEX: 26.34 KG/M2

## 2021-06-03 VITALS — BODY MASS INDEX: 26.7 KG/M2 | HEIGHT: 70 IN | WEIGHT: 186.5 LBS

## 2021-06-04 VITALS
OXYGEN SATURATION: 94 % | WEIGHT: 183 LBS | HEIGHT: 70 IN | BODY MASS INDEX: 26.2 KG/M2 | DIASTOLIC BLOOD PRESSURE: 80 MMHG | HEART RATE: 93 BPM | SYSTOLIC BLOOD PRESSURE: 125 MMHG

## 2021-06-04 VITALS
WEIGHT: 180 LBS | SYSTOLIC BLOOD PRESSURE: 102 MMHG | OXYGEN SATURATION: 94 % | DIASTOLIC BLOOD PRESSURE: 68 MMHG | HEART RATE: 88 BPM | RESPIRATION RATE: 20 BRPM | BODY MASS INDEX: 26.01 KG/M2

## 2021-06-04 VITALS
HEART RATE: 85 BPM | DIASTOLIC BLOOD PRESSURE: 69 MMHG | OXYGEN SATURATION: 95 % | BODY MASS INDEX: 26.2 KG/M2 | SYSTOLIC BLOOD PRESSURE: 105 MMHG | WEIGHT: 183 LBS | HEIGHT: 70 IN

## 2021-06-04 VITALS
SYSTOLIC BLOOD PRESSURE: 100 MMHG | BODY MASS INDEX: 26.01 KG/M2 | WEIGHT: 180 LBS | TEMPERATURE: 98.7 F | HEART RATE: 83 BPM | DIASTOLIC BLOOD PRESSURE: 58 MMHG | OXYGEN SATURATION: 94 %

## 2021-06-04 VITALS
OXYGEN SATURATION: 94 % | WEIGHT: 183.5 LBS | BODY MASS INDEX: 26.52 KG/M2 | SYSTOLIC BLOOD PRESSURE: 116 MMHG | DIASTOLIC BLOOD PRESSURE: 70 MMHG | HEART RATE: 83 BPM

## 2021-06-05 VITALS
WEIGHT: 180 LBS | HEART RATE: 70 BPM | DIASTOLIC BLOOD PRESSURE: 64 MMHG | BODY MASS INDEX: 26.2 KG/M2 | SYSTOLIC BLOOD PRESSURE: 110 MMHG

## 2021-06-05 VITALS
OXYGEN SATURATION: 94 % | DIASTOLIC BLOOD PRESSURE: 64 MMHG | SYSTOLIC BLOOD PRESSURE: 112 MMHG | HEART RATE: 72 BPM | BODY MASS INDEX: 25.47 KG/M2 | WEIGHT: 175 LBS | TEMPERATURE: 98.2 F

## 2021-06-05 VITALS
WEIGHT: 174 LBS | BODY MASS INDEX: 24.91 KG/M2 | HEIGHT: 70 IN | DIASTOLIC BLOOD PRESSURE: 69 MMHG | SYSTOLIC BLOOD PRESSURE: 104 MMHG | HEART RATE: 76 BPM

## 2021-06-05 VITALS
DIASTOLIC BLOOD PRESSURE: 56 MMHG | SYSTOLIC BLOOD PRESSURE: 100 MMHG | OXYGEN SATURATION: 94 % | HEART RATE: 80 BPM | BODY MASS INDEX: 25.95 KG/M2 | WEIGHT: 178.3 LBS

## 2021-06-05 NOTE — PROGRESS NOTES
OFFICE VISIT - FAMILY MEDICINE     ASSESSMENT AND PLAN     1. Osteoarthritis of shoulder, unspecified laterality, unspecified osteoarthritis type  lidocaine 3 % Crea   2. Uncomplicated alcohol dependence (H)     3. Major depressive disorder, recurrent, moderate (H)     4. Pulmonary emphysema, unspecified emphysema type (H)     Osteoarthritis of multiple site, we discussed treatment option, Tylenol seems to help, could try topical lidocaine cream, not a good candidate for NSAID because of history of coronary artery disease.  Depression has been stable with current management, nonsuicidal.  History of emphysema, stable, no symptoms, flu vaccine, pneumonia vaccine discussed  History of alcohol dependence's, history in remission, continue with AA meeting.  CHIEF COMPLAINT   Arthritis (would like to take stronger medication than tylenol for the pain.); Labs Only (would like to get lab work done, previous elevation in PSA @[facility]. Seen MRI was done.); and Fatigue (still having fatigue, had consult w/sleep medicine-getting sleep study done soon.)    HPI   Laurent Choi is a 74 y.o. male.  New patient as of July 17, 2019,past medical history significant for coronary artery disease status post stenting, hypertension, depression, LUTS  with BPH, GERD's, Hyperlipidemia,Prediabetes, severe alcohol abuse in remission for the past 9-month    Osteoarthritis of the fingers, with mild throbbing pain, seems to be migrating to the shoulder lately, has been using Tylenol with mild improvement, wondering about other treatment options.  PSA was elevated in the past, he did have a recheck last May and result was within normal limit.  He is planning to follow-up for an annual wellness soon and then will recheck at that point.  Still having fatigue, scheduled to have a sleep study test.  History of polysubstance abuse included alcohol, he still sober.    Review of Systems As per HPI, otherwise negative.    OBJECTIVE   /70  (Patient Site: Left Arm, Patient Position: Sitting, Cuff Size: Adult Regular)   Pulse 83   Wt 183 lb 8 oz (83.2 kg) Comment: shoes on  SpO2 94%   BMI 26.52 kg/m    Physical Exam   Constitutional: He is oriented to person, place, and time. He appears well-developed and well-nourished.   Neck: Normal range of motion. Neck supple.   Cardiovascular: Normal rate, regular rhythm, normal heart sounds and intact distal pulses. Exam reveals no gallop and no friction rub.   No murmur heard.  Pulmonary/Chest: Effort normal and breath sounds normal. No respiratory distress. He has no wheezes. He has no rales.   Musculoskeletal:         General: Deformity (Osteoarthritis deformity on the fingers in both hands.) present. No tenderness or edema.   Neurological: He is alert and oriented to person, place, and time. Coordination normal.   Psychiatric: He has a normal mood and affect. Judgment and thought content normal.       PFSH     Family History   Problem Relation Age of Onset     Snoring Father      Social History     Socioeconomic History     Marital status:      Spouse name: Not on file     Number of children: Not on file     Years of education: Not on file     Highest education level: Not on file   Occupational History     Not on file   Social Needs     Financial resource strain: Not on file     Food insecurity:     Worry: Not on file     Inability: Not on file     Transportation needs:     Medical: Not on file     Non-medical: Not on file   Tobacco Use     Smoking status: Former Smoker     Smokeless tobacco: Never Used   Substance and Sexual Activity     Alcohol use: Yes     Comment: 6 beers and 1-2 0.75L of vodka daily     Drug use: No     Sexual activity: Never     Partners: Male   Lifestyle     Physical activity:     Days per week: Not on file     Minutes per session: Not on file     Stress: Not on file   Relationships     Social connections:     Talks on phone: Not on file     Gets together: Not on file      Attends Church service: Not on file     Active member of club or organization: Not on file     Attends meetings of clubs or organizations: Not on file     Relationship status: Not on file     Intimate partner violence:     Fear of current or ex partner: Not on file     Emotionally abused: Not on file     Physically abused: Not on file     Forced sexual activity: Not on file   Other Topics Concern     Not on file   Social History Narrative    Resides in [facility].     Relevant history was reviewed with the patient today, unless noted in HPI, nothing is pertinent for this visit.  [facility]     Patient Active Problem List    Diagnosis Date Noted     Pulmonary emphysema, unspecified emphysema type (H) 01/23/2020     Severe alcohol use disorder (H) 08/28/2018     Macular degeneration (senile) of retina 08/28/2018     Moderate episode of recurrent major depressive disorder (H) 08/28/2018     Elevated prostate specific antigen less than 10 ng/ml 06/21/2018     Gastroesophageal reflux disease with esophagitis 06/21/2018     Overview Note:     Overview:   egd 9/2017.   4 cm HH. Grade A esophagitis.        Atherosclerosis of native coronary artery of native heart without angina pectoris 01/31/2017     Pure hypercholesterolemia 08/10/2016     Hypertension, essential 08/10/2016     Stented coronary artery 04/27/2015     Overview Note:     Overview:   Patient is on Clopidogrel (Plavix) following stent placement.  Date of Intervention:  4/27/2015   Type of Stent:  PORSHA  Patient is expected to continue taking Clopidogrel (Plavix) for one year (until 4/26/16) unless otherwise advised due to subsequent stent placement or continued bleeding.       Past Surgical History:   Procedure Laterality Date     CARDIAC SURGERY         RESULTS/CONSULTS (Lab/Rad)   No results found for this or any previous visit (from the past 168 hour(s)).  No results found.  MEDICATIONS     Current Outpatient Medications on File Prior to Visit   Medication  Sig Dispense Refill     acetaminophen (TYLENOL) 325 MG tablet Take 650 mg by mouth every 6 (six) hours as needed.       albuterol (PROVENTIL HFA) 90 mcg/actuation inhaler Inhale 2 puffs every 6 (six) hours as needed for wheezing or shortness of breath. 1 Inhaler 3     amLODIPine (NORVASC) 5 MG tablet Take 1 tablet (5 mg total) by mouth daily. 90 tablet 3     aspirin 81 mg chewable tablet Chew 1 tablet (81 mg total) daily. 3 tablet 0     escitalopram oxalate (LEXAPRO) 20 MG tablet Take one tablet daily.       gabapentin (NEURONTIN) 300 MG capsule Take 300 mg by mouth at bedtime.        losartan (COZAAR) 25 MG tablet Take 25 mg by mouth daily.       metFORMIN (GLUCOPHAGE XR) 500 MG 24 hr tablet Take 1 tablet (500 mg total) by mouth daily with breakfast. 30 tablet 11     naltrexone microspheres (VIVITROL) 380 mg SERR injection Inject 380 mg into the gluteal muscle.       nitroglycerin (NITROSTAT) 0.4 MG SL tablet Place 0.4 mg under the tongue every 5 (five) minutes as needed for chest pain. For up to 3 doses per episode as needed.       omeprazole (PRILOSEC) 20 MG capsule Take 1 capsule (20 mg total) by mouth daily before breakfast. Take 1 hour before a meal. 3 capsule 0     rosuvastatin (CRESTOR) 40 MG tablet Take 40 mg by mouth at bedtime.       tamsulosin (FLOMAX) 0.4 mg cap Take 1 capsule (0.4 mg total) by mouth Daily after breakfast. 3 capsule 0     calcium-vitamin D (CALCIUM-VITAMIN D) 500 mg(1,250mg) -200 unit per tablet Take 1 tablet by mouth 2 (two) times a day.       No current facility-administered medications on file prior to visit.        HEALTH MAINTENANCE / SCREENING   PHQ-2 Total Score: 0 (1/23/2020 12:00 PM)  , PHQ-9 Total Score: 5 (1/23/2020 12:00 PM)  ,RODRIGO 7 Total Score: 2 (1/23/2020 12:00 PM)    Immunization History   Administered Date(s) Administered     Hep B, Adult 02/27/1987, 03/27/1987, 08/28/1987     Hep B, historic 11/03/1999     Influenza high dose,seasonal,PF, 65+ yrs 11/03/2015,  11/16/2016, 10/20/2017, 10/17/2018, 11/06/2019     Influenza, Seasonal, Inj PF IIV3 11/09/2009     Influenza, inj, historic,unspecified 12/03/2003     Influenza,seasonal, Inj IIV3 10/13/1999, 11/08/2000, 11/27/2001, 11/25/2002, 11/08/2005, 11/02/2006, 10/25/2011, 11/09/2011, 02/25/2013     Pneumo Conj 13-V (2010&after) 04/11/2016     Pneumo Polysac 23-V 11/12/1996, 06/02/2017     Td, Adult, Absorbed 01/01/2000     Tdap 10/25/2011, 06/15/2016     ZOSTER, LIVE 06/15/2016     Health Maintenance   Topic     HEPATITIS C SCREENING      LIPID      MEDICARE ANNUAL WELLNESS VISIT      ZOSTER VACCINES (2 of 3)     FALL RISK ASSESSMENT      COLONOSCOPY      ADVANCE CARE PLANNING      TD 18+ HE      DEPRESSION ACTION PLAN      PNEUMOCOCCAL IMMUNIZATION 65+ LOW/MEDIUM RISK      INFLUENZA VACCINE RULE BASED        Amara Smith MD  Family Medicine, [facility]     This note was dictated using a voice recognition software.  Any grammatical or context distortion are unintentional and inherent to the software.

## 2021-06-05 NOTE — PROGRESS NOTES
Dear Dr. Smith, [facility]  [address] [place] [address]    Thank you for the opportunity to participate in the care of Mr. Laurent Choi.    Assessment and Plan:    In summary Laurent Choi is a 74 y.o. year old male here for evaluation of sleep disturbance.  1.  Chronic fatigue   Mr. Laurent Choi has high risk for obstructive sleep apnea based on the history of chronic fatigue, nonrestorative sleep, sleep-related leg cramps and a crowded airway. I educated the patient on the underlying pathophysiology of obstructive sleep apnea. We reviewed the risks associated with sleep apnea, including increased cardiovascular risk and overall death. We talked about treatments briefly. I recommend getting an split-night nocturnal polysomnography. The patient should return to the clinic to discuss results and treatment option in a patient-centered approach.  2.  Nonrestorative sleep  3.  Sleep-related leg cramps  4.  Paroxysmal nocturnal dyspnea/frequent nocturnal awakenings.    History of present illness:    He is a 74 y.o. male who comes to the clinic with a chief complaint of chronic fatigue that is been going on for more than 6 months.  The patient reports dyspnea on exertion but more importantly frequent episodes of nocturnal awakening with paroxysmal nocturnal dyspnea.  Since the patient sleeps by himself, there is no one to tell him if he stops breathing in his sleep or snores.  He finds it difficult to get out of bed in the morning despite adequate hours of sleep because of exhaustion.  He finds that he needs to constantly drink coffee keep him going.  The patient also complains that he would frequently wake up with leg cramps that would make it difficult for him to fall back to sleep.  He denies the urge to move when sedentary during the daytime.  He reports these episodes tends to occur when he is woken up out of sleep and denies any of the symptoms as he is trying to fall asleep.   Complicating matters further is the fact that he also has a heavy history of coronary disease status post stent placement.  The patient also has a history of heavy smoking in the past.  The patient's other review of system is otherwise negative.     Ideal Sleep-Wake Cycle(devoid of societal pressure):    Patient would try to initiate sleep at around 1-2 AM with a sleep latency of 1 hour. The patient would have more than 2 awakenings. Final wake up time is around noon.    Patient told to return in one week after the sleep study is interpreted.    Past Medical History  Past Medical History:   Diagnosis Date     Alcohol abuse      Arthritis      Chest pain      Clogged artery (heart)      GERD (gastroesophageal reflux disease)      Hypertension      Macular degeneration         Past Surgical History  Past Surgical History:   Procedure Laterality Date     CARDIAC SURGERY          Meds  Current Outpatient Medications   Medication Sig Dispense Refill     acetaminophen (TYLENOL) 325 MG tablet Take 650 mg by mouth every 6 (six) hours as needed.       albuterol (PROVENTIL HFA) 90 mcg/actuation inhaler Inhale 2 puffs every 6 (six) hours as needed for wheezing or shortness of breath. 1 Inhaler 3     amLODIPine (NORVASC) 5 MG tablet Take 1 tablet (5 mg total) by mouth daily. 90 tablet 3     aspirin 81 mg chewable tablet Chew 1 tablet (81 mg total) daily. 3 tablet 0     calcium-vitamin D (CALCIUM-VITAMIN D) 500 mg(1,250mg) -200 unit per tablet Take 1 tablet by mouth 2 (two) times a day.       escitalopram oxalate (LEXAPRO) 20 MG tablet Take one tablet daily.       gabapentin (NEURONTIN) 300 MG capsule Take 1 capsule before bed, can take it up to 3 times a day as needed.       losartan (COZAAR) 25 MG tablet Take 25 mg by mouth daily.       metFORMIN (GLUCOPHAGE XR) 500 MG 24 hr tablet Take 1 tablet (500 mg total) by mouth daily with breakfast. 30 tablet 11     naltrexone microspheres (VIVITROL) 380 mg SERR injection Inject 380 mg  into the gluteal muscle.       omeprazole (PRILOSEC) 20 MG capsule Take 1 capsule (20 mg total) by mouth daily before breakfast. Take 1 hour before a meal. 3 capsule 0     rosuvastatin (CRESTOR) 40 MG tablet Take 40 mg by mouth at bedtime.       tamsulosin (FLOMAX) 0.4 mg cap Take 1 capsule (0.4 mg total) by mouth Daily after breakfast. 3 capsule 0     nitroglycerin (NITROSTAT) 0.4 MG SL tablet Place 0.4 mg under the tongue every 5 (five) minutes as needed for chest pain. For up to 3 doses per episode as needed.       No current facility-administered medications for this visit.         Allergies  Aspirin; Nsaids (non-steroidal anti-inflammatory drug); Plavix [clopidogrel]; and Statins-hmg-coa reductase inhibitors     Social History  Social History     Socioeconomic History     Marital status:      Spouse name: Not on file     Number of children: Not on file     Years of education: Not on file     Highest education level: Not on file   Occupational History     Not on file   Social Needs     Financial resource strain: Not on file     Food insecurity:     Worry: Not on file     Inability: Not on file     Transportation needs:     Medical: Not on file     Non-medical: Not on file   Tobacco Use     Smoking status: Former Smoker     Smokeless tobacco: Never Used   Substance and Sexual Activity     Alcohol use: Yes     Comment: 6 beers and 1-2 0.75L of vodka daily     Drug use: No     Sexual activity: Never     Partners: Male   Lifestyle     Physical activity:     Days per week: Not on file     Minutes per session: Not on file     Stress: Not on file   Relationships     Social connections:     Talks on phone: Not on file     Gets together: Not on file     Attends Confucianism service: Not on file     Active member of club or organization: Not on file     Attends meetings of clubs or organizations: Not on file     Relationship status: Not on file     Intimate partner violence:     Fear of current or ex partner: Not on  file     Emotionally abused: Not on file     Physically abused: Not on file     Forced sexual activity: Not on file   Other Topics Concern     Not on file   Social History Narrative    Resides in [facility].        Family History  Family History   Problem Relation Age of Onset     Snoring Father       Review of Systems:  Constitutional: Negative except as noted in HPI.   Eyes: Negative except as noted in HPI.   ENT: Negative except as noted in HPI.   Cardiovascular: Negative except as noted in HPI.   Respiratory: Negative except as noted in HPI.   Gastrointestinal: Negative except as noted in HPI.   Genitourinary: Negative except as noted in HPI.   Musculoskeletal: Negative except as noted in HPI.   Integumentary: Negative except as noted in HPI.   Neurological: Negative except as noted in HPI.   Psychiatric: Negative except as noted in HPI.   Endocrine: Negative except as noted in HPI.   Hematologic/Lymphatic: Negative except as noted in HPI.      STOP BANG 1/16/2020   Do you snore loudly (louder than talking or loud enough to be heard through closed doors)? 0   Do you often feel tired, fatigued, or sleepy during daytime? 1   Has anyone observed you stop breathing in your sleep? 0   Do you have or are you being treated for high blood pressure? 1   BMI more than 35 kg/m2 0   Age over 50 years old? 1   Neck circumference greater than 16 inches? 1   Gender male? 1   Total Score 5     Epworths Sleepiness Scale 1/16/2020   Sitting and reading 1   Watching TV 0   Sitting, inactive in a public place (e.g. a theatre or a meeting) 1   As a passenger in a car for an hour without a break 2   Lying down to rest in the afternoon when circumstances permit 3   Sitting and talking to someone 0   Sitting quietly after a lunch without alcohol 0   In a car, while stopped for a few minutes in traffic 0   Total score 7     Rooming 1/16/2020   Usual bedtime 1-2am   Sleep Latency 1 hour   Awakenings 2 times   Wake Up Time 12-1pm  "  Weekends same   Coffee 8 cups   Difficulty falling asleep Yes   Difficulty staying asleep No   Excessive daytime tiredness Yes   Excessive daytime sleepiness Yes   Dozing off while driving No   Shift Worker No   Sleep Talking? Yes   Restless legs symptoms No       Physical Exam:  /69   Pulse 85   Ht 5' 9.75\" (1.772 m)   Wt 183 lb (83 kg)   SpO2 95%   BMI 26.45 kg/m    BMI:Body mass index is 26.45 kg/m .   GEN: NAD, appropriate for age  Head: Normocephalic.  EYES: PERRLA, EOMI  ENT: Oropharynx is clear, Jack class 3+ airway. Uvula is intact  Nasal mucosa is moist without erythema  Neck : Thyroid is within normal limits. Neck Circ 16.75\"  CV: Regular rate and rhythm, S1 & S2 positive.  LUNGS: Bilateral breathsounds heard.   ABDOMEN: Positive bowel sounds in all quadrants, soft, no rebound or guarding  MUSCULOSKELETAL: Bilateral trace leg swelling  SKIN: warm, dry, no rashes  Neurological: Alert, oriented to time, place, and person.  Psych: normal mood, normal affect     Labs/Studies:     Lab Results   Component Value Date    WBC 8.4 08/28/2018    HGB 14.4 08/28/2018    HCT 42.6 08/28/2018    MCV 97 08/28/2018     08/28/2018         Chemistry        Component Value Date/Time     07/17/2019 1150    K 4.8 07/17/2019 1150     07/17/2019 1150    CO2 23 07/17/2019 1150    BUN 13 07/17/2019 1150    CREATININE 0.88 07/17/2019 1150    GLU 89 07/17/2019 1150        Component Value Date/Time    CALCIUM 9.1 07/17/2019 1150    ALKPHOS 44 (L) 07/17/2019 1150    AST 16 07/17/2019 1150    ALT 16 07/17/2019 1150    BILITOT 0.4 07/17/2019 1150            No results found for: FERRITIN  Lab Results   Component Value Date    TSH 0.63 07/17/2019         Patient verbalized understanding of these issues, agrees with the plan and all questions were answered today. Patient was given an opportuntity to voice any other symptoms or concerns not listed above. Patient did not have any other symptoms or concerns. "         Jordon Means DO  Board Certified in Internal Medicine and Sleep Medicine    (Note created with Dragon voice recognition and unintended spelling errors and word substitutions may occur)

## 2021-06-06 NOTE — PROGRESS NOTES
PULMONARY CLINIC FOLLOW UP NOTE    History:     HPI: Laurent Choi is a 75 y.o. male former smoker, with mild obstruction on PFTs is here for follow-up.  Baseline, patient is able to walk 1 to 2 miles.  He does typically walk in the summer.  He walks on the treadmill for approximately half an hour.  This is usually done in the winter.  He has PND.      Interval History: Patient is here for scheduled follow-up.  He denies any hospitalizations, antibiotics, or steroids since he was last seen.  He denies any night sweats or weight loss.  He did have a CT scan of the chest to follow-up with pulmonary nodules and would like to go over the results.  He denies any changes in his respiratory symptoms.  He notes that he has been active and exercising.    PMHx/PSHx:  CAD s/p PCI back 2015  History of alcohol abuse  DJD  GERD  Hypertension  Macular degeneration     Social Hx:  Former smoker.  Used to smoke 1 pack per day for 30-40 years.  Quit approximately 10 years ago.   He used to work as a . Now he does sedentary work at computer.     ROS: 10 point review of system done. Pertinent findings are noted in the HPI.    Exam/Data:   /68   Pulse 88   Resp 20   Wt 180 lb (81.6 kg)   SpO2 94% Comment: ra  BMI 26.01 kg/m  , Body mass index is 26.01 kg/m .  GEN: comfortable, NAD  HEENT: NCAT, EMOI, mmm  LN: no cervical LAD   CVS: S1S2, RRR  Lung: good air entry bilaterally, no wheezing  Abd: round, obese, soft  Ext: no c/c/e  Vasc: intact radial pulses bilaterally  Neuro: nonfocal  Skin: no visible rash  Musculoskeletal: FROM all extremities  Psych: normal affect       Data:     Labs personally reviewed.      IMAGING: personally reviewed images. Formal radiology interpretation noted below.    PFT DATA 10/2019:  FEV1 75% predicted, FVC 84% predicted, ratio 67.  No reversibility.  % predicted, % predicted, DLCO 82% predicted.     CT chest done on 2/2019 at [facility]:  EXAM: CT CHEST DIAG  WO IV CONT LCS F/U  LOCATION:  [facility]  DATE/TIME: 2/14/2019 2:38 PM    INDICATION: Multiple bilateral pulmonary nodules. Personal history of tobacco use.  TECHNIQUE: Routine noncontrast CT chest. Dose reduction techniques were used.   COMPARISON: CT chest June 29, 2018.    FINDINGS:  LUNGS AND PLEURA: Mild to moderate centrilobular emphysema. Biapical scarring. Mild bronchiectasis at the right lung base.    3 mm nodule in the right lower lobe along the major fissure on series 3: image 90, unchanged. 5 mm x 2 mm nodule on series 3: image 107 in the right lower lobe adjacent to the fissure, unchanged. 2 mm nodule in the right lower lobe on series 3: image 105 which is new. Small calcified granuloma in the right middle lobe on image 117. Mild atelectasis.    On the left the previously visualized 2 mm nonsolid nodule seen in the left upper lobe on image 60 is no longer visualized. There are two 1.5 mm nodules in the left lower lobe on image 86, unchanged. 2 mm nodule in the left lower lobe on image 91 is unchanged. 2 mm nodule in the left lower lobe on image 97 is unchanged.    No pleural effusion.    MEDIASTINUM: No mediastinal or hilar adenopathy. There is coronary artery calcification. No pericardial effusion. Moderate to large hiatal hernia is increased in size from the prior exam.    CORONARY ARTERY CALCIFICATION: Severe    LIMITED UPPER ABDOMEN: The liver, spleen, pancreas, gallbladder and adrenal glands are unremarkable. Vascular calcification involving the aorta. Please note these organs are not imaged in their entirety.    MUSCULOSKELETAL: Osteopenia. Mild kyphosis. Compression fracture at T12, unchanged with mild kyphosis at this level.    CONCLUSION:  1. All of the nodules seen previously are unchanged. There is a single new nodule measuring 2 mm in the right lower lobe.      Ct Chest Without Contrast    Result Date: 2/24/2020  EXAM: CT CHEST WO CONTRAST LOCATION: [facility] DATE/TIME:  2/24/2020 12:07 PM INDICATION: pulmonary nodules noted on CT earlier this year COMPARISON: CT of the chest 2/14/2019 TECHNIQUE: CT chest without IV contrast. Multiplanar reformats were obtained. Dose reduction techniques were used. CONTRAST: None. FINDINGS: LUNGS AND PLEURA: Triangular-shaped subpleural nodule in the anterior right lower lobe abutting the major fissure (series 4, image 114) has typical imaging features of an intrapulmonary lymph node, unchanged in size. 2 mm nodule in the subpleural left lower lobe (series 4, image 137) is unchanged. There is a punctate 2 mm calcified nodule in the inferior right middle lobe, definitively benign. Upper lung zone predominant emphysema. Mild scarring in the posterior basal right lower lobe. No new nodules or airspace opacities. No pleural space abnormality. MEDIASTINUM: Moderate hiatal hernia. No enlarged mediastinal or hilar lymph nodes. Cardiac chambers are normal in size. LAD stents. Normal caliber thoracic aorta. Mild, intermittent atheromatous calcification of the thoracic aorta. Conventional arch anatomy. UPPER ABDOMEN: No actionable findings in the imaged upper abdomen. MUSCULOSKELETAL: Chronic anterior wedging of T12 resulting in focal kyphosis at this level. Several old left posterolateral rib fracture deformities. No acute fracture or bone lesion. Chest wall soft tissues are symmetric.      1.  Small lung nodules are unchanged from 2/14/2019. If the patient is eligible and agreeable to screening, follow-up with cancer screening chest CT in 12 months is suggested. 2.   Moderate hiatal hernia.     Assessment/Plan:     Laurent Choi is a 75 y.o. male, former smoker, who was referred here for evaluation of shortness of breath.  PFTs showed mild obstruction.  He had a CT scan of the chest done on 2/2019 that showed multiple pulmonary nodules that were small.  These were apparently stable compared to CT scan of the chest that was done on 6/2018.  I ordered a  CT scan of the chest that was done on 2/2020 and this remained to be unchanged.     Recommendations:  Yearly low dose CT chest, 2/201  Encouraged to stay active  Has albuterol inhaler but rarely uses it    FOLLOW UP: 1 year    Rose Sepulveda MD  Pulmonary and Critical Care Medicine  Electronically Signed on 2/27/2020    Current Outpatient Medications   Medication Sig Dispense Refill     acetaminophen (TYLENOL) 325 MG tablet Take 650 mg by mouth every 6 (six) hours as needed.       albuterol (PROVENTIL HFA) 90 mcg/actuation inhaler Inhale 2 puffs every 6 (six) hours as needed for wheezing or shortness of breath. 1 Inhaler 3     amLODIPine (NORVASC) 5 MG tablet Take 1 tablet (5 mg total) by mouth daily. 90 tablet 3     aspirin 81 mg chewable tablet Chew 1 tablet (81 mg total) daily. 3 tablet 0     calcium-vitamin D (CALCIUM-VITAMIN D) 500 mg(1,250mg) -200 unit per tablet Take 1 tablet by mouth 2 (two) times a day.       escitalopram oxalate (LEXAPRO) 20 MG tablet Take one tablet daily.       gabapentin (NEURONTIN) 300 MG capsule Take 300 mg by mouth at bedtime.        lidocaine 3 % Crea Apply 1 application topically 3 (three) times a day as needed. 85 Tube 2     losartan (COZAAR) 25 MG tablet Take 25 mg by mouth daily.       metFORMIN (GLUCOPHAGE XR) 500 MG 24 hr tablet Take 1 tablet (500 mg total) by mouth daily with breakfast. 30 tablet 11     naltrexone microspheres (VIVITROL) 380 mg SERR injection Inject 380 mg into the gluteal muscle.       nitroglycerin (NITROSTAT) 0.4 MG SL tablet Place 0.4 mg under the tongue every 5 (five) minutes as needed for chest pain. For up to 3 doses per episode as needed.       omeprazole (PRILOSEC) 20 MG capsule Take 1 capsule (20 mg total) by mouth daily before breakfast. Take 1 hour before a meal. 3 capsule 0     rosuvastatin (CRESTOR) 40 MG tablet Take 40 mg by mouth at bedtime.       tamsulosin (FLOMAX) 0.4 mg cap Take 1 capsule (0.4 mg total) by mouth Daily after breakfast. 3  capsule 0     No current facility-administered medications for this visit.      Allergies   Allergen Reactions     Aspirin Other (See Comments)     Avoids taking any aspirin besides his daily 81 mg aspirin regimen due to acid reflux/esophagus problem(s).     Nsaids (Non-Steroidal Anti-Inflammatory Drug) Other (See Comments)     Avoids due to acid reflux/esophagus problem(s).     Plavix [Clopidogrel] Other (See Comments) and Myalgia     Reaction(s): Bad bruising all over his body and leg cramps.     Statins-Hmg-Coa Reductase Inhibitors Myalgia     Per the patient, he tolerates rosuvastatin.       Meds and Allergies: See EHR for the updated medication list and Allergies. These were reviewed.     Much or all of the text in this note was generated through the use of the Dragon Dictate voice-to-text software. Errors in spelling or words which seem out of context are unintentional. Sound alike errors, in particular, may have escaped editing.

## 2021-06-06 NOTE — PROGRESS NOTES
Dear Dr. Smith, Amara PAREDES MD  [address] [place] [address],    Thank you for the opportunity to participate in the care of Laurent Choi.     He is a 75 y.o.  male patient who comes to the sleep medicine clinic for review of his sleep study. The study was completed on 02/19/2020 which showed that the patient had upper airway resistance syndrome and sleep-related hypoxia.    Assessment and Plan:    1. Upper airway resistance syndrome  I educated the patient on the underlying pathophysiology of upper airway resistance syndrome.  We also reviewed the results of the patient's sleep study in detail.  We discussed the treatment options available including oral appliance, CPAP, muscle strengthening exercises and positional therapy.  The patient would like to try positional therapy for now.    2. Hypersomnia  No change    3. Sleep related hypoxia  Offered the patient option of initiating nasal cannula oxygen 2 L/min at night.  He would like to think about it before making a final decision.  He will call us back if he is interested in proceeding with nasal cannula oxygen at night.      Current Outpatient Medications   Medication Sig Dispense Refill     acetaminophen (TYLENOL) 325 MG tablet Take 650 mg by mouth every 6 (six) hours as needed.       albuterol (PROVENTIL HFA) 90 mcg/actuation inhaler Inhale 2 puffs every 6 (six) hours as needed for wheezing or shortness of breath. 1 Inhaler 3     amLODIPine (NORVASC) 5 MG tablet Take 1 tablet (5 mg total) by mouth daily. 90 tablet 3     aspirin 81 mg chewable tablet Chew 1 tablet (81 mg total) daily. 3 tablet 0     calcium-vitamin D (CALCIUM-VITAMIN D) 500 mg(1,250mg) -200 unit per tablet Take 1 tablet by mouth 2 (two) times a day.       escitalopram oxalate (LEXAPRO) 20 MG tablet Take one tablet daily.       gabapentin (NEURONTIN) 300 MG capsule Take 300 mg by mouth at bedtime.        lidocaine 3 % Crea Apply 1 application topically 3 (three) times a day as  "needed. 85 Tube 2     losartan (COZAAR) 25 MG tablet Take 25 mg by mouth daily.       metFORMIN (GLUCOPHAGE XR) 500 MG 24 hr tablet Take 1 tablet (500 mg total) by mouth daily with breakfast. 30 tablet 11     naltrexone microspheres (VIVITROL) 380 mg SERR injection Inject 380 mg into the gluteal muscle.       omeprazole (PRILOSEC) 20 MG capsule Take 1 capsule (20 mg total) by mouth daily before breakfast. Take 1 hour before a meal. 3 capsule 0     rosuvastatin (CRESTOR) 40 MG tablet Take 40 mg by mouth at bedtime.       tamsulosin (FLOMAX) 0.4 mg cap Take 1 capsule (0.4 mg total) by mouth Daily after breakfast. 3 capsule 0     nitroglycerin (NITROSTAT) 0.4 MG SL tablet Place 0.4 mg under the tongue every 5 (five) minutes as needed for chest pain. For up to 3 doses per episode as needed.       No current facility-administered medications for this visit.        Allergies   Allergen Reactions     Aspirin Other (See Comments)     Avoids taking any aspirin besides his daily 81 mg aspirin regimen due to acid reflux/esophagus problem(s).     Nsaids (Non-Steroidal Anti-Inflammatory Drug) Other (See Comments)     Avoids due to acid reflux/esophagus problem(s).     Plavix [Clopidogrel] Other (See Comments) and Myalgia     Reaction(s): Bad bruising all over his body and leg cramps.     Statins-Hmg-Coa Reductase Inhibitors Myalgia     Per the patient, he tolerates rosuvastatin.       Epworths Sleepiness Scale 1/16/2020   Sitting and reading 1   Watching TV 0   Sitting, inactive in a public place (e.g. a theatre or a meeting) 1   As a passenger in a car for an hour without a break 2   Lying down to rest in the afternoon when circumstances permit 3   Sitting and talking to someone 0   Sitting quietly after a lunch without alcohol 0   In a car, while stopped for a few minutes in traffic 0   Total score 7       Physical Exam:  /80   Pulse 93   Ht 5' 9.75\" (1.772 m)   Wt 183 lb (83 kg)   SpO2 94%   BMI 26.45 kg/m  "   BMI:Body mass index is 26.45 kg/m .   GEN: NAD  Psych: normal mood, normal affect     Labs/Studies:  - We reviewed the results of the overnight PSG as described on the HPI.       Patient verbalized understanding of these issues, agrees with the plan and all questions were answered today. Patient was given an opportuntity to voice any other symptoms or concerns not listed above. Patient did not have any other symptoms or concerns.        Jordon Means DO  Board Certified in Internal Medicine and Sleep Medicine      (Note created with Dragon voice recognition and unintended spelling errors and word substitutions may occur)

## 2021-06-07 ENCOUNTER — COMMUNICATION - HEALTHEAST (OUTPATIENT)
Dept: FAMILY MEDICINE | Facility: CLINIC | Age: 76
End: 2021-06-07

## 2021-06-07 NOTE — TELEPHONE ENCOUNTER
Medication Request  Medication name:    Disp  Refills  Start  End     metFORMIN (GLUCOPHAGE XR) 500 MG 24 hr tablet  30 tablet  11  7/17/2019      Sig - Route: Take 1 tablet (500 mg total) by mouth daily with breakfast. - Oral     Sent to pharmacy as: metFORMIN (GLUCOPHAGE XR) 500 MG 24 hr tablet     E-Prescribing Status: Receipt confirmed by pharmacy (7/17/2019 11:44 AM CDT)         Requested Pharmacy: [facility]  Reason for request: caller is wanting this medication to be changed to a 90 day supply instead of 30 if that's okay with Amara Smith MD. Caller stated the he does need refills as well.   When did you use medication last?:    Patient offered appointment:  patient declined  Okay to leave a detailed message: yes

## 2021-06-08 ENCOUNTER — ALLIED HEALTH/NURSE VISIT (OUTPATIENT)
Dept: PSYCHIATRY | Facility: CLINIC | Age: 76
End: 2021-06-08
Payer: COMMERCIAL

## 2021-06-08 VITALS
BODY MASS INDEX: 26.2 KG/M2 | HEART RATE: 91 BPM | WEIGHT: 180 LBS | SYSTOLIC BLOOD PRESSURE: 117 MMHG | DIASTOLIC BLOOD PRESSURE: 79 MMHG

## 2021-06-08 DIAGNOSIS — F10.21 ALCOHOL USE DISORDER, SEVERE, IN SUSTAINED REMISSION (H): Primary | ICD-10-CM

## 2021-06-08 PROCEDURE — 250N000011 HC RX IP 250 OP 636: Performed by: PSYCHIATRY & NEUROLOGY

## 2021-06-08 PROCEDURE — 96372 THER/PROPH/DIAG INJ SC/IM: CPT | Performed by: PSYCHIATRY & NEUROLOGY

## 2021-06-08 RX ADMIN — NALTREXONE 380 MG: KIT at 10:00

## 2021-06-08 NOTE — TELEPHONE ENCOUNTER
Medication Request  Medication name:    Disp  Refills  Start  End     losartan (COZAAR) 25 MG tablet         Sig - Route: Take 25 mg by mouth daily. - Oral     Class: Historical Med         Requested Pharmacy: [facility]  Reason for request: needing refills.   When did you use medication last?:    Patient offered appointment:  patient declined  Okay to leave a detailed message: yes

## 2021-06-08 NOTE — NURSING NOTE
"Clinic Administered Medication Documentation      Injectable Medication Documentation    Patient was given Vivitrol 380 mg. Prior to medication administration, verified patients identity using patient s name and date of birth. Please see MAR and medication order for additional information. Patient instructed to stay in clinic after the injection but patient declined.      Was entire vial of medication used? Yes  Vial/Syringe: Single dose vial  Expiration Date:  3/31/2023  Was this medication supplied by the patient? No      Gio Choi,  1945, presented to the clinic today at the request of Dr Danelle Cruz,  ordering provider for long-acting injectable Vivitrol 380 mg.    OBSERVATIONS:  1.  Appearance: Casually dressed in clean shorts, button shirt and tennis shoes. Good body hygiene. Pt shared \"I slipped up big time on Memorial Day. I drank quite a bit, then I felt really bad that I had done it. So thanks for getting me in today. I've been depressed lately. I get seasonal depression this time of year. Most people get it during the winter months, but not me, I get it now. That's one reason I did drink. Boy, I know I need to stay on these shots though.\" Writer asked if pt had any oral Naltrexone, pt does not saying he thought Insurance will not pay for both.Pt denies SI, HI, AH, VH, and SIB.  2.  Mood: Depressed. Pt is down on himself for drinking. Talkative.   3.  Affect: Congruent to mood  4.  Attitude: Good, \"take everyday as a new one\" Engaged, good eye contact  5.  Cooperation: Full  6.  Side Effects: See above  7.  Education: Call or use [facility] to contact the clinic with any questions or concerns. Pt agreed  8. Next appointment: 2021 1000  9. Location: [facility]    The service provided today was rendered under the supervising provider of the day, Dr Beltran, who was available for consultation as needed.        "

## 2021-06-09 ENCOUNTER — COMMUNICATION - HEALTHEAST (OUTPATIENT)
Dept: FAMILY MEDICINE | Facility: CLINIC | Age: 76
End: 2021-06-09

## 2021-06-09 DIAGNOSIS — J43.9 PULMONARY EMPHYSEMA, UNSPECIFIED EMPHYSEMA TYPE (H): ICD-10-CM

## 2021-06-09 DIAGNOSIS — R06.02 SOB (SHORTNESS OF BREATH): ICD-10-CM

## 2021-06-09 DIAGNOSIS — I10 HYPERTENSION, ESSENTIAL: ICD-10-CM

## 2021-06-09 DIAGNOSIS — R53.83 FATIGUE, UNSPECIFIED TYPE: ICD-10-CM

## 2021-06-09 NOTE — TELEPHONE ENCOUNTER
[facility] sent a fax requesting a refill on the following medication. Will forward to PCP to address.     Medication: tamsulosin (FLOMAX) 0.4 mg cap  Pharmacy: [facility]  Last OV: 01/23/20   Last Refill: 08/24/18 Q:3  Refills: 0       GJ/RMA

## 2021-06-10 NOTE — TELEPHONE ENCOUNTER
Medication Request  Medication name:   omeprazole (PRILOSEC) 20 MG capsule  3 capsule  0  8/24/2018      Sig - Route: Take 1 capsule (20 mg total) by mouth daily before breakfast. Take 1 hour before a meal. - Oral         Requested Pharmacy: [facility]  Reason for request: Is out of med.    Okay to leave a detailed message: yes

## 2021-06-10 NOTE — PROGRESS NOTES
OFFICE VISIT - FAMILY MEDICINE     ASSESSMENT AND PLAN     1. Gastroesophageal reflux disease with esophagitis  omeprazole (PRILOSEC) 20 MG capsule    Vitamin B12   2. Screening for prostate cancer  PSA (Prostatic-Specific Antigen), Annual Screen   3. Atherosclerosis of native coronary artery of native heart without angina pectoris     4. Stented coronary artery  HM2(CBC w/o Differential)   5. Hypertension, essential  HM2(CBC w/o Differential)    Microalbumin, Random Urine    Thyroid Cascade   6. Prediabetes  Glycosylated Hemoglobin A1c    Microalbumin, Random Urine   7. Pure hypercholesterolemia  Comprehensive Metabolic Panel    Lipid Cascade   8. Encounter for HCV screening test for low risk patient  Hepatitis C Antibody (Anti-HCV)   9. Encounter for screening for malignant neoplasm of prostate   PSA (Prostatic-Specific Antigen), Annual Screen   10. Other eczema  hydrocortisone 2.5 % cream   11. Severe alcohol use disorder (H)     12. Pulmonary emphysema, unspecified emphysema type (H)     GERD has been controlled with omeprazole 20 mg once a day, overall seems to be tolerating well, endoscopy did show reflux disorder about 3 years ago.  Medication was refilled, consider referral to gastroenterology if symptoms recur.  Coronary artery disease, will continue to be aggressive risk factor modification, continue hyperlipidemia control, he does have prediabetes controlled with metformin 500 mg daily.  Encourage healthy lifestyle changes.  Eczema of the lower extremities, hydrocortisone 2.5% cream to use during flareup.  Severe alcohol use disorder, sober for 1 year, using albuterol once a month continue to follow with psychiatrist.  Pulmonary nodular emphysema, does get yearly lung CTs at the pulmonologist office.    CHIEF COMPLAINT   Follow-up (medication; would like to do labs, if needed.)    PAZ   Laurent SHAHID Jimbo is a 75 y.o. male.  New patient as of July 17, 2019,past medical history significant for coronary  artery disease status post stenting, hypertension, depression, LUTS  with BPH, GERD's, Hyperlipidemia,Prediabetes, severe alcohol abuse in remission for the past 9-month  Follow-up on chronic medical issues, GERD has been controlled with omeprazole 20 mg once a day, and some occasionally takes twice a day when symptoms is severe, but otherwise usually once a day.  Had an endoscopy about 3 years ago, result was reviewed in care [facility] and is below.  History of severe alcohol abuse, has been on Vivitrol injection once a month, has been sober for the past 1 year, planning to discuss with psychotherapist about stopping the medication.  Hyperlipidemia has been controlled with Crestor 40 mg daily, no muscle ache.  Hypertension has been controlled with losartan 25 mg daily, amlodipine 5 mg daily.  Lower urinary tract symptoms are well controlled with Flomax 0.4 mg daily.  Occasionally gets eczema in his legs, usually worse in the wintertime, has been using OTC cortisone 1% cream with only minimal improvement.      Impression:          - LA Grade A esophagitis. Dilated.                       - 4 cm hiatal hernia.                       - Erythematous mucosa in the stomach. Biopsied.                       - Normal examined duodenum.  Recommendation:      - Await pathology results.                       - Follow an antireflux regimen.                       - Return to primary care physician.  Review of Systems As per HPI, otherwise negative.    OBJECTIVE   /58 (Patient Site: Left Arm, Patient Position: Sitting, Cuff Size: Adult Regular)   Pulse 83   Temp 98.7  F (37.1  C) (Tympanic)   Wt 180 lb (81.6 kg) Comment: shoes on  SpO2 94%   BMI 26.01 kg/m    Physical Exam   Constitutional: He is oriented to person, place, and time. He appears well-developed and well-nourished.   HENT:   Head: Normocephalic and atraumatic.   Neck: Normal range of motion. Neck supple. No JVD present. No tracheal  deviation present. No thyromegaly present.   Cardiovascular: Normal rate, regular rhythm, normal heart sounds and intact distal pulses. Exam reveals no gallop and no friction rub.   No murmur heard.  Pulmonary/Chest: Effort normal and breath sounds normal. No respiratory distress. He has no wheezes. He has no rales.   Musculoskeletal:         General: No tenderness or edema.   Lymphadenopathy:     He has no cervical adenopathy.   Neurological: He is alert and oriented to person, place, and time. Coordination normal.   Skin: There is erythema (Small dry scaly patchy in both legs).   Psychiatric: He has a normal mood and affect. Judgment and thought content normal.       [facility]     Family History   Problem Relation Age of Onset     Snoring Father      Social History     Socioeconomic History     Marital status:      Spouse name: Not on file     Number of children: Not on file     Years of education: Not on file     Highest education level: Not on file   Occupational History     Not on file   Social Needs     Financial resource strain: Not on file     Food insecurity     Worry: Not on file     Inability: Not on file     Transportation needs     Medical: Not on file     Non-medical: Not on file   Tobacco Use     Smoking status: Former Smoker     Smokeless tobacco: Never Used   Substance and Sexual Activity     Alcohol use: Yes     Comment: 6 beers and 1-2 0.75L of vodka daily     Drug use: No     Sexual activity: Never     Partners: Male   Lifestyle     Physical activity     Days per week: Not on file     Minutes per session: Not on file     Stress: Not on file   Relationships     Social connections     Talks on phone: Not on file     Gets together: Not on file     Attends [employer] service: Not on file     Active member of club or organization: Not on file     Attends meetings of clubs or organizations: Not on file     Relationship status: Not on file     Intimate partner violence     Fear of current or ex  partner: Not on file     Emotionally abused: Not on file     Physically abused: Not on file     Forced sexual activity: Not on file   Other Topics Concern     Not on file   Social History Narrative    Resides in [facility].     Relevant history was reviewed with the patient today, unless noted in HPI, nothing is pertinent for this visit.  [facility]     Patient Active Problem List    Diagnosis Date Noted     Prediabetes 08/26/2020     Pulmonary emphysema, unspecified emphysema type (H) 01/23/2020     Severe alcohol use disorder (H) 08/28/2018     Macular degeneration (senile) of retina 08/28/2018     Moderate episode of recurrent major depressive disorder (H) 08/28/2018     Elevated prostate specific antigen less than 10 ng/ml 06/21/2018     Gastroesophageal reflux disease with esophagitis 06/21/2018     Overview Note:     Overview:   egd 9/2017.   4 cm HH. Grade A esophagitis.        Atherosclerosis of native coronary artery of native heart without angina pectoris 01/31/2017     Pure hypercholesterolemia 08/10/2016     Hypertension, essential 08/10/2016     Stented coronary artery 04/27/2015     Overview Note:     Overview:   Patient is on Clopidogrel (Plavix) following stent placement.  Date of Intervention:  4/27/2015   Type of Stent:  PORSHA  Patient is expected to continue taking Clopidogrel (Plavix) for one year (until 4/26/16) unless otherwise advised due to subsequent stent placement or continued bleeding.       Past Surgical History:   Procedure Laterality Date     CARDIAC SURGERY         RESULTS/CONSULTS (Lab/Rad)   No results found for this or any previous visit (from the past 168 hour(s)).  No results found.  MEDICATIONS     Current Outpatient Medications on File Prior to Visit   Medication Sig Dispense Refill     acetaminophen (TYLENOL) 325 MG tablet Take 650 mg by mouth every 6 (six) hours as needed.       albuterol (PROVENTIL HFA) 90 mcg/actuation inhaler Inhale 2 puffs every 6 (six) hours as needed for  wheezing or shortness of breath. 1 Inhaler 3     amLODIPine (NORVASC) 5 MG tablet Take 1 tablet (5 mg total) by mouth daily. 90 tablet 3     aspirin 81 mg chewable tablet Chew 1 tablet (81 mg total) daily. 3 tablet 0     calcium-vitamin D (CALCIUM-VITAMIN D) 500 mg(1,250mg) -200 unit per tablet Take 1 tablet by mouth 2 (two) times a day.       escitalopram oxalate (LEXAPRO) 20 MG tablet Take one tablet daily.       gabapentin (NEURONTIN) 300 MG capsule Take 300 mg by mouth at bedtime.        losartan (COZAAR) 25 MG tablet Take 1 tablet (25 mg total) by mouth daily. 90 tablet 1     melatonin-pyridoxine HCl, B6, 3-10 mg Tab Take 1-3 tabs before bed PRN       metFORMIN (GLUCOPHAGE XR) 500 MG 24 hr tablet Take 1 tablet (500 mg total) by mouth daily with breakfast. 90 tablet 2     naltrexone microspheres (VIVITROL) 380 mg SERR injection Inject 380 mg into the gluteal muscle.       nitroglycerin (NITROSTAT) 0.4 MG SL tablet Place 0.4 mg under the tongue every 5 (five) minutes as needed for chest pain. For up to 3 doses per episode as needed.       rosuvastatin (CRESTOR) 40 MG tablet Take 1 tablet (40 mg total) by mouth at bedtime. 90 tablet 1     tamsulosin (FLOMAX) 0.4 mg cap Take 1 capsule (0.4 mg total) by mouth Daily after breakfast. 30 capsule 3     [DISCONTINUED] omeprazole (PRILOSEC) 20 MG capsule Take 1 capsule (20 mg total) by mouth daily before breakfast. Take 1 hour before a meal. 30 capsule 0     lidocaine 3 % Crea Apply 1 application topically 3 (three) times a day as needed. 85 Tube 2     No current facility-administered medications on file prior to visit.        HEALTH MAINTENANCE / SCREENING   PHQ-2 Total Score: 0 (1/23/2020 12:00 PM)  , PHQ-9 Total Score: 5 (1/23/2020 12:00 PM)  ,RODRIGO 7 Total Score: 2 (1/23/2020 12:00 PM)    Immunization History   Administered Date(s) Administered     Hep B, Adult 02/27/1987, 03/27/1987, 08/28/1987     Hep B, historic 11/03/1999     Influenza high dose,seasonal,PF, 65+ yrs  11/03/2015, 11/16/2016, 10/20/2017, 10/17/2018, 11/06/2019     Influenza, Seasonal, Inj PF IIV3 11/09/2009     Influenza, inj, historic,unspecified 12/03/2003     Influenza,seasonal, Inj IIV3 10/13/1999, 11/08/2000, 11/27/2001, 11/25/2002, 11/08/2005, 11/02/2006, 10/25/2011, 11/09/2011, 02/25/2013     Pneumo Conj 13-V (2010&after) 04/11/2016     Pneumo Polysac 23-V 11/12/1996, 06/02/2017     Td, Adult, Absorbed 01/01/2000     Tdap 10/25/2011, 06/15/2016     ZOSTER, LIVE 06/15/2016     Health Maintenance   Topic     HEPATITIS C SCREENING      COPD ACTION PLAN      LIPID      MEDICARE ANNUAL WELLNESS VISIT      ZOSTER VACCINES (2 of 3)     INFLUENZA VACCINE RULE BASED (1)     COLORECTAL CANCER SCREENING      FALL RISK ASSESSMENT      ADVANCE CARE PLANNING      TD 18+ HE      DEPRESSION ACTION PLAN      SPIROMETRY      PNEUMOCOCCAL IMMUNIZATION 65+ LOW/MEDIUM RISK      HEPATITIS B VACCINES      The following are part of a depression follow up plan for the patient:  coping support management, emotional support assessment and emotional support education  Amara Smith MD  Family Medicine, [facility]     This note was dictated using a voice recognition software.  Any grammatical or context distortion are unintentional and inherent to the software.

## 2021-06-10 NOTE — TELEPHONE ENCOUNTER
Medication Request  Medication name: rosuvastatin 40 mg  Requested Pharmacy: [place]  Reason for request: Hyperlipidemia. This Rx is historical. The patient just established care with Amara Smith MD on 07/2019. Patient stated he is out of his medication and gets this from Dr. Smith.  When did you use medication last?:  Monday or Tuesday  Patient offered appointment:  n/a  Okay to leave a detailed message: no

## 2021-06-14 NOTE — TELEPHONE ENCOUNTER
Refill Approved    Rx renewed per Medication Renewal Policy. Medication was last renewed on 07/30/2020.  Last office visit was 08/26/2020 with PCP.    Amee White, [facility] Triage/Med Refill 1/23/2021     Requested Prescriptions   Pending Prescriptions Disp Refills     rosuvastatin (CRESTOR) 40 MG tablet [Pharmacy Med Name: ROSUVASTATIN CALCIUM 40MG TABS] 90 tablet 1     Sig: TAKE ONE TABLET BY MOUTH EVERY NIGHT AT BEDTIME       Statins Refill Protocol (Hmg CoA Reductase Inhibitors) Passed - 1/22/2021 12:46 PM        Passed - PCP or prescribing provider visit in past 12 months      Last office visit with prescriber/PCP: 8/26/2020 Amara Smith MD OR same dept: 1/23/2020 Amara Smith MD OR same specialty: 8/26/2020 Amara Smith MD  Last physical: Visit date not found Last MTM visit: Visit date not found   Next visit within 3 mo: Visit date not found  Next physical within 3 mo: Visit date not found  Prescriber OR PCP: Amara Smith MD  Last diagnosis associated with med order: 1. Atherosclerosis of native coronary artery of native heart without angina pectoris  - rosuvastatin (CRESTOR) 40 MG tablet [Pharmacy Med Name: ROSUVASTATIN CALCIUM 40MG TABS]; TAKE ONE TABLET BY MOUTH EVERY NIGHT AT BEDTIME  Dispense: 90 tablet; Refill: 1    If protocol passes may refill for 12 months if within 3 months of last provider visit (or a total of 15 months).

## 2021-06-14 NOTE — TELEPHONE ENCOUNTER
Refill Approved    Rx renewed per Medication Renewal Policy. Medication was last renewed on 7/23/20.    Lucía Karimi, [facility] Triage/Med Refill 1/18/2021     Requested Prescriptions   Pending Prescriptions Disp Refills     tamsulosin (FLOMAX) 0.4 mg cap [Pharmacy Med Name: TAMSULOSIN HCL 0.4MG CAPS] 30 capsule 3     Sig: TAKE ONE CAPSULE BY MOUTH ONCE DAILY WITH BREAKFAST       Alfuzosin/Tamsulosin/Silodosin Refill Protocol  Passed - 1/16/2021  2:18 PM        Passed - PCP or prescribing provider visit in past 12 months       Last office visit with prescriber/PCP: 8/26/2020 Amara Smith MD OR same dept: Visit date not found OR same specialty: 8/26/2020 Amara Smith MD  Last physical: Visit date not found Last MTM visit: Visit date not found   Next visit within 3 mo: Visit date not found  Next physical within 3 mo: Visit date not found  Prescriber OR PCP: Amara Smith MD  Last diagnosis associated with med order: 1. Lower urinary tract symptoms (LUTS)  - tamsulosin (FLOMAX) 0.4 mg cap [Pharmacy Med Name: TAMSULOSIN HCL 0.4MG CAPS]; TAKE ONE CAPSULE BY MOUTH ONCE DAILY WITH BREAKFAST  Dispense: 30 capsule; Refill: 3    If protocol passes may refill for 12 months if within 3 months of last provider visit (or a total of 15 months).

## 2021-06-15 NOTE — PROGRESS NOTES
PULMONARY CLINIC FOLLOW UP NOTE    History:     HPI: Laurent Choi is a 76 y.o. male former smoker, with mild obstruction on PFTs is here for follow-up.  Baseline, patient is able to walk 1 to 2 miles.  He does typically walk in the summer.  He walks on the treadmill for approximately half an hour.  This is usually done in the winter.  He has PND.      Interval History: For his scheduled follow-up.  He was last seen last year.  He has mild obstructive disease and is on Adderall inhaler as needed.  He does not require his albuterol inhaler frequently.  Since he was last seen, patient denies any hospitalizations, antibiotics, or steroids for respiratory related illnesses.  Denies any night sweats or weight loss.  Is active.    PMHx/PSHx:  CAD s/p PCI back 2015  History of alcohol abuse  DJD  GERD  Hypertension  Macular degeneration     Social Hx:  Former smoker.  Used to smoke 1 pack per day for 30-40 years.  Quit approximately 10 years ago.   He used to work as a . Now he does sedentary work at computer.     ROS: 10 point review of system done. Pertinent findings are noted in the HPI.    Exam/Data:   /56   Pulse 80   Wt 178 lb 4.8 oz (80.9 kg)   SpO2 94% Comment: RA  BMI 25.95 kg/m  , Body mass index is 25.95 kg/m .  GEN: comfortable, NAD  HEENT: NCAT, EMOI, mmm  LN: no cervical LAD   CVS: S1S2, RRR  Lung: good air entry bilaterally, no wheezing  Abd: round, obese, soft  Ext: no c/c/e  Vasc: intact radial pulses bilaterally  Neuro: nonfocal  Skin: no visible rash  Musculoskeletal: FROM all extremities  Psych: normal affect       Data:     Labs personally reviewed.      IMAGING: personally reviewed images. Formal radiology interpretation noted below.    PFT DATA 10/2019:  FEV1 75% predicted, FVC 84% predicted, ratio 67.  No reversibility.  % predicted, % predicted, DLCO 82% predicted.     CT chest done on 2/2019 at [facility]:  EXAM: CT CHEST DIAG WO IV CONT LCS  F/U  LOCATION:  [facility]  DATE/TIME: 2/14/2019 2:38 PM    INDICATION: Multiple bilateral pulmonary nodules. Personal history of tobacco use.  TECHNIQUE: Routine noncontrast CT chest. Dose reduction techniques were used.   COMPARISON: CT chest June 29, 2018.    FINDINGS:  LUNGS AND PLEURA: Mild to moderate centrilobular emphysema. Biapical scarring. Mild bronchiectasis at the right lung base.    3 mm nodule in the right lower lobe along the major fissure on series 3: image 90, unchanged. 5 mm x 2 mm nodule on series 3: image 107 in the right lower lobe adjacent to the fissure, unchanged. 2 mm nodule in the right lower lobe on series 3: image 105 which is new. Small calcified granuloma in the right middle lobe on image 117. Mild atelectasis.    On the left the previously visualized 2 mm nonsolid nodule seen in the left upper lobe on image 60 is no longer visualized. There are two 1.5 mm nodules in the left lower lobe on image 86, unchanged. 2 mm nodule in the left lower lobe on image 91 is unchanged. 2 mm nodule in the left lower lobe on image 97 is unchanged.    No pleural effusion.    MEDIASTINUM: No mediastinal or hilar adenopathy. There is coronary artery calcification. No pericardial effusion. Moderate to large hiatal hernia is increased in size from the prior exam.    CORONARY ARTERY CALCIFICATION: Severe    LIMITED UPPER ABDOMEN: The liver, spleen, pancreas, gallbladder and adrenal glands are unremarkable. Vascular calcification involving the aorta. Please note these organs are not imaged in their entirety.    MUSCULOSKELETAL: Osteopenia. Mild kyphosis. Compression fracture at T12, unchanged with mild kyphosis at this level.    CONCLUSION:  1. All of the nodules seen previously are unchanged. There is a single new nodule measuring 2 mm in the right lower lobe.      Ct Chest Without Contrast    Result Date: 2/24/2020  EXAM: CT CHEST WO CONTRAST LOCATION: [facility] DATE/TIME: 2/24/2020 12:07  PM INDICATION: pulmonary nodules noted on CT earlier this year COMPARISON: CT of the chest 2/14/2019 TECHNIQUE: CT chest without IV contrast. Multiplanar reformats were obtained. Dose reduction techniques were used. CONTRAST: None. FINDINGS: LUNGS AND PLEURA: Triangular-shaped subpleural nodule in the anterior right lower lobe abutting the major fissure (series 4, image 114) has typical imaging features of an intrapulmonary lymph node, unchanged in size. 2 mm nodule in the subpleural left lower lobe (series 4, image 137) is unchanged. There is a punctate 2 mm calcified nodule in the inferior right middle lobe, definitively benign. Upper lung zone predominant emphysema. Mild scarring in the posterior basal right lower lobe. No new nodules or airspace opacities. No pleural space abnormality. MEDIASTINUM: Moderate hiatal hernia. No enlarged mediastinal or hilar lymph nodes. Cardiac chambers are normal in size. LAD stents. Normal caliber thoracic aorta. Mild, intermittent atheromatous calcification of the thoracic aorta. Conventional arch anatomy. UPPER ABDOMEN: No actionable findings in the imaged upper abdomen. MUSCULOSKELETAL: Chronic anterior wedging of T12 resulting in focal kyphosis at this level. Several old left posterolateral rib fracture deformities. No acute fracture or bone lesion. Chest wall soft tissues are symmetric.      1.  Small lung nodules are unchanged from 2/14/2019. If the patient is eligible and agreeable to screening, follow-up with cancer screening chest CT in 12 months is suggested. 2.   Moderate hiatal hernia.     CT chest on 2/2021:  IMPRESSION:   1.  Negative for lung cancer screening purposes.     2.  Stable tiny lower lung nodules can all be considered benign.     3.  Moderate emphysema.    Assessment/Plan:     Laurent Choi is a 76 y.o. male, former smoker, who was referred here for evaluation of shortness of breath.  PFTs showed mild obstruction.  He had a CT scan of the chest done  on 2/2019 that showed multiple pulmonary nodules that were small.  These were apparently stable compared to CT scan of the chest that was done on 6/2018.  I ordered a CT scan of the chest that was done on 2/2020 and this remained to be unchanged.     Recommendations:  Yearly low dose CT chest, 2/2022  Encouraged to stay active  Albuterol inhaler if needed    FOLLOW UP: 1 year    Rose Sepulveda MD  Pulmonary and Critical Care Medicine  Electronically Signed on 3/10/2021    Current Outpatient Medications   Medication Sig Dispense Refill     acetaminophen (TYLENOL) 325 MG tablet Take 650 mg by mouth every 6 (six) hours as needed.       albuterol (PROVENTIL HFA) 90 mcg/actuation inhaler Inhale 2 puffs every 6 (six) hours as needed for wheezing or shortness of breath. 1 Inhaler 3     amLODIPine (NORVASC) 5 MG tablet TAKE ONE TABLET BY MOUTH ONCE DAILY 90 tablet 1     aspirin 81 mg chewable tablet Chew 1 tablet (81 mg total) daily. 3 tablet 0     calcium-vitamin D (CALCIUM-VITAMIN D) 500 mg(1,250mg) -200 unit per tablet Take 1 tablet by mouth 2 (two) times a day.       escitalopram oxalate (LEXAPRO) 20 MG tablet Take one tablet daily.       gabapentin (NEURONTIN) 300 MG capsule Take 300 mg by mouth at bedtime.        lidocaine 3 % Crea Apply 1 application topically 3 (three) times a day as needed. 85 Tube 2     losartan (COZAAR) 25 MG tablet Take 1 tablet (25 mg total) by mouth daily. 90 tablet 1     melatonin-pyridoxine HCl, B6, 3-10 mg Tab Take 1-3 tabs before bed PRN       metFORMIN (GLUCOPHAGE XR) 500 MG 24 hr tablet Take 1 tablet (500 mg total) by mouth daily with breakfast. 90 tablet 2     naltrexone microspheres (VIVITROL) 380 mg SERR injection Inject 380 mg into the gluteal muscle.       nitroglycerin (NITROSTAT) 0.4 MG SL tablet Place 0.4 mg under the tongue every 5 (five) minutes as needed for chest pain. For up to 3 doses per episode as needed.       omeprazole (PRILOSEC) 20 MG capsule Take 1 capsule (20 mg  total) by mouth daily before breakfast. Take 1 hour before a meal. 90 capsule 2     rosuvastatin (CRESTOR) 40 MG tablet TAKE ONE TABLET BY MOUTH EVERY NIGHT AT BEDTIME 90 tablet 2     tamsulosin (FLOMAX) 0.4 mg cap TAKE ONE CAPSULE BY MOUTH ONCE DAILY WITH BREAKFAST 90 capsule 1     hydrocortisone 2.5 % cream Apply topically 2 (two) times a day for 10 days. 30 g 1     No current facility-administered medications for this visit.      Allergies   Allergen Reactions     Aspirin Other (See Comments)     Avoids taking any aspirin besides his daily 81 mg aspirin regimen due to acid reflux/esophagus problem(s).     Nsaids (Non-Steroidal Anti-Inflammatory Drug) Other (See Comments)     Avoids due to acid reflux/esophagus problem(s).     Plavix [Clopidogrel] Other (See Comments) and Myalgia     Reaction(s): Bad bruising all over his body and leg cramps.     Statins-Hmg-Coa Reductase Inhibitors Myalgia     Per the patient, he tolerates rosuvastatin.       Meds and Allergies: See EHR for the updated medication list and Allergies. These were reviewed.     Much or all of the text in this note was generated through the use of the Dragon Dictate voice-to-text software. Errors in spelling or words which seem out of context are unintentional. Sound alike errors, in particular, may have escaped editing.

## 2021-06-15 NOTE — PROGRESS NOTES
"OFFICE VISIT - FAMILY MEDICINE     ASSESSMENT AND PLAN     1. Acute suppurative otitis media of right ear without spontaneous rupture of tympanic membrane, recurrence not specified  amoxicillin (AMOXIL) 875 MG tablet   2. Pulmonary emphysema, unspecified emphysema type (H)     3. Severe alcohol use disorder (H)     4. Major depressive disorder, recurrent, moderate (H)     Acute otitis media, management discussed, amoxicillin prescribed take as directed, possible side effect reviewed, follow-up if not improving in a couple of weeks sooner if symptoms get better.  Pulmonary emphysema, alcohol abuse in remission, major depression has been controlled and stable at this point. follow-up for annual physical in the near future.    CHIEF COMPLAINT   bilateral ear pain-worsen in Rt ear    HPI   Laurent Choi is a 76 y.o. male.  New patient as of July 17, 2019,past medical history significant for coronary artery disease status post stenting, hypertension, depression, LUTS  with BPH, GERD's, Hyperlipidemia,Prediabetes, severe alcohol abuse in remission for the past 9-month  Has been experiencing right ear pain for the past 5 to 7 days, mild to moderate, throbbing, sometimes sharp right greater than left, has not started any medication, occasionally, will have drainage from the right ear.  No tinnitus or hearing loss.  History of pulmonary emphysema, alcohol abuse and depression has been stable.  No recent flareup.  Planning to follow-up for an annual wellness soon.    Review of Systems As per HPI, otherwise negative.    OBJECTIVE   /69 (Patient Site: Right Arm, Patient Position: Sitting, Cuff Size: Adult Large)   Pulse 76   Ht 5' 9.5\" (1.765 m)   Wt 174 lb (78.9 kg)   BMI 25.33 kg/m    Physical Exam   Constitutional: He is oriented to person, place, and time. He appears well-developed and well-nourished.   HENT:   Head: Normocephalic and atraumatic.   Right Ear: Tympanic membrane is injected and erythematous. "   Left Ear: Tympanic membrane normal.   Neck: Normal range of motion. Neck supple. No JVD present. No tracheal deviation present. No thyromegaly present.   Cardiovascular: Normal rate, regular rhythm, normal heart sounds and intact distal pulses. Exam reveals no gallop and no friction rub.   No murmur heard.  Pulmonary/Chest: Effort normal and breath sounds normal. No respiratory distress. He has no wheezes. He has no rales.   Musculoskeletal:         General: No tenderness or edema.   Lymphadenopathy:     He has no cervical adenopathy.   Neurological: He is alert and oriented to person, place, and time. Coordination normal.   Psychiatric: He has a normal mood and affect. Judgment and thought content normal.       PFSH     Family History   Problem Relation Age of Onset     Snoring Father      Social History     Socioeconomic History     Marital status:      Spouse name: Not on file     Number of children: Not on file     Years of education: Not on file     Highest education level: Not on file   Occupational History     Not on file   Social Needs     Financial resource strain: Not on file     Food insecurity     Worry: Not on file     Inability: Not on file     Transportation needs     Medical: Not on file     Non-medical: Not on file   Tobacco Use     Smoking status: Former Smoker     Smokeless tobacco: Never Used   Substance and Sexual Activity     Alcohol use: Yes     Comment: 6 beers and 1-2 0.75L of vodka daily     Drug use: No     Sexual activity: Never     Partners: Male   Lifestyle     Physical activity     Days per week: Not on file     Minutes per session: Not on file     Stress: Not on file   Relationships     Social connections     Talks on phone: Not on file     Gets together: Not on file     Attends Rastafarian service: Not on file     Active member of club or organization: Not on file     Attends meetings of clubs or organizations: Not on file     Relationship status: Not on file     Intimate  partner violence     Fear of current or ex partner: Not on file     Emotionally abused: Not on file     Physically abused: Not on file     Forced sexual activity: Not on file   Other Topics Concern     Not on file   Social History Narrative    Resides in [facility].     Relevant history was reviewed with the patient today, unless noted in HPI, nothing is pertinent for this visit.  [facility]     Patient Active Problem List    Diagnosis Date Noted     Prediabetes 08/26/2020     Pulmonary emphysema, unspecified emphysema type (H) 01/23/2020     Severe alcohol use disorder (H) 08/28/2018     Macular degeneration (senile) of retina 08/28/2018     Moderate episode of recurrent major depressive disorder (H) 08/28/2018     Elevated prostate specific antigen less than 10 ng/ml 06/21/2018     Gastroesophageal reflux disease with esophagitis 06/21/2018     Overview Note:     Overview:   egd 9/2017.   4 cm HH. Grade A esophagitis.        Atherosclerosis of native coronary artery of native heart without angina pectoris 01/31/2017     Pure hypercholesterolemia 08/10/2016     Hypertension, essential 08/10/2016     Stented coronary artery 04/27/2015     Overview Note:     Overview:   Patient is on Clopidogrel (Plavix) following stent placement.  Date of Intervention:  4/27/2015   Type of Stent:  PORSHA  Patient is expected to continue taking Clopidogrel (Plavix) for one year (until 4/26/16) unless otherwise advised due to subsequent stent placement or continued bleeding.       Past Surgical History:   Procedure Laterality Date     CARDIAC SURGERY         RESULTS/CONSULTS (Lab/Rad)   No results found for this or any previous visit (from the past 168 hour(s)).  Ct Low Dose Lung Screening Chest    Result Date: 2/11/2021  EXAM: LOW DOSE LUNG CANCER SCREENING CT CHEST LOCATION: [facility] DATE/TIME: 2/11/2021 12:11 PM INDICATION: Lung cancer screening. History of smoking. High risk patient with greater than 30  pack year smoking history. COMPARISON: None. TECHNIQUE: Low-dose lung cancer screening non-contrast CT chest. Dose reduction techniques were used. FINDINGS: NODULES: No significant lung nodule. Couple tiny nodules left lung base unchanged and can be considered benign. LUNGS AND PLEURA: Moderate emphysema. Lungs otherwise clear. MEDIASTINUM: Moderate sized esophageal hiatal hernia. No lymphadenopathy. CORONARY ARTERY CALCIFICATION: Moderate. LIMITED UPPER ABDOMEN: Normal. MUSCULOSKELETAL: Normal.     1.  Negative for lung cancer screening purposes. 2.  Stable tiny lower lung nodules can all be considered benign. 3.  Moderate emphysema. LungRADS CATEGORY: 2: Benign. RADIOLOGIST RECOMMENDATION: Continue annual screening with low-dose CT chest in 12 months .    MEDICATIONS     Current Outpatient Medications on File Prior to Visit   Medication Sig Dispense Refill     acetaminophen (TYLENOL) 325 MG tablet Take 650 mg by mouth every 6 (six) hours as needed.       albuterol (PROVENTIL HFA) 90 mcg/actuation inhaler Inhale 2 puffs every 6 (six) hours as needed for wheezing or shortness of breath. 1 Inhaler 3     amLODIPine (NORVASC) 5 MG tablet TAKE ONE TABLET BY MOUTH ONCE DAILY 90 tablet 1     aspirin 81 mg chewable tablet Chew 1 tablet (81 mg total) daily. 3 tablet 0     calcium-vitamin D (CALCIUM-VITAMIN D) 500 mg(1,250mg) -200 unit per tablet Take 1 tablet by mouth 2 (two) times a day.       escitalopram oxalate (LEXAPRO) 20 MG tablet Take one tablet daily.       gabapentin (NEURONTIN) 300 MG capsule Take 300 mg by mouth at bedtime.        hydrocortisone 2.5 % cream Apply topically 2 (two) times a day for 10 days. 30 g 1     lidocaine 3 % Crea Apply 1 application topically 3 (three) times a day as needed. 85 Tube 2     losartan (COZAAR) 25 MG tablet Take 1 tablet (25 mg total) by mouth daily. 90 tablet 1     melatonin-pyridoxine HCl, B6, 3-10 mg Tab Take 1-3 tabs before bed PRN       metFORMIN (GLUCOPHAGE XR) 500 MG 24  hr tablet Take 1 tablet (500 mg total) by mouth daily with breakfast. 90 tablet 2     naltrexone microspheres (VIVITROL) 380 mg SERR injection Inject 380 mg into the gluteal muscle.       nitroglycerin (NITROSTAT) 0.4 MG SL tablet Place 0.4 mg under the tongue every 5 (five) minutes as needed for chest pain. For up to 3 doses per episode as needed.       NITROGLYCERIN SL Take 0.4 mg by mouth.       omeprazole (PRILOSEC) 20 MG capsule Take 1 capsule (20 mg total) by mouth daily before breakfast. Take 1 hour before a meal. 90 capsule 2     rosuvastatin (CRESTOR) 40 MG tablet TAKE ONE TABLET BY MOUTH EVERY NIGHT AT BEDTIME 90 tablet 2     tamsulosin (FLOMAX) 0.4 mg cap TAKE ONE CAPSULE BY MOUTH ONCE DAILY WITH BREAKFAST 90 capsule 1     No current facility-administered medications on file prior to visit.        HEALTH MAINTENANCE / SCREENING   PHQ-2 Total Score: 0 (2/22/2021  5:00 PM)  , PHQ-9 Total Score: 6 (2/22/2021  5:00 PM)  ,No data recorded  Immunization History   Administered Date(s) Administered     Hep B, Adult 02/27/1987, 03/27/1987, 08/28/1987     Hep B, historic 11/03/1999     Influenza high dose,seasonal,PF, 65+ yrs 11/03/2015, 11/16/2016, 10/20/2017, 10/17/2018, 11/06/2019     Influenza, Seasonal, Inj PF IIV3 11/09/2009     Influenza, inj, historic,unspecified 12/03/2003     Influenza,seasonal, Inj IIV3 10/13/1999, 11/08/2000, 11/27/2001, 11/25/2002, 11/08/2005, 11/02/2006, 10/25/2011, 11/09/2011, 02/25/2013     Pneumo Conj 13-V (2010&after) 04/11/2016     Pneumo Polysac 23-V 11/12/1996, 06/02/2017     Td, Adult, Absorbed 01/01/2000     Tdap 10/25/2011, 06/15/2016     ZOSTER, LIVE 06/15/2016     Health Maintenance   Topic     COPD ACTION PLAN      MEDICARE ANNUAL WELLNESS VISIT      ZOSTER VACCINES (2 of 3)     INFLUENZA VACCINE RULE BASED (1)     COLORECTAL CANCER SCREENING      FALL RISK ASSESSMENT      ADVANCE CARE PLANNING      LIPID      TD 18+ HE      DEPRESSION ACTION PLAN      HEPATITIS C  SCREENING      SPIROMETRY      Pneumococcal Vaccine: Pediatrics (0 to 5 Years) and At-Risk Patients (6 to 64 Years)      Pneumococcal Vaccine: 65+ Years      HEPATITIS B VACCINES      Review of external notes as documented above       25 minutes spent on the date of the encounter doing chart review, interpretation of tests, patient visit and documentation     Amara Smith MD  Family Medicine, [facility]     This note was dictated using a voice recognition software.  Any grammatical or context distortion are unintentional and inherent to the software.

## 2021-06-16 NOTE — TELEPHONE ENCOUNTER
RN cannot approve Refill Request    RN can NOT refill this medication PCP messaged that patient is overdue for Labs. Last office visit: 2/22/2021 Amara Smith MD Last Physical: Visit date not found Last MTM visit: Visit date not found Last visit same specialty: 2/22/2021 Amara Smith MD.  Next visit within 3 mo: Visit date not found  Next physical within 3 mo: Visit date not found      Faye Savage, [facility] Triage/Med Refill 3/29/2021    Requested Prescriptions   Pending Prescriptions Disp Refills     metFORMIN (GLUCOPHAGE-XR) 500 MG 24 hr tablet [Pharmacy Med Name: METFORMIN HCL ER 500MG TB24] 90 tablet 2     Sig: TAKE ONE TABLET BY MOUTH ONCE DAILY WITH BREAKFAST       Metformin Refill Protocol Failed - 3/29/2021 12:08 PM        Failed - A1C in last 6 months     Hemoglobin A1c   Date Value Ref Range Status   08/26/2020 5.9 (H) <=5.6 % Final     Comment:     Normal <5.7% Prediabete 5.7-6.4% Diabletes 6.5% or higher - adopted from ADA consensus guidelines               Passed - Blood pressure in last 12 months     BP Readings from Last 1 Encounters:   03/10/21 100/56             Passed - LFT or AST or ALT in last 12 months     Albumin   Date Value Ref Range Status   08/26/2020 4.0 3.5 - 5.0 g/dL Final     Bilirubin, Total   Date Value Ref Range Status   08/26/2020 0.4 0.0 - 1.0 mg/dL Final     Bilirubin, Direct   Date Value Ref Range Status   08/28/2018 0.2 <=0.5 mg/dL Final     Alkaline Phosphatase   Date Value Ref Range Status   08/26/2020 37 (L) 45 - 120 U/L Final     AST   Date Value Ref Range Status   08/26/2020 15 0 - 40 U/L Final     ALT   Date Value Ref Range Status   08/26/2020 14 0 - 45 U/L Final     Protein, Total   Date Value Ref Range Status   08/26/2020 6.7 6.0 - 8.0 g/dL Final                Passed - GFR or Serum Creatinine in last 6 months     GFR MDRD Non Af Amer   Date Value Ref Range Status   08/26/2020 >60 >60 mL/min/1.73m2 Final     GFR MDRD Af Amer    Date Value Ref Range Status   08/26/2020 >60 >60 mL/min/1.73m2 Final             Passed - Visit with PCP or prescribing provider visit in last 6 months or next 3 months     Last office visit with prescriber/PCP: 2/22/2021 OR same dept: Visit date not found OR same specialty: 2/22/2021 Amara Smith MD Last physical: Visit date not found Last MTM visit: Visit date not found         Next appt within 3 mo: Visit date not found  Next physical within 3 mo: Visit date not found  Prescriber OR PCP: Amara Smith MD  Last diagnosis associated with med order: 1. Prediabetes  - metFORMIN (GLUCOPHAGE-XR) 500 MG 24 hr tablet [Pharmacy Med Name: METFORMIN HCL ER 500MG TB24]; TAKE ONE TABLET BY MOUTH ONCE DAILY WITH BREAKFAST  Dispense: 90 tablet; Refill: 2     If protocol passes may refill for 12 months if within 3 months of last provider visit (or a total of 15 months).           Passed - Microalbumin in last year      Microalbumin, Random Urine   Date Value Ref Range Status   08/26/2020 <0.50 0.00 - 1.99 mg/dL Final

## 2021-06-16 NOTE — PROGRESS NOTES
Assessment & Plan     Fluid level behind tympanic membrane of right ear  Exam findings, pathophysiology and treatment options reviewed   Plan   - fluticasone propionate (FLONASE) 50 mcg/actuation nasal spray; 2 sprays into each nostril 2 (two) times a day.    Close follow up if no significant change or improvement as anticipated.    Consider ENT referral     22 minutes spent on the date of the encounter doing chart review, patient visit and documentation            Return in about 2 weeks (around 4/14/2021) for or sooner with any issues or concerns.    Sahil Menendez MD  [facility]   Laurent Choi is 76 y.o. and presenting with    Right ear pressure on going for the past couple of weeks.  He was treated for right inner ear infection with the antibiotics with improving symptoms, but then continuing to feel the pressure and noting some drainage. Denies tinnitus or hear loss.              Objective    /64   Pulse 72   Temp 98.2  F (36.8  C)   Wt 175 lb (79.4 kg)   SpO2 94%   BMI 25.47 kg/m    Body mass index is 25.47 kg/m .  Physical Exam  General Appearance:    Alert, cooperative, no distress, appears stated age   Head:    Normocephalic, Sinus: non tender    Ears:    Right EMMANUEL, normal canal, + non purulent otorrhea     Skin:    no rashes or lesions

## 2021-06-16 NOTE — PATIENT INSTRUCTIONS - HE
1. Fluid level behind tympanic membrane of right ear  - fluticasone propionate (FLONASE) 50 mcg/actuation nasal spray; 2 sprays into each nostril 2 (two) times a day.  Dispense: 16 g; Refill: 0

## 2021-06-16 NOTE — PROGRESS NOTES
Assessment & Plan     Fluid level behind tympanic membrane of right ear  PLAN'   Try for another 2 week  Call back for ENT referral if not better as anticipated.   Patient is agreeable to the plan of care                 No follow-ups on file.    Sahil Menendez MD  [facility]    Bill Choi is 76 y.o. and presents today for the follow-up of middle ear effusion, been on the Flonase for the past 2 weeks, and stating that the symptoms have not improved much.  HPI           Objective    /64   Pulse 70   Wt 180 lb (81.6 kg)   BMI 26.20 kg/m    Body mass index is 26.2 kg/m .  Physical Exam  General: No apparent distress and well hydrated  Ear exam: Positive for middle ear effusion bilaterally, normal canals

## 2021-06-17 ENCOUNTER — PRE VISIT (OUTPATIENT)
Dept: UROLOGY | Facility: CLINIC | Age: 76
End: 2021-06-17

## 2021-06-17 NOTE — TELEPHONE ENCOUNTER
Action 06/17/2021   Action Taken Records in epic mri of the prostate from 3/2019 requested cdk

## 2021-06-17 NOTE — TELEPHONE ENCOUNTER
Refill Approved    Rx renewed per Medication Renewal Policy. Medication was last renewed on 8/26/20.    Nick Medina, [facility] Triage/Med Refill 5/13/2021     Requested Prescriptions   Pending Prescriptions Disp Refills     omeprazole (PRILOSEC) 20 MG capsule [Pharmacy Med Name: OMEPRAZOLE 20MG CPDR] 90 capsule 2     Sig: TAKE 1 CAPSULE BY MOUTH DAILY,  60 MINUTES BEFORE A MEAL.       GI Medications Refill Protocol Passed - 5/12/2021 11:46 AM        Passed - PCP or prescribing provider visit in last 12 or next 3 months.     Last office visit with prescriber/PCP: 2/22/2021 Amara Smith MD OR same dept: 2/22/2021 Amara Smith MD OR same specialty: 4/14/2021 Sahil Menendez MD  Last physical: Visit date not found Last MTM visit: Visit date not found   Next visit within 3 mo: Visit date not found  Next physical within 3 mo: Visit date not found  Prescriber OR PCP: Amara Smith MD  Last diagnosis associated with med order: 1. Gastroesophageal reflux disease with esophagitis  - omeprazole (PRILOSEC) 20 MG capsule [Pharmacy Med Name: OMEPRAZOLE 20MG CPDR]; TAKE 1 CAPSULE BY MOUTH DAILY,  60 MINUTES BEFORE A MEAL.  Dispense: 90 capsule; Refill: 2    If protocol passes may refill for 12 months if within 3 months of last provider visit (or a total of 15 months).

## 2021-06-17 NOTE — PATIENT INSTRUCTIONS - HE
Patient Instructions by Jordon Means DO at 1/16/2020 11:10 AM     Author: Jordon Means DO Service: -- Author Type: Physician    Filed: 1/16/2020 11:07 AM Encounter Date: 1/16/2020 Status: Signed    : Jordon Means DO (Physician)         Patient education: What is a sleep study?     What is a sleep study? -- A sleep study is a test that measures how well you sleep and checks for sleep problems. For some sleep studies, you stay overnight in a sleep lab at a hospital or sleep center.     What happens during a sleep study? -- Before you go to sleep, a technician attaches small, sticky patches called electrodes to your head, chest, and legs. He or she will also place a small tube beneath your nose and might wrap 1 or 2 belts around your chest.   Each of these items has wires that connect to monitors. The monitors record your movement, brain activity, breathing, and other body functions while you sleep.  If you have a history of trouble falling asleep, your doctor might prescribe a medicine to help you fall asleep in the lab. If you have never taken the medicine before, your doctor might ask you take it on a night before your sleep study to see how it affects you.   Why might my doctor order a sleep study? -- Your doctor will order a sleep study if he or she thinks you have sleep apnea or a different condition that makes you:   ?Have sudden jerking leg movements while you sleep, called periodic limb movements.   ?Feel very sleepy during the day and fall asleep all of a sudden, called narcolepsy.   ?Have trouble falling asleep or staying asleep over a long period of time, called chronic insomnia.   ?Do odd things while you sleep, such as walking.  How should I prepare for a sleep study? -- On the day of your sleep study, you should:   ?Avoid alcohol   ?Avoid drinking coffee, tea, sodas, and other drinks that have caffeine in the afternoon and evening   ?Take all of your regular medicines    The cost of  care estimate line is [phone]. They are able to give the patient an estimate of the charges and also an estimate of their insurance coverage/patient responsibility.  After your sleep study is performed, please call us at [phone] to schedule for a follow up to review the results of the sleep study.    Please bring one tab of low dose melatonin 3 mg or less to the night of the study.    Melatonin intake is completely voluntary.    You may take own melatonin after arrival to sleep center. Do not drive or operate machinery after intake of melatonin.

## 2021-06-17 NOTE — PATIENT INSTRUCTIONS - HE
Patient Instructions by Amara Smith MD at 1/23/2020 11:20 AM     Author: Amara Smith MD Service: -- Author Type: Physician    Filed: 1/23/2020 12:57 PM Encounter Date: 1/23/2020 Status: Signed    : Amara Smith MD (Physician)           Patient Education     Living with Osteoarthritis    Osteoarthritis is a chronic disease and the most common type of arthritis. But it doesnt have to keep you from leading an active life. You can help control symptoms by exercising and losing weight if you are overweight. Using special tools also helps make life easier. Be sure to see your healthcare provider for scheduled checkups and lab work. If you have questions or concerns between office visits, call your healthcare provider's office.  Make exercise part of your life  Gentle exercise can help lessen your pain. Keep the following in mind:    Choose exercises that improve joint motion and make your muscles stronger. Your healthcare provider or a physical therapist may suggest a few.    Stretching and flexibility activities such as yoga and joaquin chi may improve pain and joint motion.    Try low-impact sports, such as walking, biking, or doing exercises in a warm pool.    Most people should exercise for at least 30 minutes a day on most days of the week. This can be broken up into shorter periods throughout the day.    Dont push yourself too hard at first. Slowly build up over time.    Make sure you warm up for 5 to 10 minutes before you exercise.    If pain and stiffness increase, don't exercise as hard or as long.  Watch your weight  If you weigh more than you should, your weight-bearing joints are under extra pressure. This makes your symptoms worse. To reduce pain and stiffness, try losing a few of those extra pounds. The tips below may help:    Start a weight-loss program with the help of your healthcare provider.    Ask your friends and family for support.    Join a weight-loss  group.  Use special tools  Even simple tasks can be hard to do when your joints hurt. Special tools called assistive devices can make things easier by reducing strain and protecting your joints. Ask your healthcare provider where to find these and other helpful tools:    Long-handled reachers or grabbers    Jar openers and button threaders    Large  for pencils, garden tools, and other handheld objects  Use mobility and other aids  People with arthritis and other joint problems often use mobility aids to help with walking. For example, they may use canes or walkers. They may also use splints or braces to support joints. Talk with your healthcare provider or physical therapist about these aids:    A cane to reduce knee or hip pain and help prevent falls    Splints for your wrists or other joints    A brace to support a weak knee joint    Orthotics for toe and foot involvement  Medical and surgical treatments  Discuss medical treatments with your healthcare provider to help reduce your pain and improve joint mobility. Treatments may include:    Topical medicines such as lidocaine, capsaicin, and diclofenac gel    Oral medicines such as acetaminophen, NSAIDs (nonsteroidal anti-inflammatory drugs) such as ibuprofen and naproxen, or opioids    Injections in affected joints such as corticosteroids in various joints, or hyaluronic acid in the knee joints    Surgical repair or surgical joint replacement with artificial joints    Complementary therapies such as heat and cold treatment, massage, acupuncture, supplements, cognitive training, meditation, and others. Discuss these options with your healthcare provider.  Date Last Reviewed: 6/1/2018 2000-2019 [employer]. [address] [place] [address], [place] [address]. All rights reserved. This information is not intended as a substitute for professional medical care. Always follow your healthcare professional's instructions.

## 2021-06-18 NOTE — PATIENT INSTRUCTIONS - HE
"Patient Instructions by Jordon Means DO at 3/12/2020  1:00 PM     Author: Jordon Means DO Service: -- Author Type: Physician    Filed: 3/12/2020  1:05 PM Encounter Date: 3/12/2020 Status: Addendum    : Jordon Means DO (Physician)    Related Notes: Original Note by Jordon Means DO (Physician) filed at 3/12/2020 12:58 PM       Measures aimed at reducing upper airway resistance are recommended for the degree of sleep-disordered breathing that was observed in this study. Options include: positional therapy to avoid sleep in the supine posture, weight loss, muscle strengthening exercises and dental evaluation for an oral appliance.      SLEEP HYGIENE    Sleep only as much as you need to feel rested and then get out of bed   Keep a regular sleep schedule   Avoid forcing sleep   Exercise regularly for at least 20 minutes, preferably 4 to 5 hours before bedtime   Avoid caffeinated beverages after lunch   Avoid alcohol near bedtime: no \"night cap\"   Avoid smoking, especially in the evening   Do not go to bed hungry   Adjust bedroom environment   Avoid prolonged use of light-emitting screens before bedtime    Deal with your worries before bedtime              "

## 2021-06-19 NOTE — LETTER
Letter by Rose Sepulveda MD at      Author: Rose Sepulveda MD Service: -- Author Type: --    Filed:  Encounter Date: 9/17/2019 Status: (Other)         Laurent Choi  [address]  [place] [address]    September 17, 2019    Dear Mr. Choi,    Welcome to [facility]! Your appointment information is below.   Please bring the following to your appointment:    Insurance Card, so we may scan it for our records    Drivers license or valid ID, so we may scan it for our records    Co-pay (as applicable per your insurance plan)    A current list of your medications including over the counter products such as vitamins and supplements    Your medical records including copies of X-Ray films if you are transferring your care from another clinic.  If you do not have your records, please fill out the release of information form and we will request those records.     Provider: Rose Sepulveda MD  Appointment Date: October 21, 2019   Arrival Time:10:00am For PFT and 11:ooam for Dr. Sepulveda    Location: [address]        [facility], [address]    **Please allow adequate time for your commute and parking. If you are more than 10 minutes late, you may be asked to reschedule.     If you need to cancel or reschedule your appointment, please notify us at least 24 hours prior to your appointment time so we are able to make this time available for another patient.    Thank you for choosing the [facility] for your health care needs. If you have any questions, please do not hesitate to contact us at any time at   [phone]. We look forward to caring for you.     Sincerely,     [facility] [place] staff

## 2021-06-20 NOTE — LETTER
Letter by Amara Smith MD at      Author: Amara Smith MD Service: -- Author Type: --    Filed:  Encounter Date: 1/23/2020 Status: (Other)                    My Depression Action Plan  Name: Laurent Choi   Date of Birth 1945  Date: 1/23/2020    My Doctor: Amara Smith MD   My Clinic: [facility]  [address] [facility] [address]  [phone]          GREEN    ZONE   Good Control    What it looks like:     Things are going generally well. You have normal ups and downs. You may even feel depressed from time to time, but bad moods usually last less than a day.   What you need to do:  1. Continue to care for yourself (see self care plan)  2. Check your depression survival kit and update it as needed  3. Follow your physicians recommendations including any medication.  4. Do not stop taking medication unless you consult with your physician first.           YELLOW         ZONE Getting Worse    What it looks like:     Depression is starting to interfere with your life.     It may be hard to get out of bed; you may be starting to isolate yourself from others.    Symptoms of depression are starting to last most all day and this has happened for several days.     You may have suicidal thoughts but they are not constant.   What you need to do:     1. Call your care team. Your response to treatment will improve if you keep your care team informed of your progress. Yellow periods are signs an adjustment may need to be made.     2. Continue your self-care.  Just get dressed and ready for the day.  Don't give yourself time to talk yourself out of it.    3. Talk to someone in your support network.    4. Open up your depression Depression Self-Care Plan / Wellness kit.           RED    ZONE Medical Alert - Get Help    What it looks like:     Depression is seriously interfering with your life.     You may experience these or other symptoms: You cant get  out of bed most days, cant work or engage in other necessary activities, you have trouble taking care of basic hygiene, or basic responsibilities, thoughts of suicide or death that will not go away, self-injurious behavior.     What you need to do:  1. Call your care team and request a same-day appointment. If they are not available (weekends or after hours) call your local crisis line, emergency room or 911.            Self-Care Plan / Wellness Kit    Self-Care for Depression  Heres the deal. Your body and mind are really not as separate as most people think.  What you do and think affects how you feel and how you feel influences what you do and think. This means if you do things that people who feel good do, it will help you feel better.  Sometimes this is all it takes.  There is also a place for medication and therapy depending on how severe your depression is, so be sure to consult with your medical provider and/ or Behavioral Health Consultant if your symptoms are worsening or not improving.     In order to better manage my stress, I will:    Exercise  Get some form of exercise, every day. This will help reduce pain and release endorphins, the feel good chemicals in your brain. This is almost as good as taking antidepressants!  This is not the same as joining a gym and then never going! (they count on that by the way?) It can be as simple as just going for a walk or doing some gardening, anything that will get you moving.      Hygiene   Maintain good hygiene (get out of bed in the morning, make your bed, brush your teeth, take a shower, and get dressed like you were going to work, even if you are unemployed).  If your clothes don't fit try to get ones that do.    Diet  Strive to eat foods that are good for me, drink plenty of water, and avoid excessive sugar, caffeine, alcohol, and other mood-altering substances.  Some foods that are helpful in depression are: complex carbohydrates, B vitamins, flaxseed, fish  or fish oil, fresh fruits and vegetables.    Psychotherapy  Agree to participate in Individual Therapy (if recommended).    Medication  If prescribed medications, I agree to take them.  Missing doses can result in serious side effects.  I understand that drinking alcohol, or other illicit drug use, may cause potential side effects.  I will not stop my medication abruptly without first discussing it with my provider.    Staying Connected With Others  Stay in touch with my friends, family members, and my primary care provider/team.    Use your imagination  Be creative.  We all have a creative side; it doesnt matter if its oil painting, sand castles, or mud pies! This will also kick up the endorphins.    Witness Beauty  (AKA stop and smell the roses) Take a look outside, even in mid-winter. Notice colors, textures. Watch the squirrels and birds.     Service to others  Be of service to others.  There is always someone else in need.  By helping others we can get out of ourselves and remember the really important things.  This also provides opportunities for practicing all the other parts of the program.    Humor  Laugh and be silly!  Adjust your TV habits for less news and crime-drama and more comedy.    Control your stress  Try breathing deep, massage therapy, biofeedback, and meditation. Find time to relax each day.     Crisis Text Line  http://www.crisistextline.org    The Crisis Text Line serves anyone, in any type of crisis, providing access to free, 24/7 support and information via the medium people already use and trust:    Here's how it works:  1.  Text 371-268 from anywhere in the [place], anytime, about any type of crisis.  2.  A live, trained Crisis Counselor receives the text and responds quickly.  3.  The volunteer Crisis Counselor will help you move from a 'hot moment to a cool moment'.  My support system    Clinic Contact:  Phone number:    Contact 1:  Phone number:    Contact 2:  Phone number:     [employer]/:  Phone number:    Therapist:  Phone number:    [facility] crisis center:    Phone number:    Other community support:  Phone number:

## 2021-06-23 ENCOUNTER — COMMUNICATION - HEALTHEAST (OUTPATIENT)
Dept: FAMILY MEDICINE | Facility: CLINIC | Age: 76
End: 2021-06-23

## 2021-06-23 DIAGNOSIS — I10 HYPERTENSION, ESSENTIAL: ICD-10-CM

## 2021-06-25 NOTE — TELEPHONE ENCOUNTER
----- Message from Amara Smith MD sent at 6/4/2021 12:52 PM CDT -----  PSA did double up, I will place a referral to see a urologist to discuss if further testing will be needed

## 2021-06-26 NOTE — TELEPHONE ENCOUNTER
Follow - Up: Hospital Discharge    Dates of admission: 10/19-10/23    Discharge Diagnosis: Sepsis    Discharge Medications: IV ceftriaxone x 14 days from 10/20     F/U Appt: 11/5 Alfred Mullen reports feeling well with no questions or concerns. Denies any fevers or issues with new PICC line. Reviewed follow appointment with Alfred Alves PA-C     Medication Request  Medication name: Losartan 25 mg  Requested Pharmacy: [facility]   Reason for request: refill  When did you use medication last?:  Unknown   Patient offered appointment:  N/A - electronic request  Okay to leave a detailed message: no

## 2021-06-28 ENCOUNTER — OFFICE VISIT - HEALTHEAST (OUTPATIENT)
Dept: OTOLARYNGOLOGY | Facility: CLINIC | Age: 76
End: 2021-06-28

## 2021-06-28 DIAGNOSIS — H62.41 OTITIS EXTERNA, FUNGAL, RIGHT EAR: ICD-10-CM

## 2021-06-28 DIAGNOSIS — B36.9 OTITIS EXTERNA, FUNGAL, RIGHT EAR: ICD-10-CM

## 2021-06-28 NOTE — PROGRESS NOTES
"Progress Notes by Jordon Means DO at 2/24/2020  2:45 PM     Author: Jordon Means DO Service: -- Author Type: Physician    Filed: 2/24/2020  2:46 PM Encounter Date: 2/24/2020 Status: Signed    : Jordon Means DO (Physician)        SLEEP STUDY INTERPRETATION  DIAGNOSTIC POLYSOMNOGRAPHY REPORT      Patient: ENRIKE KENNEY  YOB: 1945  Study Date: 2/19/2020  MRN: [id number]  Referring Provider: Ariana Smith MD  Ordering Provider: Jordon Means DO    Indications for Polysomnography: The patient is a 74 y old Male who is 5' 10\" and weighs 183.0 lbs. His BMI is 26.5, [place] sleepiness scale 7.0 and neck circumference is 16.8 cm. Relevant medical history includes high blood pressure and CAD. A diagnostic polysomnogram was performed to evaluate for chronic fatigue that has been going on for at least 6 months.    Polysomnogram Data: A full night polysomnogram recorded the standard physiologic parameters including EEG, EOG, EMG, ECG, nasal and oral airflow. Respiratory parameters of chest and abdominal movements were recorded with respiratory inductance plethysmography. Oxygen saturation was recorded by pulse oximetry. Hypopnea scoring rule used: 1B 4%.    Sleep Architecture: Mildly elevated arousal index  The total recording time of the polysomnogram was 482.5 minutes. The total sleep time was 305.5 minutes. Sleep latency was increased at 58.8 minutes without the use of a sleep aid. REM latency was 352.5 minutes. Arousal index was mildly increased at 18.1 arousals per hour. Sleep efficiency was decreased at 63.3%. Wake after sleep onset was 117.5 minutes. The patient spent 8.3% of total sleep time in Stage N1, 86.6% in Stage N2, 0.0% in Stage N3, and 5.1% in REM. Time in REM supine was 0 minutes.    Respiration: Mildly elevated RDI at 5.3 events per hour highly suggestive of upper airway resistance syndrome    Events ? The polysomnogram revealed a presence of 2 obstructive, 2 central, " and 3 mixed apneas resulting in an apnea index of 1.4 events per hour. There were 7 obstructive hypopneas and 0 central hypopneas resulting in an obstructive hypopnea index of 1.4 and central hypopnea index of 0 events per hour. The combined apnea/hypopnea index was 2.7 events per hour (central apnea/hypopnea index was 0.4 events per hour). The REM AHI was 0 events per hour. The supine AHI was 6.5 events per hour. The RERA index was 2.6 events per hour.  The RDI was 5.3 events per hour.    Snoring - was reported as mild-moderate in all positions.    Respiratory rate and pattern - was notable for normal respiratory rate and pattern.    Hypoventilation - Transcutaneous carbon dioxide monitoring was, however significant hypercarbia was not present with a maximum change from 28.8 to 36.9 mmHg and 0 minutes at or greater than 55 mmHg.    Sleep Associated Hypoxia - (Greater than 5 minutes O2 sat at or below 88%) was present. Baseline oxygen saturation was 91.2%. Lowest oxygen saturation was 83.0%. Time spent less than or equal to 88% was 5.8 minutes. Time spent less than or equal to 89% was 46.4 minutes.    Movement Activity: Within normal limits    Periodic Limb Activity - There were 0 PLMs during the entire study. The PLM index was 0 movements per hour. The PLM Arousal Index was 0 per hour.    REM EMG Activity - Excessive muscle activity was not present.    Nocturnal Behavior - Abnormal sleep related behaviors were not noted during sleep.     Bruxism - None apparent.    Cardiac Summary: Rare PVCs appreciated  The average pulse rate was 74.0 bpm. The minimum pulse rate was 51.6 bpm while the maximum pulse rate was 98.7 bpm.  Rare PVCs appreciated      Assessment:     Mildly elevated RDI at 5.3 events per hour highly suggestive of upper airway resistance syndrome    Borderline sleep related hypoxia    Recommendations:    Measures aimed at reducing upper airway resistance are recommended for the degree of sleep-disordered  breathing that was observed in this study. Options include: positional therapy to avoid sleep in the supine posture, weight loss, muscle strengthening exercises and dental evaluation for an oral appliance.    Consider nasal canula oxygen bleed 1L/min during sleep if he qualifies.    Suggest optimizing sleep schedule and avoiding sleep deprivation.    Diagnostic Codes:   Unspecified Sleep Disturbance G47.9  Primary Snoring R06.83  Sleep related hypoxia G47.34    2/19/2020 [facility] Diagnostic Sleep Study (183.0 lbs) - AHI 2.7, RDI 5.3, Supine AHI 6.5, REM AHI 0, Low O2 83.0%, Time Spent ?88% 5.8 minutes / Time Spent ?89% 46.4 minutes.      _____________________________________   Electronically Signed By: Jordon Means DO 02/24/2020

## 2021-07-01 ENCOUNTER — TELEPHONE (OUTPATIENT)
Dept: PSYCHIATRY | Facility: CLINIC | Age: 76
End: 2021-07-01

## 2021-07-01 NOTE — TELEPHONE ENCOUNTER
VORB - Continue with Vivitrol 380 mg, IM Injection, q28d per Dr Selam Bee.Deborah Blanchard, HANHN

## 2021-07-04 NOTE — LETTER
Letter by Amara Smith MD at      Author: Amara Smith MD Service: -- Author Type: --    Filed:  Encounter Date: 6/1/2021 Status: (Other)       My COPD Action Plan     Name: Laurent Choi    YOB: 1945   Date: 6/1/2021    My doctor: Amara Smith MD   My clinic: [address] [address]  [phone]      My Flare Up Medicine: albuterol prn   Dose: 2 puffs prn    My COPD Severity: Moderate = FeV1 < 79% -50%      Use of Oxygen: Oxygen Not Prescribed      Make sure you've had your pneumonia   vaccines.          GREEN ZONE       Doing well today      Usual level of activity and exercise    Usual amount of cough and mucus    No shortness of breath    Usual level of health (thinking clearly, sleeping well, feel like eating) Actions:      Take daily medicines    Use oxygen as prescribed    Follow regular exercise and diet plan    Avoid cigarette smoke and other irritants that harm the lungs              YELLOW ZONE             Having a bad day or flare up      Short of breath more than usual    A lot more sputum (mucus) than usual    Sputum looks yellow, green, tan, brown or bloody    More coughing or wheezing    Fever or chills    Less energy; trouble completing activities    Trouble thinking or focusing    Using quick relief inhaler or nebulizer more often    Poor sleep; symptoms wake me up    Do not feel like eating Actions:      Get plenty of rest    Take daily medicines    Use quick relief inhaler every 4 hours    If you use oxygen, call you doctor to see if you should adjust your oxygen    Do breathing exercises or other things to help you relax    Let a loved one, friend or neighbor know you are feeling worse    Call your care team if you have 2 or more symptoms.  Start taking steroids or antibiotics if directed by your care team              RED ZONE           Need medical care now      Severe shortness of  breath (feel you can't breathe)    Fever, chills    Not enough breath to do any activity    Trouble coughing up mucus, walking or talking    Blood in mucus    Frequent coughing   Rescue medicines are not working    Not able to sleep because of breathing    Feel confused or drowsy    Chest pain    Actions:      Call your health care team.  If you cannot reach your care team, call 911 or go to the emergency room.           Annual Reminders:  Meet with Care Team, Flu Shot every Fall  Pharmacy:   [facility] - [place], [place] - [address] [place] [address]  Phone: [phone] Fax: [phone]    [facility] - [place], [place] - [address] [facility]  [address] [place] [address]  Phone: [phone] Fax: [phone]

## 2021-07-04 NOTE — ADDENDUM NOTE
Addendum Note by Amara Smith MD at 6/1/2021 10:40 AM     Author: Amara Smith MD Service: -- Author Type: Physician    Filed: 6/4/2021 12:52 PM Encounter Date: 6/1/2021 Status: Signed    : Amara Smith MD (Physician)    Addended by: AMARA SMITH on: 6/4/2021 12:52 PM        Modules accepted: Orders

## 2021-07-06 ENCOUNTER — TELEPHONE (OUTPATIENT)
Dept: PSYCHIATRY | Facility: CLINIC | Age: 76
End: 2021-07-06

## 2021-07-06 VITALS
BODY MASS INDEX: 25.48 KG/M2 | RESPIRATION RATE: 18 BRPM | DIASTOLIC BLOOD PRESSURE: 75 MMHG | HEIGHT: 70 IN | SYSTOLIC BLOOD PRESSURE: 118 MMHG | TEMPERATURE: 98.2 F | HEART RATE: 73 BPM | WEIGHT: 178 LBS

## 2021-07-06 NOTE — TELEPHONE ENCOUNTER
On July 6, 2021, at 2:33 PM, writer called patient at mobile to confirm [facility] appointment scheduled for 7/7/2021 at 1000. Writer made contact with patient who confirmed attendance. JAMEE Perry, EMT

## 2021-07-07 ENCOUNTER — ALLIED HEALTH/NURSE VISIT (OUTPATIENT)
Dept: PSYCHIATRY | Facility: CLINIC | Age: 76
End: 2021-07-07
Payer: COMMERCIAL

## 2021-07-07 VITALS
HEART RATE: 89 BPM | BODY MASS INDEX: 25.82 KG/M2 | SYSTOLIC BLOOD PRESSURE: 132 MMHG | DIASTOLIC BLOOD PRESSURE: 77 MMHG | WEIGHT: 182.4 LBS

## 2021-07-07 DIAGNOSIS — F10.21 ALCOHOL USE DISORDER, SEVERE, IN SUSTAINED REMISSION (H): Primary | ICD-10-CM

## 2021-07-07 PROCEDURE — 96372 THER/PROPH/DIAG INJ SC/IM: CPT | Performed by: PSYCHIATRY & NEUROLOGY

## 2021-07-07 PROCEDURE — 250N000011 HC RX IP 250 OP 636: Performed by: PSYCHIATRY & NEUROLOGY

## 2021-07-07 RX ADMIN — NALTREXONE 380 MG: KIT at 10:00

## 2021-07-07 ASSESSMENT — PAIN SCALES - GENERAL: PAINLEVEL: NO PAIN (0)

## 2021-07-07 NOTE — PROGRESS NOTES
"REASON FOR VISIT: Recheck      HISTORY OF PRESENT ILLNESS    Gio was seen in follow up for otowick removal from Right ear.  Ear feels \"wet\" and \"itch\".  Culture positive for Aspergillus.     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  Patient will be is asked to use water precautions.  Return Monday for removal of otowick.  If he has persistent infection with neck step would be topical CSF/HC otic powder       REVIEW OF SYSTEMS    Review of Systems: a 10-system review is reviewed at this encounter.  See scanned document.     Aspirin, Nsaids (non-steroidal anti-inflammatory drug), Plavix [clopidogrel], and Statins-hmg-coa reductase inhibitors     PHYSICAL EXAM:        HEAD: Normal appearance and symmetry:  No cutaneous lesions.      EARS:   Auricles normal    Ear  Microscopy:    The right EAC is debrided with #5 suction of cheesy moist debris.  TM intact but inflamed     NOSE:    Dorsum:   straight       ORAL CAVITY/OROPHARYNX:    Lips:  Normal.     NECK:  Trachea:  midline       NEURO:   Alert and Oriented    GAIT AND STATION:  normal     RESPIRATORY:   Symmetry and Respiratory effort    PSYCH:   normal mood and affect    SKIN:  warm and dry         IMPRESSION:    Encounter Diagnosis   Name Primary?     Otitis externa, fungal, right ear Yes          RECOMMENDATIONS:    Medications Ordered   Medications     amphotericin B, bulk, Powd     Sig: CSF/HC otic powder 1 puff to RIGHT ear 2x daily for 3 weeks Dispsense 3 capsulses with otomed insufflator     Dispense:  1 Bottle     Refill:  0       No orders of the defined types were placed in this encounter.     Return visit 1 month    "

## 2021-07-07 NOTE — NURSING NOTE
"Clinic Administered Medication Documentation      Injectable Medication Documentation    Patient was given Vivitrol 380 mg. Prior to medication administration, verified patients identity using patient s name and date of birth. Please see MAR and medication order for additional information. Patient instructed to Vivitrol 380 mg.      Was entire vial of medication used? Yes  Vial/Syringe: Single dose vial  Expiration Date:  3/31/21  Was this medication supplied by the patient? No      Gio Choi,  1945, presented to the clinic today at the request of Dr Tiffanie Dunn,  ordering provider for long-acting injectable Vivitrol 380 mg.    OBSERVATIONS:  1.  Appearance: Casually dressed in clean, weather appropriate clothing. Pt shared he has been baking a lot and getting outside to enjoy the weather. 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 He would like the word \"Severe\" omitted. Writer helped pt get scheduled with his new provider and he can address it at this appointment, pt agreed. Pt denies SI, HI, AH, VH, and SIB  2.  Mood: Good, talkative, friendly  3.  Affect: Congruent  4.  Attitude: Good, engaged, good eye contact  5.  Cooperation: Full  6.  Side Effects: Denied  7.  Education: None Needed  8. Next appointment: injection 2021 1000 & Dr Tiffanie Dunn 2021 1315 60min MFU  9. Location: [facility]    The service provided today was rendered under the supervising provider of the day, Dr Bee, who was available for consultation as needed.      "

## 2021-07-26 ENCOUNTER — OFFICE VISIT (OUTPATIENT)
Dept: OTOLARYNGOLOGY | Facility: CLINIC | Age: 76
End: 2021-07-26
Payer: COMMERCIAL

## 2021-07-26 DIAGNOSIS — B36.9 OTITIS EXTERNA, FUNGAL, RIGHT EAR: Primary | ICD-10-CM

## 2021-07-26 DIAGNOSIS — H62.41 OTITIS EXTERNA, FUNGAL, RIGHT EAR: Primary | ICD-10-CM

## 2021-07-26 DIAGNOSIS — H61.21 KERATIN DEBRIS OF EAR CANAL, RIGHT: ICD-10-CM

## 2021-07-26 PROCEDURE — 99213 OFFICE O/P EST LOW 20 MIN: CPT | Performed by: OTOLARYNGOLOGY

## 2021-07-26 NOTE — PROGRESS NOTES
CHIEF COMPLAINT:  Recheck      HISTORY OF PRESENT ILLNESS    Gio was seen in follow up for recheck of right ear after course of CF otic powder. He was able to receive the powder and finished last week.  No ear pain or drainage.  He is anxious to resume swimming.       CSF otic powder as directed  Keep ear dry  Return 1 month       REVIEW OF SYSTEMS    Review of Systems: a 10-system review is reviewed at this encounter.  See scanned document.     Aspirin, Nsaids (non-steroidal anti-inflammatory drug) [nsaids], Plavix [clopidogrel], and Statins-hmg-coa reductase inhibitors [hmg-coa-r inhibitors]     PHYSICAL EXAM:        HEAD: Normal appearance and symmetry:  No cutaneous lesions.      EARS:   Auricles normal    Right ear:  Residual powder is coating the TM and EACs.   Some clumps are debrided using the #5 suction along with keratin debris.       NOSE:    Dorsum:   straight       ORAL CAVITY/OROPHARYNX:    Lips:  Normal.     NECK:  Trachea:  midline       NEURO:   Alert and Oriented    GAIT AND STATION:  normal     RESPIRATORY:   Symmetry and Respiratory effort    PSYCH:   normal mood and affect    SKIN:  warm and dry         IMPRESSION:   Encounter Diagnoses   Name Primary?     Otitis externa, fungal, right ear Yes     Keratin debris of ear canal, right               RECOMMENDATIONS:    Keep ear dry  Return visit 6 weeks with baseline audiogram

## 2021-07-26 NOTE — LETTER
7/26/2021         RE: Laurent Choi  [address]  [place] [address]        Dear Colleague,    Thank you for referring your patient, Laurent Choi, to the [facility]. Please see a copy of my visit note below.    CHIEF COMPLAINT:  Recheck      HISTORY OF PRESENT ILLNESS    Gio was seen in follow up for recheck of right ear after course of CF otic powder. He was able to receive the powder and finished last week.  No ear pain or drainage.  He is anxious to resume swimming.       CSF otic powder as directed  Keep ear dry  Return 1 month       REVIEW OF SYSTEMS    Review of Systems: a 10-system review is reviewed at this encounter.  See scanned document.     Aspirin, Nsaids (non-steroidal anti-inflammatory drug) [nsaids], Plavix [clopidogrel], and Statins-hmg-coa reductase inhibitors [hmg-coa-r inhibitors]     PHYSICAL EXAM:        HEAD: Normal appearance and symmetry:  No cutaneous lesions.      EARS:   Auricles normal    Right ear:  Residual powder is coating the TM and EACs.   Some clumps are debrided using the #5 suction along with keratin debris.       NOSE:    Dorsum:   straight       ORAL CAVITY/OROPHARYNX:    Lips:  Normal.     NECK:  Trachea:  midline       NEURO:   Alert and Oriented    GAIT AND STATION:  normal     RESPIRATORY:   Symmetry and Respiratory effort    PSYCH:   normal mood and affect    SKIN:  warm and dry         IMPRESSION:   Encounter Diagnoses   Name Primary?     Otitis externa, fungal, right ear Yes     Keratin debris of ear canal, right               RECOMMENDATIONS:    Keep ear dry  Return visit 6 weeks with baseline audiogram         Again, thank you for allowing me to participate in the care of your patient.        Sincerely,        Piyush Rasmussen MD

## 2021-07-28 ENCOUNTER — TRANSFERRED RECORDS (OUTPATIENT)
Dept: HEALTH INFORMATION MANAGEMENT | Facility: CLINIC | Age: 76
End: 2021-07-28
Payer: COMMERCIAL

## 2021-08-03 ENCOUNTER — TELEPHONE (OUTPATIENT)
Dept: PSYCHIATRY | Facility: CLINIC | Age: 76
End: 2021-08-03

## 2021-08-03 NOTE — TELEPHONE ENCOUNTER
On 8/3/2021, writer called to confirm the injection for Gio, he did answer and confirm he will be at his appointment.  Dorcas Clark, CMA

## 2021-08-04 ENCOUNTER — ALLIED HEALTH/NURSE VISIT (OUTPATIENT)
Dept: PSYCHIATRY | Facility: CLINIC | Age: 76
End: 2021-08-04
Payer: COMMERCIAL

## 2021-08-04 VITALS
DIASTOLIC BLOOD PRESSURE: 74 MMHG | SYSTOLIC BLOOD PRESSURE: 115 MMHG | BODY MASS INDEX: 25.43 KG/M2 | HEART RATE: 71 BPM | WEIGHT: 179.6 LBS

## 2021-08-04 DIAGNOSIS — F10.21 ALCOHOL USE DISORDER, SEVERE, IN SUSTAINED REMISSION (H): Primary | ICD-10-CM

## 2021-08-04 PROCEDURE — 96372 THER/PROPH/DIAG INJ SC/IM: CPT | Performed by: PSYCHIATRY & NEUROLOGY

## 2021-08-04 PROCEDURE — 250N000011 HC RX IP 250 OP 636: Performed by: PSYCHIATRY & NEUROLOGY

## 2021-08-04 RX ADMIN — NALTREXONE 380 MG: KIT at 10:00

## 2021-08-04 ASSESSMENT — PAIN SCALES - GENERAL: PAINLEVEL: NO PAIN (0)

## 2021-08-04 NOTE — NURSING NOTE
Clinic Administered Medication Documentation      Injectable Medication Documentation    Patient was given Vivitrol 380 mg. Prior to medication administration, verified patients identity using patient s name and date of birth. Please see MAR and medication order for additional information. Patient instructed to stay in clinic after the injection but patient declined.      Was entire vial of medication used? Yes  Vial/Syringe: Single dose vial  Expiration Date:  2023  Was this medication supplied by the patient? No      Gio Choi,  1945, presented to the clinic today at the request of Dr Tiffanie Dunn,  ordering provider for long-acting injectable Vivitrol 380 mg.    OBSERVATIONS:  1.  Appearance: Casually dressed in clean, weather appropriate clothing. Pt shared he has been walking outside quite a bit lately with a neighbor from his apt building. He is enjoying this. Pt said he had a colonoscopy done recently. Had a few polyps removed and will return in 5 years. Pt denies SI, HI, AH, VH, SIB, and breakthrough cravings.   2.  Mood: Good, talkative, friendly  3.  Affect: Congruent  4.  Attitude: Good, engaged, good eye contact  5.  Cooperation: Full  6.  Side Effects: Denies  7.  Education: None needed  8. Next appointment: 2021 and 2021 at 1000 for injection  9. Location: [facility]    The service provided today was rendered under the supervising provider of the day, Dr Bee, who was available for consultation as needed.

## 2021-08-05 ENCOUNTER — PRE VISIT (OUTPATIENT)
Dept: UROLOGY | Facility: CLINIC | Age: 76
End: 2021-08-05

## 2021-08-05 NOTE — TELEPHONE ENCOUNTER
Reason for Visit: Consult    Diagnosis: Elevated PSA    Orders/Procedures/Records: in system    Contact Patient: n/a    Rooming Requirements: Nader Hidalgo  08/05/21  2:21 PM

## 2021-08-06 ENCOUNTER — OFFICE VISIT (OUTPATIENT)
Dept: UROLOGY | Facility: CLINIC | Age: 76
End: 2021-08-06
Payer: COMMERCIAL

## 2021-08-06 VITALS
HEART RATE: 79 BPM | DIASTOLIC BLOOD PRESSURE: 69 MMHG | WEIGHT: 179 LBS | SYSTOLIC BLOOD PRESSURE: 116 MMHG | HEIGHT: 70 IN | BODY MASS INDEX: 25.62 KG/M2

## 2021-08-06 DIAGNOSIS — R97.20 ELEVATED PROSTATE SPECIFIC ANTIGEN (PSA): Primary | ICD-10-CM

## 2021-08-06 PROCEDURE — 99204 OFFICE O/P NEW MOD 45 MIN: CPT | Performed by: STUDENT IN AN ORGANIZED HEALTH CARE EDUCATION/TRAINING PROGRAM

## 2021-08-06 ASSESSMENT — MIFFLIN-ST. JEOR: SCORE: 1558.19

## 2021-08-06 ASSESSMENT — PAIN SCALES - GENERAL: PAINLEVEL: NO PAIN (0)

## 2021-08-06 NOTE — NURSING NOTE
"Chief Complaint   Patient presents with     Consult     Elevated PSA       Blood pressure 116/69, pulse 79, height 1.778 m (5' 10\"), weight 81.2 kg (179 lb). Body mass index is 25.68 kg/m .    Patient Active Problem List   Diagnosis     Recurrent major depressive disorder, in remission (H)     Alcohol use disorder, severe, in early remission (H)     Moderate episode of recurrent major depressive disorder (H)       Allergies   Allergen Reactions     Aspirin Other (See Comments)     Avoids taking any aspirin besides his daily 81 mg aspirin regimen due to acid reflux/esophagus problem(s).     Nsaids (Non-Steroidal Anti-Inflammatory Drug) [Nsaids] Other (See Comments)     Avoids due to acid reflux/esophagus problem(s).     Plavix [Clopidogrel] Other (See Comments) and Muscle Pain (Myalgia)     Reaction(s): Bad bruising all over his body and leg cramps.     Statins-Hmg-Coa Reductase Inhibitors [Hmg-Coa-R Inhibitors] Muscle Pain (Myalgia)     Per the patient, he tolerates rosuvastatin.       Current Outpatient Medications   Medication Sig Dispense Refill     ACETAMINOPHEN PO Take 325 mg by mouth       amLODIPine-benazepril (LOTREL) 10-20 MG per capsule Take 1 capsule by mouth daily       ASPIRIN PO Take 81 mg by mouth daily       calcium-vitamin D 500-125 MG-UNIT TABS Take 1 tablet by mouth 2 times daily       DIPHENHYDRAMINE HCL PO Take 25 mg by mouth daily as needed       escitalopram (LEXAPRO) 20 MG tablet Take 1 tablet (20 mg) by mouth daily 90 tablet 0     gabapentin (NEURONTIN) 300 MG capsule Take 1 capsule by mouth at bedtime as needed. 90 capsule 3     LOSARTAN POTASSIUM PO Take 25 mg by mouth       melatonin 3 MG tablet Take 1-3 tabs before bed PRN 90 tablet 1     naltrexone (VIVITROL) 380 MG SUSR Inject 380 mg into the muscle every 30 days 380 mg 6     nitroGLYcerin (NITRODUR) 0.4 MG/HR 24 hr patch Place 1 patch onto the skin daily       NITROGLYCERIN SL Take 0.4 mg by mouth       omeprazole 20 MG tablet Take " 1-2 daily 60 tablet 1     ROSUVASTATIN CALCIUM PO Take 40 mg by mouth daily       tamsulosin (FLOMAX) 0.4 MG capsule TAKE ONE CAPSULE BY MOUTH ONCE DAILY WITH BREAKFAST 90 capsule 0       Social History     Tobacco Use     Smoking status: Former Smoker     Packs/day: 2.00     Types: Cigarettes     Smokeless tobacco: Never Used   Substance Use Topics     Alcohol use: None     Drug use: None       Mary Anne Hidalgo  8/6/2021  3:32 PM

## 2021-08-06 NOTE — PROGRESS NOTES
Chief Complaint:    Elevated PSA (Prostate Specific Antigen)           Consult or Referral:     Mr. Laurent Choi is a 76 year old male seen at the request of Dr. Smith.         History of Present Illness:     Laurent Choi is a 76 year old male being seen for elevated PSA.  Duration of problem: 2 months  Previous treatments: none    Reviewed previous notes from Dr. Smith  He has been evaluated in  2019 with an MRI for a PSA of 3.7 which was PIRADS 3 and currently presents with a PSA at 7.7  No family history of prostate cancer and LUTS are well controlled with flomax.  No recent urethral instrumentation or urinary infection.           Past Medical History:     Past Medical History:   Diagnosis Date     Alcohol abuse      Arthritis      Chest pain      Clogged artery (heart)      GERD (gastroesophageal reflux disease)      Heart disease     Stints in heart due to cholesterol build up.      High cholesterol     Takes medications for this.      Hypertension      Hypertension      Macular degeneration     left eye is stable, right eye is legally blind.      Macular degeneration             Past Surgical History:     Past Surgical History:   Procedure Laterality Date     CARDIAC SURGERY       COLONOSCOPY      removal of polyp     ENT SURGERY       HERNIA REPAIR              Medications     Current Outpatient Medications   Medication     ACETAMINOPHEN PO     amLODIPine-benazepril (LOTREL) 10-20 MG per capsule     ASPIRIN PO     calcium-vitamin D 500-125 MG-UNIT TABS     DIPHENHYDRAMINE HCL PO     escitalopram (LEXAPRO) 20 MG tablet     gabapentin (NEURONTIN) 300 MG capsule     LOSARTAN POTASSIUM PO     melatonin 3 MG tablet     naltrexone (VIVITROL) 380 MG SUSR     nitroGLYcerin (NITRODUR) 0.4 MG/HR 24 hr patch     NITROGLYCERIN SL     omeprazole 20 MG tablet     ROSUVASTATIN CALCIUM PO     tamsulosin (FLOMAX) 0.4 MG capsule     Current Facility-Administered Medications   Medication     naltrexone  (VIVITROL) injection 380 mg            Family History:     Family History   Problem Relation Age of Onset     Dementia Mother      Substance Abuse Father      Suicide Other      Anxiety Disorder Sister      Depression No family hx of      Schizophrenia No family hx of      Bipolar Disorder No family hx of      [place] Disease No family hx of      Parkinsonism No family hx of      Autism Spectrum Disorder No family hx of      Snoring Father             Social History:     Social History     Socioeconomic History     Marital status:      Spouse name: Not on file     Number of children: Not on file     Years of education: Not on file     Highest education level: Not on file   Occupational History     Not on file   Tobacco Use     Smoking status: Former Smoker     Packs/day: 2.00     Types: Cigarettes     Smokeless tobacco: Never Used   Substance and Sexual Activity     Alcohol use: Not on file     Drug use: Not on file     Sexual activity: Not on file   Other Topics Concern     Parent/sibling w/ CABG, MI or angioplasty before 65F 55M? Not Asked   Social History Narrative     Not on file     Social Determinants of Health     Financial Resource Strain:      Difficulty of Paying Living Expenses:    Food Insecurity:      Worried About Running Out of Food in the Last Year:      Ran Out of Food in the Last Year:    Transportation Needs:      Lack of Transportation (Medical):      Lack of Transportation (Non-Medical):    Physical Activity:      Days of Exercise per Week:      Minutes of Exercise per Session:    Stress:      Feeling of Stress :    Social Connections:      Frequency of Communication with Friends and Family:      Frequency of Social Gatherings with Friends and Family:      Attends Spiritism Services:      Active Member of Clubs or Organizations:      Attends Club or Organization Meetings:      Marital Status:    Intimate Partner Violence:      Fear of Current or Ex-Partner:      Emotionally Abused:   "    Physically Abused:      Sexually Abused:             Allergies:   Aspirin, Nsaids (non-steroidal anti-inflammatory drug) [nsaids], Plavix [clopidogrel], and Statins-hmg-coa reductase inhibitors [hmg-coa-r inhibitors]         Review of Systems:  From intake questionnaire     Skin: negative  Eyes: negative  Ears/Nose/Throat: negative  Respiratory: No shortness of breath, dyspnea on exertion, cough, or hemoptysis  Cardiovascular: No chest pain or palpitations  Gastrointestinal: negative; no nausea/vomiting, constipation or diarrhea  Genitourinary: as per HPI  Musculoskeletal: negative  Neurologic: negative  Psychiatric: negative  Hematologic/Lymphatic/Immunologic: negative  Endocrine: negative         Physical Exam:     Patient is a 76 year old  male   Vitals: Blood pressure 116/69, pulse 79, height 1.778 m (5' 10\"), weight 81.2 kg (179 lb).  Constitutional: Body mass index is 25.68 kg/m .  Alert, no acute distress, oriented, conversant  Eyes: no scleral icterus; extraocular muscles intact, moist conjunctivae  Neck: trachea midline, no thyromegaly  Ears/nose/mouth: throat/mouth:normal, good dentition  Respiratory: no respiratory distress, or pursed lip breathing  Cardiovascular: pulses strong and intact; no obvious jugular venous distension present  Gastrointestinal: soft, nontender, no organomegaly or masses,   Lymphatics: No inguinal adenopathy  Musculoskeletal: extremities normal, no peripheral edema  Skin: no suspicious lesions or rashes  Neuro: Alert, oriented, speech and mentation normal  Psych: affect and mood normal, alert and oriented to person, place and time  Gait: Normal  : penis, scrotum, testes normal, FREDDY anodular, symmetric      Labs and Pathology:    The following labs were reviewed by me and discussed with the patient:    Significant for   Lab Results   Component Value Date    CR 0.86 06/01/2021    CR 0.84 08/26/2020    CR 0.78 09/04/2019    CR 0.88 07/17/2019    CR 0.72 08/28/2018    CR 0.74 " 08/24/2018    CR 0.80 07/30/2018    CR 0.90 08/12/2007    CR 0.80 06/16/2007    CR 0.80 09/30/2005    CR 1.03 09/14/2005     Prostate Specific Antigen Screen   Date Value Ref Range Status   06/01/2021 7.7 (H) 0.00 - 6.50 ng/mL Final   08/26/2020 3.6 0.00 - 6.50 ng/mL Final             Imaging:    The following imaging exams were independently viewed and interpreted by me and discussed with patient:  MRI Abd/Pelvis: Abnormal: 2019  PIRADS 3 lesion in the prostate             Assessment and Plan:     Elevated prostate specific antigen (PSA)  We discussed the significance of an elevated PSA and the need for further testing to help us decide on Prostate  biopsy.  We also discussed about MRI and how it helps us to identify and target clinically significant lesions by MRI/TRUS fusion.    In view of this We decided to do repeat PSA and consider for MRI Prostate .    - PSA tumor marker; Future      Plan:  PSA in 2 weeks    Orders  Orders Placed This Encounter   Procedures     PSA tumor marker       Blaine Adame MD  [facility] [place]      ==========================    Additional Billing and Coding Information:  Review of external notes as documented above   Review of the result(s) of each unique test - PSA    Independent interpretation of a test performed by another physician/other qualified health care professional (not separately reported) - MRI      Discussion of management or test interpretation with external physician/other qualified healthcare professional/appropriate source - NA    Diagnosis or treatment significantly limited by social determinants of health - NA    15 minutes spent on the date of the encounter doing chart review, review of outside records, review of test results, interpretation of tests, patient visit and documentation     ==========================

## 2021-08-06 NOTE — PATIENT INSTRUCTIONS - HE
Patient Instructions by Amara Smith MD at 6/1/2021 10:40 AM     Author: Amara Smith MD Service: -- Author Type: Physician    Filed: 6/1/2021 10:57 AM Encounter Date: 6/1/2021 Status: Signed    : Amara Smith MD (Physician)         Patient Education     Your Health Risk Assessment indicates you feel you are not in good physical health.    A healthy lifestyle helps keep the body fit and the mind alert. It helps protect you from disease, helps you fight disease, and helps prevent chronic disease (disease that doesn't go away) from getting worse. This is important as you get older and begin to notice twinges in muscles and joints and a decline in the strength and stamina you once took for granted. A healthy lifestyle includes good healthcare, good nutrition, weight control, recreation, and regular exercise. Avoid harmful substances and do what you can to keep safe. Another part of a healthy lifestyle is stay mentally active and socially involved.    Good healthcare     Have a wellness visit every year.     If you have new symptoms, let us know right away. Don't wait until the next checkup.     Take medicines exactly as prescribed and keep your medicines in a safe place. Tell us if your medicine causes problems.   Healthy diet and weight control     Eat 3 or 4 small, nutritious, low-fat, high-fiber meals a day. Include a variety of fruits, vegetables, and whole-grain foods.     Make sure you get enough calcium in your diet. Calcium, vitamin D, and exercise help prevent osteoporosis (bone thinning).     If you live alone, try eating with others when you can. That way you get a good meal and have company while you eat it.     Try to keep a healthy weight. If you eat more calories than your body uses for energy, it will be stored as fat and you will gain weight.     Recreation   Recreation is not limited to sports and team events. It includes any activity that provides  relaxation, interest, enjoyment, and exercise. Recreation provides an outlet for physical, mental, and social energy. It can give a sense of worth and achievement. It can help you stay healthy.       Patient Education     Exercise for a Healthier Heart  You may wonder how you can improve the health of your heart. If youre thinking about exercise, youre on the right track. You dont need to become an athlete, but you do need a certain amount of brisk exercise to help strengthen your heart. If you have been diagnosed with a heart condition, your doctor may recommend exercise to help stabilize your condition. To help make exercise a habit, choose safe, fun activities.       Be sure to check with your health care provider before starting an exercise program.    Why exercise?  Exercising regularly offers many healthy rewards. It can help you do all of the following:    Improve your blood cholesterol levels to help prevent further heart trouble    Lower your blood pressure to help prevent a stroke or heart attack    Control diabetes, or reduce your risk of getting this disease    Improve your heart and lung function    Reach and maintain a healthy weight    Make your muscles stronger and more limber so you can stay active    Prevent falls and fractures by slowing the loss of bone mass (osteoporosis)    Manage stress better  Exercise tips  Ease into your routine. Set small goals. Then build on them.  Exercise on most days. Aim for a total of 150 or more minutes of moderate to  vigorous intensity activity each week. Consider 40 minutes, 3 to 4 times a week. For best results, activity should last for 40 minutes on average. It is OK to work up to the 40 minute period over time. Examples of moderate-intensity activity is walking one mile in 15 minutes or 30 to 45 minutes of yard work.  Step up your daily activity level. Along with your exercise program, try being more active throughout the day. Walk instead of drive. Do more  household tasks or yard work.  Choose one or more activities you enjoy. Walking is one of the easiest things you can do. You can also try swimming, riding a bike, or taking an exercise class.  Stop exercising and call your doctor if you:    Have chest pain or feel dizzy or lightheaded    Feel burning, tightness, pressure, or heaviness in your chest, neck, shoulders, back, or arms    Have unusual shortness of breath    Have increased joint or muscle pain    Have palpitations or an irregular heartbeat      8311-0699 [employer]. [address] [place] [address]. All rights reserved. This information is not intended as a substitute for professional medical care. Always follow your healthcare professional's instructions.         Patient Education   Your Health Risk Assessment indicates you feel you are not in good emotional health.    Recreation   Recreation is not limited to sports and team events. It includes any activity that provides relaxation, interest, enjoyment, and exercise. Recreation provides an outlet for physical, mental, and social energy. It can give a sense of worth and achievement. It can help you stay healthy.    Mental Exercise and Social Involvement  Mental and emotional health is as important as physical health. Keep in touch with friends and family. Stay as active as possible. Continue to learn and challenge yourself.   Things you can do to stay mentally active are:    Learn something new, like a foreign language or musical instrument.     Play SCRABBLE or do crossword puzzles. If you cannot find people to play these games with you at home, you can play them with others on your computer through the Internet.     Join a games club--anything from card games to chess or checkers or lawn bowling.     Start a new hobby.     Go back to school.     Volunteer.     Read.     Keep up with world events.       Patient Education   Depression and Suicide in Older Adults  Nearly 2 million  older Americans have some type of depression. Sadly, some of them even take their own lives. Yet depression among older adults is often ignored. Learn the warning signs. You may help spare a loved one needless pain. You may also save a life.       What Is Depression?  Depression is a mood disorder that affects the way you think and feel. The most common symptom is a feeling of deep sadness. People who are depressed also may seem tired and listless. And nothing seems to give them pleasure. Its normal to grieve or be sad sometimes. But sadness lessens or passes with time. Depression rarely goes away or improves on its own. Other symptoms of depression are:    Sleeping more or less than normal    Eating more or less than normal    Having headaches, stomachaches, or other pains that dont go away    Feeling nervous, empty, or worthless    Crying a great deal    Thinking or talking about suicide or death    Feeling confused or forgetful  What Causes It?  The causes of depression arent fully known. Certain chemicals in the brain play a role. Depression does run in families. And life stresses can also trigger depression in some people. That may be the case with older adults. They often face great burdens, such as the death of friends or a spouse. They may have failing health. And they are more likely to be alone, lonely, or poor.  How You Can Help  Often, depressed people may not want to ask for help. When they do, they may be ignored. Or, they may receive the wrong treatment. You can help by showing parents and older friends love and support. If they seem depressed, help them find the right treatment. Talk to your doctor. Or contact a local mental health center, social service agency, or hospital. With modern treatment, no one has to suffer from depression.  Resources:    National [place] of Mental Health  837.726.3010  www.nimh.nih.gov    National [place] on Mental Illness  882.393.3866  www.alessandra.org    Mental Health  [facility]  138.368.2256  www.[facility].org    National Suicide Hotline  779.629.4839 (800-SUICIDE)      4735-2037 [employer]. [address] [place] [address], [place] [address]. All rights reserved. This information is not intended as a substitute for professional medical care. Always follow your healthcare professional's instructions.         Patient Education   Understanding Advance Care Planning  Advance care planning is the process of deciding ones own future medical care. It helps ensure that if you cant speak for yourself, your wishes can still be carried out. The plan is a series of legal documents that note a persons wishes. The documents vary by state. Advance care planning may be done when a person has a serious illness that is expected to get worse. It may be done before major surgery. And it can help you and your family be prepared in case of a major illness or injury. Advance care planning helps with making decisions at these times.       A health care proxy is a person who acts as the voice of a patient when the patient cant speak for himself or herself. The name of this role varies by state. It may be called a Durable Medical Power of  or Durable Power of  for Healthcare. It may be called an agent, surrogate, or advocate. Or it may be called a representative or decision maker. It is an official duty that is identified by a legal document. The document also varies by state.    Why Is Advance Care Planning Important?  If a person communicates their healthcare wishes:    They will be given medical care that matches their values and goals.    Their family members will not be forced to make decisions in a crisis with no guidance.  Creating a Plan  Making an advance care plan is often done in 3 steps:    Thinking about ones wishes. To create an advance care plan, you should think about what kind of medical treatment you would want if you lose the ability to communicate. Are there any  situations in which you would refuse or stop treatment? Are there therapies you would want or not want? And whom do you want to make decisions for you? There are many places to learn more about how to plan for your care. Ask your doctor or  for resources.    Picking a health care proxy. This means choosing a trusted person to speak for you only when you cant speak for yourself. When you cannot make medical decisions, your proxy makes sure the instructions in your advance care plan are followed. A proxy does not make decisions based on his or her own opinions. They must put aside those opinions and values if needed, and carry out your wishes.    Filling out the legal documents. There are several kinds of legal documents for advance care planning. Each one tells health care providers your wishes. The documents may vary by state. They must be signed and may need to be witnessed or notarized. You can cancel or change them whenever you wish. Depending on your state, the documents may include a Healthcare Proxy form, Living Will, Durable Medical Power of , Advance Directive, or others.  The Familys Role  The best help a family can give is to support their loved ones wishes. Open and honest communication is vital. Family should express any concerns they have about the patients choices while the patient can still make decisions.    1234-7718 [employer]. [address] [place] [address], [place] [address]. All rights reserved. This information is not intended as a substitute for professional medical care. Always follow your healthcare professional's instructions.         Also, [employer] [place] offers a free, downloadable health care directive that allows you to share your treatment choices and personal preferences if you cannot communicate your wishes. It also allows you to appoint another person (called a health care agent) to make health care decisions if you are unable to do so. You can  download an advance directive by going here: http://www.[facility].org/honoring-choices.html     Patient Education   Personalized Prevention Plan  You are due for the preventive services outlined below.  Your care team is available to assist you in scheduling these services.  If you have already completed any of these items, please share that information with your care team to update in your medical record.  Health Maintenance Due   Topic Date Due   ? COPD ACTION PLAN  Never done   ? ZOSTER VACCINES (2 of 3) 08/10/2016   ? COLORECTAL CANCER SCREENING  07/06/2021

## 2021-08-06 NOTE — LETTER
8/6/2021       RE: Laurent Choi  [address]  [place] [address]     Dear Colleague,    Thank you for referring your patient, Laurent Choi, to the [facility] [place] at [facility]. Please see a copy of my visit note below.          Chief Complaint:    Elevated PSA (Prostate Specific Antigen)           Consult or Referral:     Mr. Laurent Choi is a 76 year old male seen at the request of Dr. Smith.         History of Present Illness:     Laurent Choi is a 76 year old male being seen for elevated PSA.  Duration of problem: 2 months  Previous treatments: none    Reviewed previous notes from Dr. Smith  He has been evaluated in  2019 with an MRI for a PSA of 3.7 which was PIRADS 3 and currently presents with a PSA at 7.7  No family history of prostate cancer and LUTS are well controlled with flomax.  No recent urethral instrumentation or urinary infection.           Past Medical History:     Past Medical History:   Diagnosis Date     Alcohol abuse      Arthritis      Chest pain      Clogged artery (heart)      GERD (gastroesophageal reflux disease)      Heart disease     Stints in heart due to cholesterol build up.      High cholesterol     Takes medications for this.      Hypertension      Hypertension      Macular degeneration     left eye is stable, right eye is legally blind.      Macular degeneration             Past Surgical History:     Past Surgical History:   Procedure Laterality Date     CARDIAC SURGERY       COLONOSCOPY      removal of polyp     ENT SURGERY       HERNIA REPAIR              Medications     Current Outpatient Medications   Medication     ACETAMINOPHEN PO     amLODIPine-benazepril (LOTREL) 10-20 MG per capsule     ASPIRIN PO     calcium-vitamin D 500-125 MG-UNIT TABS     DIPHENHYDRAMINE HCL PO     escitalopram (LEXAPRO) 20 MG tablet     gabapentin (NEURONTIN) 300 MG  capsule     LOSARTAN POTASSIUM PO     melatonin 3 MG tablet     naltrexone (VIVITROL) 380 MG SUSR     nitroGLYcerin (NITRODUR) 0.4 MG/HR 24 hr patch     NITROGLYCERIN SL     omeprazole 20 MG tablet     ROSUVASTATIN CALCIUM PO     tamsulosin (FLOMAX) 0.4 MG capsule     Current Facility-Administered Medications   Medication     naltrexone (VIVITROL) injection 380 mg            Family History:     Family History   Problem Relation Age of Onset     Dementia Mother      Substance Abuse Father      Suicide Other      Anxiety Disorder Sister      Depression No family hx of      Schizophrenia No family hx of      Bipolar Disorder No family hx of      Eleonora Disease No family hx of      Parkinsonism No family hx of      Autism Spectrum Disorder No family hx of      Snoring Father             Social History:     Social History     Socioeconomic History     Marital status:      Spouse name: Not on file     Number of children: Not on file     Years of education: Not on file     Highest education level: Not on file   Occupational History     Not on file   Tobacco Use     Smoking status: Former Smoker     Packs/day: 2.00     Types: Cigarettes     Smokeless tobacco: Never Used   Substance and Sexual Activity     Alcohol use: Not on file     Drug use: Not on file     Sexual activity: Not on file   Other Topics Concern     Parent/sibling w/ CABG, MI or angioplasty before 65F 55M? Not Asked   Social History Narrative     Not on file     Social Determinants of Health     Financial Resource Strain:      Difficulty of Paying Living Expenses:    Food Insecurity:      Worried About Running Out of Food in the Last Year:      Ran Out of Food in the Last Year:    Transportation Needs:      Lack of Transportation (Medical):      Lack of Transportation (Non-Medical):    Physical Activity:      Days of Exercise per Week:      Minutes of Exercise per Session:    Stress:      Feeling of Stress :    Social Connections:      Frequency of  "Communication with Friends and Family:      Frequency of Social Gatherings with Friends and Family:      Attends Jew Services:      Active Member of Clubs or Organizations:      Attends Club or Organization Meetings:      Marital Status:    Intimate Partner Violence:      Fear of Current or Ex-Partner:      Emotionally Abused:      Physically Abused:      Sexually Abused:             Allergies:   Aspirin, Nsaids (non-steroidal anti-inflammatory drug) [nsaids], Plavix [clopidogrel], and Statins-hmg-coa reductase inhibitors [hmg-coa-r inhibitors]         Review of Systems:  From intake questionnaire     Skin: negative  Eyes: negative  Ears/Nose/Throat: negative  Respiratory: No shortness of breath, dyspnea on exertion, cough, or hemoptysis  Cardiovascular: No chest pain or palpitations  Gastrointestinal: negative; no nausea/vomiting, constipation or diarrhea  Genitourinary: as per HPI  Musculoskeletal: negative  Neurologic: negative  Psychiatric: negative  Hematologic/Lymphatic/Immunologic: negative  Endocrine: negative         Physical Exam:     Patient is a 76 year old  male   Vitals: Blood pressure 116/69, pulse 79, height 1.778 m (5' 10\"), weight 81.2 kg (179 lb).  Constitutional: Body mass index is 25.68 kg/m .  Alert, no acute distress, oriented, conversant  Eyes: no scleral icterus; extraocular muscles intact, moist conjunctivae  Neck: trachea midline, no thyromegaly  Ears/nose/mouth: throat/mouth:normal, good dentition  Respiratory: no respiratory distress, or pursed lip breathing  Cardiovascular: pulses strong and intact; no obvious jugular venous distension present  Gastrointestinal: soft, nontender, no organomegaly or masses,   Lymphatics: No inguinal adenopathy  Musculoskeletal: extremities normal, no peripheral edema  Skin: no suspicious lesions or rashes  Neuro: Alert, oriented, speech and mentation normal  Psych: affect and mood normal, alert and oriented to person, place and time  Gait: " Normal  : penis, scrotum, testes normal, FREDDY anodular, symmetric      Labs and Pathology:    The following labs were reviewed by me and discussed with the patient:    Significant for   Lab Results   Component Value Date    CR 0.86 06/01/2021    CR 0.84 08/26/2020    CR 0.78 09/04/2019    CR 0.88 07/17/2019    CR 0.72 08/28/2018    CR 0.74 08/24/2018    CR 0.80 07/30/2018    CR 0.90 08/12/2007    CR 0.80 06/16/2007    CR 0.80 09/30/2005    CR 1.03 09/14/2005     Prostate Specific Antigen Screen   Date Value Ref Range Status   06/01/2021 7.7 (H) 0.00 - 6.50 ng/mL Final   08/26/2020 3.6 0.00 - 6.50 ng/mL Final             Imaging:    The following imaging exams were independently viewed and interpreted by me and discussed with patient:  MRI Abd/Pelvis: Abnormal: 2019  PIRADS 3 lesion in the prostate             Assessment and Plan:     Elevated prostate specific antigen (PSA)  We discussed the significance of an elevated PSA and the need for further testing to help us decide on Prostate  biopsy.  We also discussed about MRI and how it helps us to identify and target clinically significant lesions by MRI/TRUS fusion.    In view of this We decided to do repeat PSA and consider for MRI Prostate .    - PSA tumor marker; Future      Plan:  PSA in 2 weeks    Orders  Orders Placed This Encounter   Procedures     PSA tumor marker       Blaine Adame MD  [facility] [place]      ==========================    Additional Billing and Coding Information:  Review of external notes as documented above   Review of the result(s) of each unique test - PSA    Independent interpretation of a test performed by another physician/other qualified health care professional (not separately reported) - MRI      Discussion of management or test interpretation with external physician/other qualified healthcare professional/appropriate source - NA    Diagnosis or treatment significantly limited by social determinants of  health - NA    15 minutes spent on the date of the encounter doing chart review, review of outside records, review of test results, interpretation of tests, patient visit and documentation     ==========================

## 2021-08-19 ENCOUNTER — LAB (OUTPATIENT)
Dept: LAB | Facility: CLINIC | Age: 76
End: 2021-08-19
Payer: COMMERCIAL

## 2021-08-19 DIAGNOSIS — R97.20 ELEVATED PROSTATE SPECIFIC ANTIGEN (PSA): ICD-10-CM

## 2021-08-19 LAB — PSA SERPL-MCNC: 5.67 UG/L (ref 0–6.5)

## 2021-08-19 PROCEDURE — 84153 ASSAY OF PSA TOTAL: CPT

## 2021-08-19 PROCEDURE — 36415 COLL VENOUS BLD VENIPUNCTURE: CPT

## 2021-08-23 ENCOUNTER — TELEPHONE (OUTPATIENT)
Dept: UROLOGY | Facility: CLINIC | Age: 76
End: 2021-08-23

## 2021-08-23 DIAGNOSIS — R97.20 ELEVATED PROSTATE SPECIFIC ANTIGEN (PSA): Primary | ICD-10-CM

## 2021-08-23 NOTE — TELEPHONE ENCOUNTER
His psa is 5.67  it is down from the last psa in June but do you want to go forward with mri ?? Biopsy?? Татьяна Lerma LPN Staff Nurse

## 2021-08-23 NOTE — TELEPHONE ENCOUNTER
Called patient and told him no MRI or biopsy  Return in 6 months with psa  Psa orders placed told patient to call back in 4-5 months and set up his follow up Татьяна Lerma LPN Staff Nurse

## 2021-08-23 NOTE — TELEPHONE ENCOUNTER
[facility]    Phone Message    May a detailed message be left on voicemail: yes     Reason for Call: Requesting Results   Name/type of test: PSA  Date of test: 8/19/2021  Was test done at a location other than [facility] (Please fill in the location if not [facility])?: No      Action Taken: Message routed to:  [facility] uro    Travel Screening: Not Applicable

## 2021-08-25 ENCOUNTER — OFFICE VISIT (OUTPATIENT)
Dept: PSYCHIATRY | Facility: CLINIC | Age: 76
End: 2021-08-25
Attending: PSYCHIATRY & NEUROLOGY
Payer: COMMERCIAL

## 2021-08-25 VITALS
DIASTOLIC BLOOD PRESSURE: 74 MMHG | SYSTOLIC BLOOD PRESSURE: 129 MMHG | BODY MASS INDEX: 25.88 KG/M2 | WEIGHT: 180.4 LBS | HEART RATE: 80 BPM

## 2021-08-25 DIAGNOSIS — F10.20 ALCOHOL USE DISORDER, SEVERE, DEPENDENCE (H): Primary | ICD-10-CM

## 2021-08-25 DIAGNOSIS — F33.1 MODERATE EPISODE OF RECURRENT MAJOR DEPRESSIVE DISORDER (H): ICD-10-CM

## 2021-08-25 DIAGNOSIS — F41.1 GAD (GENERALIZED ANXIETY DISORDER): ICD-10-CM

## 2021-08-25 PROCEDURE — G0463 HOSPITAL OUTPT CLINIC VISIT: HCPCS

## 2021-08-25 PROCEDURE — 99214 OFFICE O/P EST MOD 30 MIN: CPT | Mod: GC

## 2021-08-25 RX ORDER — ESCITALOPRAM OXALATE 20 MG/1
20 TABLET ORAL DAILY
Qty: 90 TABLET | Refills: 0 | Status: CANCELLED | OUTPATIENT
Start: 2021-08-25

## 2021-08-25 ASSESSMENT — PAIN SCALES - GENERAL: PAINLEVEL: NO PAIN (0)

## 2021-08-25 NOTE — NURSING NOTE
Chief Complaint   Patient presents with     Recheck Medication     Alcohol use disorder, severe, in sustained remission
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
## 2021-08-25 NOTE — Clinical Note
Hello, this pt would be interested in re-joining your relapse prevention group.  Please keep him in mind when that group re-starts.  On note, Dr. Bee knows several other pts that would be interested.

## 2021-08-30 ENCOUNTER — OFFICE VISIT (OUTPATIENT)
Dept: AUDIOLOGY | Facility: CLINIC | Age: 76
End: 2021-08-30
Payer: COMMERCIAL

## 2021-08-30 ENCOUNTER — OFFICE VISIT (OUTPATIENT)
Dept: OTOLARYNGOLOGY | Facility: CLINIC | Age: 76
End: 2021-08-30
Payer: COMMERCIAL

## 2021-08-30 DIAGNOSIS — R23.4: Primary | ICD-10-CM

## 2021-08-30 DIAGNOSIS — H61.21 KERATIN DEBRIS OF EAR CANAL, RIGHT: ICD-10-CM

## 2021-08-30 DIAGNOSIS — H90.3 SENSORINEURAL HEARING LOSS (SNHL) OF BOTH EARS: ICD-10-CM

## 2021-08-30 DIAGNOSIS — H90.3 SENSORINEURAL HEARING LOSS (SNHL) OF BOTH EARS: Primary | ICD-10-CM

## 2021-08-30 DIAGNOSIS — H93.8X1 EAR FULLNESS, RIGHT: ICD-10-CM

## 2021-08-30 PROCEDURE — 92504 EAR MICROSCOPY EXAMINATION: CPT | Mod: RT | Performed by: OTOLARYNGOLOGY

## 2021-08-30 PROCEDURE — 99213 OFFICE O/P EST LOW 20 MIN: CPT | Mod: 25 | Performed by: OTOLARYNGOLOGY

## 2021-08-30 PROCEDURE — 92557 COMPREHENSIVE HEARING TEST: CPT | Performed by: AUDIOLOGIST

## 2021-08-30 RX ORDER — CLOTRIMAZOLE 1 %
CREAM (GRAM) TOPICAL 2 TIMES DAILY
Qty: 24 G | Refills: 0 | Status: SHIPPED | OUTPATIENT
Start: 2021-08-30

## 2021-08-30 NOTE — LETTER
8/30/2021         RE: Laurent Choi  [address]  [place] [address]        Dear Colleague,    Thank you for referring your patient, Laurent Choi, to the [facility]. Please see a copy of my visit note below.    CHIEF COMPLAINT:  Recheck      HISTORY OF PRESENT ILLNESS    Gio was seen in follow up for audiogram review.  Right ear still feels full on occasion.  No pain or discharge.  Notes that skin feels rough in front of right ear.   Diagnoses       Codes Comments    Scaly skin on examination    -  Primary R23.4                  REVIEW OF SYSTEMS    Review of Systems: a 10-system review is reviewed at this encounter.  See scanned document.     Aspirin, Nsaids (non-steroidal anti-inflammatory drug) [nsaids], Plavix [clopidogrel], and Statins-hmg-coa reductase inhibitors [hmg-coa-r inhibitors]     PHYSICAL EXAM:        HEAD: Normal appearance and symmetry:  No cutaneous lesions.      EARS:   Auricles normal    Debridement (RIGHT EAR)    Under the microscope, a combination of curved pick and alligator forceps is used to remove layer of debris/kertin crust from EAC.   Thin layer of crust over TM (not removed due to bleeding with manipulation)       Preauricular skin (right): scaly, pink appearance  NOSE:    Dorsum:   straight       ORAL CAVITY/OROPHARYNX:    Lips:  Normal.     NECK:  Trachea:  midline       NEURO:   Alert and Oriented    GAIT AND STATION:  normal     RESPIRATORY:   Symmetry and Respiratory effort    PSYCH:   normal mood and affect    SKIN:  warm and dry     AUDIOGRAM:  High frequency SNHL with preserved wR.     IMPRESSION:   Encounter Diagnosis   Name Primary?     Scaly skin on examination Yes              RECOMMENDATIONS:    Clotrimazole cream as directed  Return visit 3 months for ear microscopy right ear.         Again, thank you for allowing me to participate in the care of your patient.        Sincerely,        Piyush Rasmussen MD

## 2021-08-30 NOTE — PROGRESS NOTES
CHIEF COMPLAINT:  Recheck      HISTORY OF PRESENT ILLNESS    Gio was seen in follow up for audiogram review.  Right ear still feels full on occasion.  No pain or discharge.  Notes that skin feels rough in front of right ear.   Diagnoses       Codes Comments    Scaly skin on examination    -  Primary R23.4                  REVIEW OF SYSTEMS    Review of Systems: a 10-system review is reviewed at this encounter.  See scanned document.     Aspirin, Nsaids (non-steroidal anti-inflammatory drug) [nsaids], Plavix [clopidogrel], and Statins-hmg-coa reductase inhibitors [hmg-coa-r inhibitors]     PHYSICAL EXAM:        HEAD: Normal appearance and symmetry:  No cutaneous lesions.      EARS:   Auricles normal    Debridement (RIGHT EAR)    Under the microscope, a combination of curved pick and alligator forceps is used to remove layer of debris/kertin crust from EAC.   Thin layer of crust over TM (not removed due to bleeding with manipulation)       Preauricular skin (right): scaly, pink appearance  NOSE:    Dorsum:   straight       ORAL CAVITY/OROPHARYNX:    Lips:  Normal.     NECK:  Trachea:  midline       NEURO:   Alert and Oriented    GAIT AND STATION:  normal     RESPIRATORY:   Symmetry and Respiratory effort    PSYCH:   normal mood and affect    SKIN:  warm and dry     AUDIOGRAM:  High frequency SNHL with preserved wR.     IMPRESSION:   Encounter Diagnosis   Name Primary?     Scaly skin on examination Yes              RECOMMENDATIONS:    Clotrimazole cream as directed  Return visit 3 months for ear microscopy right ear.

## 2021-08-30 NOTE — PROGRESS NOTES
AUDIOLOGY REPORT  Patient seen 6/23/2021 and had right otorrhea, no audio completed at that time. He has been seen and treated by ENT for fungal otitis externa. Patient in today to complete hearing test.     SUMMARY: Audiology visit completed. See audiogram for results.      RECOMMENDATIONS: Follow-up with ENT.    Karan López, CCC-A  [place] Licensed Audiologist #[id number]

## 2021-08-30 NOTE — PATIENT INSTRUCTIONS
Clotrimazole cream as directed  Keep right ear dry  Return visit 3 months for microsocope evaluation

## 2021-08-31 ENCOUNTER — TELEPHONE (OUTPATIENT)
Dept: PSYCHIATRY | Facility: CLINIC | Age: 76
End: 2021-08-31

## 2021-08-31 NOTE — TELEPHONE ENCOUNTER
On 8/31/2021, writer called patient on mobile to confirm [facility] appointment scheduled for 1000 9/1/2021. Writer made contact with patient who confirmed attendance. JAMEE Counsell, EMT

## 2021-09-01 ENCOUNTER — ALLIED HEALTH/NURSE VISIT (OUTPATIENT)
Dept: PSYCHIATRY | Facility: CLINIC | Age: 76
End: 2021-09-01
Payer: COMMERCIAL

## 2021-09-01 ENCOUNTER — TELEPHONE (OUTPATIENT)
Dept: PSYCHIATRY | Facility: CLINIC | Age: 76
End: 2021-09-01

## 2021-09-01 VITALS
WEIGHT: 180 LBS | BODY MASS INDEX: 25.83 KG/M2 | SYSTOLIC BLOOD PRESSURE: 130 MMHG | HEART RATE: 103 BPM | DIASTOLIC BLOOD PRESSURE: 80 MMHG

## 2021-09-01 DIAGNOSIS — F10.21 ACUTE ALCOHOLIC INTOXICATION IN ALCOHOLISM, IN REMISSION (H): ICD-10-CM

## 2021-09-01 DIAGNOSIS — F10.20 ALCOHOL USE DISORDER, SEVERE, DEPENDENCE (H): Primary | ICD-10-CM

## 2021-09-01 PROCEDURE — 96372 THER/PROPH/DIAG INJ SC/IM: CPT | Performed by: PSYCHIATRY & NEUROLOGY

## 2021-09-01 PROCEDURE — 250N000011 HC RX IP 250 OP 636: Performed by: PSYCHIATRY & NEUROLOGY

## 2021-09-01 RX ORDER — NALTREXONE 380 MG
380 KIT INTRAMUSCULAR
Qty: 1 EACH | Refills: 4 | OUTPATIENT
Start: 2021-09-01 | End: 2022-04-13

## 2021-09-01 RX ADMIN — NALTREXONE 380 MG: KIT at 11:03

## 2021-09-01 ASSESSMENT — PAIN SCALES - GENERAL: PAINLEVEL: NO PAIN (0)

## 2021-09-01 NOTE — TELEPHONE ENCOUNTER
----- Message from Tiffanie Dunn MD sent at 9/1/2021  8:05 AM CDT -----  Regarding: RE: Need Verbal  Pt will continue with injections, not move to oral at this time.  OK for injection today.  ----- Message -----  From: Deborah Blanchard LPN  Sent: 8/31/2021   7:33 AM CDT  To: Tiffanie Dunn MD  Subject: Need Verbal                                      Good morning,    Gio will be coming in tomorrow Wednesday 9/1/21 for his next Vivitrol. A few things I need before I can give his injection. Your last note is not signed so I need a verbal authorization. So, is it ok to give Gio his Vivitrol IM Injection tomorrow?   Also, are you planning on stopping the injection and going to oral naltrexone? I saw in your note there was talk of this. Let me know. If we are continuing this injection is it ok to put in a few refills? Maybe 3?  Let me know    Thanks    Deborah

## 2021-09-01 NOTE — TELEPHONE ENCOUNTER
VORB - Continue with Vivitrol 380 mg. IM Injection, q28d Per Dr Tiffanie Dunn.Deborah Blanchard LPN

## 2021-09-01 NOTE — PATIENT INSTRUCTIONS
**For crisis resources, please see the information at the end of this document**     Patient Education      Thank you for coming to the [facility] [place] [facility].    Lab Testing:  If you had lab testing today and your results are reassuring or normal they will be mailed to you or sent through [employer] within 7 days. If the lab tests need quick action we will call you with the results. The phone number we will call with results is # [phone] (home) . If this is not the best number please call our clinic and change the number.    Medication Refills:  If you need any refills please call your pharmacy and they will contact us. Our fax number for refills is [phone]. Please allow three business for refill processing. If you need to  your refill at a new pharmacy, please contact the new pharmacy directly. The new pharmacy will help you get your medications transferred.     Scheduling:  If you have any concerns about today's visit or wish to schedule another appointment please call our office during normal business hours [phone] (8-5:00 M-F)    Contact Us:  Please call [phone] during business hours (8-5:00 M-F).  If after clinic hours, or on the weekend, please call  [phone].    Financial Assistance [phone]  [facility] Billing [phone]  Central Billing Office, [facility]: [phone]  [facility] Billing [phone]  Medical Records [phone]  [facility] Patient Bill of Rights [email or web address]       MENTAL HEALTH CRISIS NUMBERS:  For a medical emergency please call  911 or go to the nearest ER.     [facility]:   [facility] -[phone]   Crisis Residence [facility] Residence -[phone]   Walk-In Counseling Center [facility] -[phone]   COPE 24/7 [place] Mobile Team -[phone] (adults)/[phone] (child)  CHILD: [facility] needs assessment  team - [phone]      [facility]:   [facility] - [phone]   Walk-in counseling [facility] - [phone]   Walk-in counseling  - [phone]   Crisis Residence [facility]idence - [phone]  Urgent Care Adult Mental Ffndqf-[phone] mobile unit/ 24/7 crisis line    National Crisis Numbers:   National Suicide Prevention Lifeline: 1-722-927-TALK (339-480-7348)  Poison Control Center - 0-110-537-0571  [employer]/resources for a list of additional resources (SOS)  Trans Lifeline a hotline for transgender people 1-422.631.2504  The [employer] a hotline for LGBT youth 2-702-178-9776  Crisis Text Line: For any crisis 24/7   To: 234214  see www.crisistextline.org  - IF MAKING A CALL FEELS TOO HARD, send a text!         Again thank you for choosing [facility] MENTAL HEALTH & ADDICTION [facility] and please let us know how we can best partner with you to improve you and your family's health.    You may be receiving a survey regarding this appointment. We would love to have your feedback, both positive and negative. The survey is done by an external company, so your answers are anonymous.

## 2021-09-14 ENCOUNTER — TELEPHONE (OUTPATIENT)
Dept: UROLOGY | Facility: CLINIC | Age: 76
End: 2021-09-14

## 2021-09-14 DIAGNOSIS — R97.20 ELEVATED PROSTATE SPECIFIC ANTIGEN (PSA): Primary | ICD-10-CM

## 2021-09-14 NOTE — TELEPHONE ENCOUNTER
----- Message from Blaine Adame MD sent at 9/14/2021  1:02 PM CDT -----  PL ease schedule for a MRI

## 2021-09-22 ENCOUNTER — ANCILLARY PROCEDURE (OUTPATIENT)
Dept: MRI IMAGING | Facility: CLINIC | Age: 76
End: 2021-09-22
Attending: STUDENT IN AN ORGANIZED HEALTH CARE EDUCATION/TRAINING PROGRAM
Payer: COMMERCIAL

## 2021-09-22 DIAGNOSIS — R97.20 ELEVATED PROSTATE SPECIFIC ANTIGEN (PSA): ICD-10-CM

## 2021-09-22 PROCEDURE — 72197 MRI PELVIS W/O & W/DYE: CPT | Performed by: RADIOLOGY

## 2021-09-22 PROCEDURE — A9585 GADOBUTROL INJECTION: HCPCS | Performed by: RADIOLOGY

## 2021-09-22 RX ORDER — GADOBUTROL 604.72 MG/ML
10 INJECTION INTRAVENOUS ONCE
Status: COMPLETED | OUTPATIENT
Start: 2021-09-22 | End: 2021-09-22

## 2021-09-22 RX ADMIN — GADOBUTROL 8 ML: 604.72 INJECTION INTRAVENOUS at 18:59

## 2021-09-23 NOTE — DISCHARGE INSTRUCTIONS
MRI Contrast Discharge Instructions    The IV contrast you received today will pass out of your body in your  urine. This will happen in the next 24 hours. You will not feel this process.  Your urine will not change color.    Drink at least 4 extra glasses of water or juice today (unless your doctor  has restricted your fluids). This reduces the stress on your kidneys.  You may take your regular medicines.    If you are on dialysis: It is best to have dialysis today.    If you have a reaction: Most reactions happen right away. If you have  any new symptoms after leaving the hospital (such as hives or swelling),  call your hospital at the correct number below. Or call your family doctor.  If you have breathing distress or wheezing, call 911.    Special instructions: ***    I have read and understand the above information.    Signature:______________________________________ Date:___________    Staff:__________________________________________ Date:___________     Time:__________    [facility] Departments:    ___[place]: [phone]  ___[facility]: [phone]  ___[place]: [phone] ___[facility]: [phone]  ___[facility]: [phone]  ___[facility]: [phone]  ___[phone]  ___[facility]: [phone]  ___[phone]

## 2021-09-28 ENCOUNTER — TELEPHONE (OUTPATIENT)
Dept: PSYCHIATRY | Facility: CLINIC | Age: 76
End: 2021-09-28

## 2021-09-28 NOTE — TELEPHONE ENCOUNTER
On September 28, 2021 at 4:13 PM writer called patient at mobile to remind them of their injection appointment for tomorrow at 1530h. Writer was unable to reach to reach patient and left dvm for callback. [phone] left as callback number. Marcia Harper, EMT

## 2021-09-29 ENCOUNTER — ALLIED HEALTH/NURSE VISIT (OUTPATIENT)
Dept: PSYCHIATRY | Facility: CLINIC | Age: 76
End: 2021-09-29
Payer: COMMERCIAL

## 2021-09-29 VITALS — WEIGHT: 183.4 LBS | BODY MASS INDEX: 26.32 KG/M2

## 2021-09-29 DIAGNOSIS — F10.21 ALCOHOL USE DISORDER, SEVERE, IN SUSTAINED REMISSION (H): Primary | ICD-10-CM

## 2021-09-29 PROCEDURE — 96372 THER/PROPH/DIAG INJ SC/IM: CPT | Performed by: PSYCHIATRY & NEUROLOGY

## 2021-09-29 PROCEDURE — 250N000011 HC RX IP 250 OP 636: Performed by: PSYCHIATRY & NEUROLOGY

## 2021-09-29 RX ADMIN — NALTREXONE 380 MG: KIT at 11:43

## 2021-09-29 ASSESSMENT — PAIN SCALES - GENERAL: PAINLEVEL: NO PAIN (0)

## 2021-09-29 NOTE — NURSING NOTE
Clinic Administered Medication Documentation      Injectable Medication Documentation    Patient was given Vivitrol 380 mg. Prior to medication administration, verified patients identity using patient s name and date of birth. Please see MAR and medication order for additional information. Patient instructed to remain in clinic for 15 minutes, report any adverse reaction to staff immediately  and stay in clinic after the injection but patient declined.      Was entire vial of medication used? Yes  Vial/Syringe: Single dose vial  Expiration Date:  2023  Was this medication supplied by the patient? No      Gio Choi,  1945, presented to the clinic today at the request of Dr Tiffanie Dunn,  ordering provider for long-acting injectable Vivitrol 380 mg.    OBSERVATIONS:  1.  Appearance: Casually dressed in clean, weather appropriate clothing. Pt shared it has been 4 months since last alcohol use. No cravings. Pt enjoys getting outside and taking walks. Pt denies SI, HI, AH, VH, and SIB.  2.  Mood: Good, talkative, friendly  3.  Affect: Congruent  4.  Attitude: Good, engaged, good eye contact  5.  Cooperation: Full  6.  Side Effects: Denies  7.  Education: None needed  8. Next appointment: 10/27/2021 at 1000  9. Location: [facility]    The service provided today was rendered under the supervising provider of the day, Dr Bee, who was available for consultation as needed.

## 2021-09-30 ENCOUNTER — TELEPHONE (OUTPATIENT)
Dept: UROLOGY | Facility: CLINIC | Age: 76
End: 2021-09-30

## 2021-09-30 NOTE — TELEPHONE ENCOUNTER
[facility] Call Center    Phone Message    May a detailed message be left on voicemail: yes     Reason for Call: Other:Gio calling to report that he received an email from Dr. Adame and was asked to sign in to [facility] to view the secure message.  He needed to sign into [employer] which asked for a user name and password, but he could not sign in as [employer] does not have his correct phone number.  They had [phone] vs [phone].  He is unsure if this message is in relation to his MRI results.  Please call him back to discuss    Action Taken: Message routed to:  [facility]  Urology    Travel Screening: Not Applicable

## 2021-10-05 DIAGNOSIS — F33.1 MODERATE EPISODE OF RECURRENT MAJOR DEPRESSIVE DISORDER (H): ICD-10-CM

## 2021-10-05 NOTE — TELEPHONE ENCOUNTER
Pending Prescriptions:                       Disp   Refills    escitalopram (LEXAPRO) 20 MG tablet       90 tab*0            Sig: Take 1 tablet (20 mg) by mouth daily

## 2021-10-05 NOTE — TELEPHONE ENCOUNTER
"Routing refill request to provider for review/approval because:  Patient needs to be seen because:  Been more than 6 months  PHQ9     Last Written Prescription Date:  04/14/2021  Last Fill Quantity: 90,  # refills: 0   Last office visit provider:  02/22/2021 Luis     Requested Prescriptions   Pending Prescriptions Disp Refills     escitalopram (LEXAPRO) 20 MG tablet 90 tablet 0     Sig: Take 1 tablet (20 mg) by mouth daily       SSRIs Protocol Failed - 10/5/2021  4:17 PM        Failed - PHQ-9 score less than 5 in past 6 months     Please review last PHQ-9 score.           Failed - Recent (6 mo) or future (30 days) visit within the authorizing provider's specialty     Patient had office visit in the last 6 months or has a visit in the next 30 days with authorizing provider or within the authorizing provider's specialty.  See \"Patient Info\" tab in inbasket, or \"Choose Columns\" in Meds & Orders section of the refill encounter.            Passed - Medication is active on med list        Passed - Patient is age 18 or older             Daya Alejo RN 10/05/21 5:12 PM  "

## 2021-10-06 DIAGNOSIS — F33.1 MODERATE EPISODE OF RECURRENT MAJOR DEPRESSIVE DISORDER (H): ICD-10-CM

## 2021-10-06 RX ORDER — ESCITALOPRAM OXALATE 20 MG/1
20 TABLET ORAL DAILY
Qty: 90 TABLET | Refills: 0 | Status: SHIPPED | OUTPATIENT
Start: 2021-10-06 | End: 2021-11-17

## 2021-10-06 NOTE — TELEPHONE ENCOUNTER
Last seen: 8/25  RTC: 1 month   Cancel: 10/6  No-show: non  Next appt:  11/17    Incoming refill from patient via phone     Medication requested: escitalopram (LEXAPRO) 20 MG tablet  Directions:  Take 1 tablet (20 mg) by mouth daily - Oral  Qty: 90  Last refilled: 4/14    Per chart review, patient should have ran out of medications few months ago. Placed a call to patient and was notified he ran out of mediations on Friday. He was able to reach out to pharmacy and was provided with 3 days emergency supply. He is requesting a refill at this time.

## 2021-10-06 NOTE — TELEPHONE ENCOUNTER
pollo's pt - refill  Received: Today  Aaliyah Caro [facility] Psychiatry [facility]  Phone Number: [phone]     Caller:  Self   Relationship to pt: Self   Medication:   escitalopram (LEXAPRO) 20 MG tablet   How many days left of med do you have left (if >3 day supply, redirect to pharmacy): 0   Pharmacy and location: [facility] - [place], [place] - [address]   Pending appt date:  11/17 @ 8:30am - pt had to reschedule since provider out sick today.   Okay to leave detailed VM:  n/a [phone]       Thank you,   Aaliyah

## 2021-10-11 DIAGNOSIS — R35.0 URINE FREQUENCY: ICD-10-CM

## 2021-10-11 RX ORDER — ESCITALOPRAM OXALATE 20 MG/1
20 TABLET ORAL DAILY
Qty: 90 TABLET | Refills: 0 | Status: SHIPPED | OUTPATIENT
Start: 2021-10-11 | End: 2021-11-17

## 2021-10-11 NOTE — TELEPHONE ENCOUNTER
Pending Prescriptions:                       Disp   Refills    tamsulosin (FLOMAX) 0.4 MG capsule        90 cap*0            Sig: TAKE ONE CAPSULE BY MOUTH ONCE DAILY WITH           BREAKFAST

## 2021-10-12 RX ORDER — TAMSULOSIN HYDROCHLORIDE 0.4 MG/1
CAPSULE ORAL
Qty: 90 CAPSULE | Refills: 2 | Status: SHIPPED | OUTPATIENT
Start: 2021-10-12 | End: 2022-07-19

## 2021-10-12 NOTE — TELEPHONE ENCOUNTER
"Last Written Prescription Date:  7/19/21  Last Fill Quantity: 90,  # refills: 0   Last office visit provider:  6/1/21     Requested Prescriptions   Pending Prescriptions Disp Refills     tamsulosin (FLOMAX) 0.4 MG capsule 90 capsule 0     Sig: TAKE ONE CAPSULE BY MOUTH ONCE DAILY WITH BREAKFAST       Alpha Blockers Passed - 10/11/2021 11:42 AM        Passed - Blood pressure under 140/90 in past 12 months     BP Readings from Last 3 Encounters:   08/06/21 116/69   06/01/21 118/75   04/14/21 110/64                 Passed - Recent (12 mo) or future (30 days) visit within the authorizing provider's specialty     Patient has had an office visit with the authorizing provider or a provider within the authorizing providers department within the previous 12 mos or has a future within next 30 days. See \"Patient Info\" tab in inbasket, or \"Choose Columns\" in Meds & Orders section of the refill encounter.              Passed - Patient does not have Tadalafil, Vardenafil, or Sildenafil on their medication list        Passed - Medication is active on med list        Passed - Patient is 18 years of age or older             Florence Soto RN 10/12/21 10:59 AM  "

## 2021-10-20 DIAGNOSIS — I25.10 CORONARY ATHEROSCLEROSIS OF NATIVE CORONARY ARTERY: Primary | ICD-10-CM

## 2021-10-21 RX ORDER — ROSUVASTATIN CALCIUM 40 MG/1
TABLET, COATED ORAL
Qty: 90 TABLET | Refills: 2 | Status: SHIPPED | OUTPATIENT
Start: 2021-10-21 | End: 2022-07-19

## 2021-10-21 NOTE — TELEPHONE ENCOUNTER
"Routing refill request to provider for review/approval because:  Allergy contraindication     Last Written Prescription Date:  1/23/21  Last Fill Quantity: 90,  # refills: 2   Last office visit provider:           Requested Prescriptions   Pending Prescriptions Disp Refills     rosuvastatin (CRESTOR) 40 MG tablet [Pharmacy Med Name: ROSUVASTATIN CALCIUM 40MG TABS] 90 tablet 2     Sig: TAKE ONE TABLET BY MOUTH EVERY NIGHT AT BEDTIME       Statins Protocol Passed - 10/20/2021 12:25 PM        Passed - LDL on file in past 12 months     Recent Labs   Lab Test 06/01/21  1125   LDL 74             Passed - No abnormal creatine kinase in past 12 months     No lab results found.             Passed - Recent (12 mo) or future (30 days) visit within the authorizing provider's specialty     Patient has had an office visit with the authorizing provider or a provider within the authorizing providers department within the previous 12 mos or has a future within next 30 days. See \"Patient Info\" tab in inbasket, or \"Choose Columns\" in Meds & Orders section of the refill encounter.              Passed - Medication is active on med list        Passed - Patient is age 18 or older             Faye Savage RN 10/21/21 11:11 AM  "

## 2021-10-24 ENCOUNTER — HEALTH MAINTENANCE LETTER (OUTPATIENT)
Age: 76
End: 2021-10-24

## 2021-10-26 ENCOUNTER — TELEPHONE (OUTPATIENT)
Dept: PSYCHIATRY | Facility: CLINIC | Age: 76
End: 2021-10-26

## 2021-10-26 NOTE — TELEPHONE ENCOUNTER
On October 26, 2021 at 2:05 PM writer called patient at mobile to remind them of their BARRETO appointment tomorrow, 10/27/21 at 1000h. Patient confirmed they would be present for this appointment. Marcia Harper, EMT

## 2021-10-27 ENCOUNTER — ALLIED HEALTH/NURSE VISIT (OUTPATIENT)
Dept: PSYCHIATRY | Facility: CLINIC | Age: 76
End: 2021-10-27
Payer: COMMERCIAL

## 2021-10-27 VITALS
SYSTOLIC BLOOD PRESSURE: 129 MMHG | WEIGHT: 180 LBS | DIASTOLIC BLOOD PRESSURE: 84 MMHG | HEART RATE: 73 BPM | BODY MASS INDEX: 25.83 KG/M2

## 2021-10-27 DIAGNOSIS — Z23 NEED FOR PROPHYLACTIC VACCINATION AND INOCULATION AGAINST INFLUENZA: Primary | ICD-10-CM

## 2021-10-27 DIAGNOSIS — F10.21 ALCOHOL USE DISORDER, SEVERE, IN SUSTAINED REMISSION (H): Primary | ICD-10-CM

## 2021-10-27 PROCEDURE — 250N000011 HC RX IP 250 OP 636

## 2021-10-27 PROCEDURE — 250N000011 HC RX IP 250 OP 636: Performed by: PSYCHIATRY & NEUROLOGY

## 2021-10-27 PROCEDURE — G0008 ADMIN INFLUENZA VIRUS VAC: HCPCS

## 2021-10-27 PROCEDURE — 96372 THER/PROPH/DIAG INJ SC/IM: CPT | Performed by: PSYCHIATRY & NEUROLOGY

## 2021-10-27 PROCEDURE — 90662 IIV NO PRSV INCREASED AG IM: CPT

## 2021-10-27 RX ADMIN — NALTREXONE 380 MG: KIT at 12:42

## 2021-10-27 ASSESSMENT — PAIN SCALES - GENERAL: PAINLEVEL: NO PAIN (0)

## 2021-10-27 NOTE — NURSING NOTE
Gio Choi,  49, presented to the clinic today at the request of Dr. Dunn,  ordering provider for long-acting injectable Vivitrol 380mg.    OBSERVATIONS:  1.  Appearance: Dressed in casual, weather appropriate clothing, good body hygiene, good eye contact and held a conversation.    2.  Mood: Engaged, he talked about being sober for 2 years, 2 small slips in the spring- which he doesn't count. He said feels that his drinking is getting under control, and may not be needing the injection in the far future.  H was running behind today, because he was scheduling his covid booster shot online.  He reports no safety concerns, SIB or SI.    3.  Affect: Congruent  4.  Attitude: Friendly, he also inquired about a flu shot, flu shot administered.    5.  Cooperation: Full  6.  Side Effects: denied  7.  Education: none   8. Next appointment: 21 @ 0830 with Dr. Dunn and injection on 21 @ 1030  9. Location: [facility]    The service provided today was rendered under the supervising provider of the day, Dr. Bee, who was available for consultation as needed.  Clinic Administered Medication Documentation      Injectable Medication Documentation    Patient was given Vivitrol 380mg. Prior to medication administration, verified patients identity using patient s name and date of birth. Please see MAR and medication order for additional information. Patient instructed to report any adverse reaction to staff immediately .      Was entire vial of medication used? Yes  Vial/Syringe: Single dose vial  Expiration Date:  2023  Was this medication supplied by the patient? No

## 2021-10-27 NOTE — PATIENT INSTRUCTIONS
**For crisis resources, please see the information at the end of this document**     Patient Education      Thank you for coming to the [facility] MENTAL HEALTH & ADDICTION [place] CLINIC.    Lab Testing:  If you had lab testing today and your results are reassuring or normal they will be mailed to you or sent through [facility] within 7 days. If the lab tests need quick action we will call you with the results. The phone number we will call with results is # [phone] (home) . If this is not the best number please call our clinic and change the number.    Medication Refills:  If you need any refills please call your pharmacy and they will contact us. Our fax number for refills is [phone]. Please allow three business for refill processing. If you need to  your refill at a new pharmacy, please contact the new pharmacy directly. The new pharmacy will help you get your medications transferred.     Scheduling:  If you have any concerns about today's visit or wish to schedule another appointment please call our office during normal business hours [phone] (8-5:00 M-F)    Contact Us:  Please call [phone] during business hours (8-5:00 M-F).  If after clinic hours, or on the weekend, please call  [phone].    Financial Assistance [phone]  [facility] Billing [phone]  Central Billing Office, [facility]: [phone]  [facility] Billing [phone]  Medical Records [phone]  [facility] Patient Bill of Rights [email or web address]       MENTAL HEALTH CRISIS NUMBERS:  For a medical emergency please call  911 or go to the nearest ER.     [facility]:   [facility] -[phone]   Crisis Residence [facility] Residence -[phone]   Walk-In Counseling Center [facility] -[phone]   COPE 24/7 [place] Mobile Team -[phone] (adults)/[phone] (child)  CHILD: [facility] needs assessment  team - [phone]      [facility]:   [facility] - [phone]   Walk-in counseling [facility] - [phone]   Walk-in counseling [facility] - [phone]   Crisis Residence [facility] Res[facility] - [phone]  Urgent Care Adult Mental [facility]-[phone] mobile unit/ 24/7 crisis line    National Crisis Numbers:   National Suicide Prevention Lifeline: 0-868-210-TALK (734-232-0287)  Poison Control Center - 0-024-277-5750  [facility]/resources for a list of additional resources (SOS)  Trans Lifeline a hotline for transgender people 1-197.610.9138  The Oni Project a hotline for LGBT youth 4-589-451-3701  Crisis Text Line: For any crisis 24/7   To: 269301  see www.crisistextline.org  - IF MAKING A CALL FEELS TOO HARD, send a text!         Again thank you for choosing [facility] MENTAL HEALTH & ADDICTION [facility] and please let us know how we can best partner with you to improve you and your family's health.    You may be receiving a survey regarding this appointment. We would love to have your feedback, both positive and negative. The survey is done by an external company, so your answers are anonymous.

## 2021-11-17 ENCOUNTER — VIRTUAL VISIT (OUTPATIENT)
Dept: PSYCHIATRY | Facility: CLINIC | Age: 76
End: 2021-11-17
Attending: PSYCHIATRY & NEUROLOGY
Payer: COMMERCIAL

## 2021-11-17 DIAGNOSIS — G47.00 INSOMNIA, UNSPECIFIED TYPE: ICD-10-CM

## 2021-11-17 DIAGNOSIS — F33.1 MODERATE EPISODE OF RECURRENT MAJOR DEPRESSIVE DISORDER (H): ICD-10-CM

## 2021-11-17 DIAGNOSIS — F41.1 GAD (GENERALIZED ANXIETY DISORDER): ICD-10-CM

## 2021-11-17 DIAGNOSIS — F10.21 ALCOHOL USE DISORDER, SEVERE, IN EARLY REMISSION (H): Primary | ICD-10-CM

## 2021-11-17 PROCEDURE — 99214 OFFICE O/P EST MOD 30 MIN: CPT | Mod: 95 | Performed by: FAMILY MEDICINE

## 2021-11-17 RX ORDER — GABAPENTIN 300 MG/1
CAPSULE ORAL
Qty: 90 CAPSULE | Refills: 3 | Status: SHIPPED | OUTPATIENT
Start: 2021-11-17 | End: 2022-03-16

## 2021-11-17 RX ORDER — ESCITALOPRAM OXALATE 20 MG/1
20 TABLET ORAL DAILY
Qty: 90 TABLET | Refills: 0 | Status: SHIPPED | OUTPATIENT
Start: 2021-11-17 | End: 2022-03-16

## 2021-11-17 ASSESSMENT — PAIN SCALES - GENERAL: PAINLEVEL: NO PAIN (0)

## 2021-11-17 NOTE — PROGRESS NOTES
"    ----------------------------------------------------------------------------------------------------------  [facility], [place]   Psychiatry Clinic Progress Note  Addiction Medicine                      TELEPHONE VISIT  Laurent Choi is a 76 year old pt. who is being evaluated via a billable telephone visit.      The patient has been notified of the following:    We have found that certain health care needs can be provided without the need for a physical exam. This service lets us provide the care you need with a short phone conversation. If a prescription is necessary we can send it directly to your pharmacy. If lab work is needed we can place an order for that and you can then stop by our lab to have the test done at a later time. Insurers are generally covering virtual visits as they would in-office visits so billing should not be different than normal.  If for some reason you do get billed incorrectly, you should contact the billing office to correct it and that number is in the AVS .    Patient has given verbal consent for a telephone visit?:  Yes   How would the pt like to obtain the AVS?:  Patient declined  AVS SmartPhrase [PsychAVS] has been placed in 'Patient Instructions':  No     Start Time:830am         End Time:  900am      IDENTIFICATION   Laurent \"Gio\" Blaze Choi is a 73 year old, melendrez, , male with alcohol use disorder on Vivitrol.  History was provided by patient who was a good historian. He is here for a follow up visit with his initial visit on 10/17/18.       CHIEF COMPLAINT   Here for follow up and medication management of AUD     Brief Summary     Interim history: Patient reports things are going okay.  He has had no use since his last visit.  Last Vivitrol injection 380 mg was on 10/27/2021.  Tolerated this well.  He had thought about changing to oral naltrexone, but at this time he would like to stay on the injection as it is working well.  " "Patient feels strong in his sobriety, has a sponsor he talks to.  Currently not participating in AA because he is worried about exposure with COVID.  Had previously thought about meeting with a therapist but he is does not feel that that would be helpful.  Reports he has had no \"slips \"and is happy about this.    The patient feels his symptoms of  MDD and RODRIGO are well managed at this time.  He does report that he has trouble sleeping some nights, this is helped when he takes the gabapentin.  He is currently taking the gabapentin as needed.     History:  Gio has been in detox over 40 times and treatment about 5 times.  His first treatment was in 1980. He gave up daily drinking in late 1990's, then began to binge drink.  In recent years, his pattern has been drinking 1-2 weeks, not eating most of that time, and getting very sick. He then will stop drinking, will be sober for 1-2 weeks and the cycle is continued. Alcohol has caused withdrawal and tolerance, drinking 6-10 beers and 1 pint a day when on a binge. Alcohol has affected relationships, has affected health with getting sick, not eating and getting depressed.       Treatment History:    First treatment in 1980.  Last treatment at [facility] finished  November 2018.     Substance Use Pharmacotherapies:   Tried antabuse in late 1990's and he would stop taking and \"drink around it\".     Psych pharm:  He was on prozac for about 10 years prior to January 2019 when he was switched to lexapr 20mg, doing well.     Had a trial of aripiprazole in Spring/Summer 2019, but stopped because it was making him drool.    Gabapentin was started in Spring 2019 during a period of abstinence for anxiety.     RECENT SYMPTOMS   [PSYCH ROS]     PANIC ATTACK:  No new episodes.  ANXIETY:  none currently   DEPRESSION: Mood is good.   Denies SI/SIB.   DYSREGULATION: None   PSYCHOSIS:  none;  DENIES- none  MARILEE/HYPOMANIA:  none;  DENIES- none  TRAUMA RELATED:  none.   EATING " DISORDER:  none  COMPULSIVE:  none  Grief:  none       ALLERGY                                Aspirin, Nsaids (non-steroidal anti-inflammatory drug) [nsaids], Plavix [clopidogrel], and Statins-hmg-coa reductase inhibitors [hmg-coa-r inhibitors]  MEDICATIONS                               Current Outpatient Medications   Medication Sig Dispense Refill     ACETAMINOPHEN PO Take 325 mg by mouth       amLODIPine-benazepril (LOTREL) 10-20 MG per capsule Take 1 capsule by mouth daily       ASPIRIN PO Take 81 mg by mouth daily       calcium-vitamin D 500-125 MG-UNIT TABS Take 1 tablet by mouth 2 times daily       clotrimazole (LOTRIMIN) 1 % external cream Apply topically 2 times daily Apply to rough patch of skin in front of right ear 2x daily for 10 days 24 g 0     DIPHENHYDRAMINE HCL PO Take 25 mg by mouth daily as needed       escitalopram (LEXAPRO) 20 MG tablet Take 1 tablet (20 mg) by mouth daily 90 tablet 0     gabapentin (NEURONTIN) 300 MG capsule Take 1 capsule by mouth at bedtime as needed. 90 capsule 3     LOSARTAN POTASSIUM PO Take 25 mg by mouth       melatonin 3 MG tablet Take 1-3 tabs before bed PRN 90 tablet 1     naltrexone (VIVITROL) 380 MG SUSR Inject 380 mg into the muscle every 30 days 1 each 4     NITROGLYCERIN SL Take 0.4 mg by mouth       omeprazole 20 MG tablet Take 1-2 daily 60 tablet 1     rosuvastatin (CRESTOR) 40 MG tablet TAKE ONE TABLET BY MOUTH EVERY NIGHT AT BEDTIME 90 tablet 2     ROSUVASTATIN CALCIUM PO Take 40 mg by mouth daily       tamsulosin (FLOMAX) 0.4 MG capsule TAKE ONE CAPSULE BY MOUTH ONCE DAILY WITH BREAKFAST 90 capsule 2       VITALS   There were no vitals taken for this visit.      MENTAL STATUS EXAM                                                           Orientation: full, x3  Alertness: alert   Appearance: Excellent   Behavior/Demeanor: cooperative, pleasant and calm,   Speech: normal  Language: intact  Psychomotor:ok  Mood: anxious because of moving issues  Affect: full  range and appropriate; was congruent to mood; was congruent to content  Thought Process/Associations: unremarkable  Thought Content:  Denies suicidal and violent ideation, delusions and preoccupations  Perception: none  Denies auditory hallucinations and visual hallucinations  Insight: good  Judgment: good  Cognition: does  appear grossly intact; formal cognitive testing was not done  Gait: Normal   MSK: extremities normal, no peripheral edema    SUBSTANCE USE/PSYCHIATRIC DIAGNOSES                                                                                                    Alcohol Use Disorder, severe, in sustained remission.   Major Depressive Disorder- in remission   Generalized Anxiety Disorder- in remission.   R/o PTSD  (partner suicide attempts,his incarcerations) seems not to be an issue currently     ASSESSMENT                                                       This patient is a 73 year old male who has alcohol use disorder since 1980 with intermittent use.  He has been in detox more than 40 times and 4-5 treatments. He has been sober since last visit.     #AUD: Severe, in sustained remission  #MDD: In remission  #RODRIGO: controlled  #Fatigue: improved significantly since last appointment    [place] PRESCRIPTION MONITORING PROGRAM [] was  checked today:  indicates no controlled subtances reported in previous 12 months.     PLAN                                                                                                       1) PSYCHOTROPIC MEDICATIONS:   - Continue Lexapro 20 mg daily.       - Continue gabapentin to 300 mg,QHS PRN    - recommend taking nightly to help with insomnia     - Continue vivitrol injection monthly    - Continue with melatonin 3mg at night    Discussed IM vivitrol vs PO naltrexone.  At this time pt will remain on vivitrol and discuss again in the future.  Answered pt's questions regarding mediations.     Discussed triggers and relapse prevention.  Pt plans to continue to use  distraction.  Encourage connection with his sponsor.  Discussed AA and smartrecovery, pt is not interested at this time.      Recommend: Relapse Prevention Group.      2) insomnia, reviewed good sleep hygiene.  He sleeps better when he takes the gabapentin at night.  Discussed taking this regularly to help with symptoms.    3) Next appointment in 1 month         TREATMENT RISK STATEMENT:  The risks, benefits, alternatives and potential adverse effects have been explained and are understood by the pt. The pt agrees to the treatment plan with the ability to do so. The pt knows to call the clinic for any problems or to access emergency care if needed.  Medical and CD concerns are documented above.  Psychotropic drug interaction check was done, including changes made today, and is discussed above.    ADDICTION FELLOW: Tiffanie Dunn MD    Patient seen (phone) by and discussed with staff psychiatrist, Dr. Bee. Supervisor is Dr. Bee      TELEPHONE Attestation: direct phone communication with patient, fellow/resident and me occurred.  I discussed the key portions of the service with the fellow/resident, including the mental status examination and developing the plan of care. I agree with the findings and plan as documented in this note.       Selam Bee MD

## 2021-11-17 NOTE — PROGRESS NOTES
"VIDEO VISIT  Laurent Choi is a 76 year old patient that has consented to receive services via billable video visit.      The patient has been notified of following:   \"This video visit will be conducted via a call between you and your physician/provider. We have found that certain health care needs can be provided without the need for an in-person physical exam. This service lets us provide the care you need with a video conversation. If a prescription is necessary we can send it directly to your pharmacy. If lab work is needed we can place an order for that and you can then stop by our lab to have the test done at a later time. Insurers are generally covering virtual visits as they would in-office visits so billing should not be different than normal.  If for some reason you do get billed incorrectly, you should contact the billing office to correct it and that number is in the AVS .    Patient will join video visit via:  PeopleAdmin (Patient / guardian confirmed to join via PeopleAdmin)    If patient attempts to join the video via PeopleAdmin at appointment start time, but is unable to, they would prefer that the provider send them a video invitation via:   Send to preferred e-mail: [email or web address]      How would patient like to obtain AVS?:  MyChart    "

## 2021-11-17 NOTE — PATIENT INSTRUCTIONS
**For crisis resources, please see the information at the end of this document**     Patient Education      Thank you for coming to the [facility] MENTAL HEALTH & ADDICTION [place] CLINIC.    Lab Testing:  If you had lab testing today and your results are reassuring or normal they will be mailed to you or sent through [facility] within 7 days. If the lab tests need quick action we will call you with the results. The phone number we will call with results is # [phone] (home) . If this is not the best number please call our clinic and change the number.    Medication Refills:  If you need any refills please call your pharmacy and they will contact us. Our fax number for refills is [phone]. Please allow three business for refill processing. If you need to  your refill at a new pharmacy, please contact the new pharmacy directly. The new pharmacy will help you get your medications transferred.     Scheduling:  If you have any concerns about today's visit or wish to schedule another appointment please call our office during normal business hours [phone] (8-5:00 M-F)    Contact Us:  Please call [phone] during business hours (8-5:00 M-F).  If after clinic hours, or on the weekend, please call  [phone].    Financial Assistance [phone]  [facility] Billing [phone]  Central Billing Office, [facility]: [phone]  [facility] Billing [phone]  Medical Records [phone]  [facility] Patient Bill of Rights [email or web address]       MENTAL HEALTH CRISIS NUMBERS:  For a medical emergency please call  911 or go to the nearest ER.     [facility]:   [facility] -[phone]   Crisis Residence [facility] Residence -[phone]   Walk-In Counseling Center [facility] -[phone]   COPE 24/7 [place] Mobile Team -[phone] (adults)/[phone] (child)  CHILD: [facility] needs assessment  team - [phone]      [facility]:   [facility] - [phone]   Walk-in counseling [facility] - [phone]   Walk-in counseling [facility] - [phone]   Crisis Residence [facility] - [phone]  Urgent Care Adult Mental [facility]-[phone] mobile unit/ 24/7 crisis line    National Crisis Numbers:   National Suicide Prevention Lifeline: 2-064-376-TALK (829-196-0829)  Poison Control Center - 8-175-795-1182  [facility]/resources for a list of additional resources (SOS)  Trans Lifeline a hotline for transgender people 1-195.993.3069  The Oni Project a hotline for LGBT youth 8-541-885-1685  Crisis Text Line: For any crisis 24/7   To: 151291  see www.crisistextline.org  - IF MAKING A CALL FEELS TOO HARD, send a text!         Again thank you for choosing [facility] MENTAL HEALTH & ADDICTION [facility] and please let us know how we can best partner with you to improve you and your family's health.    You may be receiving a survey regarding this appointment. We would love to have your feedback, both positive and negative. The survey is done by an external company, so your answers are anonymous.

## 2021-11-23 ENCOUNTER — TELEPHONE (OUTPATIENT)
Dept: PSYCHIATRY | Facility: CLINIC | Age: 76
End: 2021-11-23
Payer: COMMERCIAL

## 2021-11-23 NOTE — TELEPHONE ENCOUNTER
On November 23, 2021 at 3:41 PM writer called patient at mobile to remind them of their BARRETO appointment tomorrow, 11/24/21 at 1030. Patient confirmed they would be present for this appointment. Marcia Harper, EMT

## 2021-11-24 ENCOUNTER — ALLIED HEALTH/NURSE VISIT (OUTPATIENT)
Dept: PSYCHIATRY | Facility: CLINIC | Age: 76
End: 2021-11-24
Payer: COMMERCIAL

## 2021-11-24 ENCOUNTER — TELEPHONE (OUTPATIENT)
Dept: PSYCHIATRY | Facility: CLINIC | Age: 76
End: 2021-11-24
Payer: COMMERCIAL

## 2021-11-24 VITALS
DIASTOLIC BLOOD PRESSURE: 78 MMHG | SYSTOLIC BLOOD PRESSURE: 121 MMHG | HEART RATE: 69 BPM | WEIGHT: 185.2 LBS | BODY MASS INDEX: 26.57 KG/M2

## 2021-11-24 DIAGNOSIS — F10.21 ALCOHOL USE DISORDER, SEVERE, IN SUSTAINED REMISSION (H): Primary | ICD-10-CM

## 2021-11-24 PROCEDURE — 96372 THER/PROPH/DIAG INJ SC/IM: CPT | Performed by: PSYCHIATRY & NEUROLOGY

## 2021-11-24 PROCEDURE — 250N000011 HC RX IP 250 OP 636: Performed by: PSYCHIATRY & NEUROLOGY

## 2021-11-24 RX ADMIN — NALTREXONE 380 MG: KIT at 11:45

## 2021-11-24 ASSESSMENT — PAIN SCALES - GENERAL: PAINLEVEL: NO PAIN (0)

## 2021-11-24 NOTE — TELEPHONE ENCOUNTER
Verbal authorization obtained for continuing Vivitrol 380 mg, IM Injection, q28d per Dr Tiffanie Dunn.Deborah Blanchard LPN

## 2021-11-24 NOTE — NURSING NOTE
Clinic Administered Medication Documentation      Injectable Medication Documentation    Patient was given Vivitrol 380 mg. Prior to medication administration, verified patients identity using patient s name and date of birth. Please see MAR and medication order for additional information. Patient instructed to stay in clinic after the injection but patient declined.      Was entire vial of medication used? Yes  Vial/Syringe: Single dose vial  Expiration Date:  2023  Was this medication supplied by the patient? No      Gio Choi,  1945, presented to the clinic today at the request of Dr Dunn,  ordering provider for long-acting injectable Vivitrol 380 mg.    OBSERVATIONS:  1.  Appearance: Casually dressed in clean, weather appropriate clothing. Pt shared he will be home by himself for Thanksgiving and is looking forward to it. He will be baking a banana cream pie from scratch. He shared when his partner was alive, he would do all the baking and his partner, who was a , did all the cooking, which he misses more this time of year. This is the first time the pt has shared memories of his partner with this writer. Pt denies SI, HI, AH, VH, and SIB  2.  Mood: Good, talkative, friendly  3.  Affect: Congruent  4.  Attitude: Good, engaged, good eye contact  5.  Cooperation: Full  6.  Side Effects: Denies  7.  Education: None needed  8. Next appointment: 2021 1030  9. Location: [facility]    The service provided today was rendered under the supervising provider of the day, Dr Bee, who was available for consultation as needed.

## 2021-12-01 ENCOUNTER — OFFICE VISIT (OUTPATIENT)
Dept: OTOLARYNGOLOGY | Facility: CLINIC | Age: 76
End: 2021-12-01
Payer: COMMERCIAL

## 2021-12-01 DIAGNOSIS — H61.21 KERATIN DEBRIS OF EAR CANAL, RIGHT: Primary | ICD-10-CM

## 2021-12-01 DIAGNOSIS — H61.23 KERATIN DEBRIS OF BOTH EAR CANALS: ICD-10-CM

## 2021-12-01 PROCEDURE — 99213 OFFICE O/P EST LOW 20 MIN: CPT | Mod: 25 | Performed by: OTOLARYNGOLOGY

## 2021-12-01 PROCEDURE — 92504 EAR MICROSCOPY EXAMINATION: CPT | Performed by: OTOLARYNGOLOGY

## 2021-12-01 NOTE — PROGRESS NOTES
CHIEF COMPLAINT:  Recheck      HISTORY OF PRESENT ILLNESS    Gio was seen in follow up for microscope exam.  No further issues with drainage.  Hearing is stable. No ear pain or vertigo.     My last note:    Encounter Diagnosis   Name Primary?     Scaly skin on examination Yes         RECOMMENDATIONS:     Clotrimazole cream as directed  Return visit 3 months for ear microscopy right ear       REVIEW OF SYSTEMS    Review of Systems: a 10-system review is reviewed at this encounter.  See scanned document.     Aspirin, Nsaids (non-steroidal anti-inflammatory drug) [nsaids], Plavix [clopidogrel], and Statins-hmg-coa reductase inhibitors [hmg-coa-r inhibitors]     PHYSICAL EXAM:        HEAD: Normal appearance and symmetry:  No cutaneous lesions.      EARS:   Auricles normal    Examination of both ears under the binocular microscope shows some keratin debris along the external auditory canals.  This is loosened with a straight pick and then removed with alligator forceps.  External auditory canals otherwise normal intact as well as the tympanic membranes     NOSE:    Dorsum:   straight       ORAL CAVITY/OROPHARYNX:    Lips:  Normal.     NECK:  Trachea:  midline       NEURO:   Alert and Oriented    GAIT AND STATION:  normal     RESPIRATORY:   Symmetry and Respiratory effort    PSYCH:   normal mood and affect    SKIN:  warm and dry         IMPRESSION:   Encounter Diagnoses   Name Primary?     Keratin debris of ear canal, right Yes     Keratin debris of both ear canals               RECOMMENDATIONS:    Patient will return in 6 months for routine ear debridement and baseline audiogram.  All questions were answered.  He is agreeable to plan of care.  He is instructed to return immediately for any sudden change in hearing.

## 2021-12-01 NOTE — LETTER
12/1/2021         RE: Laurent Choi  [address]  [place] [address]        Dear Colleague,    Thank you for referring your patient, Laurent Choi, to the [facility]. Please see a copy of my visit note below.    CHIEF COMPLAINT:  Recheck      HISTORY OF PRESENT ILLNESS    Gio was seen in follow up for microscope exam.  No further issues with drainage.  Hearing is stable. No ear pain or vertigo.     My last note:    Encounter Diagnosis   Name Primary?     Scaly skin on examination Yes         RECOMMENDATIONS:     Clotrimazole cream as directed  Return visit 3 months for ear microscopy right ear       REVIEW OF SYSTEMS    Review of Systems: a 10-system review is reviewed at this encounter.  See scanned document.     Aspirin, Nsaids (non-steroidal anti-inflammatory drug) [nsaids], Plavix [clopidogrel], and Statins-hmg-coa reductase inhibitors [hmg-coa-r inhibitors]     PHYSICAL EXAM:        HEAD: Normal appearance and symmetry:  No cutaneous lesions.      EARS:   Auricles normal    Examination of both ears under the binocular microscope shows some keratin debris along the external auditory canals.  This is loosened with a straight pick and then removed with alligator forceps.  External auditory canals otherwise normal intact as well as the tympanic membranes     NOSE:    Dorsum:   straight       ORAL CAVITY/OROPHARYNX:    Lips:  Normal.     NECK:  Trachea:  midline       NEURO:   Alert and Oriented    GAIT AND STATION:  normal     RESPIRATORY:   Symmetry and Respiratory effort    PSYCH:   normal mood and affect    SKIN:  warm and dry         IMPRESSION:   Encounter Diagnoses   Name Primary?     Keratin debris of ear canal, right Yes     Keratin debris of both ear canals               RECOMMENDATIONS:    Patient will return in 6 months for routine ear debridement and baseline audiogram.  All questions were answered.  He is agreeable to plan of care.  He is  instructed to return immediately for any sudden change in hearing.        Again, thank you for allowing me to participate in the care of your patient.        Sincerely,        Piyush Rasmussen MD

## 2021-12-21 ENCOUNTER — TELEPHONE (OUTPATIENT)
Dept: PSYCHIATRY | Facility: CLINIC | Age: 76
End: 2021-12-21
Payer: COMMERCIAL

## 2021-12-21 NOTE — TELEPHONE ENCOUNTER
On December 21, 2021 at 3:52 PM writer called patient at mobile to remind them of their [facility] appointment for tomorrow, 12/22/21 at 1030. Writer was unable to reach patient and left dvm for callback. [phone] left as callback number. Marcia Harper, EMT

## 2021-12-22 ENCOUNTER — TELEPHONE (OUTPATIENT)
Dept: PSYCHIATRY | Facility: CLINIC | Age: 76
End: 2021-12-22
Payer: COMMERCIAL

## 2021-12-22 ENCOUNTER — ALLIED HEALTH/NURSE VISIT (OUTPATIENT)
Dept: PSYCHIATRY | Facility: CLINIC | Age: 76
End: 2021-12-22
Payer: COMMERCIAL

## 2021-12-22 VITALS
SYSTOLIC BLOOD PRESSURE: 120 MMHG | BODY MASS INDEX: 26.83 KG/M2 | HEART RATE: 90 BPM | WEIGHT: 187 LBS | DIASTOLIC BLOOD PRESSURE: 78 MMHG

## 2021-12-22 DIAGNOSIS — F10.21 ALCOHOL USE DISORDER, SEVERE, IN SUSTAINED REMISSION (H): Primary | ICD-10-CM

## 2021-12-22 PROCEDURE — 250N000011 HC RX IP 250 OP 636: Performed by: PSYCHIATRY & NEUROLOGY

## 2021-12-22 PROCEDURE — 96372 THER/PROPH/DIAG INJ SC/IM: CPT | Performed by: PSYCHIATRY & NEUROLOGY

## 2021-12-22 RX ADMIN — NALTREXONE 380 MG: KIT at 10:45

## 2021-12-22 ASSESSMENT — PAIN SCALES - GENERAL: PAINLEVEL: NO PAIN (0)

## 2021-12-22 NOTE — NURSING NOTE
Clinic Administered Medication Documentation      Injectable Medication Documentation    Patient was given Vivitrol 380 mg. Prior to medication administration, verified patients identity using patient s name and date of birth. Please see MAR and medication order for additional information. Patient instructed to stay in clinic after the injection but patient declined.      Was entire vial of medication used? Yes  Vial/Syringe: Single dose vial  Expiration Date:  2023  Was this medication supplied by the patient? No      Gio Choi,  1945, presented to the clinic today at the request of Dr Dunn,  ordering provider for long-acting injectable Vivitrol 380 mg.    OBSERVATIONS:  1.  Appearance: Casually dressed in clean, weather appropriate clothing. Pt shared he will be grocery shopping today for the holiday with plans of staying home d/t increase in COVID outbreak. Pt unhappy at increase in weight saying he will start working out 1/2 hr a day on the treadmill at his apt building. This has worked for him in the past. Pt denies SI, HI, AH, VH, and SIB  2.  Mood: Good, talkative friendly  3.  Affect: Congruent  4.  Attitude: Good, engaged, good eye contact  5.  Cooperation: Full  6.  Side Effects: Denies (slight weight gain)  7.  Education: Call or use [employer] to contact the clinic with any questions or concerns. Pt agreed  8. Next appointment: 2022 1100  9. Location: [facility]    The service provided today was rendered under the supervising provider of the day, Dr Bee, who was available for consultation as needed.

## 2021-12-22 NOTE — TELEPHONE ENCOUNTER
Verbal authorization obtained to continue Vivitrol 380 mg, IM Injection, q28d per Dr Selam Bee.Deborah Blanchard LPN

## 2022-01-05 DIAGNOSIS — R73.03 PREDIABETES: Primary | ICD-10-CM

## 2022-01-05 DIAGNOSIS — R35.0 URINE FREQUENCY: ICD-10-CM

## 2022-01-07 RX ORDER — TAMSULOSIN HYDROCHLORIDE 0.4 MG/1
CAPSULE ORAL
Qty: 90 CAPSULE | Refills: 1 | OUTPATIENT
Start: 2022-01-07

## 2022-01-07 NOTE — TELEPHONE ENCOUNTER
"Outpatient Medication Detail     Disp Refills Start End NIKO   metFORMIN (GLUCOPHAGE-XR) 500 MG 24 hr tablet 90 tablet 2 3/30/2021  No   Sig: TAKE ONE TABLET BY MOUTH ONCE DAILY WITH BREAKFAST   Sent to pharmacy as: metFORMIN  mg tablet,extended release 24 hr (GLUCOPHAGE-XR)   E-Prescribing Status: Receipt confirmed by pharmacy (3/30/2021  8:21 AM CDT)       metFORMIN (GLUCOPHAGE-XR) 500 MG 24 hr tablet [[id number]]    Electronically signed by: Amara Smith MD on 03/30/21 0821 Status: Active   Ordering user: Amara Smith MD 03/30/21 0821 Authorized by: Amara Smith MD   Frequency:  03/30/21 - Until Discontinued Released by: Amara Smith MD 03/30/21 0821   Diagnoses  Prediabetes [R73.03]     Routing refill request to provider for review/approval because:  Labs not current:  A1C     Last office visit provider:  6/1/21     Requested Prescriptions   Pending Prescriptions Disp Refills     metFORMIN (GLUCOPHAGE-XR) 500 MG 24 hr tablet [Pharmacy Med Name: METFORMIN HCL ER 500MG TB24] 90 tablet 2     Sig: TAKE ONE TABLET BY MOUTH ONCE DAILY WITH BREAKFAST       Biguanide Agents Failed - 1/5/2022  9:28 AM        Failed - Patient has documented A1c within the specified period of time.     If HgbA1C is 8 or greater, it needs to be on file within the past 3 months.  If less than 8, must be on file within the past 6 months.     Recent Labs   Lab Test 06/01/21  1125   A1C 6.0*             Failed - Medication is active on med list        Failed - Recent (6 mo) or future (30 days) visit within the authorizing provider's specialty     Patient had office visit in the last 6 months or has a visit in the next 30 days with authorizing provider or within the authorizing provider's specialty.  See \"Patient Info\" tab in inbasket, or \"Choose Columns\" in Meds & Orders section of the refill encounter.            Passed - Patient is age 10 or older        Passed - Patient's CR is NOT>1.4 " "OR Patient's EGFR is NOT<45 within past 12 mos.     Recent Labs   Lab Test 06/01/21  1125   GFRESTIMATED >60   GFRESTBLACK >60       Recent Labs   Lab Test 06/01/21  1125   CR 0.86             Passed - Patient does NOT have a diagnosis of CHF.         Refused Prescriptions Disp Refills     tamsulosin (FLOMAX) 0.4 MG capsule [Pharmacy Med Name: TAMSULOSIN HCL 0.4MG CAPS] 90 capsule 1     Sig: TAKE ONE CAPSULE BY MOUTH ONCE DAILY WITH BREAKFAST       Alpha Blockers Passed - 1/5/2022  9:28 AM        Passed - Blood pressure under 140/90 in past 12 months     BP Readings from Last 3 Encounters:   08/06/21 116/69   06/01/21 118/75   04/14/21 110/64                 Passed - Recent (12 mo) or future (30 days) visit within the authorizing provider's specialty     Patient has had an office visit with the authorizing provider or a provider within the authorizing providers department within the previous 12 mos or has a future within next 30 days. See \"Patient Info\" tab in inbasket, or \"Choose Columns\" in Meds & Orders section of the refill encounter.              Passed - Patient does not have Tadalafil, Vardenafil, or Sildenafil on their medication list        Passed - Medication is active on med list        Passed - Patient is 18 years of age or older             Nick Medina RN 01/07/22 1:01 PM  "

## 2022-01-10 ENCOUNTER — VIRTUAL VISIT (OUTPATIENT)
Dept: PSYCHIATRY | Facility: CLINIC | Age: 77
End: 2022-01-10
Attending: PSYCHOLOGIST
Payer: COMMERCIAL

## 2022-01-10 DIAGNOSIS — F10.21 ALCOHOL USE DISORDER, SEVERE, IN SUSTAINED REMISSION (H): Primary | ICD-10-CM

## 2022-01-10 PROCEDURE — 90853 GROUP PSYCHOTHERAPY: CPT | Mod: 95 | Performed by: PSYCHOLOGIST

## 2022-01-10 RX ORDER — METFORMIN HCL 500 MG
TABLET, EXTENDED RELEASE 24 HR ORAL
Qty: 90 TABLET | Refills: 2 | Status: SHIPPED | OUTPATIENT
Start: 2022-01-10 | End: 2022-10-10

## 2022-01-18 ENCOUNTER — TELEPHONE (OUTPATIENT)
Dept: PSYCHIATRY | Facility: CLINIC | Age: 77
End: 2022-01-18
Payer: COMMERCIAL

## 2022-01-18 NOTE — PROGRESS NOTES
Video- Visit Details  Reason for video visit: Unable to travel due to COVID-19  Originating Site (patient location):  Remote location  Distant Site (provider location):  Remote location  Mode of Communication:  Video Conference via Zoom  Consent:  Patient has given verbal consent for video visit?: Yes    Relapse Prevention Group for Substance Use Disorders                                Time of Service: 4:00 - 5:00   Care Providers: Susi Davis, PhD, LP  Total number of patients present: 4    DSM-5 Diagnoses  AUD, severe, in sustained remission   MDD, in remission (per chart review)  RODRIGO (per chart review)    GROUP CONTENT: Introduction to group format and rules. Group members introduced themselves and reported their goals for group.       PATIENT ENGAGEMENT: Active and engaged. Gio left group after 30 minutes. I followed up following group, but he did not answer. Left VM encouraging him to call clinic if he had any concerns.     MENTAL STATUS EXAM:   Appearance: Casually dressed and appropriately groomed   Motor activity: Within normal range  Speech rate: Within normal range  Speech volume: Within normal range  Speech articulation:  Within normal range  Speech coherence:  Within normal range  Speech spontaneity:  Within normal range  Affect (objective appearance):  Euthymic  Thought process: Linear, logical, goal-oriented  Suicide/ violence risk: Not individually assessed given group context, but no signs of risk were observed    PLAN:   - Attend Relapse Prevention Group on Mondays at 4pm

## 2022-01-19 ENCOUNTER — ALLIED HEALTH/NURSE VISIT (OUTPATIENT)
Dept: PSYCHIATRY | Facility: CLINIC | Age: 77
End: 2022-01-19
Payer: COMMERCIAL

## 2022-01-19 DIAGNOSIS — F10.21 ALCOHOL USE DISORDER, SEVERE, IN SUSTAINED REMISSION (H): Primary | ICD-10-CM

## 2022-01-19 PROCEDURE — 96372 THER/PROPH/DIAG INJ SC/IM: CPT | Performed by: PSYCHIATRY & NEUROLOGY

## 2022-01-19 PROCEDURE — 250N000011 HC RX IP 250 OP 636: Performed by: PSYCHIATRY & NEUROLOGY

## 2022-01-19 RX ADMIN — NALTREXONE 380 MG: KIT at 11:30

## 2022-01-19 NOTE — TELEPHONE ENCOUNTER
Writer spoke to the pt with a reminder of [facility] appt for 1/19/21. Pt acknowledged appt.Deborah Blanchard LPN

## 2022-01-19 NOTE — NURSING NOTE
"Clinic Administered Medication Documentation      Injectable Medication Documentation    Patient was given Vivitrol 380 mg. Prior to medication administration, verified patients identity using patient s name and date of birth. Please see MAR and medication order for additional information. Patient instructed to stay in clinic after the injection but patient declined.      Was entire vial of medication used? Yes  Vial/Syringe: Single dose vial  Expiration Date:  2023  Was this medication supplied by the patient? No      Gio Choi,  1945, presented to the clinic today at the request of Dr Dunn,  ordering provider for long-acting injectable Vivitrol 380 mg.    OBSERVATIONS:  1.  Appearance: Casually dressed in clean, weather appropriate clothing. Pt shared the heater in his has not been working for years. He stated, \"I just keep the fan on high in the car and hope it does not frost over and when I park I leave a window cracked.\" Writer asked about [employer] which he responded he does have this service but chooses to drive and only uses it if he has to go to eye appointments, d/t dilation of eyes and unable to drive. Writer encouraged the use of this service on frigid temperature days. Pt acknowledged. Pt denies safety concerns, including SI, HI, AH, VH, and SIB  2.  Mood: Good, talkative, friendly  3.  Affect: Congruent  4.  Attitude: Good, engaged, good eye contact  5.  Cooperation: Full  6.  Side Effects: Denies  7.  Education: Call or use MyChart with any questions or concerns. Pt agreed  8. Next appointment: 2022 at 1130  9. Location: [facility]    The service provided today was rendered under the supervising provider of the day, Dr Guidry, who was available for consultation as needed.        "

## 2022-01-26 NOTE — TELEPHONE ENCOUNTER
Refilled 5/13/21 with 3 refills.    Outpatient Medication Detail     Disp Refills Start End NIKO   omeprazole (PRILOSEC) 20 MG capsule 90 capsule 3 5/13/2021  No   Sig: TAKE 1 CAPSULE BY MOUTH DAILY,  60 MINUTES BEFORE A MEAL.   Sent to pharmacy as: omeprazole 20 mg capsule,delayed release (PriLOSEC)   E-Prescribing Status: Receipt confirmed by pharmacy (5/13/2021  9:04 AM CDT)

## 2022-01-28 ENCOUNTER — LAB (OUTPATIENT)
Dept: LAB | Facility: CLINIC | Age: 77
End: 2022-01-28
Payer: COMMERCIAL

## 2022-01-28 DIAGNOSIS — R97.20 ELEVATED PROSTATE SPECIFIC ANTIGEN (PSA): ICD-10-CM

## 2022-01-28 LAB — PSA SERPL-MCNC: 5.06 UG/L (ref 0–6.5)

## 2022-01-28 PROCEDURE — 84153 ASSAY OF PSA TOTAL: CPT

## 2022-01-28 PROCEDURE — 36415 COLL VENOUS BLD VENIPUNCTURE: CPT

## 2022-02-02 ENCOUNTER — TELEPHONE (OUTPATIENT)
Dept: PSYCHIATRY | Facility: CLINIC | Age: 77
End: 2022-02-02
Payer: COMMERCIAL

## 2022-02-02 NOTE — TELEPHONE ENCOUNTER
On February 2, 2022, at 10:12 AM, writer called patient at mobile to confirm Virtual Visit. Writer unable to make contact with patient. Writer left detailed voice message for call back, with [phone] left as callback number. Link for [employer] was provided via: Send to preferred e-mail: [email or web address]. Marcia Harper EMT

## 2022-02-15 ENCOUNTER — TELEPHONE (OUTPATIENT)
Dept: PSYCHIATRY | Facility: CLINIC | Age: 77
End: 2022-02-15
Payer: COMMERCIAL

## 2022-02-15 NOTE — TELEPHONE ENCOUNTER
On February 15, 2022 at 4:38 PM writer called patient at mobile to remind them of their [facility] appointment for tomorrow, 2/16/22 at 1130. Writer was unable to reach patient and left dvm for callback. [phone] left as callback number. Marcia Harper, EMT

## 2022-02-16 ENCOUNTER — ALLIED HEALTH/NURSE VISIT (OUTPATIENT)
Dept: PSYCHIATRY | Facility: CLINIC | Age: 77
End: 2022-02-16
Payer: COMMERCIAL

## 2022-02-16 DIAGNOSIS — F10.21 ALCOHOL USE DISORDER, SEVERE, IN SUSTAINED REMISSION (H): Primary | ICD-10-CM

## 2022-02-16 PROCEDURE — 250N000011 HC RX IP 250 OP 636: Performed by: PSYCHIATRY & NEUROLOGY

## 2022-02-16 PROCEDURE — 96372 THER/PROPH/DIAG INJ SC/IM: CPT | Performed by: PSYCHIATRY & NEUROLOGY

## 2022-02-16 RX ADMIN — NALTREXONE 380 MG: KIT at 11:00

## 2022-02-16 NOTE — NURSING NOTE
Clinic Administered Medication Documentation      Injectable Medication Documentation    Patient was given Vivitrol 380 mg. Prior to medication administration, verified patients identity using patient s name and date of birth. Please see MAR and medication order for additional information. Patient instructed to stay in clinic after the injection but patient declined.      Was entire vial of medication used? Yes  Vial/Syringe: Single dose vial  Expiration Date:  2023  Was this medication supplied by the patient? No      Gio Choi,  1945, presented to the clinic today at the request of Dr Dunn,  ordering provider for long-acting injectable Vivitrol 380 mg.    OBSERVATIONS:  1.  Appearance: Casually dressed in clean, weather appropriate clothing. Pt shared he was looking forward to Spring and planting a garden this year as he was unable to last year. He finds great pleasure in gardening. Pt denies any safety concerns including SI, HI, AH, VH, and SIB. Pt agreed to meet with Dr Dunn at next injection appt for a MFU  2.  Mood: Good, talkative, friendly  3.  Affect: Congruent  4.  Attitude: Good, engaged, good eye contact  5.  Cooperation: Full  6.  Side Effects: Denies  7.  Education: Call or use [employer] for any questions or concerns. Pt agreed  8. Next appointment: 3/16/2022 1000 - Dr Dunn 3/16/22 3498  9. Location: [facility]    The service provided today was rendered under the supervising provider of the day, Dr Bee, who was available for consultation as needed.

## 2022-02-25 ENCOUNTER — OFFICE VISIT (OUTPATIENT)
Dept: PULMONOLOGY | Facility: OTHER | Age: 77
End: 2022-02-25
Payer: COMMERCIAL

## 2022-02-25 VITALS
RESPIRATION RATE: 14 BRPM | SYSTOLIC BLOOD PRESSURE: 120 MMHG | DIASTOLIC BLOOD PRESSURE: 76 MMHG | HEART RATE: 80 BPM | BODY MASS INDEX: 26.69 KG/M2 | WEIGHT: 186 LBS | OXYGEN SATURATION: 96 %

## 2022-02-25 DIAGNOSIS — Z87.891 PERSONAL HISTORY OF TOBACCO USE: Primary | ICD-10-CM

## 2022-02-25 PROCEDURE — G0296 VISIT TO DETERM LDCT ELIG: HCPCS | Performed by: INTERNAL MEDICINE

## 2022-02-25 PROCEDURE — 99213 OFFICE O/P EST LOW 20 MIN: CPT | Mod: 25 | Performed by: INTERNAL MEDICINE

## 2022-02-25 NOTE — PROGRESS NOTES
PULMONARY CLINIC FOLLOW UP NOTE    History:     HPI: Laurent Choi is a 76 y.o. male former smoker, with mild obstruction on PFTs is here for follow-up.  Baseline, patient is able to walk 1 to 2 miles.  He does typically walk in the summer.  He walks on the treadmill for approximately half an hour.  This is usually done in the winter.  He has PND.      Interval History: Patient is here for his scheduled visit.  He denies any complaints.  He denies any shortness of breath.  He is active and walks couple miles several times a week when the weather allows.  He denies any hospitalizations, antibiotics, or steroids for respiratory related illnesses.  He has albuterol inhaler that he uses as needed.  He does not have functional limitations.      PMHx/PSHx:  CAD s/p PCI back 2015  History of alcohol abuse  DJD  GERD  Hypertension  Macular degeneration     Social Hx:  Former smoker.  Used to smoke 1 pack per day for 30-40 years.  Quit approximately 10 years ago.   He used to work as a . Now he does sedentary work at computer.     ROS: 10 point review of system done. Pertinent findings are noted in the HPI.    Exam/Data:     /76 (BP Location: Left arm, Patient Position: Chair, Cuff Size: Adult Regular)   Pulse 80   Resp 14   Wt 84.4 kg (186 lb)   SpO2 96%   BMI 26.69 kg/m        GEN: comfortable, NAD  HEENT: NCAT, EMOI, mmm  LN: no cervical LAD   CVS: S1S2, RRR  Lung: good air entry bilaterally, no wheezing  Abd: round, obese, soft  Ext: no c/c/e  Vasc: intact radial pulses bilaterally  Neuro: nonfocal  Skin: no visible rash  Musculoskeletal: FROM all extremities  Psych: normal affect       Data:     Labs personally reviewed.      IMAGING: personally reviewed images. Formal radiology interpretation noted below.    PFT DATA 10/2019:  FEV1 75% predicted, FVC 84% predicted, ratio 67.  No reversibility.  % predicted, % predicted, DLCO 82% predicted.     CT chest done on 2/2019 at  [facility]:  EXAM: CT CHEST DIAG WO IV CONT LCS F/U  LOCATION:  [facility]  DATE/TIME: 2/14/2019 2:38 PM    INDICATION: Multiple bilateral pulmonary nodules. Personal history of tobacco use.  TECHNIQUE: Routine noncontrast CT chest. Dose reduction techniques were used.   COMPARISON: CT chest June 29, 2018.    FINDINGS:  LUNGS AND PLEURA: Mild to moderate centrilobular emphysema. Biapical scarring. Mild bronchiectasis at the right lung base.    3 mm nodule in the right lower lobe along the major fissure on series 3: image 90, unchanged. 5 mm x 2 mm nodule on series 3: image 107 in the right lower lobe adjacent to the fissure, unchanged. 2 mm nodule in the right lower lobe on series 3: image 105 which is new. Small calcified granuloma in the right middle lobe on image 117. Mild atelectasis.    On the left the previously visualized 2 mm nonsolid nodule seen in the left upper lobe on image 60 is no longer visualized. There are two 1.5 mm nodules in the left lower lobe on image 86, unchanged. 2 mm nodule in the left lower lobe on image 91 is unchanged. 2 mm nodule in the left lower lobe on image 97 is unchanged.    No pleural effusion.    MEDIASTINUM: No mediastinal or hilar adenopathy. There is coronary artery calcification. No pericardial effusion. Moderate to large hiatal hernia is increased in size from the prior exam.    CORONARY ARTERY CALCIFICATION: Severe    LIMITED UPPER ABDOMEN: The liver, spleen, pancreas, gallbladder and adrenal glands are unremarkable. Vascular calcification involving the aorta. Please note these organs are not imaged in their entirety.    MUSCULOSKELETAL: Osteopenia. Mild kyphosis. Compression fracture at T12, unchanged with mild kyphosis at this level.    CONCLUSION:  1. All of the nodules seen previously are unchanged. There is a single new nodule measuring 2 mm in the right lower lobe.      Ct Chest Without Contrast    Result Date: 2/24/2020  EXAM: CT CHEST WO CONTRAST  LOCATION: [facility] DATE/TIME: 2/24/2020 12:07 PM INDICATION: pulmonary nodules noted on CT earlier this year COMPARISON: CT of the chest 2/14/2019 TECHNIQUE: CT chest without IV contrast. Multiplanar reformats were obtained. Dose reduction techniques were used. CONTRAST: None. FINDINGS: LUNGS AND PLEURA: Triangular-shaped subpleural nodule in the anterior right lower lobe abutting the major fissure (series 4, image 114) has typical imaging features of an intrapulmonary lymph node, unchanged in size. 2 mm nodule in the subpleural left lower lobe (series 4, image 137) is unchanged. There is a punctate 2 mm calcified nodule in the inferior right middle lobe, definitively benign. Upper lung zone predominant emphysema. Mild scarring in the posterior basal right lower lobe. No new nodules or airspace opacities. No pleural space abnormality. MEDIASTINUM: Moderate hiatal hernia. No enlarged mediastinal or hilar lymph nodes. Cardiac chambers are normal in size. LAD stents. Normal caliber thoracic aorta. Mild, intermittent atheromatous calcification of the thoracic aorta. Conventional arch anatomy. UPPER ABDOMEN: No actionable findings in the imaged upper abdomen. MUSCULOSKELETAL: Chronic anterior wedging of T12 resulting in focal kyphosis at this level. Several old left posterolateral rib fracture deformities. No acute fracture or bone lesion. Chest wall soft tissues are symmetric.      1.  Small lung nodules are unchanged from 2/14/2019. If the patient is eligible and agreeable to screening, follow-up with cancer screening chest CT in 12 months is suggested. 2.   Moderate hiatal hernia.     CT chest on 2/2021:  IMPRESSION:   1.  Negative for lung cancer screening purposes.     2.  Stable tiny lower lung nodules can all be considered benign.     3.  Moderate emphysema.    Assessment/Plan:     Laurent Choi is a 76 y.o. male, former smoker, who was referred here for evaluation of shortness of breath.  PFTs  showed mild obstruction.  He had a CT scan of the chest done on 2/2019 that showed multiple pulmonary nodules that were small.  These were apparently stable compared to CT scan of the chest that was done on 6/2018.  CT scan of the chest on 2/2021 unchanged.     Recommendations:  Yearly low dose CT chest, 2/2022 ordered.  Encouraged to stay active  Albuterol inhaler if needed    FOLLOW UP: 1 year    Rose Sepulveda MD  Pulmonary and Critical Care Medicine  Electronically Signed on 3/10/2021  Current Outpatient Medications   Medication Sig Dispense Refill     ACETAMINOPHEN PO Take 325 mg by mouth       amLODIPine-benazepril (LOTREL) 10-20 MG per capsule Take 1 capsule by mouth daily       ASPIRIN PO Take 81 mg by mouth daily       calcium-vitamin D 500-125 MG-UNIT TABS Take 1 tablet by mouth 2 times daily       clotrimazole (LOTRIMIN) 1 % external cream Apply topically 2 times daily Apply to rough patch of skin in front of right ear 2x daily for 10 days 24 g 0     DIPHENHYDRAMINE HCL PO Take 25 mg by mouth daily as needed       escitalopram (LEXAPRO) 20 MG tablet Take 1 tablet (20 mg) by mouth daily 90 tablet 0     gabapentin (NEURONTIN) 300 MG capsule Take 1 capsule by mouth at bedtime as needed. 90 capsule 3     LOSARTAN POTASSIUM PO Take 25 mg by mouth       melatonin 3 MG tablet Take 1-3 tabs before bed PRN 90 tablet 1     metFORMIN (GLUCOPHAGE-XR) 500 MG 24 hr tablet TAKE ONE TABLET BY MOUTH ONCE DAILY WITH BREAKFAST 90 tablet 2     naltrexone (VIVITROL) 380 MG SUSR Inject 380 mg into the muscle every 30 days 1 each 4     NITROGLYCERIN SL Take 0.4 mg by mouth       omeprazole 20 MG tablet Take 1-2 daily 60 tablet 1     rosuvastatin (CRESTOR) 40 MG tablet TAKE ONE TABLET BY MOUTH EVERY NIGHT AT BEDTIME 90 tablet 2     ROSUVASTATIN CALCIUM PO Take 40 mg by mouth daily       tamsulosin (FLOMAX) 0.4 MG capsule TAKE ONE CAPSULE BY MOUTH ONCE DAILY WITH BREAKFAST 90 capsule 2        Allergies   Allergen Reactions      Aspirin Other (See Comments)     Avoids taking any aspirin besides his daily 81 mg aspirin regimen due to acid reflux/esophagus problem(s).     Nsaids (Non-Steroidal Anti-Inflammatory Drug) [Nsaids] Other (See Comments)     Avoids due to acid reflux/esophagus problem(s).     Plavix [Clopidogrel] Other (See Comments) and Muscle Pain (Myalgia)     Reaction(s): Bad bruising all over his body and leg cramps.     Statins-Hmg-Coa Reductase Inhibitors [Hmg-Coa-R Inhibitors] Muscle Pain (Myalgia)     Per the patient, he tolerates rosuvastatin.           Meds and Allergies: See EHR for the updated medication list and Allergies. These were reviewed.     Much or all of the text in this note was generated through the use of the Dragon Dictate voice-to-text software. Errors in spelling or words which seem out of context are unintentional. Sound alike errors, in particular, may have escaped editing.        Lung Cancer Screening Shared Decision Making Visit     Laurent Choi is eligible for lung cancer screening on the basis of the information provided in my signed lung cancer screening order.     I have discussed with patient the risks and benefits of screening for lung cancer with low-dose CT.     The risks include:  radiation exposure: one low dose chest CT has as much ionizing radiation as about 15 chest x-rays or 6 months of background radiation living in [place]    false positives: 96% of positive findings/nodules are NOT cancer, but some might still require additional diagnostic evaluation, including biopsy  over-diagnosis: some slow growing cancers that might never have been clinically significant will be detected and treated unnecessarily     The benefit of early detection of lung cancer is contingent upon adherence to annual screening or more frequent follow up if indicated.     Furthermore, reaping the benefits of screening requires Laurent Choi to be willing and physically able to undergo  diagnostic procedures, if indicated. Although no specific guide is available for determining severity of comorbidities, it is reasonable to withhold screening in patients who have greater mortality risk from other diseases.     We did discuss that the only way to prevent lung cancer is to not smoke. Smoking cessation counseling was not given.      I did offer risk estimation using a calculator such as this one:    ShouldIScreen

## 2022-02-25 NOTE — PATIENT INSTRUCTIONS
Lung Cancer Screening   Frequently Asked Questions  If you are at high-risk for lung cancer, getting screened with low-dose computed tomography (LDCT) every year can help save your life. This handout offers answers to some of the most common questions about lung cancer screening. If you have other questions, please call [phone] ([phone]).     What is it?  Lung cancer screening uses special X-ray technology to create an image of your lung tissue. The exam is quick and easy and takes less than 10 seconds. We don t give you any medicine or use any needles. You can eat before and after the exam. You don t need to change your clothes as long as the clothing on your chest doesn t contain metal. But, you do need to be able to hold your breath for at least 6 seconds during the exam.    What is the goal of lung cancer screening?  The goal of lung cancer screening is to save lives. Many times, lung cancer is not found until a person starts having physical symptoms. Lung cancer screening can help detect lung cancer in the earliest stages when it may be easier to treat.    Who should be screened for lung cancer?  We suggest lung cancer screening for anyone who is at high-risk for lung cancer. You are in the high-risk group if you:      are between the ages of 55 and 79, and    have smoked at least 1 pack of cigarettes a day for 20 or more years, and    still smoke or have quit within the past 15 years.    However, if you have a new cough or shortness of breath, you should talk to your doctor before being screened.    Why does it matter if I have symptoms?  Certain symptoms can be a sign that you have a condition in your lungs that should be checked and treated by your doctor. These symptoms include fever, chest pain, a new or changing cough, shortness of breath that you have never felt before, coughing up blood or unexplained weight loss. Having any of these symptoms can greatly affect the results of lung  cancer screening.       Should all smokers get an LDCT lung cancer screening exam?  It depends. Lung cancer screening is for a very specific group of men and women who have a history of heavy smoking over a long period of time (see  Who should be screened for lung cancer  above).  I am in the high-risk group, but have been diagnosed with cancer in the past. Is LDCT lung cancer screening right for me?  In some cases, you should not have LDCT lung screening, such as when your doctor is already following your cancer with CT scan studies. Your doctor will help you decide if LDCT lung screening is right for you.  Do I need to have a screening exam every year?  Yes. If you are in the high-risk group described earlier, you should get an LDCT lung cancer screening exam every year until you are 79, or are no longer willing or able to undergo screening and possible procedures to diagnose and treat lung cancer.  How effective is LDCT at preventing death from lung cancer?  Studies have shown that LDCT lung cancer screening can lower the risk of death from lung cancer by 20 percent in people who are at high-risk.  What are the risks?  There are some risks and limitations of LDCT lung cancer screening. We want to make sure you understand the risks and benefits, so please let us know if you have any questions. Your doctor may want to talk with you more about these risks.    Radiation exposure: As with any exam that uses radiation, there is a very small increased risk of cancer. The amount of radiation in LDCT is small--about the same amount a person would get from a mammogram. Your doctor orders the exam when he or she feels the potential benefits outweigh the risks.    False negatives: No test is perfect, including LDCT. It is possible that you may have a medical condition, including lung cancer, that is not found during your exam. This is called a false negative result.    False positives and more testing: LDCT very often finds  something in the lung that could be cancer, but in fact is not. This is called a false positive result. False positive tests often cause anxiety. To make sure these findings are not cancer, you may need to have more tests. These tests will be done only if you give us permission. Sometimes patients need a treatment that can have side effects, such as a biopsy. For more information on false positives, see  What can I expect from the results?     Findings not related to lung cancer: Your LDCT exam also takes pictures of areas of your body next to your lungs. In a very small number of cases, the CT scan will show an abnormal finding in one of these areas, such as your kidneys, adrenal glands, liver or thyroid. This finding may not be serious, but you may need more tests. Your doctor can help you decide what other tests you may need, if any.  What can I expect from the results?  About 1 out of 4 LDCT exams will find something that may need more tests. Most of the time, these findings are lung nodules. Lung nodules are very small collections of tissue in the lung. These nodules are very common, and the vast majority--more than 97 percent--are not cancer (benign). Most are normal lymph nodes or small areas of scarring from past infections.  But, if a small lung nodule is found to be cancer, the cancer can be cured more than 90 percent of the time. To know if the nodule is cancer, we may need to get more images before your next yearly screening exam. If the nodule has suspicious features (for example, it is large, has an odd shape or grows over time), we will refer you to a specialist for further testing.  Will my doctor also get the results?  Yes. Your doctor will get a copy of your results.

## 2022-03-01 ENCOUNTER — ANCILLARY PROCEDURE (OUTPATIENT)
Dept: CT IMAGING | Facility: CLINIC | Age: 77
End: 2022-03-01
Attending: INTERNAL MEDICINE
Payer: COMMERCIAL

## 2022-03-01 DIAGNOSIS — Z87.891 PERSONAL HISTORY OF TOBACCO USE: ICD-10-CM

## 2022-03-01 LAB — RADIOLOGIST FLAGS: NORMAL

## 2022-03-01 PROCEDURE — 71271 CT THORAX LUNG CANCER SCR C-: CPT | Mod: GC | Performed by: RADIOLOGY

## 2022-03-04 ENCOUNTER — MYC MEDICAL ADVICE (OUTPATIENT)
Dept: PULMONOLOGY | Facility: OTHER | Age: 77
End: 2022-03-04
Payer: COMMERCIAL

## 2022-03-04 DIAGNOSIS — K76.9 LIVER LESION: Primary | ICD-10-CM

## 2022-03-15 ENCOUNTER — TELEPHONE (OUTPATIENT)
Dept: PSYCHIATRY | Facility: CLINIC | Age: 77
End: 2022-03-15
Payer: COMMERCIAL

## 2022-03-15 NOTE — TELEPHONE ENCOUNTER
Writer left a DVM at [phone] reminding pt of [facility]  in-clinc appointments tomorrow 3/16/22 with Dr Dunn and ESTEVAN.Deborah Blanchard LPN

## 2022-03-16 ENCOUNTER — ALLIED HEALTH/NURSE VISIT (OUTPATIENT)
Dept: PSYCHIATRY | Facility: CLINIC | Age: 77
End: 2022-03-16
Payer: COMMERCIAL

## 2022-03-16 ENCOUNTER — OFFICE VISIT (OUTPATIENT)
Dept: PSYCHIATRY | Facility: CLINIC | Age: 77
End: 2022-03-16
Attending: PSYCHIATRY & NEUROLOGY
Payer: COMMERCIAL

## 2022-03-16 VITALS
WEIGHT: 185.6 LBS | SYSTOLIC BLOOD PRESSURE: 135 MMHG | DIASTOLIC BLOOD PRESSURE: 81 MMHG | BODY MASS INDEX: 26.63 KG/M2 | HEART RATE: 72 BPM

## 2022-03-16 VITALS
SYSTOLIC BLOOD PRESSURE: 135 MMHG | HEART RATE: 72 BPM | WEIGHT: 185.6 LBS | BODY MASS INDEX: 26.63 KG/M2 | DIASTOLIC BLOOD PRESSURE: 81 MMHG

## 2022-03-16 DIAGNOSIS — F10.21 ALCOHOL USE DISORDER, SEVERE, IN EARLY REMISSION (H): ICD-10-CM

## 2022-03-16 DIAGNOSIS — G47.00 INSOMNIA, UNSPECIFIED TYPE: ICD-10-CM

## 2022-03-16 DIAGNOSIS — F33.1 MODERATE EPISODE OF RECURRENT MAJOR DEPRESSIVE DISORDER (H): ICD-10-CM

## 2022-03-16 DIAGNOSIS — F41.1 GAD (GENERALIZED ANXIETY DISORDER): ICD-10-CM

## 2022-03-16 DIAGNOSIS — F10.21 ALCOHOL USE DISORDER, SEVERE, IN SUSTAINED REMISSION (H): Primary | ICD-10-CM

## 2022-03-16 PROCEDURE — G0463 HOSPITAL OUTPT CLINIC VISIT: HCPCS

## 2022-03-16 PROCEDURE — 96372 THER/PROPH/DIAG INJ SC/IM: CPT | Performed by: PSYCHIATRY & NEUROLOGY

## 2022-03-16 PROCEDURE — 250N000011 HC RX IP 250 OP 636: Performed by: PSYCHIATRY & NEUROLOGY

## 2022-03-16 PROCEDURE — G0463 HOSPITAL OUTPT CLINIC VISIT: HCPCS | Mod: 25

## 2022-03-16 PROCEDURE — 99214 OFFICE O/P EST MOD 30 MIN: CPT | Mod: 25 | Performed by: FAMILY MEDICINE

## 2022-03-16 RX ORDER — GABAPENTIN 300 MG/1
CAPSULE ORAL
Qty: 90 CAPSULE | Refills: 3 | Status: SHIPPED | OUTPATIENT
Start: 2022-03-16 | End: 2023-03-29

## 2022-03-16 RX ORDER — ESCITALOPRAM OXALATE 20 MG/1
20 TABLET ORAL DAILY
Qty: 90 TABLET | Refills: 0 | Status: SHIPPED | OUTPATIENT
Start: 2022-03-16 | End: 2022-09-22

## 2022-03-16 RX ADMIN — NALTREXONE 380 MG: KIT at 10:23

## 2022-03-16 ASSESSMENT — PATIENT HEALTH QUESTIONNAIRE - PHQ9
SUM OF ALL RESPONSES TO PHQ QUESTIONS 1-9: 10
SUM OF ALL RESPONSES TO PHQ QUESTIONS 1-9: 10
10. IF YOU CHECKED OFF ANY PROBLEMS, HOW DIFFICULT HAVE THESE PROBLEMS MADE IT FOR YOU TO DO YOUR WORK, TAKE CARE OF THINGS AT HOME, OR GET ALONG WITH OTHER PEOPLE: SOMEWHAT DIFFICULT

## 2022-03-16 ASSESSMENT — PAIN SCALES - GENERAL
PAINLEVEL: NO PAIN (0)
PAINLEVEL: NO PAIN (0)

## 2022-03-16 NOTE — PROGRESS NOTES
"    ----------------------------------------------------------------------------------------------------------  [facility], [place]   Psychiatry Clinic Progress Note  Addiction Medicine                        IDENTIFICATION   Laurent \"Gio\" Blaze Choi is a 77 year old, melendrez, , male with alcohol use disorder on Vivitrol.  History was provided by patient who was a good historian. He is here for a follow up visit with his initial visit on 10/17/18.       CHIEF COMPLAINT   Here for follow up and medication management of AUD     Brief Summary     Interim history: Pt reports he is working a lot.  Is tired, sleeps a lot.  He is currently working on a book.  Reports sleeping well at night, maybe \"too much\".  Takes long afternoon nap, then does not fall asleep until 3am.  Wakes 11am.  Feels work takes priority and feels that his sleep cycle does not provide restorative sleep.  Feels tired when he wakes up.  Had sleep apnea test - recommendation for raised head of bed. Pt has tried adding a pillow but has not raised the head of his bed.    Patient reports recovery is going okay.  He has had no use since his last visit.  Last Vivitrol injection 380 mg was on  Feb 26, 2022.  Tolerated this well.  Will remain on injection for now.     Patient feels strong in his sobriety, reconnected to his sponsor recently.  Has not been participating in AA meetings because he is worried about exposure to COVID.  Reports he has had no \"slips \"and is happy about this.  Has found an English speaking AA group in [place] that he is interested in.     The patient feels his symptoms of  MDD and RODRIGO are well managed at this time.    Mood is good, no suicidal ideations or thoughts.  Denies excessive worry.  Eating and drinking well.  Tolerating Lexapro with no trouble.      History:  Gio has been in detox over 40 times and treatment about 5 times.  His first treatment was in 1980. He gave up daily drinking in " "late 1990's, then began to binge drink.  In recent years, his pattern has been drinking 1-2 weeks, not eating most of that time, and getting very sick. He then will stop drinking, will be sober for 1-2 weeks and the cycle is continued. Alcohol has caused withdrawal and tolerance, drinking 6-10 beers and 1 pint a day when on a binge. Alcohol has affected relationships, has affected health with getting sick, not eating and getting depressed.       Treatment History:    First treatment in 1980.  Last treatment at [facility] finished  November 2018.     Substance Use Pharmacotherapies:   Tried antabuse in late 1990's and he would stop taking and \"drink around it\".     Psych pharm:  He was on prozac for about 10 years prior to January 2019 when he was switched to lexapr 20mg, doing well.     Had a trial of aripiprazole in Spring/Summer 2019, but stopped because it was making him drool.    Gabapentin was started in Spring 2019 during a period of abstinence for anxiety.     RECENT SYMPTOMS   [PSYCH ROS]     PANIC ATTACK:  No episodes.  ANXIETY:  none currently   DEPRESSION: Mood is good.   Denies SI/SIB.   DYSREGULATION: None   PSYCHOSIS:  none;  DENIES- none  MARILEE/HYPOMANIA:  none;  DENIES- none  TRAUMA RELATED:  none.   EATING DISORDER:  none  COMPULSIVE:  none  Grief:  none       ALLERGY                                Aspirin, Nsaids (non-steroidal anti-inflammatory drug) [nsaids], Plavix [clopidogrel], and Statins-hmg-coa reductase inhibitors [hmg-coa-r inhibitors]  MEDICATIONS                               Current Outpatient Medications   Medication Sig Dispense Refill     Acetaminophen 325 MG CAPS Take 325 mg by mouth        amLODIPine-benazepril (LOTREL) 10-20 MG per capsule Take 1 capsule by mouth daily       ASPIRIN PO Take 81 mg by mouth daily       calcium-vitamin D 500-125 MG-UNIT TABS Take 1 tablet by mouth 2 times daily       clotrimazole (LOTRIMIN) 1 % external cream Apply topically 2 times daily " Apply to rough patch of skin in front of right ear 2x daily for 10 days 24 g 0     DIPHENHYDRAMINE HCL PO Take 25 mg by mouth daily as needed       escitalopram (LEXAPRO) 20 MG tablet Take 1 tablet (20 mg) by mouth daily 90 tablet 0     gabapentin (NEURONTIN) 300 MG capsule Take 1 capsule by mouth at bedtime as needed. 90 capsule 3     LOSARTAN POTASSIUM PO Take 25 mg by mouth       melatonin 3 MG tablet Take 1-3 tabs before bed PRN 90 tablet 1     metFORMIN (GLUCOPHAGE-XR) 500 MG 24 hr tablet TAKE ONE TABLET BY MOUTH ONCE DAILY WITH BREAKFAST 90 tablet 2     naltrexone (VIVITROL) 380 MG SUSR Inject 380 mg into the muscle every 30 days 1 each 4     NITROGLYCERIN SL Take 0.4 mg by mouth       omeprazole 20 MG tablet Take 1-2 daily 60 tablet 1     rosuvastatin (CRESTOR) 40 MG tablet TAKE ONE TABLET BY MOUTH EVERY NIGHT AT BEDTIME 90 tablet 2     ROSUVASTATIN CALCIUM PO Take 40 mg by mouth daily       tamsulosin (FLOMAX) 0.4 MG capsule TAKE ONE CAPSULE BY MOUTH ONCE DAILY WITH BREAKFAST 90 capsule 2       VITALS   There were no vitals taken for this visit.      MENTAL STATUS EXAM                                                           Orientation: full, x3  Alertness: alert   Appearance: Excellent   Behavior/Demeanor: cooperative, pleasant and calm,   Speech: normal  Language: intact  Psychomotor:ok  Mood: anxious because of moving issues  Affect: full range and appropriate; was congruent to mood; was congruent to content  Thought Process/Associations: unremarkable  Thought Content:  Denies suicidal and violent ideation, delusions and preoccupations  Perception: none  Denies auditory hallucinations and visual hallucinations  Insight: good  Judgment: good  Cognition: does  appear grossly intact; formal cognitive testing was not done  Gait: Normal   MSK: extremities normal, no peripheral edema    SUBSTANCE USE/PSYCHIATRIC DIAGNOSES                                                                                                     Alcohol Use Disorder, severe, in sustained remission.   Major Depressive Disorder- in remission   Generalized Anxiety Disorder- in remission.   R/o PTSD  (partner suicide attempts,his incarcerations) seems not to be an issue currently     ASSESSMENT                                                       This patient is a 77 year old male who has alcohol use disorder since 1980 with intermittent use.  He has been in detox more than 40 times and 4-5 treatments. He has been sober since last visit.     #AUD: Severe, in sustained remission  #MDD: In remission  #RODRIGO: controlled  #Fatigue: improved significantly since last appointment    [place] PRESCRIPTION MONITORING PROGRAM [] was  checked today:  indicates no controlled subtances reported in previous 12 months.     PLAN                                                                                                       PSYCHOTROPIC MEDICATIONS:   - Continue Lexapro 20 mg daily.       - Continue gabapentin to 300 mg,QHS PRN  - recommend taking nightly to help with insomnia     - Continue vivitrol injection monthly    - Continue with melatonin 3mg at night    At this time pt will remain on vivitrol and discuss again in the future.  Answered pt's questions regarding mediations.     Discussed triggers and relapse prevention.  Pt plans to continue to use distraction.  Encourage maintaining connection with his sponsor.  Discussed AA -[place]  Via zoom.  Encouraged patient to connect with meetings, he plans to do this.      2) insomnia- recommend following with recommendation of elevating head of bed.  Continue Gabapentin and melatonin.  Reviewed sleep hygiene.  will follow up.      3) Next appointment in 1 month         TREATMENT RISK STATEMENT:  The risks, benefits, alternatives and potential adverse effects have been explained and are understood by the pt. The pt agrees to the treatment plan with the ability to do so. The pt knows to call the clinic for any  problems or to access emergency care if needed.  Medical and CD concerns are documented above.  Psychotropic drug interaction check was done, including changes made today, and is discussed above.    ADDICTION FELLOW: Tiffanie Dunn MD    Patient seen by and discussed with staff psychiatrist, Dr. Laraupervisor is Dr. Bee     I saw the patient with the fellow, and participated in key portions of the service, including the mental status examination and developing the plan of care. I reviewed key portions of the history with the fellow. I agree with the findings and plan as documented in this note.    Selam Bee MD

## 2022-03-16 NOTE — NURSING NOTE
"Clinic Administered Medication Documentation      Injectable Medication Documentation    Patient was given Vivitrol 380 mg. Prior to medication administration, verified patients identity using patient s name and date of birth. Please see MAR and medication order for additional information. Patient instructed to stay in clinic after the injection but patient declined.      Was entire vial of medication used? Yes  Vial/Syringe: Single dose vial  Expiration Date: 10/2023  Was this medication supplied by the patient? No      Laurent Choi,  1945, presented to the clinic today at the request of , ordering provider for long-acting injectable Vivitrol 380 mg.    OBSERVATIONS:  1. Appearance: Dressed in clean, weather appropriate outfit. Patient stated it has been two years since his last alcohol drink. Patient shared he is looking forward to Spring and wants to take a walk to \"loose weight.\" Denied SI, HI, AH, VH, and SIB. Patient saw  after the injection for an MFU.   2.  Mood: Good, friendly  3.  Affect: Congruent  4.  Attitude: Engaged, good eye contact  5.  Cooperation: Full  6.  Side Effects: Denied  7.  Education: None needed  8. Next appointment: 22  9. Location: Right [place] Quadrant    The service provided today was rendered under the supervising provider of the day, , who was available for consultation as needed.        "

## 2022-03-17 ASSESSMENT — PATIENT HEALTH QUESTIONNAIRE - PHQ9: SUM OF ALL RESPONSES TO PHQ QUESTIONS 1-9: 10

## 2022-03-18 ENCOUNTER — HOSPITAL ENCOUNTER (OUTPATIENT)
Dept: ULTRASOUND IMAGING | Facility: HOSPITAL | Age: 77
Discharge: HOME | End: 2022-03-18
Attending: INTERNAL MEDICINE | Admitting: INTERNAL MEDICINE
Payer: COMMERCIAL

## 2022-03-18 DIAGNOSIS — K76.9 LIVER LESION: ICD-10-CM

## 2022-03-18 PROCEDURE — 76705 ECHO EXAM OF ABDOMEN: CPT

## 2022-04-12 ENCOUNTER — TELEPHONE (OUTPATIENT)
Dept: PSYCHIATRY | Facility: CLINIC | Age: 77
End: 2022-04-12
Payer: COMMERCIAL

## 2022-04-12 NOTE — TELEPHONE ENCOUNTER
Writer called [phone] and spoke to the patient with a reminder of [facility] appointment on Wednesday 4/13/22. Patient confirmed appointment.Deborah Blanchard LPN

## 2022-04-13 ENCOUNTER — ALLIED HEALTH/NURSE VISIT (OUTPATIENT)
Dept: PSYCHIATRY | Facility: CLINIC | Age: 77
End: 2022-04-13
Payer: COMMERCIAL

## 2022-04-13 ENCOUNTER — TELEPHONE (OUTPATIENT)
Dept: PSYCHIATRY | Facility: CLINIC | Age: 77
End: 2022-04-13
Payer: COMMERCIAL

## 2022-04-13 VITALS
WEIGHT: 186 LBS | SYSTOLIC BLOOD PRESSURE: 120 MMHG | DIASTOLIC BLOOD PRESSURE: 77 MMHG | BODY MASS INDEX: 26.69 KG/M2 | HEART RATE: 103 BPM

## 2022-04-13 DIAGNOSIS — F10.21 ACUTE ALCOHOLIC INTOXICATION IN ALCOHOLISM, IN REMISSION (H): ICD-10-CM

## 2022-04-13 DIAGNOSIS — F10.21 ALCOHOL USE DISORDER, SEVERE, IN SUSTAINED REMISSION (H): Primary | ICD-10-CM

## 2022-04-13 PROCEDURE — 96372 THER/PROPH/DIAG INJ SC/IM: CPT | Performed by: PSYCHIATRY & NEUROLOGY

## 2022-04-13 PROCEDURE — 250N000011 HC RX IP 250 OP 636: Performed by: PSYCHIATRY & NEUROLOGY

## 2022-04-13 RX ORDER — NALTREXONE 380 MG
380 KIT INTRAMUSCULAR
Qty: 1 EACH | Refills: 4 | OUTPATIENT
Start: 2022-04-13 | End: 2023-04-10

## 2022-04-13 RX ADMIN — NALTREXONE 380 MG: KIT at 11:15

## 2022-04-13 NOTE — NURSING NOTE
"Clinic Administered Medication Documentation      Injectable Medication Documentation    Patient was given Vivitrol 380 mg . Prior to medication administration, verified patients identity using patient s name and date of birth. Please see MAR and medication order for additional information. Patient instructed to stay in clinic after the injection but patient declined.      Was entire vial of medication used? Yes  Vial/Syringe: Single dose vial  Expiration Date:  2023  Was this medication supplied by the patient? No      Laurentjulian Thakur Jimbo , LUL 1945  presented to the clinic today at the request of Dr Dunn,  ordering provider for long-acting injectable Vivitrol 380 mg.    OBSERVATIONS:  1.  Appearance: Clean, well groomed  2.  Mood: \"good\" Denies thoughts of self harm, thoughts of harming others, and suicidal ideation. Patient has not drank in several years.   3.  Affect: congruent   4.  Attitude: pleasant   5.  Cooperation: cooperative   6.  Side Effects: none   7.  Education: remind all provider that he is taking this medication especially if he needs opioids as this medication is an opioid blocker. Patient understands.    8. Next appointment: 22 [id number]  9. Location given: left upper outer quadrant     The service provided today was rendered under the supervising provider of the day, Dr Camilo, who was available for consultation as needed.        "

## 2022-04-13 NOTE — TELEPHONE ENCOUNTER
Authorization obtained for refill # 4 on Vivitrol 380 mg IM Injection, Q28D per Dr Tiffanie Dunn.Deborah Blanchard LPN

## 2022-04-19 ENCOUNTER — OFFICE VISIT (OUTPATIENT)
Dept: FAMILY MEDICINE | Facility: CLINIC | Age: 77
End: 2022-04-19
Payer: COMMERCIAL

## 2022-04-19 VITALS
DIASTOLIC BLOOD PRESSURE: 80 MMHG | SYSTOLIC BLOOD PRESSURE: 133 MMHG | WEIGHT: 188 LBS | BODY MASS INDEX: 26.98 KG/M2 | RESPIRATION RATE: 16 BRPM | TEMPERATURE: 98.1 F | HEART RATE: 81 BPM

## 2022-04-19 DIAGNOSIS — D18.03 LIVER HEMANGIOMA: ICD-10-CM

## 2022-04-19 DIAGNOSIS — Z00.00 ENCOUNTER FOR MEDICARE ANNUAL WELLNESS EXAM: Primary | ICD-10-CM

## 2022-04-19 DIAGNOSIS — I10 HYPERTENSION, ESSENTIAL: ICD-10-CM

## 2022-04-19 DIAGNOSIS — E78.00 PURE HYPERCHOLESTEROLEMIA: ICD-10-CM

## 2022-04-19 DIAGNOSIS — Z95.5 CORONARY STENT PATENT: ICD-10-CM

## 2022-04-19 DIAGNOSIS — R73.03 PREDIABETES: ICD-10-CM

## 2022-04-19 DIAGNOSIS — L98.9 SKIN LESION: ICD-10-CM

## 2022-04-19 DIAGNOSIS — J43.9 PULMONARY EMPHYSEMA, UNSPECIFIED EMPHYSEMA TYPE (H): ICD-10-CM

## 2022-04-19 DIAGNOSIS — K62.5 RECTAL BLEEDING: ICD-10-CM

## 2022-04-19 DIAGNOSIS — F10.21 ALCOHOL USE DISORDER, SEVERE, IN EARLY REMISSION (H): ICD-10-CM

## 2022-04-19 LAB
ALBUMIN SERPL-MCNC: 3.9 G/DL (ref 3.5–5)
ALP SERPL-CCNC: 49 U/L (ref 45–120)
ALT SERPL W P-5'-P-CCNC: 13 U/L (ref 0–45)
ANION GAP SERPL CALCULATED.3IONS-SCNC: 10 MMOL/L (ref 5–18)
AST SERPL W P-5'-P-CCNC: 18 U/L (ref 0–40)
BASOPHILS # BLD AUTO: 0 10E3/UL (ref 0–0.2)
BASOPHILS NFR BLD AUTO: 1 %
BILIRUB SERPL-MCNC: 0.5 MG/DL (ref 0–1)
BUN SERPL-MCNC: 11 MG/DL (ref 8–28)
CALCIUM SERPL-MCNC: 9.1 MG/DL (ref 8.5–10.5)
CHLORIDE BLD-SCNC: 105 MMOL/L (ref 98–107)
CHOLEST SERPL-MCNC: 159 MG/DL
CO2 SERPL-SCNC: 27 MMOL/L (ref 22–31)
CREAT SERPL-MCNC: 0.88 MG/DL (ref 0.7–1.3)
CREAT UR-MCNC: 44 MG/DL
EOSINOPHIL # BLD AUTO: 0.2 10E3/UL (ref 0–0.7)
EOSINOPHIL NFR BLD AUTO: 2 %
ERYTHROCYTE [DISTWIDTH] IN BLOOD BY AUTOMATED COUNT: 12.9 % (ref 10–15)
FASTING STATUS PATIENT QL REPORTED: NO
GFR SERPL CREATININE-BSD FRML MDRD: 90 ML/MIN/1.73M2
GLUCOSE BLD-MCNC: 84 MG/DL (ref 70–125)
HBA1C MFR BLD: 6 % (ref 0–5.6)
HCT VFR BLD AUTO: 43.6 % (ref 40–53)
HDLC SERPL-MCNC: 68 MG/DL
HGB BLD-MCNC: 14.1 G/DL (ref 13.3–17.7)
IMM GRANULOCYTES # BLD: 0 10E3/UL
IMM GRANULOCYTES NFR BLD: 0 %
LDLC SERPL CALC-MCNC: 68 MG/DL
LYMPHOCYTES # BLD AUTO: 2 10E3/UL (ref 0.8–5.3)
LYMPHOCYTES NFR BLD AUTO: 32 %
MCH RBC QN AUTO: 30.4 PG (ref 26.5–33)
MCHC RBC AUTO-ENTMCNC: 32.3 G/DL (ref 31.5–36.5)
MCV RBC AUTO: 94 FL (ref 78–100)
MICROALBUMIN UR-MCNC: <0.5 MG/DL (ref 0–1.99)
MICROALBUMIN/CREAT UR: NORMAL MG/G{CREAT}
MONOCYTES # BLD AUTO: 0.9 10E3/UL (ref 0–1.3)
MONOCYTES NFR BLD AUTO: 14 %
NEUTROPHILS # BLD AUTO: 3.2 10E3/UL (ref 1.6–8.3)
NEUTROPHILS NFR BLD AUTO: 51 %
PLATELET # BLD AUTO: 224 10E3/UL (ref 150–450)
POTASSIUM BLD-SCNC: 4.5 MMOL/L (ref 3.5–5)
PROT SERPL-MCNC: 7.3 G/DL (ref 6–8)
RBC # BLD AUTO: 4.64 10E6/UL (ref 4.4–5.9)
SODIUM SERPL-SCNC: 142 MMOL/L (ref 136–145)
TRIGL SERPL-MCNC: 114 MG/DL
TSH SERPL DL<=0.005 MIU/L-ACNC: 0.83 UIU/ML (ref 0.3–5)
WBC # BLD AUTO: 6.2 10E3/UL (ref 4–11)

## 2022-04-19 PROCEDURE — 80061 LIPID PANEL: CPT | Performed by: FAMILY MEDICINE

## 2022-04-19 PROCEDURE — 36415 COLL VENOUS BLD VENIPUNCTURE: CPT | Performed by: FAMILY MEDICINE

## 2022-04-19 PROCEDURE — 99397 PER PM REEVAL EST PAT 65+ YR: CPT | Performed by: FAMILY MEDICINE

## 2022-04-19 PROCEDURE — 0064A COVID-19,PF,MODERNA (18+ YRS BOOSTER .25ML): CPT | Performed by: FAMILY MEDICINE

## 2022-04-19 PROCEDURE — 83036 HEMOGLOBIN GLYCOSYLATED A1C: CPT | Performed by: FAMILY MEDICINE

## 2022-04-19 PROCEDURE — 82043 UR ALBUMIN QUANTITATIVE: CPT | Performed by: FAMILY MEDICINE

## 2022-04-19 PROCEDURE — 84443 ASSAY THYROID STIM HORMONE: CPT | Performed by: FAMILY MEDICINE

## 2022-04-19 PROCEDURE — 91306 COVID-19,PF,MODERNA (18+ YRS BOOSTER .25ML): CPT | Performed by: FAMILY MEDICINE

## 2022-04-19 PROCEDURE — 80053 COMPREHEN METABOLIC PANEL: CPT | Performed by: FAMILY MEDICINE

## 2022-04-19 PROCEDURE — 85025 COMPLETE CBC W/AUTO DIFF WBC: CPT | Performed by: FAMILY MEDICINE

## 2022-04-19 RX ORDER — AMLODIPINE BESYLATE 5 MG/1
TABLET ORAL
COMMUNITY
Start: 2022-03-09 | End: 2022-06-06

## 2022-04-19 NOTE — PROGRESS NOTES
SUBJECTIVE:   Laurent Choi is a 77 year old male who presents for Preventive Visit.      Patient has been advised of split billing requirements and indicates understanding: Yes  Are you in the first 12 months of your Medicare coverage?  No    HPI  Do you feel safe in your environment? Yes    Have you ever done Advance Care Planning? (For example, a Health Directive, POLST, or a discussion with a medical provider or your loved ones about your wishes): No, advance care planning information given to patient to review.  Patient declined advance care planning discussion at this time.       Fall risk  Fallen 2 or more times in the past year?: No  Any fall with injury in the past year?: No    Cognitive Screening   1) Repeat 3 items (Leader, Season, Table)    2) Clock draw: NORMAL  3) 3 item recall: Recalls 3 objects  Results: 3 items recalled: COGNITIVE IMPAIRMENT LESS LIKELY    Mini-CogTM Copyright S Nazario. Licensed by the author for use in [facility]; reprinted with permission (eden@[facility]). All rights reserved.      Do you have sleep apnea, excessive snoring or daytime drowsiness?: yes    Reviewed and updated as needed this visit by clinical staff   Tobacco   Meds                Reviewed and updated as needed this visit by Provider                   Social History     Tobacco Use     Smoking status: Former Smoker     Packs/day: 2.00     Types: Cigarettes     Smokeless tobacco: Never Used   Substance Use Topics     Alcohol use: Not on file     If you drink alcohol do you typically have >3 drinks per day or >7 drinks per week? No    No flowsheet data found.        PROBLEMS TO ADD ON...  CT scan lung to screen for cancer did show a liver hemangioma, ultrasound did confirm that.  He did have rectal bleeding a few days after that.  Nothing since then.  Does have a history of hiatal hernia, last colonoscopy in file was 2016 but he did have 1 less than a year ago at [facility], no record  available for review.  He still sober, he is doing Vivitrol injection monthly.  Family history is positive for melanoma.  Current providers sharing in care for this patient include:   Patient Care Team:  Amara Smith MD as PCP - General  Selam Bee MD as MD (Psychiatry)  Blaine Adame MD as MD (Urology)  Amara Smith MD as Assigned PCP  Margie Hernandez, RN as Registered Nurse (Oncology)  Rose Sepulveda MD as Assigned Pulmonology Provider  Piyush Rasmussen MD as Assigned Surgical Provider  Tiffanie Dunn MD as Fellow (Chemical Dependency)  Joel Vasquez MD as MD (Dermatology)  Susi Davis, PhD as Assigned Behavioral Health Provider  Benny Carrasco MD as MD (Cardiovascular Disease)    The following health maintenance items are reviewed in Epic and correct as of today:  Health Maintenance Due   Topic Date Due     AORTIC ANEURYSM SCREENING (SYSTEM ASSIGNED)  Never done     ZOSTER IMMUNIZATION (2 of 3) 08/10/2016     Lab work is in process  Labs reviewed in EPIC  BP Readings from Last 3 Encounters:   04/19/22 133/80   04/13/22 120/77   03/16/22 135/81    Wt Readings from Last 3 Encounters:   04/19/22 85.3 kg (188 lb)   04/13/22 84.4 kg (186 lb)   03/16/22 84.2 kg (185 lb 9.6 oz)                  Patient Active Problem List   Diagnosis     Recurrent major depressive disorder, in remission (H)     Alcohol use disorder, severe, in early remission (H)     Moderate episode of recurrent major depressive disorder (H)     Pulmonary emphysema, unspecified emphysema type (H)     Past Surgical History:   Procedure Laterality Date     CARDIAC SURGERY       COLONOSCOPY      removal of polyp     ENT SURGERY       HERNIA REPAIR         Social History     Tobacco Use     Smoking status: Former Smoker     Packs/day: 2.00     Types: Cigarettes     Smokeless tobacco: Never Used   Substance Use Topics     Alcohol use: Not on file     Family History   Problem Relation Age of Onset     Dementia Mother       Substance Abuse Father      Suicide Other      Anxiety Disorder Sister      Depression No family hx of      Schizophrenia No family hx of      Bipolar Disorder No family hx of      [place] Disease No family hx of      Parkinsonism No family hx of      Autism Spectrum Disorder No family hx of      Snoring Father          Current Outpatient Medications   Medication Sig Dispense Refill     Acetaminophen 325 MG CAPS Take 325 mg by mouth        ASPIRIN PO Take 81 mg by mouth daily       calcium-vitamin D 500-125 MG-UNIT TABS Take 1 tablet by mouth 2 times daily       clotrimazole (LOTRIMIN) 1 % external cream Apply topically 2 times daily Apply to rough patch of skin in front of right ear 2x daily for 10 days (Patient not taking: Reported on 4/20/2022) 24 g 0     DIPHENHYDRAMINE HCL PO Take 25 mg by mouth daily as needed       escitalopram (LEXAPRO) 20 MG tablet Take 1 tablet (20 mg) by mouth daily 90 tablet 0     gabapentin (NEURONTIN) 300 MG capsule Take 1 capsule by mouth at bedtime as needed. 90 capsule 3     LOSARTAN POTASSIUM PO Take 25 mg by mouth       melatonin 3 MG tablet Take 1-3 tabs before bed PRN 90 tablet 1     metFORMIN (GLUCOPHAGE-XR) 500 MG 24 hr tablet TAKE ONE TABLET BY MOUTH ONCE DAILY WITH BREAKFAST 90 tablet 2     naltrexone (VIVITROL) 380 MG SUSR Inject 380 mg into the muscle every 30 days 1 each 4     omeprazole 20 MG tablet Take 1-2 daily 60 tablet 1     rosuvastatin (CRESTOR) 40 MG tablet TAKE ONE TABLET BY MOUTH EVERY NIGHT AT BEDTIME 90 tablet 2     tamsulosin (FLOMAX) 0.4 MG capsule TAKE ONE CAPSULE BY MOUTH ONCE DAILY WITH BREAKFAST 90 capsule 2     amLODIPine (NORVASC) 5 MG tablet        NITROGLYCERIN SL Take 0.4 mg by mouth (Patient not taking: No sig reported)       Allergies   Allergen Reactions     Aspirin Other (See Comments)     Avoids taking any aspirin besides his daily 81 mg aspirin regimen due to acid reflux/esophagus problem(s).     Nsaids (Non-Steroidal Anti-Inflammatory  "Drug) [Nsaids] Other (See Comments)     Avoids due to acid reflux/esophagus problem(s).     Plavix [Clopidogrel] Other (See Comments) and Muscle Pain (Myalgia)     Reaction(s): Bad bruising all over his body and leg cramps.     Statins-Hmg-Coa Reductase Inhibitors [Hmg-Coa-R Inhibitors] Muscle Pain (Myalgia)     Per the patient, he tolerates rosuvastatin.     Recent Labs   Lab Test 04/19/22  1402 06/01/21  1125 08/26/20  1141   A1C 6.0* 6.0* 5.9*   LDL 68 74 78   HDL 68 75 68   TRIG 114 87 89   ALT 13 15 14   CR 0.88 0.86 0.84   GFRESTIMATED 90 >60 >60   GFRESTBLACK  --  >60 >60   POTASSIUM 4.5 4.6 4.4   TSH 0.83  --  0.49        Review of Systems  Constitutional, HEENT, cardiovascular, pulmonary, GI, , musculoskeletal, neuro, skin, endocrine and psych systems are negative, except as otherwise noted.    OBJECTIVE:   /80 (BP Location: Left arm, Patient Position: Sitting, Cuff Size: Adult Regular)   Pulse 81   Temp 98.1  F (36.7  C)   Resp 16   Wt 85.3 kg (188 lb)   BMI 26.98 kg/m   Estimated body mass index is 26.98 kg/m  as calculated from the following:    Height as of 8/6/21: 1.778 m (5' 10\").    Weight as of this encounter: 85.3 kg (188 lb).  Physical Exam  GENERAL: healthy, alert and no distress  EYES: Eyes grossly normal to inspection, PERRL and conjunctivae and sclerae normal  HENT: ear canals and TM's normal, nose and mouth without ulcers or lesions  NECK: no adenopathy, no asymmetry, masses, or scars and thyroid normal to palpation  RESP: lungs clear to auscultation - no rales, rhonchi or wheezes  CV: regular rate and rhythm, normal S1 S2, no S3 or S4, no murmur, click or rub, no peripheral edema and peripheral pulses strong  ABDOMEN: soft, nontender, no hepatosplenomegaly, no masses and bowel sounds normal  MS: no gross musculoskeletal defects noted, no edema  SKIN: no suspicious lesions or rashes  NEURO: Normal strength and tone, mentation intact and speech normal  PSYCH: mentation appears " normal, affect normal/bright    Diagnostic Test Results:  Labs reviewed in Epic    ASSESSMENT / PLAN:   (Z00.00) Encounter for Medicare annual wellness exam  (primary encounter diagnosis)  Comment:   Plan:     (L98.9) Skin lesion  Comment:   Plan: Adult Dermatology Referral            (K62.5) Rectal bleeding  Comment:   Plan: Adult Gastro Ref - Consult Only            (J43.9) Pulmonary emphysema, unspecified emphysema type (H)  Comment:   Plan:     (D18.03) Liver hemangioma  Comment:   Plan:     (F10.21) Alcohol use disorder, severe, in early remission (H)  Comment:   Plan: **Comprehensive metabolic panel FUTURE 2mo,         **CBC with platelets differential FUTURE 2mo            (I10) Hypertension, essential  Comment:   Plan: **CBC with platelets differential FUTURE 2mo,         **TSH with free T4 reflex FUTURE 2mo, Albumin         Random Urine Quantitative with Creat Ratio           (E78.00) Pure hypercholesterolemia  Comment:   Plan: Lipid panel reflex to direct LDL Fasting            (Z95.5) Coronary stent patent  Comment:   Plan: Adult Cardiology Eval  Referral            (R73.03) Prediabetes  Comment:   Plan: Hemoglobin A1c              Rectal bleeding, refer to GI to determine if colonoscopy will need to be done, did have one less than a year ago, results not available for review.  Coronary artery disease with stent placement 2015, refer to cardiologist for follow-up.  Elevated PSA has been followed by urologist did seems to normalized recently.  Family history of melanoma, refer to dermatology for regular skin exam.  Alcohol dependence is, still sober, doing Vivitrol monthly.  Emphysema has been followed by pulmonologist.    Patient has been advised of split billing requirements and indicates understanding: Yes    COUNSELING:  Reviewed preventive health counseling, as reflected in patient instructions       Regular exercise       Healthy diet/nutrition       Vision screening       Hearing  "screening       Dental care       Bladder control       Colon cancer screening       Prostate cancer screening    Estimated body mass index is 26.98 kg/m  as calculated from the following:    Height as of 8/6/21: 1.778 m (5' 10\").    Weight as of this encounter: 85.3 kg (188 lb).    Weight management plan: Discussed healthy diet and exercise guidelines    He reports that he has quit smoking. His smoking use included cigarettes. He smoked 2.00 packs per day. He has never used smokeless tobacco.      Appropriate preventive services were discussed with this patient, including applicable screening as appropriate for cardiovascular disease, diabetes, osteopenia/osteoporosis, and glaucoma.  As appropriate for age/gender, discussed screening for colorectal cancer, prostate cancer, breast cancer, and cervical cancer. Checklist reviewing preventive services available has been given to the patient.    Reviewed patients plan of care and provided an AVS. The Intermediate Care Plan ( asthma action plan, low back pain action plan, and migraine action plan) for Laurent meets the Care Plan requirement. This Care Plan has been established and reviewed with the Patient.    Counseling Resources:  ATP IV Guidelines  Pooled Cohorts Equation Calculator  Breast Cancer Risk Calculator  Breast Cancer: Medication to Reduce Risk  FRAX Risk Assessment  ICSI Preventive Guidelines  Dietary Guidelines for Americans, 2010  [employer]'s MyPlate  ASA Prophylaxis  Lung CA Screening    Amara Smith MD  [facility]    Identified Health Risks:  "

## 2022-04-19 NOTE — PATIENT INSTRUCTIONS
Patient Education   Personalized Prevention Plan  You are due for the preventive services outlined below.  Your care team is available to assist you in scheduling these services.  If you have already completed any of these items, please share that information with your care team to update in your medical record.  Health Maintenance Due   Topic Date Due     AORTIC ANEURYSM SCREENING (SYSTEM ASSIGNED)  Never done     Zoster (Shingles) Vaccine (2 of 3) 08/10/2016

## 2022-04-20 ENCOUNTER — VIRTUAL VISIT (OUTPATIENT)
Dept: PSYCHIATRY | Facility: CLINIC | Age: 77
End: 2022-04-20
Attending: FAMILY MEDICINE
Payer: COMMERCIAL

## 2022-04-20 DIAGNOSIS — F10.21 ALCOHOL USE DISORDER, SEVERE, IN SUSTAINED REMISSION (H): Primary | ICD-10-CM

## 2022-04-20 DIAGNOSIS — F33.1 MODERATE EPISODE OF RECURRENT MAJOR DEPRESSIVE DISORDER (H): ICD-10-CM

## 2022-04-20 DIAGNOSIS — F41.1 GAD (GENERALIZED ANXIETY DISORDER): ICD-10-CM

## 2022-04-20 DIAGNOSIS — G47.00 INSOMNIA, UNSPECIFIED TYPE: ICD-10-CM

## 2022-04-20 PROCEDURE — 99214 OFFICE O/P EST MOD 30 MIN: CPT | Mod: GT | Performed by: FAMILY MEDICINE

## 2022-04-20 NOTE — PATIENT INSTRUCTIONS
**For crisis resources, please see the information at the end of this document**   Patient Education    Thank you for coming to the [facility] M[facility] & [facility] [place] [facility].    Lab Testing:  If you had lab testing today and your results are reassuring or normal they will be mailed to you or sent through [employer] within 7 days. If the lab tests need quick action we will call you with the results. The phone number we will call with results is # [phone]. If this is not the best number please call our clinic and change the number.     Medication Refills:  If you need any refills please call your pharmacy and they will contact us. Our fax number for refills is [phone]. Please allow three business days for refill processing.   If you need to change to a different pharmacy, please contact the new pharmacy directly. The new pharmacy will help you get your medications transferred.     Contact Us:  Please call [phone] during business hours (8-5:00 M-F).  If you have medication related questions after clinic hours, or on the weekend, please call [phone].    Financial Assistance [phone]  Medical Records [phone]       MENTAL HEALTH CRISIS RESOURCES:  For a emergency help, please call 911 or go to the nearest Emergency Department.     Emergency Walk-In Options:   EmPATH Unit @ [facility] ([place]): [phone] - Specialized mental health emergency area designed to be calming  [facility] ([place]): [phone]  [facility] ([place]): [phone]  [facility]): [phone]    County Crisis Information:   [place]: [phone]  [place]: [phone]  [place] - Adult: [phone]     Child: [phone]  [place] - Adult: [phone]     Child: [phone]  [place]: [phone]  List of all [facility] resources:    https://[place].gov/dhs/people-we-serve/adults/health-care/mental-health/resources/crisis-contacts.jsp    National Crisis Information:   Crisis Text Line: Text  [place]  to 351133  National Suicide Prevention Lifeline: 2-110-596-TALK (1-783.911.2925)       For online chat options, visit https://suicidepreventionlifeline.org/chat/  Poison Control Center: 9-135-522-1398  Trans Lifeline: 1-172-840-2068 - Hotline for transgender people of all ages  The [employer]: 6-822-391-4386 - Hotline for LGBT youth     For Non-Emergency Support:   Fast Tracker: Mental Health & Substance Use Disorder Resources -   https://www.RedTn.org/

## 2022-04-20 NOTE — PROGRESS NOTES
"Video-Visit Details    Type of service:  Video Visit  Video Start Time (time video started): 0900  Video End Time (time video stopped): 0920  Originating Location (pt. Location): Home  Distant Location (provider location):  [facility] MENTAL HEALTH & ADDICTION [facility]   Mode of Communication:  Video Conference via Practice Management e-Tools  Physician has received verbal consent for a Video Visit from the patient? Yes        ----------------------------------------------------------------------------------------------------------  [facility], [place]   Psychiatry Clinic Progress Note  Addiction Medicine                        IDENTIFICATION   Laurent \"Gio\" Blaze Choi is a 77 year old, melendrez, , male with alcohol use disorder on Vivitrol.  History was provided by patient who was a good historian. He is here for a follow up visit with his initial visit on 10/17/18.       CHIEF COMPLAINT   Here for follow up and medication management of AUD     Brief Summary     Interim history: Pt reports he is working a lot.  Is tired.  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  Reports his sleep has improved.      Patient reports recovery is going okay.  He has had no use since his last visit.  Vivitrol injections are going well.  He had been thinking about changing from Vivitrol to oral naltrexone, he has decided it is best to stay on Vivitrol at this time.  He is coordinated his next injection together with an in person visit clinic visit.      Patient feels strong in his sobriety, reconnected to his sponsor recently.  Has not been participating in AA meetings because he is worried about exposure to COVID.  He has not connected with an on line AA meeting.  He has contemplated this but has not followed through.    Patient did attempt to participate in the relapse prevention therapy group.  He was able to log in to 1 group meeting but then had technical issues.  He is interested " "in reconnecting with that group.    The patient feels his symptoms of  MDD and RODRIGO are well managed at this time.    Mood is good, no suicidal ideations or thoughts.  Denies excessive worry.  Eating and drinking well.  Tolerating Lexapro with no trouble.      History:  Gio has been in detox over 40 times and treatment about 5 times.  His first treatment was in 1980. He gave up daily drinking in late 1990's, then began to binge drink.  In recent years, his pattern has been drinking 1-2 weeks, not eating most of that time, and getting very sick. He then will stop drinking, will be sober for 1-2 weeks and the cycle is continued. Alcohol has caused withdrawal and tolerance, drinking 6-10 beers and 1 pint a day when on a binge. Alcohol has affected relationships, has affected health with getting sick, not eating and getting depressed.       Treatment History:    First treatment in 1980.  Last treatment at [facility] finished  November 2018.     Substance Use Pharmacotherapies:   Tried antabuse in late 1990's and he would stop taking and \"drink around it\".     Psych pharm:  He was on prozac for about 10 years prior to January 2019 when he was switched to lexapr 20mg, doing well.     Had a trial of aripiprazole in Spring/Summer 2019, but stopped because it was making him drool.    Gabapentin was started in Spring 2019 during a period of abstinence for anxiety.     RECENT SYMPTOMS   [PSYCH ROS]     PANIC ATTACK:  No episodes.  ANXIETY:  none currently   DEPRESSION: Mood is good.   Denies SI/SIB.   DYSREGULATION: None   PSYCHOSIS:  none;  DENIES- none  MARILEE/HYPOMANIA:  none;  DENIES- none  TRAUMA RELATED:  none.   EATING DISORDER:  none  COMPULSIVE:  none  Grief:  none       ALLERGY                                Aspirin, Nsaids (non-steroidal anti-inflammatory drug) [nsaids], Plavix [clopidogrel], and Statins-hmg-coa reductase inhibitors [hmg-coa-r inhibitors]  MEDICATIONS                               Current " Outpatient Medications   Medication Sig Dispense Refill     Acetaminophen 325 MG CAPS Take 325 mg by mouth        amLODIPine (NORVASC) 5 MG tablet        ASPIRIN PO Take 81 mg by mouth daily       calcium-vitamin D 500-125 MG-UNIT TABS Take 1 tablet by mouth 2 times daily       DIPHENHYDRAMINE HCL PO Take 25 mg by mouth daily as needed       escitalopram (LEXAPRO) 20 MG tablet Take 1 tablet (20 mg) by mouth daily 90 tablet 0     gabapentin (NEURONTIN) 300 MG capsule Take 1 capsule by mouth at bedtime as needed. 90 capsule 3     LOSARTAN POTASSIUM PO Take 25 mg by mouth       melatonin 3 MG tablet Take 1-3 tabs before bed PRN 90 tablet 1     metFORMIN (GLUCOPHAGE-XR) 500 MG 24 hr tablet TAKE ONE TABLET BY MOUTH ONCE DAILY WITH BREAKFAST 90 tablet 2     naltrexone (VIVITROL) 380 MG SUSR Inject 380 mg into the muscle every 30 days 1 each 4     omeprazole 20 MG tablet Take 1-2 daily 60 tablet 1     rosuvastatin (CRESTOR) 40 MG tablet TAKE ONE TABLET BY MOUTH EVERY NIGHT AT BEDTIME 90 tablet 2     tamsulosin (FLOMAX) 0.4 MG capsule TAKE ONE CAPSULE BY MOUTH ONCE DAILY WITH BREAKFAST 90 capsule 2     clotrimazole (LOTRIMIN) 1 % external cream Apply topically 2 times daily Apply to rough patch of skin in front of right ear 2x daily for 10 days (Patient not taking: Reported on 4/20/2022) 24 g 0     NITROGLYCERIN SL Take 0.4 mg by mouth (Patient not taking: No sig reported)         VITALS   There were no vitals taken for this visit.      MENTAL STATUS EXAM                                                           Orientation: full, x3  Alertness: alert   Appearance: Excellent   Behavior/Demeanor: cooperative, pleasant and calm,   Speech: normal  Language: intact  Psychomotor:ok  Mood: anxious because of moving issues  Affect: full range and appropriate; was congruent to mood; was congruent to content  Thought Process/Associations: unremarkable  Thought Content:  Denies suicidal and violent ideation, delusions and  preoccupations  Perception: none  Denies auditory hallucinations and visual hallucinations  Insight: good  Judgment: good  Cognition: does  appear grossly intact; formal cognitive testing was not done  Gait: Normal   MSK: extremities normal, no peripheral edema    SUBSTANCE USE/PSYCHIATRIC DIAGNOSES                                                                                                    Alcohol Use Disorder, severe, in sustained remission.   Major Depressive Disorder- in remission   Generalized Anxiety Disorder- in remission.   R/o PTSD  (partner suicide attempts,his incarcerations) seems not to be an issue currently     ASSESSMENT                                                       This patient is a 77 year old male who has alcohol use disorder since 1980 with intermittent use.  He has been in detox more than 40 times and 4-5 treatments. He has been sober since last visit.     #AUD: Severe, in sustained remission  #MDD: In remission  #RODRIGO: controlled  #Fatigue: improved    [place] PRESCRIPTION MONITORING PROGRAM [] was  checked today:  indicates no controlled subtances reported in previous 12 months.     PLAN                                                                                                       PSYCHOTROPIC MEDICATIONS:   - Continue Lexapro 20 mg daily.       - Continue gabapentin to 300 mg,QHS PRN  - recommend taking nightly to help with insomnia     - Continue vivitrol injection monthly    - Continue with melatonin 3mg at night    At this time pt will remain on vivitrol.  Answered pt's questions regarding mediations.     Discussed triggers and relapse prevention.  Pt plans to continue to use distraction.  Encourage maintaining connection with his sponsor.  Encourage patient to connect with online AA, have discussed this at the last several visits.  Also will help patient reconnect with the relapse prevention group.  Message sent to front line to help with this.    2) insomnia-improved.   "Monitor.    3) Next appointment in 1 month         TREATMENT RISK STATEMENT:  The risks, benefits, alternatives and potential adverse effects have been explained and are understood by the pt. The pt agrees to the treatment plan with the ability to do so. The pt knows to call the clinic for any problems or to access emergency care if needed.  Medical and CD concerns are documented above.  Psychotropic drug interaction check was done, including changes made today, and is discussed above.    ADDICTION FELLOW: Tiffanie Dunn MD    Patient seen by and discussed with staff psychiatrist, Dr. Laraupervisor is Dr. Bee     TELEHEALTH ATTESTATION  Following the ACGME guidelines on telemedicine and direct supervision due to COVID-19, I was concurrently participating in and/or monitoring the patient care through appropriate telecommunication technology.  I discussed the key portions of the service with the fellow, including the mental status examination and developing the plan of care. I reviewed key portions of the history with the fellow. I agree with the findings and plan as documented in this note.\"     MD Emmett         "

## 2022-04-24 DIAGNOSIS — K21.00 GASTRO-ESOPHAGEAL REFLUX DISEASE WITH ESOPHAGITIS: Primary | ICD-10-CM

## 2022-04-27 NOTE — TELEPHONE ENCOUNTER
--Last visit:  4/19/2022     Medication  omeprazole (PRILOSEC) 20 MG capsule [[id number]]    Outpatient Medication Detail     Disp Refills Start End NIKO   omeprazole (PRILOSEC) 20 MG capsule 90 capsule 3 5/13/2021  No   Sig: TAKE 1 CAPSULE BY MOUTH DAILY,  60 MINUTES BEFORE A MEAL.   Sent to pharmacy as: omeprazole 20 mg capsule,delayed release (PriLOSEC)   E-Prescribing Status: Receipt confirmed by pharmacy (5/13/2021  9:04 AM CDT)       omeprazole (PRILOSEC) 20 MG capsule [[id number]]    Electronically signed by: Nick Medina RN on 05/13/21 0904 Status: Active   Ordering user: Nick Medina RN 05/13/21 0904 Ordering provider: Amara Smith MD   Authorized by: Amara Smith MD   Frequency:  05/13/21 - Until Discontinued Released by: Nick Medina RN 05/13/21 0904   Diagnoses  Gastroesophageal reflux disease with esophagitis [K21.00]     ---Prescription approved per [facility] Refill Protocol.       Roopa Silverio RN BSN     [facility]

## 2022-05-09 ENCOUNTER — TELEPHONE (OUTPATIENT)
Dept: PSYCHIATRY | Facility: CLINIC | Age: 77
End: 2022-05-09
Payer: COMMERCIAL

## 2022-05-09 NOTE — TELEPHONE ENCOUNTER
VORB: Authorization obtained to continue Vivitrol 380 mg IM injection Q 28 Day per Dr. Rohit Wetzel RN

## 2022-05-10 ENCOUNTER — TELEPHONE (OUTPATIENT)
Dept: PSYCHIATRY | Facility: CLINIC | Age: 77
End: 2022-05-10
Payer: COMMERCIAL

## 2022-05-10 NOTE — TELEPHONE ENCOUNTER
On May 10, 2022 at 4:11 PM writer called patient at mobile to remind them of their [facility]RETO appointment tomorrow, 5/11/22 at 0930 following U in-person appt with Dr. Dunn at 0900. Patient confirmed they would be present for these appointments. Marcia Harper, EMT

## 2022-05-11 ENCOUNTER — ALLIED HEALTH/NURSE VISIT (OUTPATIENT)
Dept: PSYCHIATRY | Facility: CLINIC | Age: 77
End: 2022-05-11
Payer: COMMERCIAL

## 2022-05-11 ENCOUNTER — OFFICE VISIT (OUTPATIENT)
Dept: PSYCHIATRY | Facility: CLINIC | Age: 77
End: 2022-05-11
Payer: COMMERCIAL

## 2022-05-11 ENCOUNTER — DOCUMENTATION ONLY (OUTPATIENT)
Dept: PSYCHIATRY | Facility: CLINIC | Age: 77
End: 2022-05-11
Payer: COMMERCIAL

## 2022-05-11 VITALS
BODY MASS INDEX: 26.89 KG/M2 | SYSTOLIC BLOOD PRESSURE: 119 MMHG | WEIGHT: 187.4 LBS | DIASTOLIC BLOOD PRESSURE: 60 MMHG | HEART RATE: 85 BPM

## 2022-05-11 DIAGNOSIS — F10.21 ALCOHOL USE DISORDER, SEVERE, IN SUSTAINED REMISSION (H): Primary | ICD-10-CM

## 2022-05-11 DIAGNOSIS — F33.1 MODERATE EPISODE OF RECURRENT MAJOR DEPRESSIVE DISORDER (H): ICD-10-CM

## 2022-05-11 DIAGNOSIS — F41.1 GAD (GENERALIZED ANXIETY DISORDER): ICD-10-CM

## 2022-05-11 DIAGNOSIS — G47.00 INSOMNIA, UNSPECIFIED TYPE: ICD-10-CM

## 2022-05-11 PROCEDURE — 99214 OFFICE O/P EST MOD 30 MIN: CPT | Mod: GC | Performed by: FAMILY MEDICINE

## 2022-05-11 PROCEDURE — G0463 HOSPITAL OUTPT CLINIC VISIT: HCPCS | Mod: 25

## 2022-05-11 PROCEDURE — 96372 THER/PROPH/DIAG INJ SC/IM: CPT | Performed by: PSYCHIATRY & NEUROLOGY

## 2022-05-11 PROCEDURE — 250N000011 HC RX IP 250 OP 636: Performed by: PSYCHIATRY & NEUROLOGY

## 2022-05-11 RX ADMIN — NALTREXONE 380 MG: KIT at 09:55

## 2022-05-11 ASSESSMENT — PAIN SCALES - GENERAL: PAINLEVEL: NO PAIN (0)

## 2022-05-11 NOTE — NURSING NOTE
"Clinic Administered Medication Documentation      Injectable Medication Documentation    Patient was given Vivitrol 380 mg. Prior to medication administration, verified patients identity using patient s name and date of birth. Please see MAR and medication order for additional information. Patient instructed to stay in clinic after the injection but patient declined.      Was entire vial of medication used? Yes  Vial/Syringe: Single dose vial  Expiration Date:  2023  Was this medication supplied by the patient? No      Laurent Thakur Jimbo ,  1945  presented to the clinic today at the request of Dr Dunn,  ordering provider for long-acting injectable Vivitrol 380 mg.    OBSERVATIONS:  1.  Appearance: No apparent distress, Well groomed and Dressed appropriately for weather  2.  Mood: \"good\" Last drink, \"don't remember, maybe last spring a couple beers\"   3.  Affect: full range  4.  Attitude: pleasant and cooperative  5.  Cooperation: Participated voluntarily  6.  Safety denies any current safety concerns including suicidal ideation, self-harm, and homicidal ideation  7.  Side Effects: none  8.  Education: Reminded that medication was an opioid blocker and to make sure all providers know he is on it.   9. Next appointment: 22 10:30   10. Location given: [facility]    The service provided today was rendered under the supervising provider of the day, Dr Norris, who was available for consultation as needed.      "

## 2022-05-17 NOTE — PROGRESS NOTES
"    ----------------------------------------------------------------------------------------------------------  [facility], [place]   Psychiatry Clinic Progress Note  Addiction Medicine                        IDENTIFICATION   Laurent \"Gio\" Blaze Choi is a 77 year old, melendrez, , male with alcohol use disorder on Vivitrol.  History was provided by patient who was a good historian. He is here for a follow up visit with his initial visit on 10/17/18.       CHIEF COMPLAINT   Here for follow up and medication management of AUD     Brief Summary     Interim history: Patient reports continuing to do well.  He does describe more anxiety and thoughts related to his alcohol use in the spring.  He reports that this is a seasonal feeling and he has no plan to return to use.  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    Patient reports recovery is going okay.  He has had no use since his last visit.  Vivitrol injections are going well.  No thoughts of changing from Vivitrol to oral naltrexone at this time.  Due for injection today.     Patient feels strong in his sobriety, reconnected to his sponsor recently.  Has been trying to connect with online AA meetings but has not been able to do so.  He also plans to reconnect with the relapse prevention therapy group.  He has the information and plans to lock in on Monday.      The patient feels his symptoms of  MDD and RODRIGO are well managed at this time.    Mood is good, no suicidal ideations or thoughts.  Denies excessive worry.  Eating and drinking well.  Tolerating Lexapro with no trouble.      History:  Gio has been in detox over 40 times and treatment about 5 times.  His first treatment was in 1980. He gave up daily drinking in late 1990's, then began to binge drink.  In recent years, his pattern has been drinking 1-2 weeks, not eating most of that time, and getting very sick. He then will stop " "drinking, will be sober for 1-2 weeks and the cycle is continued. Alcohol has caused withdrawal and tolerance, drinking 6-10 beers and 1 pint a day when on a binge. Alcohol has affected relationships, has affected health with getting sick, not eating and getting depressed.       Treatment History:    First treatment in 1980.  Last treatment at [facility] finished  November 2018.     Substance Use Pharmacotherapies:   Tried antabuse in late 1990's and he would stop taking and \"drink around it\".     Psych pharm:  He was on prozac for about 10 years prior to January 2019 when he was switched to lexapr 20mg, doing well.     Had a trial of aripiprazole in Spring/Summer 2019, but stopped because it was making him drool.    Gabapentin was started in Spring 2019 during a period of abstinence for anxiety.     RECENT SYMPTOMS   [PSYCH ROS]     PANIC ATTACK:  No episodes.  ANXIETY:  none currently   DEPRESSION: Mood is good.   Denies SI/SIB.   DYSREGULATION: None   PSYCHOSIS:  none;  DENIES- none  MARILEE/HYPOMANIA:  none;  DENIES- none  TRAUMA RELATED:  none.   EATING DISORDER:  none  COMPULSIVE:  none  Grief:  none       ALLERGY                                Aspirin, Nsaids (non-steroidal anti-inflammatory drug) [nsaids], Plavix [clopidogrel], and Statins-hmg-coa reductase inhibitors [hmg-coa-r inhibitors]  MEDICATIONS                               Current Outpatient Medications   Medication Sig Dispense Refill     Acetaminophen 325 MG CAPS Take 325 mg by mouth        amLODIPine (NORVASC) 5 MG tablet        ASPIRIN PO Take 81 mg by mouth daily       calcium-vitamin D 500-125 MG-UNIT TABS Take 1 tablet by mouth 2 times daily       escitalopram (LEXAPRO) 20 MG tablet Take 1 tablet (20 mg) by mouth daily 90 tablet 0     gabapentin (NEURONTIN) 300 MG capsule Take 1 capsule by mouth at bedtime as needed. 90 capsule 3     LOSARTAN POTASSIUM PO Take 25 mg by mouth       melatonin 3 MG tablet Take 1-3 tabs before bed PRN " 90 tablet 1     metFORMIN (GLUCOPHAGE-XR) 500 MG 24 hr tablet TAKE ONE TABLET BY MOUTH ONCE DAILY WITH BREAKFAST 90 tablet 2     naltrexone (VIVITROL) 380 MG SUSR Inject 380 mg into the muscle every 30 days 1 each 4     omeprazole (PRILOSEC) 20 MG DR capsule TAKE 1 CAPSULE BY MOUTH DAILY,  60 MINUTES BEFORE A MEAL. 90 capsule 3     omeprazole 20 MG tablet Take 1-2 daily 60 tablet 1     rosuvastatin (CRESTOR) 40 MG tablet TAKE ONE TABLET BY MOUTH EVERY NIGHT AT BEDTIME 90 tablet 2     tamsulosin (FLOMAX) 0.4 MG capsule TAKE ONE CAPSULE BY MOUTH ONCE DAILY WITH BREAKFAST 90 capsule 2     clotrimazole (LOTRIMIN) 1 % external cream Apply topically 2 times daily Apply to rough patch of skin in front of right ear 2x daily for 10 days (Patient not taking: No sig reported) 24 g 0     DIPHENHYDRAMINE HCL PO Take 25 mg by mouth daily as needed       NITROGLYCERIN SL Take 0.4 mg by mouth (Patient not taking: No sig reported)         VITALS   /60   Pulse 85   Wt 85 kg (187 lb 6.4 oz)   BMI 26.89 kg/m        MENTAL STATUS EXAM                                                           Orientation: full, x3  Alertness: alert   Appearance: Excellent   Behavior/Demeanor: cooperative, pleasant and calm,   Speech: normal  Language: intact  Psychomotor:ok  Mood: anxious because of moving issues  Affect: full range and appropriate; was congruent to mood; was congruent to content  Thought Process/Associations: unremarkable  Thought Content:  Denies suicidal and violent ideation, delusions and preoccupations  Perception: none  Denies auditory hallucinations and visual hallucinations  Insight: good  Judgment: good  Cognition: does  appear grossly intact; formal cognitive testing was not done  Gait: Normal   MSK: extremities normal, no peripheral edema    SUBSTANCE USE/PSYCHIATRIC DIAGNOSES                                                                                                    Alcohol Use Disorder, severe, in sustained  remission.   Major Depressive Disorder- in remission   Generalized Anxiety Disorder- in remission.   R/o PTSD  (partner suicide attempts,his incarcerations) seems not to be an issue currently     ASSESSMENT                                                       This patient is a 77 year old male who has alcohol use disorder since 1980 with intermittent use.  He has been in detox more than 40 times and 4-5 treatments. He has been sober since last visit.     #AUD: Severe, in sustained remission  #MDD: In remission  #RODRIGO: controlled  #Fatigue: improved    [place] PRESCRIPTION MONITORING PROGRAM [] was  checked today:  indicates no controlled subtances reported in previous 12 months.     PLAN                                                                                                       PSYCHOTROPIC MEDICATIONS:   - Continue Lexapro 20 mg daily.       - Continue gabapentin to 300 mg,QHS PRN  -taking nightly to help with insomnia     - Continue vivitrol injection monthly    - Continue with melatonin 3mg at night    At this time pt will remain on vivitrol.  Answered pt's questions regarding mediations.     Discussed triggers and relapse prevention.  Pt plans to continue to use distraction.  Encourage maintaining connection with his sponsor.  Encourage patient to connect with online AA, have discussed this at the last several visits.  Also will help patient reconnect with the relapse prevention group.  Message sent to front line to help with this.    2) insomnia-improved.  Monitor.    3) Next appointment in 1 month         TREATMENT RISK STATEMENT:  The risks, benefits, alternatives and potential adverse effects have been explained and are understood by the pt. The pt agrees to the treatment plan with the ability to do so. The pt knows to call the clinic for any problems or to access emergency care if needed.  Medical and CD concerns are documented above.  Psychotropic drug interaction check was done, including changes  made today, and is discussed above.    ADDICTION FELLOW: Tiffanie Dunn MD    Patient seen by and discussed with staff psychiatrist, . Janeupervisor is Dr. Bee   I saw the patient with the fellow, and participated in key portions of the service, including the mental status examination and developing the plan of care. I reviewed key portions of the history with the fellow. I agree with the findings and plan as documented in this note.    Selam Bee MD

## 2022-06-01 ENCOUNTER — TRANSFERRED RECORDS (OUTPATIENT)
Dept: HEALTH INFORMATION MANAGEMENT | Facility: CLINIC | Age: 77
End: 2022-06-01
Payer: COMMERCIAL

## 2022-06-05 DIAGNOSIS — I10 HYPERTENSION, ESSENTIAL: ICD-10-CM

## 2022-06-05 DIAGNOSIS — R06.02 SOB (SHORTNESS OF BREATH): Primary | ICD-10-CM

## 2022-06-05 DIAGNOSIS — J43.9 PULMONARY EMPHYSEMA, UNSPECIFIED EMPHYSEMA TYPE (H): ICD-10-CM

## 2022-06-05 DIAGNOSIS — R53.83 FATIGUE, UNSPECIFIED TYPE: ICD-10-CM

## 2022-06-06 ENCOUNTER — OFFICE VISIT (OUTPATIENT)
Dept: AUDIOLOGY | Facility: CLINIC | Age: 77
End: 2022-06-06
Payer: COMMERCIAL

## 2022-06-06 DIAGNOSIS — H90.3 SENSORINEURAL HEARING LOSS (SNHL) OF BOTH EARS: Primary | ICD-10-CM

## 2022-06-06 PROCEDURE — 92550 TYMPANOMETRY & REFLEX THRESH: CPT | Mod: 52 | Performed by: AUDIOLOGIST

## 2022-06-06 PROCEDURE — 92557 COMPREHENSIVE HEARING TEST: CPT | Performed by: AUDIOLOGIST

## 2022-06-06 RX ORDER — AMLODIPINE BESYLATE 5 MG/1
5 TABLET ORAL DAILY
Qty: 90 TABLET | Refills: 3 | Status: SHIPPED | OUTPATIENT
Start: 2022-06-06 | End: 2023-06-28

## 2022-06-06 NOTE — PROGRESS NOTES
AUDIOLOGY REPORT    SUMMARY: Audiology visit completed. See audiogram for results.      RECOMMENDATIONS: Return as recommended by ENT for medical management. At his return ENT visit in 6 months, if there are no new concerns, he may return in 1 year for annual monitoring.    Karan Austin, CCC-A  Clinical Audiologist  [place] #[id number]

## 2022-06-06 NOTE — TELEPHONE ENCOUNTER
"Outpatient Medication Detail     Disp Refills Start End NIKO   amLODIPine (NORVASC) 5 MG tablet 90 tablet 3 6/10/2021  No   Sig: TAKE ONE TABLET BY MOUTH ONCE DAILY   Sent to pharmacy as: amLODIPine 5 mg tablet (NORVASC)   E-Prescribing Status: Receipt confirmed by pharmacy (6/10/2021  1:00 PM CDT)     Last office visit provider:  4/19/22     Requested Prescriptions   Pending Prescriptions Disp Refills     amLODIPine (NORVASC) 5 MG tablet [Pharmacy Med Name: AMLODIPINE BESYLATE 5MG TABS] 90 tablet 3     Sig: TAKE ONE TABLET BY MOUTH ONCE DAILY       Calcium Channel Blockers Protocol  Passed - 6/6/2022 10:51 AM        Passed - Blood pressure under 140/90 in past 12 months     BP Readings from Last 3 Encounters:   04/19/22 133/80   02/25/22 120/76   08/06/21 116/69                 Passed - Recent (12 mo) or future (30 days) visit within the authorizing provider's specialty     Patient has had an office visit with the authorizing provider or a provider within the authorizing providers department within the previous 12 mos or has a future within next 30 days. See \"Patient Info\" tab in inbasket, or \"Choose Columns\" in Meds & Orders section of the refill encounter.              Passed - Medication is active on med list        Passed - Patient is age 18 or older        Passed - Normal serum creatinine on file in past 12 months     Recent Labs   Lab Test 04/19/22  1402   CR 0.88       Ok to refill medication if creatinine is low               Nick Medina RN 06/06/22 10:51 AM  "

## 2022-06-07 ENCOUNTER — TELEPHONE (OUTPATIENT)
Dept: PSYCHIATRY | Facility: CLINIC | Age: 77
End: 2022-06-07
Payer: COMMERCIAL

## 2022-06-07 NOTE — TELEPHONE ENCOUNTER
On June 7, 2022 at 3:20 PM writer called patient at mobile to remind them of their BARRETO appointment tomorrow, 6/8/22 at 1100h following in person [facility] visit with Dr. Dunn at 1030. Patient confirmed they would be present for these appointments. Marcia Harper, EMT

## 2022-06-08 ENCOUNTER — OFFICE VISIT (OUTPATIENT)
Dept: PSYCHIATRY | Facility: CLINIC | Age: 77
End: 2022-06-08
Attending: PSYCHIATRY & NEUROLOGY
Payer: COMMERCIAL

## 2022-06-08 ENCOUNTER — ALLIED HEALTH/NURSE VISIT (OUTPATIENT)
Dept: PSYCHIATRY | Facility: CLINIC | Age: 77
End: 2022-06-08
Payer: COMMERCIAL

## 2022-06-08 VITALS — DIASTOLIC BLOOD PRESSURE: 80 MMHG | SYSTOLIC BLOOD PRESSURE: 132 MMHG | HEART RATE: 81 BPM

## 2022-06-08 DIAGNOSIS — F10.21 ALCOHOL USE DISORDER, SEVERE, IN SUSTAINED REMISSION (H): Primary | ICD-10-CM

## 2022-06-08 DIAGNOSIS — F33.40 RECURRENT MAJOR DEPRESSIVE DISORDER, IN REMISSION (H): ICD-10-CM

## 2022-06-08 DIAGNOSIS — F41.1 GAD (GENERALIZED ANXIETY DISORDER): ICD-10-CM

## 2022-06-08 PROCEDURE — 96372 THER/PROPH/DIAG INJ SC/IM: CPT | Performed by: PSYCHIATRY & NEUROLOGY

## 2022-06-08 PROCEDURE — 99214 OFFICE O/P EST MOD 30 MIN: CPT | Mod: GC | Performed by: FAMILY MEDICINE

## 2022-06-08 PROCEDURE — 250N000011 HC RX IP 250 OP 636: Performed by: PSYCHIATRY & NEUROLOGY

## 2022-06-08 RX ADMIN — NALTREXONE 380 MG: KIT at 11:33

## 2022-06-08 ASSESSMENT — PAIN SCALES - GENERAL: PAINLEVEL: NO PAIN (0)

## 2022-06-08 NOTE — PROGRESS NOTES
"    ----------------------------------------------------------------------------------------------------------  [facility], [place]   Psychiatry Clinic Progress Note  Addiction Medicine                        IDENTIFICATION   Laurent \"Gio\" Blaze Choi is a 77 year old, melendrez, , male with alcohol use disorder on Vivitrol.  History was provided by patient who was a good historian. He is here for a follow up visit with his initial visit on 10/17/18.       CHIEF COMPLAINT   Here for follow up and medication management of AUD     Brief Summary     Interim history:   Gio says he is doing well. He reports worsening anxiety every spring, possibly due to the expectations of increased socialization.   His anxiety is \"good\" and has improved since the last visit. His anxiety is mostly related to socializing. He says he has a \"woman problem\".  He leaves his house every 1-2 weeks for groceries, but besides that, rarely sees people or socializes. Over the winter, he was going on a walk once weekly with a neighbor,   but he hasn't done that in a few months. He has had meet ups scheduled with his sister, but he has canceled those. He says he doesn't have any social supports.     Gio is interested in switching from vivitrol injections to oral naltrexone. He would like to eventually not be dependent on medications for his alcohol use, and  he thinks switching from injections to a pill would be the first step in that process. He says he wants to feel more independent rather than relying on the injections.  Denies side effects or other issues with the injections. They are completely covered by his insurance. He talked to the pharmacy about the cost of switching to oral, but they   said they couldn't tell him the price without a prescription.   His last drink was a little over a year ago, last Spring. His triggers are watching movies about drinking.     His mood is also \"good\". Reports his depression " "is a lifelong problem, and lexipro is going well. Never misses his medications. Has a pill box that he sets up weekly.  When prompted, he agrees that is alcohol is also a lifelong problem. He recognizes that it would  be detrimental if he drank again, and that he could likely need to go   through detox again if he were to start drinking.     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 He works late into the night working. Reports no trouble falling asleep, but doesn't sleep well and often  has a difficult time waking up due to fatigue.     He has a goal of going to a [facility] group that has AA and addresses \"woman issues\". He has one picked out, and they only meet Saturday mornings at 9am. He wants to go this   coming Saturday. When asked about what he will change to help him , he says he will try to go to sleep earlier.      History:  Gio has been in detox over 40 times and treatment about 5 times.  His first treatment was in 1980. He gave up daily drinking in late 1990's, then began to binge drink.  In recent years, his pattern has been drinking 1-2 weeks, not eating most of that time, and getting very sick. He then will stop drinking, will be sober for 1-2 weeks and the cycle is continued. Alcohol has caused withdrawal and tolerance, drinking 6-10 beers and 1 pint a day when on a binge. Alcohol has affected relationships, has affected health with getting sick, not eating and getting depressed.       Treatment History:    First treatment in 1980.  Last treatment at [facility] finished  November 2018.     Substance Use Pharmacotherapies:   Tried antabuse in late 1990's and he would stop taking and \"drink around it\".     Psych pharm:  He was on prozac for about 10 years prior to January 2019 when he was switched to lexapr 20mg, doing well.     Had a trial of aripiprazole in Spring/Summer 2019, but stopped because it was making him drool.    Gabapentin was started in Spring 2019 during a " period of abstinence for anxiety.     RECENT SYMPTOMS   [PSYCH ROS]     PANIC ATTACK:  No episodes.  ANXIETY:  social anxiety   DEPRESSION: Mood is good.   Denies SI/SIB.   DYSREGULATION: None   PSYCHOSIS:  none;  DENIES- none  MARILEE/HYPOMANIA:  none;  DENIES- none  TRAUMA RELATED:  none.   EATING DISORDER:  none  COMPULSIVE:  none  Grief:  none       ALLERGY                                Aspirin, Nsaids (non-steroidal anti-inflammatory drug) [nsaids], Plavix [clopidogrel], and Statins-hmg-coa reductase inhibitors [hmg-coa-r inhibitors]  MEDICATIONS                               Current Outpatient Medications   Medication Sig Dispense Refill     Acetaminophen 325 MG CAPS Take 325 mg by mouth        amLODIPine (NORVASC) 5 MG tablet Take 1 tablet (5 mg) by mouth daily 90 tablet 3     ASPIRIN PO Take 81 mg by mouth daily       calcium-vitamin D 500-125 MG-UNIT TABS Take 1 tablet by mouth 2 times daily       clotrimazole (LOTRIMIN) 1 % external cream Apply topically 2 times daily Apply to rough patch of skin in front of right ear 2x daily for 10 days (Patient not taking: No sig reported) 24 g 0     DIPHENHYDRAMINE HCL PO Take 25 mg by mouth daily as needed       escitalopram (LEXAPRO) 20 MG tablet Take 1 tablet (20 mg) by mouth daily 90 tablet 0     gabapentin (NEURONTIN) 300 MG capsule Take 1 capsule by mouth at bedtime as needed. 90 capsule 3     LOSARTAN POTASSIUM PO Take 25 mg by mouth       melatonin 3 MG tablet Take 1-3 tabs before bed PRN 90 tablet 1     metFORMIN (GLUCOPHAGE-XR) 500 MG 24 hr tablet TAKE ONE TABLET BY MOUTH ONCE DAILY WITH BREAKFAST 90 tablet 2     naltrexone (VIVITROL) 380 MG SUSR Inject 380 mg into the muscle every 30 days 1 each 4     NITROGLYCERIN SL Take 0.4 mg by mouth (Patient not taking: No sig reported)       omeprazole (PRILOSEC) 20 MG DR capsule TAKE 1 CAPSULE BY MOUTH DAILY,  60 MINUTES BEFORE A MEAL. 90 capsule 3     omeprazole 20 MG tablet Take 1-2 daily 60 tablet 1      "rosuvastatin (CRESTOR) 40 MG tablet TAKE ONE TABLET BY MOUTH EVERY NIGHT AT BEDTIME 90 tablet 2     tamsulosin (FLOMAX) 0.4 MG capsule TAKE ONE CAPSULE BY MOUTH ONCE DAILY WITH BREAKFAST 90 capsule 2       VITALS   There were no vitals taken for this visit.      MENTAL STATUS EXAM                                                           Orientation: full, x3  Alertness: alert   Appearance: well-maintaned  Behavior/Demeanor: cooperative, pleasant and calm,   Speech: normal  Language: intact  Psychomotor:ok  Mood: \"good\"  Affect: full range and appropriate; was congruent to mood; was congruent to content  Thought Process/Associations: unremarkable  Thought Content:  Denies suicidal and violent ideation, delusions and preoccupations  Perception: none  Denies auditory hallucinations and visual hallucinations  Insight: good  Judgment: good  Cognition: does  appear grossly intact; formal cognitive testing was not done  Gait: asymmetric  MSK: no gross abnormalities    SUBSTANCE USE/PSYCHIATRIC DIAGNOSES                                                                                                    Alcohol Use Disorder, severe, in sustained remission.   Major Depressive Disorder- in remission   Generalized Anxiety Disorder- in remission.   R/o PTSD  (partner suicide attempts,his incarcerations) seems not to be an issue currently     ASSESSMENT                                                       This patient is a 77 year old male who has alcohol use disorder since 1980 with intermittent use.  He has been in detox more than 40 times and 4-5 treatments. He has been sober since last visit.     #AUD: Severe, in sustained remission  #MDD: In remission  #RODRIGO: controlled    [place] PRESCRIPTION MONITORING PROGRAM [] was  checked today:  indicates no controlled subtances reported in previous 12 months.     PLAN                                                                                                       PSYCHOTROPIC " MEDICATIONS:   - Continue Lexapro 20 mg daily.       - Continue gabapentin to 300 mg,QHS PRN  -taking nightly to help with insomnia     - Continue vivitrol injection monthly    - Continue with melatonin 3mg at night    At this time pt will remain on vivitrol.  Answered pt's questions regarding mediations.     Discussed triggers and relapse prevention.  Pt plans to continue to avoid triggers.  Encouraged to call insurance company to help him find a therapist for psychotherapy. Encouraged re-initiation of weekly walks with his neighbor. Discussed strategies for achieving goal of starting men's group.     -  Next appointment in 1 month      Anne Marie Modi MD   Family Medicine, G1 Resident     TREATMENT RISK STATEMENT:  The risks, benefits, alternatives and potential adverse effects have been explained and are understood by the pt. The pt agrees to the treatment plan with the ability to do so. The pt knows to call the clinic for any problems or to access emergency care if needed.  Medical and CD concerns are documented above.  Psychotropic drug interaction check was done, including changes made today, and is discussed above.      Patient seen by and discussed with staff psychiatrist, Dr. Bee

## 2022-06-08 NOTE — NURSING NOTE
"Clinic Administered Medication Documentation      Injectable Medication Documentation    Patient was given Vivitrol 380 mg . Prior to medication administration, verified patients identity using patient s name and date of birth. Please see MAR and medication order for additional information. Patient instructed to stay in clinic after the injection but patient declined.      Was entire vial of medication used? Yes  Vial/Syringe: Single dose vial  Expiration Date:  2023  Was this medication supplied by the patient? No      Laurent Brooksmariya ,  1945  presented to the clinic today at the request of Dr Dunn,  ordering provider for long-acting injectable Vivitrol 380 mg .    OBSERVATIONS:  1.  Appearance: No apparent distress, Casually dressed, Well groomed and Dressed appropriately for weather  2.  Mood: \"good\" and pleasant  3.  Affect: full range  4.  Attitude: pleasant and cooperative  5.  Cooperation: Participated voluntarily  6.  Safety denies any current safety concerns including suicidal ideation, self-harm, and homicidal ideation  7.  Side Effects: none  8.  Education:Do not take opioid drugs while you are taking this drug. Opioid drugs will not work. Do not take more opioid drugs to try to get them to work. Doing this may cause severe injury, coma, or death     9. Next appointment: 22  10. [place] [facility] [place]    The service provided today was rendered under the supervising provider of the day, Dr Gonzales, who was available for consultation as needed.        "

## 2022-06-15 ENCOUNTER — TRANSFERRED RECORDS (OUTPATIENT)
Dept: HEALTH INFORMATION MANAGEMENT | Facility: CLINIC | Age: 77
End: 2022-06-15

## 2022-06-20 ENCOUNTER — VIRTUAL VISIT (OUTPATIENT)
Dept: PSYCHIATRY | Facility: CLINIC | Age: 77
End: 2022-06-20
Attending: PSYCHOLOGIST
Payer: COMMERCIAL

## 2022-06-20 DIAGNOSIS — F10.21 ALCOHOL USE DISORDER, SEVERE, IN SUSTAINED REMISSION (H): Primary | ICD-10-CM

## 2022-06-23 NOTE — NURSING NOTE
"Gio Choi,  1945, presented to the clinic today at the request of Dr. Dunn,  ordering provider for long-acting injectable Vivitrol 380mg.    OBSERVATIONS:  1.  Appearance: Dressed in casual, clean, weather appropriate clothing.  Good body hygiene, good eye contact and held a conversation.     2.  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  He is planing on finding some  Good frames to use.    3.  Affect: Congruent  4.  Attitude: Engaged, he reported no issues with the medication.  He last had a drink about 1 month, but he states \"he is back on track now.\"  5.  Cooperation: Full  6.  Side Effects: denied  7.  Education: none needed  8. Next appointment:  @ 10AM for injection and with Dr. Dunn on 10/6 @ 8AM   [place]. [place] [facility] [place]    The service provided today was rendered under the supervising provider of the day, Dr. Guidry, who was available for consultation as needed.  Clinic Administered Medication Documentation      Injectable Medication Documentation    Patient was given Vivitrol 380mg. Prior to medication administration, verified patients identity using patient s name and date of birth. Please see MAR and medication order for additional information. Patient instructed to report any adverse reaction to staff immediately .      Was entire vial of medication used? Yes  Vial/Syringe: Syringe  Expiration Date:    Was this medication supplied by the patient? No        " Refilled Rx Symbicort 160 per chart via fax to [facility] per pt's request. Pt transferred to PAR to schedule an apt w/ [place]/6MWT.

## 2022-06-27 ENCOUNTER — VIRTUAL VISIT (OUTPATIENT)
Dept: PSYCHIATRY | Facility: CLINIC | Age: 77
End: 2022-06-27
Attending: PSYCHOLOGIST
Payer: COMMERCIAL

## 2022-06-27 DIAGNOSIS — I10 HYPERTENSION, ESSENTIAL: Primary | ICD-10-CM

## 2022-06-27 DIAGNOSIS — F10.21 ALCOHOL USE DISORDER, SEVERE, IN SUSTAINED REMISSION (H): Primary | ICD-10-CM

## 2022-06-27 PROCEDURE — 90853 GROUP PSYCHOTHERAPY: CPT | Mod: 95 | Performed by: PSYCHOLOGIST

## 2022-06-28 RX ORDER — LOSARTAN POTASSIUM 50 MG/1
TABLET ORAL
Qty: 45 TABLET | Refills: 3 | Status: SHIPPED | OUTPATIENT
Start: 2022-06-28 | End: 2022-06-28

## 2022-06-28 NOTE — TELEPHONE ENCOUNTER
Patient not prescribed 50 mg dose per request.  Patient last script was for 25 mg dosing.  Please resubmit.    Outpatient Medication Detail     Disp Refills Start End NIKO   losartan (COZAAR) 25 MG tablet 90 tablet 3 6/23/2021  No   Sig - Route: Take 1 tablet (25 mg total) by mouth daily. - Oral   Sent to pharmacy as: losartan 25 mg tablet (COZAAR)   E-Prescribing Status: Receipt confirmed by pharmacy (6/23/2021  5:42 PM CDT)

## 2022-06-28 NOTE — TELEPHONE ENCOUNTER
"Outpatient Medication Detail     Disp Refills Start End NIKO   losartan (COZAAR) 25 MG tablet 90 tablet 3 6/23/2021  No   Sig - Route: Take 1 tablet (25 mg total) by mouth daily. - Oral   Sent to pharmacy as: losartan 25 mg tablet (COZAAR)   E-Prescribing Status: Receipt confirmed by pharmacy (6/23/2021  5:42 PM CDT)       losartan (COZAAR) 25 MG tablet [[id number]]    Electronically signed by: Amara Smith MD on 06/23/21 1742 Status: Active   Ordering user: Amara Smith MD 06/23/21 1742 Authorized by: Amara Smith MD   Frequency: DAILY 06/23/21 - Until Discontinued Released by: Amara Smith MD 06/23/21 1742   Diagnoses  Hypertension, essential [I10]     Routing refill request to provider for review/approval because:  Discontinued medication harmony'd up as 50 mg dosing.  Archived script for 25 mg dosing.  Currently, no reorder medication available.  Will require new script from provider for 25 mg dosing if appropriate.    Last office visit provider:  4/19/22     Requested Prescriptions   Signed Prescriptions Disp Refills    losartan (COZAAR) 50 MG tablet 45 tablet 3     Sig: Take one-half tablet by mouth every day       Angiotensin-II Receptors Passed - 6/28/2022  7:27 AM        Passed - Last blood pressure under 140/90 in past 12 months     BP Readings from Last 3 Encounters:   04/19/22 133/80   02/25/22 120/76   08/06/21 116/69                 Passed - Recent (12 mo) or future (30 days) visit within the authorizing provider's specialty     Patient has had an office visit with the authorizing provider or a provider within the authorizing providers department within the previous 12 mos or has a future within next 30 days. See \"Patient Info\" tab in inbasket, or \"Choose Columns\" in Meds & Orders section of the refill encounter.              Passed - Medication is active on med list        Passed - Patient is age 18 or older        Passed - Normal serum creatinine on file " in past 12 months     Recent Labs   Lab Test 04/19/22  1402   CR 0.88       Ok to refill medication if creatinine is low          Passed - Normal serum potassium on file in past 12 months     Recent Labs   Lab Test 04/19/22  1402   POTASSIUM 4.5                         Nick Medina RN 06/28/22 7:54 AM

## 2022-07-05 ENCOUNTER — TELEPHONE (OUTPATIENT)
Dept: PSYCHIATRY | Facility: CLINIC | Age: 77
End: 2022-07-05

## 2022-07-05 NOTE — TELEPHONE ENCOUNTER
Writer called [phone] and spoke to the patient whom confirmed his BARRETO appointment for Wednesday 7/6/22.Deborah Blanchard LPN

## 2022-07-06 ENCOUNTER — ALLIED HEALTH/NURSE VISIT (OUTPATIENT)
Dept: PSYCHIATRY | Facility: CLINIC | Age: 77
End: 2022-07-06
Payer: COMMERCIAL

## 2022-07-06 VITALS — DIASTOLIC BLOOD PRESSURE: 83 MMHG | SYSTOLIC BLOOD PRESSURE: 138 MMHG | HEART RATE: 78 BPM

## 2022-07-06 DIAGNOSIS — I10 HYPERTENSION, ESSENTIAL: Primary | ICD-10-CM

## 2022-07-06 DIAGNOSIS — F10.21 ALCOHOL USE DISORDER, SEVERE, IN SUSTAINED REMISSION (H): Primary | ICD-10-CM

## 2022-07-06 PROCEDURE — 250N000011 HC RX IP 250 OP 636: Performed by: PSYCHIATRY & NEUROLOGY

## 2022-07-06 PROCEDURE — 96372 THER/PROPH/DIAG INJ SC/IM: CPT | Performed by: PSYCHIATRY & NEUROLOGY

## 2022-07-06 RX ORDER — LOSARTAN POTASSIUM 50 MG/1
50 TABLET ORAL DAILY
Qty: 90 TABLET | Refills: 1 | Status: SHIPPED | OUTPATIENT
Start: 2022-07-06 | End: 2023-01-11

## 2022-07-06 RX ADMIN — NALTREXONE 380 MG: KIT at 11:24

## 2022-07-06 ASSESSMENT — PAIN SCALES - GENERAL: PAINLEVEL: NO PAIN (0)

## 2022-07-06 NOTE — PROGRESS NOTES
Telehealth Details  Type of service:  video visit for group therapy  Originating Site (patient location):  [facility] Location- Remote location  Distant Site (provider location):  Remote location  Mode of Communication:  Video conference via Zoom    Relapse Prevention Group for Substance Use Disorders                                Time of Service: 4:05 - 5:00   Length of Appointment: 55 mins  Care Providers: Susi Gilbert, PhD, LP  Total number of patients present: 5    DSM-5 Diagnosis:   Alcohol Use Disorder, severe  Major Depressive Disorder, recurrent (per chart review)  Generalized Anxiety Disorder (per chart review)    GROUP CONTENT: Orientation to CBT model, and group members provided their own examples.     PATIENT ENGAGEMENT: Active and engaged.     MENTAL STATUS EXAM:   Appearance: Casually dressed and appropriately groomed   Motor activity: Within normal range  Speech rate: Within normal range  Speech volume: Within normal range  Speech articulation:  Within normal range  Speech coherence:  Within normal range  Speech spontaneity:  Within normal range  Affect (objective appearance):  Euthymic  Thought process: Linear, logical, goal-oriented  Suicide/ violence risk: Not individually assessed given group context, but no signs of risk were observed    PLAN:   - Attend Relapse Prevention Group on Mondays at 4pm. No meeting next week 7/5 due to July 4th holiday. Next meeting July 11th.

## 2022-07-06 NOTE — PROGRESS NOTES
Gio presented to Relapse Prevention group at the beginning, but due to tech issues was not able to participate/ engage in group.

## 2022-07-06 NOTE — NURSING NOTE
"Clinic Administered Medication Documentation      Injectable Medication Documentation    Patient was given Vivitrol 380 mg. Prior to medication administration, verified patients identity using patient s name and date of birth. Please see MAR and medication order for additional information. Patient instructed to stay in clinic after the injection but patient declined.      Was entire vial of medication used? Yes  Vial/Syringe: Single dose vial  Expiration Date:  2023  Was this medication supplied by the patient? No      Laurent Choi ,  1945  presented to the clinic today at the request of Dr Wagner,  ordering provider for long-acting injectable Vivitrol 380 mg .    Patient would like to space out the injections to every 6 weeks if possible. Message sent to provider.     OBSERVATIONS:  1.  Appearance: No apparent distress, Casually dressed, Well groomed and Dressed appropriately for weather  2.  Mood: \"good\"  3.  Affect: full range  4.  Attitude: pleasant and cooperative  5.  Cooperation: Participated voluntarily  6.  Safety denies any current safety concerns including suicidal ideation, self-harm, and homicidal ideation  7.  Side Effects: none  8.  Education: Do not take opioid drugs while you are taking this drug. Opioid drugs will not work. Do not take more opioid drugs to try to get them to work. Doing this may cause severe injury, coma, or death   9. Next appointment: 22   10. Location given: [facility]    The service provided today was rendered under the supervising provider of the day, Dr Gonzales, who was available for consultation as needed.        "

## 2022-07-11 ENCOUNTER — VIRTUAL VISIT (OUTPATIENT)
Dept: PSYCHIATRY | Facility: CLINIC | Age: 77
End: 2022-07-11
Attending: PSYCHOLOGIST
Payer: COMMERCIAL

## 2022-07-11 DIAGNOSIS — F10.21 ALCOHOL USE DISORDER, SEVERE, IN SUSTAINED REMISSION (H): Primary | ICD-10-CM

## 2022-07-11 PROCEDURE — 90853 GROUP PSYCHOTHERAPY: CPT | Mod: 52 | Performed by: PSYCHOLOGIST

## 2022-07-18 ENCOUNTER — VIRTUAL VISIT (OUTPATIENT)
Dept: PSYCHIATRY | Facility: CLINIC | Age: 77
End: 2022-07-18
Attending: PSYCHOLOGIST
Payer: COMMERCIAL

## 2022-07-18 DIAGNOSIS — F10.21 ALCOHOL USE DISORDER, SEVERE, IN SUSTAINED REMISSION (H): Primary | ICD-10-CM

## 2022-07-18 PROCEDURE — 90853 GROUP PSYCHOTHERAPY: CPT | Mod: 95 | Performed by: PSYCHOLOGIST

## 2022-07-19 DIAGNOSIS — I25.10 CORONARY ATHEROSCLEROSIS OF NATIVE CORONARY ARTERY: ICD-10-CM

## 2022-07-19 DIAGNOSIS — R35.0 URINE FREQUENCY: ICD-10-CM

## 2022-07-19 RX ORDER — ROSUVASTATIN CALCIUM 40 MG/1
TABLET, COATED ORAL
Qty: 90 TABLET | Refills: 2 | Status: SHIPPED | OUTPATIENT
Start: 2022-07-19 | End: 2023-04-10

## 2022-07-19 RX ORDER — TAMSULOSIN HYDROCHLORIDE 0.4 MG/1
CAPSULE ORAL
Qty: 90 CAPSULE | Refills: 2 | Status: SHIPPED | OUTPATIENT
Start: 2022-07-19 | End: 2023-05-03

## 2022-07-19 NOTE — TELEPHONE ENCOUNTER
"Last Written Prescription Date:  10/12/21  Last Fill Quantity: 90,  # refills: 2   Last office visit provider:  4/19/22     Requested Prescriptions   Pending Prescriptions Disp Refills     tamsulosin (FLOMAX) 0.4 MG capsule [Pharmacy Med Name: TAMSULOSIN HCL 0.4MG CAPS] 90 capsule 2     Sig: TAKE ONE CAPSULE BY MOUTH ONCE DAILY WITH BREAKFAST       Alpha Blockers Passed - 7/19/2022 12:13 PM        Passed - Blood pressure under 140/90 in past 12 months     BP Readings from Last 3 Encounters:   04/19/22 133/80   02/25/22 120/76   08/06/21 116/69                 Passed - Recent (12 mo) or future (30 days) visit within the authorizing provider's specialty     Patient has had an office visit with the authorizing provider or a provider within the authorizing providers department within the previous 12 mos or has a future within next 30 days. See \"Patient Info\" tab in inbasket, or \"Choose Columns\" in Meds & Orders section of the refill encounter.              Passed - Patient does not have Tadalafil, Vardenafil, or Sildenafil on their medication list        Passed - Medication is active on med list        Passed - Patient is 18 years of age or older             Rose Griffin 07/19/22 6:10 PM  "

## 2022-07-24 NOTE — PROGRESS NOTES
Telehealth Details  Type of service:  video visit for group therapy  Originating Site (patient location):  [facility] Location- Remote location  Distant Site (provider location):  Remote location  Mode of Communication:  Video conference via Zoom    Relapse Prevention Group for Substance Use Disorders                                Time of Service: 4:00 - 5:00   Length of Appointment: 60 mins  Care Providers: Susi Gilbert, PhD, LP  Total number of patients present:  2 (6 scheduled but 4 no-showed)    DSM-5 Diagnosis:   Alcohol Use Disorder, severe  Major Depressive Disorder, recurrent (per chart review)  Generalized Anxiety Disorder (per chart review)     GROUP CONTENT: Discussed triggers for a group member's urge to use over the past week and skills used to maintain abstinence. Psychoeducation about common unhelpful thinking related to KODY, and group members provided their own examples.     PATIENT ENGAGEMENT: Active and engaged.     Patient did not report any changes to medications.    MENTAL STATUS EXAM:   Appearance: Casually dressed and appropriately groomed   Motor activity: Within normal range  Speech rate: Within normal range  Speech volume: Within normal range  Speech articulation:  Within normal range  Speech coherence:  Within normal range  Speech spontaneity:  Within normal range  Affect (objective appearance):  Euthymic  Thought process: Linear, logical, goal-oriented  Suicide/ violence risk: Not individually assessed given group context, but no signs of risk were observed    PLAN:   - Attend Relapse Prevention Group on Mondays at 4pm.

## 2022-08-02 ENCOUNTER — TELEPHONE (OUTPATIENT)
Dept: PSYCHIATRY | Facility: CLINIC | Age: 77
End: 2022-08-02

## 2022-08-02 NOTE — TELEPHONE ENCOUNTER
Called patient at mobile to remind them of their BARRETO appointment tomorrow, 8/3/22 at 1000h. Patient confirmed they would be present for this appointment. Marcia Harper, EMT

## 2022-08-03 ENCOUNTER — ALLIED HEALTH/NURSE VISIT (OUTPATIENT)
Dept: PSYCHIATRY | Facility: CLINIC | Age: 77
End: 2022-08-03
Payer: COMMERCIAL

## 2022-08-03 DIAGNOSIS — F10.21 ALCOHOL USE DISORDER, SEVERE, IN SUSTAINED REMISSION (H): Primary | ICD-10-CM

## 2022-08-03 PROCEDURE — 96372 THER/PROPH/DIAG INJ SC/IM: CPT | Performed by: PSYCHIATRY & NEUROLOGY

## 2022-08-03 PROCEDURE — 250N000011 HC RX IP 250 OP 636: Performed by: PSYCHIATRY & NEUROLOGY

## 2022-08-03 RX ADMIN — NALTREXONE 380 MG: KIT at 10:25

## 2022-08-03 NOTE — NURSING NOTE
"Clinic Administered Medication Documentation      Injectable Medication Documentation    Patient was given Vivitrol 380 mg. Prior to medication administration, verified patients identity using patient s name and date of birth. Please see MAR and medication order for additional information. Patient instructed to stay in clinic after the injection but patient declined.      Was entire vial of medication used? Yes  Vial/Syringe: Single dose vial  Expiration Date:  22  Was this medication supplied by the patient? No      Laurent Choi ,  1945  presented to the clinic today at the request of Dr Bee,  ordering provider for long-acting injectable Vivitrol 380 mg .    OBSERVATIONS:  1.  Appearance: Casually dressed and Adequately groomed  2.  Mood: \"good\"  3.  Affect: full range  4.  Attitude: pleasant and cooperative  5.  Cooperation: Participated voluntarily  6.  Safety denies any current safety concerns including suicidal ideation, self-harm, and homicidal ideation  7.  Side Effects: none  8.  Education: Do not take opioid drugs while you are taking this drug. Opioid drugs will not work. Do not take more opioid drugs to try to get them to work. Doing this may cause severe injury, coma, or death   9. Next appointment: 22 10 am   [place]. [place] [facility] [place]    The service provided today was rendered under the supervising provider of the day, Dr Norris, who was available for consultation as needed.        "

## 2022-08-05 NOTE — PROGRESS NOTES
Telehealth Details  Type of service:  video visit for group therapy  Originating Site (patient location):  [facility] Location- Remote location  Distant Site (provider location):  Remote location  Mode of Communication:  Video conference via Zoom    Relapse Prevention Group for Substance Use Disorders                                Time of Service: 4:00 - 5:00   Length of Appointment: 60 mins  Care Providers: Susi Gilbert, PhD, LP  Total number of patients present:  3    DSM-5 Diagnosis:   Alcohol Use Disorder, severe  Major Depressive Disorder, recurrent (per chart review)  Generalized Anxiety Disorder (per chart review)     GROUP CONTENT: Discussed behavioral activation, mood, and its relation to substance use.    PATIENT ENGAGEMENT: Active and engaged.     Patient did not report any changes to medications.    MENTAL STATUS EXAM:   Appearance: Casually dressed and appropriately groomed   Motor activity: Within normal range  Speech rate: Within normal range  Speech volume: Within normal range  Speech articulation:  Within normal range  Speech coherence:  Within normal range  Speech spontaneity:  Within normal range  Affect (objective appearance):  Euthymic  Thought process: Linear, logical, goal-oriented  Suicide/ violence risk: Not individually assessed given group context, but no signs of risk were observed    PLAN:   - Attend Relapse Prevention Group on Mondays at 4pm.

## 2022-08-08 ENCOUNTER — VIRTUAL VISIT (OUTPATIENT)
Dept: PSYCHIATRY | Facility: CLINIC | Age: 77
End: 2022-08-08
Attending: PSYCHOLOGIST
Payer: COMMERCIAL

## 2022-08-08 DIAGNOSIS — F10.21 ALCOHOL USE DISORDER, SEVERE, IN SUSTAINED REMISSION (H): Primary | ICD-10-CM

## 2022-08-08 PROCEDURE — 90853 GROUP PSYCHOTHERAPY: CPT | Mod: 95 | Performed by: PSYCHOLOGIST

## 2022-08-15 ENCOUNTER — VIRTUAL VISIT (OUTPATIENT)
Dept: PSYCHIATRY | Facility: CLINIC | Age: 77
End: 2022-08-15
Attending: PSYCHOLOGIST
Payer: COMMERCIAL

## 2022-08-15 DIAGNOSIS — F10.21 ALCOHOL USE DISORDER, SEVERE, IN SUSTAINED REMISSION (H): Primary | ICD-10-CM

## 2022-08-15 PROCEDURE — 90853 GROUP PSYCHOTHERAPY: CPT | Mod: 95 | Performed by: PSYCHOLOGIST

## 2022-08-18 ENCOUNTER — OFFICE VISIT (OUTPATIENT)
Dept: URGENT CARE | Facility: URGENT CARE | Age: 77
End: 2022-08-18
Payer: COMMERCIAL

## 2022-08-18 VITALS
HEART RATE: 80 BPM | TEMPERATURE: 98.1 F | OXYGEN SATURATION: 94 % | DIASTOLIC BLOOD PRESSURE: 81 MMHG | SYSTOLIC BLOOD PRESSURE: 125 MMHG

## 2022-08-18 DIAGNOSIS — R21 GROIN RASH: Primary | ICD-10-CM

## 2022-08-18 PROCEDURE — 99213 OFFICE O/P EST LOW 20 MIN: CPT | Performed by: FAMILY MEDICINE

## 2022-08-18 RX ORDER — KETOCONAZOLE 20 MG/G
CREAM TOPICAL DAILY
Qty: 60 G | Refills: 1 | Status: SHIPPED | OUTPATIENT
Start: 2022-08-18 | End: 2022-08-25

## 2022-08-18 NOTE — PATIENT INSTRUCTIONS
Use the ketaconazole cream twice a day for 7-10 days    If no improvement in next 5 days or if symptoms worsen at any point please come in right away to be evaluated

## 2022-08-18 NOTE — PROGRESS NOTES
Assessment & Plan     Groin rash  - ketoconazole (NIZORAL) 2 % external cream  Dispense: 60 g; Refill: 1     Examination is not consistent with folliculitis there is no significant swelling of lesions there is tho a large cluster of scaly to erythematous lesions in bilateral groin area. Given hx and presentation I feel starting a topical     Consider nystatin therapy for possible candida infection if ketaconazole therapy fails.     Chriss Mcdonnell MD   [facility]    Subjective     Gio is a 77 year old male who presents to clinic today for the following health issues:  Chief Complaint   Patient presents with     Derm Problem     Pt has a rash in the groin a month ago, started treating it with a few different powders that caused some pain to keep rash dry, very irritating, if touched it gets painful, slowly getting worse and radiating outwards      HPI patient has had about 5 remedies the first being an over-the-counter powder which she says was a generic antifungal but did not recall the name.  Has also tried topical antibiotic in addition to a start of some sort.  He also has tried baby powder cream without relief.    There is a bit involvement underneath his penile and scrotal area but mostly is in the groin and extending backwards towards the anus.      Patient Active Problem List    Diagnosis Date Noted     Pulmonary emphysema, unspecified emphysema type (H) 04/19/2022     Priority: Medium     Moderate episode of recurrent major depressive disorder (H) 04/19/2021     Priority: Medium     Alcohol use disorder, severe, in early remission (H) 10/17/2018     Priority: Medium     Recurrent major depressive disorder, in remission (H) 09/24/2018     Priority: Medium       Current Outpatient Medications   Medication     Acetaminophen 325 MG CAPS     amLODIPine (NORVASC) 5 MG tablet     ASPIRIN PO     calcium-vitamin D 500-125 MG-UNIT TABS     DIPHENHYDRAMINE HCL PO     escitalopram (LEXAPRO) 20 MG tablet      gabapentin (NEURONTIN) 300 MG capsule     ketoconazole (NIZORAL) 2 % external cream     losartan (COZAAR) 50 MG tablet     melatonin 3 MG tablet     metFORMIN (GLUCOPHAGE-XR) 500 MG 24 hr tablet     naltrexone (VIVITROL) 380 MG SUSR     omeprazole (PRILOSEC) 20 MG DR capsule     omeprazole 20 MG tablet     rosuvastatin (CRESTOR) 40 MG tablet     tamsulosin (FLOMAX) 0.4 MG capsule     clotrimazole (LOTRIMIN) 1 % external cream     NITROGLYCERIN SL     Current Facility-Administered Medications   Medication     naltrexone (VIVITROL) injection 380 mg         Objective    /81 (BP Location: Left arm, Patient Position: Sitting, Cuff Size: Adult Regular)   Pulse 80   Temp 98.1  F (36.7  C) (Temporal)   SpO2 94%   Physical Exam     Groin: no largely red/beefy lesions or swelling there are clusters of closely adjacent lesions with some skin /whitish scaling.     No results found for any visits on 08/18/22.      The use of [employer] dictation services may have been used to construct the content in this note; any grammatical or spelling errors are non-intentional. Please contact the author of this note directly if you are in need of any clarification.

## 2022-08-21 NOTE — PROGRESS NOTES
Telehealth Details  Type of service:  video visit for group therapy  Originating Site (patient location):  [facility] Location- Remote location  Distant Site (provider location):  Remote location  Mode of Communication:  Video conference via Zoom    Relapse Prevention Group for Substance Use Disorders                                Time of Service: 4:00 - 5:00   Length of Appointment: 60 mins  Care Providers: Susi Gilbert, PhD, LP  Total number of patients present:  3    DSM-5 Diagnosis:   Alcohol Use Disorder, severe  Major Depressive Disorder, recurrent (per chart review)  Generalized Anxiety Disorder (per chart review)     GROUP CONTENT: Introduction to new group member and orientation to group. Discussed CBT model with a focus on unhelpful thinking.    PATIENT ENGAGEMENT: Active and engaged.     Patient did not report any changes to medications.    MENTAL STATUS EXAM:   Appearance: Casually dressed and appropriately groomed   Motor activity: Within normal range  Speech rate: Within normal range  Speech volume: Within normal range  Speech articulation:  Within normal range  Speech coherence:  Within normal range  Speech spontaneity:  Within normal range  Affect (objective appearance):  Euthymic  Thought process: Linear, logical, goal-oriented  Suicide/ violence risk: Not individually assessed given group context, but no signs of risk were observed    PLAN:   - Attend Relapse Prevention Group on Mondays at 4pm.

## 2022-08-21 NOTE — PROGRESS NOTES
Telehealth Details  Type of service:  video visit for group therapy  Originating Site (patient location):  [facility] Location- Remote location  Distant Site (provider location):  Remote location  Mode of Communication:  Video conference via Zoom    Relapse Prevention Group for Substance Use Disorders                                Time of Service: 4:00 - 5:00   Length of Appointment: 60 mins  Care Providers: Susi Gilbert, PhD, LP  Total number of patients present:  3    DSM-5 Diagnosis:   Alcohol Use Disorder, severe  Major Depressive Disorder, recurrent (per chart review)  Generalized Anxiety Disorder (per chart review)     GROUP CONTENT: Discussed a group member's cravings and strategies to cope with cravings.     PATIENT ENGAGEMENT: Active and engaged.     Patient did not report any changes to medications.    MENTAL STATUS EXAM:   Appearance: Casually dressed and appropriately groomed   Motor activity: Within normal range  Speech rate: Within normal range  Speech volume: Within normal range  Speech articulation:  Within normal range  Speech coherence:  Within normal range  Speech spontaneity:  Within normal range  Affect (objective appearance):  Euthymic  Thought process: Linear, logical, goal-oriented  Suicide/ violence risk: Not individually assessed given group context, but no signs of risk were observed    PLAN:   - Attend Relapse Prevention Group on Mondays at 4pm.

## 2022-08-22 ENCOUNTER — VIRTUAL VISIT (OUTPATIENT)
Dept: PSYCHIATRY | Facility: CLINIC | Age: 77
End: 2022-08-22
Attending: PSYCHOLOGIST
Payer: COMMERCIAL

## 2022-08-22 DIAGNOSIS — F10.21 ALCOHOL USE DISORDER, SEVERE, IN SUSTAINED REMISSION (H): Primary | ICD-10-CM

## 2022-08-22 PROCEDURE — 90853 GROUP PSYCHOTHERAPY: CPT | Mod: 95 | Performed by: PSYCHOLOGIST

## 2022-08-25 NOTE — PROGRESS NOTES
Telehealth Details  Type of service:  video visit for group therapy  Originating Site (patient location):  [facility] Location- Remote location  Distant Site (provider location):  Remote location  Mode of Communication:  Video conference via Zoom    Relapse Prevention Group for Substance Use Disorders                                Time of Service: 4:00 - 5:00   Length of Appointment: 60 mins  Care Providers: Susi Gilbert, PhD, LP  Total number of patients present:  3    DSM-5 Diagnosis:   Alcohol Use Disorder, severe  Major Depressive Disorder, recurrent (per chart review)  Generalized Anxiety Disorder (per chart review)     GROUP CONTENT: Discussed strategies to cope with craving and coping with situations outside one's control/ acceptance.     PATIENT ENGAGEMENT: Active and engaged.     Patient did not report any changes to medications.    MENTAL STATUS EXAM:   Appearance: Casually dressed and appropriately groomed   Motor activity: Within normal range  Speech rate: Within normal range  Speech volume: Within normal range  Speech articulation:  Within normal range  Speech coherence:  Within normal range  Speech spontaneity:  Within normal range  Affect (objective appearance):  Euthymic  Thought process: Linear, logical, goal-oriented  Suicide/ violence risk: Not individually assessed given group context, but no signs of risk were observed    PLAN:   - Attend Relapse Prevention Group on Mondays at 4pm.

## 2022-08-27 ENCOUNTER — OFFICE VISIT (OUTPATIENT)
Dept: URGENT CARE | Facility: URGENT CARE | Age: 77
End: 2022-08-27
Payer: COMMERCIAL

## 2022-08-27 VITALS
SYSTOLIC BLOOD PRESSURE: 127 MMHG | HEART RATE: 85 BPM | HEIGHT: 71 IN | WEIGHT: 185 LBS | BODY MASS INDEX: 25.9 KG/M2 | OXYGEN SATURATION: 95 % | DIASTOLIC BLOOD PRESSURE: 78 MMHG

## 2022-08-27 DIAGNOSIS — L08.1 ERYTHRASMA: Primary | ICD-10-CM

## 2022-08-27 PROCEDURE — 99203 OFFICE O/P NEW LOW 30 MIN: CPT | Performed by: INTERNAL MEDICINE

## 2022-08-27 RX ORDER — KETOCONAZOLE 20 MG/G
CREAM TOPICAL 2 TIMES DAILY
Qty: 30 G | Refills: 3 | Status: SHIPPED | OUTPATIENT
Start: 2022-08-27 | End: 2023-06-28

## 2022-08-27 NOTE — PROGRESS NOTES
"  Assessment & Plan     Erythrasma  Partial response to short course of topical ketoconazole.  Longer treatment durations are commonly required.  Will extend course of therapy.  If fails to respond or recurs then further diagnostic testing with KOH scraping and Wood's lamp may be helpful.  Topical clindamycin or erythromycin would also be alternative if Corynebacterium minitissimum is confirmed.    - ketoconazole (NIZORAL) 2 % external cream; Apply topically 2 times daily    Waqar Reveles MD  [facility] [place]    Subjective   Gio is a 77 year old, presenting for the following health issues:  Urgent Care and Derm Problem (Pt in clinic to have eval for worsening rash in groin area.)      HPI   Patient was treated with topical ketoconazole for a groin rash.  This was helping and he d/c'd after 7 days as was instructed in the Rx.  Then rash has recurred.          Objective    /78   Pulse 85   Ht 1.803 m (5' 11\")   Wt 83.9 kg (185 lb)   SpO2 95%   BMI 25.80 kg/m    Body mass index is 25.8 kg/m .  Physical Exam   GENERAL APPEARANCE: alert and no distress  SKIN: sharply demarcated bright red superficial macular rash with superficial desquamation in the intertriginous zones of the perinuem, groin, perianal region              .  ..  "

## 2022-08-30 ENCOUNTER — TELEPHONE (OUTPATIENT)
Dept: PSYCHIATRY | Facility: CLINIC | Age: 77
End: 2022-08-30

## 2022-08-30 NOTE — TELEPHONE ENCOUNTER
Called patient at mobile to remind them of their [facility] appointment tomorrow, 8/31/22 at 1000. Patient confirmed they would be present for this appointment. Marcia Harper, EMT

## 2022-08-31 ENCOUNTER — ALLIED HEALTH/NURSE VISIT (OUTPATIENT)
Dept: PSYCHIATRY | Facility: CLINIC | Age: 77
End: 2022-08-31
Payer: COMMERCIAL

## 2022-08-31 ENCOUNTER — TELEPHONE (OUTPATIENT)
Dept: PSYCHIATRY | Facility: CLINIC | Age: 77
End: 2022-08-31

## 2022-08-31 VITALS
BODY MASS INDEX: 26.64 KG/M2 | DIASTOLIC BLOOD PRESSURE: 73 MMHG | SYSTOLIC BLOOD PRESSURE: 122 MMHG | WEIGHT: 191 LBS | HEART RATE: 73 BPM

## 2022-08-31 DIAGNOSIS — F10.21 ALCOHOL USE DISORDER, SEVERE, IN SUSTAINED REMISSION (H): Primary | ICD-10-CM

## 2022-08-31 PROCEDURE — 96372 THER/PROPH/DIAG INJ SC/IM: CPT | Performed by: PSYCHIATRY & NEUROLOGY

## 2022-08-31 PROCEDURE — 250N000011 HC RX IP 250 OP 636: Performed by: PSYCHIATRY & NEUROLOGY

## 2022-08-31 RX ADMIN — NALTREXONE 380 MG: KIT at 10:45

## 2022-08-31 ASSESSMENT — PAIN SCALES - GENERAL: PAINLEVEL: NO PAIN (0)

## 2022-08-31 NOTE — TELEPHONE ENCOUNTER
[facility] - continue Vivitrol 380 mg IM Injection, Q28D per Dr Chele Zavaleta.Deborah Blanchard, HANHN

## 2022-08-31 NOTE — NURSING NOTE
"Clinic Administered Medication Documentation      Injectable Medication Documentation    Patient was given naltrexone (VIVITROL) injection 380 mg. Prior to medication administration, verified patients identity using patient s name and date of birth. Please see MAR and medication order for additional information. Patient instructed to stay in clinic after the injection but patient declined.      Was entire vial of medication used? Yes  Vial/Syringe: Single dose vial  Expiration Date:  10/31/2023  Was this medication supplied by the patient? No      Laurent Brooksmariya ,  1945  presented to the clinic today at the request of Sarai Garcia,  ordering provider for long-acting injectable Vivitrol.    OBSERVATIONS:  1.  Appearance: No apparent distress, Casually dressed and Dressed appropriately for weather. Patient reports that he has been more fatigued over the past month, but denies any other changes. Patient reports he is planning to go for a walk after injection today to avoid napping. Patient also shares that he enjoys baking and may do some of that today.   2.  Mood: \"okay\"  3.  Affect: full range  4.  Attitude: pleasant and cooperative  5.  Cooperation: Participated voluntarily  6.  Safety denies any current safety concerns including suicidal ideation, self-harm, and homicidal ideation  7.  Side Effects: none  8.  Education: Encouraged patient to contact the clinic if questions or concerns  9. Next appointment: 2022  10. Location given: [facility]    The service provided today was rendered under the supervising provider of the day, Abilio Norris, who was available for consultation as needed.        "

## 2022-09-12 ENCOUNTER — VIRTUAL VISIT (OUTPATIENT)
Dept: PSYCHIATRY | Facility: CLINIC | Age: 77
End: 2022-09-12
Attending: PSYCHOLOGIST
Payer: COMMERCIAL

## 2022-09-12 DIAGNOSIS — F10.21 ALCOHOL USE DISORDER, SEVERE, IN SUSTAINED REMISSION (H): Primary | ICD-10-CM

## 2022-09-12 PROCEDURE — 90853 GROUP PSYCHOTHERAPY: CPT | Mod: 95 | Performed by: PSYCHOLOGIST

## 2022-09-15 NOTE — PROGRESS NOTES
Telehealth Details  Type of service:  video visit for group therapy  Originating Site (patient location):  [facility] Location- Remote location  Distant Site (provider location):  Remote location  Mode of Communication:  Video conference via Zoom    Relapse Prevention Group for Substance Use Disorders                                Time of Service: 4:00 - 5:00   Length of Appointment: 60 mins  Care Providers: Susi Gilbert, PhD, LP  Total number of patients present:  2 (4 scheduled, but 2 no-showed)    DSM-5 Diagnosis:   Alcohol Use Disorder, severe  Major Depressive Disorder, recurrent (per chart review)  Generalized Anxiety Disorder (per chart review)     GROUP CONTENT: Discussed a group member's recent relapse and strategies to cope. Also discussed strategies to increase social support.      PATIENT ENGAGEMENT: Active and engaged.     Patient did not report any changes to medications.    MENTAL STATUS EXAM:   Appearance: Casually dressed and appropriately groomed   Motor activity: Within normal range  Speech rate: Within normal range  Speech volume: Within normal range  Speech articulation:  Within normal range  Speech coherence:  Within normal range  Speech spontaneity:  Within normal range  Affect (objective appearance):  Euthymic  Thought process: Linear, logical, goal-oriented  Suicide/ violence risk: Not individually assessed given group context, but no signs of risk were observed    PLAN:   - Relapse Prevention group on hold. Will re-start when lead therapist returns from leave and we have recruited more participants.

## 2022-09-22 DIAGNOSIS — F33.1 MODERATE EPISODE OF RECURRENT MAJOR DEPRESSIVE DISORDER (H): ICD-10-CM

## 2022-09-22 DIAGNOSIS — F41.1 GAD (GENERALIZED ANXIETY DISORDER): ICD-10-CM

## 2022-09-22 RX ORDER — ESCITALOPRAM OXALATE 20 MG/1
20 TABLET ORAL DAILY
Qty: 90 TABLET | Refills: 3 | Status: SHIPPED | OUTPATIENT
Start: 2022-09-22 | End: 2023-03-29

## 2022-09-28 ENCOUNTER — ALLIED HEALTH/NURSE VISIT (OUTPATIENT)
Dept: PSYCHIATRY | Facility: CLINIC | Age: 77
End: 2022-09-28
Payer: COMMERCIAL

## 2022-09-28 ENCOUNTER — TELEPHONE (OUTPATIENT)
Dept: PSYCHIATRY | Facility: CLINIC | Age: 77
End: 2022-09-28

## 2022-09-28 VITALS
SYSTOLIC BLOOD PRESSURE: 118 MMHG | WEIGHT: 186.2 LBS | BODY MASS INDEX: 25.97 KG/M2 | HEART RATE: 77 BPM | DIASTOLIC BLOOD PRESSURE: 81 MMHG

## 2022-09-28 DIAGNOSIS — F10.21 ALCOHOL USE DISORDER, SEVERE, IN SUSTAINED REMISSION (H): Primary | ICD-10-CM

## 2022-09-28 PROCEDURE — 96372 THER/PROPH/DIAG INJ SC/IM: CPT | Performed by: PSYCHIATRY & NEUROLOGY

## 2022-09-28 PROCEDURE — 250N000011 HC RX IP 250 OP 636: Performed by: PSYCHIATRY & NEUROLOGY

## 2022-09-28 RX ADMIN — NALTREXONE 380 MG: KIT at 12:57

## 2022-09-28 ASSESSMENT — PAIN SCALES - GENERAL: PAINLEVEL: NO PAIN (0)

## 2022-09-28 NOTE — TELEPHONE ENCOUNTER
Verbal authorization obtained to continue Vivitrol 380 mg IM Injection, Q28D per Dr Selam Bee.Deborah Blanchard, HANHN

## 2022-09-28 NOTE — PROGRESS NOTES
"Clinic Administered Medication Documentation      Injectable Medication Documentation    Patient was given Vivrtol 380 mg. Prior to medication administration, verified patients identity using patient s name and date of birth. Please see MAR and medication order for additional information. Patient instructed to stay in clinic after the injection but patient declined.      Was entire vial of medication used? Yes  Vial/Syringe: Single dose vial  Expiration Date:  2024  Was this medication supplied by the patient? No      Laurent Choi ,  1945  presented to the clinic today at the request of Dr Bee,  ordering provider for long-acting injectable Vivitrol 380 mg.    Patient states that he thinks the Vivitrol is making him very fatigued. He states that some days he has a hard time staying awake. He feels like this has become worse over the last 2 months. He did go for a walk yesterday and felt like that helped with the fatigue. He also states that he is not sleeping well and has been grinding his teeth more. He would like to discuss going to oral naltrexone at next appointment.     OBSERVATIONS:  1.  Appearance: No apparent distress, Casually dressed and Dressed appropriately for weather  2.  Mood: \"okay\", normal and pleasant  3.  Affect: full range  4.  Attitude: pleasant and cooperative  5.  Cooperation: Participated voluntarily  6.  Safety denies any current safety concerns including suicidal ideation, self-harm, and homicidal ideation  7.  Side Effects: none  8.  Education: Do not take opioid drugs while you are taking this drug. Opioid drugs will not work. Do not take more opioid drugs to try to get them to work. Doing this may cause severe injury, coma, or death   9. Next appointment: 10/26/22  10. Location given: [facility] [place]    The service provided today was rendered under the supervising provider of the day, Dr Norris , who was available for consultation as needed.        "
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
## 2022-10-05 ENCOUNTER — VIRTUAL VISIT (OUTPATIENT)
Dept: PSYCHIATRY | Facility: CLINIC | Age: 77
End: 2022-10-05
Attending: PSYCHIATRY & NEUROLOGY
Payer: COMMERCIAL

## 2022-10-05 DIAGNOSIS — F10.21 ALCOHOL USE DISORDER, SEVERE, IN SUSTAINED REMISSION (H): Primary | ICD-10-CM

## 2022-10-05 PROCEDURE — 99214 OFFICE O/P EST MOD 30 MIN: CPT | Mod: 95 | Performed by: HOSPITALIST

## 2022-10-05 NOTE — PATIENT INSTRUCTIONS
**For crisis resources, please see the information at the end of this document**   Patient Education    Thank you for coming to the [facility] [place] [facility].     Lab Testing:  If you had lab testing today and your results are reassuring or normal they will be mailed to you or sent through [employer] within 7 days. If the lab tests need quick action we will call you with the results. The phone number we will call with results is # [phone]. If this is not the best number please call our clinic and change the number.     Medication Refills:  If you need any refills please call your pharmacy and they will contact us. Our fax number for refills is [phone].   Three business days of notice are needed for general medication refill requests.   Five business days of notice are needed for controlled substance refill requests.   If you need to change to a different pharmacy, please contact the new pharmacy directly. The new pharmacy will help you get your medications transferred.     Contact Us:  Please call [phone] during business hours (8-5:00 M-F).   If you have medication related questions after clinic hours, or on the weekend, please call [phone].     Financial Assistance [phone]   Medical Records [phone]       MENTAL HEALTH CRISIS RESOURCES:  For a emergency help, please call 911 or go to the nearest Emergency Department.     Emergency Walk-In Options:   EmPATH Unit @ [facility] ([place]): [phone] - Specialized mental health emergency area designed to be calming  [facility] ([place]): [phone]  [facility] ([place]): [phone]  [facility]): [phone]    [facility] Crisis Information:   [place]: [phone]  [place]: [phone]  [place] ([place]) - Adult: [phone]     Child: [phone]  [place] - Adult: [phone]     Child: [phone]  [place]:  [phone]  List of all [facility] resources:   https://[place].gov/dhs/people-we-serve/adults/health-care/mental-health/resources/crisis-contacts.jsp    National Crisis Information:   Crisis Text Line: Text  [place]  to 028923  Suicide & Crisis Lifeline: 988  National Suicide Prevention Lifeline: 1-008-412-TALK (1-469.958.6107)       For online chat options, visit https://suicidepreventionlifeline.org/chat/  Poison Control Center: 5-341-998-6027  Trans Lifeline: 4-155-647-7477 - Hotline for transgender people of all ages  [employer]: 0-733-656-5558 - Hotline for LGBT youth     For Non-Emergency Support:   Fast Tracker: Mental Health & Substance Use Disorder Resources -   https://www.[employer].org/

## 2022-10-05 NOTE — PROGRESS NOTES
Laurent Choi is a 77 year old who has consented to receive services via billable video visit.      Pt will join video visit via: [email or web address]  If there are problems joining the visit, send backup video invite via: Text to preferred phone: [phone]      Originating Location (patient location): Patient's home  Distant Location (provider location): [facility] [place] [facility]    Will anyone else be joining the video visit? No

## 2022-10-10 DIAGNOSIS — R73.03 PREDIABETES: ICD-10-CM

## 2022-10-10 RX ORDER — METFORMIN HCL 500 MG
500 TABLET, EXTENDED RELEASE 24 HR ORAL
Qty: 90 TABLET | Refills: 2 | Status: SHIPPED | OUTPATIENT
Start: 2022-10-10 | End: 2023-07-09

## 2022-10-15 ENCOUNTER — HEALTH MAINTENANCE LETTER (OUTPATIENT)
Age: 77
End: 2022-10-15

## 2022-10-25 ENCOUNTER — TELEPHONE (OUTPATIENT)
Dept: PSYCHIATRY | Facility: CLINIC | Age: 77
End: 2022-10-25

## 2022-10-25 NOTE — TELEPHONE ENCOUNTER
Called patient at mobile to remind them of their BARRETO appointment tomorrow, 10/26/22 at 1230. Patient confirmed they would be present for this appointment. Marcia Harper, EMT

## 2022-10-26 ENCOUNTER — ALLIED HEALTH/NURSE VISIT (OUTPATIENT)
Dept: PSYCHIATRY | Facility: CLINIC | Age: 77
End: 2022-10-26
Payer: COMMERCIAL

## 2022-10-26 VITALS
BODY MASS INDEX: 26.95 KG/M2 | HEART RATE: 81 BPM | DIASTOLIC BLOOD PRESSURE: 82 MMHG | WEIGHT: 193.2 LBS | SYSTOLIC BLOOD PRESSURE: 131 MMHG

## 2022-10-26 DIAGNOSIS — Z23 NEED FOR PROPHYLACTIC VACCINATION AND INOCULATION AGAINST INFLUENZA: Primary | ICD-10-CM

## 2022-10-26 DIAGNOSIS — F10.21 ALCOHOL USE DISORDER, SEVERE, IN SUSTAINED REMISSION (H): Primary | ICD-10-CM

## 2022-10-26 PROCEDURE — 90662 IIV NO PRSV INCREASED AG IM: CPT

## 2022-10-26 PROCEDURE — 250N000011 HC RX IP 250 OP 636: Performed by: PSYCHIATRY & NEUROLOGY

## 2022-10-26 PROCEDURE — G0008 ADMIN INFLUENZA VIRUS VAC: HCPCS

## 2022-10-26 PROCEDURE — 96372 THER/PROPH/DIAG INJ SC/IM: CPT | Performed by: PSYCHIATRY & NEUROLOGY

## 2022-10-26 PROCEDURE — 250N000011 HC RX IP 250 OP 636

## 2022-10-26 RX ADMIN — NALTREXONE 380 MG: KIT at 12:45

## 2022-10-26 ASSESSMENT — PAIN SCALES - GENERAL: PAINLEVEL: NO PAIN (0)

## 2022-10-26 NOTE — NURSING NOTE
"Clinic Administered Medication Documentation      Injectable Medication Documentation    Patient was given Vivitrol 380 mg. Prior to medication administration, verified patients identity using patient s name and date of birth. Please see MAR and medication order for additional information. Patient instructed to remain in clinic for 15 minutes.      Was entire vial of medication used? Yes  Vial/Syringe: Single dose vial  Expiration Date:  2024  Was this medication supplied by the patient? No      Laurent Choi ,  1945  presented to the clinic today at the request of Dr Garcia,  ordering provider for long-acting injectable Vivitrol 380 mg.    OBSERVATIONS:  1.  Appearance: Casually dressed and Dressed appropriately for weather  2.  Mood: \"good\", calm, friendly, comfortable and pleasant. Pt states he is walking more and trying to get exercise. He continues to enjoy baking cookies, and also make lemon blueberry muffins recently. Reports fatigue from Vivitrol is about the same.   3.  Affect: full range  4.  Attitude: pleasant and cooperative  5.  Cooperation: Participated voluntarily  6.  Safety denies any current safety concerns including suicidal ideation, self-harm, and homicidal ideation  7.  Side Effects: none  8.  Education: Notify clinic of any concerns.  9. Next appointment: 22 1230  10. Location given: [facility]    The service provided today was rendered under the supervising provider of the day, Abilio Norris, who was available for consultation as needed.        "

## 2022-10-26 NOTE — NURSING NOTE
Clinic Administered Medication Documentation        Patient was given Fluzone High-Dose (for 65 years of age and older). Prior to medication administration, verified patients identity using patient s name and date of birth. Please see MAR and medication order for additional information. Patient instructed to stay in clinic after the injection but patient declined.        Was entire vial of medication used? Yes  Vial/Syringe: Syringe  Expiration Date:  6/30/23  Was this medication supplied by the patient? No

## 2022-11-22 ENCOUNTER — TELEPHONE (OUTPATIENT)
Dept: PSYCHIATRY | Facility: CLINIC | Age: 77
End: 2022-11-22

## 2022-11-22 NOTE — TELEPHONE ENCOUNTER
Called patient at mobile to remind them of their BARRETO appointment tomorrow, 11/23/22 at 1230. Patient confirmed they would be present for this appointment. Marcia Harper, EMT

## 2022-11-23 ENCOUNTER — ALLIED HEALTH/NURSE VISIT (OUTPATIENT)
Dept: PSYCHIATRY | Facility: CLINIC | Age: 77
End: 2022-11-23
Payer: COMMERCIAL

## 2022-11-23 VITALS
BODY MASS INDEX: 27.17 KG/M2 | HEART RATE: 98 BPM | SYSTOLIC BLOOD PRESSURE: 133 MMHG | WEIGHT: 194.8 LBS | DIASTOLIC BLOOD PRESSURE: 84 MMHG

## 2022-11-23 DIAGNOSIS — F10.21 ALCOHOL USE DISORDER, SEVERE, IN SUSTAINED REMISSION (H): Primary | ICD-10-CM

## 2022-11-23 PROCEDURE — 250N000011 HC RX IP 250 OP 636: Performed by: PSYCHIATRY & NEUROLOGY

## 2022-11-23 PROCEDURE — 96372 THER/PROPH/DIAG INJ SC/IM: CPT | Performed by: PSYCHIATRY & NEUROLOGY

## 2022-11-23 RX ADMIN — NALTREXONE 380 MG: KIT at 13:00

## 2022-11-23 ASSESSMENT — PAIN SCALES - GENERAL: PAINLEVEL: NO PAIN (0)

## 2022-11-23 NOTE — NURSING NOTE
"Clinic Administered Medication Documentation      Injectable Medication Documentation    Patient was given Vivitrol 380 mg. Prior to medication administration, verified patients identity using patient s name and date of birth. Please see MAR and medication order for additional information. Patient instructed to stay in clinic after the injection but patient declined.      Was entire vial of medication used? Yes  Vial/Syringe: Single dose vial  Expiration Date:  24  Was this medication supplied by the patient? No      Laurent Choi ,  1945  presented to the clinic today at the request of Dr Garcia ,  ordering provider for long-acting injectable Vivitrol 380 mg .    OBSERVATIONS:  1.  Appearance: No apparent distress, Casually dressed, Adequately groomed, Disheveled and Dressed appropriately for weather  2.  Mood: \"good\", friendly and \"tired\"  3.  Affect: flat  4.  Attitude: pleasant and cooperative  5.  Cooperation: Participated voluntarily  6.  Safety denies any current safety concerns including suicidal ideation, self-harm, and homicidal ideation  7.  Side Effects: none  8.  Education: Do not take opioid drugs while you are taking this drug. Opioid drugs will not work. Do not take more opioid drugs to try to get them to work. Doing this may cause severe injury, coma, or death   9. Next appointment: 22  10. Location given: [facility]    The service provided today was rendered under the supervising provider of the day, Dr Lester, who was available for consultation as needed.        "

## 2022-11-29 NOTE — PROGRESS NOTES
"    ----------------------------------------------------------------------------------------------------------  [facility], [place]   Psychiatry Clinic Progress Note  Addiction Medicine                            VIDEO VISIT  Laurent Choi is a 77 year old who is being evaluated via a billable video visit.      Telehealth Details  Type of service:  medication management  Time of service:    Start Time: 10:05 am    End Time:   10: 40 am    Reason for Telehealth Visit: Patient has requested telehealth visit  Originating Site (patient location):  [facility]   Location- Patient's home  Distant Site (provider location):  On-site  Mode of Communication:  AmWell         Background     Laurent \"Gio\" Blaze Choi is a 77 year old, , male with a history of alcohol use disorder on Vivitrol since September, 2018. His initial visit was on 10/17/18.         Subjective/INTERIM HISTORY                                               Since last visit:    Patient states that his mood is good. \"I don't think I am depressed when I think about who I am as a person\".  He does note that his work is very important to him.  However, he has had a hard time focusing on work and sometimes this makes him feel depressed. My anxiety is \"ok\".  He denies suicidal or homicidal ideation.      He thinks that he sleeps too much.  However, upon reflection he notes sometimes he does not sleep well.  He does note that he does his best to work at night time he starts to become more focused at night.  He would like to be asleep before midnight.  However, he usually goes to bed around 2-3 am.  He does not wake up until around noon.  This has been his normal sleep cycle for several years.  Therefore, he prefers later appointments as he had to get out of bed for this appointment. He made a \"rich chocolate cake last night\" which he was happy about.     Have some ongoing fatigue.  He is not sure this is getting better. " " He feels that he is still trying to recover from daylight savings and changing time and is going to bed about an hour later and waking up about an hour later.  He wondered if Lexapro was contributing to the fatigue.  Therefore, a couple weeks ago he decreased the dose of Lexapro to half a tablet which is 10 mg to see if it would boost his energy.  He did not notice any change, so he went back to a full dose after 10 days.     He does keep active by going on walks with his neighbor about 2 miles at a time.  He also has a treadmill which he will start to use now that it has started snowing.    He again if he can stop taking Vivitrol.  He is concerned that he has been on it for too long.  During our last visit, he states that he was surprised to learn that he had been on it since 2018.  There is what the long-term effects of being on Vivitrol will be.  He notes that his spouse was on risperdione and \"it took 15 years off his life\". His spouse passed a few years ago.       He denies any alcohol use since summer/spring. \"Alcohol does not make me feel powerless. I make a choice not to drink alcohol\".  We did some motivational interviewing.  With regards to confidence and staying sober, he states that is about a 5/10. \"Knowing who I am, I don't like to rate things as higher than a 5\".     \"I am not complaining about my anxiety really on my mood or being isolated\".  He does clarify that this is his normal and he is coming to live with it\"    He is very appreciative of our visit today.     Treatment changes and response: None      RECENT SYMPTOMS   [PSYCH ROS]   CRAVINGS/URGES: None   SLEEP: As above   SIDE EFFECTS: Concerned about fatigue   ANXIETY:  excessive worry  PANIC ATTACK:  None   DEPRESSION:  depressed mood, low energy, chronic;  DENIES-suicidal ideation   PSYCHOSIS:  none;  DENIES- delusions, hallucinations   MARILEE/HYPOMANIA:  none;  DENIES- increased energy  OTHER:  None         SUBSTANCE USE DETAILED HISTORY    " "                                                             Gio has been in detox over 40 times and treatment about 5 times.  His first treatment was in 1980. He gave up daily drinking in late 1990's, then began to binge drink.  In recent years, his pattern has been drinking 1-2 weeks, not eating most of that time, and getting very sick. He then will stop drinking, will be sober for 1-2 weeks and the cycle is continued. Alcohol has caused withdrawal and tolerance, drinking 6-10 beers and 1 pint a day when on a binge. Alcohol has affected relationships, has affected health with getting sick, not eating and getting depressed.       Treatment History:    First treatment in 1980.  Last treatment at [facility] finished  November 2018.     Substance Use Pharmacotherapies:   Tried antabuse in late 1990's and he would stop taking and \"drink around it\".       PAST PSYCH MED TRIALS       He was on prozac starting in the 1980s. As high as 80 mg. It used to stopped him from obsessing and helped him concentrate. Then it stopped working.     He tried several other SSRIs over the years including zoloft, paxil, cymbalta, effexor.    Doesn't remember much about effexor with regards to how it made him feel or side effects     He took tricyclic antidepressants in the 70s but had side effects. In the 90s, he took Wellbutrin. This made him anxious.      He was also on remeron for sometime. He notes that this was increased when he was in [facility] and it made his anxiety worse     In January 2019, he was switched to lexapro 20mg, doing well.     Had a trial of aripiprazole in Spring/Summer 2019, but stopped because it was making him drool.    Gabapentin was started in Spring 2019 during a period of abstinence for anxiety.         ALLERGY                                Aspirin, Nsaids (non-steroidal anti-inflammatory drug) [nsaids], Plavix [clopidogrel], and Statins-hmg-coa reductase inhibitors [hmg-coa-r inhibitors]  MEDICATIONS "                               Current Outpatient Medications   Medication Sig Dispense Refill     Acetaminophen 325 MG CAPS Take 325 mg by mouth        amLODIPine (NORVASC) 5 MG tablet Take 1 tablet (5 mg) by mouth daily 90 tablet 3     ASPIRIN PO Take 81 mg by mouth daily       calcium-vitamin D 500-125 MG-UNIT TABS Take 1 tablet by mouth 2 times daily       clotrimazole (LOTRIMIN) 1 % external cream Apply topically 2 times daily Apply to rough patch of skin in front of right ear 2x daily for 10 days 24 g 0     DIPHENHYDRAMINE HCL PO Take 25 mg by mouth daily as needed       escitalopram (LEXAPRO) 20 MG tablet Take 1 tablet (20 mg) by mouth daily 90 tablet 3     gabapentin (NEURONTIN) 300 MG capsule Take 1 capsule by mouth at bedtime as needed. 90 capsule 3     ketoconazole (NIZORAL) 2 % external cream Apply topically 2 times daily 30 g 3     losartan (COZAAR) 50 MG tablet Take 1 tablet (50 mg) by mouth daily 90 tablet 1     melatonin 3 MG tablet Take 1-3 tabs before bed PRN 90 tablet 1     metFORMIN (GLUCOPHAGE XR) 500 MG 24 hr tablet Take 1 tablet (500 mg) by mouth daily (with breakfast) 90 tablet 2     naltrexone (VIVITROL) 380 MG SUSR Inject 380 mg into the muscle every 30 days 1 each 4     NITROGLYCERIN SL Take 0.4 mg by mouth       omeprazole (PRILOSEC) 20 MG DR capsule TAKE 1 CAPSULE BY MOUTH DAILY,  60 MINUTES BEFORE A MEAL. 90 capsule 3     omeprazole 20 MG tablet Take 1-2 daily 60 tablet 1     rosuvastatin (CRESTOR) 40 MG tablet TAKE ONE TABLET BY MOUTH EVERY NIGHT AT BEDTIME 90 tablet 2     tamsulosin (FLOMAX) 0.4 MG capsule TAKE ONE CAPSULE BY MOUTH ONCE DAILY WITH BREAKFAST 90 capsule 2       VITALS   There were no vitals taken for this visit.      MENTAL STATUS EXAM                                                           Orientation: Oriented x 3  Alertness: alert   Appearance: well-maintaned  Behavior/Demeanor: cooperative, pleasant and calm,   Speech: normal  Language:  "intact  Psychomotor:ok  Mood: \"good\"  Affect: full range and appropriate; was congruent to mood; was congruent to content  Thought Process/Associations: unremarkable  Thought Content:  Denies suicidal and violent ideation, delusions and preoccupations  Perception: none  Denies auditory hallucinations and visual hallucinations  Insight: good  Judgment: good  Cognition: does  appear grossly intact; formal cognitive testing was not done  Gait: asymmetric  MSK: no gross abnormalities    SUBSTANCE USE/PSYCHIATRIC DIAGNOSES                                                                                                    Alcohol Use Disorder, severe, in sustained remission.   Major Depressive Disorder without suicidal ideation   Generalized Anxiety Disorder  R/o PTSD  (partner suicide attempts, his incarcerations) seems not to be an issue currently         ASSESSMENT/Diagnosis/Plan                                                  Laurent Choi is a 77 year old male with a history of alcohol use disorder, MDD, RODRIGO who is here for follow-up. Patient has been stable on Vivitrol.  He reports episodic fatigue and intermittently inquires about whether he can stop taking Vivitrol.      #AUD: Severe, in remission  -Patient had a one episode of drinking a 6 pack of alcohol in spring/summer.   -He is stable in his recovery.   -He again inquires about whether he can stop taking Vivitrol.  He notes that his major concern at this time is about potential long-term effects from Vivitrol.  He also would like to see how he would do without Vivitrol in terms of fatigue.  He acknowledges that he has done well on Vivitrol and not that that intermittent return to use about once a year, he is quite stable in his recovery.  -We recommended that he continue to take Vivitrol at this time and we will revisit it when the relapse prevention groups resume so he will have some ongoing peer support. He thinks this is a good idea.   - Continue " vivitrol injection monthly     #MDD  #RODRIGO  -His mood and anxiety are stable   -He has some ongoing fatigue and decrease his dose of Lexapro from 20 mg to 10 mg daily x10 days.  He did not notice any effect after cutting the dose of Lexapro so he resumed this.   -We discussed potentially trying another antidepressant which may be more activating.  Wellbutrin made him too anxious in the past.  Effexor would be another option.  However, at this time he would like to continue taking Lexapro 20 mg and does not want to make any changes at this time   -Patient has reported being quite active including spending half hour on a treadmill or going for a 2.5-3 mile walk with his neighbor. We commended his efforts at staying active and encouraged him to continue   - Continue gabapentin to 300 mg,QHS PRN for insomnia   - Continue with melatonin 3mg at night    RTC: 8 weeks       [place] PRESCRIPTION MONITORING PROGRAM [] was checked today:  not using controlled substances besides prescribed below.      Patient seen by and discussed with staff psychiatrist, Dr. Bee     I saw the patient with the fellow, and participated in key portions of the service, including the mental status examination and developing the plan of care. I reviewed key portions of the history with the fellow. I agree with the findings and plan as documented in this note.    Selam Bee MD          Answers for HPI/ROS submitted by the patient on 10/5/2022  If you checked off any problems, how difficult have these problems made it for you to do your work, take care of things at home, or get along with other people?: Somewhat difficult  PHQ9 TOTAL SCORE: 10

## 2022-11-30 ENCOUNTER — VIRTUAL VISIT (OUTPATIENT)
Dept: PSYCHIATRY | Facility: CLINIC | Age: 77
End: 2022-11-30
Attending: PSYCHIATRY & NEUROLOGY
Payer: COMMERCIAL

## 2022-11-30 DIAGNOSIS — F41.1 GAD (GENERALIZED ANXIETY DISORDER): ICD-10-CM

## 2022-11-30 DIAGNOSIS — F33.1 MODERATE EPISODE OF RECURRENT MAJOR DEPRESSIVE DISORDER (H): ICD-10-CM

## 2022-11-30 DIAGNOSIS — F10.21 ALCOHOL USE DISORDER, SEVERE, IN SUSTAINED REMISSION (H): Primary | ICD-10-CM

## 2022-11-30 PROCEDURE — G0463 HOSPITAL OUTPT CLINIC VISIT: HCPCS | Mod: PN,GT | Performed by: HOSPITALIST

## 2022-11-30 PROCEDURE — 99214 OFFICE O/P EST MOD 30 MIN: CPT | Mod: 95 | Performed by: HOSPITALIST

## 2022-11-30 NOTE — PROGRESS NOTES
Laurent Choi is a 77 year old who is being evaluated via a billable video visit.      Pt will join video visit via: [employer]  If there are problems joining the visit, send backup video invite via: Send to preferred e-mail: [email or web address]    Reason for telehealth visit: Patient has requested telehealth visit    Originating location (patient location): Patient's home    Will anyone else be joining the visit? No

## 2022-11-30 NOTE — PATIENT INSTRUCTIONS
**For crisis resources, please see the information at the end of this document**   Patient Education    Thank you for coming to the [facility] [place] [facility].     Lab Testing:  If you had lab testing today and your results are reassuring or normal they will be mailed to you or sent through [employer] within 7 days. If the lab tests need quick action we will call you with the results. The phone number we will call with results is # [phone]. If this is not the best number please call our clinic and change the number.     Medication Refills:  If you need any refills please call your pharmacy and they will contact us. Our fax number for refills is [phone].   Three business days of notice are needed for general medication refill requests.   Five business days of notice are needed for controlled substance refill requests.   If you need to change to a different pharmacy, please contact the new pharmacy directly. The new pharmacy will help you get your medications transferred.     Contact Us:  Please call [phone] during business hours (8-5:00 M-F).   If you have medication related questions after clinic hours, or on the weekend, please call [phone].     Financial Assistance [phone]   Medical Records [phone]       MENTAL HEALTH CRISIS RESOURCES:  For a emergency help, please call 911 or go to the nearest Emergency Department.     Emergency Walk-In Options:   EmPATH Unit @ [facility] ([place]): [phone] - Specialized mental health emergency area designed to be calming  [facility] ([place]): [phone]  [facility] ([place]): [phone]  [facility]): [phone]    [facility] Crisis Information:   [place]: [phone]  [place]: [phone]  [place] ([place]) - Adult: [phone]     Child: [phone]  [place] - Adult: [phone]     Child: [phone]  [place]:  [phone]  List of all [facility] resources:   https://[place].gov/dhs/people-we-serve/adults/health-care/mental-health/resources/crisis-contacts.jsp    National Crisis Information:   Crisis Text Line: Text  [place]  to 794864  Suicide & Crisis Lifeline: 988  National Suicide Prevention Lifeline: 0-443-107-TALK (1-642.619.8647)       For online chat options, visit https://suicidepreventionlifeline.org/chat/  Poison Control Center: 8-427-569-5821  Trans Lifeline: 5-567-726-8724 - Hotline for transgender people of all ages  [employer]: 9-720-621-1270 - Hotline for LGBT youth     For Non-Emergency Support:   Fast Tracker: Mental Health & Substance Use Disorder Resources -   https://www.[employer]n.org/

## 2022-12-20 ENCOUNTER — TELEPHONE (OUTPATIENT)
Dept: PSYCHIATRY | Facility: CLINIC | Age: 77
End: 2022-12-20

## 2022-12-20 NOTE — TELEPHONE ENCOUNTER
Writer called [phone] and left a DVM reminding patient of his Vivitrol injection tomorrow, Wednesday 12/21/22. Clinic line left as CB number if appointment needs to be rescheduled.Deborah Blanchard LPN Lead

## 2022-12-21 ENCOUNTER — TELEPHONE (OUTPATIENT)
Dept: PSYCHIATRY | Facility: CLINIC | Age: 77
End: 2022-12-21

## 2022-12-21 NOTE — TELEPHONE ENCOUNTER
NASHB per Dr. Keya horn to give Vivitrol 380 mg injections today 12-.    ----- Message -----   From: Sarai Garcia MD   Sent: 12/16/2022  10:19 PM CST   To: Madonna Hargrove,   The note is signed but the chart has not been closed by Dr. Bee.     Sarai Garcia MD       ----- Message -----   From: Madonna Calle   Sent: 12/16/2022  10:22 AM CST   To: MD Dr. Radha Phan,     I was prepping for the next injections and I want to make sure that the last office notes is sign before I give the injection date.     Madonna Calle on 12/21/2022 at 8:25 AM

## 2022-12-26 ENCOUNTER — ALLIED HEALTH/NURSE VISIT (OUTPATIENT)
Dept: PSYCHIATRY | Facility: CLINIC | Age: 77
End: 2022-12-26
Payer: COMMERCIAL

## 2022-12-26 VITALS
SYSTOLIC BLOOD PRESSURE: 142 MMHG | HEART RATE: 96 BPM | BODY MASS INDEX: 27.11 KG/M2 | WEIGHT: 194.4 LBS | DIASTOLIC BLOOD PRESSURE: 83 MMHG

## 2022-12-26 DIAGNOSIS — F10.21 ALCOHOL USE DISORDER, SEVERE, IN SUSTAINED REMISSION (H): Primary | ICD-10-CM

## 2022-12-26 PROCEDURE — 96372 THER/PROPH/DIAG INJ SC/IM: CPT | Performed by: PSYCHIATRY & NEUROLOGY

## 2022-12-26 PROCEDURE — 250N000011 HC RX IP 250 OP 636: Performed by: PSYCHIATRY & NEUROLOGY

## 2022-12-26 RX ADMIN — NALTREXONE 380 MG: KIT at 14:31

## 2022-12-26 ASSESSMENT — PAIN SCALES - GENERAL: PAINLEVEL: NO PAIN (0)

## 2022-12-26 NOTE — NURSING NOTE
"Clinic Administered Medication Documentation      Injectable Medication Documentation    Patient was given Vivitrol 380 mg . Prior to medication administration, verified patients identity using patient s name and date of birth. Please see MAR and medication order for additional information. Patient instructed to Patient stayed after injection to recheck blood pressure and writer was able to assess if there's any concerns after injections .      Was entire vial of medication used? Yes  Vial/Syringe: Single dose vial  Expiration Date:  2024  Was this medication supplied by the patient? No      Laurent Choi ,  1945  presented to the clinic today at the request of Dr. Garcia ,  ordering provider for long-acting injectable Vivitrol 380 mg .    OBSERVATIONS:  1.  Appearance: Casually dressed and Dressed appropriately for weather.  Patient states his last drink was three days ago, writer asked how much he drinks, patient states , \" not significant\" only griffin at bedtime. Not on any pain medications. Patient discussed his marriages, traffics  and feeling tired in the morning. He thinks it's the Vivitrol that is causing the fatigue. He states he is not a morning person and he goes to sleep late , around  2 am in the morning. Patient declined any other concerns.   2.  Mood: pleasant  3.  Affect: full range  4.  Attitude: cooperative  5.  Cooperation: Participated voluntarily  6.  Safety denies any current safety concerns including suicidal ideation, self-harm, and homicidal ideation  7.  Side Effects: none  8.  Education: Call the clinic with any concerns  9. Next appointment: 2023, 2 pm, q28d   10. Location given: [facility]     The service provided today was rendered under the supervising provider of the day, Dr. Allen , who was available for consultation as needed.    Madonna Calle on 2022 at 2:30 PM     "

## 2023-01-10 DIAGNOSIS — I10 HYPERTENSION, ESSENTIAL: ICD-10-CM

## 2023-01-10 RX ORDER — LOSARTAN POTASSIUM 50 MG/1
TABLET ORAL
Qty: 90 TABLET | Refills: 1 | OUTPATIENT
Start: 2023-01-10

## 2023-01-11 RX ORDER — LOSARTAN POTASSIUM 50 MG/1
50 TABLET ORAL DAILY
Qty: 90 TABLET | Refills: 1 | Status: SHIPPED | OUTPATIENT
Start: 2023-01-11 | End: 2023-07-09

## 2023-01-11 NOTE — TELEPHONE ENCOUNTER
"Routing refill request to provider for review/approval because:  bp out of range    Last Written Prescription Date:  7/6/22  Last Fill Quantity: 90,  # refills: 1   Last office visit provider:  4/19/22     Requested Prescriptions   Pending Prescriptions Disp Refills     losartan (COZAAR) 50 MG tablet 90 tablet 1     Sig: Take 1 tablet (50 mg) by mouth daily       Angiotensin-II Receptors Failed - 1/11/2023 11:11 AM        Failed - Last blood pressure under 140/90 in past 12 months     BP Readings from Last 3 Encounters:   08/27/22 127/78   08/18/22 125/81   04/19/22 133/80                 Passed - Recent (12 mo) or future (30 days) visit within the authorizing provider's specialty     Patient has had an office visit with the authorizing provider or a provider within the authorizing providers department within the previous 12 mos or has a future within next 30 days. See \"Patient Info\" tab in inbasket, or \"Choose Columns\" in Meds & Orders section of the refill encounter.              Passed - Medication is active on med list        Passed - Patient is age 18 or older        Passed - Normal serum creatinine on file in past 12 months     Recent Labs   Lab Test 04/19/22  1402   CR 0.88       Ok to refill medication if creatinine is low          Passed - Normal serum potassium on file in past 12 months     Recent Labs   Lab Test 04/19/22  1402   POTASSIUM 4.5                     Refused Prescriptions Disp Refills     losartan (COZAAR) 50 MG tablet [Pharmacy Med Name: LOSARTAN POTASSIUM 50MG TABS] 90 tablet 1     Sig: TAKE ONE TABLET BY MOUTH ONCE DAILY       Angiotensin-II Receptors Failed - 1/11/2023 11:11 AM        Failed - Last blood pressure under 140/90 in past 12 months     BP Readings from Last 3 Encounters:   08/27/22 127/78   08/18/22 125/81   04/19/22 133/80                 Passed - Recent (12 mo) or future (30 days) visit within the authorizing provider's specialty     Patient has had an office visit with the " "authorizing provider or a provider within the authorizing providers department within the previous 12 mos or has a future within next 30 days. See \"Patient Info\" tab in inbasket, or \"Choose Columns\" in Meds & Orders section of the refill encounter.              Passed - Medication is active on med list        Passed - Patient is age 18 or older        Passed - Normal serum creatinine on file in past 12 months     Recent Labs   Lab Test 04/19/22  1402   CR 0.88       Ok to refill medication if creatinine is low          Passed - Normal serum potassium on file in past 12 months     Recent Labs   Lab Test 04/19/22  1402   POTASSIUM 4.5                         Lucía Karimi, RN 01/11/23 11:16 AM  "

## 2023-01-11 NOTE — TELEPHONE ENCOUNTER
Medication Question or Refill    Contacts       Type Contact Phone/Fax    01/10/2023 01:01 AM CST Interface (Incoming) [facility] - [place], [place] - [address] [phone]    01/11/2023 11:08 AM CST Phone (Incoming) Gio Choi (Self) [phone]          What medication are you calling about (include dose and sig)?: losartan (COZAAR) 50 MG tablet    Controlled Substance Agreement on file:   CSA -- Patient Level:    CSA: None found at the patient level.       Who prescribed the medication?: Dr. mSith    Do you need a refill? Yes: out of medication    When did you use the medication last? N/A    Patient offered an appointment? No    Do you have any questions or concerns?  No    Preferred Pharmacy:       [facility] - [place], [place] - [address]  [address]  [place] [address]  Phone: [phone] Fax: [phone]        Could we send this information to you [facility] or would you prefer to receive a phone call?:   Patient would prefer a phone call   Okay to leave a detailed message?: Yes at Cell number on file:    Telephone Information:   Mobile [phone]

## 2023-01-20 ENCOUNTER — TELEPHONE (OUTPATIENT)
Dept: PSYCHIATRY | Facility: CLINIC | Age: 78
End: 2023-01-20
Payer: COMMERCIAL

## 2023-01-20 NOTE — TELEPHONE ENCOUNTER
Called patient at mobile to remind them of their BARRETO appointment Monday, 1/23/23 at 1400. Patient confirmed they would be present for this appointment. Marcia Harper, EMT

## 2023-01-23 ENCOUNTER — ALLIED HEALTH/NURSE VISIT (OUTPATIENT)
Dept: PSYCHIATRY | Facility: CLINIC | Age: 78
End: 2023-01-23
Payer: COMMERCIAL

## 2023-01-23 VITALS
WEIGHT: 194 LBS | BODY MASS INDEX: 27.06 KG/M2 | SYSTOLIC BLOOD PRESSURE: 120 MMHG | DIASTOLIC BLOOD PRESSURE: 72 MMHG | HEART RATE: 85 BPM

## 2023-01-23 DIAGNOSIS — F10.21 ALCOHOL USE DISORDER, SEVERE, IN SUSTAINED REMISSION (H): Primary | ICD-10-CM

## 2023-01-23 PROCEDURE — 96372 THER/PROPH/DIAG INJ SC/IM: CPT | Performed by: PSYCHIATRY & NEUROLOGY

## 2023-01-23 PROCEDURE — 250N000011 HC RX IP 250 OP 636: Performed by: PSYCHIATRY & NEUROLOGY

## 2023-01-23 RX ADMIN — NALTREXONE 380 MG: KIT at 14:32

## 2023-01-23 ASSESSMENT — PAIN SCALES - GENERAL: PAINLEVEL: NO PAIN (0)

## 2023-01-23 NOTE — NURSING NOTE
Clinic Administered Medication Documentation      Injectable Medication Documentation    Patient was given Vivitrol 380 mg . Prior to medication administration, verified patients identity using patient s name and date of birth. Please see MAR and medication order for additional information. Patient instructed to remain in clinic for 15 minutes.      Was entire vial of medication used? Yes  Vial/Syringe: Single dose vial  Expiration Date:  2023  Was this medication supplied by the patient? No      Laurent Choi ,  1945  presented to the clinic today at the request of Dr. Badillo ,  ordering provider for long-acting injectable Vivitrol 380 mg .    OBSERVATIONS:  1.  Appearance: Casually dressed and Dressed appropriately for weather.Patient states he is doing good. His last drink was last elizabeth . He was taking Aleve a few days ago because of back pain . But today he is doing better.  2.  Mood: pleasant  3.  Affect: full range  4.  Attitude: pleasant and cooperative  5.  Cooperation: Participated voluntarily  6.  Safety denies any current safety concerns including suicidal ideation, self-harm, and homicidal ideation  7.  Side Effects: none  8.  Education: Call the clinic with any concerns  9. Next appointment:  at 2 pm   10. [place] [facility]     The service provided today was rendered under the supervising provider of the day, Dr. Bee , who was available for consultation as needed.    Madonna Calle on 2023 at 2:39 PM

## 2023-01-25 ENCOUNTER — VIRTUAL VISIT (OUTPATIENT)
Dept: PSYCHIATRY | Facility: CLINIC | Age: 78
End: 2023-01-25
Attending: PSYCHIATRY & NEUROLOGY
Payer: COMMERCIAL

## 2023-01-25 DIAGNOSIS — F33.1 MODERATE EPISODE OF RECURRENT MAJOR DEPRESSIVE DISORDER (H): ICD-10-CM

## 2023-01-25 DIAGNOSIS — F41.1 GAD (GENERALIZED ANXIETY DISORDER): ICD-10-CM

## 2023-01-25 DIAGNOSIS — F10.21 ALCOHOL USE DISORDER, SEVERE, IN SUSTAINED REMISSION (H): Primary | ICD-10-CM

## 2023-01-25 PROCEDURE — G0463 HOSPITAL OUTPT CLINIC VISIT: HCPCS | Mod: PN,GT | Performed by: HOSPITALIST

## 2023-01-25 PROCEDURE — 99214 OFFICE O/P EST MOD 30 MIN: CPT | Mod: VID | Performed by: HOSPITALIST

## 2023-01-25 ASSESSMENT — PATIENT HEALTH QUESTIONNAIRE - PHQ9
SUM OF ALL RESPONSES TO PHQ QUESTIONS 1-9: 12
SUM OF ALL RESPONSES TO PHQ QUESTIONS 1-9: 12
10. IF YOU CHECKED OFF ANY PROBLEMS, HOW DIFFICULT HAVE THESE PROBLEMS MADE IT FOR YOU TO DO YOUR WORK, TAKE CARE OF THINGS AT HOME, OR GET ALONG WITH OTHER PEOPLE: SOMEWHAT DIFFICULT
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
## 2023-01-25 NOTE — PATIENT INSTRUCTIONS
**For crisis resources, please see the information at the end of this document**   Patient Education    Thank you for coming to the [facility].     Lab Testing:  If you had lab testing today and your results are reassuring or normal they will be mailed to you or sent through [employer] within 7 days. If the lab tests need quick action we will call you with the results. The phone number we will call with results is # [phone]. If this is not the best number please call our clinic and change the number.     Medication Refills:  If you need any refills please call your pharmacy and they will contact us. Our fax number for refills is [phone].   Three business days of notice are needed for general medication refill requests.   Five business days of notice are needed for controlled substance refill requests.   If you need to change to a different pharmacy, please contact the new pharmacy directly. The new pharmacy will help you get your medications transferred.     Contact Us:  Please call [phone] during business hours (8-5:00 M-F).   If you have medication related questions after clinic hours, or on the weekend, please call [phone].     Financial Assistance [phone]   Medical Records [phone]       MENTAL HEALTH CRISIS RESOURCES:  For a emergency help, please call 911 or go to the nearest Emergency Department.     Emergency Walk-In Options:   EmPATH Unit @ [facility] ([place]): [phone] - Specialized mental health emergency area designed to be calming  [facility] ([place]): [phone]  [facility] ([place]): [phone]  [facility]): [phone]    [facility] Crisis Information:   [place]: [phone]  [place]: [phone]  [place] ([place]) - Adult: [phone]     Child: [phone]  [place] - Adult: [phone]     Child: [phone]  [place]:  [phone]  List of all [facility] resources:   https://[place].gov/dhs/people-we-serve/adults/health-care/mental-health/resources/crisis-contacts.jsp    National Crisis Information:   Crisis Text Line: Text  [place]  to 808258  Suicide & Crisis Lifeline: 988  National Suicide Prevention Lifeline: 3-372-349-TALK (1-145.656.8326)       For online chat options, visit https://suicidepreventionlifeline.org/chat/  Poison Control Center: 5-040-369-5273  Trans Lifeline: 1-266-236-9173 - Hotline for transgender people of all ages  The [employer]: 6-704-039-6863 - Hotline for LGBT youth     For Non-Emergency Support:   Fast Tracker: Mental Health & Substance Use Disorder Resources -   https://www.[employer].org/

## 2023-01-25 NOTE — NURSING NOTE
Patient denies any changes since echeck-in regarding medication and allergies and states all information entered during echeck-in remains accurate.  Martha Angeles VF

## 2023-02-21 ENCOUNTER — ALLIED HEALTH/NURSE VISIT (OUTPATIENT)
Dept: PSYCHIATRY | Facility: CLINIC | Age: 78
End: 2023-02-21
Payer: COMMERCIAL

## 2023-02-21 VITALS
WEIGHT: 193.2 LBS | SYSTOLIC BLOOD PRESSURE: 118 MMHG | DIASTOLIC BLOOD PRESSURE: 66 MMHG | HEART RATE: 91 BPM | BODY MASS INDEX: 26.95 KG/M2

## 2023-02-21 DIAGNOSIS — F10.21 ALCOHOL USE DISORDER, SEVERE, IN SUSTAINED REMISSION (H): Primary | ICD-10-CM

## 2023-02-21 PROCEDURE — 250N000011 HC RX IP 250 OP 636: Performed by: PSYCHIATRY & NEUROLOGY

## 2023-02-21 PROCEDURE — 96372 THER/PROPH/DIAG INJ SC/IM: CPT | Performed by: PSYCHIATRY & NEUROLOGY

## 2023-02-21 RX ADMIN — NALTREXONE 380 MG: KIT at 12:36

## 2023-02-21 ASSESSMENT — PAIN SCALES - GENERAL: PAINLEVEL: NO PAIN (0)

## 2023-02-21 NOTE — NURSING NOTE
Clinic Administered Medication Documentation      Injectable Medication Documentation    Patient was given Vivitrol 380 mg . Prior to medication administration, verified patients identity using patient s name and date of birth. Please see MAR and medication order for additional information. Patient instructed to stay in clinic after the injection but patient declined.      Was entire vial of medication used? Yes  Vial/Syringe: Single dose vial  Expiration Date:  2024   Was this medication supplied by the patient? No      Laurent Brookselenajose ,  1945  presented to the clinic today at the request of Dr. Garcia ,  ordering provider for long-acting injectable Vivitrol 380 mg .    OBSERVATIONS:  1.  Appearance: Well groomed and Dressed appropriately for weather. Small conversation about weather . He states he feel guilty for eating the whole pie he made.   2.  Mood: normal and pleasant  3.  Affect: full range  4.  Attitude: pleasant  5.  Cooperation: Participated voluntarily  6.  Safety denies any current safety concerns including suicidal ideation, self-harm, and homicidal ideation  7.  Side Effects: none  8.  Education: call the clinic with any concerns   9. Next appointment: 3- at 12 pm  10. Location given: [facility]     The service provided today was rendered under the supervising provider of the day, Dr. Hurd , who was available for consultation as needed.    Madonna Calle on 2023 at 12:47 PM

## 2023-03-08 NOTE — TELEPHONE ENCOUNTER
Refill Approved    Rx renewed per Medication Renewal Policy. Medication was last renewed on 12/9/20.    Nick Medina, [facility] Triage/Med Refill 6/10/2021     Requested Prescriptions   Pending Prescriptions Disp Refills     amLODIPine (NORVASC) 5 MG tablet [Pharmacy Med Name: AMLODIPINE BES 5MG TABS] 90 tablet 1     Sig: TAKE ONE TABLET BY MOUTH ONCE DAILY       Calcium-Channel Blockers Protocol Passed - 6/9/2021 12:53 PM        Passed - PCP or prescribing provider visit in past 12 months or next 3 months     Last office visit with prescriber/PCP: 2/22/2021 Amara Smith MD OR same dept: 2/22/2021 Amara Smith MD OR same specialty: 4/14/2021 Sahil Menendez MD  Last physical: 6/1/2021 Last MTM visit: Visit date not found   Next visit within 3 mo: Visit date not found  Next physical within 3 mo: Visit date not found  Prescriber OR PCP: Amara Smith MD  Last diagnosis associated with med order: 1. SOB (shortness of breath)  - amLODIPine (NORVASC) 5 MG tablet [Pharmacy Med Name: AMLODIPINE BES 5MG TABS]; TAKE ONE TABLET BY MOUTH ONCE DAILY  Dispense: 90 tablet; Refill: 1    2. Fatigue, unspecified type  - amLODIPine (NORVASC) 5 MG tablet [Pharmacy Med Name: AMLODIPINE BES 5MG TABS]; TAKE ONE TABLET BY MOUTH ONCE DAILY  Dispense: 90 tablet; Refill: 1    3. Pulmonary emphysema, unspecified emphysema type (H)  - amLODIPine (NORVASC) 5 MG tablet [Pharmacy Med Name: AMLODIPINE BES 5MG TABS]; TAKE ONE TABLET BY MOUTH ONCE DAILY  Dispense: 90 tablet; Refill: 1    4. Hypertension, essential  - amLODIPine (NORVASC) 5 MG tablet [Pharmacy Med Name: AMLODIPINE BES 5MG TABS]; TAKE ONE TABLET BY MOUTH ONCE DAILY  Dispense: 90 tablet; Refill: 1    If protocol passes may refill for 12 months if within 3 months of last provider visit (or a total of 15 months).             Passed - Blood pressure filed in past 12 months     BP Readings from Last 1 Encounters:   06/01/21 118/75          : Yes

## 2023-03-09 ENCOUNTER — OFFICE VISIT (OUTPATIENT)
Dept: PULMONOLOGY | Facility: CLINIC | Age: 78
End: 2023-03-09
Payer: COMMERCIAL

## 2023-03-09 VITALS
OXYGEN SATURATION: 95 % | SYSTOLIC BLOOD PRESSURE: 124 MMHG | WEIGHT: 195 LBS | BODY MASS INDEX: 27.2 KG/M2 | HEART RATE: 104 BPM | DIASTOLIC BLOOD PRESSURE: 62 MMHG

## 2023-03-09 DIAGNOSIS — F17.211 CIGARETTE NICOTINE DEPENDENCE IN REMISSION: ICD-10-CM

## 2023-03-09 DIAGNOSIS — Z87.891 PERSONAL HISTORY OF TOBACCO USE, PRESENTING HAZARDS TO HEALTH: Primary | ICD-10-CM

## 2023-03-09 PROCEDURE — 99213 OFFICE O/P EST LOW 20 MIN: CPT | Performed by: INTERNAL MEDICINE

## 2023-03-09 NOTE — PROGRESS NOTES
PULMONARY CLINIC FOLLOW UP NOTE    History:     HPI: Laurent Choi is a 76 y.o. male former smoker, with mild obstruction on PFTs is here for follow-up.  At baseline, patient is able to walk 2-3 miles.  He does typically walk in the summer.  He walks on the treadmill for approximately half an hour but he does not do it that often.  He usually does that in the winter.  He has PND.      Interval History: pt is for follow up of obstructive lung disease. He denies any hospitalizations, ED/Urgent care visits, or steroids/abx for respiratory related issues.  No cough. No nocturnal symptoms. At baseline, pt is able to walk about 2-3 blocks before having to rest.  He uses an albuterol inhaler on an as needed basis. No night sweats or weight loss.    PMHx/PSHx:  CAD s/p PCI back 2015  History of alcohol abuse  DJD  GERD  Hypertension  Macular degeneration     Social Hx:  Former smoker.  Used to smoke 1 pack per day for 30-40 years.  Quit approximately 10 years ago. Quit 2009.   He used to work as a . Now he does sedentary work at computer.     ROS: 10 point review of system done. Pertinent findings are noted in the HPI.    Exam/Data:     /62 (BP Location: Left arm, Patient Position: Chair, Cuff Size: Adult Regular)   Pulse 104   Wt 88.5 kg (195 lb)   SpO2 95%   BMI 27.20 kg/m        GEN: comfortable, NAD  HEENT: NCAT, EMOI, mmm  CVS: S1S2, RRR  Lung: good air entry bilaterally, no wheezing  Abd: round, obese, soft  Ext: no c/c/e  Vasc: intact radial pulses bilaterally  Neuro: nonfocal  Skin: no visible rash  Musculoskeletal: FROM all extremities  Psych: normal affect       Data:     Labs personally reviewed.      IMAGING: personally reviewed images. Formal radiology interpretation noted below.    PFT DATA 10/2019:  FEV1 75% predicted, FVC 84% predicted, ratio 67.  No reversibility.  % predicted, % predicted, DLCO 82% predicted.     CT chest done on 2/2019 at [facility]:  EXAM: CT  CHEST DIAG WO IV CONT LCS F/U  LOCATION:  [facility]  DATE/TIME: 2/14/2019 2:38 PM    INDICATION: Multiple bilateral pulmonary nodules. Personal history of tobacco use.  TECHNIQUE: Routine noncontrast CT chest. Dose reduction techniques were used.   COMPARISON: CT chest June 29, 2018.    FINDINGS:  LUNGS AND PLEURA: Mild to moderate centrilobular emphysema. Biapical scarring. Mild bronchiectasis at the right lung base.    3 mm nodule in the right lower lobe along the major fissure on series 3: image 90, unchanged. 5 mm x 2 mm nodule on series 3: image 107 in the right lower lobe adjacent to the fissure, unchanged. 2 mm nodule in the right lower lobe on series 3: image 105 which is new. Small calcified granuloma in the right middle lobe on image 117. Mild atelectasis.    On the left the previously visualized 2 mm nonsolid nodule seen in the left upper lobe on image 60 is no longer visualized. There are two 1.5 mm nodules in the left lower lobe on image 86, unchanged. 2 mm nodule in the left lower lobe on image 91 is unchanged. 2 mm nodule in the left lower lobe on image 97 is unchanged.    No pleural effusion.    MEDIASTINUM: No mediastinal or hilar adenopathy. There is coronary artery calcification. No pericardial effusion. Moderate to large hiatal hernia is increased in size from the prior exam.    CORONARY ARTERY CALCIFICATION: Severe    LIMITED UPPER ABDOMEN: The liver, spleen, pancreas, gallbladder and adrenal glands are unremarkable. Vascular calcification involving the aorta. Please note these organs are not imaged in their entirety.    MUSCULOSKELETAL: Osteopenia. Mild kyphosis. Compression fracture at T12, unchanged with mild kyphosis at this level.    CONCLUSION:  1. All of the nodules seen previously are unchanged. There is a single new nodule measuring 2 mm in the right lower lobe.      Ct Chest Without Contrast    Result Date: 2/24/2020  EXAM: CT CHEST WO CONTRAST LOCATION: [facility]  DATE/TIME: 2/24/2020 12:07 PM INDICATION: pulmonary nodules noted on CT earlier this year COMPARISON: CT of the chest 2/14/2019 TECHNIQUE: CT chest without IV contrast. Multiplanar reformats were obtained. Dose reduction techniques were used. CONTRAST: None. FINDINGS: LUNGS AND PLEURA: Triangular-shaped subpleural nodule in the anterior right lower lobe abutting the major fissure (series 4, image 114) has typical imaging features of an intrapulmonary lymph node, unchanged in size. 2 mm nodule in the subpleural left lower lobe (series 4, image 137) is unchanged. There is a punctate 2 mm calcified nodule in the inferior right middle lobe, definitively benign. Upper lung zone predominant emphysema. Mild scarring in the posterior basal right lower lobe. No new nodules or airspace opacities. No pleural space abnormality. MEDIASTINUM: Moderate hiatal hernia. No enlarged mediastinal or hilar lymph nodes. Cardiac chambers are normal in size. LAD stents. Normal caliber thoracic aorta. Mild, intermittent atheromatous calcification of the thoracic aorta. Conventional arch anatomy. UPPER ABDOMEN: No actionable findings in the imaged upper abdomen. MUSCULOSKELETAL: Chronic anterior wedging of T12 resulting in focal kyphosis at this level. Several old left posterolateral rib fracture deformities. No acute fracture or bone lesion. Chest wall soft tissues are symmetric.      1.  Small lung nodules are unchanged from 2/14/2019. If the patient is eligible and agreeable to screening, follow-up with cancer screening chest CT in 12 months is suggested. 2.   Moderate hiatal hernia.     CT chest on 2/2021:  IMPRESSION:   1.  Negative for lung cancer screening purposes.     2.  Stable tiny lower lung nodules can all be considered benign.     3.  Moderate emphysema.    Assessment/Plan:     Laurent Choi is a 78 y.o. male, former smoker, who is here for follow up of pulmonary nodules and obstructive lung disease. PFTs 2019 showed mild  obstruction.  He had a CT scan of the chest done on 2/2019 that showed multiple pulmonary nodules that were small.  These were apparently stable compared to CT scan of the chest that was done on 6/2018.  CT scan of the chest on 2/2021 unchanged.     Recommendations:  Yearly low dose CT chest, 2/2022 ordered.  Encouraged to stay active  Albuterol inhaler if needed    FOLLOW UP: 1 year    This Smartset fulfills all insurance requirements, including:   - Shared decision making visit  - Smoking cessation for current smokers.    Lung Cancer Screening pre-scan counseling Visit    The patient fits the risk profile of patients who benefit from this screening:  -The patient is >55 years old and <80 years old  -The patient has 60 pack year history (over 30)  -The patient has smoked within the past 15 years  -The patient has no medical comorbidity severe enough that it would cause mortality prior to mortality due to the lung cancer attempting to be detected.    Discussion with patient regarding the harms associated with LDCT screening include false-negative and false-positive results, incidental findings, overdiagnosis, and radiation exposure were reviewed at length.   The patient understands that pursuing this screening test may result in a biopsy that was not necessary. It may also produced added stress over a nodule that is likely not cancer.    Of 100 patients who get screening, 25 will have a positive scan. Of those 25, only 1 will have cancer.  Overdiagnosis is estimated at 10% of patients-- they would not have been detected in the patient's lifetime without screening. Less than 1% of patients likely had death related to radiation exposure increase.   Average low-dose CT associated with 0.61 to 1.5 mSv. Annual background radiation exposure in the [place] averages 2.4 mS; mammogram is 0.7mSv.    The benefits are reduction in risk of death from lung cancer. The number needed to treat is 320 (for every 320 patients  who undergo screening, 1 patient will have a benefit in mortality from early detection from the screening).    Undergoing this screening implies willingness to pursue further potentially invasive testing to discover potential cancer.    All questions were answered.    The patient was counseled regarding smoking cessation and its risk for lung cancer.      Rose Sepulveda MD  Pulmonary and Critical Care Medicine  Electronically Signed on 3/10/2021  Current Outpatient Medications   Medication Sig Dispense Refill     Acetaminophen 325 MG CAPS Take 325 mg by mouth every 8 hours as needed       amLODIPine (NORVASC) 5 MG tablet Take 1 tablet (5 mg) by mouth daily 90 tablet 3     ASPIRIN PO Take 81 mg by mouth daily       calcium-vitamin D 500-125 MG-UNIT TABS Take 1 tablet by mouth 2 times daily       clotrimazole (LOTRIMIN) 1 % external cream Apply topically 2 times daily Apply to rough patch of skin in front of right ear 2x daily for 10 days 24 g 0     DIPHENHYDRAMINE HCL PO Take 25 mg by mouth daily as needed       escitalopram (LEXAPRO) 20 MG tablet Take 1 tablet (20 mg) by mouth daily 90 tablet 3     gabapentin (NEURONTIN) 300 MG capsule Take 1 capsule by mouth at bedtime as needed. 90 capsule 3     ketoconazole (NIZORAL) 2 % external cream Apply topically 2 times daily 30 g 3     losartan (COZAAR) 50 MG tablet Take 1 tablet (50 mg) by mouth daily 90 tablet 1     melatonin 3 MG tablet Take 1-3 tabs before bed PRN 90 tablet 1     metFORMIN (GLUCOPHAGE XR) 500 MG 24 hr tablet Take 1 tablet (500 mg) by mouth daily (with breakfast) 90 tablet 2     naltrexone (VIVITROL) 380 MG SUSR Inject 380 mg into the muscle every 30 days 1 each 4     NITROGLYCERIN SL Take 0.4 mg by mouth every 5 minutes as needed       omeprazole (PRILOSEC) 20 MG DR capsule TAKE 1 CAPSULE BY MOUTH DAILY,  60 MINUTES BEFORE A MEAL. 90 capsule 3     rosuvastatin (CRESTOR) 40 MG tablet TAKE ONE TABLET BY MOUTH EVERY NIGHT AT BEDTIME 90 tablet 2      tamsulosin (FLOMAX) 0.4 MG capsule TAKE ONE CAPSULE BY MOUTH ONCE DAILY WITH BREAKFAST 90 capsule 2     omeprazole 20 MG tablet Take 1-2 daily 60 tablet 1        Allergies   Allergen Reactions     Aspirin Other (See Comments)     Avoids taking any aspirin besides his daily 81 mg aspirin regimen due to acid reflux/esophagus problem(s).     Nsaids (Non-Steroidal Anti-Inflammatory Drug) [Nsaids] Other (See Comments)     Avoids due to acid reflux/esophagus problem(s).     Plavix [Clopidogrel] Other (See Comments) and Muscle Pain (Myalgia)     Reaction(s): Bad bruising all over his body and leg cramps.     Statins-Hmg-Coa Reductase Inhibitors [Hmg-Coa-R Inhibitors] Muscle Pain (Myalgia)     Per the patient, he tolerates rosuvastatin.           Meds and Allergies: See EHR for the updated medication list and Allergies. These were reviewed.     Much or all of the text in this note was generated through the use of the Dragon Dictate voice-to-text software. Errors in spelling or words which seem out of context are unintentional. Sound alike errors, in particular, may have escaped editing.

## 2023-03-21 ENCOUNTER — TELEPHONE (OUTPATIENT)
Dept: PSYCHIATRY | Facility: CLINIC | Age: 78
End: 2023-03-21
Payer: COMMERCIAL

## 2023-03-21 ENCOUNTER — ALLIED HEALTH/NURSE VISIT (OUTPATIENT)
Dept: PSYCHIATRY | Facility: CLINIC | Age: 78
End: 2023-03-21
Payer: COMMERCIAL

## 2023-03-21 VITALS
DIASTOLIC BLOOD PRESSURE: 66 MMHG | BODY MASS INDEX: 27.06 KG/M2 | SYSTOLIC BLOOD PRESSURE: 100 MMHG | HEART RATE: 86 BPM | WEIGHT: 194 LBS

## 2023-03-21 DIAGNOSIS — F10.21 ALCOHOL USE DISORDER, SEVERE, IN SUSTAINED REMISSION (H): Primary | ICD-10-CM

## 2023-03-21 PROCEDURE — 96372 THER/PROPH/DIAG INJ SC/IM: CPT | Performed by: PSYCHIATRY & NEUROLOGY

## 2023-03-21 PROCEDURE — 250N000011 HC RX IP 250 OP 636: Performed by: PSYCHIATRY & NEUROLOGY

## 2023-03-21 RX ADMIN — NALTREXONE 380 MG: KIT at 13:30

## 2023-03-21 ASSESSMENT — PAIN SCALES - GENERAL: PAINLEVEL: NO PAIN (0)

## 2023-03-21 NOTE — NURSING NOTE
Clinic Administered Medication Documentation      Injectable Medication Documentation    Patient was given vivitrol 380 mg . Prior to medication administration, verified patients identity using patient s name and date of birth. Please see MAR and medication order for additional information. Patient instructed to remain in clinic for 15 minutes.      Was entire vial of medication used? Yes  Vial/Syringe: Single dose vial  Expiration Date:  10-  Was this medication supplied by the patient? No      Laurent Choi ,  1945  presented to the clinic today at the request of Dr. Bee ,  ordering provider for long-acting injectable vivitrol 380 mg .    OBSERVATIONS:  1.  Appearance: Casually dressed and Dressed appropriately for weather. Patient states he have another appointment tomorrow at 12 pm with [facility] to his chest X-ray , so he decided to come in for his BARRETO. States he is sober and have been good. Not taking any pain medications. Still feeling tired . Patient talks about his apartment and thinking about moving to [place], it's cheaper to live there. Good eye contact.   2.  Mood: pleasant  3.  Affect: full range  4.  Attitude: pleasant and cooperative  5.  Cooperation: Participated voluntarily  6.  Safety denies any current safety concerns including suicidal ideation, self-harm, and homicidal ideation  7.  Side Effects: none  8.  Education: Call the clinic with any concerns   9. Next appointment: 2023 at 12 pm , 3- Video visit with Dr. Garcia   [place]. [place] [facility] [place]     The service provided today was rendered under the supervising provider of the day, Dr. Hurd , who was available for consultation as needed.

## 2023-03-21 NOTE — TELEPHONE ENCOUNTER
Called patient reminding BARRETO appointment on 3-, but patient thought his appointment was today at 12 pm. Writer ok patient to come in today for his BARRETO.. Confirmed appointment with patient.     Madonna Calle on 3/21/2023 at 10:40 AM

## 2023-03-23 ENCOUNTER — HOSPITAL ENCOUNTER (OUTPATIENT)
Dept: CT IMAGING | Facility: HOSPITAL | Age: 78
Discharge: HOME | End: 2023-03-23
Attending: INTERNAL MEDICINE | Admitting: INTERNAL MEDICINE
Payer: COMMERCIAL

## 2023-03-23 DIAGNOSIS — F17.211 CIGARETTE NICOTINE DEPENDENCE IN REMISSION: ICD-10-CM

## 2023-03-23 PROCEDURE — 71271 CT THORAX LUNG CANCER SCR C-: CPT

## 2023-03-28 ENCOUNTER — TELEPHONE (OUTPATIENT)
Dept: CARDIOLOGY | Facility: CLINIC | Age: 78
End: 2023-03-28

## 2023-03-28 NOTE — TELEPHONE ENCOUNTER
[facility] Call Center    Phone Message    May a detailed message be left on voicemail: yes     Reason for Call: Other: Patient called to schedule an appointment for Coronary stent patent. This is diagnosis is not in our protocol. Please review and call patient back to further coordinate.     Action Taken: Message routed to:  Other: Cardiology    Travel Screening: Not Applicable     Thank you!  [facility]

## 2023-03-29 ENCOUNTER — VIRTUAL VISIT (OUTPATIENT)
Dept: PSYCHIATRY | Facility: CLINIC | Age: 78
End: 2023-03-29
Attending: PSYCHIATRY & NEUROLOGY
Payer: COMMERCIAL

## 2023-03-29 DIAGNOSIS — F10.21 ALCOHOL USE DISORDER, SEVERE, IN EARLY REMISSION (H): ICD-10-CM

## 2023-03-29 DIAGNOSIS — G47.00 INSOMNIA, UNSPECIFIED TYPE: ICD-10-CM

## 2023-03-29 DIAGNOSIS — F41.1 GAD (GENERALIZED ANXIETY DISORDER): ICD-10-CM

## 2023-03-29 DIAGNOSIS — F33.1 MODERATE EPISODE OF RECURRENT MAJOR DEPRESSIVE DISORDER (H): ICD-10-CM

## 2023-03-29 PROCEDURE — G0463 HOSPITAL OUTPT CLINIC VISIT: HCPCS | Mod: PN,GT | Performed by: HOSPITALIST

## 2023-03-29 PROCEDURE — 99214 OFFICE O/P EST MOD 30 MIN: CPT | Mod: VID | Performed by: HOSPITALIST

## 2023-03-29 RX ORDER — ESCITALOPRAM OXALATE 20 MG/1
20 TABLET ORAL DAILY
Qty: 90 TABLET | Refills: 0 | Status: SHIPPED | OUTPATIENT
Start: 2023-03-29 | End: 2023-09-20

## 2023-03-29 RX ORDER — GABAPENTIN 300 MG/1
300 CAPSULE ORAL AT BEDTIME
Qty: 90 CAPSULE | Refills: 0 | Status: SHIPPED | OUTPATIENT
Start: 2023-03-29 | End: 2023-07-09

## 2023-03-29 ASSESSMENT — PATIENT HEALTH QUESTIONNAIRE - PHQ9
10. IF YOU CHECKED OFF ANY PROBLEMS, HOW DIFFICULT HAVE THESE PROBLEMS MADE IT FOR YOU TO DO YOUR WORK, TAKE CARE OF THINGS AT HOME, OR GET ALONG WITH OTHER PEOPLE: SOMEWHAT DIFFICULT
SUM OF ALL RESPONSES TO PHQ QUESTIONS 1-9: 11
SUM OF ALL RESPONSES TO PHQ QUESTIONS 1-9: 11

## 2023-03-29 NOTE — PATIENT INSTRUCTIONS
**For crisis resources, please see the information at the end of this document**   Patient Education    Thank you for coming to the [facility] [place] [facility].     Lab Testing:  If you had lab testing today and your results are reassuring or normal they will be mailed to you or sent through [employer] within 7 days. If the lab tests need quick action we will call you with the results. The phone number we will call with results is # [phone]. If this is not the best number please call our clinic and change the number.     Medication Refills:  If you need any refills please call your pharmacy and they will contact us. Our fax number for refills is [phone].   Three business days of notice are needed for general medication refill requests.   Five business days of notice are needed for controlled substance refill requests.   If you need to change to a different pharmacy, please contact the new pharmacy directly. The new pharmacy will help you get your medications transferred.     Contact Us:  Please call [phone] during business hours (8-5:00 M-F).   If you have medication related questions after clinic hours, or on the weekend, please call [phone].     Financial Assistance [phone]   Medical Records [phone]       MENTAL HEALTH CRISIS RESOURCES:  For a emergency help, please call 911 or go to the nearest Emergency Department.     Emergency Walk-In Options:   EmPATH Unit @ [facility] ([place]): [phone] - Specialized mental health emergency area designed to be calming  [facility] ([place]): [phone]  [facility] ([place]): [phone]  [facility]): [phone]    [facility] Crisis Information:   [place]: [phone]  [place]: [phone]  [place] ([place]) - Adult: [phone]     Child: [phone]  [place] - Adult: [phone]     Child: [phone]  [place]:  [phone]  List of all [facility] resources:   https://[place].gov/dhs/people-we-serve/adults/health-care/mental-health/resources/crisis-contacts.jsp    National Crisis Information:   Crisis Text Line: Text  [place]  to 319580  Suicide & Crisis Lifeline: 988  National Suicide Prevention Lifeline: 0-627-118-TALK (1-114.969.9841)       For online chat options, visit https://suicidepreventionlifeline.org/chat/  Poison Control Center: 2-314-357-6833  Trans Lifeline: 9-507-552-0172 - Hotline for transgender people of all ages  [employer]: 8-975-901-8270 - Hotline for LGBT youth     For Non-Emergency Support:   Fast Tracker: Mental Health & Substance Use Disorder Resources -   https://www.[employer].org/

## 2023-03-29 NOTE — NURSING NOTE
Is the patient currently in the state of [place]? YES    Visit mode:VIDEO    If the visit is dropped, the patient can be reconnected by: VIDEO VISIT: Text to cell phone: [phone]    Will anyone else be joining the visit? NO      How would you like to obtain your AVS? MyChart    Are changes needed to the allergy or medication list? NO    Reason for visit: Follow up

## 2023-03-29 NOTE — PROGRESS NOTES
"    ----------------------------------------------------------------------------------------------------------  [facility], [place]   Psychiatry Clinic Progress Note  Addiction Medicine                        Virtual Visit Details    Type of service:  Video Visit   Video Start Time: 10:50 AM  Video End Time:11:17 AM    Originating Location (pt. Location): Home    Distant Location (provider location):  On-site  Platform used for Video Visit: [employer]                  Background     Laurent \"Gio\" Blaze Choi is a 77 year old, , male with a history of alcohol use disorder on Vivitrol since September, 2018. His initial visit was on 10/17/18.         Subjective/INTERIM HISTORY                                                 Since last visit:    Patient is doing well. His mood is stable and improving with the warmer weather. He has been taking daily walks. No return to alcohol use . He is happy about this. He is working on affirmations \"I am happy to be sober\". Still has some cravings but nothing that he would act on. He does not go to [employer] for groceries when he is feeling vulnerable. This is because he does not want to get tempted to  alcohol.    He has not been to AA meetings recently because several issues have come up on mondays. He is able to drive now because it is not so dark at night but missed the last 2 mondays.     Mood is stable. Still ongoing fatigue, about the same. \"I'm getting old\". He is able to get everything done that he needs to get done but not as efficient. Some poor concentration. He sleeps a lot but does not feel rested. \"It is a struggle to get out of bed\" in the mornings.     He has not heard back from the relapse prevention group.       Treatment changes and response: None      RECENT SYMPTOMS   [PSYCH ROS]   CRAVINGS/URGES: Mild   SLEEP: As above   SIDE EFFECTS: Ongoing fatigue for several years   ANXIETY:  excessive worry  PANIC ATTACK:  None " "  DEPRESSION:  depressed mood, low energy, chronic;  DENIES-suicidal ideation   PSYCHOSIS:  none;  DENIES- delusions, hallucinations   MARILEE/HYPOMANIA:  none;  DENIES- increased energy  OTHER:  None         SUBSTANCE USE DETAILED HISTORY                                                                 Gio has been in detox over 40 times and treatment about 5 times.  His first treatment was in 1980. He gave up daily drinking in late 1990's, then began to binge drink.  In recent years, his pattern has been drinking 1-2 weeks, not eating most of that time, and getting very sick. He then will stop drinking, will be sober for 1-2 weeks and the cycle is continued. Alcohol has caused withdrawal and tolerance, drinking 6-10 beers and 1 pint a day when on a binge. Alcohol has affected relationships, has affected health with getting sick, not eating and getting depressed.       Treatment History:    First treatment in 1980.  Last treatment at [facility] finished  November 2018.     Substance Use Pharmacotherapies:   Tried antabuse in late 1990's and he would stop taking and \"drink around it\".       PAST PSYCH MED TRIALS       He was on prozac starting in the 1980s. As high as 80 mg. It used to stopped him from obsessing and helped him concentrate. Then it stopped working.     He tried several other SSRIs over the years including zoloft, paxil, cymbalta, effexor.    Doesn't remember much about effexor with regards to how it made him feel or side effects     He took tricyclic antidepressants in the 70s but had side effects. In the 90s, he took Wellbutrin. This made him anxious.      He was also on remeron for sometime. He notes that this was increased when he was in retirement and it made his anxiety worse     In January 2019, he was switched to lexapro 20mg, doing well.     Had a trial of aripiprazole in Spring/Summer 2019, but stopped because it was making him drool.    Gabapentin was started in Spring 2019 during a " period of abstinence for anxiety.         ALLERGY                                Aspirin, Nsaids (non-steroidal anti-inflammatory drug) [nsaids], Plavix [clopidogrel], and Statins-hmg-coa reductase inhibitors [hmg-coa-r inhibitors]  MEDICATIONS                               Current Outpatient Medications   Medication Sig Dispense Refill     Acetaminophen 325 MG CAPS Take 325 mg by mouth every 8 hours as needed       amLODIPine (NORVASC) 5 MG tablet Take 1 tablet (5 mg) by mouth daily 90 tablet 3     ASPIRIN PO Take 81 mg by mouth daily       calcium-vitamin D 500-125 MG-UNIT TABS Take 1 tablet by mouth 2 times daily       clotrimazole (LOTRIMIN) 1 % external cream Apply topically 2 times daily Apply to rough patch of skin in front of right ear 2x daily for 10 days 24 g 0     DIPHENHYDRAMINE HCL PO Take 25 mg by mouth daily as needed       escitalopram (LEXAPRO) 20 MG tablet Take 1 tablet (20 mg) by mouth daily 90 tablet 3     gabapentin (NEURONTIN) 300 MG capsule Take 1 capsule by mouth at bedtime as needed. 90 capsule 3     ketoconazole (NIZORAL) 2 % external cream Apply topically 2 times daily 30 g 3     losartan (COZAAR) 50 MG tablet Take 1 tablet (50 mg) by mouth daily 90 tablet 1     melatonin 3 MG tablet Take 1-3 tabs before bed PRN 90 tablet 1     metFORMIN (GLUCOPHAGE XR) 500 MG 24 hr tablet Take 1 tablet (500 mg) by mouth daily (with breakfast) 90 tablet 2     naltrexone (VIVITROL) 380 MG SUSR Inject 380 mg into the muscle every 30 days 1 each 4     NITROGLYCERIN SL Take 0.4 mg by mouth every 5 minutes as needed       omeprazole (PRILOSEC) 20 MG DR capsule TAKE 1 CAPSULE BY MOUTH DAILY,  60 MINUTES BEFORE A MEAL. 90 capsule 3     omeprazole 20 MG tablet Take 1-2 daily 60 tablet 1     rosuvastatin (CRESTOR) 40 MG tablet TAKE ONE TABLET BY MOUTH EVERY NIGHT AT BEDTIME 90 tablet 2     tamsulosin (FLOMAX) 0.4 MG capsule TAKE ONE CAPSULE BY MOUTH ONCE DAILY WITH BREAKFAST 90 capsule 2       VITALS   There were  "no vitals taken for this visit.      MENTAL STATUS EXAM                                                           Orientation: Oriented x 3  Alertness: alert   Appearance: well-maintaned  Behavior/Demeanor: cooperative, pleasant and calm,   Speech: normal  Language: intact  Psychomotor:ok  Mood: \"good\"  Affect: full range and appropriate; was congruent to mood; was congruent to content  Thought Process/Associations: unremarkable  Thought Content:  Denies suicidal and violent ideation, delusions and preoccupations  Perception: none  Denies auditory hallucinations and visual hallucinations  Insight: good  Judgment: good  Cognition: does  appear grossly intact; formal cognitive testing was not done  Gait: asymmetric  MSK: no gross abnormalities    SUBSTANCE USE/PSYCHIATRIC DIAGNOSES                                                                                                    Alcohol Use Disorder, severe, in sustained remission.   Major Depressive Disorder without suicidal ideation   Generalized Anxiety Disorder  R/o PTSD  (partner suicide attempts, his incarcerations) seems not to be an issue currently         ASSESSMENT/Diagnosis/Plan                                                  Laurent Choi is a 77 year old male with a history of alcohol use disorder, MDD, RODRIGO who is here for follow-up. Patient has been stable on Vivitrol.  He reports episodic fatigue and intermittently inquires about whether he can stop taking Vivitrol.      #AUD: Severe.  Return to use just before christmas, 2022. Occasional cravings. He wants to continue taking Vivitrol which he notes is helping.   - Continue vivitrol injection monthly.   -Encouraged continued attendance at AA meetings     #MDD  #RODRIGO  -He reports his mood is stable   - Continue Lexapro 20 mg daily  - Continue gabapentin to 300 mg,QHS. Taking it nightly, thinks it helps with sleep.   - He stopped melatonin, dose was 10 mg. He noticed that he would wake up at " night with his mouth dry. He has not noticed worsening sleep.     RTC: 10 weeks       [place] PRESCRIPTION MONITORING PROGRAM [] was checked today:  not using controlled substances besides prescribed below.      Patient seen by and discussed with staff psychiatrist, Dr. Bee     I saw the patient with the fellow, and participated in key portions of the service, including the mental status examination and developing the plan of care. I reviewed key portions of the history with the fellow. I agree with the findings and plan as documented in this note.    Selam Bee MD        Answers for HPI/ROS submitted by the patient on 3/29/2023  If you checked off any problems, how difficult have these problems made it for you to do your work, take care of things at home, or get along with other people?: Somewhat difficult  PHQ9 TOTAL SCORE: 11

## 2023-04-07 DIAGNOSIS — K21.9 GASTROESOPHAGEAL REFLUX DISEASE WITHOUT ESOPHAGITIS: ICD-10-CM

## 2023-04-08 RX ORDER — NICOTINE POLACRILEX 4 MG/1
GUM, CHEWING ORAL
Qty: 60 TABLET | Refills: 1 | Status: SHIPPED | OUTPATIENT
Start: 2023-04-08 | End: 2023-06-28

## 2023-04-08 NOTE — TELEPHONE ENCOUNTER
"Last Written Prescription Date:  6/19/19  Last Fill Quantity: 60,  # refills: 1   Last office visit provider:  10/10/22     Requested Prescriptions   Pending Prescriptions Disp Refills     omeprazole 20 MG tablet 60 tablet 1     Sig: Take 1-2 daily       PPI Protocol Passed - 4/7/2023 10:08 AM        Passed - Not on Clopidogrel (unless Pantoprazole ordered)        Passed - No diagnosis of osteoporosis on record        Passed - Recent (12 mo) or future (30 days) visit within the authorizing provider's specialty     Patient has had an office visit with the authorizing provider or a provider within the authorizing providers department within the previous 12 mos or has a future within next 30 days. See \"Patient Info\" tab in inbasket, or \"Choose Columns\" in Meds & Orders section of the refill encounter.              Passed - Medication is active on med list        Passed - Patient is age 18 or older             Rose Griffin 04/08/23 7:58 AM  "

## 2023-04-10 ENCOUNTER — TELEPHONE (OUTPATIENT)
Dept: PSYCHIATRY | Facility: CLINIC | Age: 78
End: 2023-04-10
Payer: COMMERCIAL

## 2023-04-10 DIAGNOSIS — F10.21 ALCOHOL USE DISORDER, SEVERE, IN EARLY REMISSION (H): Primary | ICD-10-CM

## 2023-04-10 DIAGNOSIS — I25.10 CORONARY ATHEROSCLEROSIS OF NATIVE CORONARY ARTERY: ICD-10-CM

## 2023-04-10 NOTE — TELEPHONE ENCOUNTER
Updating  CAM order for BARRETO : Vivitrol  per work flow updated on 3-    Madonna Calle on 4/10/2023 at 1:52 PM

## 2023-04-11 RX ORDER — ROSUVASTATIN CALCIUM 40 MG/1
40 TABLET, COATED ORAL AT BEDTIME
Qty: 90 TABLET | Refills: 0 | Status: SHIPPED | OUTPATIENT
Start: 2023-04-11 | End: 2023-07-24

## 2023-04-11 NOTE — TELEPHONE ENCOUNTER
"  Last Written Prescription Date:  07/19/2022  Last Fill Quantity: 90,  # refills: 2   Last office visit provider:  04/19/2022     Requested Prescriptions   Pending Prescriptions Disp Refills     rosuvastatin (CRESTOR) 40 MG tablet 90 tablet 2     Sig: Take 1 tablet (40 mg) by mouth At Bedtime       Statins Protocol Passed - 4/11/2023  1:32 PM        Passed - LDL on file in past 12 months     Recent Labs   Lab Test 04/19/22  1402   LDL 68             Passed - No abnormal creatine kinase in past 12 months     No lab results found.             Passed - Recent (12 mo) or future (30 days) visit within the authorizing provider's specialty     Patient has had an office visit with the authorizing provider or a provider within the authorizing providers department within the previous 12 mos or has a future within next 30 days. See \"Patient Info\" tab in inbasket, or \"Choose Columns\" in Meds & Orders section of the refill encounter.              Passed - Medication is active on med list        Passed - Patient is age 18 or older             Emmanuelle Kirk RN 04/11/23 1:33 PM  "

## 2023-04-13 ENCOUNTER — TELEPHONE (OUTPATIENT)
Dept: PSYCHIATRY | Facility: CLINIC | Age: 78
End: 2023-04-13
Payer: COMMERCIAL

## 2023-04-13 NOTE — TELEPHONE ENCOUNTER
----- Message from Selam Bee MD sent at 4/12/2023  4:37 PM CDT -----  Yes, please.   SS  ----- Message -----  From: Madonna Calle  Sent: 4/12/2023   8:02 AM CDT  To: Selam Bee MD; MD Dr. Rohit Phan ,    Patient is coming in next week to get his BARRETO -Vivitrol 380 mg q28d . Co-sign needed. We are to continue Vivitrol ?     Thanks,  Madonna

## 2023-04-13 NOTE — TELEPHONE ENCOUNTER
NASHB per Dr. Bee to continue BARRETO -Vivitrol 380 mg q28d . See messages below.    Madonna Calle on 4/13/2023 at 6:05 AM

## 2023-04-18 ENCOUNTER — TELEPHONE (OUTPATIENT)
Dept: PSYCHIATRY | Facility: CLINIC | Age: 78
End: 2023-04-18
Payer: COMMERCIAL

## 2023-04-18 NOTE — TELEPHONE ENCOUNTER
Called patient reminding BARRETO appointment on 4- at 12 pm. Confirmed appointment with patient.     Madonna Calle on 4/18/2023 at 12:01 PM

## 2023-04-19 ENCOUNTER — ALLIED HEALTH/NURSE VISIT (OUTPATIENT)
Dept: PSYCHIATRY | Facility: CLINIC | Age: 78
End: 2023-04-19
Payer: COMMERCIAL

## 2023-04-19 VITALS
SYSTOLIC BLOOD PRESSURE: 106 MMHG | WEIGHT: 191.2 LBS | DIASTOLIC BLOOD PRESSURE: 74 MMHG | BODY MASS INDEX: 26.67 KG/M2 | HEART RATE: 99 BPM

## 2023-04-19 DIAGNOSIS — F10.21 ALCOHOL USE DISORDER, SEVERE, IN SUSTAINED REMISSION (H): Primary | ICD-10-CM

## 2023-04-19 PROCEDURE — 250N000011 HC RX IP 250 OP 636: Performed by: PSYCHIATRY & NEUROLOGY

## 2023-04-19 PROCEDURE — 96372 THER/PROPH/DIAG INJ SC/IM: CPT | Performed by: PSYCHIATRY & NEUROLOGY

## 2023-04-19 RX ADMIN — NALTREXONE 380 MG: KIT at 13:00

## 2023-04-19 ASSESSMENT — PAIN SCALES - GENERAL: PAINLEVEL: NO PAIN (0)

## 2023-04-19 NOTE — NURSING NOTE
Clinic Administered Medication Documentation      Injectable Medication Documentation    Is there an active order (written within the past 365 days, with administrations remaining, not ) in the chart? Yes.     Patient was given Vivitrol 380 mg. Prior to medication administration, verified patient's identity using patient s name and date of birth. Please see MAR and medication order for additional information. Patient instructed to remain in clinic for 15 minutes, report any adverse reaction to staff immediately and remain in clinic for 15 minutes and report any adverse reaction to staff immediately but patient declined.    Vial/Syringe: Single dose vial. Was entire vial of medication used? Yes  Was this medication supplied by the patient? No  Is this a medication the patient will need to receive again? Yes. Verified that the patient has refills remaining in their prescription.      Gio Jimbo,  1945, presented to the clinic today at the request of Dr Garcia,  ordering provider for long-acting injectable Vivitrol 380 mg.    OBSERVATIONS:  1.  Appearance: Casually dressed in clean, weather appropriate clothing. Patient shared due to the rising cost he will not be putting in a garden at his apt building this year, which he had enjoyed in years past. 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 He is looking forward to the warmer weather so he can get outside more. Patient denies any safety concerns including SI, HI, AH, VH, and SIB  2.  Mood: Good, talkative, friendly  3.  Affect: Congruent  4.  Attitude: Good, engaged, good eye contact  5.  Cooperation: Full  6.  Side Effects: Denies  7.  Education: Call the clinic/MyChart with any questions or concerns. Patient agreed  8. Next appointment: 2023 noon  9. Location: [facility]    The service provided today was rendered under the supervising  provider of the day, Dr Bee, who was available for consultation as needed.

## 2023-04-20 ENCOUNTER — PATIENT OUTREACH (OUTPATIENT)
Dept: CARE COORDINATION | Facility: CLINIC | Age: 78
End: 2023-04-20
Payer: COMMERCIAL

## 2023-05-02 ENCOUNTER — VIRTUAL VISIT (OUTPATIENT)
Dept: PSYCHIATRY | Facility: CLINIC | Age: 78
End: 2023-05-02
Attending: PSYCHOLOGIST
Payer: COMMERCIAL

## 2023-05-02 DIAGNOSIS — F10.21 ALCOHOL USE DISORDER, SEVERE, IN SUSTAINED REMISSION (H): Primary | ICD-10-CM

## 2023-05-02 PROCEDURE — 90853 GROUP PSYCHOTHERAPY: CPT | Mod: VID | Performed by: PSYCHOLOGIST

## 2023-05-11 NOTE — PROGRESS NOTES
Telehealth Details  Type of service:  video visit for group therapy  Originating Site (patient location):  [facility] Location- Remote location  Distant Site (provider location):  Remote location  Mode of Communication:  Video conference via Zoom    Relapse Prevention Group for Substance Use Disorders                                Time of Service: 3:50 - 4:30   Length of Appointment: 40 mins  Care Providers: Susi Davis, PhD, LP  Total number of patients present:  2 (5 patients were scheduled but only 2 attended)    DSM-5 Diagnosis:   Alcohol Use Disorder   Major Depressive Disorder, recurrent (per chart review)  Generalized Anxiety Disorder (per chart review)     GROUP CONTENT: Introduction to group rules and structure. Group members discussed goals, challenges in maintaining sobriety, and set goals for next group. Importance of social support in maintaining sobriety and improving mood was discussed.     PATIENT ENGAGEMENT: Active and engaged.     Patient did not report any changes to medications.    MENTAL STATUS EXAM:   Appearance: Casually dressed and appropriately groomed   Motor activity: Within normal range  Speech rate: Within normal range  Speech volume: Within normal range  Speech articulation:  Within normal range  Speech coherence:  Within normal range  Speech spontaneity:  Within normal range  Affect (objective appearance):  Euthymic  Thought process: Linear, logical, goal-oriented  Suicide/ violence risk: Not individually assessed given group context, but no signs of risk were observed    PLAN:   - Attend Relapse Prevention Group

## 2023-05-16 ENCOUNTER — TELEPHONE (OUTPATIENT)
Dept: PSYCHIATRY | Facility: CLINIC | Age: 78
End: 2023-05-16
Payer: COMMERCIAL

## 2023-05-16 NOTE — TELEPHONE ENCOUNTER
Called patient reminding BARRETO appointment .  Not able to get hold of patient. Left messages on voicemail reminding BARRETO appointment  on 5- at 12 pm    Madonna Alva on 5/16/2023 at 9:54 AM

## 2023-05-17 ENCOUNTER — ALLIED HEALTH/NURSE VISIT (OUTPATIENT)
Dept: PSYCHIATRY | Facility: CLINIC | Age: 78
End: 2023-05-17
Payer: COMMERCIAL

## 2023-05-17 VITALS
DIASTOLIC BLOOD PRESSURE: 84 MMHG | SYSTOLIC BLOOD PRESSURE: 162 MMHG | HEART RATE: 80 BPM | BODY MASS INDEX: 26.64 KG/M2 | WEIGHT: 191 LBS

## 2023-05-17 DIAGNOSIS — F10.21 ALCOHOL USE DISORDER, SEVERE, IN SUSTAINED REMISSION (H): Primary | ICD-10-CM

## 2023-05-17 PROCEDURE — 250N000011 HC RX IP 250 OP 636: Performed by: PSYCHIATRY & NEUROLOGY

## 2023-05-17 PROCEDURE — 96372 THER/PROPH/DIAG INJ SC/IM: CPT | Performed by: PSYCHIATRY & NEUROLOGY

## 2023-05-17 RX ADMIN — NALTREXONE 380 MG: KIT at 12:34

## 2023-05-17 ASSESSMENT — PAIN SCALES - GENERAL: PAINLEVEL: NO PAIN (0)

## 2023-05-17 NOTE — NURSING NOTE
Clinic Administered Medication Documentation      Vivitrol Documentation     Are you actively using opioids (within the past 10 days)?  No  Is this the initial dose? No  When was the last injection?  2023  Has 28 days passed since last injection?  Yes  Did the patient check with their insurance to verify coverage for this injection?  Yes  Is there an active order (written within the past 365 days, with administrations remaining, not ) in the chart?  Yes    Prior to injection, verified patient identity using patient's name and date of birth. Medication was administered. Please see MAR and medication order for additional information. Patient instructed to remain in clinic for 15 minutes and report any adverse reaction to staff immediately.  Vial/Syringe: Single dose vial. Was entire vial of medication used? Yes  Was this medication supplied by the patient?  No  Verified that the patient has refills remaining in their prescription.                                        Gio Choi,  1945, presented to the clinic today at the request of Dr Garcia,  ordering provider for long-acting injectable Vivitrol 380 mg.    OBSERVATIONS:  1.  Appearance: Casually dressed in clean, weather appropriate clothing. 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 He shared he will not be gardening this year. He is happy the weather is getting warmer out so he can get outside more. Patient denies any safety concerns including SI, HI, AH, VH, and SIB  2.  Mood: Good, talkative, friendly  3.  Affect: Congruent  4.  Attitude: Good, engaged, good eye contact  5.  Cooperation: Full  6.  Side Effects: Denies  7.  Education: Call the clinic with any questions or concerns.  8. Next appointment: 2023 noon Q28D  9. Location: [facility] [place]    The service provided today was rendered under the supervising provider of the day, Dr Allen, who was available for consultation as needed.

## 2023-05-18 ENCOUNTER — OFFICE VISIT (OUTPATIENT)
Dept: FAMILY MEDICINE | Facility: CLINIC | Age: 78
End: 2023-05-18
Payer: COMMERCIAL

## 2023-05-18 VITALS
BODY MASS INDEX: 27.2 KG/M2 | HEIGHT: 70 IN | DIASTOLIC BLOOD PRESSURE: 71 MMHG | OXYGEN SATURATION: 95 % | HEART RATE: 66 BPM | RESPIRATION RATE: 20 BRPM | SYSTOLIC BLOOD PRESSURE: 111 MMHG | WEIGHT: 190 LBS | TEMPERATURE: 97.7 F

## 2023-05-18 DIAGNOSIS — E11.9 TYPE 2 DIABETES MELLITUS WITHOUT COMPLICATION, WITHOUT LONG-TERM CURRENT USE OF INSULIN (H): ICD-10-CM

## 2023-05-18 DIAGNOSIS — E83.50 UNSPECIFIED DISORDER OF CALCIUM METABOLISM: ICD-10-CM

## 2023-05-18 DIAGNOSIS — R25.2 LEG CRAMPING: ICD-10-CM

## 2023-05-18 DIAGNOSIS — J43.9 PULMONARY EMPHYSEMA, UNSPECIFIED EMPHYSEMA TYPE (H): ICD-10-CM

## 2023-05-18 DIAGNOSIS — E61.1 IRON DEFICIENCY: ICD-10-CM

## 2023-05-18 DIAGNOSIS — I25.10 ATHEROSCLEROSIS OF NATIVE CORONARY ARTERY OF NATIVE HEART WITHOUT ANGINA PECTORIS: ICD-10-CM

## 2023-05-18 DIAGNOSIS — S22.080D COMPRESSION FRACTURE OF T12 VERTEBRA WITH ROUTINE HEALING: ICD-10-CM

## 2023-05-18 DIAGNOSIS — F10.21 ALCOHOL USE DISORDER, SEVERE, IN EARLY REMISSION (H): ICD-10-CM

## 2023-05-18 DIAGNOSIS — F33.40 RECURRENT MAJOR DEPRESSIVE DISORDER, IN REMISSION (H): ICD-10-CM

## 2023-05-18 DIAGNOSIS — Z00.00 ENCOUNTER FOR MEDICARE ANNUAL WELLNESS EXAM: Primary | ICD-10-CM

## 2023-05-18 DIAGNOSIS — R73.03 PREDIABETES: ICD-10-CM

## 2023-05-18 DIAGNOSIS — M80.88XA OTHER OSTEOPOROSIS WITH CURRENT PATHOLOGICAL FRACTURE, VERTEBRA(E), INITIAL ENCOUNTER FOR FRACTURE (H): ICD-10-CM

## 2023-05-18 DIAGNOSIS — R97.20 ELEVATED PROSTATE SPECIFIC ANTIGEN LESS THAN 10 NG/ML: ICD-10-CM

## 2023-05-18 LAB
ALBUMIN SERPL BCG-MCNC: 4.6 G/DL (ref 3.5–5.2)
ALP SERPL-CCNC: 43 U/L (ref 40–129)
ALT SERPL W P-5'-P-CCNC: 16 U/L (ref 10–50)
ANION GAP SERPL CALCULATED.3IONS-SCNC: 11 MMOL/L (ref 7–15)
AST SERPL W P-5'-P-CCNC: 21 U/L (ref 10–50)
BASOPHILS # BLD AUTO: 0 10E3/UL (ref 0–0.2)
BASOPHILS NFR BLD AUTO: 1 %
BILIRUB SERPL-MCNC: 0.5 MG/DL
BUN SERPL-MCNC: 12.9 MG/DL (ref 8–23)
CALCIUM SERPL-MCNC: 9.5 MG/DL (ref 8.8–10.2)
CHLORIDE SERPL-SCNC: 103 MMOL/L (ref 98–107)
CHOLEST SERPL-MCNC: 149 MG/DL
CREAT SERPL-MCNC: 0.87 MG/DL (ref 0.67–1.17)
DEPRECATED HCO3 PLAS-SCNC: 26 MMOL/L (ref 22–29)
EOSINOPHIL # BLD AUTO: 0.2 10E3/UL (ref 0–0.7)
EOSINOPHIL NFR BLD AUTO: 3 %
ERYTHROCYTE [DISTWIDTH] IN BLOOD BY AUTOMATED COUNT: 12.9 % (ref 10–15)
FERRITIN SERPL-MCNC: 48 NG/ML (ref 31–409)
GFR SERPL CREATININE-BSD FRML MDRD: 89 ML/MIN/1.73M2
GLUCOSE SERPL-MCNC: 98 MG/DL (ref 70–99)
HBA1C MFR BLD: 6.7 % (ref 0–5.6)
HCT VFR BLD AUTO: 42.2 % (ref 40–53)
HDLC SERPL-MCNC: 73 MG/DL
HGB BLD-MCNC: 13.7 G/DL (ref 13.3–17.7)
IMM GRANULOCYTES # BLD: 0 10E3/UL
IMM GRANULOCYTES NFR BLD: 0 %
LDLC SERPL CALC-MCNC: 63 MG/DL
LYMPHOCYTES # BLD AUTO: 2 10E3/UL (ref 0.8–5.3)
LYMPHOCYTES NFR BLD AUTO: 33 %
MCH RBC QN AUTO: 30 PG (ref 26.5–33)
MCHC RBC AUTO-ENTMCNC: 32.5 G/DL (ref 31.5–36.5)
MCV RBC AUTO: 92 FL (ref 78–100)
MONOCYTES # BLD AUTO: 0.8 10E3/UL (ref 0–1.3)
MONOCYTES NFR BLD AUTO: 14 %
NEUTROPHILS # BLD AUTO: 3 10E3/UL (ref 1.6–8.3)
NEUTROPHILS NFR BLD AUTO: 50 %
NONHDLC SERPL-MCNC: 76 MG/DL
PLATELET # BLD AUTO: 202 10E3/UL (ref 150–450)
POTASSIUM SERPL-SCNC: 4.9 MMOL/L (ref 3.4–5.3)
PROT SERPL-MCNC: 7 G/DL (ref 6.4–8.3)
PSA SERPL DL<=0.01 NG/ML-MCNC: 7.76 NG/ML (ref 0–6.5)
RBC # BLD AUTO: 4.57 10E6/UL (ref 4.4–5.9)
SODIUM SERPL-SCNC: 140 MMOL/L (ref 136–145)
TRIGL SERPL-MCNC: 67 MG/DL
TSH SERPL DL<=0.005 MIU/L-ACNC: 0.96 UIU/ML (ref 0.3–4.2)
WBC # BLD AUTO: 6 10E3/UL (ref 4–11)

## 2023-05-18 PROCEDURE — 80061 LIPID PANEL: CPT | Performed by: FAMILY MEDICINE

## 2023-05-18 PROCEDURE — 36415 COLL VENOUS BLD VENIPUNCTURE: CPT | Performed by: FAMILY MEDICINE

## 2023-05-18 PROCEDURE — 83036 HEMOGLOBIN GLYCOSYLATED A1C: CPT | Performed by: FAMILY MEDICINE

## 2023-05-18 PROCEDURE — 80050 GENERAL HEALTH PANEL: CPT | Performed by: FAMILY MEDICINE

## 2023-05-18 PROCEDURE — 84153 ASSAY OF PSA TOTAL: CPT | Performed by: FAMILY MEDICINE

## 2023-05-18 PROCEDURE — 82306 VITAMIN D 25 HYDROXY: CPT | Performed by: FAMILY MEDICINE

## 2023-05-18 PROCEDURE — 99214 OFFICE O/P EST MOD 30 MIN: CPT | Mod: 25 | Performed by: FAMILY MEDICINE

## 2023-05-18 PROCEDURE — G0438 PPPS, INITIAL VISIT: HCPCS | Performed by: FAMILY MEDICINE

## 2023-05-18 PROCEDURE — 82728 ASSAY OF FERRITIN: CPT | Performed by: FAMILY MEDICINE

## 2023-05-18 ASSESSMENT — PATIENT HEALTH QUESTIONNAIRE - PHQ9
SUM OF ALL RESPONSES TO PHQ QUESTIONS 1-9: 5
SUM OF ALL RESPONSES TO PHQ QUESTIONS 1-9: 5
10. IF YOU CHECKED OFF ANY PROBLEMS, HOW DIFFICULT HAVE THESE PROBLEMS MADE IT FOR YOU TO DO YOUR WORK, TAKE CARE OF THINGS AT HOME, OR GET ALONG WITH OTHER PEOPLE: NOT DIFFICULT AT ALL

## 2023-05-18 ASSESSMENT — ENCOUNTER SYMPTOMS
HEARTBURN: 0
SORE THROAT: 0
COUGH: 0
DIZZINESS: 0
HEMATOCHEZIA: 0
DIARRHEA: 0
HEADACHES: 0
SHORTNESS OF BREATH: 1
NERVOUS/ANXIOUS: 0
NAUSEA: 0
MYALGIAS: 1
FREQUENCY: 0
DYSURIA: 0
PALPITATIONS: 0
CONSTIPATION: 0
HEMATURIA: 0
WEAKNESS: 1
EYE PAIN: 0
PARESTHESIAS: 0
ARTHRALGIAS: 0
ABDOMINAL PAIN: 0
JOINT SWELLING: 0
CHILLS: 0

## 2023-05-18 ASSESSMENT — ACTIVITIES OF DAILY LIVING (ADL): CURRENT_FUNCTION: NO ASSISTANCE NEEDED

## 2023-05-18 NOTE — PROGRESS NOTES
He is at risk for lack of exercise and has been provided with information to increase physical activity for the benefit of his well-being.  The patient's PHQ-9 score is consistent with mild depression. He was provided with information regarding depression and was advised to schedule a follow up appointment in 12 weeks to further address this issue.

## 2023-05-18 NOTE — PROGRESS NOTES
"SUBJECTIVE:   Gio is a 78 year old who presents for Preventive Visit.      5/18/2023     9:38 AM   Additional Questions   Roomed by Janae STEPHENSON   Accompanied by self     Patient has been advised of split billing requirements and indicates understanding: Yes  Are you in the first 12 months of your Medicare coverage?  Yes,  Visual Acuity:  Right Eye: 0   Left Eye: 10/32  Both Eyes: 10/25    Healthy Habits:     In general, how would you rate your overall health?  Good    Frequency of exercise:  1 day/week    Duration of exercise:  Less than 15 minutes    Do you usually eat at least 4 servings of fruit and vegetables a day, include whole grains    & fiber and avoid regularly eating high fat or \"junk\" foods?  Yes    Taking medications regularly:  Yes    Medication side effects:  Other    Ability to successfully perform activities of daily living:  No assistance needed    Home Safety:  No safety concerns identified    Hearing Impairment:  No hearing concerns    In the past 6 months, have you been bothered by leaking of urine?  No    In general, how would you rate your overall mental or emotional health?  Good      PHQ-2 Total Score: 0    Additional concerns today:  No        Have you ever done Advance Care Planning? (For example, a Health Directive, POLST, or a discussion with a medical provider or your loved ones about your wishes): Yes, patient states has an Advance Care Planning document and will bring a copy to the clinic.    Hearing: no difficulty  Fall risk  Fallen 2 or more times in the past year?: No  Any fall with injury in the past year?: No    Cognitive Screening   1) Repeat 3 items (Leader, Season, Table)    2) Clock draw: NORMAL  3) 3 item recall: Recalls 2 objects   Results: NORMAL clock, 1-2 items recalled: COGNITIVE IMPAIRMENT LESS LIKELY    Mini-CogTM Copyright PATRICIA Lange. Licensed by the author for use in [facility]; reprinted with permission (eden@.[facility]). All rights reserved.      Do you have " sleep apnea, excessive snoring or daytime drowsiness?: no    Reviewed and updated as needed this visit by clinical staff   Tobacco  Allergies  Meds              Reviewed and updated as needed this visit by Provider                 Social History     Tobacco Use     Smoking status: Former     Packs/day: 2.00     Types: Cigarettes     Smokeless tobacco: Never   Vaping Use     Vaping status: Never Used   Substance Use Topics     Alcohol use: Not Currently             5/18/2023     9:03 AM   Alcohol Use   Prescreen: >3 drinks/day or >7 drinks/week? No     Do you have a current opioid prescription? No  Do you use any other controlled substances or medications that are not prescribed by a provider? None      PROBLEMS TO ADD ON...  CT lung screening negative for lung cancer, but showed old T12 fracture.  Scheduled to see cardiologist for coronary artery disease follow-up, no new chest pain.  Still getting Vivitrol monthly injection at the psychiatry office to help stay sober for alcohol.  Metformin prescribed for prediabetes management, patient stating compliant with therapy, A1c up today in the diabetes range.  Current providers sharing in care for this patient include:   Patient Care Team:  Amara Smith MD as PCP - General  Selam Bee MD as MD (Psychiatry)  Blaine Adame MD as MD (Urology)  Amara Smith MD as Assigned PCP  Margie Hernandez, RN as Registered Nurse (Oncology)  Rose Sepulveda MD as Assigned Pulmonology Provider  Piyush Rasmussen MD as Assigned Surgical Provider  Joel Vasquez MD as MD (Dermatology)  Susi Davis, PhD as Assigned Behavioral Health Provider  Benny Carrasco MD as MD (Cardiovascular Disease)  Sarai Garcia MD as Resident (Student in organized health care education/training program)    The following health maintenance items are reviewed in Epic and correct as of today:  Health Maintenance   Topic Date Due     COVID-19 Vaccine (6 - Moderna series) 02/05/2023      ANNUAL REVIEW OF HM ORDERS  04/19/2023     MEDICARE ANNUAL WELLNESS VISIT  04/19/2023     PHQ-9  11/18/2023     LUNG CANCER SCREENING  03/23/2024     FALL RISK ASSESSMENT  05/18/2024     DTAP/TDAP/TD IMMUNIZATION (3 - Td or Tdap) 06/15/2026     LIPID  04/19/2027     ADVANCE CARE PLANNING  04/20/2027     SPIROMETRY  Completed     HEPATITIS C SCREENING  Completed     COPD ACTION PLAN  Completed     DEPRESSION ACTION PLAN  Completed     INFLUENZA VACCINE  Completed     Pneumococcal Vaccine: 65+ Years  Completed     ZOSTER IMMUNIZATION  Completed     IPV IMMUNIZATION  Aged Out     MENINGITIS IMMUNIZATION  Aged Out     COLORECTAL CANCER SCREENING  Discontinued     Lab work is in process  Labs reviewed in EPIC  BP Readings from Last 3 Encounters:   05/18/23 111/71   05/17/23 (!) 162/84   04/19/23 106/74    Wt Readings from Last 3 Encounters:   05/18/23 86.2 kg (190 lb)   05/17/23 86.6 kg (191 lb)   04/19/23 86.7 kg (191 lb 3.2 oz)                  Patient Active Problem List   Diagnosis     Recurrent major depressive disorder, in remission (H)     Alcohol use disorder, severe, in early remission (H)     Moderate episode of recurrent major depressive disorder (H)     Pulmonary emphysema, unspecified emphysema type (H)     Atherosclerosis of native coronary artery of native heart without angina pectoris     Elevated prostate specific antigen less than 10 ng/ml     Compression fracture of T12 vertebra with routine healing     Diabetes mellitus, type 2 (H)     Past Surgical History:   Procedure Laterality Date     CARDIAC SURGERY       COLONOSCOPY      removal of polyp     ENT SURGERY       HERNIA REPAIR         Social History     Tobacco Use     Smoking status: Former     Packs/day: 2.00     Types: Cigarettes     Smokeless tobacco: Never   Vaping Use     Vaping status: Never Used   Substance Use Topics     Alcohol use: Not Currently     Family History   Problem Relation Age of Onset     Dementia Mother      Substance Abuse  Father      Suicide Other      Anxiety Disorder Sister      Depression No family hx of      Schizophrenia No family hx of      Bipolar Disorder No family hx of      Nobles Disease No family hx of      Parkinsonism No family hx of      Autism Spectrum Disorder No family hx of      Snoring Father          Current Outpatient Medications   Medication Sig Dispense Refill     Acetaminophen 325 MG CAPS Take 325 mg by mouth every 8 hours as needed       amLODIPine (NORVASC) 5 MG tablet Take 1 tablet (5 mg) by mouth daily 90 tablet 3     ASPIRIN PO Take 81 mg by mouth daily       calcium-vitamin D 500-125 MG-UNIT TABS Take 1 tablet by mouth 2 times daily       clotrimazole (LOTRIMIN) 1 % external cream Apply topically 2 times daily Apply to rough patch of skin in front of right ear 2x daily for 10 days 24 g 0     DIPHENHYDRAMINE HCL PO Take 25 mg by mouth daily as needed       escitalopram (LEXAPRO) 20 MG tablet Take 1 tablet (20 mg) by mouth daily 90 tablet 0     gabapentin (NEURONTIN) 300 MG capsule Take 1 capsule (300 mg) by mouth At Bedtime Take 1 capsule by mouth at bedtime as needed. 90 capsule 0     ketoconazole (NIZORAL) 2 % external cream Apply topically 2 times daily 30 g 3     losartan (COZAAR) 50 MG tablet Take 1 tablet (50 mg) by mouth daily 90 tablet 1     metFORMIN (GLUCOPHAGE XR) 500 MG 24 hr tablet Take 1 tablet (500 mg) by mouth daily (with breakfast) 90 tablet 2     NITROGLYCERIN SL Take 0.4 mg by mouth every 5 minutes as needed       omeprazole (PRILOSEC) 20 MG DR capsule TAKE 1 CAPSULE BY MOUTH DAILY,  60 MINUTES BEFORE A MEAL. 90 capsule 3     omeprazole 20 MG tablet Take 1-2 daily 60 tablet 1     rosuvastatin (CRESTOR) 40 MG tablet Take 1 tablet (40 mg) by mouth At Bedtime 90 tablet 0     tamsulosin (FLOMAX) 0.4 MG capsule TAKE ONE CAPSULE BY MOUTH ONCE DAILY WITH BREAKFAST 90 capsule 0     Allergies   Allergen Reactions     Aspirin Other (See Comments)     Avoids taking any aspirin besides his  daily 81 mg aspirin regimen due to acid reflux/esophagus problem(s).     Nsaids (Non-Steroidal Anti-Inflammatory Drug) [Nsaids] Other (See Comments)     Avoids due to acid reflux/esophagus problem(s).     Plavix [Clopidogrel] Other (See Comments) and Muscle Pain (Myalgia)     Reaction(s): Bad bruising all over his body and leg cramps.     Statins-Hmg-Coa Reductase Inhibitors [Statins] Muscle Pain (Myalgia)     Per the patient, he tolerates rosuvastatin.     Recent Labs   Lab Test 05/18/23  1037 04/19/22  1402 06/01/21  1125 08/26/20  1141   A1C 6.7* 6.0* 6.0* 5.9*   LDL 63 68 74 78   HDL 73 68 75 68   TRIG 67 114 87 89   ALT 16 13 15 14   CR 0.87 0.88 0.86 0.84   GFRESTIMATED 89 90 >60 >60   GFRESTBLACK  --   --  >60 >60   POTASSIUM 4.9 4.5 4.6 4.4   TSH 0.96 0.83  --  0.49              Review of Systems   Constitutional: Negative for chills.   HENT: Positive for congestion. Negative for ear pain, hearing loss and sore throat.    Eyes: Negative for pain and visual disturbance.   Respiratory: Positive for shortness of breath. Negative for cough.    Cardiovascular: Negative for chest pain, palpitations and peripheral edema.   Gastrointestinal: Negative for abdominal pain, constipation, diarrhea, heartburn, hematochezia and nausea.   Genitourinary: Negative for dysuria, frequency, genital sores, hematuria, impotence, penile discharge and urgency.   Musculoskeletal: Positive for myalgias. Negative for arthralgias and joint swelling.   Skin: Negative for rash.   Neurological: Positive for weakness. Negative for dizziness, headaches and paresthesias.   Psychiatric/Behavioral: The patient is not nervous/anxious.      Constitutional, HEENT, cardiovascular, pulmonary, GI, , musculoskeletal, neuro, skin, endocrine and psych systems are negative, except as otherwise noted.    OBJECTIVE:   /71 (BP Location: Right arm, Patient Position: Sitting, Cuff Size: Adult Regular)   Pulse 66   Temp 97.7  F (36.5  C) (Temporal)   " Resp 20   Ht 1.785 m (5' 10.28\")   Wt 86.2 kg (190 lb)   SpO2 95%   BMI 27.05 kg/m   Estimated body mass index is 27.05 kg/m  as calculated from the following:    Height as of this encounter: 1.785 m (5' 10.28\").    Weight as of this encounter: 86.2 kg (190 lb).  Physical Exam  GENERAL: healthy, alert and no distress  EYES: Eyes grossly normal to inspection, PERRL and conjunctivae and sclerae normal  HENT: ear canals and TM's normal, nose and mouth without ulcers or lesions  NECK: no adenopathy, no asymmetry, masses, or scars and thyroid normal to palpation  RESP: lungs clear to auscultation - no rales, rhonchi or wheezes  CV: regular rate and rhythm, normal S1 S2, no S3 or S4, no murmur, click or rub, no peripheral edema and peripheral pulses strong  ABDOMEN: soft, nontender, no hepatosplenomegaly, no masses and bowel sounds normal  MS: no gross musculoskeletal defects noted, no edema  SKIN: no suspicious lesions or rashes  NEURO: Normal strength and tone, mentation intact and speech normal  PSYCH: mentation appears normal, affect normal/bright    Diagnostic Test Results:  Labs reviewed in Epic    ASSESSMENT / PLAN:   (Z00.00) Encounter for Medicare annual wellness exam  (primary encounter diagnosis)  Comment:   Plan: PRIMARY CARE FOLLOW-UP SCHEDULING            (I25.10) Atherosclerosis of native coronary artery of native heart without angina pectoris  Comment:   Plan: **Comprehensive metabolic panel FUTURE 2mo,         Lipid panel reflex to direct LDL Fasting, **TSH        with free T4 reflex FUTURE 2mo, **CBC with         platelets differential FUTURE 2mo            (J43.9) Pulmonary emphysema, unspecified emphysema type (H)  Comment:   Plan: Continue to follow with pulmonologist.    (F10.21) Alcohol use disorder, severe, in early remission (H)  Comment:   Plan: Continue to follow-up with psychiatrist for Vivitrol injection monthly.    (F33.40) Recurrent major depressive disorder, in remission (H)  Comment: "   Plan: Stable on Lexapro.    (R73.03) Prediabetes  Comment:   Plan: **Comprehensive metabolic panel FUTURE 2mo,         **TSH with free T4 reflex FUTURE 2mo,         **Hemoglobin A1c FUTURE 3mo            (R25.2) Leg cramping  Comment:   Plan: Ferritin            (R97.20) Elevated prostate specific antigen less than 10 ng/ml  Comment:   Plan: PSA, tumor marker PSA continues to be elevated, patient will follow-up with urology team, has seen them in the past.            (S22.080D) Compression fracture of T12 vertebra with routine healing  Comment: Discussed calcium and vitamin D supplementation.  Plan: **TSH with free T4 reflex FUTURE 2mo, Vitamin D        Deficiency, DX Hip/Pelvis/Spine       We will get a baseline DEXA scan, discussed treatment included.  Therapy; will plan a separate appointment to go over options.    (E61.1) Iron deficiency  Comment:   Plan: Ferritin            (E83.50) Unspecified disorder of calcium metabolism  Comment:   Plan: Vitamin D Deficiency            (M80.88XA) Other osteoporosis with current pathological fracture, vertebra(e), initial encounter for fracture (H)  Comment:   Plan: DX Hip/Pelvis/Spine            (E11.9) Type 2 diabetes mellitus without complication, without long-term current use of insulin (H)  Comment: Newly diagnosed, will refer for education and training on how to check blood sugar.  Continue metformin, consider increasing the dose based on kidney function and creatinine clearance.  Plan: AMB Adult Diabetes Educator Referral              Patient has been advised of split billing requirements and indicates understanding: Yes      COUNSELING:  Reviewed preventive health counseling, as reflected in patient instructions       Regular exercise       Healthy diet/nutrition       Vision screening       Hearing screening       Dental care       Bladder control       Prostate cancer screening      BMI:   Estimated body mass index is 27.05 kg/m  as calculated from the  "following:    Height as of this encounter: 1.785 m (5' 10.28\").    Weight as of this encounter: 86.2 kg (190 lb).   Weight management plan: Discussed healthy diet and exercise guidelines      He reports that he has quit smoking. His smoking use included cigarettes. He smoked an average of 2 packs per day. He has never used smokeless tobacco.      Appropriate preventive services were discussed with this patient, including applicable screening as appropriate for cardiovascular disease, diabetes, osteopenia/osteoporosis, and glaucoma.  As appropriate for age/gender, discussed screening for colorectal cancer, prostate cancer, breast cancer, and cervical cancer. Checklist reviewing preventive services available has been given to the patient.    Reviewed patients plan of care and provided an AVS. The Complex Care Plan (for patients with higher acuity and needing more deliberate coordination of services) for Laurent meets the Care Plan requirement. This Care Plan has been established and reviewed with the Patient.          Amara Smith MD  [facility]    Identified Health Risks:    I have reviewed Opioid Use Disorder and Substance Use Disorder risk factors and made any needed referrals.      Prior to immunization administration, verified patients identity using patient s name and date of birth. Please see Immunization Activity for additional information.     Screening Questionnaire for Adult Immunization    Are you sick today?   No   Do you have allergies to medications, food, a vaccine component or latex?   No   Have you ever had a serious reaction after receiving a vaccination?   No   Do you have a long-term health problem with heart, lung, kidney, or metabolic disease (e.g., diabetes), asthma, a blood disorder, no spleen, complement component deficiency, a cochlear implant, or a spinal fluid leak?  Are you on long-term aspirin therapy?   Yes   Do you have cancer, leukemia, HIV/AIDS, or any " other immune system problem?   No   Do you have a parent, brother, or sister with an immune system problem?   No   In the past 3 months, have you taken medications that affect  your immune system, such as prednisone, other steroids, or anticancer drugs; drugs for the treatment of rheumatoid arthritis, Crohn s disease, or psoriasis; or have you had radiation treatments?   No   Have you had a seizure, or a brain or other nervous system problem?   No   During the past year, have you received a transfusion of blood or blood    products, or been given immune (gamma) globulin or antiviral drug?   No   For women: Are you pregnant or is there a chance you could become       pregnant during the next month?   Not applicable   Have you received any vaccinations in the past 4 weeks?   No     Immunization questionnaire was positive for at least one answer.  Notified Dr. Smith.          Screening performed by Janae Calle MA on 5/18/2023 at 9:52 AM.      Answers for HPI/ROS submitted by the patient on 5/18/2023  If you checked off any problems, how difficult have these problems made it for you to do your work, take care of things at home, or get along with other people?: Not difficult at all  PHQ9 TOTAL SCORE: 5

## 2023-05-18 NOTE — LETTER
May 22, 2023      Gio Brookselenajose  [address]   [place] [address]        Dear ,    We are writing to inform you of your test results.    Gio  PSA still elevated, make sure you follow-up with the urologist.Let me know if you need a referral.     Blood sugar is up, reaching the diabetes range, I would like you to meet our diabetes educator to give you additional training and teaching on blood sugar, metformin that you are currently taking should be good enough for now    Resulted Orders   **Comprehensive metabolic panel FUTURE 2mo   Result Value Ref Range    Sodium 140 136 - 145 mmol/L    Potassium 4.9 3.4 - 5.3 mmol/L    Chloride 103 98 - 107 mmol/L    Carbon Dioxide (CO2) 26 22 - 29 mmol/L    Anion Gap 11 7 - 15 mmol/L    Urea Nitrogen 12.9 8.0 - 23.0 mg/dL    Creatinine 0.87 0.67 - 1.17 mg/dL    Calcium 9.5 8.8 - 10.2 mg/dL    Glucose 98 70 - 99 mg/dL    Alkaline Phosphatase 43 40 - 129 U/L    AST 21 10 - 50 U/L    ALT 16 10 - 50 U/L    Protein Total 7.0 6.4 - 8.3 g/dL    Albumin 4.6 3.5 - 5.2 g/dL    Bilirubin Total 0.5 <=1.2 mg/dL    GFR Estimate 89 >60 mL/min/1.73m2      Comment:      eGFR calculated using 2021 CKD-EPI equation.   Lipid panel reflex to direct LDL Fasting   Result Value Ref Range    Cholesterol 149 <200 mg/dL    Triglycerides 67 <150 mg/dL    Direct Measure HDL 73 >=40 mg/dL    LDL Cholesterol Calculated 63 <=100 mg/dL    Non HDL Cholesterol 76 <130 mg/dL    Narrative    Cholesterol  Desirable:  <200 mg/dL    Triglycerides  Normal:  Less than 150 mg/dL  Borderline High:  150-199 mg/dL  High:  200-499 mg/dL  Very High:  Greater than or equal to 500 mg/dL    Direct Measure HDL  Female:  Greater than or equal to 50 mg/dL   Male:  Greater than or equal to 40 mg/dL    LDL Cholesterol  Desirable:  <100mg/dL  Above Desirable:  100-129 mg/dL   Borderline High:  130-159 mg/dL   High:  160-189 mg/dL   Very High:  >= 190 mg/dL    Non HDL Cholesterol  Desirable:  130  mg/dL  Above Desirable:  130-159 mg/dL  Borderline High:  160-189 mg/dL  High:  190-219 mg/dL  Very High:  Greater than or equal to 220 mg/dL   **TSH with free T4 reflex FUTURE 2mo   Result Value Ref Range    TSH 0.96 0.30 - 4.20 uIU/mL   **Hemoglobin A1c FUTURE 3mo   Result Value Ref Range    Hemoglobin A1C 6.7 (H) 0.0 - 5.6 %      Comment:      Normal <5.7%   Prediabetes 5.7-6.4%    Diabetes 6.5% or higher     Note: Adopted from ADA consensus guidelines.   Ferritin   Result Value Ref Range    Ferritin 48 31 - 409 ng/mL   PSA, tumor marker   Result Value Ref Range    PSA Tumor Marker 7.76 (H) 0.00 - 6.50 ng/mL    Narrative    This result is obtained using the Roche Elecsys total PSA method on the cabrera [id number] immunoassay analyzer. Results obtained with different assay methods or kits cannot be used interchangeably.   Vitamin D Deficiency   Result Value Ref Range    Vitamin D, Total (25-Hydroxy) 29 20 - 75 ug/L    Narrative    Season, race, dietary intake, and treatment affect the concentration of 25-hydroxy-Vitamin D. Values may decrease during winter months and increase during summer months. Values 20-29 ug/L may indicate Vitamin D insufficiency and values <20 ug/L may indicate Vitamin D deficiency.    Vitamin D determination is routinely performed by an immunoassay specific for 25 hydroxyvitamin D3.  If an individual is on vitamin D2(ergocalciferol) supplementation, please specify 25 OH vitamin D2 and D3 level determination by LCMSMS test VITD23.     CBC with platelets and differential   Result Value Ref Range    WBC Count 6.0 4.0 - 11.0 10e3/uL    RBC Count 4.57 4.40 - 5.90 10e6/uL    Hemoglobin 13.7 13.3 - 17.7 g/dL    Hematocrit 42.2 40.0 - 53.0 %    MCV 92 78 - 100 fL    MCH 30.0 26.5 - 33.0 pg    MCHC 32.5 31.5 - 36.5 g/dL    RDW 12.9 10.0 - 15.0 %    Platelet Count 202 150 - 450 10e3/uL    % Neutrophils 50 %    % Lymphocytes 33 %    % Monocytes 14 %    % Eosinophils 3 %    % Basophils 1 %    % Immature  Granulocytes 0 %    Absolute Neutrophils 3.0 1.6 - 8.3 10e3/uL    Absolute Lymphocytes 2.0 0.8 - 5.3 10e3/uL    Absolute Monocytes 0.8 0.0 - 1.3 10e3/uL    Absolute Eosinophils 0.2 0.0 - 0.7 10e3/uL    Absolute Basophils 0.0 0.0 - 0.2 10e3/uL    Absolute Immature Granulocytes 0.0 <=0.4 10e3/uL       If you have any questions or concerns, please call the clinic at the number listed above.       Sincerely,      Amara Smith MD

## 2023-05-18 NOTE — PATIENT INSTRUCTIONS
Patient Education   Personalized Prevention Plan  You are due for the preventive services outlined below.  Your care team is available to assist you in scheduling these services.  If you have already completed any of these items, please share that information with your care team to update in your medical record.  Health Maintenance Due   Topic Date Due     COVID-19 Vaccine (6 - Moderna series) 02/05/2023     ANNUAL REVIEW OF HM ORDERS  04/19/2023       Exercise for a Healthier Heart  You may wonder how you can improve the health of your heart. If you re thinking about exercise, you re on the right track. You don t need to become an athlete. But you do need a certain amount of brisk exercise to help strengthen your heart. If you have been diagnosed with a heart condition, your healthcare provider may advise exercise to help your condition. To help make exercise a habit, choose safe, fun activities.      Exercise with a friend. When activity is fun, you're more likely to stick with it.     Before you start  Check with your healthcare provider before starting an exercise program. This is especially important if you haven't been active for a while. It's also important if you have a long-term (chronic) health problem such as heart disease, diabetes, or obesity. Also check with your provider if you're at high risk for having these problems.   Why exercise?  Exercising regularly offers many healthy rewards. It can help you do all of these:     Improve your blood cholesterol level to help prevent further heart trouble.    Lower your blood pressure to help prevent a stroke or heart attack.    Control diabetes or reduce your risk of getting this disease.    Improve your heart and lung function.    Reach and stay at a healthy weight.    Make your muscles stronger so you can stay active.    Prevent falls and fractures by slowing the loss of bone mass (osteoporosis).    Manage stress better.    Improve your sense of self and  your body image.  Exercise tips      Ease into your routine. Set small goals. Then build on them. Talk with your healthcare provider first before starting an exercise routine if you're not sure what your activity level should be.    Exercise on most days. Aim for a total of at least 150 minutes (2 hours and 30 minutes) or more of moderate-intensity aerobic activity each week. You could also do 75 minutes (1 hour and 15 minutes) or more of vigorous-intensity aerobic activity each week. Or try for a combination of both. Moderate activity means that you breathe heavier and your heart rate increases, but you can still talk. Think about doing at least 30 minutes of moderate exercise, 5 times a week. It's OK to work up to the 30-minute period over time. Examples of moderate-intensity activity are brisk walking, gardening, and water aerobics.    Step up your daily activity level.  Along with your exercise program, try being more active the whole day. Walk instead of drive. Or park further away so that you take more steps each day. Do more household tasks or yard work. You may not be able to meet the advised amount of physical activity. But doing some moderate- or vigorous-intensity aerobic activity can help reduce your risk for heart disease. Your healthcare provider can help you figure out what is best for you.    Choose 1 or more activities you enjoy.  Walking is one of the easiest things you can do. You can also try swimming, riding a bike, dancing, or taking an exercise class.    Call 911  Call 911 right away if any of these occur:     Chest pain that doesn't go away quickly with rest    New burning, tightness, pressure, or heaviness in your chest, neck, shoulders, back, or arms    Abnormal or severe shortness of breath    A very fast or irregular heartbeat (palpitations)    Fainting  When to call your healthcare provider  Call your healthcare provider if you have any of these:     Dizziness or lightheadedness    Mild  "shortness of breath or chest pain    Increased or new joint or muscle pain    Marah last reviewed this educational content on 7/1/2022 2000-2022 [employer]. All rights reserved. This information is not intended as a substitute for professional medical care. Always follow your healthcare professional's instructions.          Depression and Suicide in Older Adults  Nearly 2 million older adults in the [place] have some type of depression. Some of them even take their own lives. Yet depression among older adults is often ignored. Learning about the warning signs of depression may help spare a loved one needless pain. You may also save a life.   What is depression?  Depression is a common and serious illness. It affects the way you think and feel. It is not a normal part of aging. It is not a sign of weakness, a character flaw, or something you can \"snap out of.\" Most people with depression need treatment to get better. The most common symptom is a feeling of deep sadness. People who are depressed also may seem tired and listless. And nothing seems to give them pleasure. It s normal to grieve or be sad sometimes. But sadness lessens or passes with time. Depression rarely goes away or improves on its own. Other symptoms of depression are:     Sleeping more or less than normal    Eating more or less than normal    Having headaches, stomachaches, or other pains that don t go away    Feeling nervous,  empty,  or worthless    Crying a lot    Thinking or talking about suicide or death    Loss of interest in activities previously enjoyed    Social isolation    Feeling confused or forgetful  What causes it?  The causes of depression aren t fully known. But it is thought to result from a complex blend of these factors:     Biochemistry. Certain chemicals in the brain play a role.    Genes. Depression does run in families.    Life stress. Life stresses can also trigger depression in some people. Older adults " often face many stressors. These may include isolation, the death of friends or a spouse, health problems, and financial concerns.    Chronic health conditions. This includes diabetes, heart disease, or cancer. These can cause symptoms of depression. Medicine side effects can cause changes in thoughts and behaviors.  Giving support    Depressed people often may not want to ask for help. When they do, they may be ignored. Or they may get the wrong treatment. You can help by showing parents and older friends love and support. If they seem depressed, don t lecture the person or ignore the symptoms. Don't discount the symptoms as a  normal  part of aging. They are not. Get involved, listen, and show interest and support.   Help them understand that depression is a treatable illness. Tell them you can help them find the right treatment. Offer to go to their healthcare provider's appointment with them for support when the symptoms are discussed. With their approval, contact a local mental health center, social service agency, or hospital about services.   Helping at healthcare visits  You can be an advocate for them at healthcare appointments. Many older adults have chronic illnesses. Many of these can cause symptoms of depression. Medicine side effects can change thoughts and behaviors.   You can help make sure that the healthcare provider looks at all of these factors. They should refer your family member or friend to a mental healthcare provider when needed. In some cases, untreated depression can lead to a misdiagnosis. A person may be diagnosed with a brain disorder such as dementia. If the healthcare provider does not take the issue of depression seriously, help your family member or friend find another provider.   Asking about self-harm thoughts  If you think an older person you care about could be suicidal, ask,  Have you thought about suicide?  Most people will tell you the truth. If they say yes, they may  already have a plan for how and when they will attempt it. Find out as much as you can. The more detailed the plan, and the easier it is to carry out, the more danger the person is in right now. Tell the person you are there for them and you do not want them to get harmed. Don't wait to get help for the person. Call the person's healthcare provider, local hospital, or emergency services.   Call 988 in a crisis   Never leave the person alone. A person who is actively suicidal needs crisis care right away. They need constant supervision. Never leave the person out of sight. Call 988 Tell the crisis counselor you need help for a person who is thinking about suicide. The counselor will help you get the right level of crisis help. You may be advised to take the person to the nearest emergency room.   The National Suicide Prevention Lifeline is available at 988, or 750-533-VMBV (998-946-9677), or www.suicidepreventionlifeline.org. When you call or text 988, you will be connected to trained counselors who are part of the National Suicide Prevention Lifeline network. An online chat option is also available. Lifeline is free and available 24/7.   To learn more    National Suicide Prevention Lifeline at www.suicidepreventionlifeline.org  or 427-500-BMRT (220-427-3753)    National [place] on Mental Illness at www.alessandra.org  or 667-353-ERFP (424-920-0762)    [facility] at www.nmha.org  or 642-886-0189    National [place] of Mental Health at www.nimh.nih.gov  or 955-853-2003    Marah last reviewed this educational content on 7/1/2022 2000-2022 [employer]. All rights reserved. This information is not intended as a substitute for professional medical care. Always follow your healthcare professional's instructions.

## 2023-05-19 PROBLEM — E11.9 DIABETES MELLITUS, TYPE 2 (H): Status: ACTIVE | Noted: 2023-05-19

## 2023-05-19 LAB — DEPRECATED CALCIDIOL+CALCIFEROL SERPL-MC: 29 UG/L (ref 20–75)

## 2023-05-23 ENCOUNTER — VIRTUAL VISIT (OUTPATIENT)
Dept: PSYCHIATRY | Facility: CLINIC | Age: 78
End: 2023-05-23
Attending: PSYCHOLOGIST
Payer: COMMERCIAL

## 2023-05-23 DIAGNOSIS — F10.21 ALCOHOL USE DISORDER, SEVERE, IN SUSTAINED REMISSION (H): Primary | ICD-10-CM

## 2023-05-23 PROCEDURE — 90853 GROUP PSYCHOTHERAPY: CPT | Mod: VID | Performed by: PSYCHOLOGIST

## 2023-05-25 ENCOUNTER — ANCILLARY PROCEDURE (OUTPATIENT)
Dept: BONE DENSITY | Facility: CLINIC | Age: 78
End: 2023-05-25
Attending: FAMILY MEDICINE
Payer: COMMERCIAL

## 2023-05-25 DIAGNOSIS — M80.88XA OTHER OSTEOPOROSIS WITH CURRENT PATHOLOGICAL FRACTURE, VERTEBRA(E), INITIAL ENCOUNTER FOR FRACTURE (H): ICD-10-CM

## 2023-05-25 DIAGNOSIS — S22.080D COMPRESSION FRACTURE OF T12 VERTEBRA WITH ROUTINE HEALING: ICD-10-CM

## 2023-05-25 DIAGNOSIS — S22.080A COMPRESSION FRACTURE OF T12 VERTEBRA (H): ICD-10-CM

## 2023-05-25 PROCEDURE — 77080 DXA BONE DENSITY AXIAL: CPT | Mod: TC | Performed by: PHYSICIAN ASSISTANT

## 2023-05-25 PROCEDURE — 77081 DXA BONE DENSITY APPENDICULR: CPT | Mod: TC | Performed by: PHYSICIAN ASSISTANT

## 2023-05-26 ENCOUNTER — TELEPHONE (OUTPATIENT)
Dept: FAMILY MEDICINE | Facility: CLINIC | Age: 78
End: 2023-05-26
Payer: COMMERCIAL

## 2023-05-26 NOTE — TELEPHONE ENCOUNTER
----- Message from Amara Smith MD sent at 5/26/2023  2:05 PM CDT -----  Recent  bone density showed osteoporosis, I would like you to schedule an appointment to discuss treatment option.  Please help schedule an appointment to discuss.

## 2023-05-30 ENCOUNTER — VIRTUAL VISIT (OUTPATIENT)
Dept: PSYCHIATRY | Facility: CLINIC | Age: 78
End: 2023-05-30
Attending: PSYCHOLOGIST
Payer: COMMERCIAL

## 2023-05-30 DIAGNOSIS — F10.21 ALCOHOL USE DISORDER, SEVERE, IN SUSTAINED REMISSION (H): Primary | ICD-10-CM

## 2023-05-30 PROCEDURE — 90853 GROUP PSYCHOTHERAPY: CPT | Mod: VID | Performed by: PSYCHOLOGIST

## 2023-05-30 ASSESSMENT — PATIENT HEALTH QUESTIONNAIRE - PHQ9
SUM OF ALL RESPONSES TO PHQ QUESTIONS 1-9: 0
SUM OF ALL RESPONSES TO PHQ QUESTIONS 1-9: 0
10. IF YOU CHECKED OFF ANY PROBLEMS, HOW DIFFICULT HAVE THESE PROBLEMS MADE IT FOR YOU TO DO YOUR WORK, TAKE CARE OF THINGS AT HOME, OR GET ALONG WITH OTHER PEOPLE: NOT DIFFICULT AT ALL

## 2023-05-31 NOTE — PROGRESS NOTES
Telehealth Details  Type of service:  video visit for group therapy  Originating Site (patient location):  [facility] Location- Remote location  Distant Site (provider location):  Remote location  Mode of Communication:  Video conference via Zoom    Relapse Prevention Group for Substance Use Disorders                                Time of Service: 3:40 - 4:30   Length of Appointment: 50 mins  Care Providers: Susi Davis, PhD, LP  Total number of patients present:  4    DSM-5 Diagnosis:   Alcohol Use Disorder   Major Depressive Disorder, recurrent (per chart review)  Generalized Anxiety Disorder (per chart review)     GROUP CONTENT: Introduction to group rules and structure. Group members discussed goals and challenges in maintaining sobriety. Psychoeducation regarding triggers, including warmer weather was discussed.     PATIENT ENGAGEMENT: Active and engaged. Set behavioral activation goal for the coming week.     Patient did not report any changes to medications.    MENTAL STATUS EXAM:   Appearance: Casually dressed and appropriately groomed   Motor activity: Within normal range  Speech rate: Within normal range  Speech volume: Within normal range  Speech articulation:  Within normal range  Speech coherence:  Within normal range  Speech spontaneity:  Within normal range  Affect (objective appearance):  Euthymic  Thought process: Linear, logical, goal-oriented  Suicide/ violence risk: Not individually assessed given group context, but no signs of risk were observed    PLAN:   - Attend Relapse Prevention Group

## 2023-05-31 NOTE — PROGRESS NOTES
Telehealth Details  Type of service:  video visit for group therapy  Originating Site (patient location):  [facility] Location- Remote location  Distant Site (provider location):  Remote location  Mode of Communication:  Video conference via Zoom    Relapse Prevention Group for Substance Use Disorders                                Time of Service: 3:40 - 4:30   Length of Appointment: 50 mins  Care Providers: Susi Davis, PhD, LP  Total number of patients present:  3    DSM-5 Diagnosis:   Alcohol Use Disorder   Major Depressive Disorder, recurrent (per chart review)  Generalized Anxiety Disorder (per chart review)     GROUP CONTENT: Introduction to group rules and structure, as there was a new group member today. Discussed strategies that helped with behavioral change, including behavioral tracking.     PATIENT ENGAGEMENT: Active and engaged. Set behavioral activation goal for the coming week.     Patient did not report any changes to medications.    MENTAL STATUS EXAM:   Appearance: Unable to assess due to Gio's video not working.   Motor activity: Unable to assess due to Gio's video not working.   Speech rate: Within normal range  Speech volume: Within normal range  Speech articulation:  Within normal range  Speech coherence:  Within normal range  Speech spontaneity:  Within normal range  Affect (objective appearance):  Unable to assess due to Gio's video not working.   Thought process: Linear, logical, goal-oriented  Suicide/ violence risk: Not individually assessed given group context, but no signs of risk were observed    PLAN:   - Attend Relapse Prevention Group    Answers for HPI/ROS submitted by the patient on 5/30/2023  If you checked off any problems, how difficult have these problems made it for you to do your work, take care of things at home, or get along with other people?: Not difficult at all  PHQ9 TOTAL SCORE: 0

## 2023-06-05 ENCOUNTER — DOCUMENTATION ONLY (OUTPATIENT)
Dept: OTHER | Facility: CLINIC | Age: 78
End: 2023-06-05
Payer: COMMERCIAL

## 2023-06-07 ENCOUNTER — ALLIED HEALTH/NURSE VISIT (OUTPATIENT)
Dept: EDUCATION SERVICES | Facility: CLINIC | Age: 78
End: 2023-06-07
Payer: COMMERCIAL

## 2023-06-07 VITALS — BODY MASS INDEX: 27.25 KG/M2 | WEIGHT: 191.4 LBS

## 2023-06-07 DIAGNOSIS — E11.9 TYPE 2 DIABETES MELLITUS WITHOUT COMPLICATION, WITHOUT LONG-TERM CURRENT USE OF INSULIN (H): ICD-10-CM

## 2023-06-07 PROCEDURE — G0108 DIAB MANAGE TRN  PER INDIV: HCPCS | Performed by: DIETITIAN, REGISTERED

## 2023-06-07 NOTE — LETTER
6/7/2023         RE: Laurent Choi  [address]  [place] [address]        Dear Colleague,    Thank you for referring your patient, Laurent Choi, to the [facility]. Please see a copy of my visit note below.    Diabetes Self-Management Education & Support/ 60 minutes    Presents for:      Type of Service: In Person Visit    Assessment Type:   ASSESSMENT:  Initial Dm education for Gio. A1C has been in the pre-diabetes range until recent PCP visit on 5/18. Gio was more sedentary over the winter months, weight had increased and he has not been limiting his carbohydrate portions. In the last few months, weight has decreased by 5#, Gio  joined the [facility] for water aerobics, plus he has been walking once weekly for 30 minutes.  Gio would like to see weight decrease another 5# at a minimum, ideally he is aiming for 180#.  Gio is consuming three meals/day, with sweets/baked goods at least twice daily.  He is up to date on yearly eye exams, he wears dentures.      Patient's most recent   Lab Results   Component Value Date    A1C 6.7 05/18/2023     is meeting goal of <7.0    Diabetes knowledge and skills assessment:   Patient is knowledgeable in diabetes management concepts related to: new diagnosis    Continue education with the following diabetes management concepts: per patient request    Based on learning assessment above, most appropriate setting for further diabetes education would be: Individual setting.      PLAN  1. Goal is to keep  A1C under 7%.  2. Weight loss to 185# in the next 3 months.  3. Exercise goal: 2x/week consistently for 3-4 week, then add in your third day, either walking or water aerobics.  4. For frozen dinners: keep total carbohydrates lower than 55 grams and total sodium less than 650 mg. Healthy choice and lean cuisine typically  have less sodium.   5. Limit baked goods to only one treat/day, then further decrease to one treat every  "other day.   6. Limit your carbohydrate foods to no more than 45-60 grams per day and 15 grams per snack or 1/4 of you plate.  7. Read labels for the serving size and total amount of carbohydrate.      Topics to cover at upcoming visits: per patient request    Follow-up: PRN    See Care Plan for co-developed, patient-state behavior change goals.  AVS provided for patient today.    Education Materials Provided:  Living Healthy with Diabetes and My Plate Planner      SUBJECTIVE/OBJECTIVE:  Diabetes education in the past 24mo: No  Diabetes type: Type 2  Disease course: Worsening  How confident are you filling out medical forms by yourself:: Quite a bit  Cultural Influences/Ethnic Background:  Not  or       Diabetes Symptoms & Complications:  Fatigue: Yes  Neuropathy: No  Polydipsia: No  Polyphagia: No  Polyuria: No  Visual change: No  Slow healing wounds: No  Autonomic neuropathy: No  CVA: No  Heart disease: Yes  Nephropathy: No  Peripheral neuropathy: No  Peripheral Vascular Disease: No  Retinopathy: No  Sexual dysfunction: No    Patient Problem List and Family Medical History reviewed for relevant medical history, current medical status, and diabetes risk factors.    Vitals:  Wt 86.8 kg (191 lb 6.4 oz)   BMI 27.25 kg/m    Estimated body mass index is 27.25 kg/m  as calculated from the following:    Height as of 5/18/23: 1.785 m (5' 10.28\").    Weight as of this encounter: 86.8 kg (191 lb 6.4 oz).   Last 3 BP:   BP Readings from Last 3 Encounters:   05/18/23 111/71   03/09/23 124/62   08/27/22 127/78       History   Smoking Status     Former     Packs/day: 2.00     Types: Cigarettes   Smokeless Tobacco     Never       Labs:  Lab Results   Component Value Date    A1C 6.7 05/18/2023     Lab Results   Component Value Date    GLC 98 05/18/2023    GLC 84 04/19/2022     09/04/2019     Lab Results   Component Value Date    LDL 63 05/18/2023     Direct Measure HDL   Date Value Ref Range Status "   05/18/2023 73 >=40 mg/dL Final   ]  GFR Estimate   Date Value Ref Range Status   05/18/2023 89 >60 mL/min/1.73m2 Final     Comment:     eGFR calculated using 2021 CKD-EPI equation.   06/01/2021 >60 >60 mL/min/1.73m2 Final   09/04/2019 90 >60 mL/min/[1.73_m2] Final     Comment:     Non  GFR Calc  Starting 12/18/2018, serum creatinine based estimated GFR (eGFR) will be   calculated using the Chronic Kidney Disease Epidemiology Collaboration   (CKD-EPI) equation.       GFR Estimate If Black   Date Value Ref Range Status   06/01/2021 >60 >60 mL/min/1.73m2 Final   09/04/2019 >90 >60 mL/min/[1.73_m2] Final     Comment:      GFR Calc  Starting 12/18/2018, serum creatinine based estimated GFR (eGFR) will be   calculated using the Chronic Kidney Disease Epidemiology Collaboration   (CKD-EPI) equation.       Lab Results   Component Value Date    CR 0.87 05/18/2023    CR 0.78 09/04/2019     No results found for: MICROALBUMIN    Healthy Eating:  Cultural/Congregational diet restrictions?: No  Meal planning/habits: None  How many times a week on average do you eat food made away from home (restaurant/take-out)?: 0  Meals include: Breakfast, Dinner, Evening Snack  Breakfast: 10-3p: cereal corn flakes or cheerios with a banana/toast  or oatmeal in the winter rarely a omelette, coffee  Dinner: 5-8p: cooks at home, convenience food, frozen dinners, [employer] meals, sometimes a chicken breast or fish or salmon, creamed potatoes  Other: baked goods each time, often  Beverages: Coffee, Juice (about 4 oz juice/day)    Being Active:  Being Active Assessed Today: Yes (1x/week walks around [place], 1.6 mile, 30 minutes, joined the [employer]-1x/week water aerobics)  Exercise:: Yes  Days per week of moderate to strenuous exercise (like a brisk walk): (P) 2  On average, minutes per day of exercise at this level: (P) 40  How intense was your typical exercise? : (P) Moderate (like brisk walking)  Exercise Minutes per  Week: (P) 80  Barrier to exercise: None    Monitoring:  Monitoring Assessed Today: No  Times checking blood sugar at home (number): Never        Taking Medications:  Diabetes Medication(s)     Biguanides       metFORMIN (GLUCOPHAGE XR) 500 MG 24 hr tablet    Take 1 tablet (500 mg) by mouth daily (with breakfast)          Taking Medication Assessed Today: Yes  Current Treatments: None        Reducing Risks:  Diabetes Risks: Age over 45 years, Family History  CAD Risks: Stress  Has dilated eye exam at least once a year?: Yes  Sees dentist every 6 months?: Yes  Feet checked by healthcare provider in the last year?: No    Healthy Coping:  Emotional response to diabetes: Ready to learn, Concern for health and well-being  Informal Support system:: None  Stage of change: PREPARATION (Decided to change - considering how)  Patient Activation Measure Survey Score:       View : No data to display.                  Care Plan and Education Provided:  Care Plan: Diabetes   Updates made by Maria Luisa Puga RD since 6/7/2023 12:00 AM      Problem: HbA1C Not In Goal       Goal: Establish Regular Follow-Ups with PCP       Task: Discuss with PCP the recommended timing for patient's next follow up visit(s)    Responsible User: Maria Luisa Puga RD      Task: Discuss schedule for PCP visits with patient    Responsible User: Maria Luisa Puga RD      Goal: Get HbA1C Level in Goal       Task: Educate patient on diabetes education self-management topics    Responsible User: Maria Luisa Puga RD      Task: Educate patient on benefits of regular glucose monitoring    Responsible User: Maria Luisa Puga RD      Task: Refer patient to appropriate extended care team member, as needed (Medication Therapy Management, Behavioral Health, Physical Therapy, etc.)    Responsible User: Maria Luisa Puga RD      Task: Discuss diabetes treatment plan with patient    Responsible User: Maria Luisa Puga RD      Problem: Diabetes Self-Management Education Needed to  Optimize Self-Care Behaviors       Goal: Understand diabetes pathophysiology and disease progression       Task: Provide education on diabetes pathophysiology and disease progression specfic to patient's diabetes type    Responsible User: Maria Luisa Puga RD      Goal: Healthy Eating - follow a healthy eating pattern for diabetes       Task: Provide education on portion control and consistency in amount, composition and timing of food intake    Responsible User: Maria Luisa Puga RD      Task: Provide education on managing carbohydrate intake (carbohydrate counting, plate planning method, etc.)    Responsible User: Maria Luisa Puga RD      Task: Provide education on weight management    Responsible User: Maria Luisa Puga RD      Task: Provide education on heart healthy eating    Responsible User: Maria Luisa Puga RD      Task: Provide education on eating out    Responsible User: Maria Luisa Puga RD      Task: Develop individualized healthy eating plan with patient    Responsible User: Maria Luisa Puga RD      Goal: Being Active - get regular physical activity, working up to at least 150 minutes per week       Task: Provide education on relationship of activity to glucose and precautions to take if at risk for low glucose    Responsible User: Maria Luisa Puga RD      Task: Discuss barriers to physical activity with patient    Responsible User: Maria Luisa Puga RD      Task: Develop physical activity plan with patient    Responsible User: Maria Luisa Puga RD      Task: Explore community resources including walking groups, assistance programs, and home videos    Responsible User: Maria Luisa Puga RD      Goal: Monitoring - monitor glucose and ketones as directed       Task: Provide education on blood glucose monitoring (purpose, proper technique, frequency, glucose targets, interpreting results, when to use glucose control solution, sharps disposal)    Responsible User: Maria Luisa Puga RD      Task: Provide education on continuous  glucose monitoring (sensor placement, use of michael or /reader, understanding glucose trends, alerts and alarms, differences between sensor glucose and blood glucose)    Responsible User: Maria Luisa Puga RD      Task: Provide education on ketone monitoring (when to monitor, frequency, etc.)    Responsible User: Maria Luisa Puga RD      Goal: Taking Medication - patient is consistently taking medications as directed       Task: Provide education on action of prescribed medication, including when to take and possible side effects    Responsible User: Maria Luisa Puga RD      Task: Provide education on insulin and injectable diabetes medications, including administration, storage, site selection and rotation for injection sites    Responsible User: Maria Luisa Puga RD      Task: Discuss barriers to medication adherence with patient and provide management technique ideas as appropriate    Responsible User: Maria Luisa Puga RD      Task: Provide education on frequency and refill details of medications    Responsible User: Maria Luisa Puga RD      Goal: Problem Solving - know how to prevent and manage short-term diabetes complications       Task: Provide education on high blood glucose - causes, signs/symptoms, prevention and treatment    Responsible User: Maria Luisa Puga RD      Task: Provide education on low blood glucose - causes, signs/symptoms, prevention, treatment, carrying a carbohydrate source at all times, and medical identification    Responsible User: Maria Luisa Puga RD      Task: Provide education on safe travel with diabetes    Responsible User: Maria Luisa Puga RD      Task: Provide education on how to care for diabetes on sick days    Responsible User: Maria Luisa Puga RD      Task: Provide education on when to call a health care provider    Responsible User: Maria Luisa Puga RD      Goal: Reducing Risks - know how to prevent and treat long-term diabetes complications       Task: Provide education on major  complications of diabetes, prevention, early diagnostic measures and treatment of complications    Responsible User: Maria Luisa Puga RD      Task: Provide education on recommended care for dental, eye and foot health    Responsible User: Maria Luisa Puga RD      Task: Provide education on Hemoglobin A1c - goals and relationship to blood glucose levels    Responsible User: Maria Luisa Puga RD      Task: Provide education on recommendations for heart health - lipid levels and goals, blood pressure and goals, and aspirin therapy, if indicated    Responsible User: Maria Luisa Puga RD      Task: Provide education on tobacco cessation    Responsible User: Maria Luisa Puga RD      Goal: Healthy Coping - use available resources to cope with the challenges of managing diabetes       Task: Discuss recognizing feelings about having diabetes    Responsible User: Maria Luisa Puga RD      Task: Provide education on the benefits of making appropriate lifestyle changes    Responsible User: Maria Luisa Puga RD      Task: Provide education on benefits of utilizing support systems    Responsible User: Maria Luisa Puga RD      Task: Discuss methods for coping with stress    Responsible User: Maria Luisa Puga RD      Task: Provide education on when to seek professional counseling    Responsible User: Maria Luisa Puga RD              Time Spent: 60 minutes  Encounter Type: Individual    Any diabetes medication dose changes were made via the CDE Protocol per the patient's primary care provider. A copy of this encounter was shared with the provider.

## 2023-06-07 NOTE — PATIENT INSTRUCTIONS
Goal is to keep your A1C under 7%.  Weight loss to 185# in the next 3 months.  Exercise goal: 2x/week consistently for 3-4 week, then add in your third day, either walking or water aerobics.  For frozen dinners: keep total carbohydrates lower than 55 grams and total sodium less than 650 mg. Healthy choice and lean cuisine typically  have less sodium.   Limit baked goods to only one treat/day, then further decrease to one treat every other day.   Limit your carbohydrate foods to no more than 45-60 grams per day and 15 grams per snack or 1/4 of you plate.  Read labels for the serving size and total amount of carbohydrate.

## 2023-06-07 NOTE — PROGRESS NOTES
Diabetes Self-Management Education & Support/ 60 minutes    Presents for:      Type of Service: In Person Visit    Assessment Type:   ASSESSMENT:  Initial Dm education for Gio. A1C has been in the pre-diabetes range until recent PCP visit on 5/18. Gio was more sedentary over the winter months, weight had increased and he has not been limiting his carbohydrate portions. In the last few months, weight has decreased by 5#, Gio  joined the [facility] for water aerobics, plus he has been walking once weekly for 30 minutes.  Gio would like to see weight decrease another 5# at a minimum, ideally he is aiming for 180#.  Gio is consuming three meals/day, with sweets/baked goods at least twice daily.  He is up to date on yearly eye exams, he wears dentures.      Patient's most recent   Lab Results   Component Value Date    A1C 6.7 05/18/2023     is meeting goal of <7.0    Diabetes knowledge and skills assessment:   Patient is knowledgeable in diabetes management concepts related to: new diagnosis    Continue education with the following diabetes management concepts: per patient request    Based on learning assessment above, most appropriate setting for further diabetes education would be: Individual setting.      PLAN  1. Goal is to keep  A1C under 7%.  2. Weight loss to 185# in the next 3 months.  3. Exercise goal: 2x/week consistently for 3-4 week, then add in your third day, either walking or water aerobics.  4. For frozen dinners: keep total carbohydrates lower than 55 grams and total sodium less than 650 mg. Healthy choice and lean cuisine typically  have less sodium.   5. Limit baked goods to only one treat/day, then further decrease to one treat every other day.   6. Limit your carbohydrate foods to no more than 45-60 grams per day and 15 grams per snack or 1/4 of you plate.  7. Read labels for the serving size and total amount of carbohydrate.      Topics to cover at upcoming visits: per patient request    Follow-up:  "PRN    See Care Plan for co-developed, patient-state behavior change goals.  AVS provided for patient today.    Education Materials Provided:  Living Healthy with Diabetes and My Plate Planner      SUBJECTIVE/OBJECTIVE:  Diabetes education in the past 24mo: No  Diabetes type: Type 2  Disease course: Worsening  How confident are you filling out medical forms by yourself:: Quite a bit  Cultural Influences/Ethnic Background:  Not  or       Diabetes Symptoms & Complications:  Fatigue: Yes  Neuropathy: No  Polydipsia: No  Polyphagia: No  Polyuria: No  Visual change: No  Slow healing wounds: No  Autonomic neuropathy: No  CVA: No  Heart disease: Yes  Nephropathy: No  Peripheral neuropathy: No  Peripheral Vascular Disease: No  Retinopathy: No  Sexual dysfunction: No    Patient Problem List and Family Medical History reviewed for relevant medical history, current medical status, and diabetes risk factors.    Vitals:  Wt 86.8 kg (191 lb 6.4 oz)   BMI 27.25 kg/m    Estimated body mass index is 27.25 kg/m  as calculated from the following:    Height as of 5/18/23: 1.785 m (5' 10.28\").    Weight as of this encounter: 86.8 kg (191 lb 6.4 oz).   Last 3 BP:   BP Readings from Last 3 Encounters:   05/18/23 111/71   03/09/23 124/62   08/27/22 127/78       History   Smoking Status     Former     Packs/day: 2.00     Types: Cigarettes   Smokeless Tobacco     Never       Labs:  Lab Results   Component Value Date    A1C 6.7 05/18/2023     Lab Results   Component Value Date    GLC 98 05/18/2023    GLC 84 04/19/2022     09/04/2019     Lab Results   Component Value Date    LDL 63 05/18/2023     Direct Measure HDL   Date Value Ref Range Status   05/18/2023 73 >=40 mg/dL Final   ]  GFR Estimate   Date Value Ref Range Status   05/18/2023 89 >60 mL/min/1.73m2 Final     Comment:     eGFR calculated using 2021 CKD-EPI equation.   06/01/2021 >60 >60 mL/min/1.73m2 Final   09/04/2019 90 >60 mL/min/[1.73_m2] Final     Comment:    "  Non  GFR Calc  Starting 12/18/2018, serum creatinine based estimated GFR (eGFR) will be   calculated using the Chronic Kidney Disease Epidemiology Collaboration   (CKD-EPI) equation.       GFR Estimate If Black   Date Value Ref Range Status   06/01/2021 >60 >60 mL/min/1.73m2 Final   09/04/2019 >90 >60 mL/min/[1.73_m2] Final     Comment:      GFR Calc  Starting 12/18/2018, serum creatinine based estimated GFR (eGFR) will be   calculated using the Chronic Kidney Disease Epidemiology Collaboration   (CKD-EPI) equation.       Lab Results   Component Value Date    CR 0.87 05/18/2023    CR 0.78 09/04/2019     No results found for: MICROALBUMIN    Healthy Eating:  Cultural/Anglican diet restrictions?: No  Meal planning/habits: None  How many times a week on average do you eat food made away from home (restaurant/take-out)?: 0  Meals include: Breakfast, Dinner, Evening Snack  Breakfast: 10-3p: cereal corn flakes or cheerios with a banana/toast  or oatmeal in the winter rarely a omelette, coffee  Dinner: 5-8p: cooks at home, convenience food, frozen dinners, [employer] meals, sometimes a chicken breast or fish or salmon, creamed potatoes  Other: baked goods each time, often  Beverages: Coffee, Juice (about 4 oz juice/day)    Being Active:  Being Active Assessed Today: Yes (1x/week walks around [place], 1.6 mile, 30 minutes, joined the [employer]-1x/week water aerobics)  Exercise:: Yes  Days per week of moderate to strenuous exercise (like a brisk walk): (P) 2  On average, minutes per day of exercise at this level: (P) 40  How intense was your typical exercise? : (P) Moderate (like brisk walking)  Exercise Minutes per Week: (P) 80  Barrier to exercise: None    Monitoring:  Monitoring Assessed Today: No  Times checking blood sugar at home (number): Never        Taking Medications:  Diabetes Medication(s)     Biguanides       metFORMIN (GLUCOPHAGE XR) 500 MG 24 hr tablet    Take 1 tablet (500 mg) by  mouth daily (with breakfast)          Taking Medication Assessed Today: Yes  Current Treatments: None        Reducing Risks:  Diabetes Risks: Age over 45 years, Family History  CAD Risks: Stress  Has dilated eye exam at least once a year?: Yes  Sees dentist every 6 months?: Yes  Feet checked by healthcare provider in the last year?: No    Healthy Coping:  Emotional response to diabetes: Ready to learn, Concern for health and well-being  Informal Support system:: None  Stage of change: PREPARATION (Decided to change - considering how)  Patient Activation Measure Survey Score:       View : No data to display.                  Care Plan and Education Provided:  Care Plan: Diabetes   Updates made by Maria Luisa Puga RD since 6/7/2023 12:00 AM      Problem: HbA1C Not In Goal       Goal: Establish Regular Follow-Ups with PCP       Task: Discuss with PCP the recommended timing for patient's next follow up visit(s)    Responsible User: Maria Luisa Puga RD      Task: Discuss schedule for PCP visits with patient    Responsible User: Maria Luisa Puga RD      Goal: Get HbA1C Level in Goal       Task: Educate patient on diabetes education self-management topics    Responsible User: Maria Luisa Puga RD      Task: Educate patient on benefits of regular glucose monitoring    Responsible User: Maria Luisa Puga RD      Task: Refer patient to appropriate extended care team member, as needed (Medication Therapy Management, Behavioral Health, Physical Therapy, etc.)    Responsible User: Maria Luisa Puga RD      Task: Discuss diabetes treatment plan with patient    Responsible User: Maria Luisa Puga RD      Problem: Diabetes Self-Management Education Needed to Optimize Self-Care Behaviors       Goal: Understand diabetes pathophysiology and disease progression       Task: Provide education on diabetes pathophysiology and disease progression specfic to patient's diabetes type    Responsible User: Maria Luisa Puga RD      Goal: Healthy Eating -  follow a healthy eating pattern for diabetes       Task: Provide education on portion control and consistency in amount, composition and timing of food intake    Responsible User: Maria Luisa Puga RD      Task: Provide education on managing carbohydrate intake (carbohydrate counting, plate planning method, etc.)    Responsible User: Maria Luisa Puga RD      Task: Provide education on weight management    Responsible User: Maria Luisa Puga RD      Task: Provide education on heart healthy eating    Responsible User: Maria Luisa Puga RD      Task: Provide education on eating out    Responsible User: Maria Luisa Puga RD      Task: Develop individualized healthy eating plan with patient    Responsible User: Maria Luisa Puga RD      Goal: Being Active - get regular physical activity, working up to at least 150 minutes per week       Task: Provide education on relationship of activity to glucose and precautions to take if at risk for low glucose    Responsible User: Maria Luisa Puga RD      Task: Discuss barriers to physical activity with patient    Responsible User: Maria Luisa Puga RD      Task: Develop physical activity plan with patient    Responsible User: Maria Luisa Puga RD      Task: Explore community resources including walking groups, assistance programs, and home videos    Responsible User: Maria Luisa Puga RD      Goal: Monitoring - monitor glucose and ketones as directed       Task: Provide education on blood glucose monitoring (purpose, proper technique, frequency, glucose targets, interpreting results, when to use glucose control solution, sharps disposal)    Responsible User: Maria Luisa Puga RD      Task: Provide education on continuous glucose monitoring (sensor placement, use of michael or /reader, understanding glucose trends, alerts and alarms, differences between sensor glucose and blood glucose)    Responsible User: Maria Luisa Puga RD      Task: Provide education on ketone monitoring (when to monitor,  frequency, etc.)    Responsible User: Maria Luisa Puga RD      Goal: Taking Medication - patient is consistently taking medications as directed       Task: Provide education on action of prescribed medication, including when to take and possible side effects    Responsible User: Maria Luisa Puga RD      Task: Provide education on insulin and injectable diabetes medications, including administration, storage, site selection and rotation for injection sites    Responsible User: Maria Luisa Puga RD      Task: Discuss barriers to medication adherence with patient and provide management technique ideas as appropriate    Responsible User: Maria Luisa Puga RD      Task: Provide education on frequency and refill details of medications    Responsible User: Maria Luisa Puga RD      Goal: Problem Solving - know how to prevent and manage short-term diabetes complications       Task: Provide education on high blood glucose - causes, signs/symptoms, prevention and treatment    Responsible User: Maria Luisa Puga RD      Task: Provide education on low blood glucose - causes, signs/symptoms, prevention, treatment, carrying a carbohydrate source at all times, and medical identification    Responsible User: Maria Luisa uPga RD      Task: Provide education on safe travel with diabetes    Responsible User: Maria Luisa Puga RD      Task: Provide education on how to care for diabetes on sick days    Responsible User: Maria Luisa Puga RD      Task: Provide education on when to call a health care provider    Responsible User: Maria Luisa Puga RD      Goal: Reducing Risks - know how to prevent and treat long-term diabetes complications       Task: Provide education on major complications of diabetes, prevention, early diagnostic measures and treatment of complications    Responsible User: Maria Luisa Puga RD      Task: Provide education on recommended care for dental, eye and foot health    Responsible User: Maria Luisa Puga RD      Task: Provide  education on Hemoglobin A1c - goals and relationship to blood glucose levels    Responsible User: Maria Luisa Puga RD      Task: Provide education on recommendations for heart health - lipid levels and goals, blood pressure and goals, and aspirin therapy, if indicated    Responsible User: Maria Luisa Puga RD      Task: Provide education on tobacco cessation    Responsible User: Maria Luisa Puga RD      Goal: Healthy Coping - use available resources to cope with the challenges of managing diabetes       Task: Discuss recognizing feelings about having diabetes    Responsible User: Maria Luisa Puga RD      Task: Provide education on the benefits of making appropriate lifestyle changes    Responsible User: Maria Luisa Puga RD      Task: Provide education on benefits of utilizing support systems    Responsible User: Maria Luisa Puga RD      Task: Discuss methods for coping with stress    Responsible User: Maria Luisa Puga RD      Task: Provide education on when to seek professional counseling    Responsible User: Maria Luisa Puga RD              Time Spent: 60 minutes  Encounter Type: Individual    Any diabetes medication dose changes were made via the CDE Protocol per the patient's primary care provider. A copy of this encounter was shared with the provider.

## 2023-06-13 ENCOUNTER — TELEPHONE (OUTPATIENT)
Dept: PSYCHIATRY | Facility: CLINIC | Age: 78
End: 2023-06-13
Payer: COMMERCIAL

## 2023-06-13 NOTE — TELEPHONE ENCOUNTER
Called patient reminding BARRETO appointment .  Not able to get hold of patient. Left messages on voicemail reminding BARRETO appointment  on 6- at 12 pm  Madonna Alva on 6/13/2023 at 12:05 PM

## 2023-06-14 DIAGNOSIS — I10 HYPERTENSION, ESSENTIAL: ICD-10-CM

## 2023-06-14 DIAGNOSIS — J43.9 PULMONARY EMPHYSEMA, UNSPECIFIED EMPHYSEMA TYPE (H): ICD-10-CM

## 2023-06-14 DIAGNOSIS — R53.83 FATIGUE, UNSPECIFIED TYPE: ICD-10-CM

## 2023-06-14 DIAGNOSIS — R06.02 SOB (SHORTNESS OF BREATH): ICD-10-CM

## 2023-06-14 RX ORDER — AMLODIPINE BESYLATE 5 MG/1
5 TABLET ORAL DAILY
Qty: 90 TABLET | Refills: 3 | OUTPATIENT
Start: 2023-06-14

## 2023-06-14 NOTE — TELEPHONE ENCOUNTER
Patient did not come for his BARRETO. Called and left message to patient to call back to reschedule.    Madonna Calle on 6/14/2023 at 1:47 PM

## 2023-06-15 ENCOUNTER — TELEPHONE (OUTPATIENT)
Dept: PSYCHIATRY | Facility: CLINIC | Age: 78
End: 2023-06-15
Payer: COMMERCIAL

## 2023-06-15 NOTE — TELEPHONE ENCOUNTER
Writer called the patient at [phone] regarding missed BARRETO appointment on Wednesday 6/14/23. The phone rang twice then was disconnected. Writer left a DVM requesting a CB to reschedule BARRETO appointment. Clinic line left as CB number.Deborah Blanchard LPN Lead

## 2023-06-16 ENCOUNTER — TELEPHONE (OUTPATIENT)
Dept: PSYCHIATRY | Facility: CLINIC | Age: 78
End: 2023-06-16
Payer: COMMERCIAL

## 2023-06-16 NOTE — TELEPHONE ENCOUNTER
Writer called patient at [phone] and left a DVM asking for a CB regarding missed BARRETO appointment from 6/14/23. Clinic line left as CB number.Deborah Blanchard LPN Lead

## 2023-06-20 ENCOUNTER — VIRTUAL VISIT (OUTPATIENT)
Dept: PSYCHIATRY | Facility: CLINIC | Age: 78
End: 2023-06-20
Attending: PSYCHOLOGIST
Payer: COMMERCIAL

## 2023-06-20 DIAGNOSIS — F10.21 ALCOHOL USE DISORDER, SEVERE, IN SUSTAINED REMISSION (H): Primary | ICD-10-CM

## 2023-06-20 PROCEDURE — 90853 GROUP PSYCHOTHERAPY: CPT | Mod: VID | Performed by: PSYCHOLOGIST

## 2023-06-21 ENCOUNTER — OFFICE VISIT (OUTPATIENT)
Dept: FAMILY MEDICINE | Facility: CLINIC | Age: 78
End: 2023-06-21
Payer: COMMERCIAL

## 2023-06-21 VITALS
SYSTOLIC BLOOD PRESSURE: 115 MMHG | DIASTOLIC BLOOD PRESSURE: 69 MMHG | HEIGHT: 70 IN | BODY MASS INDEX: 27.31 KG/M2 | TEMPERATURE: 98.2 F | OXYGEN SATURATION: 96 % | RESPIRATION RATE: 20 BRPM | WEIGHT: 190.75 LBS | HEART RATE: 80 BPM

## 2023-06-21 DIAGNOSIS — M80.00XS OSTEOPOROSIS WITH CURRENT PATHOLOGICAL FRACTURE, UNSPECIFIED OSTEOPOROSIS TYPE, SEQUELA: Primary | ICD-10-CM

## 2023-06-21 DIAGNOSIS — E11.9 TYPE 2 DIABETES MELLITUS WITHOUT COMPLICATION, WITH LONG-TERM CURRENT USE OF INSULIN (H): ICD-10-CM

## 2023-06-21 DIAGNOSIS — Z79.899 OTHER LONG TERM (CURRENT) DRUG THERAPY: ICD-10-CM

## 2023-06-21 DIAGNOSIS — S22.080D COMPRESSION FRACTURE OF T12 VERTEBRA WITH ROUTINE HEALING: ICD-10-CM

## 2023-06-21 DIAGNOSIS — Z79.4 TYPE 2 DIABETES MELLITUS WITHOUT COMPLICATION, WITH LONG-TERM CURRENT USE OF INSULIN (H): ICD-10-CM

## 2023-06-21 LAB
CREAT UR-MCNC: 52.8 MG/DL
MICROALBUMIN UR-MCNC: <12 MG/L
MICROALBUMIN/CREAT UR: NORMAL MG/G{CREAT}

## 2023-06-21 PROCEDURE — 83519 RIA NONANTIBODY: CPT | Mod: 90 | Performed by: FAMILY MEDICINE

## 2023-06-21 PROCEDURE — 99000 SPECIMEN HANDLING OFFICE-LAB: CPT | Performed by: FAMILY MEDICINE

## 2023-06-21 PROCEDURE — 82570 ASSAY OF URINE CREATININE: CPT | Performed by: FAMILY MEDICINE

## 2023-06-21 PROCEDURE — 36415 COLL VENOUS BLD VENIPUNCTURE: CPT | Performed by: FAMILY MEDICINE

## 2023-06-21 PROCEDURE — 82523 COLLAGEN CROSSLINKS: CPT | Mod: 90 | Performed by: FAMILY MEDICINE

## 2023-06-21 PROCEDURE — 99215 OFFICE O/P EST HI 40 MIN: CPT | Performed by: FAMILY MEDICINE

## 2023-06-21 PROCEDURE — 82043 UR ALBUMIN QUANTITATIVE: CPT | Performed by: FAMILY MEDICINE

## 2023-06-21 RX ORDER — ALENDRONATE SODIUM 70 MG/1
70 TABLET ORAL
Qty: 12 TABLET | Refills: 3 | Status: SHIPPED | OUTPATIENT
Start: 2023-06-21 | End: 2023-06-28

## 2023-06-21 NOTE — PROGRESS NOTES
Assessment & Plan     Osteoporosis with current pathological fracture, unspecified osteoporosis type, sequela  Pathophysiology and treatment options discussed, will start Fosamax 70 mg weekly, possible adverse reaction discussed, also discussed importance to take the medication and stay upright for 30 minutes to minimize heartburn, not planning to have dental work in the near future, discussed weightbearing activities, calcium and vitamin D supplementation.  - alendronate (FOSAMAX) 70 MG tablet; Take 1 tablet (70 mg) by mouth every 7 days for 336 days  - Procollagen Type 1 Intact N Terminal Pro; Future  - C-Telopeptide, Beta-Cross-Linked; Future  - Procollagen Type 1 Intact N Terminal Pro  - C-Telopeptide, Beta-Cross-Linked    Type 2 diabetes mellitus without complication, with long-term current use of insulin (H)  Recently diagnosed with mildly elevated blood sugar over the years.  Has met with a diabetes educator.  He does have an ophthalmologist that he sees on a yearly basis, previous diagnosis of macular degeneration, he gets left injection every 3 months.  - Albumin Random Urine Quantitative with Creat Ratio; Future  - Albumin Random Urine Quantitative with Creat Ratio    Compression fracture of T12 vertebra with routine healing  Chronic in nature, no recent exacerbation, osteoporosis treatment discussed and prescribed.    Other long term (current) drug therapy    - C-Telopeptide, Beta-Cross-Linked; Future  - C-Telopeptide, Beta-Cross-Linked    Review of external notes as documented elsewhere in note  40 minutes spent by me on the date of the encounter doing chart review, review of outside records, review of test results, interpretation of tests, patient visit and documentation        Regular exercise    Amara Smith MD  [facility]   Gio is a 78 year old, presenting for the following health issues:  Osteoporosis (Discuss treatment options after recent  "diagnosis.)        6/21/2023    11:28 AM   Additional Questions   Roomed by Gianna MULLEN     He is here to discuss management of osteoporosis, he had a bone density done a few weeks ago confirming the diagnosis.  CT scan done to screen for lung cancer about a month ago show chronic T12 compression fracture.  Patient also stating that his sister has osteoporosis and she is currently on treatment.  He does have mild GERD but usually exacerbated when it before going to bed.  Not planning to have dental work.  Planning to start weightbearing exercise continue with calcium and vitamin D.  Has been getting diabetes education classes.  He has an appointment in a few weeks to meet with a urologist for his elevated PSA.  He is also planning to follow-up with a cardiologist for his coronary artery disease.    Review of Systems   Constitutional, HEENT, cardiovascular, pulmonary, gi and gu systems are negative, except as otherwise noted.      Objective    /69 (BP Location: Left arm, Patient Position: Sitting, Cuff Size: Adult Regular)   Pulse 80   Temp 98.2  F (36.8  C) (Temporal)   Resp 20   Ht 1.778 m (5' 10\")   Wt 86.5 kg (190 lb 12 oz)   SpO2 96%   BMI 27.37 kg/m    Body mass index is 27.37 kg/m .  Physical Exam   GENERAL: healthy, alert and no distress  NECK: no adenopathy, no asymmetry, masses, or scars and thyroid normal to palpation  RESP: lungs clear to auscultation - no rales, rhonchi or wheezes  CV: regular rate and rhythm, normal S1 S2, no S3 or S4, no murmur, click or rub, no peripheral edema and peripheral pulses strong  ABDOMEN: soft, nontender, no hepatosplenomegaly, no masses and bowel sounds normal  MS: no gross musculoskeletal defects noted, no edema    Office Visit on 05/18/2023   Component Date Value Ref Range Status     Sodium 05/18/2023 140  136 - 145 mmol/L Final     Potassium 05/18/2023 4.9  3.4 - 5.3 mmol/L Final     Chloride 05/18/2023 103  98 - 107 mmol/L Final     Carbon Dioxide (CO2) " 05/18/2023 26  22 - 29 mmol/L Final     Anion Gap 05/18/2023 11  7 - 15 mmol/L Final     Urea Nitrogen 05/18/2023 12.9  8.0 - 23.0 mg/dL Final     Creatinine 05/18/2023 0.87  0.67 - 1.17 mg/dL Final     Calcium 05/18/2023 9.5  8.8 - 10.2 mg/dL Final     Glucose 05/18/2023 98  70 - 99 mg/dL Final     Alkaline Phosphatase 05/18/2023 43  40 - 129 U/L Final     AST 05/18/2023 21  10 - 50 U/L Final     ALT 05/18/2023 16  10 - 50 U/L Final     Protein Total 05/18/2023 7.0  6.4 - 8.3 g/dL Final     Albumin 05/18/2023 4.6  3.5 - 5.2 g/dL Final     Bilirubin Total 05/18/2023 0.5  <=1.2 mg/dL Final     GFR Estimate 05/18/2023 89  >60 mL/min/1.73m2 Final    eGFR calculated using 2021 CKD-EPI equation.     Cholesterol 05/18/2023 149  <200 mg/dL Final     Triglycerides 05/18/2023 67  <150 mg/dL Final     Direct Measure HDL 05/18/2023 73  >=40 mg/dL Final     LDL Cholesterol Calculated 05/18/2023 63  <=100 mg/dL Final     Non HDL Cholesterol 05/18/2023 76  <130 mg/dL Final     TSH 05/18/2023 0.96  0.30 - 4.20 uIU/mL Final     Hemoglobin A1C 05/18/2023 6.7 (H)  0.0 - 5.6 % Final    Normal <5.7%   Prediabetes 5.7-6.4%    Diabetes 6.5% or higher     Note: Adopted from ADA consensus guidelines.     Ferritin 05/18/2023 48  31 - 409 ng/mL Final     PSA Tumor Marker 05/18/2023 7.76 (H)  0.00 - 6.50 ng/mL Final     Vitamin D, Total (25-Hydroxy) 05/18/2023 29  20 - 75 ug/L Final     WBC Count 05/18/2023 6.0  4.0 - 11.0 10e3/uL Final     RBC Count 05/18/2023 4.57  4.40 - 5.90 10e6/uL Final     Hemoglobin 05/18/2023 13.7  13.3 - 17.7 g/dL Final     Hematocrit 05/18/2023 42.2  40.0 - 53.0 % Final     MCV 05/18/2023 92  78 - 100 fL Final     MCH 05/18/2023 30.0  26.5 - 33.0 pg Final     MCHC 05/18/2023 32.5  31.5 - 36.5 g/dL Final     RDW 05/18/2023 12.9  10.0 - 15.0 % Final     Platelet Count 05/18/2023 202  150 - 450 10e3/uL Final     % Neutrophils 05/18/2023 50  % Final     % Lymphocytes 05/18/2023 33  % Final     % Monocytes 05/18/2023 14   % Final     % Eosinophils 05/18/2023 3  % Final     % Basophils 05/18/2023 1  % Final     % Immature Granulocytes 05/18/2023 0  % Final     Absolute Neutrophils 05/18/2023 3.0  1.6 - 8.3 10e3/uL Final     Absolute Lymphocytes 05/18/2023 2.0  0.8 - 5.3 10e3/uL Final     Absolute Monocytes 05/18/2023 0.8  0.0 - 1.3 10e3/uL Final     Absolute Eosinophils 05/18/2023 0.2  0.0 - 0.7 10e3/uL Final     Absolute Basophils 05/18/2023 0.0  0.0 - 0.2 10e3/uL Final     Absolute Immature Granulocytes 05/18/2023 0.0  <=0.4 10e3/uL Final     No results found for any visits on 06/21/23.    This note was completed in part using a voice recognition software, any grammatical or context distortion are unintentional and inherent to the software.          Prior to immunization administration, verified patients identity using patient s name and date of birth. Please see Immunization Activity for additional information.     Screening Questionnaire for Adult Immunization    Are you sick today?   No   Do you have allergies to medications, food, a vaccine component or latex?   No   Have you ever had a serious reaction after receiving a vaccination?   No   Do you have a long-term health problem with heart, lung, kidney, or metabolic disease (e.g., diabetes), asthma, a blood disorder, no spleen, complement component deficiency, a cochlear implant, or a spinal fluid leak?  Are you on long-term aspirin therapy?   Yes   Do you have cancer, leukemia, HIV/AIDS, or any other immune system problem?   No   Do you have a parent, brother, or sister with an immune system problem?   No   In the past 3 months, have you taken medications that affect  your immune system, such as prednisone, other steroids, or anticancer drugs; drugs for the treatment of rheumatoid arthritis, Crohn s disease, or psoriasis; or have you had radiation treatments?   No   Have you had a seizure, or a brain or other nervous system problem?   No   During the past year, have you  received a transfusion of blood or blood    products, or been given immune (gamma) globulin or antiviral drug?   No   For women: Are you pregnant or is there a chance you could become       pregnant during the next month?   No   Have you received any vaccinations in the past 4 weeks?   No     Immunization questionnaire was positive for at least one answer.  Notified Dr. Smith.      Screening performed by Gianna Ingram RN on 6/21/2023 at 11:31 AM.

## 2023-06-22 LAB — COLLAGEN CTX SERPL-MCNC: 235 PG/ML

## 2023-06-23 LAB — PINP SER-MCNC: 41 UG/L

## 2023-06-24 NOTE — PROGRESS NOTES
Telehealth Details  Type of service:  video visit for group therapy  Originating Site (patient location):  [facility] Location- Remote location  Distant Site (provider location):  Remote location  Mode of Communication:  Video conference via Zoom    Relapse Prevention Group for Substance Use Disorders                                Time of Service: 3:30 - 4:30   Length of Appointment: 60 mins  Care Providers: Susi Davis, PhD, LP  Total number of patients present:  5    DSM-5 Diagnosis:   Alcohol Use Disorder   Major Depressive Disorder, recurrent (per chart review)  Generalized Anxiety Disorder (per chart review)     GROUP CONTENT: Introduction to group rules and structure, as there was a new group member today. Discussed the relationship between relationship between negative emotions and the urge to use, and strategies to cope.     PATIENT ENGAGEMENT: Active and engaged. Set behavioral activation goal for the coming week.     Patient did not report any changes to medications.    MENTAL STATUS EXAM:   Appearance: Unable to assess due to Gio's video not working.   Motor activity: Unable to assess due to Gio's video not working.   Speech rate: Within normal range  Speech volume: Within normal range  Speech articulation:  Within normal range  Speech coherence:  Within normal range  Speech spontaneity:  Within normal range  Affect (objective appearance):  Unable to assess due to Gio's video not working.   Thought process: Linear, logical, goal-oriented  Suicide/ violence risk: Not individually assessed given group context, but no signs of risk were observed    PLAN:   - Attend Relapse Prevention Group    Answers for HPI/ROS submitted by the patient on 5/30/2023  If you checked off any problems, how difficult have these problems made it for you to do your work, take care of things at home, or get along with other people?: Not difficult at all  PHQ9 TOTAL SCORE: 0

## 2023-06-26 ENCOUNTER — TELEPHONE (OUTPATIENT)
Dept: PSYCHOLOGY | Facility: CLINIC | Age: 78
End: 2023-06-26
Payer: COMMERCIAL

## 2023-06-26 NOTE — TELEPHONE ENCOUNTER
"Writer called the patient at [phone] who said he does not want to continue with the BARRETO Vivitrol 380 mg monthly injection. Patient's last injection was on 5/17/23. Patient shared his fatigue is slightly better within the past week. Patient attributes the BARRETO to his fatigue. Writer explained Magdalena Bee, Radha, and Ryan (weekly therapist) will be updated regarding patient's request.   Patient shared things are going well and \"does not have a desire or urge to drink in a really long time\" Patient shared if this does occur he will mention it at his weekly therapy and call the clinic.   Writer reminded patient there will be a change in providers and will wait for a call from the clinic to schedule with his new provider.   Patient acknowledges to contact the clinic for any questions or concerns before his new provider appointment.Deborah Blanchard LPN Lead    "

## 2023-06-28 ENCOUNTER — TRANSFERRED RECORDS (OUTPATIENT)
Dept: HEALTH INFORMATION MANAGEMENT | Facility: CLINIC | Age: 78
End: 2023-06-28

## 2023-06-28 ENCOUNTER — OFFICE VISIT (OUTPATIENT)
Dept: CARDIOLOGY | Facility: CLINIC | Age: 78
End: 2023-06-28
Payer: COMMERCIAL

## 2023-06-28 VITALS
DIASTOLIC BLOOD PRESSURE: 62 MMHG | WEIGHT: 190 LBS | RESPIRATION RATE: 14 BRPM | HEART RATE: 84 BPM | SYSTOLIC BLOOD PRESSURE: 96 MMHG | BODY MASS INDEX: 27.26 KG/M2

## 2023-06-28 DIAGNOSIS — I25.119 CORONARY ARTERY DISEASE INVOLVING NATIVE CORONARY ARTERY OF NATIVE HEART WITH ANGINA PECTORIS (H): Primary | ICD-10-CM

## 2023-06-28 PROCEDURE — 99204 OFFICE O/P NEW MOD 45 MIN: CPT | Mod: 25 | Performed by: INTERNAL MEDICINE

## 2023-06-28 PROCEDURE — 93000 ELECTROCARDIOGRAM COMPLETE: CPT | Performed by: INTERNAL MEDICINE

## 2023-06-28 NOTE — LETTER
6/28/2023    Amara Smith MD  [address] [place] [address]    RE: Laurent Choi       Dear Colleague,     I had the pleasure of seeing Laurent Choi in the [facility].        Thank you, Dr. Patrick, for asking [facility] to evaluate Mr. Laurent Choi.      Assessment/Recommendations   Assessment:    Coronary artery disease with history of LAD and diagonal stenting at [facility] in 2013, mild to moderate disease in left circumflex and RCA  Left arm discomfort/ paresthesia, unclear etiology possibly atypical angina  Hypercholesterolemia, good control  Diabetes mellitus on metformin  Alcoholism, sober  History of tobacco use with COPD mild    Plan:  Continue current cardiac medications       History of Present Illness    Mr. Laurent Choi is a 78 year old male who comes in for cardiac evaluation.  He is concerned about discomfort and paresthesia in his left upper arm.  He does not have an associated chest pain.  The discomfort in the arm is nonexertional.  He thinks that discomfort is somewhat similar to what he was experiencing back in 2013.  At that time he was diagnosed with obstructive coronary artery disease and had stenting of LAD and diagonal at [facility].  Last time that he was seen by cardiologist was 4 years ago.  He had a negative nuclear stress test.  He denies heart palpitation or syncope.  He has not had recent weight gain, PND, orthopnea.  Recently he was diagnosed with diabetes mellitus.  He has been taking metformin.    ECG: Personally reviewed.  6/20/2023 normal sinus rhythm right bundle branch block, no ischemic changes or Q waves    Stress Test: 2019  Negative myocardial perfusion scan     Physical Examination Review of Systems   BP 96/62 (BP Location: Left arm, Patient Position: Sitting, Cuff Size: Adult Regular)   Pulse 84   Resp 14   Wt 86.2 kg (190 lb)   BMI 27.26 kg/m    Body mass index is 27.26 kg/m .  Wt Readings from Last 3  Encounters:   06/28/23 86.2 kg (190 lb)   06/21/23 86.5 kg (190 lb 12 oz)   06/07/23 86.8 kg (191 lb 6.4 oz)     General Appearance:   Alert, cooperative, no distress, appears stated age   Head/ENT: Normocephalic, without obvious abnormality. Membranes moist      EYES:  no scleral icterus, normal conjunctivae   Neck: Supple, symmetrical, trachea midline, no adenopathy, thyroid: not enlarged, symmetric, no carotid bruit or JVD   Chest/Lungs:   Lungs are clear to auscultation, respirations unlabored. No tenderness or deformity    Cardiovascular:   Regular rhythm, S1, S2 normal, no murmur, rub or gallop.   Abdomen:  Soft, non-tender, bowel sounds active all four quadrants,  no masses, no organomegaly   Extremities: no cyanosis or clubbing. No edema   Skin: Skin color, texture, turgor normal, no rashes or lesions.    Psychiatric: Normal affect, calm   Neurologic: Alert and oriented x 3, moving all four extremities.     Enc Vitals  BP: 96/62  Pulse: 84  Resp: 14  Weight: 86.2 kg (190 lb)                                          Lab Results    Chemistry/lipid CBC Cardiac Enzymes/BNP/TSH/INR   Recent Labs   Lab Test 05/18/23  1037   CHOL 149   HDL 73   LDL 63   TRIG 67     Recent Labs   Lab Test 05/18/23  1037 04/19/22  1402 06/01/21  1125   LDL 63 68 74     Recent Labs   Lab Test 05/18/23  1037      POTASSIUM 4.9   CHLORIDE 103   CO2 26   GLC 98   BUN 12.9   CR 0.87   GFRESTIMATED 89   SELENA 9.5     Recent Labs   Lab Test 05/18/23  1037 04/19/22  1402 06/01/21  1125   CR 0.87 0.88 0.86     Recent Labs   Lab Test 05/18/23  1037 04/19/22  1402 06/01/21  1125   A1C 6.7* 6.0* 6.0*          Recent Labs   Lab Test 05/18/23  1037   WBC 6.0   HGB 13.7   HCT 42.2   MCV 92        Recent Labs   Lab Test 05/18/23  1037 04/19/22  1402 06/01/21  1125   HGB 13.7 14.1 13.6*    No results for input(s): TROPONINI in the last 42155 hours.  Recent Labs   Lab Test 07/17/19  1150   NTBNP 59     Recent Labs   Lab Test 05/18/23  1037    TSH 0.96     Recent Labs   Lab Test 07/17/19  1150 08/28/18  1400   INR 0.97 0.91        Medical History  Surgical History Family History Social History   Past Medical History:   Diagnosis Date    Alcohol abuse     Arthritis     Chest pain     Clogged artery (heart)     GERD (gastroesophageal reflux disease)     Heart disease     Stints in heart due to cholesterol build up.     High cholesterol     Takes medications for this.     Hypertension     Hypertension     Macular degeneration     left eye is stable, right eye is legally blind.     Macular degeneration      Past Surgical History:   Procedure Laterality Date    CARDIAC SURGERY      COLONOSCOPY      removal of polyp    ENT SURGERY      HERNIA REPAIR       No premature CAD, SCD,cardiomyopathy   Social History     Socioeconomic History    Marital status:      Spouse name: Not on file    Number of children: Not on file    Years of education: Not on file    Highest education level: Not on file   Occupational History    Not on file   Tobacco Use    Smoking status: Former     Packs/day: 2.00     Types: Cigarettes     Passive exposure: Never    Smokeless tobacco: Never   Vaping Use    Vaping Use: Never used   Substance and Sexual Activity    Alcohol use: Not Currently    Drug use: Never    Sexual activity: Not on file   Other Topics Concern    Parent/sibling w/ CABG, MI or angioplasty before 65F 55M? Not Asked   Social History Narrative    Not on file     Social Determinants of Health     Financial Resource Strain: Not on file   Food Insecurity: Not on file   Transportation Needs: Not on file   Physical Activity: Not on file   Stress: Not on file   Social Connections: Not on file   Intimate Partner Violence: Not At Risk (5/11/2022)    Humiliation, Afraid, Rape, and Kick questionnaire     Fear of Current or Ex-Partner: No     Emotionally Abused: No     Physically Abused: No     Sexually Abused: No   Housing Stability: Not on file         Medications  Allergies    Current Outpatient Medications   Medication Sig Dispense Refill    Acetaminophen 325 MG CAPS Take 325 mg by mouth every 8 hours as needed      ASPIRIN PO Take 81 mg by mouth daily      calcium-vitamin D 500-125 MG-UNIT TABS Take 1 tablet by mouth 2 times daily      clotrimazole (LOTRIMIN) 1 % external cream Apply topically 2 times daily Apply to rough patch of skin in front of right ear 2x daily for 10 days 24 g 0    DIPHENHYDRAMINE HCL PO Take 25 mg by mouth daily as needed      escitalopram (LEXAPRO) 20 MG tablet Take 1 tablet (20 mg) by mouth daily 90 tablet 0    gabapentin (NEURONTIN) 300 MG capsule Take 1 capsule (300 mg) by mouth At Bedtime Take 1 capsule by mouth at bedtime as needed. 90 capsule 0    losartan (COZAAR) 50 MG tablet Take 1 tablet (50 mg) by mouth daily 90 tablet 1    metFORMIN (GLUCOPHAGE XR) 500 MG 24 hr tablet Take 1 tablet (500 mg) by mouth daily (with breakfast) 90 tablet 2    NITROGLYCERIN SL Take 0.4 mg by mouth every 5 minutes as needed      omeprazole (PRILOSEC) 20 MG DR capsule TAKE 1 CAPSULE BY MOUTH DAILY,  60 MINUTES BEFORE A MEAL. 90 capsule 3    rosuvastatin (CRESTOR) 40 MG tablet Take 1 tablet (40 mg) by mouth At Bedtime 90 tablet 0    tamsulosin (FLOMAX) 0.4 MG capsule TAKE ONE CAPSULE BY MOUTH ONCE DAILY WITH BREAKFAST 90 capsule 0        Allergies   Allergen Reactions    Aspirin Other (See Comments)     Avoids taking any aspirin besides his daily 81 mg aspirin regimen due to acid reflux/esophagus problem(s).    Nsaids (Non-Steroidal Anti-Inflammatory Drug) [Nsaids] Other (See Comments)     Avoids due to acid reflux/esophagus problem(s).    Plavix [Clopidogrel] Other (See Comments) and Muscle Pain (Myalgia)     Reaction(s): Bad bruising all over his body and leg cramps.    Statins-Hmg-Coa Reductase Inhibitors [Statins] Muscle Pain (Myalgia)     Per the patient, he tolerates rosuvastatin.                       Thank you for allowing me to participate in the care of your  patient.      Sincerely,     Gio Patrick MD     [facility]  cc:   Gio Patrick MD  [address]  [address]  [place] [address]

## 2023-06-28 NOTE — PROGRESS NOTES
Thank you, Dr. Patrick, for asking [facility] to evaluate Mr. Laurent Choi.      Assessment/Recommendations   Assessment:    Coronary artery disease with history of LAD and diagonal stenting at [facility] in 2013, mild to moderate disease in left circumflex and RCA  Left arm discomfort/ paresthesia, unclear etiology possibly atypical angina  Hypercholesterolemia, good control  Diabetes mellitus on metformin  Alcoholism, sober  History of tobacco use with COPD mild    Plan:  Continue current cardiac medications       History of Present Illness    Mr. Laurent Choi is a 78 year old male who comes in for cardiac evaluation.  He is concerned about discomfort and paresthesia in his left upper arm.  He does not have an associated chest pain.  The discomfort in the arm is nonexertional.  He thinks that discomfort is somewhat similar to what he was experiencing back in 2013.  At that time he was diagnosed with obstructive coronary artery disease and had stenting of LAD and diagonal at [facility].  Last time that he was seen by cardiologist was 4 years ago.  He had a negative nuclear stress test.  He denies heart palpitation or syncope.  He has not had recent weight gain, PND, orthopnea.  Recently he was diagnosed with diabetes mellitus.  He has been taking metformin.    ECG: Personally reviewed.  6/20/2023 normal sinus rhythm right bundle branch block, no ischemic changes or Q waves    Stress Test: 2019  Negative myocardial perfusion scan     Physical Examination Review of Systems   BP 96/62 (BP Location: Left arm, Patient Position: Sitting, Cuff Size: Adult Regular)   Pulse 84   Resp 14   Wt 86.2 kg (190 lb)   BMI 27.26 kg/m    Body mass index is 27.26 kg/m .  Wt Readings from Last 3 Encounters:   06/28/23 86.2 kg (190 lb)   06/21/23 86.5 kg (190 lb 12 oz)   06/07/23 86.8 kg (191 lb 6.4 oz)     General Appearance:   Alert, cooperative, no distress, appears stated age   Head/ENT:  Normocephalic, without obvious abnormality. Membranes moist      EYES:  no scleral icterus, normal conjunctivae   Neck: Supple, symmetrical, trachea midline, no adenopathy, thyroid: not enlarged, symmetric, no carotid bruit or JVD   Chest/Lungs:   Lungs are clear to auscultation, respirations unlabored. No tenderness or deformity    Cardiovascular:   Regular rhythm, S1, S2 normal, no murmur, rub or gallop.   Abdomen:  Soft, non-tender, bowel sounds active all four quadrants,  no masses, no organomegaly   Extremities: no cyanosis or clubbing. No edema   Skin: Skin color, texture, turgor normal, no rashes or lesions.    Psychiatric: Normal affect, calm   Neurologic: Alert and oriented x 3, moving all four extremities.     Enc Vitals  BP: 96/62  Pulse: 84  Resp: 14  Weight: 86.2 kg (190 lb)                                          Lab Results    Chemistry/lipid CBC Cardiac Enzymes/BNP/TSH/INR   Recent Labs   Lab Test 05/18/23  1037   CHOL 149   HDL 73   LDL 63   TRIG 67     Recent Labs   Lab Test 05/18/23  1037 04/19/22  1402 06/01/21  1125   LDL 63 68 74     Recent Labs   Lab Test 05/18/23  1037      POTASSIUM 4.9   CHLORIDE 103   CO2 26   GLC 98   BUN 12.9   CR 0.87   GFRESTIMATED 89   SELENA 9.5     Recent Labs   Lab Test 05/18/23  1037 04/19/22  1402 06/01/21  1125   CR 0.87 0.88 0.86     Recent Labs   Lab Test 05/18/23  1037 04/19/22  1402 06/01/21  1125   A1C 6.7* 6.0* 6.0*          Recent Labs   Lab Test 05/18/23  1037   WBC 6.0   HGB 13.7   HCT 42.2   MCV 92        Recent Labs   Lab Test 05/18/23  1037 04/19/22  1402 06/01/21  1125   HGB 13.7 14.1 13.6*    No results for input(s): TROPONINI in the last 91084 hours.  Recent Labs   Lab Test 07/17/19  1150   NTBNP 59     Recent Labs   Lab Test 05/18/23  1037   TSH 0.96     Recent Labs   Lab Test 07/17/19  1150 08/28/18  1400   INR 0.97 0.91        Medical History  Surgical History Family History Social History   Past Medical History:   Diagnosis Date      Alcohol abuse      Arthritis      Chest pain      Clogged artery (heart)      GERD (gastroesophageal reflux disease)      Heart disease     Stints in heart due to cholesterol build up.      High cholesterol     Takes medications for this.      Hypertension      Hypertension      Macular degeneration     left eye is stable, right eye is legally blind.      Macular degeneration      Past Surgical History:   Procedure Laterality Date     CARDIAC SURGERY       COLONOSCOPY      removal of polyp     ENT SURGERY       HERNIA REPAIR       No premature CAD, SCD,cardiomyopathy   Social History     Socioeconomic History     Marital status:      Spouse name: Not on file     Number of children: Not on file     Years of education: Not on file     Highest education level: Not on file   Occupational History     Not on file   Tobacco Use     Smoking status: Former     Packs/day: 2.00     Types: Cigarettes     Passive exposure: Never     Smokeless tobacco: Never   Vaping Use     Vaping Use: Never used   Substance and Sexual Activity     Alcohol use: Not Currently     Drug use: Never     Sexual activity: Not on file   Other Topics Concern     Parent/sibling w/ CABG, MI or angioplasty before 65F 55M? Not Asked   Social History Narrative     Not on file     Social Determinants of Health     Financial Resource Strain: Not on file   Food Insecurity: Not on file   Transportation Needs: Not on file   Physical Activity: Not on file   Stress: Not on file   Social Connections: Not on file   Intimate Partner Violence: Not At Risk (5/11/2022)    Humiliation, Afraid, Rape, and Kick questionnaire      Fear of Current or Ex-Partner: No      Emotionally Abused: No      Physically Abused: No      Sexually Abused: No   Housing Stability: Not on file         Medications  Allergies   Current Outpatient Medications   Medication Sig Dispense Refill     Acetaminophen 325 MG CAPS Take 325 mg by mouth every 8 hours as needed       ASPIRIN PO Take 81  mg by mouth daily       calcium-vitamin D 500-125 MG-UNIT TABS Take 1 tablet by mouth 2 times daily       clotrimazole (LOTRIMIN) 1 % external cream Apply topically 2 times daily Apply to rough patch of skin in front of right ear 2x daily for 10 days 24 g 0     DIPHENHYDRAMINE HCL PO Take 25 mg by mouth daily as needed       escitalopram (LEXAPRO) 20 MG tablet Take 1 tablet (20 mg) by mouth daily 90 tablet 0     gabapentin (NEURONTIN) 300 MG capsule Take 1 capsule (300 mg) by mouth At Bedtime Take 1 capsule by mouth at bedtime as needed. 90 capsule 0     losartan (COZAAR) 50 MG tablet Take 1 tablet (50 mg) by mouth daily 90 tablet 1     metFORMIN (GLUCOPHAGE XR) 500 MG 24 hr tablet Take 1 tablet (500 mg) by mouth daily (with breakfast) 90 tablet 2     NITROGLYCERIN SL Take 0.4 mg by mouth every 5 minutes as needed       omeprazole (PRILOSEC) 20 MG DR capsule TAKE 1 CAPSULE BY MOUTH DAILY,  60 MINUTES BEFORE A MEAL. 90 capsule 3     rosuvastatin (CRESTOR) 40 MG tablet Take 1 tablet (40 mg) by mouth At Bedtime 90 tablet 0     tamsulosin (FLOMAX) 0.4 MG capsule TAKE ONE CAPSULE BY MOUTH ONCE DAILY WITH BREAKFAST 90 capsule 0        Allergies   Allergen Reactions     Aspirin Other (See Comments)     Avoids taking any aspirin besides his daily 81 mg aspirin regimen due to acid reflux/esophagus problem(s).     Nsaids (Non-Steroidal Anti-Inflammatory Drug) [Nsaids] Other (See Comments)     Avoids due to acid reflux/esophagus problem(s).     Plavix [Clopidogrel] Other (See Comments) and Muscle Pain (Myalgia)     Reaction(s): Bad bruising all over his body and leg cramps.     Statins-Hmg-Coa Reductase Inhibitors [Statins] Muscle Pain (Myalgia)     Per the patient, he tolerates rosuvastatin.

## 2023-06-28 NOTE — PATIENT INSTRUCTIONS
Laurent Choi,    It was a pleasure to see you today at the [facility].     My recommendations after this visit include:    Stress test    JESSIE Patrick MD, FACC, MELISSA

## 2023-06-29 LAB
ATRIAL RATE - MUSE: 74 BPM
DIASTOLIC BLOOD PRESSURE - MUSE: NORMAL MMHG
INTERPRETATION ECG - MUSE: NORMAL
P AXIS - MUSE: 77 DEGREES
PR INTERVAL - MUSE: 204 MS
QRS DURATION - MUSE: 120 MS
QT - MUSE: 412 MS
QTC - MUSE: 457 MS
R AXIS - MUSE: 35 DEGREES
SYSTOLIC BLOOD PRESSURE - MUSE: NORMAL MMHG
T AXIS - MUSE: 56 DEGREES
VENTRICULAR RATE- MUSE: 74 BPM

## 2023-07-06 ENCOUNTER — HOSPITAL ENCOUNTER (OUTPATIENT)
Dept: NUCLEAR MEDICINE | Facility: HOSPITAL | Age: 78
Discharge: HOME | End: 2023-07-06
Attending: INTERNAL MEDICINE
Payer: COMMERCIAL

## 2023-07-06 ENCOUNTER — HOSPITAL ENCOUNTER (OUTPATIENT)
Dept: CARDIOLOGY | Facility: HOSPITAL | Age: 78
Discharge: HOME | End: 2023-07-06
Attending: INTERNAL MEDICINE
Payer: COMMERCIAL

## 2023-07-06 DIAGNOSIS — I25.119 CORONARY ARTERY DISEASE INVOLVING NATIVE CORONARY ARTERY OF NATIVE HEART WITH ANGINA PECTORIS (H): Primary | ICD-10-CM

## 2023-07-06 DIAGNOSIS — I25.119 CORONARY ARTERY DISEASE INVOLVING NATIVE CORONARY ARTERY OF NATIVE HEART WITH ANGINA PECTORIS (H): ICD-10-CM

## 2023-07-06 LAB
CV STRESS CURRENT BP HE: NORMAL
CV STRESS CURRENT HR HE: 62
CV STRESS CURRENT HR HE: 65
CV STRESS CURRENT HR HE: 65
CV STRESS CURRENT HR HE: 70
CV STRESS CURRENT HR HE: 70
CV STRESS CURRENT HR HE: 73
CV STRESS CURRENT HR HE: 76
CV STRESS CURRENT HR HE: 77
CV STRESS CURRENT HR HE: 78
CV STRESS CURRENT HR HE: 79
CV STRESS CURRENT HR HE: 82
CV STRESS CURRENT HR HE: 84
CV STRESS CURRENT HR HE: 84
CV STRESS CURRENT HR HE: 88
CV STRESS DEVIATION TIME HE: NORMAL
CV STRESS ECHO PERCENT HR HE: NORMAL
CV STRESS EXERCISE STAGE HE: NORMAL
CV STRESS FINAL RESTING BP HE: NORMAL
CV STRESS FINAL RESTING HR HE: 70
CV STRESS MAX HR HE: 90
CV STRESS MAX TREADMILL GRADE HE: 0
CV STRESS MAX TREADMILL SPEED HE: 0
CV STRESS PEAK DIA BP HE: NORMAL
CV STRESS PEAK SYS BP HE: NORMAL
CV STRESS PHASE HE: NORMAL
CV STRESS PROTOCOL HE: NORMAL
CV STRESS RESTING PT POSITION HE: NORMAL
CV STRESS ST DEVIATION AMOUNT HE: NORMAL
CV STRESS ST DEVIATION ELEVATION HE: NORMAL
CV STRESS ST EVELATION AMOUNT HE: NORMAL
CV STRESS TEST TYPE HE: NORMAL
CV STRESS TOTAL STAGE TIME MIN 1 HE: NORMAL
RATE PRESSURE PRODUCT: NORMAL
STRESS ECHO BASELINE DIASTOLIC HE: 78
STRESS ECHO BASELINE HR: 64
STRESS ECHO BASELINE SYSTOLIC BP: 144
STRESS ECHO CALCULATED PERCENT HR: 63 %
STRESS ECHO LAST STRESS DIASTOLIC BP: 78
STRESS ECHO LAST STRESS HR: 84
STRESS ECHO LAST STRESS SYSTOLIC BP: 153
STRESS ECHO TARGET HR: 144

## 2023-07-06 PROCEDURE — 78452 HT MUSCLE IMAGE SPECT MULT: CPT | Mod: 26 | Performed by: INTERNAL MEDICINE

## 2023-07-06 PROCEDURE — 78452 HT MUSCLE IMAGE SPECT MULT: CPT

## 2023-07-06 PROCEDURE — A9500 TC99M SESTAMIBI: HCPCS | Performed by: INTERNAL MEDICINE

## 2023-07-06 PROCEDURE — 343N000001 HC RX 343: Performed by: INTERNAL MEDICINE

## 2023-07-06 PROCEDURE — 93018 CV STRESS TEST I&R ONLY: CPT | Performed by: INTERNAL MEDICINE

## 2023-07-06 PROCEDURE — 93016 CV STRESS TEST SUPVJ ONLY: CPT | Performed by: INTERNAL MEDICINE

## 2023-07-06 PROCEDURE — 93017 CV STRESS TEST TRACING ONLY: CPT

## 2023-07-06 PROCEDURE — 250N000011 HC RX IP 250 OP 636: Mod: JZ | Performed by: INTERNAL MEDICINE

## 2023-07-06 RX ORDER — AMINOPHYLLINE 25 MG/ML
50 INJECTION, SOLUTION INTRAVENOUS
Status: DISCONTINUED | OUTPATIENT
Start: 2023-07-06 | End: 2023-07-06 | Stop reason: HOSPADM

## 2023-07-06 RX ORDER — REGADENOSON 0.08 MG/ML
0.4 INJECTION, SOLUTION INTRAVENOUS ONCE
Status: COMPLETED | OUTPATIENT
Start: 2023-07-06 | End: 2023-07-06

## 2023-07-06 RX ADMIN — Medication 8.1 MILLICURIE: at 09:14

## 2023-07-06 RX ADMIN — REGADENOSON 0.4 MG: 0.08 INJECTION, SOLUTION INTRAVENOUS at 10:38

## 2023-07-06 RX ADMIN — Medication 30.9 MILLICURIE: at 10:40

## 2023-07-07 DIAGNOSIS — G47.00 INSOMNIA, UNSPECIFIED TYPE: ICD-10-CM

## 2023-07-07 DIAGNOSIS — F10.21 ALCOHOL USE DISORDER, SEVERE, IN EARLY REMISSION (H): ICD-10-CM

## 2023-07-07 DIAGNOSIS — F41.1 GAD (GENERALIZED ANXIETY DISORDER): ICD-10-CM

## 2023-07-07 NOTE — TELEPHONE ENCOUNTER
Routing refill request to provider for review/approval because:  Drug not on the G refill protocol     Last Written Prescription Date:  3/29/23  Last Fill Quantity: 90,  # refills: 0   Last office visit provider:  6/21/23     Requested Prescriptions   Pending Prescriptions Disp Refills     gabapentin (NEURONTIN) 300 MG capsule 90 capsule 0     Sig: Take 1 capsule (300 mg) by mouth At Bedtime Take 1 capsule by mouth at bedtime as needed.       There is no refill protocol information for this order          Alexandra Barrett RN 07/07/23 1:37 PM

## 2023-07-09 DIAGNOSIS — R35.0 URINE FREQUENCY: ICD-10-CM

## 2023-07-09 DIAGNOSIS — R73.03 PREDIABETES: ICD-10-CM

## 2023-07-09 DIAGNOSIS — I10 HYPERTENSION, ESSENTIAL: ICD-10-CM

## 2023-07-09 RX ORDER — TAMSULOSIN HYDROCHLORIDE 0.4 MG/1
CAPSULE ORAL
Qty: 90 CAPSULE | Refills: 3 | Status: SHIPPED | OUTPATIENT
Start: 2023-07-09 | End: 2024-07-24

## 2023-07-09 RX ORDER — METFORMIN HCL 500 MG
TABLET, EXTENDED RELEASE 24 HR ORAL
Qty: 90 TABLET | Refills: 1 | Status: SHIPPED | OUTPATIENT
Start: 2023-07-09 | End: 2024-01-09

## 2023-07-09 RX ORDER — GABAPENTIN 300 MG/1
300 CAPSULE ORAL AT BEDTIME
Qty: 90 CAPSULE | Refills: 3 | Status: SHIPPED | OUTPATIENT
Start: 2023-07-09 | End: 2023-09-20

## 2023-07-09 RX ORDER — LOSARTAN POTASSIUM 50 MG/1
TABLET ORAL
Qty: 90 TABLET | Refills: 3 | Status: SHIPPED | OUTPATIENT
Start: 2023-07-09 | End: 2024-07-08

## 2023-07-10 NOTE — TELEPHONE ENCOUNTER
"flomax  Last Written Prescription Date:  5/3/2023  Last Fill Quantity: 90,  # refills: 0   Last office visit provider:  6/21/2023       metformin  Last Written Prescription Date:  10/10/2022  Last Fill Quantity: 90,  # refills: 2   Last office visit provider:  6/21/2023       losartan  Last Written Prescription Date:  1/11/2023  Last Fill Quantity: 90,  # refills: 1   Last office visit provider:  6/21/2023   Requested Prescriptions   Pending Prescriptions Disp Refills     tamsulosin (FLOMAX) 0.4 MG capsule [Pharmacy Med Name: TAMSULOSIN HCL 0.4MG CAPS] 90 capsule 0     Sig: TAKE ONE CAPSULE BY MOUTH ONCE DAILY WITH BREAKFAST. (NEED TO BE SEEN IN CLINIC FOR FURTHER REFILLS)       Alpha Blockers Passed - 7/9/2023  2:32 PM        Passed - Blood pressure under 140/90 in past 12 months     BP Readings from Last 3 Encounters:   06/28/23 96/62   06/21/23 115/69   05/18/23 111/71                 Passed - Recent (12 mo) or future (30 days) visit within the authorizing provider's specialty     Patient has had an office visit with the authorizing provider or a provider within the authorizing providers department within the previous 12 mos or has a future within next 30 days. See \"Patient Info\" tab in inbasket, or \"Choose Columns\" in Meds & Orders section of the refill encounter.              Passed - Patient does not have Tadalafil, Vardenafil, or Sildenafil on their medication list        Passed - Medication is active on med list        Passed - Patient is 18 years of age or older           metFORMIN (GLUCOPHAGE XR) 500 MG 24 hr tablet [Pharmacy Med Name: METFORMIN HCL ER 500MG TB24] 90 tablet 2     Sig: TAKE ONE TABLET BY MOUTH ONCE DAILY WITH BREAKFAST       Biguanide Agents Passed - 7/9/2023  2:32 PM        Passed - Patient is age 10 or older        Passed - Patient has documented A1c within the specified period of time.     If HgbA1C is 8 or greater, it needs to be on file within the past 3 months.  If less than 8, must " "be on file within the past 6 months.     Recent Labs   Lab Test 05/18/23  1037   A1C 6.7*             Passed - Patient's CR is NOT>1.4 OR Patient's EGFR is NOT<45 within past 12 mos.     Recent Labs   Lab Test 05/18/23  1037 04/19/22  1402 06/01/21  1125   GFRESTIMATED 89   < > >60   GFRESTBLACK  --   --  >60    < > = values in this interval not displayed.       Recent Labs   Lab Test 05/18/23  1037   CR 0.87             Passed - Patient does NOT have a diagnosis of CHF.        Passed - Medication is active on med list        Passed - Recent (6 mo) or future (30 days) visit within the authorizing provider's specialty     Patient had office visit in the last 6 months or has a visit in the next 30 days with authorizing provider or within the authorizing provider's specialty.  See \"Patient Info\" tab in inbasket, or \"Choose Columns\" in Meds & Orders section of the refill encounter.               losartan (COZAAR) 50 MG tablet [Pharmacy Med Name: LOSARTAN POTASSIUM 50MG TABS] 90 tablet 1     Sig: TAKE ONE TABLET BY MOUTH ONCE DAILY       Angiotensin-II Receptors Passed - 7/9/2023  2:32 PM        Passed - Last blood pressure under 140/90 in past 12 months     BP Readings from Last 3 Encounters:   06/28/23 96/62   06/21/23 115/69   05/18/23 111/71                 Passed - Recent (12 mo) or future (30 days) visit within the authorizing provider's specialty     Patient has had an office visit with the authorizing provider or a provider within the authorizing providers department within the previous 12 mos or has a future within next 30 days. See \"Patient Info\" tab in inAutobutleret, or \"Choose Columns\" in Meds & Orders section of the refill encounter.              Passed - Medication is active on med list        Passed - Patient is age 18 or older        Passed - Normal serum creatinine on file in past 12 months     Recent Labs   Lab Test 05/18/23  1037   CR 0.87       Ok to refill medication if creatinine is low          Passed - " Normal serum potassium on file in past 12 months     Recent Labs   Lab Test 05/18/23  1037   POTASSIUM 4.9                         Tiffany Clement, JHONY 07/09/23 8:03 PM

## 2023-07-11 ENCOUNTER — VIRTUAL VISIT (OUTPATIENT)
Dept: PSYCHIATRY | Facility: CLINIC | Age: 78
End: 2023-07-11
Attending: PSYCHOLOGIST
Payer: COMMERCIAL

## 2023-07-11 DIAGNOSIS — F10.21 ALCOHOL USE DISORDER, SEVERE, IN SUSTAINED REMISSION (H): Primary | ICD-10-CM

## 2023-07-11 PROCEDURE — 90853 GROUP PSYCHOTHERAPY: CPT | Mod: VID | Performed by: PSYCHOLOGIST

## 2023-07-16 NOTE — PROGRESS NOTES
Assessment and Plan:     Patient has been advised of split billing requirements and indicates understanding: Yes  1. Routine general medical examination at a health care facility  2. Screen for colon cancer  - Ambulatory referral for Colonoscopy  3. Pure hypercholesterolemia  Controlled with Crestor 40 mg daily.  4. Hypertension, essential  - HM2(CBC w/o Differential)  - Comprehensive Metabolic Panel  - Microalbumin, Random Urine    5. Moderate episode of recurrent major depressive disorder (H)  Currently on Lexapro 20 mg daily.  6. Pulmonary emphysema, unspecified emphysema type (H)  Only uses albuterol as needed, screening lung cancer with CT low-dose was negative about a year ago.  7. Asymmetrical sensorineural hearing loss  - Ambulatory referral to Audiology - [place]  - Ambulatory referral to ENT - [place]  8. Prediabetes  - Glycosylated Hemoglobin A1c  - Comprehensive Metabolic Panel  - Microalbumin, Random Urine  9. Atherosclerosis of native coronary artery of native heart without angina pectoris  - Comprehensive Metabolic Panel  - Lipid Cascade  10. Screening for prostate cancer  - PSA (Prostatic-Specific Antigen), Diagnostic    11. Benign prostatic hyperplasia with urinary frequency   - PSA (Prostatic-Specific Antigen), Diagnostic     The patient's current medical problems were reviewed.    I have had an Advance Directives discussion with the patient.  The following are part of a depression follow up plan for the patient:  under care of mental health counselor, coping support assessment, coping support management, emotional support assessment and emotional support education  The following high BMI interventions were performed this visit: encouragement to exercise and weight loss from baseline weight  The following health maintenance schedule was reviewed with the patient and provided in printed form in the after visit summary:   Health Maintenance   Topic Date Due     COPD ACTION PLAN  Never done      ZOSTER VACCINES (2 of 3) 08/10/2016     COLORECTAL CANCER SCREENING  07/06/2021     FALL RISK ASSESSMENT  08/26/2021     MEDICARE ANNUAL WELLNESS VISIT  06/01/2022     ADVANCE CARE PLANNING  09/17/2024     LIPID  06/01/2026     TD 18+ HE  06/15/2026     DEPRESSION ACTION PLAN  Completed     HEPATITIS C SCREENING  Completed     SPIROMETRY  Completed     Pneumococcal Vaccine: Pediatrics (0 to 5 Years) and At-Risk Patients (6 to 64 Years)  Completed     Pneumococcal Vaccine: 65+ Years  Completed     INFLUENZA VACCINE RULE BASED  Completed     COVID-19 Vaccine  Completed     HEPATITIS B VACCINES  Aged Out       Subjective:   Chief Complaint: Laurent Choi is an 76 y.o. male here for an Annual Wellness visit.   HPI:  Hearing loss, sensation of plugging right ear.  Did try to use a Q-tip.  With no improvement.  History of alcohol abuse, on Vivitrol but did not go last month for intramuscular monthly injection, no relapses.  He would like to taper off Vivitrol.  Due for colonoscopy this year, planning to schedule.  Coronary artery disease, compliant with treatment, no adverse reaction.  Review of Systems:    Please see above.  The rest of the review of systems are negative for all systems.    Patient Care Team:  Amara Smith MD as PCP - General (Family Medicine)  Amara Smith MD as Assigned PCP  Rose Sepulveda MD as Assigned Pulmonology Provider     Patient Active Problem List   Diagnosis     Severe alcohol use disorder (H)     Stented coronary artery     Pure hypercholesterolemia     Macular degeneration (senile) of retina     Hypertension, essential     Elevated prostate specific antigen less than 10 ng/ml     Gastroesophageal reflux disease with esophagitis     Atherosclerosis of native coronary artery of native heart without angina pectoris     Moderate episode of recurrent major depressive disorder (H)     Pulmonary emphysema, unspecified emphysema type (H)     Prediabetes     Past  Medical History:   Diagnosis Date     Alcohol abuse      Arthritis      Chest pain      Clogged artery (heart)      GERD (gastroesophageal reflux disease)      Hypertension      Macular degeneration       Past Surgical History:   Procedure Laterality Date     CARDIAC SURGERY        Family History   Problem Relation Age of Onset     Snoring Father       Social History     Socioeconomic History     Marital status:      Spouse name: Not on file     Number of children: Not on file     Years of education: Not on file     Highest education level: Not on file   Occupational History     Not on file   Social Needs     Financial resource strain: Not on file     Food insecurity     Worry: Not on file     Inability: Not on file     Transportation needs     Medical: Not on file     Non-medical: Not on file   Tobacco Use     Smoking status: Former Smoker     Smokeless tobacco: Never Used   Substance and Sexual Activity     Alcohol use: Yes     Comment: 6 beers and 1-2 0.75L of vodka daily     Drug use: No     Sexual activity: Never     Partners: Male   Lifestyle     Physical activity     Days per week: Not on file     Minutes per session: Not on file     Stress: Not on file   Relationships     Social connections     Talks on phone: Not on file     Gets together: Not on file     Attends Restorationism service: Not on file     Active member of club or organization: Not on file     Attends meetings of clubs or organizations: Not on file     Relationship status: Not on file     Intimate partner violence     Fear of current or ex partner: Not on file     Emotionally abused: Not on file     Physically abused: Not on file     Forced sexual activity: Not on file   Other Topics Concern     Not on file   Social History Narrative    Resides in [facility].      Current Outpatient Medications   Medication Sig Dispense Refill     acetaminophen (TYLENOL) 325 MG tablet Take 650 mg by mouth every 6 (six) hours as needed.       amLODIPine  "(NORVASC) 5 MG tablet TAKE ONE TABLET BY MOUTH ONCE DAILY 90 tablet 1     aspirin 81 mg chewable tablet Chew 1 tablet (81 mg total) daily. 3 tablet 0     calcium-vitamin D (CALCIUM-VITAMIN D) 500 mg(1,250mg) -200 unit per tablet Take 1 tablet by mouth 2 (two) times a day.       escitalopram oxalate (LEXAPRO) 20 MG tablet Take one tablet daily.       fluticasone propionate (FLONASE) 50 mcg/actuation nasal spray 2 sprays into each nostril 2 (two) times a day. 16 g 0     gabapentin (NEURONTIN) 300 MG capsule Take 300 mg by mouth at bedtime.        losartan (COZAAR) 25 MG tablet Take 1 tablet (25 mg total) by mouth daily. 90 tablet 1     melatonin-pyridoxine HCl, B6, 3-10 mg Tab Take 1-3 tabs before bed PRN       metFORMIN (GLUCOPHAGE-XR) 500 MG 24 hr tablet TAKE ONE TABLET BY MOUTH ONCE DAILY WITH BREAKFAST 90 tablet 2     naltrexone microspheres (VIVITROL) 380 mg SERR injection Inject 380 mg into the gluteal muscle.       nitroglycerin (NITROSTAT) 0.4 MG SL tablet Place 0.4 mg under the tongue every 5 (five) minutes as needed for chest pain. For up to 3 doses per episode as needed.       omeprazole (PRILOSEC) 20 MG capsule TAKE 1 CAPSULE BY MOUTH DAILY,  60 MINUTES BEFORE A MEAL. 90 capsule 3     rosuvastatin (CRESTOR) 40 MG tablet TAKE ONE TABLET BY MOUTH EVERY NIGHT AT BEDTIME 90 tablet 2     tamsulosin (FLOMAX) 0.4 mg cap TAKE ONE CAPSULE BY MOUTH ONCE DAILY WITH BREAKFAST 90 capsule 1     albuterol (PROVENTIL HFA) 90 mcg/actuation inhaler Inhale 2 puffs every 6 (six) hours as needed for wheezing or shortness of breath. 1 Inhaler 3     hydrocortisone 2.5 % cream Apply topically 2 (two) times a day for 10 days. 30 g 1     No current facility-administered medications for this visit.       Objective:   Vital Signs:   Visit Vitals  /75 (Patient Site: Left Arm, Patient Position: Sitting, Cuff Size: Adult Regular)   Pulse 73   Temp 98.2  F (36.8  C) (Temporal)   Resp 18   Ht 5' 10.47\" (1.79 m)   Wt 178 lb (80.7 kg) "   BMI 25.20 kg/m           VisionScreening:  No exam data present     PHYSICAL EXAM  Physical Examination: General appearance - alert, well appearing, and in no distress  Mental status - alert, oriented to person, place, and time  Eyes - pupils equal and reactive, extraocular eye movements intact  Ears - bilateral TM's and external ear canals normal  Nose - normal and patent, no erythema, discharge or polyps  Mouth - mucous membranes moist, pharynx normal without lesions  Neck - supple, no significant adenopathy  Lymphatics - no palpable lymphadenopathy, no hepatosplenomegaly  Chest - clear to auscultation, no wheezes, rales or rhonchi, symmetric air entry  Heart - normal rate, regular rhythm, normal S1, S2, no murmurs, rubs, clicks or gallops  Abdomen - soft, nontender, nondistended, no masses or organomegaly  Back exam - full range of motion, no tenderness, palpable spasm or pain on motion  Neurological - alert, oriented, normal speech, no focal findings or movement disorder noted  Musculoskeletal - no joint tenderness, deformity or swelling  Extremities - peripheral pulses normal, no pedal edema, no clubbing or cyanosis  Skin - normal coloration and turgor, no rashes, no suspicious skin lesions noted    Assessment Results 6/1/2021   Activities of Daily Living No help needed   Instrumental Activities of Daily Living No help needed   Mini Cog Total Score 4   Some recent data might be hidden     A Mini-Cog score of 0-2 suggests the possibility of dementia, score of 3-5 suggests no dementia    Identified Health Risks:     The patient was provided with suggestions to help him develop a healthy physical lifestyle.   He is at risk for lack of exercise and has been provided with information to increase physical activity for the benefit of his well-being.  The patient was provided with suggestions to help him develop a healthy emotional lifestyle.   The patient's PHQ-9 score is consistent with mild depression. He was  provided with information regarding depression and was advised to schedule a follow up appointment in 8 weeks to further address this issue.  Information regarding advance directives (living díaz), including where he can download the appropriate form, was provided to the patient via the AVS.        No Yes

## 2023-07-18 ENCOUNTER — VIRTUAL VISIT (OUTPATIENT)
Dept: PSYCHIATRY | Facility: CLINIC | Age: 78
End: 2023-07-18
Attending: PSYCHOLOGIST
Payer: COMMERCIAL

## 2023-07-18 DIAGNOSIS — F10.21 ALCOHOL USE DISORDER, SEVERE, IN SUSTAINED REMISSION (H): Primary | ICD-10-CM

## 2023-07-18 PROCEDURE — 90853 GROUP PSYCHOTHERAPY: CPT | Mod: VID | Performed by: PSYCHOLOGIST

## 2023-07-19 DIAGNOSIS — K21.00 GASTRO-ESOPHAGEAL REFLUX DISEASE WITH ESOPHAGITIS: ICD-10-CM

## 2023-07-19 NOTE — PROGRESS NOTES
Telehealth Details  Type of service:  video visit for group therapy  Originating Site (patient location):  [facility] Location- Remote location  Distant Site (provider location):  Remote location  Mode of Communication:  Video conference via Zoom    Relapse Prevention Group for Substance Use Disorders                                Time of Service: 3:30 - 4:30   Length of Appointment: 60 mins  Care Providers: Susi Davis, PhD, LP  Total number of patients present:  5    DSM-5 Diagnosis:   Alcohol Use Disorder   Major Depressive Disorder, recurrent (per chart review)  Generalized Anxiety Disorder (per chart review)     GROUP CONTENT: Discussed strategies to cope with high stress and triggers for use.    PATIENT ENGAGEMENT: Active and engaged. Set behavioral activation goal for the coming week.     Patient did not report any changes to medications.    MENTAL STATUS EXAM:   Appearance: Well groomed  Motor activity: Within normal range  Speech rate: Within normal range  Speech volume: Within normal range  Speech articulation:  Within normal range  Speech coherence:  Within normal range  Speech spontaneity:  Within normal range  Affect (objective appearance): Euthymic   Thought process: Linear, logical, goal-oriented  Suicide/ violence risk: Not individually assessed given group context, but no signs of risk were observed    PLAN:   - Attend Relapse Prevention Group

## 2023-07-20 NOTE — TELEPHONE ENCOUNTER
"Routing refill request to provider for review/approval because:  Failed - no diagnosis of osteoporosis    Last Written Prescription Date:  4/27/2022  Last Fill Quantity: 90,  # refills: 3   Last office visit provider:  6/21/2023     Requested Prescriptions   Pending Prescriptions Disp Refills     omeprazole (PRILOSEC) 20 MG DR capsule 90 capsule 3     Sig: TAKE 1 CAPSULE BY MOUTH DAILY,  60 MINUTES BEFORE A MEAL.       PPI Protocol Failed - 7/20/2023 11:31 AM        Failed - No diagnosis of osteoporosis on record        Passed - Not on Clopidogrel (unless Pantoprazole ordered)        Passed - Recent (12 mo) or future (30 days) visit within the authorizing provider's specialty     Patient has had an office visit with the authorizing provider or a provider within the authorizing providers department within the previous 12 mos or has a future within next 30 days. See \"Patient Info\" tab in inbasket, or \"Choose Columns\" in Meds & Orders section of the refill encounter.              Passed - Medication is active on med list        Passed - Patient is age 18 or older             Azul Duarte RN 07/20/23 11:31 AM  "

## 2023-07-21 DIAGNOSIS — I25.10 CORONARY ATHEROSCLEROSIS OF NATIVE CORONARY ARTERY: ICD-10-CM

## 2023-07-21 NOTE — TELEPHONE ENCOUNTER
"Routing refill request to provider for review/approval because:  Drug interaction warning    Last Written Prescription Date:  4/11/2023  Last Fill Quantity: 90,  # refills: 0   Last office visit provider:  6/21/2023     Requested Prescriptions   Pending Prescriptions Disp Refills     rosuvastatin (CRESTOR) 40 MG tablet 90 tablet 0     Sig: Take 1 tablet (40 mg) by mouth At Bedtime       Statins Protocol Passed - 7/21/2023 11:21 AM        Passed - LDL on file in past 12 months     Recent Labs   Lab Test 05/18/23  1037   LDL 63             Passed - No abnormal creatine kinase in past 12 months     No lab results found.             Passed - Recent (12 mo) or future (30 days) visit within the authorizing provider's specialty     Patient has had an office visit with the authorizing provider or a provider within the authorizing providers department within the previous 12 mos or has a future within next 30 days. See \"Patient Info\" tab in inbasket, or \"Choose Columns\" in Meds & Orders section of the refill encounter.              Passed - Medication is active on med list        Passed - Patient is age 18 or older             Rula Kang RN 07/21/23 12:51 PM  "

## 2023-07-24 RX ORDER — ROSUVASTATIN CALCIUM 40 MG/1
40 TABLET, COATED ORAL AT BEDTIME
Qty: 90 TABLET | Refills: 0 | Status: SHIPPED | OUTPATIENT
Start: 2023-07-24 | End: 2023-10-26

## 2023-07-25 ENCOUNTER — VIRTUAL VISIT (OUTPATIENT)
Dept: PSYCHIATRY | Facility: CLINIC | Age: 78
End: 2023-07-25
Attending: PSYCHOLOGIST
Payer: COMMERCIAL

## 2023-07-25 DIAGNOSIS — F10.21 ALCOHOL USE DISORDER, SEVERE, IN SUSTAINED REMISSION (H): Primary | ICD-10-CM

## 2023-07-25 PROCEDURE — 90853 GROUP PSYCHOTHERAPY: CPT | Mod: VID | Performed by: PSYCHOLOGIST

## 2023-07-26 ENCOUNTER — TRANSFERRED RECORDS (OUTPATIENT)
Dept: HEALTH INFORMATION MANAGEMENT | Facility: CLINIC | Age: 78
End: 2023-07-26
Payer: COMMERCIAL

## 2023-08-01 ENCOUNTER — VIRTUAL VISIT (OUTPATIENT)
Dept: PSYCHIATRY | Facility: CLINIC | Age: 78
End: 2023-08-01
Attending: PSYCHOLOGIST
Payer: COMMERCIAL

## 2023-08-01 DIAGNOSIS — F10.21 ALCOHOL USE DISORDER, SEVERE, IN SUSTAINED REMISSION (H): Primary | ICD-10-CM

## 2023-08-01 PROCEDURE — 90853 GROUP PSYCHOTHERAPY: CPT | Mod: VID | Performed by: PSYCHOLOGIST

## 2023-08-03 ENCOUNTER — OFFICE VISIT (OUTPATIENT)
Dept: UROLOGY | Facility: CLINIC | Age: 78
End: 2023-08-03
Payer: COMMERCIAL

## 2023-08-03 VITALS
DIASTOLIC BLOOD PRESSURE: 68 MMHG | WEIGHT: 190 LBS | SYSTOLIC BLOOD PRESSURE: 104 MMHG | HEIGHT: 71 IN | BODY MASS INDEX: 26.6 KG/M2

## 2023-08-03 DIAGNOSIS — R97.20 ELEVATED PROSTATE SPECIFIC ANTIGEN (PSA): Primary | ICD-10-CM

## 2023-08-03 PROCEDURE — 99213 OFFICE O/P EST LOW 20 MIN: CPT | Performed by: STUDENT IN AN ORGANIZED HEALTH CARE EDUCATION/TRAINING PROGRAM

## 2023-08-03 ASSESSMENT — PAIN SCALES - GENERAL: PAINLEVEL: NO PAIN (0)

## 2023-08-03 NOTE — PROGRESS NOTES
Telehealth Details  Type of service:  video visit for group therapy  Originating Site (patient location):  [facility] Location- Remote location  Distant Site (provider location):  Remote location  Mode of Communication:  Video conference via Zoom    Relapse Prevention Group for Substance Use Disorders                                Time of Service: 3:40 - 4:30   Length of Appointment: 60 mins  Care Providers: Susi Davis, PhD, LP  Total number of patients present:  5    DSM-5 Diagnosis:   Alcohol Use Disorder   Major Depressive Disorder, recurrent (per chart review)  Generalized Anxiety Disorder (per chart review)     GROUP CONTENT: Discussed benefits of mindfulness and strategies group members have found helpful when practicing it, as well as coping with criticism.     PATIENT ENGAGEMENT: Active and engaged. Set behavioral activation goal for the coming week.     Patient did not report any changes to medications.    MENTAL STATUS EXAM:   Appearance: Well groomed  Motor activity: Within normal range  Speech rate: Within normal range  Speech volume: Within normal range  Speech articulation:  Within normal range  Speech coherence:  Within normal range  Speech spontaneity:  Within normal range  Affect (objective appearance): Euthymic   Thought process: Linear, logical, goal-oriented  Suicide/ violence risk: Not individually assessed given group context, but no signs of risk were observed    PLAN:   - Attend Relapse Prevention Group

## 2023-08-03 NOTE — LETTER
"8/3/2023       RE: Laurent Choi  [address]  [place] [address]     Dear Colleague,    Thank you for referring your patient, Laurent Choi, to the [facility] [place] at [facility]. Please see a copy of my visit note below.    CHIEF COMPLAINT   It was my pleasure to see Laurent Choi who is a 78 year old male for follow-up of elevated PSA.      HPI:  Laurent Choi is a 78 year old male being seen for follow-up.  Duration of problem: Few years  Previous treatments: Prostate MRI and on follow-up      Reviewed previous notes  PSA has been continued to be done at a yearly basis for Gio  He is concerned about the recent PSA level  No family history of prostate cancer  Prior MRI was normal with a prostate size of 96 g    Exam:  /68   Ht 1.803 m (5' 11\")   Wt 86.2 kg (190 lb)   BMI 26.50 kg/m    General: age-appropriate appearing male in NAD sitting in an exam chair  Resp: no respiratory distress  CV: heart rate regular  Abdomen: Degree of obesity is mild. Abdomen is soft and nontender. No organomegaly.   :  Enlarged nonnodular prostate  Neuro: grossly non focal. Normal reflexes  Motor: excellent strength throughout    Review of Imaging:  The following imaging exams were independently viewed and interpreted by me and discussed with patient:  MRI Abd/Pelvis: Normal review of prior MRI of the prostate which revealed 96 g PI-RADS 2    Review of Labs:  The following labs were reviewed by me and discussed with the patient:  PSA: Abnormal:    Latest Reference Range & Units 05/18/23 10:37   PSA Tumor Marker 0.00 - 6.50 ng/mL 7.76 (H)   (H): Data is abnormally high    Assessment & Plan    Elevated prostate specific antigen (PSA)  We about his PSA value and the fact that he has a 96 g prostate and the PSA density is not significant    I also told him that since he is 78 he does not need PSA screening and would " recommend rectal exam on a yearly basis  He can follow-up with us as needed for now    Blaine Adame MD  [facility] [place]      ==========================    Additional Billing and Coding Information:  Review of external notes as documented above   Review of the result(s) of each unique test - PSA, MRI    Independent interpretation of a test performed by another physician/other qualified health care professional (not separately reported) -       Discussion of management or test interpretation with external physician/other qualified healthcare professional/appropriate source -           12 minutes spent by me on the date of the encounter doing chart review, review of test results, interpretation of tests, patient visit, and documentation     ==========================

## 2023-08-03 NOTE — PROGRESS NOTES
"Telehealth Details  Type of service:  video visit for group therapy  Originating Site (patient location):  [facility] Location- Remote location  Distant Site (provider location):  Remote location  Mode of Communication:  Video conference via Zoom    Relapse Prevention Group for Substance Use Disorders                                Time of Service: 3:30 - 4:30   Length of Appointment: 60 mins  Care Providers: Susi Davis, PhD, LP  Total number of patients present:  3    DSM-5 Diagnosis:   Alcohol Use Disorder   Major Depressive Disorder, recurrent (per chart review)  Generalized Anxiety Disorder (per chart review)     GROUP CONTENT: Discussed self-talk related to substance use, including how challenging it can be to say \"I will never use again\" vs taking one day at a time. Discussed pros/ cons of some alcohol use after a period of abstinence.     PATIENT ENGAGEMENT: Active and engaged. Set behavioral activation goal for the coming week.     Patient did not report any changes to medications.    MENTAL STATUS EXAM:   Appearance: Well groomed  Motor activity: Within normal range  Speech rate: Within normal range  Speech volume: Within normal range  Speech articulation:  Within normal range  Speech coherence:  Within normal range  Speech spontaneity:  Within normal range  Affect (objective appearance): Euthymic   Thought process: Linear, logical, goal-oriented  Suicide/ violence risk: Not individually assessed given group context, but no signs of risk were observed    PLAN:   - Attend Relapse Prevention Group      "

## 2023-08-03 NOTE — PROGRESS NOTES
Telehealth Details  Type of service:  video visit for group therapy  Originating Site (patient location):  [facility] Location- Remote location  Distant Site (provider location):  Remote location  Mode of Communication:  Video conference via Zoom    Relapse Prevention Group for Substance Use Disorders                                Time of Service: 3:30 - 4:30   Length of Appointment: 60 mins  Care Providers: Susi Davis, PhD, LP  Total number of patients present:  3    DSM-5 Diagnosis:   Alcohol Use Disorder   Major Depressive Disorder, recurrent (per chart review)  Generalized Anxiety Disorder (per chart review)     GROUP CONTENT: Discussed strategies to cope with high stress, particularly related to a group member's unexpected job loss. Discussed how using substances provides short-term relief but then will typically make a situation worse.     PATIENT ENGAGEMENT: Active and engaged. Set behavioral activation goal for the coming week.     Patient did not report any changes to medications.    MENTAL STATUS EXAM:   Appearance: Well groomed  Motor activity: Within normal range  Speech rate: Within normal range  Speech volume: Within normal range  Speech articulation:  Within normal range  Speech coherence:  Within normal range  Speech spontaneity:  Within normal range  Affect (objective appearance): Euthymic   Thought process: Linear, logical, goal-oriented  Suicide/ violence risk: Not individually assessed given group context, but no signs of risk were observed    PLAN:   - Attend Relapse Prevention Group

## 2023-08-03 NOTE — NURSING NOTE
Chief Complaint   Patient presents with    Elevated PSA     Review PSA      Pt has no urinary concerns today    Estrellita Montez, CMA

## 2023-08-03 NOTE — PROGRESS NOTES
"CHIEF COMPLAINT   It was my pleasure to see Laurent Choi who is a 78 year old male for follow-up of elevated PSA.      HPI:  Laurent Choi is a 78 year old male being seen for follow-up.  Duration of problem: Few years  Previous treatments: Prostate MRI and on follow-up      Reviewed previous notes  PSA has been continued to be done at a yearly basis for Gio  He is concerned about the recent PSA level  No family history of prostate cancer  Prior MRI was normal with a prostate size of 96 g    Exam:  /68   Ht 1.803 m (5' 11\")   Wt 86.2 kg (190 lb)   BMI 26.50 kg/m    General: age-appropriate appearing male in NAD sitting in an exam chair  Resp: no respiratory distress  CV: heart rate regular  Abdomen: Degree of obesity is mild. Abdomen is soft and nontender. No organomegaly.   :  Enlarged nonnodular prostate  Neuro: grossly non focal. Normal reflexes  Motor: excellent strength throughout    Review of Imaging:  The following imaging exams were independently viewed and interpreted by me and discussed with patient:  MRI Abd/Pelvis: Normal review of prior MRI of the prostate which revealed 96 g PI-RADS 2    Review of Labs:  The following labs were reviewed by me and discussed with the patient:  PSA: Abnormal:    Latest Reference Range & Units 05/18/23 10:37   PSA Tumor Marker 0.00 - 6.50 ng/mL 7.76 (H)   (H): Data is abnormally high    Assessment & Plan     Elevated prostate specific antigen (PSA)  We about his PSA value and the fact that he has a 96 g prostate and the PSA density is not significant    I also told him that since he is 78 he does not need PSA screening and would recommend rectal exam on a yearly basis  He can follow-up with us as needed for now    Blaine Adame MD  [facility] [place]      ==========================    Additional Billing and Coding Information:  Review of external notes as documented above   Review of the result(s) of each unique test - PSA, " MRI    Independent interpretation of a test performed by another physician/other qualified health care professional (not separately reported) -       Discussion of management or test interpretation with external physician/other qualified healthcare professional/appropriate source -           12 minutes spent by me on the date of the encounter doing chart review, review of test results, interpretation of tests, patient visit, and documentation     ==========================

## 2023-08-15 ENCOUNTER — VIRTUAL VISIT (OUTPATIENT)
Dept: PSYCHIATRY | Facility: CLINIC | Age: 78
End: 2023-08-15
Attending: PSYCHOLOGIST
Payer: COMMERCIAL

## 2023-08-15 DIAGNOSIS — F10.21 ALCOHOL USE DISORDER, SEVERE, IN SUSTAINED REMISSION (H): Primary | ICD-10-CM

## 2023-08-15 PROCEDURE — 90853 GROUP PSYCHOTHERAPY: CPT | Mod: VID | Performed by: PSYCHOLOGIST

## 2023-08-16 NOTE — PROGRESS NOTES
Telehealth Details  Type of service:  video visit for group therapy  Originating Site (patient location):  [facility] Location- Remote location  Distant Site (provider location):  Remote location  Mode of Communication:  Video conference via Zoom    Relapse Prevention Group for Substance Use Disorders                                Time of Service: 3:40 - 4:30   Length of Appointment: 60 mins  Care Providers: Susi Davis, PhD, LP  Total number of patients present:  4    DSM-5 Diagnosis:   Alcohol Use Disorder   Major Depressive Disorder, recurrent (per chart review)  Generalized Anxiety Disorder (per chart review)     GROUP CONTENT: Each group member provided an update on the past week including, including urges and how they coped, stressors, and mood. Other group members offered support, and we focused on discussion on the importance of routine.    PATIENT ENGAGEMENT: Active and engaged. Set behavioral activation goal for the coming week.     Patient did not report any changes to medications.    MENTAL STATUS EXAM:   Appearance: Well groomed  Motor activity: Within normal range  Speech rate: Within normal range  Speech volume: Within normal range  Speech articulation:  Within normal range  Speech coherence:  Within normal range  Speech spontaneity:  Within normal range  Affect (objective appearance): Euthymic   Thought process: Linear, logical, goal-oriented  Suicide/ violence risk: Not individually assessed given group context, but no signs of risk were observed    PLAN:   - Attend Relapse Prevention Group

## 2023-08-20 ENCOUNTER — HEALTH MAINTENANCE LETTER (OUTPATIENT)
Age: 78
End: 2023-08-20

## 2023-08-21 ENCOUNTER — OFFICE VISIT (OUTPATIENT)
Dept: FAMILY MEDICINE | Facility: CLINIC | Age: 78
End: 2023-08-21
Payer: COMMERCIAL

## 2023-08-21 VITALS
RESPIRATION RATE: 20 BRPM | BODY MASS INDEX: 26.32 KG/M2 | SYSTOLIC BLOOD PRESSURE: 106 MMHG | HEART RATE: 75 BPM | HEIGHT: 71 IN | OXYGEN SATURATION: 95 % | TEMPERATURE: 97.9 F | DIASTOLIC BLOOD PRESSURE: 64 MMHG | WEIGHT: 188 LBS

## 2023-08-21 DIAGNOSIS — E11.9 TYPE 2 DIABETES MELLITUS WITHOUT COMPLICATION, WITH LONG-TERM CURRENT USE OF INSULIN (H): Primary | ICD-10-CM

## 2023-08-21 DIAGNOSIS — Z79.4 TYPE 2 DIABETES MELLITUS WITHOUT COMPLICATION, WITH LONG-TERM CURRENT USE OF INSULIN (H): Primary | ICD-10-CM

## 2023-08-21 DIAGNOSIS — M80.00XS OSTEOPOROSIS WITH CURRENT PATHOLOGICAL FRACTURE, UNSPECIFIED OSTEOPOROSIS TYPE, SEQUELA: ICD-10-CM

## 2023-08-21 LAB — HBA1C MFR BLD: 6 % (ref 0–5.6)

## 2023-08-21 PROCEDURE — 83036 HEMOGLOBIN GLYCOSYLATED A1C: CPT | Performed by: FAMILY MEDICINE

## 2023-08-21 PROCEDURE — 36415 COLL VENOUS BLD VENIPUNCTURE: CPT | Performed by: FAMILY MEDICINE

## 2023-08-21 PROCEDURE — 99214 OFFICE O/P EST MOD 30 MIN: CPT | Performed by: FAMILY MEDICINE

## 2023-08-21 RX ORDER — ALENDRONATE SODIUM 70 MG/1
70 TABLET ORAL
Qty: 12 TABLET | Refills: 3 | Status: SHIPPED | OUTPATIENT
Start: 2023-08-21 | End: 2024-08-14

## 2023-08-21 RX ORDER — LANCETS
EACH MISCELLANEOUS
Qty: 100 EACH | Refills: 6 | Status: SHIPPED | OUTPATIENT
Start: 2023-08-21

## 2023-08-21 RX ORDER — ALENDRONATE SODIUM 70 MG/1
70 TABLET ORAL
Status: CANCELLED | OUTPATIENT
Start: 2023-08-21

## 2023-08-21 NOTE — PROGRESS NOTES
Assessment & Plan     Type 2 diabetes mellitus without complication, with long-term current use of insulin (H)  We discussed importance of healthy lifestyle changes, regular physical activities, blood sugar monitoring, A1c is trending down at 6%, previously at 6.7%,continue current management, recheck in 3 months.  - Hemoglobin A1c  - blood glucose monitoring (NO BRAND SPECIFIED) meter device kit; Use to test blood sugar 2 times daily or as directed. Preferred blood glucose meter OR supplies to accompany: Blood Glucose Monitor Brands: per insurance.  - thin (NO BRAND SPECIFIED) lancets; Use with lanceting device. To accompany: Blood Glucose Monitor Brands: per insurance.  - blood glucose (NO BRAND SPECIFIED) test strip; Use to test blood sugar 2 times daily or as directed. To accompany: Blood Glucose Monitor Brands: per insurance.    Osteoporosis with current pathological fracture, unspecified osteoporosis type, sequela, T12 fracture  I discussed starting taking the medication and to let us know if you develop any adverse reaction.Continue calcium and vitamin D supplementation  - alendronate (FOSAMAX) 70 MG tablet; Take 1 tablet (70 mg) by mouth every 7 days    Review of external notes as documented elsewhere in note  30 minutes spent by me on the date of the encounter doing chart review, review of outside records, review of test results, interpretation of tests, patient visit, and documentation        Regular exercise    MD SEEMA Flores [facility]   Gio is a 78 year old, presenting for the following health issues:  Diabetes check and Consult For (Bone density medication)      8/21/2023    11:40 AM   Additional Questions   Roomed by Janae STEPHENSON   Accompanied by self       History of Present Illness       Diabetes:   He presents for follow up of diabetes.    He is not checking blood glucose.        He is concerned about other.   He is having blurry vision.  The patient has not  "had a diabetic eye exam in the last 12 months.          He eats 2-3 servings of fruits and vegetables daily.He exercises with enough effort to increase his heart rate 20 to 29 minutes per day.  He exercises with enough effort to increase his heart rate 3 or less days per week.   He is taking medications regularly.     Following up on diabetes type 2, last A1c was 6.7% about 3 months ago, he has been working on healthy lifestyle changes, was able to voluntarily lose about 2 pounds, taking metformin  mg and 1 tablet daily.  Has not been checking his blood sugar at home.  A1c is at 6% today.He has been followed by the [facility] retinal clinic and he has an appointment next week.  He has osteoporosis, was afraid of taking the Fosamax, but after discussion with him he is ready to start taking it and let us know if you develop any side effect.  Right lower lip was biopsied at the dermatology office and showed a skin cancer, area is still healing.    Review of Systems   Constitutional, HEENT, cardiovascular, pulmonary, gi and gu systems are negative, except as otherwise noted.      Objective    /64 (BP Location: Left arm, Patient Position: Sitting, Cuff Size: Adult Regular)   Pulse 75   Temp 97.9  F (36.6  C) (Temporal)   Resp 20   Ht 1.803 m (5' 11\")   Wt 85.3 kg (188 lb)   SpO2 95%   BMI 26.22 kg/m    Body mass index is 26.22 kg/m .  Physical Exam   GENERAL: healthy, alert and no distress  NECK: no adenopathy, no asymmetry, masses, or scars and thyroid normal to palpation  RESP: lungs clear to auscultation - no rales, rhonchi or wheezes  CV: regular rate and rhythm, normal S1 S2, no S3 or S4, no murmur, click or rub, no peripheral edema and peripheral pulses strong  ABDOMEN: soft, nontender, no hepatosplenomegaly, no masses and bowel sounds normal  MS: no gross musculoskeletal defects noted, no edema    Results for orders placed or performed in visit on 08/21/23   Hemoglobin A1c     Status: Abnormal "   Result Value Ref Range    Hemoglobin A1C 6.0 (H) 0.0 - 5.6 %       This note was completed in part using a voice recognition software, any grammatical or context distortion are unintentional and inherent to the software.          Prior to immunization administration, verified patients identity using patient s name and date of birth. Please see Immunization Activity for additional information.     Screening Questionnaire for Adult Immunization    Are you sick today?   No   Do you have allergies to medications, food, a vaccine component or latex?   No   Have you ever had a serious reaction after receiving a vaccination?   No   Do you have a long-term health problem with heart, lung, kidney, or metabolic disease (e.g., diabetes), asthma, a blood disorder, no spleen, complement component deficiency, a cochlear implant, or a spinal fluid leak?  Are you on long-term aspirin therapy?   Yes   Do you have cancer, leukemia, HIV/AIDS, or any other immune system problem?   No   Do you have a parent, brother, or sister with an immune system problem?   No   In the past 3 months, have you taken medications that affect  your immune system, such as prednisone, other steroids, or anticancer drugs; drugs for the treatment of rheumatoid arthritis, Crohn s disease, or psoriasis; or have you had radiation treatments?   No   Have you had a seizure, or a brain or other nervous system problem?   No   During the past year, have you received a transfusion of blood or blood    products, or been given immune (gamma) globulin or antiviral drug?   No   For women: Are you pregnant or is there a chance you could become       pregnant during the next month?   No   Have you received any vaccinations in the past 4 weeks?   No     Immunization questionnaire was positive for at least one answer.  Notified Dr. Smith.      Patient instructed to remain in clinic for 15 minutes afterwards, and to report any adverse reactions.     Screening performed by  Janae Calel MA on 8/21/2023 at 11:57 AM.

## 2023-08-22 ENCOUNTER — VIRTUAL VISIT (OUTPATIENT)
Dept: PSYCHIATRY | Facility: CLINIC | Age: 78
End: 2023-08-22
Attending: PSYCHOLOGIST
Payer: COMMERCIAL

## 2023-08-22 DIAGNOSIS — F10.21 ALCOHOL USE DISORDER, SEVERE, IN SUSTAINED REMISSION (H): Primary | ICD-10-CM

## 2023-08-22 PROCEDURE — 90853 GROUP PSYCHOTHERAPY: CPT | Mod: VID | Performed by: PSYCHOLOGIST

## 2023-08-24 NOTE — PROGRESS NOTES
"Telehealth Details  Type of service:  video visit for group therapy  Originating Site (patient location):  [facility] Location- Remote location  Distant Site (provider location):  Remote location  Mode of Communication:  Video conference via Zoom    Relapse Prevention Group for Substance Use Disorders                                Time of Service: 3:30 - 4:30   Length of Appointment: 60 mins  Care Providers: Susi Davis, PhD, LP  Total number of patients present:  5    DSM-5 Diagnosis:   Alcohol Use Disorder   Major Depressive Disorder, recurrent (per chart review)  Generalized Anxiety Disorder (per chart review)     GROUP CONTENT: Each group member provided an update on the past week, including any urges and how they coped, stressors, and mood. Other group members offered support, and we focused our group discussion on identifying unhelpful thinking (eg, \"I can have just one\", \"I can be a social drinker\") and how our thinking impacts our behavior.     PATIENT ENGAGEMENT: Active and engaged. Set behavioral activation goal for the coming week.     Patient did not report any changes to medications.    MENTAL STATUS EXAM:   Appearance: Well groomed  Motor activity: Within normal range  Speech rate: Within normal range  Speech volume: Within normal range  Speech articulation:  Within normal range  Speech coherence:  Within normal range  Speech spontaneity:  Within normal range  Affect (objective appearance): Euthymic   Thought process: Linear, logical, goal-oriented  Suicide/ violence risk: Not individually assessed given group context, but no signs of risk were observed    PLAN:   - Attend Relapse Prevention Group      "

## 2023-08-28 ENCOUNTER — TRANSFERRED RECORDS (OUTPATIENT)
Dept: HEALTH INFORMATION MANAGEMENT | Facility: CLINIC | Age: 78
End: 2023-08-28

## 2023-09-20 ENCOUNTER — ALLIED HEALTH/NURSE VISIT (OUTPATIENT)
Dept: PSYCHIATRY | Facility: CLINIC | Age: 78
End: 2023-09-20
Payer: COMMERCIAL

## 2023-09-20 ENCOUNTER — TELEPHONE (OUTPATIENT)
Dept: PSYCHIATRY | Facility: CLINIC | Age: 78
End: 2023-09-20
Payer: COMMERCIAL

## 2023-09-20 ENCOUNTER — OFFICE VISIT (OUTPATIENT)
Dept: PSYCHIATRY | Facility: CLINIC | Age: 78
End: 2023-09-20
Attending: PSYCHIATRY & NEUROLOGY
Payer: COMMERCIAL

## 2023-09-20 VITALS
BODY MASS INDEX: 25.69 KG/M2 | WEIGHT: 184.2 LBS | DIASTOLIC BLOOD PRESSURE: 84 MMHG | SYSTOLIC BLOOD PRESSURE: 136 MMHG | HEART RATE: 112 BPM

## 2023-09-20 DIAGNOSIS — G47.00 INSOMNIA, UNSPECIFIED TYPE: ICD-10-CM

## 2023-09-20 DIAGNOSIS — F41.1 GAD (GENERALIZED ANXIETY DISORDER): ICD-10-CM

## 2023-09-20 DIAGNOSIS — F33.1 MODERATE EPISODE OF RECURRENT MAJOR DEPRESSIVE DISORDER (H): ICD-10-CM

## 2023-09-20 DIAGNOSIS — F10.21 ALCOHOL USE DISORDER, SEVERE, IN SUSTAINED REMISSION (H): Primary | ICD-10-CM

## 2023-09-20 DIAGNOSIS — F10.21 ALCOHOL USE DISORDER, SEVERE, IN EARLY REMISSION (H): ICD-10-CM

## 2023-09-20 DIAGNOSIS — Z23 NEED FOR PROPHYLACTIC VACCINATION AND INOCULATION AGAINST INFLUENZA: Primary | ICD-10-CM

## 2023-09-20 PROCEDURE — 250N000011 HC RX IP 250 OP 636

## 2023-09-20 PROCEDURE — 96372 THER/PROPH/DIAG INJ SC/IM: CPT | Performed by: PSYCHIATRY & NEUROLOGY

## 2023-09-20 PROCEDURE — G0008 ADMIN INFLUENZA VIRUS VAC: HCPCS

## 2023-09-20 PROCEDURE — 99214 OFFICE O/P EST MOD 30 MIN: CPT | Mod: GC | Performed by: STUDENT IN AN ORGANIZED HEALTH CARE EDUCATION/TRAINING PROGRAM

## 2023-09-20 PROCEDURE — 90662 IIV NO PRSV INCREASED AG IM: CPT

## 2023-09-20 PROCEDURE — G0463 HOSPITAL OUTPT CLINIC VISIT: HCPCS | Mod: 25 | Performed by: STUDENT IN AN ORGANIZED HEALTH CARE EDUCATION/TRAINING PROGRAM

## 2023-09-20 PROCEDURE — 250N000011 HC RX IP 250 OP 636: Performed by: PSYCHIATRY & NEUROLOGY

## 2023-09-20 RX ORDER — ESCITALOPRAM OXALATE 20 MG/1
20 TABLET ORAL DAILY
Qty: 90 TABLET | Refills: 0 | Status: SHIPPED | OUTPATIENT
Start: 2023-09-20 | End: 2023-12-20

## 2023-09-20 RX ORDER — GABAPENTIN 300 MG/1
300 CAPSULE ORAL AT BEDTIME
Qty: 90 CAPSULE | Refills: 3 | Status: SHIPPED | OUTPATIENT
Start: 2023-09-20 | End: 2024-02-28

## 2023-09-20 RX ADMIN — NALTREXONE 380 MG: KIT at 11:15

## 2023-09-20 ASSESSMENT — PAIN SCALES - GENERAL: PAINLEVEL: NO PAIN (0)

## 2023-09-20 NOTE — TELEPHONE ENCOUNTER
Verbal authorization obtained to start Vivitrol 380 mg IM Injection Q28D per Dr Selam Bee.Deborah Blanchard LPN Lead

## 2023-09-20 NOTE — PATIENT INSTRUCTIONS
- Recommend that you get in touch with your AA sponsor and get back connected with them since your relapse prevention group has stopped.

## 2023-09-20 NOTE — NURSING NOTE
Clinic Administered Medication Documentation        Patient was given High Dose Flu Zone. Prior to medication administration, verified patient's identity using patient s name and date of birth. Please see MAR and medication order for additional information. Patient instructed to remain in clinic for 15 minutes and report any adverse reaction to staff immediately.    Vial/Syringe: Syringe

## 2023-09-20 NOTE — NURSING NOTE
"Clinic Administered Medication Documentation        Patient was given Vivitrol 380 mg. Prior to medication administration, verified patient's identity using patient s name and date of birth. Please see MAR and medication order for additional information. Patient instructed to remain in clinic for 15 minutes, report any adverse reaction to staff immediately, and patient stayed for 15 min after BARRETO & Flu shot administered. .    Vial/Syringe: Single dose vial. Was entire vial of medication used? Yes      Gio Choi,  1945, presented to the clinic today at the request of Dr Selam Bee,  ordering provider for long-acting injectable Vivitrol 380 mg.    OBSERVATIONS:  1.  Appearance: Casually dressed in clean, weather appropriate clothing. Writer was asked by Dr Bee to start Vivitrol BARRETO today. Insurance verified and still active with coverage for BARRETO. Patient shared he has disposed of all alcohol in his home. \"I threw it all down the kitchen sink. If it is not in my house I won't be as tempted. I'm not drinking much. I'm glad to start the injections again as they do help with the cravings.\" Patient shared he has ongoing issues with his SUSANA at the condo he is living wanting to replace all windows which will have an impact on his finances. Patient and other tenants are looking into hiring a  to help with the SUSANA issues. Patient denies any safety concerns.  2.  Mood: Good, talkative, friendly  3.  Affect: Congruent  4.  Attitude: Good, engaged, good eye contact  5.  Cooperation: Full  6.  Side Effects: Denies  7.  Education: Call the clinic with any questions or concerns. Patient agreed.  8. Next appointment: 10/18/23 1000 Q28D  9. Location: [facility]    The service provided today was rendered under the supervising provider of the day, Dr Bee, who was available for consultation as needed.      "

## 2023-09-20 NOTE — NURSING NOTE
Chief Complaint   Patient presents with    Recheck Medication     Alcohol use disorder, severe     - Freddie Bazzi, Visit Facilitator

## 2023-09-20 NOTE — PROGRESS NOTES
"  ----------------------------------------------------------------------------------------------------------  [facility]   Addiction Medicine Progress Note     ID                                Laurent Choi is a 78 year old  male with a history of alcohol use disorder, previously severe, in sustained remission, managed with BARRETO naltrexone since 9/2018, as well as major depressive disorder and generalized anxiety disorder. He has been following in the addiction clinic since 10/2018     INTERVAL HISTORY                                  Gio was last seen by Dr. Garcia in 3/2023. At that time his mood was stable, he reported a return to alcohol use around Christmas 2022. He was continued on BARRETO naltrexone and ongoing attendance at AA meetings was recommended.     Since his last visit, Mr. Choi reports that he has been \"ok.\" His mood has its ups and downs, and with some hesitation he admits to me that this previous weekend he attended a meeting with the board of his senior living facility which ended poorly. He left the meeting furious, and went to the liquor store and bought and consumed 2L griffin that evening. This made him extremely sick for most of the weekend, and he was concerned that he would die. He had vomiting/intoxication all weekend and then hangover through yesterday. Previous to this, he had been drinking 2-3 beers in a weekend, 1-2x per month, since his last visit. His last BARRETO naltrexone was on 5/17/23. With this, he has pretty controlled cravings.     He feels quite a bit of shame around this event, but responded well to reassurance. Unfortunately, his relapse prevention group will be stopping soon.     Otherwise, his health has been overall stable. He follows with a PCP that he has a good relationship with. Other than this last event, his mood has overall been good. He does multiple times discuss how frustrated he is with his senior living " "situation.     PSYCHIATRIC REVIEW OF SYSTEMS     Sleep: OK - fragmented  Anxiety:  Denies significant  Depression:  Denies significant  Psychosis:   Denies     BRIEF SUMMARY OF SUBSTANCE USE                                                                Gio has been in detox over 40 times and treatment about 5 times. His first treatment was in 1980. He gave up daily drinking in late 1990's, then began to binge drink. In recent years, his pattern has been drinking 1-2 weeks, not eating most of that time, and getting very sick. He then will stop drinking, will be sober for 1-2 weeks and the cycle is continued. Alcohol has caused withdrawal and tolerance, drinking 6-10 beers and 1 pint a day when on a binge. Alcohol has affected relationships, has affected health with getting sick, not eating and getting depressed.      Treatment History:    First treatment in 1980. Last treatment at [facility] finished November 2018.      Substance Use Pharmacotherapies:   Tried antabuse in late 1990's and he would stop taking and \"drink around it\".      BRIEF PSYCHIATRIC HISTORY     Previous diagnoses, history and diagnostic clarification:  Alcohol Use Disorder, severe, in sustained remission.   Major Depressive Disorder without suicidal ideation   Generalized Anxiety Disorder  R/o PTSD      PAST PSYCH MED TRIALS       Drug  Treatment Dates Max Dose (mg) Helpful Adverse Effects   Reason Discontinued   Prozac 1980s 80mg Yes  Stopped working   Zoloft, Paxil, Cymbalta, Effexor ?    ?   TCAs 1970s       Buproprion 1990    Anxiety   Mirtazapine  ?    Anxiety   Aripiprazole Summer 2019    Drooling   Gabapentin  Spring 2019  Yes       Current Medications     Current Outpatient Medications   Medication Instructions    Acetaminophen 325 mg, Oral, EVERY 8 HOURS PRN    alendronate (FOSAMAX) 70 mg, Oral, EVERY 7 DAYS    ASPIRIN PO 81 mg, Oral, DAILY    blood glucose (NO BRAND SPECIFIED) test strip Use to test blood sugar 2 times daily " or as directed. To accompany: Blood Glucose Monitor Brands: per insurance.    blood glucose monitoring (NO BRAND SPECIFIED) meter device kit Use to test blood sugar 2 times daily or as directed. Preferred blood glucose meter OR supplies to accompany: Blood Glucose Monitor Brands: per insurance.    calcium-vitamin D 500-125 MG-UNIT TABS 1 tablet, Oral, 2 TIMES DAILY    clotrimazole (LOTRIMIN) 1 % external cream Topical, 2 TIMES DAILY, Apply to rough patch of skin in front of right ear 2x daily for 10 days    DIPHENHYDRAMINE HCL PO 25 mg, Oral, DAILY PRN    escitalopram (LEXAPRO) 20 mg, Oral, DAILY    gabapentin (NEURONTIN) 300 mg, Oral, AT BEDTIME, Take 1 capsule by mouth at bedtime as needed.    losartan (COZAAR) 50 MG tablet TAKE ONE TABLET BY MOUTH ONCE DAILY    metFORMIN (GLUCOPHAGE XR) 500 MG 24 hr tablet TAKE ONE TABLET BY MOUTH ONCE DAILY WITH BREAKFAST    NITROGLYCERIN SL 0.4 mg, EVERY 5 MIN PRN    omeprazole (PRILOSEC) 20 MG DR capsule TAKE 1 CAPSULE BY MOUTH DAILY,  60 MINUTES BEFORE A MEAL.    rosuvastatin (CRESTOR) 40 mg, Oral, AT BEDTIME    tamsulosin (FLOMAX) 0.4 MG capsule TAKE ONE CAPSULE BY MOUTH ONCE DAILY WITH BREAKFAST. (NEED TO BE SEEN IN CLINIC FOR FURTHER REFILLS)    thin (NO BRAND SPECIFIED) lancets Use with lanceting device. To accompany: Blood Glucose Monitor Brands: per insurance.         MENTAL STATUS EXAM/WITHDRAWAL                                                              Withdrawal symptoms: None    Alertness: alert   Orientation: awake and alert  Appearance: adequately groomed  Behavior/Demeanor: cooperative and pleasant, with good  eye contact.  Speech: slowed  Psychomotor: normal or unremarkable    Mood:  description consistent with euthymia  Affect: full range and was congruent to speech content.  Thought Process/Associations: unremarkable   Thought Content: devoid of  suicidal ideation.   Perception: devoid of  auditory hallucinations and visual hallucinations  Insight:  adequate.  Judgment: fair.    These cognitive functions grossly appear as described, but were not formally tested.    ASSESSMENT/PLAN                                                  Summation/Assessment:  78 year old male with history of severe alcohol use disorder as well as depression, with recent significant binge alcohol use precipitated by life stressor.     # Alcohol use disorder, severe  Longstanding history with most recent pattern of intermittent low-volume use, 2-3 drinks once or twice monthly through the summer. Unfortunately this weekend had large binge episode precipitated by stressor, leading to multiple days sick/recuperating. Relapse prevention group has stopped, and his last BARRETO naltrexone was in May.   - Continue BARRETO naltrexone monthly, given in clinic today   - Recommended reconnecting with  sponsor for therapeutic community since Relapse Prevention group has ceased    # MDD  # RODRIGO  Mood is good today, endorses normal fluctuations but no major depressive episodes or significant anxiety.   - Continue escitalopram 20mg daily and gabapentin 300mg at bedtime     RTC: 1 month     [place] PRESCRIPTION MONITORING PROGRAM [] was checked today:  No recent controlled substances other than gabapentin.     07/10/2023 07/10/2023 07/13/2023 1   Gabapentin 300 Mg Capsule  90.00 90 [facility] [id number] Jorge ([id number] 0/3  [place]are [place]  03/29/2023 03/29/2023 04/05/2023 1   Gabapentin 300 Mg Capsule  90.00 90 [facility] [id number] Jorge ([id number]) 0/0  [place]    Marvin Barboza MD   Addiction Medicine Fellow  [facility]     Patient seen by and discussed with staff Dr. Bee. Supervisor is Dr. Bee

## 2023-10-18 ENCOUNTER — OFFICE VISIT (OUTPATIENT)
Dept: PSYCHIATRY | Facility: CLINIC | Age: 78
End: 2023-10-18
Attending: PSYCHIATRY & NEUROLOGY
Payer: COMMERCIAL

## 2023-10-18 VITALS
HEART RATE: 94 BPM | BODY MASS INDEX: 26.14 KG/M2 | SYSTOLIC BLOOD PRESSURE: 127 MMHG | DIASTOLIC BLOOD PRESSURE: 83 MMHG | WEIGHT: 187.4 LBS

## 2023-10-18 DIAGNOSIS — F10.21 ALCOHOL USE DISORDER, SEVERE, IN EARLY REMISSION (H): Primary | ICD-10-CM

## 2023-10-18 PROCEDURE — 99214 OFFICE O/P EST MOD 30 MIN: CPT | Mod: GC | Performed by: STUDENT IN AN ORGANIZED HEALTH CARE EDUCATION/TRAINING PROGRAM

## 2023-10-18 PROCEDURE — G0463 HOSPITAL OUTPT CLINIC VISIT: HCPCS | Performed by: STUDENT IN AN ORGANIZED HEALTH CARE EDUCATION/TRAINING PROGRAM

## 2023-10-18 ASSESSMENT — PATIENT HEALTH QUESTIONNAIRE - PHQ9
SUM OF ALL RESPONSES TO PHQ QUESTIONS 1-9: 8
SUM OF ALL RESPONSES TO PHQ QUESTIONS 1-9: 8
10. IF YOU CHECKED OFF ANY PROBLEMS, HOW DIFFICULT HAVE THESE PROBLEMS MADE IT FOR YOU TO DO YOUR WORK, TAKE CARE OF THINGS AT HOME, OR GET ALONG WITH OTHER PEOPLE: SOMEWHAT DIFFICULT

## 2023-10-18 ASSESSMENT — PAIN SCALES - GENERAL: PAINLEVEL: NO PAIN (0)

## 2023-10-18 NOTE — NURSING NOTE
Chief Complaint   Patient presents with    Recheck Medication     Moderate episode of recurrent major depressive disorder     - Freddie Bazzi, Visit Facilitator

## 2023-10-18 NOTE — PROGRESS NOTES
"  ----------------------------------------------------------------------------------------------------------  [facility]   Addiction Medicine Progress Note     ID                                Laurent Choi is a 78 year old  male with a history of alcohol use disorder, previously severe, in sustained remission, managed with BARRETO naltrexone since 9/2018, as well as major depressive disorder and generalized anxiety disorder. He has been following in the addiction clinic since 10/2018.     INTERVAL HISTORY                                  Gio reports that since the last naltrexone injection, he has noticed a significant decrease in his sex drive. In retrospect, he does remember that while on naltrexone IM for about 4 years he had very little sex drive, he attributed this to old age. After stopping, his sex drive returned \"with a vengeance\" and he has been sexually active on a weekly basis since then. With the shot it waned again, and he is hesitant to resume. Overall he feels he has been getting more benefit from the relationships than from the alcohol craving control.     He has not returned to alcohol use since last month and is mindful of intermittent cravings. He has good insight surrounding them, and feels like he has methods to control them.    He does note some worsening mood for the last month as well. 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 He has had a lower mood and has not been as social.     He did have one outburst at a recent community meeting regarding window replacement in the apartment building where he knocked over a display window and broke it. He feels quite embarrassed about this and thinks he is marlen they avoided criminal charges.     BRIEF SUMMARY OF SUBSTANCE USE                                                                Gio has been in detox over 40 times and treatment about 5 " "times. His first treatment was in 1980. He gave up daily drinking in late 1990's, then began to binge drink. In recent years, his pattern has been drinking 1-2 weeks, not eating most of that time, and getting very sick. He then will stop drinking, will be sober for 1-2 weeks and the cycle is continued. Alcohol has caused withdrawal and tolerance, drinking 6-10 beers and 1 pint a day when on a binge. Alcohol has affected relationships, has affected health with getting sick, not eating and getting depressed.     Last significant binge use was 9/2023 prompted by conflict at his apartment.      Treatment History:    First treatment in 1980. Last treatment at [facility] finished November 2018.      Substance Use Pharmacotherapies:   Tried antabuse in late 1990's and he would stop taking and \"drink around it\".      BRIEF PSYCHIATRIC HISTORY     Previous diagnoses, history and diagnostic clarification:  Alcohol Use Disorder, severe, in sustained remission.   Major Depressive Disorder without suicidal ideation   Generalized Anxiety Disorder  R/o PTSD      PAST PSYCH MED TRIALS       Drug  Treatment Dates Max Dose (mg) Helpful Adverse Effects   Reason Discontinued   Prozac 1980s 80mg Yes  Stopped working   Zoloft, Paxil, Cymbalta, Effexor ?    ?   TCAs 1970s       Buproprion 1990    Anxiety   Mirtazapine  ?    Anxiety   Aripiprazole Summer 2019    Drooling   Gabapentin  Spring 2019  Yes       Current Medications     Current Outpatient Medications   Medication Instructions    Acetaminophen 325 mg, Oral, EVERY 8 HOURS PRN    alendronate (FOSAMAX) 70 mg, Oral, EVERY 7 DAYS    ASPIRIN PO 81 mg, Oral, DAILY    blood glucose (NO BRAND SPECIFIED) test strip Use to test blood sugar 2 times daily or as directed. To accompany: Blood Glucose Monitor Brands: per insurance.    blood glucose monitoring (NO BRAND SPECIFIED) meter device kit Use to test blood sugar 2 times daily or as directed. Preferred blood glucose meter OR " supplies to accompany: Blood Glucose Monitor Brands: per insurance.    calcium-vitamin D 500-125 MG-UNIT TABS 1 tablet, Oral, 2 TIMES DAILY    clotrimazole (LOTRIMIN) 1 % external cream Topical, 2 TIMES DAILY, Apply to rough patch of skin in front of right ear 2x daily for 10 days    DIPHENHYDRAMINE HCL PO 25 mg, Oral, DAILY PRN    escitalopram (LEXAPRO) 20 mg, Oral, DAILY    gabapentin (NEURONTIN) 300 mg, Oral, AT BEDTIME, Take 1 capsule by mouth at bedtime as needed.    losartan (COZAAR) 50 MG tablet TAKE ONE TABLET BY MOUTH ONCE DAILY    metFORMIN (GLUCOPHAGE XR) 500 MG 24 hr tablet TAKE ONE TABLET BY MOUTH ONCE DAILY WITH BREAKFAST    NITROGLYCERIN SL 0.4 mg, EVERY 5 MIN PRN    omeprazole (PRILOSEC) 20 MG DR capsule TAKE 1 CAPSULE BY MOUTH DAILY,  60 MINUTES BEFORE A MEAL.    rosuvastatin (CRESTOR) 40 mg, Oral, AT BEDTIME    tamsulosin (FLOMAX) 0.4 MG capsule TAKE ONE CAPSULE BY MOUTH ONCE DAILY WITH BREAKFAST. (NEED TO BE SEEN IN CLINIC FOR FURTHER REFILLS)    thin (NO BRAND SPECIFIED) lancets Use with lanceting device. To accompany: Blood Glucose Monitor Brands: per insurance.     MENTAL STATUS EXAM/WITHDRAWAL                                                              Withdrawal symptoms: None    Alertness: alert   Orientation: awake and alert  Appearance: adequately groomed  Behavior/Demeanor: cooperative and pleasant, with good  eye contact.  Speech: slowed  Psychomotor: normal or unremarkable    Mood:  description consistent with euthymia  Affect: full range and was congruent to speech content.  Thought Process/Associations: unremarkable   Thought Content: devoid of  suicidal ideation.   Perception: devoid of  auditory hallucinations and visual hallucinations  Insight: adequate.  Judgment: fair.    These cognitive functions grossly appear as described, but were not formally tested.    ASSESSMENT/PLAN                                                  Summation/Assessment:  78 year old male with history of  severe alcohol use disorder as well as depression, with worsening mood episode recently.     # Alcohol use disorder, severe  Longstanding history with most recent pattern of intermittent low-volume use, 2-3 drinks once or twice monthly through the summer, most recently with large binge episode which made him quite sick in September, triggered by frustration around a meeting at his apartment. No return to use since this episode, he does have occasional cravings but feels he has some control over them. He had a shot of naltrexone at last visit but currently feels that the mood symptoms and the worsening of his sex drive is not worth the extra craving/impulse control that he gains.   - No further naltrexone for now, will continue to discuss  - Currently planning to try new AA meeting in [facility] and to try a new sponsor there. Will follow up at next visit on progress.    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 It is unclear whether this depressive episode is primary or related to naltrexone. We elected to continue current medications below while holding naltrexone for now and to reassess in 1 month after naltrexone has cleared. No SI/HI.  - Continue escitalopram 20mg daily and gabapentin 300mg at bedtime   - Discussed contingency plans for extreme sadness, SI, impulsiveness     RTC: 1 month    [place] PRESCRIPTION MONITORING PROGRAM [] was checked today:  No recent controlled substances other than gabapentin.     Marvin Barboza MD   Addiction Medicine Fellow  [facility]     Patient seen by and discussed with staff Dr. Bee. Supervisor is Dr. Bee    I saw the patient with the fellow, and participated in key portions of the service, including the mental status examination and developing the plan of care. I  reviewed key portions of the history with the fellow. I agree with the findings and plan as documented in this note.    Selam Bee MD      Answers submitted by the patient for this visit:  Patient Health Questionnaire (Submitted on 10/18/2023)  If you checked off any problems, how difficult have these problems made it for you to do your work, take care of things at home, or get along with other people?: Somewhat difficult  PHQ9 TOTAL SCORE: 8

## 2023-10-26 DIAGNOSIS — I25.10 CORONARY ATHEROSCLEROSIS OF NATIVE CORONARY ARTERY: ICD-10-CM

## 2023-10-26 RX ORDER — ROSUVASTATIN CALCIUM 40 MG/1
40 TABLET, COATED ORAL AT BEDTIME
Qty: 90 TABLET | Refills: 0 | Status: SHIPPED | OUTPATIENT
Start: 2023-10-26 | End: 2024-02-01

## 2023-11-14 NOTE — PROGRESS NOTES
"SESSION TYPE: Individual psychotherapy  LENGTH OF APPOINTMENT: 55 minutes  APPOINTMENT TIME: 12:50 pm - 1:45 pm  PARTICIPANTS: Writer (Lynda Olmedo MA) and patient  SUPERVISOR: Susi Gilbert, PhD, LP    DSM-5 DIAGNOSIS  Major Depressive Disorder, Recurrent, Severe  Generalized Anxiety Disorder  Alcohol Use Disorder, Severe  R/O Social Anxiety Disorder    SUBJECTIVE: Mr. Choi reported feeling more energetic and accomplishing more tasks this week. He reported feeling capable of completing one chore per day.  He reported less fatigue. He noted he has been setting an alarm for himself when he takes a nap to limit the length of his naps during the day. Mr. Choi reported a \"good\" holiday, noting he spent time with a neighbor and spoke with his sponsor over the break.    TREATMENT: Mr. Choi spent the majority of the session discussing his support network. He participated in a social mapping activity. He came to the conclusion that he needs to expand his support network and develop closer relationships with those already in his life. Mr. Choi also discussed his disdain for persons who \"act like victims.\" The reason behind this disdain should be explored further in future sessions.    ASSESSMENT: Mr. Choi arrived on-time for the session. He was well groomed and appropriately dressed for the session. He appeared his stated age. He engaged in spontaneous conversation with the therapist. Eye contact was appropriate. Speech and thought were unremarkable/fluent/linear and organized throughout the session. He denied SI.     PLAN:    -RTC in 1 week   -For homework, exercise, bake bread, think about going to the [facility] to exercise, and write a poem.   -Continue medication management with addiction medicine fellow    I did not see this pt directly. This pt was discussed with me in individual psychotherapy supervision, and I agree with the plan as documented.     Susi Gilbert, Ph.D., L.P.  " Push fluids.

## 2023-11-15 ENCOUNTER — OFFICE VISIT (OUTPATIENT)
Dept: PSYCHIATRY | Facility: CLINIC | Age: 78
End: 2023-11-15
Attending: PSYCHIATRY & NEUROLOGY
Payer: COMMERCIAL

## 2023-11-15 VITALS
BODY MASS INDEX: 26.58 KG/M2 | WEIGHT: 190.6 LBS | DIASTOLIC BLOOD PRESSURE: 83 MMHG | SYSTOLIC BLOOD PRESSURE: 129 MMHG | HEART RATE: 103 BPM

## 2023-11-15 DIAGNOSIS — F10.21 ALCOHOL USE DISORDER, SEVERE, IN EARLY REMISSION (H): Primary | ICD-10-CM

## 2023-11-15 PROCEDURE — 90833 PSYTX W PT W E/M 30 MIN: CPT | Mod: GC | Performed by: STUDENT IN AN ORGANIZED HEALTH CARE EDUCATION/TRAINING PROGRAM

## 2023-11-15 PROCEDURE — G0463 HOSPITAL OUTPT CLINIC VISIT: HCPCS | Performed by: STUDENT IN AN ORGANIZED HEALTH CARE EDUCATION/TRAINING PROGRAM

## 2023-11-15 PROCEDURE — 99214 OFFICE O/P EST MOD 30 MIN: CPT | Mod: GC | Performed by: STUDENT IN AN ORGANIZED HEALTH CARE EDUCATION/TRAINING PROGRAM

## 2023-11-15 ASSESSMENT — PAIN SCALES - GENERAL: PAINLEVEL: NO PAIN (0)

## 2023-11-15 NOTE — PROGRESS NOTES
----------------------------------------------------------------------------------------------------------  [facility]   Addiction Medicine Progress Note     ID                                Laurent Choi is a 78 year old  male with a history of alcohol use disorder, previously severe, in sustained remission, as well as major depressive disorder and generalized anxiety disorder. He has been following in the addiction clinic since 10/2018.     INTERVAL HISTORY                                  Doing well since last visit. Over the month he has stopped going to community meetings at his retiree facility as they have proved to be very triggering for his substance use. He has not had return to alcohol use or desire to drink. 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     He went to [facility] meeting once and found it encouraging. This was his first in a while. He met a jagdish named Kemar who had some unique perspectives about unconscious use of alcohol and this resonated with Gio particularly. He did not attend on other days, citing his preoccupation with his book. They meet daily at 1200.     BRIEF SUMMARY OF SUBSTANCE USE                                                                Gio has been in detox over 40 times and treatment about 5 times. His first treatment was in 1980. He gave up daily drinking in late 1990's, then began to binge drink. In recent years, his pattern has been drinking 1-2 weeks, not eating most of that time, and getting very sick. He then will stop drinking, will be sober for 1-2 weeks and the cycle is continued. Alcohol has caused withdrawal and tolerance, drinking 6-10 beers and 1 pint a day when on a binge. Alcohol has affected relationships, has affected health with getting sick, not eating and getting depressed.     Last significant binge use was 9/2023  "prompted by conflict at his apartment.      Treatment History:    First treatment in 1980. Last treatment at [facility] finished November 2018.      Substance Use Pharmacotherapies:   Tried antabuse in late 1990's and he would stop taking and \"drink around it\".      BRIEF PSYCHIATRIC HISTORY     Previous diagnoses, history and diagnostic clarification:  Alcohol Use Disorder, severe, in sustained remission.   Major Depressive Disorder without suicidal ideation   Generalized Anxiety Disorder  R/o PTSD      PAST PSYCH MED TRIALS       Drug  Treatment Dates Max Dose (mg) Helpful Adverse Effects   Reason Discontinued   Prozac 1980s 80mg Yes  Stopped working   Zoloft, Paxil, Cymbalta, Effexor ?    ?   TCAs 1970s       Buproprion 1990    Anxiety   Mirtazapine  ?    Anxiety   Aripiprazole Summer 2019    Drooling   Gabapentin  Spring 2019  Yes       Current Medications     Current Outpatient Medications   Medication Instructions    Acetaminophen 325 mg, Oral, EVERY 8 HOURS PRN    alendronate (FOSAMAX) 70 mg, Oral, EVERY 7 DAYS    ASPIRIN PO 81 mg, Oral, DAILY    blood glucose (NO BRAND SPECIFIED) test strip Use to test blood sugar 2 times daily or as directed. To accompany: Blood Glucose Monitor Brands: per insurance.    blood glucose monitoring (NO BRAND SPECIFIED) meter device kit Use to test blood sugar 2 times daily or as directed. Preferred blood glucose meter OR supplies to accompany: Blood Glucose Monitor Brands: per insurance.    calcium-vitamin D 500-125 MG-UNIT TABS 1 tablet, Oral, 2 TIMES DAILY    clotrimazole (LOTRIMIN) 1 % external cream Topical, 2 TIMES DAILY, Apply to rough patch of skin in front of right ear 2x daily for 10 days    DIPHENHYDRAMINE HCL PO 25 mg, Oral, DAILY PRN    escitalopram (LEXAPRO) 20 mg, Oral, DAILY    gabapentin (NEURONTIN) 300 mg, Oral, AT BEDTIME, Take 1 capsule by mouth at bedtime as needed.    losartan (COZAAR) 50 MG tablet TAKE ONE TABLET BY MOUTH ONCE DAILY    metFORMIN " (GLUCOPHAGE XR) 500 MG 24 hr tablet TAKE ONE TABLET BY MOUTH ONCE DAILY WITH BREAKFAST    NITROGLYCERIN SL 0.4 mg, EVERY 5 MIN PRN    omeprazole (PRILOSEC) 20 MG DR capsule TAKE 1 CAPSULE BY MOUTH DAILY,  60 MINUTES BEFORE A MEAL.    rosuvastatin (CRESTOR) 40 mg, Oral, AT BEDTIME    tamsulosin (FLOMAX) 0.4 MG capsule TAKE ONE CAPSULE BY MOUTH ONCE DAILY WITH BREAKFAST. (NEED TO BE SEEN IN CLINIC FOR FURTHER REFILLS)    thin (NO BRAND SPECIFIED) lancets Use with lanceting device. To accompany: Blood Glucose Monitor Brands: per insurance.     MENTAL STATUS EXAM/WITHDRAWAL                                                              Withdrawal symptoms: None    Alertness: alert   Orientation: awake and alert  Appearance: adequately groomed  Behavior/Demeanor: cooperative and pleasant, with good  eye contact.  Speech: slowed  Psychomotor: normal or unremarkable    Mood:  description consistent with euthymia  Affect: full range and was congruent to speech content.  Thought Process/Associations: unremarkable   Thought Content: devoid of  suicidal ideation.   Perception: devoid of  auditory hallucinations and visual hallucinations  Insight: adequate.  Judgment: fair.    These cognitive functions grossly appear as described, but were not formally tested.    ASSESSMENT/PLAN                                                  Summation/Assessment:  78 year old male with history of severe alcohol use disorder as well as depression. Mood improved over the last month, with no alcohol use or cravings.     # Alcohol use disorder, severe  Longstanding history with most recent pattern of intermittent low-volume use, 2-3 drinks once or twice monthly through the summer, most recently with large binge episode which made him quite sick in September, triggered by frustration around a meeting at his apartment. No return to use since this episode, reports cravings have improved over the last month. Previously took naltrexone (BARRETO for months)  with good effect, however the downside was significant negative impact on his sex life.   - No further naltrexone for now, will continue to discuss  - Encouraged progress with AA, together made goal to go to 4 AA meetings (Mondays) before next visit and to reconnect with Kemar     # MDD  # RODRIGO  Mood improved this month, was previously on downward trajectory. Sleep and productivity have been improved. He did see some benefit to his mood by attending AA meetings, and hopes to continue this. Previous depressive symptoms have mostly resolved.   - Continue escitalopram 20mg daily and gabapentin 300mg at bedtime     RTC: 1 month    [place] PRES[place] MONITORING PROGRAM [] was checked today:  No recent controlled substances other than gabapentin.     Marvin Barboza MD   Addiction Medicine Fellow  [facility]     Patient seen by and discussed with staff Dr. Bee.     I saw the patient with the fellow, and participated in key portions of the service, including the mental status examination and developing the plan of care. I reviewed key portions of the history with the fellow. I agree with the findings and plan as documented in this note.    Selam Bee MD        Psychiatry Individual Psychotherapy Note   Psychotherapy start time - 10:10  Psychotherapy end time - 10:28 AM  Date last reviewed - 11/15/23  Subjective: This supportive psychotherapy session addressed issues related to goals of therapy and current psychosocial stressors.   Interactive complexity indicated? No  Plan: RTC in timeframe noted above  Psychotherapy services during this visit included myself and the patient.   Treatment Plan      SYMPTOMS; PROBLEMS   MEASURABLE GOALS;    FUNCTIONAL IMPROVEMENT / GAINS INTERVENTIONS DISCHARGE CRITERIA   Substance Use: alcohol    stay free of alcohol abuse  and go to AA meetings 4x before next meeting Supportive / psychodynamic marked symptom improvement

## 2023-11-15 NOTE — NURSING NOTE
Chief Complaint   Patient presents with    RECHECK     Alcohol use disorder, severe, in early remission

## 2023-11-21 ENCOUNTER — OFFICE VISIT (OUTPATIENT)
Dept: FAMILY MEDICINE | Facility: CLINIC | Age: 78
End: 2023-11-21
Payer: COMMERCIAL

## 2023-11-21 VITALS
SYSTOLIC BLOOD PRESSURE: 112 MMHG | OXYGEN SATURATION: 96 % | HEIGHT: 70 IN | DIASTOLIC BLOOD PRESSURE: 60 MMHG | HEART RATE: 90 BPM | WEIGHT: 188 LBS | TEMPERATURE: 97.9 F | RESPIRATION RATE: 21 BRPM | BODY MASS INDEX: 26.92 KG/M2

## 2023-11-21 DIAGNOSIS — I25.119 CORONARY ARTERY DISEASE INVOLVING NATIVE CORONARY ARTERY OF NATIVE HEART WITH ANGINA PECTORIS (H): ICD-10-CM

## 2023-11-21 DIAGNOSIS — E11.9 TYPE 2 DIABETES MELLITUS WITHOUT COMPLICATION, WITHOUT LONG-TERM CURRENT USE OF INSULIN (H): Primary | ICD-10-CM

## 2023-11-21 DIAGNOSIS — M80.00XS OSTEOPOROSIS WITH CURRENT PATHOLOGICAL FRACTURE, UNSPECIFIED OSTEOPOROSIS TYPE, SEQUELA: ICD-10-CM

## 2023-11-21 LAB — HBA1C MFR BLD: 5.9 % (ref 0–5.6)

## 2023-11-21 PROCEDURE — 36415 COLL VENOUS BLD VENIPUNCTURE: CPT | Performed by: FAMILY MEDICINE

## 2023-11-21 PROCEDURE — 90480 ADMN SARSCOV2 VAC 1/ONLY CMP: CPT | Performed by: FAMILY MEDICINE

## 2023-11-21 PROCEDURE — 91320 SARSCV2 VAC 30MCG TRS-SUC IM: CPT | Performed by: FAMILY MEDICINE

## 2023-11-21 PROCEDURE — 99215 OFFICE O/P EST HI 40 MIN: CPT | Mod: 25 | Performed by: FAMILY MEDICINE

## 2023-11-21 PROCEDURE — 83036 HEMOGLOBIN GLYCOSYLATED A1C: CPT | Performed by: FAMILY MEDICINE

## 2023-11-21 ASSESSMENT — PATIENT HEALTH QUESTIONNAIRE - PHQ9: SUM OF ALL RESPONSES TO PHQ QUESTIONS 1-9: 7

## 2023-11-21 NOTE — PROGRESS NOTES
"  Assessment & Plan     Type 2 diabetes mellitus without complication, without long-term current use of insulin (H)  Overall stable, A1c within goal, continue healthy lifestyle changes, continue current management follow-up every 4 to 6 months.  - HEMOGLOBIN A1C; Future  - HEMOGLOBIN A1C    Coronary artery disease involving native coronary artery of native heart with angina pectoris (H24)  Continue aggressive risk factor modification, tight control of blood pressure, hyperlipidemia, diabetes type 2.    Osteoporosis with current pathological fracture, unspecified osteoporosis type, sequela  Controlled with Fosamax weekly prescription, tolerated well.  Bone density will be done in about a year.    Review of external notes as documented elsewhere in note  40 minutes spent by me on the date of the encounter doing chart review, review of outside records, review of test results, interpretation of tests, patient visit, and documentation        BMI:   Estimated body mass index is 26.7 kg/m  as calculated from the following:    Height as of this encounter: 1.787 m (5' 10.35\").    Weight as of this encounter: 85.3 kg (188 lb).   Weight management plan: Discussed healthy diet and exercise guidelines    Work on weight loss  Regular exercise    MD SEEMA Flores [facility]   Gio is a 78 year old, presenting for the following health issues:  Follow Up      11/21/2023    11:10 AM   Additional Questions   Roomed by Janae STEPHENSON   Accompanied by self       History of Present Illness       Diabetes:   He presents for follow up of diabetes.  He is checking home blood glucose a few times a month.   He checks blood glucose after meals.  Blood glucose is never over 200 and never under 70. He is aware of hypoglycemia symptoms including shakiness and weakness.    He has no concerns regarding his diabetes at this time.   He is not experiencing numbness or burning in feet, excessive thirst, blurry vision, " "weight changes or redness, sores or blisters on feet.           He eats 2-3 servings of fruits and vegetables daily.He consumes 1 sweetened beverage(s) daily.He exercises with enough effort to increase his heart rate 20 to 29 minutes per day.  He exercises with enough effort to increase his heart rate 3 or less days per week.   He is taking medications regularly.       Follow-up on diabetes type 2, overall doing fine, checks blood sugar twice a week, usually average around 150s, never above 200.  Takes metformin  mg daily, highest A1c was 6.7% about 6 months ago.  He seen his vitreal specialist, but after discussion with patient is planning to see a regular ophthalmologist or optometrist.  Coronary artery disease appears stable, no recent chest pain or shortness of breath.  Some Crestor.  Osteoporosis, is tolerating Fosamax weekly prescription well, no GERD or bone ache.    Review of Systems   Constitutional, HEENT, cardiovascular, pulmonary, gi and gu systems are negative, except as otherwise noted.      Objective    /60 (BP Location: Right arm, Patient Position: Sitting, Cuff Size: Adult Large)   Pulse 90   Temp 97.9  F (36.6  C) (Temporal)   Resp 21   Ht 1.787 m (5' 10.35\")   Wt 85.3 kg (188 lb)   SpO2 96%   BMI 26.70 kg/m    Body mass index is 26.7 kg/m .  Physical Exam   GENERAL: healthy, alert and no distress  NECK: no adenopathy, no asymmetry, masses, or scars and thyroid normal to palpation  RESP: lungs clear to auscultation - no rales, rhonchi or wheezes  CV: regular rate and rhythm, normal S1 S2, no S3 or S4, no murmur, click or rub, no peripheral edema and peripheral pulses strong  ABDOMEN: soft, nontender, no hepatosplenomegaly, no masses and bowel sounds normal  MS: no gross musculoskeletal defects noted, no edema    Results for orders placed or performed in visit on 11/21/23   HEMOGLOBIN A1C     Status: Abnormal   Result Value Ref Range    Hemoglobin A1C 5.9 (H) 0.0 - 5.6 % "       This note was completed in part using a voice recognition software, any grammatical or context distortion are unintentional and inherent to the software.          Prior to immunization administration, verified patients identity using patient s name and date of birth. Please see Immunization Activity for additional information.     Screening Questionnaire for Adult Immunization    Are you sick today?   No   Do you have allergies to medications, food, a vaccine component or latex?   Yes   Have you ever had a serious reaction after receiving a vaccination?   No   Do you have a long-term health problem with heart, lung, kidney, or metabolic disease (e.g., diabetes), asthma, a blood disorder, no spleen, complement component deficiency, a cochlear implant, or a spinal fluid leak?  Are you on long-term aspirin therapy?   Yes   Do you have cancer, leukemia, HIV/AIDS, or any other immune system problem?   No   Do you have a parent, brother, or sister with an immune system problem?   No   In the past 3 months, have you taken medications that affect  your immune system, such as prednisone, other steroids, or anticancer drugs; drugs for the treatment of rheumatoid arthritis, Crohn s disease, or psoriasis; or have you had radiation treatments?   No   Have you had a seizure, or a brain or other nervous system problem?   No   During the past year, have you received a transfusion of blood or blood    products, or been given immune (gamma) globulin or antiviral drug?   No   For women: Are you pregnant or is there a chance you could become       pregnant during the next month?   No   Have you received any vaccinations in the past 4 weeks?   No     Immunization questionnaire was positive for at least one answer.  Notified Dr. Smith.      Patient instructed to remain in clinic for 15 minutes afterwards, and to report any adverse reactions.     Screening performed by Janae Calle MA on 11/21/2023 at 11:18 AM.

## 2023-12-20 ENCOUNTER — OFFICE VISIT (OUTPATIENT)
Dept: PSYCHIATRY | Facility: CLINIC | Age: 78
End: 2023-12-20
Attending: PSYCHIATRY & NEUROLOGY
Payer: COMMERCIAL

## 2023-12-20 VITALS
DIASTOLIC BLOOD PRESSURE: 83 MMHG | HEART RATE: 81 BPM | SYSTOLIC BLOOD PRESSURE: 123 MMHG | BODY MASS INDEX: 26.73 KG/M2 | WEIGHT: 188.2 LBS

## 2023-12-20 DIAGNOSIS — F41.1 GAD (GENERALIZED ANXIETY DISORDER): ICD-10-CM

## 2023-12-20 DIAGNOSIS — F33.1 MODERATE EPISODE OF RECURRENT MAJOR DEPRESSIVE DISORDER (H): ICD-10-CM

## 2023-12-20 PROCEDURE — G0463 HOSPITAL OUTPT CLINIC VISIT: HCPCS | Performed by: STUDENT IN AN ORGANIZED HEALTH CARE EDUCATION/TRAINING PROGRAM

## 2023-12-20 PROCEDURE — 99214 OFFICE O/P EST MOD 30 MIN: CPT | Mod: GC | Performed by: STUDENT IN AN ORGANIZED HEALTH CARE EDUCATION/TRAINING PROGRAM

## 2023-12-20 RX ORDER — NALTREXONE HYDROCHLORIDE 50 MG/1
50 TABLET, FILM COATED ORAL DAILY
Qty: 30 TABLET | Refills: 1 | Status: SHIPPED | OUTPATIENT
Start: 2023-12-20 | End: 2023-12-20

## 2023-12-20 RX ORDER — ESCITALOPRAM OXALATE 20 MG/1
20 TABLET ORAL DAILY
Qty: 90 TABLET | Refills: 0 | Status: SHIPPED | OUTPATIENT
Start: 2023-12-20 | End: 2024-02-28

## 2023-12-20 RX ORDER — NALTREXONE HYDROCHLORIDE 50 MG/1
50 TABLET, FILM COATED ORAL DAILY PRN
Qty: 30 TABLET | Refills: 1 | Status: SHIPPED | OUTPATIENT
Start: 2023-12-20

## 2023-12-20 ASSESSMENT — PAIN SCALES - GENERAL: PAINLEVEL: NO PAIN (0)

## 2023-12-20 NOTE — NURSING NOTE
Chief Complaint   Patient presents with    Recheck Medication     Alcohol use disorder, severe, in early remission     - Freddie Bazzi, Visit Facilitator

## 2023-12-20 NOTE — PROGRESS NOTES
"  ----------------------------------------------------------------------------------------------------------  [facility]   Addiction Medicine Progress Note     ID                                Laurent Choi is a 78 year old  male with a history of alcohol use disorder, previously severe, in sustained remission, as well as major depressive disorder and generalized anxiety disorder. He has been following in the addiction clinic since 10/2018.     INTERVAL HISTORY                                  Since last visit, Gio accomplished his goal of going to 4 AA meetings at the [facility] over the 4 weeks. He describes these as the \"best meetings he has been to\" and has made some good connections. Further, he went to a Thanksgiving dinner at the [facility] near his home and that was a good experience.    His mood has been down due to the holidays. This is typically a hard time for him. Luckily he has been going to AA and many people feel the same way there.     He has not returned to alcohol use nor had significant cravings. 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 Describes himself as a \"shut in\". Feels like he does not have the energy to get out on a regular basis, has been sleeping more.     BRIEF SUMMARY OF SUBSTANCE USE                                                                Gio has been in detox over 40 times and treatment about 5 times. His first treatment was in 1980. He gave up daily drinking in late 1990's, then began to binge drink. In recent years, his pattern has been drinking 1-2 weeks, not eating most of that time, and getting very sick. He then will stop drinking, will be sober for 1-2 weeks and the cycle is continued. Alcohol has caused withdrawal and tolerance, drinking 6-10 beers and 1 pint a day when on a binge. Alcohol has affected relationships, has affected health with getting sick, " "not eating and getting depressed.     Last significant binge use was 9/2023 prompted by conflict at his apartment.      Treatment History:    First treatment in 1980. Last treatment at [facility] finished November 2018.      Substance Use Pharmacotherapies:   Tried antabuse in late 1990's and he would stop taking and \"drink around it\".      BRIEF PSYCHIATRIC HISTORY     Previous diagnoses, history and diagnostic clarification:  Alcohol Use Disorder, severe, in sustained remission.   Major Depressive Disorder without suicidal ideation   Generalized Anxiety Disorder  R/o PTSD      PAST PSYCH MED TRIALS       Drug  Treatment Dates Max Dose (mg) Helpful Adverse Effects   Reason Discontinued   Prozac 1980s 80mg Yes  Stopped working   Zoloft, Paxil, Cymbalta, Effexor ?    ?   TCAs 1970s       Buproprion 1990    Anxiety   Mirtazapine  ?    Anxiety   Aripiprazole Summer 2019    Drooling   Gabapentin  Spring 2019  Yes       Current Medications     Current Outpatient Medications   Medication Instructions    Acetaminophen 325 mg, Oral, EVERY 8 HOURS PRN    alendronate (FOSAMAX) 70 mg, Oral, EVERY 7 DAYS    ASPIRIN PO 81 mg, Oral, DAILY    blood glucose (NO BRAND SPECIFIED) test strip Use to test blood sugar 2 times daily or as directed. To accompany: Blood Glucose Monitor Brands: per insurance.    blood glucose monitoring (NO BRAND SPECIFIED) meter device kit Use to test blood sugar 2 times daily or as directed. Preferred blood glucose meter OR supplies to accompany: Blood Glucose Monitor Brands: per insurance.    calcium-vitamin D 500-125 MG-UNIT TABS 1 tablet, Oral, 2 TIMES DAILY    clotrimazole (LOTRIMIN) 1 % external cream Topical, 2 TIMES DAILY, Apply to rough patch of skin in front of right ear 2x daily for 10 days    DIPHENHYDRAMINE HCL PO 25 mg, Oral, DAILY PRN    escitalopram (LEXAPRO) 20 mg, Oral, DAILY    gabapentin (NEURONTIN) 300 mg, Oral, AT BEDTIME, Take 1 capsule by mouth at bedtime as needed.    " losartan (COZAAR) 50 MG tablet TAKE ONE TABLET BY MOUTH ONCE DAILY    metFORMIN (GLUCOPHAGE XR) 500 MG 24 hr tablet TAKE ONE TABLET BY MOUTH ONCE DAILY WITH BREAKFAST    NITROGLYCERIN SL 0.4 mg, EVERY 5 MIN PRN    omeprazole (PRILOSEC) 20 MG DR capsule TAKE 1 CAPSULE BY MOUTH DAILY,  60 MINUTES BEFORE A MEAL.    rosuvastatin (CRESTOR) 40 mg, Oral, AT BEDTIME    tamsulosin (FLOMAX) 0.4 MG capsule TAKE ONE CAPSULE BY MOUTH ONCE DAILY WITH BREAKFAST. (NEED TO BE SEEN IN CLINIC FOR FURTHER REFILLS)    thin (NO BRAND SPECIFIED) lancets Use with lanceting device. To accompany: Blood Glucose Monitor Brands: per insurance.     MENTAL STATUS EXAM/WITHDRAWAL                                                              Withdrawal symptoms: None    Alertness: alert   Orientation: awake and alert  Appearance: adequately groomed  Behavior/Demeanor: cooperative and pleasant, with good  eye contact.  Speech: slowed  Psychomotor: normal or unremarkable    Mood:  description consistent with euthymia  Affect: full range and was congruent to speech content.  Thought Process/Associations: unremarkable   Thought Content: devoid of  suicidal ideation.   Perception: devoid of  auditory hallucinations and visual hallucinations  Insight: adequate.  Judgment: fair.    These cognitive functions grossly appear as described, but were not formally tested.    ASSESSMENT/PLAN                                                  Summation/Assessment:  78 year old male with history of severe alcohol use disorder as well as depression. Mood has been down over the last month, with the coming of the Vinh season.     # Alcohol use disorder, severe  Longstanding history with most recent pattern of intermittent low-volume use, 2-3 drinks once or twice monthly through the summer, most recently with large binge episode which made him quite sick in September, triggered by frustration around a meeting at his apartment. No return to use since this episode,  reports cravings have improved over the last month. Previously took naltrexone (BARRETO for months) with good effect, however the downside was significant negative impact on his sex life. Currently feels confident in his sobriety without naltrexone but this medication has proven very effective for him in the past, and in the coming months there will be a large stressor as he needs to either decide to move out or pay the $30k for new windows.   - Naltrexone 50mg PO daily PRN - to be taken in anticipation of large stressors   - Goal to attend AA meetings weekly, will provide accountability for this.     # MDD  # RODRIGO  His mood continues to fluctuate, this has been a bad month due to the holidays. AA groups have been uplifting, but he still is struggling to get out of the house on a regular basis and has been sleeping more than typical. Discussed longstanding medication regimen, will reassess at next visit, did not want to make large changes today.   - Continue escitalopram 20mg daily and gabapentin 300mg at bedtime   - Consider addition of bupropion at next visit if mood is still poor.     RTC: 1 month    [place] PRESCRIPTION MONITORING PROGRAM [] was checked today:  No recent controlled substances other than gabapentin.     Marvin Barboza MD   Addiction Medicine Fellow  [facility]     Patient seen by and discussed with staff Dr. Bee.    I saw the patient with the fellow, and participated in key portions of the service, including the mental status examination and developing the plan of care. I reviewed key portions of the history with the fellow. I agree with the findings and plan as documented in this note.    Selam Bee MD      Psychiatry Individual Psychotherapy Note   Psychotherapy start time - 9:50  Psychotherapy end time - 10:23  Date last reviewed - 11/15/23  Subjective: This supportive psychotherapy session addressed issues related to goals of therapy and current psychosocial stressors.    Interactive complexity indicated? No  Plan: RTC in timeframe noted above  Psychotherapy services during this visit included myself and the patient.   Treatment Plan      SYMPTOMS; PROBLEMS   MEASURABLE GOALS;    FUNCTIONAL IMPROVEMENT / GAINS INTERVENTIONS DISCHARGE CRITERIA   Substance Use: alcohol    stay free of alcohol abuse  and go to AA meetings 4x before next meeting Supportive / psychodynamic marked symptom improvement

## 2024-01-08 DIAGNOSIS — R73.03 PREDIABETES: ICD-10-CM

## 2024-01-09 RX ORDER — METFORMIN HCL 500 MG
TABLET, EXTENDED RELEASE 24 HR ORAL
Qty: 90 TABLET | Refills: 1 | Status: SHIPPED | OUTPATIENT
Start: 2024-01-09 | End: 2024-07-08

## 2024-01-24 ENCOUNTER — OFFICE VISIT (OUTPATIENT)
Dept: PSYCHIATRY | Facility: CLINIC | Age: 79
End: 2024-01-24
Attending: PSYCHIATRY & NEUROLOGY
Payer: COMMERCIAL

## 2024-01-24 VITALS
BODY MASS INDEX: 27.84 KG/M2 | SYSTOLIC BLOOD PRESSURE: 128 MMHG | DIASTOLIC BLOOD PRESSURE: 81 MMHG | HEART RATE: 88 BPM | WEIGHT: 196 LBS

## 2024-01-24 DIAGNOSIS — F10.21 ALCOHOL USE DISORDER, SEVERE, IN EARLY REMISSION (H): Primary | ICD-10-CM

## 2024-01-24 PROCEDURE — G0463 HOSPITAL OUTPT CLINIC VISIT: HCPCS | Performed by: STUDENT IN AN ORGANIZED HEALTH CARE EDUCATION/TRAINING PROGRAM

## 2024-01-24 PROCEDURE — 99214 OFFICE O/P EST MOD 30 MIN: CPT | Mod: GC | Performed by: STUDENT IN AN ORGANIZED HEALTH CARE EDUCATION/TRAINING PROGRAM

## 2024-01-24 ASSESSMENT — PATIENT HEALTH QUESTIONNAIRE - PHQ9
10. IF YOU CHECKED OFF ANY PROBLEMS, HOW DIFFICULT HAVE THESE PROBLEMS MADE IT FOR YOU TO DO YOUR WORK, TAKE CARE OF THINGS AT HOME, OR GET ALONG WITH OTHER PEOPLE: NOT DIFFICULT AT ALL
SUM OF ALL RESPONSES TO PHQ QUESTIONS 1-9: 7
SUM OF ALL RESPONSES TO PHQ QUESTIONS 1-9: 7

## 2024-01-24 ASSESSMENT — PAIN SCALES - GENERAL: PAINLEVEL: NO PAIN (0)

## 2024-01-24 NOTE — NURSING NOTE
Chief Complaint   Patient presents with    Recheck Medication     Moderate episode of recurrent major depressive disorder     Madonna Calle on 1/24/2024 at 10:27 AM

## 2024-01-24 NOTE — PROGRESS NOTES
----------------------------------------------------------------------------------------------------------  [facility]   Addiction Medicine Progress Note     ID                                Laurent Choi is a 78 year old  male with a history of alcohol use disorder, previously severe, in sustained remission, as well as major depressive disorder and generalized anxiety disorder. He has been following in the addiction clinic since 10/2018.     INTERVAL HISTORY                                  Gio is doing well today. 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 This is bringing him a lot of satisfaction. 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    He has been going to AA weekly, and has made a personal goal to speak at every meeting. This is new to him. He reports just adding a sentence or two every time. He is growing more comfortable there.    He denies alcohol use since last visit and did have one episode of cravings - there was a line at [employer] and he had to wait in front of the liquor store. He plans on taking naltrexone if there are any anticipated big stressors cravings.     His neighbor Andrzej and him and grown more close lately and are walking about once a week. They went on a three mile walk in the cold about a week ago. He has been using the treadmill about once week, going 1 mile in 30 minutes, and this has been good for his mood.     Answers submitted by the patient for this visit:  Patient Health Questionnaire (Submitted on 1/24/2024)  If you checked off any problems, how difficult have these problems made it for you to do your work, take care of things at home, or get along with other people?: Not difficult at all  PHQ9 TOTAL SCORE: 7      BRIEF SUMMARY OF SUBSTANCE USE                                                                Gio has been in detox over 40 times and treatment  "about 5 times. His first treatment was in 1980. He gave up daily drinking in late 1990's, then began to binge drink. In recent years, his pattern has been drinking 1-2 weeks, not eating most of that time, and getting very sick. He then will stop drinking, will be sober for 1-2 weeks and the cycle is continued. Alcohol has caused withdrawal and tolerance, drinking 6-10 beers and 1 pint a day when on a binge. Alcohol has affected relationships, has affected health with getting sick, not eating and getting depressed.     Last significant binge use was 9/2023 prompted by conflict at his apartment.      Treatment History:    First treatment in 1980. Last treatment at [facility] finished November 2018.      Substance Use Pharmacotherapies:   Tried antabuse in late 1990's and he would stop taking and \"drink around it\".      BRIEF PSYCHIATRIC HISTORY     Previous diagnoses, history and diagnostic clarification:  Alcohol Use Disorder, severe, in sustained remission.   Major Depressive Disorder without suicidal ideation   Generalized Anxiety Disorder  R/o PTSD      PAST PSYCH MED TRIALS       Drug  Treatment Dates Max Dose (mg) Helpful Adverse Effects   Reason Discontinued   Prozac 1980s 80mg Yes  Stopped working   Zoloft, Paxil, Cymbalta, Effexor ?    ?   TCAs 1970s       Buproprion 1990    Anxiety   Mirtazapine  ?    Anxiety   Aripiprazole Summer 2019    Drooling   Gabapentin  Spring 2019  Yes       Current Medications     Current Outpatient Medications   Medication Instructions    Acetaminophen 325 mg, Oral, EVERY 8 HOURS PRN    alendronate (FOSAMAX) 70 mg, Oral, EVERY 7 DAYS    ASPIRIN PO 81 mg, Oral, DAILY    blood glucose (NO BRAND SPECIFIED) test strip Use to test blood sugar 2 times daily or as directed. To accompany: Blood Glucose Monitor Brands: per insurance.    blood glucose monitoring (NO BRAND SPECIFIED) meter device kit Use to test blood sugar 2 times daily or as directed. Preferred blood glucose " meter OR supplies to accompany: Blood Glucose Monitor Brands: per insurance.    calcium-vitamin D 500-125 MG-UNIT TABS 1 tablet, Oral, 2 TIMES DAILY    clotrimazole (LOTRIMIN) 1 % external cream Topical, 2 TIMES DAILY, Apply to rough patch of skin in front of right ear 2x daily for 10 days    DIPHENHYDRAMINE HCL PO 25 mg, Oral, DAILY PRN    escitalopram (LEXAPRO) 20 mg, Oral, DAILY    gabapentin (NEURONTIN) 300 mg, Oral, AT BEDTIME, Take 1 capsule by mouth at bedtime as needed.    losartan (COZAAR) 50 MG tablet TAKE ONE TABLET BY MOUTH ONCE DAILY    metFORMIN (GLUCOPHAGE XR) 500 MG 24 hr tablet TAKE ONE TABLET BY MOUTH ONCE DAILY WITH BREAKFAST    NITROGLYCERIN SL 0.4 mg, EVERY 5 MIN PRN    omeprazole (PRILOSEC) 20 MG DR capsule TAKE 1 CAPSULE BY MOUTH DAILY,  60 MINUTES BEFORE A MEAL.    rosuvastatin (CRESTOR) 40 mg, Oral, AT BEDTIME    tamsulosin (FLOMAX) 0.4 MG capsule TAKE ONE CAPSULE BY MOUTH ONCE DAILY WITH BREAKFAST. (NEED TO BE SEEN IN CLINIC FOR FURTHER REFILLS)    thin (NO BRAND SPECIFIED) lancets Use with lanceting device. To accompany: Blood Glucose Monitor Brands: per insurance.     MENTAL STATUS EXAM/WITHDRAWAL                                                              Withdrawal symptoms: None    Alertness: alert   Orientation: awake and alert  Appearance: adequately groomed  Behavior/Demeanor: cooperative and pleasant, with good  eye contact.  Speech: slowed  Psychomotor: normal or unremarkable    Mood:  description consistent with euthymia  Affect: full range and was congruent to speech content.  Thought Process/Associations: unremarkable   Thought Content: devoid of  suicidal ideation.   Perception: devoid of  auditory hallucinations and visual hallucinations  Insight: adequate.  Judgment: fair.    These cognitive functions grossly appear as described, but were not formally tested.    ASSESSMENT/PLAN                                                  Summation/Assessment:  78 year old male with  history of severe alcohol use disorder as well as depression. Mood has been down over the last month, with the coming of the Vinh season.     # Alcohol use disorder, severe  Longstanding history with most recent pattern of intermittent low-volume use, 2-3 drinks once or twice monthly through the summer, most recently with large binge episode which made him quite sick in September, triggered by frustration around a meeting at his apartment. No return to use since this episode, reports cravings have improved over the last month. Previously took naltrexone (BARRETO for months) with good effect, however the downside was significant negative impact on his sex life. Currently feels confident in his sobriety without naltrexone but has a plan to use on a PRN basis if there are large stressors or cravings. He attends AA weekly and has been growing more comfortable there.   - Naltrexone 50mg PO daily PRN    - Goal to attend AA meetings weekly, speak at meeting each time.     # MDD  # RODRIGO  Mood much improved today, has been working on his book and reconnecting with friends and siblings. He attributes more exercise and getting out of the house more often (AA and seeing friends) with this change.   - Continue escitalopram 20mg daily and gabapentin 300mg at bedtime   - Goal: use treadmill twice per week on days that he is not walking with friend      RTC: 1 month    [place] PRES[place] MONITORING PROGRAM [] was not checked today:  Will be checked at next visit     Marvin Barboza MD   Addiction Medicine Fellow  [facility]     Patient seen by and discussed with staff Dr. Bee.    Psychiatry Individual Psychotherapy Note   Psychotherapy start time - 10:30  Psychotherapy end time - 10:55  Date last reviewed - 11/15/23  Subjective: This supportive psychotherapy session addressed issues related to goals of therapy and current psychosocial stressors.   Interactive complexity indicated? No  Plan: RTC in timeframe  noted above  Psychotherapy services during this visit included myself and the patient.   Treatment Plan      SYMPTOMS; PROBLEMS   MEASURABLE GOALS;    FUNCTIONAL IMPROVEMENT / GAINS INTERVENTIONS DISCHARGE CRITERIA   Substance Use: alcohol    stay free of alcohol abuse  and go to AA meetings 4x before next meeting Supportive / psychodynamic marked symptom improvement

## 2024-01-31 DIAGNOSIS — I25.10 CORONARY ATHEROSCLEROSIS OF NATIVE CORONARY ARTERY: ICD-10-CM

## 2024-02-01 RX ORDER — ROSUVASTATIN CALCIUM 40 MG/1
40 TABLET, COATED ORAL
Qty: 90 TABLET | Refills: 0 | Status: SHIPPED | OUTPATIENT
Start: 2024-02-01 | End: 2024-04-15

## 2024-02-19 ENCOUNTER — OFFICE VISIT (OUTPATIENT)
Dept: OPHTHALMOLOGY | Facility: CLINIC | Age: 79
End: 2024-02-19
Attending: OPHTHALMOLOGY
Payer: COMMERCIAL

## 2024-02-19 DIAGNOSIS — H35.3221 EXUDATIVE AGE-RELATED MACULAR DEGENERATION OF LEFT EYE WITH ACTIVE CHOROIDAL NEOVASCULARIZATION (H): Primary | ICD-10-CM

## 2024-02-19 PROCEDURE — 92134 CPTRZ OPH DX IMG PST SGM RTA: CPT | Performed by: OPHTHALMOLOGY

## 2024-02-19 PROCEDURE — 99207 FUNDUS AUTOFLUORESCENCE IMAGE (FAF) OU (BOTH EYES): CPT | Mod: 26 | Performed by: OPHTHALMOLOGY

## 2024-02-19 PROCEDURE — 99204 OFFICE O/P NEW MOD 45 MIN: CPT | Mod: 25 | Performed by: OPHTHALMOLOGY

## 2024-02-19 PROCEDURE — 67028 INJECTION EYE DRUG: CPT | Mod: LT | Performed by: OPHTHALMOLOGY

## 2024-02-19 PROCEDURE — G0463 HOSPITAL OUTPT CLINIC VISIT: HCPCS | Mod: 25 | Performed by: OPHTHALMOLOGY

## 2024-02-19 PROCEDURE — 92250 FUNDUS PHOTOGRAPHY W/I&R: CPT | Performed by: OPHTHALMOLOGY

## 2024-02-19 PROCEDURE — 250N000011 HC RX IP 250 OP 636: Performed by: OPHTHALMOLOGY

## 2024-02-19 RX ADMIN — Medication 1.25 MG: at 14:33

## 2024-02-19 ASSESSMENT — CONF VISUAL FIELD
METHOD: COUNTING FINGERS
OS_SUPERIOR_TEMPORAL_RESTRICTION: 0
OS_SUPERIOR_NASAL_RESTRICTION: 0
OD_SUPERIOR_NASAL_RESTRICTION: 1
OS_NORMAL: 1
OD_INFERIOR_NASAL_RESTRICTION: 1
OD_INFERIOR_TEMPORAL_RESTRICTION: 1
OD_SUPERIOR_TEMPORAL_RESTRICTION: 1
OS_INFERIOR_TEMPORAL_RESTRICTION: 0
OS_INFERIOR_NASAL_RESTRICTION: 0

## 2024-02-19 ASSESSMENT — VISUAL ACUITY
OD_SC: HM
OS_PH_SC+: -1
OS_SC: 20/40
METHOD: SNELLEN - LINEAR
OS_PH_SC: 20/30
OS_SC+: -1

## 2024-02-19 ASSESSMENT — REFRACTION_WEARINGRX
OD_CYLINDER: +1.50
OD_SPHERE: +3.00
OD_AXIS: 169
OS_CYLINDER: +1.25
OS_AXIS: 1772
OS_SPHERE: +3.00
SPECS_TYPE: READERS

## 2024-02-19 ASSESSMENT — TONOMETRY
OS_IOP_MMHG: 11
OD_IOP_MMHG: 12
IOP_METHOD: TONOPEN

## 2024-02-19 ASSESSMENT — SLIT LAMP EXAM - LIDS
COMMENTS: NORMAL
COMMENTS: NORMAL

## 2024-02-19 ASSESSMENT — EXTERNAL EXAM - RIGHT EYE: OD_EXAM: NORMAL

## 2024-02-19 ASSESSMENT — EXTERNAL EXAM - LEFT EYE: OS_EXAM: NORMAL

## 2024-02-19 NOTE — NURSING NOTE
"Chief Complaints and History of Present Illnesses   Patient presents with    Retinal Evaluation     Known AMD   Type 2 DM     Chief Complaint(s) and History of Present Illness(es)       Retinal Evaluation              Comments: Known AMD   Type 2 DM              Comments    Pt here to establish care for AMD   States he has been getting injections in the left eye \"4 times per year\"   States dryness , both eyes   No flashes, floaters or eye pain   LBS: has not been checking  Last A1C: \"below 7\"  Lab Results       Component                Value               Date                       A1C                      5.9                 11/21/2023                 A1C                      6.0                 08/21/2023                 A1C                      6.7                 05/18/2023                 A1C                      6.0                 04/19/2022                 A1C                      6.0                 06/01/2021                Felicita Fritz COT 1:28 PM February 19, 2024                                   "

## 2024-02-19 NOTE — PROGRESS NOTES
I have confirmed the patient's and reviewed Past Medical History, Past Surgical History, Social History, Family History, Problem List, Medication List and agree with Tech note.    CC: consult Age related macular degeneration     HPI: patient has been at [facility] for many years and has quarterly injections left eye.  He has lost vision right eye many years ago from laser     Assessment/plan:   1.  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    2.  Nuclear sclerotic cataract  Both eyes    - Not visually significant   - refract as needed    - no new prescription glasses issued    - monitor yearly         RTC 12 weeks      No follow-ups on file.    Bulmaro Bazan MD PhD.  Professor & Chair

## 2024-02-27 ENCOUNTER — VIRTUAL VISIT (OUTPATIENT)
Dept: PULMONOLOGY | Facility: CLINIC | Age: 79
End: 2024-02-27
Payer: COMMERCIAL

## 2024-02-27 DIAGNOSIS — F17.211 CIGARETTE NICOTINE DEPENDENCE IN REMISSION: Primary | ICD-10-CM

## 2024-02-27 DIAGNOSIS — R91.8 PULMONARY NODULES: ICD-10-CM

## 2024-02-27 PROCEDURE — 99213 OFFICE O/P EST LOW 20 MIN: CPT | Mod: 95 | Performed by: INTERNAL MEDICINE

## 2024-02-27 RX ORDER — FLUTICASONE PROPIONATE 50 MCG
1 SPRAY, SUSPENSION (ML) NASAL DAILY
Qty: 16 G | Refills: 4 | Status: SHIPPED | OUTPATIENT
Start: 2024-02-27

## 2024-02-27 ASSESSMENT — PAIN SCALES - GENERAL: PAINLEVEL: NO PAIN (0)

## 2024-02-27 NOTE — PROGRESS NOTES
Virtual Visit Details    Type of service:  Video Visit   Video Start Time: 10:00 AM  Video End Time: 10:17 am..    Originating Location (pt. Location): Home    Distant Location (provider location):  On-site  Platform used for Video Visit: [facility]    PULMONARY CLINIC FOLLOW UP NOTE    History:     HPI: Laurent Choi is a 76 y.o. male former smoker, with mild obstruction on PFTs is here for follow-up.  At baseline, patient is able to walk 2-3 miles.  He does typically walk in the summer.  He walks on the treadmill for approximately half an hour but he does not do it that often.  He usually does that in the winter.  He has PND.      Interval History: Had a virtual visit with pt. He notes that he has been exercising. He increased his activities. He walks on the treatmill for 1 mile and twice a he walks with a friend for a 2-3 miles.  He denies any hospitalizations, ED/Urgent care visits, or steroids/abx for respiratory related issues.  No cough. No nocturnal symptoms. He uses an albuterol inhaler on an as needed basis. No night sweats or weight loss. He notes that he has nasal congestion and clears his throat. No allergies.     PMHx/PSHx:  CAD s/p PCI back 2015  History of alcohol abuse  DJD  GERD  Hypertension  Macular degeneration     Social Hx:  Former smoker.  Used to smoke 1 pack per day for 30-40 years.  Quit approximately 10 years ago. Quit 2009  He used to work as a . Now he does sedentary work at computer.     ROS: 10 point review of system done. Pertinent findings are noted in the HPI.    Exam/Data:     There were no vitals taken for this visit.        Data:     Labs personally reviewed.      IMAGING: personally reviewed images. Formal radiology interpretation noted below.    PFT DATA 10/2019:  FEV1 75% predicted, FVC 84% predicted, ratio 67.  No reversibility.  % predicted, % predicted, DLCO 82% predicted.     CT chest done on 2/2019 at [facility]:  EXAM: CT CHEST DIAG WO IV  CONT LCS F/U  LOCATION:  [facility]  DATE/TIME: 2/14/2019 2:38 PM    INDICATION: Multiple bilateral pulmonary nodules. Personal history of tobacco use.  TECHNIQUE: Routine noncontrast CT chest. Dose reduction techniques were used.   COMPARISON: CT chest June 29, 2018.    FINDINGS:  LUNGS AND PLEURA: Mild to moderate centrilobular emphysema. Biapical scarring. Mild bronchiectasis at the right lung base.    3 mm nodule in the right lower lobe along the major fissure on series 3: image 90, unchanged. 5 mm x 2 mm nodule on series 3: image 107 in the right lower lobe adjacent to the fissure, unchanged. 2 mm nodule in the right lower lobe on series 3: image 105 which is new. Small calcified granuloma in the right middle lobe on image 117. Mild atelectasis.    On the left the previously visualized 2 mm nonsolid nodule seen in the left upper lobe on image 60 is no longer visualized. There are two 1.5 mm nodules in the left lower lobe on image 86, unchanged. 2 mm nodule in the left lower lobe on image 91 is unchanged. 2 mm nodule in the left lower lobe on image 97 is unchanged.    No pleural effusion.    MEDIASTINUM: No mediastinal or hilar adenopathy. There is coronary artery calcification. No pericardial effusion. Moderate to large hiatal hernia is increased in size from the prior exam.    CORONARY ARTERY CALCIFICATION: Severe    LIMITED UPPER ABDOMEN: The liver, spleen, pancreas, gallbladder and adrenal glands are unremarkable. Vascular calcification involving the aorta. Please note these organs are not imaged in their entirety.    MUSCULOSKELETAL: Osteopenia. Mild kyphosis. Compression fracture at T12, unchanged with mild kyphosis at this level.    CONCLUSION:  1. All of the nodules seen previously are unchanged. There is a single new nodule measuring 2 mm in the right lower lobe.      Ct Chest Without Contrast    Result Date: 2/24/2020  EXAM: CT CHEST WO CONTRAST LOCATION: [facility] DATE/TIME:  2/24/2020 12:07 PM INDICATION: pulmonary nodules noted on CT earlier this year COMPARISON: CT of the chest 2/14/2019 TECHNIQUE: CT chest without IV contrast. Multiplanar reformats were obtained. Dose reduction techniques were used. CONTRAST: None. FINDINGS: LUNGS AND PLEURA: Triangular-shaped subpleural nodule in the anterior right lower lobe abutting the major fissure (series 4, image 114) has typical imaging features of an intrapulmonary lymph node, unchanged in size. 2 mm nodule in the subpleural left lower lobe (series 4, image 137) is unchanged. There is a punctate 2 mm calcified nodule in the inferior right middle lobe, definitively benign. Upper lung zone predominant emphysema. Mild scarring in the posterior basal right lower lobe. No new nodules or airspace opacities. No pleural space abnormality. MEDIASTINUM: Moderate hiatal hernia. No enlarged mediastinal or hilar lymph nodes. Cardiac chambers are normal in size. LAD stents. Normal caliber thoracic aorta. Mild, intermittent atheromatous calcification of the thoracic aorta. Conventional arch anatomy. UPPER ABDOMEN: No actionable findings in the imaged upper abdomen. MUSCULOSKELETAL: Chronic anterior wedging of T12 resulting in focal kyphosis at this level. Several old left posterolateral rib fracture deformities. No acute fracture or bone lesion. Chest wall soft tissues are symmetric.      1.  Small lung nodules are unchanged from 2/14/2019. If the patient is eligible and agreeable to screening, follow-up with cancer screening chest CT in 12 months is suggested. 2.   Moderate hiatal hernia.     CT chest on 2/2021:  IMPRESSION:   1.  Negative for lung cancer screening purposes.     2.  Stable tiny lower lung nodules can all be considered benign.     3.  Moderate emphysema.    CT chest on 3/2023: MPRESSION:  1.  No evidence for lung cancer.    Assessment/Plan:     Laurent Choi is a 78 y.o. male, former smoker, who is here for follow up of pulmonary  nodules and obstructive lung disease. PFTs 2019 showed mild obstruction.  He had a CT scan of the chest done on 2/2019 that showed multiple pulmonary nodules that were small.  These were apparently stable compared to CT scan of the chest previously.      Recommendations:  Yearly low dose CT chest, 2023 - ordered.  Encouraged to stay active  Albuterol inhaler if needed  Start pt on flonase    FOLLOW UP: 1 year or sooner if need to be seen      Current Outpatient Medications   Medication Sig Dispense Refill    Acetaminophen 325 MG CAPS Take 325 mg by mouth every 8 hours as needed      alendronate (FOSAMAX) 70 MG tablet Take 1 tablet (70 mg) by mouth every 7 days 12 tablet 3    ASPIRIN PO Take 81 mg by mouth daily      blood glucose (NO BRAND SPECIFIED) test strip Use to test blood sugar 2 times daily or as directed. To accompany: Blood Glucose Monitor Brands: per insurance. 100 strip 6    blood glucose monitoring (NO BRAND SPECIFIED) meter device kit Use to test blood sugar 2 times daily or as directed. Preferred blood glucose meter OR supplies to accompany: Blood Glucose Monitor Brands: per insurance. 1 kit 0    calcium-vitamin D 500-125 MG-UNIT TABS Take 1 tablet by mouth 2 times daily      clotrimazole (LOTRIMIN) 1 % external cream Apply topically 2 times daily Apply to rough patch of skin in front of right ear 2x daily for 10 days 24 g 0    DIPHENHYDRAMINE HCL PO Take 25 mg by mouth daily as needed      escitalopram (LEXAPRO) 20 MG tablet Take 1 tablet (20 mg) by mouth daily 90 tablet 0    gabapentin (NEURONTIN) 300 MG capsule Take 1 capsule (300 mg) by mouth At Bedtime Take 1 capsule by mouth at bedtime as needed. 90 capsule 3    losartan (COZAAR) 50 MG tablet TAKE ONE TABLET BY MOUTH ONCE DAILY 90 tablet 3    metFORMIN (GLUCOPHAGE XR) 500 MG 24 hr tablet TAKE ONE TABLET BY MOUTH ONCE DAILY WITH BREAKFAST 90 tablet 1    naltrexone (DEPADE/REVIA) 50 MG tablet Take 1 tablet (50 mg) by mouth daily as needed  (craving) 30 tablet 1    NITROGLYCERIN SL Take 0.4 mg by mouth every 5 minutes as needed      omeprazole (PRILOSEC) 20 MG DR capsule TAKE 1 CAPSULE BY MOUTH DAILY,  60 MINUTES BEFORE A MEAL. 90 capsule 3    rosuvastatin (CRESTOR) 40 MG tablet TAKE ONE TABLET BY MOUTH AT BEDTIME 90 tablet 0    tamsulosin (FLOMAX) 0.4 MG capsule TAKE ONE CAPSULE BY MOUTH ONCE DAILY WITH BREAKFAST. (NEED TO BE SEEN IN CLINIC FOR FURTHER REFILLS) 90 capsule 3    thin (NO BRAND SPECIFIED) lancets Use with lanceting device. To accompany: Blood Glucose Monitor Brands: per insurance. 100 each 6        Allergies   Allergen Reactions    Aspirin Other (See Comments)     Avoids taking any aspirin besides his daily 81 mg aspirin regimen due to acid reflux/esophagus problem(s).    Nsaids (Non-Steroidal Anti-Inflammatory Drug) [Nsaids] Other (See Comments)     Avoids due to acid reflux/esophagus problem(s).    Plavix [Clopidogrel] Other (See Comments) and Muscle Pain (Myalgia)     Reaction(s): Bad bruising all over his body and leg cramps.    Statins-Hmg-Coa Reductase Inhibitors [Statins] Muscle Pain (Myalgia)     Per the patient, he tolerates rosuvastatin.           Meds and Allergies: See EHR for the updated medication list and Allergies. These were reviewed.     Much or all of the text in this note was generated through the use of the Dragon Dictate voice-to-text software. Errors in spelling or words which seem out of context are unintentional. Sound alike errors, in particular, may have escaped editing.

## 2024-02-27 NOTE — NURSING NOTE
Is the patient currently in the state of [place]? YES    Visit mode:VIDEO    If the visit is dropped, the patient can be reconnected by: VIDEO VISIT: Text to cell phone:   Telephone Information:   Mobile [phone]       Will anyone else be joining the visit? NO  (If patient encounters technical issues they should call [phone])    How would you like to obtain your AVS? MyChart    Are changes needed to the allergy or medication list? Pt stated no changes to allergies and Pt stated no med changes    Reason for visit: RECHECK    Pradeep JONESF

## 2024-02-28 ENCOUNTER — OFFICE VISIT (OUTPATIENT)
Dept: PSYCHIATRY | Facility: CLINIC | Age: 79
End: 2024-02-28
Attending: PSYCHIATRY & NEUROLOGY
Payer: COMMERCIAL

## 2024-02-28 VITALS
DIASTOLIC BLOOD PRESSURE: 83 MMHG | WEIGHT: 187 LBS | SYSTOLIC BLOOD PRESSURE: 136 MMHG | BODY MASS INDEX: 26.56 KG/M2 | HEART RATE: 90 BPM

## 2024-02-28 DIAGNOSIS — F33.1 MODERATE EPISODE OF RECURRENT MAJOR DEPRESSIVE DISORDER (H): ICD-10-CM

## 2024-02-28 DIAGNOSIS — F41.1 GAD (GENERALIZED ANXIETY DISORDER): ICD-10-CM

## 2024-02-28 PROCEDURE — 99214 OFFICE O/P EST MOD 30 MIN: CPT | Mod: GC | Performed by: STUDENT IN AN ORGANIZED HEALTH CARE EDUCATION/TRAINING PROGRAM

## 2024-02-28 PROCEDURE — G0463 HOSPITAL OUTPT CLINIC VISIT: HCPCS | Performed by: STUDENT IN AN ORGANIZED HEALTH CARE EDUCATION/TRAINING PROGRAM

## 2024-02-28 RX ORDER — ESCITALOPRAM OXALATE 20 MG/1
20 TABLET ORAL DAILY
Qty: 90 TABLET | Refills: 0 | Status: SHIPPED | OUTPATIENT
Start: 2024-02-28 | End: 2024-07-02

## 2024-02-28 ASSESSMENT — PAIN SCALES - GENERAL: PAINLEVEL: NO PAIN (0)

## 2024-02-28 NOTE — PROGRESS NOTES
----------------------------------------------------------------------------------------------------------  [facility]   Addiction Medicine Progress Note     ID                                Laurent Choi is a 78 year old  male with a history of alcohol use disorder, previously severe, in sustained remission, as well as major depressive disorder and generalized anxiety disorder. He has been following in the addiction clinic since 10/2018.     INTERVAL HISTORY                                  Doing well today. It has been a busy month. He has been seeing his sister consistently - two dinners in the last month, as well as two concerts. Mood has been good. He is avoiding getting pulled into the drama of the window replacement at his condo. Potentially worst case scenario plan is to move to [facility].     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 He hopes to have the first one prepared by this summer.     He goes on walks consistently, about twice a week during good weather with his friend Rakan. She is in her mid 80's. They have gone on two 5 mile walks. Otherwise he uses the treadmill consistently.     AA meetings have continued to be encouraging. He reports reaching a sort of epiphany that he can never drink alcohol again, and that it has been instrumental in many of the bad things that have happened in his life. He did not meet his goal of going to 4 meeting since our last visit, but has been speaking consistently.     He denies alcohol use or significant cravings.    Sleep has been more excessive than usual, but to 12 hours to feel refreshed. He is staying up until midnight or 3 am most days.     BRIEF SUMMARY OF SUBSTANCE USE                                                                Gio has been in detox over 40 times and treatment about 5 times. His first treatment was in 1980. He gave up daily drinking  "in late 1990's, then began to binge drink. In recent years, his pattern has been drinking 1-2 weeks, not eating most of that time, and getting very sick. He then will stop drinking, will be sober for 1-2 weeks and the cycle is continued. Alcohol has caused withdrawal and tolerance, drinking 6-10 beers and 1 pint a day when on a binge. Alcohol has affected relationships, has affected health with getting sick, not eating and getting depressed.     Last significant binge use was 9/2023 prompted by conflict at his apartment.      Treatment History:    First treatment in 1980. Last treatment at [facility] finished November 2018.      Substance Use Pharmacotherapies:   Tried antabuse in late 1990's and he would stop taking and \"drink around it\".      BRIEF PSYCHIATRIC HISTORY     Previous diagnoses, history and diagnostic clarification:  Alcohol Use Disorder, severe, in sustained remission.   Major Depressive Disorder without suicidal ideation   Generalized Anxiety Disorder  R/o PTSD      PAST PSYCH MED TRIALS       Drug  Treatment Dates Max Dose (mg) Helpful Adverse Effects   Reason Discontinued   Prozac 1980s 80mg Yes  Stopped working   Zoloft, Paxil, Cymbalta, Effexor ?    ?   TCAs 1970s       Buproprion 1990    Anxiety   Mirtazapine  ?    Anxiety   Aripiprazole Summer 2019    Drooling   Gabapentin  Spring 2019  Yes       Current Medications     Current Outpatient Medications   Medication Instructions    Acetaminophen 325 mg, Oral, EVERY 8 HOURS PRN    alendronate (FOSAMAX) 70 mg, Oral, EVERY 7 DAYS    ASPIRIN PO 81 mg, Oral, DAILY    blood glucose (NO BRAND SPECIFIED) test strip Use to test blood sugar 2 times daily or as directed. To accompany: Blood Glucose Monitor Brands: per insurance.    blood glucose monitoring (NO BRAND SPECIFIED) meter device kit Use to test blood sugar 2 times daily or as directed. Preferred blood glucose meter OR supplies to accompany: Blood Glucose Monitor Brands: per insurance. "    calcium-vitamin D 500-125 MG-UNIT TABS 1 tablet, Oral, 2 TIMES DAILY    clotrimazole (LOTRIMIN) 1 % external cream Topical, 2 TIMES DAILY, Apply to rough patch of skin in front of right ear 2x daily for 10 days    DIPHENHYDRAMINE HCL PO 25 mg, Oral, DAILY PRN    escitalopram (LEXAPRO) 20 mg, Oral, DAILY    gabapentin (NEURONTIN) 300 mg, Oral, AT BEDTIME, Take 1 capsule by mouth at bedtime as needed.    losartan (COZAAR) 50 MG tablet TAKE ONE TABLET BY MOUTH ONCE DAILY    metFORMIN (GLUCOPHAGE XR) 500 MG 24 hr tablet TAKE ONE TABLET BY MOUTH ONCE DAILY WITH BREAKFAST    NITROGLYCERIN SL 0.4 mg, EVERY 5 MIN PRN    omeprazole (PRILOSEC) 20 MG DR capsule TAKE 1 CAPSULE BY MOUTH DAILY,  60 MINUTES BEFORE A MEAL.    rosuvastatin (CRESTOR) 40 mg, Oral, AT BEDTIME    tamsulosin (FLOMAX) 0.4 MG capsule TAKE ONE CAPSULE BY MOUTH ONCE DAILY WITH BREAKFAST. (NEED TO BE SEEN IN CLINIC FOR FURTHER REFILLS)    thin (NO BRAND SPECIFIED) lancets Use with lanceting device. To accompany: Blood Glucose Monitor Brands: per insurance.     MENTAL STATUS EXAM/WITHDRAWAL                                                              Withdrawal symptoms: None    Alertness: alert   Orientation: awake and alert  Appearance: adequately groomed  Behavior/Demeanor: cooperative and pleasant, with good  eye contact.  Speech: slowed  Psychomotor: normal or unremarkable    Mood:  description consistent with euthymia  Affect: full range and was congruent to speech content.  Thought Process/Associations: unremarkable   Thought Content: devoid of  suicidal ideation.   Perception: devoid of  auditory hallucinations and visual hallucinations  Insight: adequate.  Judgment: fair.    These cognitive functions grossly appear as described, but were not formally tested.    ASSESSMENT/PLAN                                                  Summation/Assessment:  78 year old male with history of severe alcohol use disorder as well as depression. Mood improving at  this visit, for multiple reasons including increased social outings, exercise, and better weather.     # Alcohol use disorder, severe, in early remission   Longstanding history with most recent pattern of intermittent low-volume use, 2-3 drinks once or twice monthly through summer 2023, most recently with large binge episode which made him quite sick in September, triggered by frustration around a meeting at his apartment. No return to use since this episode. Previously took naltrexone (BARRETO for months) with good effect, however the downside was significant negative impact on his sex life. Currently feels confident in his sobriety without naltrexone but has a plan to use on a PRN basis if there are large stressors or cravings. He attends AA weekly and has been growing more comfortable there.   - Naltrexone 50mg PO daily PRN    - Goal to attend AA meetings weekly, speak at meeting each time.   - Discontinue gabapentin PRN - has not been using and is potential fall risk     # MDD  # RODRIGO  Mood much improved today, has been working on his book and reconnecting with friends and siblings. He attributes more exercise and getting out of the house more often (AA and seeing friends) with this change.   - Continue escitalopram 20mg daily    - Goal: use treadmill twice per week on days that he is not walking with friend      RTC: 2 month    [place] PRESCRIPTION MONITORING PROGRAM [] was not checked today:  Will be checked at next visit     Marvin Barboza MD   Addiction Medicine Fellow  [facility]     Patient seen by and discussed with staff Dr. Bee.    I saw the patient with the fellow, and participated in key portions of the service, including the mental status examination and developing the plan of care. I reviewed key portions of the history with the fellow. I agree with the findings and plan as documented in this note.    Selam Bee MD     Psychiatry Individual Psychotherapy Note   Psychotherapy  start time - 1005  Psychotherapy end time - 1030  Date last reviewed - 11/15/23  Subjective: This supportive psychotherapy session addressed issues related to goals of therapy and current psychosocial stressors.   Interactive complexity indicated? No  Plan: RTC in timeframe noted above  Psychotherapy services during this visit included myself and the patient.   Treatment Plan      SYMPTOMS; PROBLEMS   MEASURABLE GOALS;    FUNCTIONAL IMPROVEMENT / GAINS INTERVENTIONS DISCHARGE CRITERIA   Substance Use: alcohol    stay free of alcohol abuse  and go to AA meetings 4x before next meeting Supportive / psychodynamic marked symptom improvement

## 2024-03-17 ENCOUNTER — HEALTH MAINTENANCE LETTER (OUTPATIENT)
Age: 79
End: 2024-03-17

## 2024-03-21 ENCOUNTER — OFFICE VISIT (OUTPATIENT)
Dept: FAMILY MEDICINE | Facility: CLINIC | Age: 79
End: 2024-03-21
Payer: COMMERCIAL

## 2024-03-21 VITALS
WEIGHT: 186 LBS | TEMPERATURE: 97.7 F | HEART RATE: 81 BPM | SYSTOLIC BLOOD PRESSURE: 109 MMHG | DIASTOLIC BLOOD PRESSURE: 68 MMHG | OXYGEN SATURATION: 95 % | RESPIRATION RATE: 16 BRPM | BODY MASS INDEX: 26.63 KG/M2 | HEIGHT: 70 IN

## 2024-03-21 DIAGNOSIS — Z29.11 NEED FOR VACCINATION AGAINST RESPIRATORY SYNCYTIAL VIRUS: ICD-10-CM

## 2024-03-21 DIAGNOSIS — Z79.4 TYPE 2 DIABETES MELLITUS WITHOUT COMPLICATION, WITH LONG-TERM CURRENT USE OF INSULIN (H): Primary | ICD-10-CM

## 2024-03-21 DIAGNOSIS — J43.9 PULMONARY EMPHYSEMA, UNSPECIFIED EMPHYSEMA TYPE (H): ICD-10-CM

## 2024-03-21 DIAGNOSIS — E11.59 TYPE 2 DIABETES MELLITUS WITH OTHER CIRCULATORY COMPLICATIONS (H): ICD-10-CM

## 2024-03-21 DIAGNOSIS — E11.9 TYPE 2 DIABETES MELLITUS WITHOUT COMPLICATION, WITH LONG-TERM CURRENT USE OF INSULIN (H): Primary | ICD-10-CM

## 2024-03-21 DIAGNOSIS — I25.119 CORONARY ARTERY DISEASE INVOLVING NATIVE CORONARY ARTERY OF NATIVE HEART WITH ANGINA PECTORIS (H): ICD-10-CM

## 2024-03-21 LAB — HBA1C MFR BLD: 6.2 % (ref 0–5.6)

## 2024-03-21 PROCEDURE — 99214 OFFICE O/P EST MOD 30 MIN: CPT | Performed by: FAMILY MEDICINE

## 2024-03-21 PROCEDURE — 36415 COLL VENOUS BLD VENIPUNCTURE: CPT | Performed by: FAMILY MEDICINE

## 2024-03-21 PROCEDURE — 83036 HEMOGLOBIN GLYCOSYLATED A1C: CPT | Performed by: FAMILY MEDICINE

## 2024-03-21 RX ORDER — RESPIRATORY SYNCYTIAL VIRUS VACCINE 120MCG/0.5
0.5 KIT INTRAMUSCULAR ONCE
Qty: 1 EACH | Refills: 0 | Status: SHIPPED | OUTPATIENT
Start: 2024-03-21 | End: 2024-03-21

## 2024-03-21 ASSESSMENT — PATIENT HEALTH QUESTIONNAIRE - PHQ9: SUM OF ALL RESPONSES TO PHQ QUESTIONS 1-9: 3

## 2024-03-21 NOTE — PROGRESS NOTES
Assessment & Plan     Type 2 diabetes mellitus without complication, with long-term current use of insulin (H)  Discussed healthy lifestyle changes, regular physical activities, weight loss program, continue metformin, recheck in about 3 to 4 months.    Need for vaccination against respiratory syncytial virus    - respiratory syncytial virus vaccine, bivalent (ABRYSVO) injection; Inject 0.5 mLs into the muscle once for 1 dose    Pulmonary emphysema, unspecified emphysema type (H)  No recent exacerbation, discussed prevention of infection by getting RSV vaccine at the local pharmacy.    Coronary artery disease involving native coronary artery of native heart with angina pectoris (H24)  Continue with aggressive risk factor modification, tight control of cholesterol, diabetes, hypertension.        Review of external notes as documented elsewhere in note  30 minutes spent by me on the date of the encounter doing chart review, review of outside records, review of test results, interpretation of tests, patient visit, and documentation         Work on weight loss  Regular exercise    Subjective   Gio is a 79 year old, presenting for the following health issues:  Follow Up      3/21/2024    11:39 AM   Additional Questions   Roomed by Janae STEPHENSON   Accompanied by self     History of Present Illness       Diabetes:   He presents for follow up of diabetes.    He is not checking blood glucose.         He has no concerns regarding his diabetes at this time.  He is having blurry vision.            He eats 2-3 servings of fruits and vegetables daily.He consumes 0 sweetened beverage(s) daily.He exercises with enough effort to increase his heart rate 9 or less minutes per day.  He exercises with enough effort to increase his heart rate 3 or less days per week.   He is taking medications regularly.     Following up on diabetes type 2, overall doing fine, staying active, taking his metformin, eating his fruits and vegetable, also eat  "additional fish, [place].  Last A1c was 5.9%, A1c is returning today at 6.2%.  He did stop the supplement [employer]Med.    Mild emphysema, but no worsening symptoms.  Coronary artery disease but no new symptoms.    Review of Systems  Constitutional, HEENT, cardiovascular, pulmonary, gi and gu systems are negative, except as otherwise noted.      Objective    /68 (BP Location: Left arm, Patient Position: Sitting, Cuff Size: Adult Large)   Pulse 81   Temp 97.7  F (36.5  C) (Temporal)   Resp 16   Ht 1.787 m (5' 10.35\")   Wt 84.4 kg (186 lb)   SpO2 95%   BMI 26.42 kg/m    Body mass index is 26.42 kg/m .  Physical Exam   GENERAL: alert and no distress  NECK: no adenopathy, no asymmetry, masses, or scars  RESP: lungs clear to auscultation - no rales, rhonchi or wheezes  CV: regular rate and rhythm, normal S1 S2, no S3 or S4, no murmur, click or rub, no peripheral edema  ABDOMEN: soft, nontender, no hepatosplenomegaly, no masses and bowel sounds normal  MS: no gross musculoskeletal defects noted, no edema    Results for orders placed or performed in visit on 03/21/24   Hemoglobin A1c     Status: Abnormal   Result Value Ref Range    Hemoglobin A1C 6.2 (H) 0.0 - 5.6 %           Signed Electronically by: Amara Smith MD  Prior to immunization administration, verified patients identity using patient s name and date of birth. Please see Immunization Activity for additional information.     Screening Questionnaire for Adult Immunization    Are you sick today?   No   Do you have allergies to medications, food, a vaccine component or latex?   No   Have you ever had a serious reaction after receiving a vaccination?   No   Do you have a long-term health problem with heart, lung, kidney, or metabolic disease (e.g., diabetes), asthma, a blood disorder, no spleen, complement component deficiency, a cochlear implant, or a spinal fluid leak?  Are you on long-term aspirin therapy?   Yes   Do you have cancer, " leukemia, HIV/AIDS, or any other immune system problem?   No   Do you have a parent, brother, or sister with an immune system problem?   No   In the past 3 months, have you taken medications that affect  your immune system, such as prednisone, other steroids, or anticancer drugs; drugs for the treatment of rheumatoid arthritis, Crohn s disease, or psoriasis; or have you had radiation treatments?   No   Have you had a seizure, or a brain or other nervous system problem?   No   During the past year, have you received a transfusion of blood or blood    products, or been given immune (gamma) globulin or antiviral drug?   No   For women: Are you pregnant or is there a chance you could become       pregnant during the next month?   No   Have you received any vaccinations in the past 4 weeks?   No     Immunization questionnaire was positive for at least one answer.  Notified .      Patient instructed to remain in clinic for 15 minutes afterwards, and to report any adverse reactions.     Screening performed by Janae Calle MA on 3/21/2024 at 11:47 AM.

## 2024-04-10 ENCOUNTER — TRANSFERRED RECORDS (OUTPATIENT)
Dept: HEALTH INFORMATION MANAGEMENT | Facility: CLINIC | Age: 79
End: 2024-04-10
Payer: COMMERCIAL

## 2024-04-15 DIAGNOSIS — I25.10 CORONARY ATHEROSCLEROSIS OF NATIVE CORONARY ARTERY: ICD-10-CM

## 2024-04-15 RX ORDER — ROSUVASTATIN CALCIUM 40 MG/1
40 TABLET, COATED ORAL AT BEDTIME
Qty: 90 TABLET | Refills: 2 | Status: SHIPPED | OUTPATIENT
Start: 2024-04-15

## 2024-04-18 ENCOUNTER — PATIENT OUTREACH (OUTPATIENT)
Dept: CARE COORDINATION | Facility: CLINIC | Age: 79
End: 2024-04-18
Payer: COMMERCIAL

## 2024-04-27 ENCOUNTER — HOSPITAL ENCOUNTER (OUTPATIENT)
Dept: CT IMAGING | Facility: CLINIC | Age: 79
Discharge: HOME | End: 2024-04-27
Attending: INTERNAL MEDICINE | Admitting: INTERNAL MEDICINE
Payer: COMMERCIAL

## 2024-04-27 DIAGNOSIS — R91.8 PULMONARY NODULES: ICD-10-CM

## 2024-04-27 PROCEDURE — 71250 CT THORAX DX C-: CPT

## 2024-04-29 DIAGNOSIS — H35.3221 EXUDATIVE AGE-RELATED MACULAR DEGENERATION OF LEFT EYE WITH ACTIVE CHOROIDAL NEOVASCULARIZATION (H): Primary | ICD-10-CM

## 2024-05-01 ENCOUNTER — OFFICE VISIT (OUTPATIENT)
Dept: PSYCHIATRY | Facility: CLINIC | Age: 79
End: 2024-05-01
Attending: PSYCHIATRY & NEUROLOGY
Payer: COMMERCIAL

## 2024-05-01 VITALS
DIASTOLIC BLOOD PRESSURE: 74 MMHG | HEART RATE: 102 BPM | BODY MASS INDEX: 26.9 KG/M2 | SYSTOLIC BLOOD PRESSURE: 111 MMHG | WEIGHT: 189.4 LBS

## 2024-05-01 DIAGNOSIS — F33.1 MODERATE EPISODE OF RECURRENT MAJOR DEPRESSIVE DISORDER (H): ICD-10-CM

## 2024-05-01 DIAGNOSIS — F10.21 ALCOHOL USE DISORDER, SEVERE, IN EARLY REMISSION (H): Primary | ICD-10-CM

## 2024-05-01 PROCEDURE — 99214 OFFICE O/P EST MOD 30 MIN: CPT | Performed by: STUDENT IN AN ORGANIZED HEALTH CARE EDUCATION/TRAINING PROGRAM

## 2024-05-01 PROCEDURE — 90833 PSYTX W PT W E/M 30 MIN: CPT | Mod: 4UV | Performed by: STUDENT IN AN ORGANIZED HEALTH CARE EDUCATION/TRAINING PROGRAM

## 2024-05-01 PROCEDURE — G0463 HOSPITAL OUTPT CLINIC VISIT: HCPCS | Performed by: STUDENT IN AN ORGANIZED HEALTH CARE EDUCATION/TRAINING PROGRAM

## 2024-05-01 ASSESSMENT — PAIN SCALES - GENERAL: PAINLEVEL: NO PAIN (0)

## 2024-05-01 NOTE — PROGRESS NOTES
"  ----------------------------------------------------------------------------------------------------------  [facility]   Addiction Medicine Progress Note     ID                                Laurent Choi is a 78 year old  male with a history of alcohol use disorder, previously severe, in sustained remission, as well as major depressive disorder and generalized anxiety disorder. He has been following in the addiction clinic since 10/2018.     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 It has help with his stress in the meantime.     He is walking 2-3 times per week, though his friend Rakan is no longer joining him because she is busy in the garden. He does spend some time with her.    Spring makes him feel anxious, sometimes paranoid. No true delusions of paranoia but he does get some irrational concerns that people may have poor intentions when interacting with him. There is a combination of the flurry of activity in the spring with the lingering dark weather that has always increased his anxiety.    Sleep is regular, he feels poor in quality. Goes to bed often after midnight, sleeps in. Gets about 8 hours, struggles to get out of bed. Takes a nap most days. Discussed previous sleep study, which was mostly normal.     Going to AA essentially every other week. Has had some frustrations with younger/newer AA members speaking excessively, in \"platitudes\". He does recognize his strengths as far as his years battling alcoholism.    He has not had any cravings for alcohol, mostly just a lingering desire to be able to be a social drinker that crops up at times. He has proven to himself \"1000 times\" that he cannot be a social drinker and quickly crushes these thoughts. No recent " "returns to use. Has not needed naltrexone.     BRIEF SUMMARY OF SUBSTANCE USE                                                                Gio has been in detox over 40 times and treatment about 5 times. His first treatment was in 1980. He gave up daily drinking in late 1990's, then began to binge drink. In recent years, his pattern has been drinking 1-2 weeks, not eating most of that time, and getting very sick. He then will stop drinking, will be sober for 1-2 weeks and the cycle is continued. Alcohol has caused withdrawal and tolerance, drinking 6-10 beers and 1 pint a day when on a binge. Alcohol has affected relationships, has affected health with getting sick, not eating and getting depressed.     Last significant binge use was 9/2023 prompted by conflict at his apartment.      Treatment History:    First treatment in 1980. Last treatment at [facility] finished November 2018.      Substance Use Pharmacotherapies:   Tried antabuse in late 1990's and he would stop taking and \"drink around it\".      BRIEF PSYCHIATRIC HISTORY     Previous diagnoses, history and diagnostic clarification:  Alcohol Use Disorder, severe, in sustained remission.   Major Depressive Disorder without suicidal ideation   Generalized Anxiety Disorder  R/o PTSD      PAST PSYCH MED TRIALS       Drug  Treatment Dates Max Dose (mg) Helpful Adverse Effects   Reason Discontinued   Prozac 1980s 80mg Yes  Stopped working   Zoloft, Paxil, Cymbalta, Effexor ?    ?   TCAs 1970s       Buproprion 1990    Anxiety   Mirtazapine  ?    Anxiety   Aripiprazole Summer 2019    Drooling   Gabapentin  Spring 2019  Yes       Current Medications     Current Outpatient Medications   Medication Instructions    Acetaminophen 325 mg, Oral, EVERY 8 HOURS PRN    alendronate (FOSAMAX) 70 mg, Oral, EVERY 7 DAYS    ASPIRIN PO 81 mg, Oral, DAILY    blood glucose (NO BRAND SPECIFIED) test strip Use to test blood sugar 2 times daily or as directed. To accompany: " Blood Glucose Monitor Brands: per insurance.    blood glucose monitoring (NO BRAND SPECIFIED) meter device kit Use to test blood sugar 2 times daily or as directed. Preferred blood glucose meter OR supplies to accompany: Blood Glucose Monitor Brands: per insurance.    calcium-vitamin D 500-125 MG-UNIT TABS 1 tablet, Oral, 2 TIMES DAILY    clotrimazole (LOTRIMIN) 1 % external cream Topical, 2 TIMES DAILY, Apply to rough patch of skin in front of right ear 2x daily for 10 days    DIPHENHYDRAMINE HCL PO 25 mg, Oral, DAILY PRN    escitalopram (LEXAPRO) 20 mg, Oral, DAILY    gabapentin (NEURONTIN) 300 mg, Oral, AT BEDTIME, Take 1 capsule by mouth at bedtime as needed.    losartan (COZAAR) 50 MG tablet TAKE ONE TABLET BY MOUTH ONCE DAILY    metFORMIN (GLUCOPHAGE XR) 500 MG 24 hr tablet TAKE ONE TABLET BY MOUTH ONCE DAILY WITH BREAKFAST    NITROGLYCERIN SL 0.4 mg, EVERY 5 MIN PRN    omeprazole (PRILOSEC) 20 MG DR capsule TAKE 1 CAPSULE BY MOUTH DAILY,  60 MINUTES BEFORE A MEAL.    rosuvastatin (CRESTOR) 40 mg, Oral, AT BEDTIME    tamsulosin (FLOMAX) 0.4 MG capsule TAKE ONE CAPSULE BY MOUTH ONCE DAILY WITH BREAKFAST. (NEED TO BE SEEN IN CLINIC FOR FURTHER REFILLS)    thin (NO BRAND SPECIFIED) lancets Use with lanceting device. To accompany: Blood Glucose Monitor Brands: per insurance.     MENTAL STATUS EXAM/WITHDRAWAL                                                              Withdrawal symptoms: None    Alertness: alert   Orientation: awake and alert  Appearance: adequately groomed  Behavior/Demeanor: cooperative and pleasant, with good  eye contact.  Speech: slowed  Psychomotor: normal or unremarkable    Mood:  description consistent with euthymia  Affect: full range and was congruent to speech content.  Thought Process/Associations: unremarkable   Thought Content: devoid of  suicidal ideation.   Perception: devoid of  auditory hallucinations and visual hallucinations  Insight: adequate.  Judgment: fair.    These  cognitive functions grossly appear as described, but were not formally tested.    ASSESSMENT/PLAN                                                  Summation/Assessment:  78 year old male with history of severe alcohol use disorder as well as depression. Mood stable in this visit, maintaining sobriety has been growing in ease.     # Alcohol use disorder, severe, in early remission   Longstanding history with most recent pattern of intermittent low-volume use, 2-3 drinks once or twice monthly through summer 2023, most recently with large binge episode which made him quite sick in September, triggered by frustration around a meeting at his apartment. No return to use since this episode. Previously took naltrexone (BARRETO for months) with good effect, however the downside was significant negative impact on his sex life. Currently feels confident in his sobriety without naltrexone but has a plan to use on a PRN basis if there are large stressors or cravings. Attending AA meetings regularly. Today congratulated him around his growing awareness/management of triggers for use (anger, confrontation).   - Naltrexone 50mg PO daily PRN    - Goal to attend AA meetings weekly. May attend 6:30PM meeting instead.     # Major depressive disorder, single episode, partial remission  # RODRIGO  Mood stable at today's visit. Spring does bring some anxiety but this seems manageable to him and he anticipates it abating in summer. Ongoing social interactions with his friends and increasingly his sister have been helpful for mood, has plans to visit his brother in June. Reviewed outside records of sleep study (2020), no AJNNETTE or narcolepsy, does have some anatomic predisposition to airway resistance and sleeps with a large pillow.   - Continue escitalopram 20mg daily    - Goal: try to get to sleep before midnight consistently.     RTC: 2 month    [place] PRESCRIPTION MONITORING PROGRAM [] was not checked today:  Will be checked at next visit      Marvin Barboza MD   Addiction Medicine Fellow  [facility]     Patient seen by and discussed with staff Dr. Bee.    I saw the patient with the fellow, and participated in key portions of the service, including the mental status examination and developing the plan of care. I reviewed key portions of the history with the fellow. I agree with the findings and plan as documented in this note.    Selam Bee MD      Psychiatry Individual Psychotherapy Note   Psychotherapy start time - 1035  Psychotherapy end time - 1110  Date last reviewed - 11/15/23  Subjective: This supportive psychotherapy session addressed issues related to goals of therapy and current psychosocial stressors.   Interactive complexity indicated? No  Plan: RTC in timeframe noted above  Psychotherapy services during this visit included myself and the patient.   Treatment Plan      SYMPTOMS; PROBLEMS   MEASURABLE GOALS;    FUNCTIONAL IMPROVEMENT / GAINS INTERVENTIONS DISCHARGE CRITERIA   Substance Use: alcohol    stay free of alcohol abuse  and go to AA meetings 4x before next meeting Supportive / psychodynamic marked symptom improvement

## 2024-05-02 ENCOUNTER — PATIENT OUTREACH (OUTPATIENT)
Dept: CARE COORDINATION | Facility: CLINIC | Age: 79
End: 2024-05-02
Payer: COMMERCIAL

## 2024-05-13 ENCOUNTER — OFFICE VISIT (OUTPATIENT)
Dept: OPHTHALMOLOGY | Facility: CLINIC | Age: 79
End: 2024-05-13
Payer: COMMERCIAL

## 2024-05-13 DIAGNOSIS — H35.3221 EXUDATIVE AGE-RELATED MACULAR DEGENERATION OF LEFT EYE WITH ACTIVE CHOROIDAL NEOVASCULARIZATION (H): ICD-10-CM

## 2024-05-13 PROCEDURE — 67028 INJECTION EYE DRUG: CPT | Mod: LT

## 2024-05-13 PROCEDURE — 92134 CPTRZ OPH DX IMG PST SGM RTA: CPT

## 2024-05-13 PROCEDURE — 99214 OFFICE O/P EST MOD 30 MIN: CPT | Mod: 25

## 2024-05-13 PROCEDURE — 250N000011 HC RX IP 250 OP 636: Performed by: OPHTHALMOLOGY

## 2024-05-13 PROCEDURE — G0463 HOSPITAL OUTPT CLINIC VISIT: HCPCS | Mod: 25

## 2024-05-13 RX ADMIN — Medication 1.25 MG: at 14:24

## 2024-05-13 ASSESSMENT — CONF VISUAL FIELD
OD_INFERIOR_NASAL_RESTRICTION: 1
OD_INFERIOR_TEMPORAL_RESTRICTION: 1
METHOD: COUNTING FINGERS
OS_SUPERIOR_TEMPORAL_RESTRICTION: 0
OS_SUPERIOR_NASAL_RESTRICTION: 0
OS_NORMAL: 1
OS_INFERIOR_TEMPORAL_RESTRICTION: 0
OD_SUPERIOR_NASAL_RESTRICTION: 1
OD_SUPERIOR_TEMPORAL_RESTRICTION: 1
OS_INFERIOR_NASAL_RESTRICTION: 0

## 2024-05-13 ASSESSMENT — VISUAL ACUITY
OS_PH_SC+: -2
OS_PH_SC: 20/20
OS_SC: 20/30
METHOD: SNELLEN - LINEAR
OS_SC+: -1

## 2024-05-13 ASSESSMENT — TONOMETRY
OS_IOP_MMHG: 15
OD_IOP_MMHG: 16
IOP_METHOD: TONOPEN

## 2024-05-13 ASSESSMENT — SLIT LAMP EXAM - LIDS
COMMENTS: NORMAL
COMMENTS: NORMAL

## 2024-05-13 ASSESSMENT — EXTERNAL EXAM - RIGHT EYE: OD_EXAM: NORMAL

## 2024-05-13 ASSESSMENT — EXTERNAL EXAM - LEFT EYE: OS_EXAM: NORMAL

## 2024-05-13 NOTE — PROGRESS NOTES
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   - OCT is stable, trace IR cysts SN to fovea  - Vision is stable  - last injection was 12 weeks ago  - plan: Avastin today follow-up in 12 weeks     # Nuclear sclerotic cataract  Both eyes               - Not visually significant              - refract as needed               - no new prescription glasses issued               - monitor yearly     # DM type II  Last A1c is:  Lab Results   Component Value Date    A1C 6.2 03/21/2024    A1C 5.9 11/21/2023   Recommend adequate blood sugar control  Recommend follow-up every yearly    Follow-up in 12 weeks, DFE, OCT macula OU     Ivy Allen MD     Medical Retina  [facility]     Attending Physician Attestation:  Complete documentation of historical and exam elements from today's encounter can be found in the full encounter summary report (not reduplicated in this progress note).  I personally obtained the chief complaint(s) and history of present illness.  I confirmed and edited as necessary the review of systems, past medical/surgical history, family history, social history, and examination findings as documented by others; and I examined the patient myself.  I personally reviewed the relevant tests, images, and reports as documented above.  I formulated and edited as necessary the assessment and plan and discussed the findings and management plan with the patient and family. Iyv Allen MD

## 2024-05-23 ENCOUNTER — TELEPHONE (OUTPATIENT)
Dept: FAMILY MEDICINE | Facility: CLINIC | Age: 79
End: 2024-05-23
Payer: COMMERCIAL

## 2024-05-23 NOTE — TELEPHONE ENCOUNTER
Patient Returning Call    Reason for call:  Patient wnt to his dentist to have some teeth removed the dentist told him that he will need to be off his alendronate (FOSAMAX) for 3 months before the extraction and 3 months after his teeth extraction.    He wants your opinion on this.    Information relayed to patient:  send to team and Dr Smith    Patient has additional questions:  No      Could we send this information to you in [facility] or would you prefer to receive a phone call?:   Patient would prefer a phone call   Okay to leave a detailed message?: Yes at Home number on file [phone] (home)

## 2024-06-05 ENCOUNTER — MYC MEDICAL ADVICE (OUTPATIENT)
Dept: FAMILY MEDICINE | Facility: CLINIC | Age: 79
End: 2024-06-05
Payer: COMMERCIAL

## 2024-06-05 NOTE — TELEPHONE ENCOUNTER
Writer called patient:  No answer and unable to leave message because voicemail full.    [employer] message sent to patient.    MADDIE Solis, RN-BC  [facility]

## 2024-06-05 NOTE — TELEPHONE ENCOUNTER
Patient returned call--message below form Dr. Smith was relayed. Patient verbalized understanding and denied questions.

## 2024-06-05 NOTE — TELEPHONE ENCOUNTER
Amara Smith MD  [facility] Nurse [place] - [facility]49 minutes ago (12:02 PM)     GM  In very rare circumstances, people can have damage to jawbone during deep dental procedure  while on the biphosphonate,It is  okay to stop 3 months before and 3 months after, continue with calcium and vitamin D supplementation in the meantime

## 2024-06-12 ENCOUNTER — OFFICE VISIT (OUTPATIENT)
Dept: PSYCHIATRY | Facility: CLINIC | Age: 79
End: 2024-06-12
Attending: PSYCHIATRY & NEUROLOGY
Payer: COMMERCIAL

## 2024-06-12 VITALS
HEART RATE: 93 BPM | WEIGHT: 187.2 LBS | BODY MASS INDEX: 26.59 KG/M2 | DIASTOLIC BLOOD PRESSURE: 77 MMHG | SYSTOLIC BLOOD PRESSURE: 125 MMHG

## 2024-06-12 DIAGNOSIS — F33.40 RECURRENT MAJOR DEPRESSIVE DISORDER, IN REMISSION (H): Primary | ICD-10-CM

## 2024-06-12 DIAGNOSIS — F10.21 ALCOHOL USE DISORDER, SEVERE, IN EARLY REMISSION (H): ICD-10-CM

## 2024-06-12 PROCEDURE — G0463 HOSPITAL OUTPT CLINIC VISIT: HCPCS | Performed by: STUDENT IN AN ORGANIZED HEALTH CARE EDUCATION/TRAINING PROGRAM

## 2024-06-12 PROCEDURE — 99214 OFFICE O/P EST MOD 30 MIN: CPT | Mod: GC | Performed by: STUDENT IN AN ORGANIZED HEALTH CARE EDUCATION/TRAINING PROGRAM

## 2024-06-12 PROCEDURE — 90833 PSYTX W PT W E/M 30 MIN: CPT | Mod: 4UV | Performed by: STUDENT IN AN ORGANIZED HEALTH CARE EDUCATION/TRAINING PROGRAM

## 2024-06-12 ASSESSMENT — PATIENT HEALTH QUESTIONNAIRE - PHQ9
SUM OF ALL RESPONSES TO PHQ QUESTIONS 1-9: 9
SUM OF ALL RESPONSES TO PHQ QUESTIONS 1-9: 9
10. IF YOU CHECKED OFF ANY PROBLEMS, HOW DIFFICULT HAVE THESE PROBLEMS MADE IT FOR YOU TO DO YOUR WORK, TAKE CARE OF THINGS AT HOME, OR GET ALONG WITH OTHER PEOPLE: SOMEWHAT DIFFICULT

## 2024-06-12 ASSESSMENT — PAIN SCALES - GENERAL: PAINLEVEL: MILD PAIN (2)

## 2024-06-12 NOTE — NURSING NOTE
Chief Complaint   Patient presents with    Recheck Medication     Alcohol use disorder, severe, in early remission      Blood pressure 125/77, pulse 93, weight 84.9 kg (187 lb 3.2 oz).    - Freddie Bazzi, Visit Facilitator

## 2024-06-12 NOTE — PROGRESS NOTES
----------------------------------------------------------------------------------------------------------  General acute hospital   Addiction Medicine Progress Note     ID                                Lauretn Choi is a 78 year old  male with a history of alcohol use disorder, previously severe, in sustained remission, as well as major depressive disorder and generalized anxiety disorder. He has been following in the addiction clinic since 10/2018.     INTERVAL HISTORY                                  Doing well today. Big news is that the state funded the major renovations at his [facility] because most of the residents were elderly and couldn't pay. He was considering moving out or being strapped with a large debt because of this, which was stressful. It has been a huge relief.    No alcohol since last visit. Cravings are under control. He plans to take naltrexone if he has any strong desires to drink or is going through any highly emotional times.    Attending AA consistently. He met last month's goal of attending weekly. He has not attended the evening meeting. He is excited about the opportunity to lead AA meetings in detox at [place].     Sleep is somewhat improved. He met his goal of getting to bed before midnight consistently. We reviewed sleep hygiene strategies.     BRIEF SUMMARY OF SUBSTANCE USE                                                                Gio has been in detox over 40 times and treatment about 5 times. His first treatment was in 1980. He gave up daily drinking in late 1990's, then began to binge drink. In recent years, his pattern has been drinking 1-2 weeks, not eating most of that time, and getting very sick. He then will stop drinking, will be sober for 1-2 weeks and the cycle is continued. Alcohol has caused withdrawal and tolerance, drinking 6-10 beers and 1 pint a day when on a binge. Alcohol has affected relationships, has affected health  "with getting sick, not eating and getting depressed.     Last significant binge use was 9/2023 prompted by conflict at his apartment.      Treatment History:    First treatment in 1980. Last treatment at [facility] finished November 2018.      Substance Use Pharmacotherapies:   Tried antabuse in late 1990's and he would stop taking and \"drink around it\".      BRIEF PSYCHIATRIC HISTORY     Previous diagnoses, history and diagnostic clarification:  Alcohol Use Disorder, severe, in sustained remission.   Major Depressive Disorder without suicidal ideation   Generalized Anxiety Disorder  R/o PTSD      PAST PSYCH MED TRIALS       Drug  Treatment Dates Max Dose (mg) Helpful Adverse Effects   Reason Discontinued   Prozac 1980s 80mg Yes  Stopped working   Zoloft, Paxil, Cymbalta, Effexor ?    ?   TCAs 1970s       Buproprion 1990    Anxiety   Mirtazapine  ?    Anxiety   Aripiprazole Summer 2019    Drooling   Gabapentin  Spring 2019  Yes       Current Medications     Current Outpatient Medications   Medication Instructions    Acetaminophen 325 mg, Oral, EVERY 8 HOURS PRN    alendronate (FOSAMAX) 70 mg, Oral, EVERY 7 DAYS    ASPIRIN PO 81 mg, Oral, DAILY    blood glucose (NO BRAND SPECIFIED) test strip Use to test blood sugar 2 times daily or as directed. To accompany: Blood Glucose Monitor Brands: per insurance.    blood glucose monitoring (NO BRAND SPECIFIED) meter device kit Use to test blood sugar 2 times daily or as directed. Preferred blood glucose meter OR supplies to accompany: Blood Glucose Monitor Brands: per insurance.    calcium-vitamin D 500-125 MG-UNIT TABS 1 tablet, Oral, 2 TIMES DAILY    clotrimazole (LOTRIMIN) 1 % external cream Topical, 2 TIMES DAILY, Apply to rough patch of skin in front of right ear 2x daily for 10 days    DIPHENHYDRAMINE HCL PO 25 mg, Oral, DAILY PRN    escitalopram (LEXAPRO) 20 mg, Oral, DAILY    gabapentin (NEURONTIN) 300 mg, Oral, AT BEDTIME, Take 1 capsule by mouth at " bedtime as needed.    losartan (COZAAR) 50 MG tablet TAKE ONE TABLET BY MOUTH ONCE DAILY    metFORMIN (GLUCOPHAGE XR) 500 MG 24 hr tablet TAKE ONE TABLET BY MOUTH ONCE DAILY WITH BREAKFAST    NITROGLYCERIN SL 0.4 mg, EVERY 5 MIN PRN    omeprazole (PRILOSEC) 20 MG DR capsule TAKE 1 CAPSULE BY MOUTH DAILY,  60 MINUTES BEFORE A MEAL.    rosuvastatin (CRESTOR) 40 mg, Oral, AT BEDTIME    tamsulosin (FLOMAX) 0.4 MG capsule TAKE ONE CAPSULE BY MOUTH ONCE DAILY WITH BREAKFAST. (NEED TO BE SEEN IN CLINIC FOR FURTHER REFILLS)    thin (NO BRAND SPECIFIED) lancets Use with lanceting device. To accompany: Blood Glucose Monitor Brands: per insurance.     MENTAL STATUS EXAM/WITHDRAWAL                                                              Withdrawal symptoms: None    Alertness: alert   Orientation: awake and alert  Appearance: adequately groomed  Behavior/Demeanor: cooperative and pleasant, with good  eye contact.  Speech: slowed  Psychomotor: normal or unremarkable    Mood:  description consistent with euthymia  Affect: full range and was congruent to speech content.  Thought Process/Associations: unremarkable   Thought Content: devoid of  suicidal ideation.   Perception: devoid of  auditory hallucinations and visual hallucinations  Insight: adequate.  Judgment: fair.    These cognitive functions grossly appear as described, but were not formally tested.    ASSESSMENT/PLAN                                                  Summation/Assessment:  78 year old male with history of severe alcohol use disorder as well as depression. Sober since 9/2023, involved in AA.     # Alcohol use disorder, severe, in early remission   Longstanding history with most recent pattern of intermittent low-volume use, 2-3 drinks once or twice monthly through summer 2023, most recently binge episode which made him quite sick in September, triggered by frustration around a meeting at his apartment. No return to use since this episode. Previously took  naltrexone (BARRETO for months) with good effect, however the downside was significant negative impact on libido. Currently feels confident in his sobriety without naltrexone but has a plan to use on a PRN basis if there are large stressors or cravings. Attending AA meetings regularly.    - Naltrexone 50mg PO daily PRN    - Goal to attend AA meeting one time weekly. May attend 6:30PM meeting instead, would like to start walking to this one.     # Major depressive disorder, single episode, partial remission  # RODRIGO  Mood improved today. Ongoing social interactions with his friends and increasingly his sister have been helpful for mood, has plans to visit his brother in June. Sleep study (2020), no JANNETTE or narcolepsy, does have some anatomic predisposition to airway resistance and sleeps with a large pillow. Sleep can be a negative trigger on his mood, and has been difficult for most of his life. Currently working on sleep hygiene goals as below.   - Continue escitalopram 20mg daily    - Goal: try to get to sleep before midnight consistently and to stop drinking caffeine after 6pm    RTC: 3 months     [place] PRESCRIPTION MONITORING PROGRAM [] was not checked today:  Will be checked at next visit     Marvin Barboza MD   Addiction Medicine Fellow  [facility]     Patient seen by and discussed with staff Dr. Bee.  I saw the patient with the fellow, and participated in key portions of the service, including the mental status examination and developing the plan of care. I reviewed key portions of the history with the fellow. I agree with the findings and plan as documented in this note.  I was also present for the entirety of the therapy portion of the visit.     Selam Bee MD      Psychiatry Individual Psychotherapy Note   Psychotherapy start time - 1030  Psychotherapy end time - 1055  Date last reviewed - 11/15/23  Subjective: This supportive psychotherapy session addressed issues related to goals of  therapy and current psychosocial stressors.   Interactive complexity indicated? No  Plan: RTC in timeframe noted above  Psychotherapy services during this visit included myself and the patient.   Treatment Plan      SYMPTOMS; PROBLEMS   MEASURABLE GOALS;    FUNCTIONAL IMPROVEMENT / GAINS INTERVENTIONS DISCHARGE CRITERIA   Substance Use: alcohol    stay free of alcohol abuse  and go to AA meetings 4x before next meeting Supportive / psychodynamic marked symptom improvement         Answers submitted by the patient for this visit:  Patient Health Questionnaire (Submitted on 6/12/2024)  If you checked off any problems, how difficult have these problems made it for you to do your work, take care of things at home, or get along with other people?: Somewhat difficult  PHQ9 TOTAL SCORE: 9

## 2024-07-02 DIAGNOSIS — F33.1 MODERATE EPISODE OF RECURRENT MAJOR DEPRESSIVE DISORDER (H): ICD-10-CM

## 2024-07-02 DIAGNOSIS — F41.1 GAD (GENERALIZED ANXIETY DISORDER): ICD-10-CM

## 2024-07-02 RX ORDER — ESCITALOPRAM OXALATE 20 MG/1
20 TABLET ORAL DAILY
Qty: 90 TABLET | Refills: 0 | Status: SHIPPED | OUTPATIENT
Start: 2024-07-02

## 2024-07-02 NOTE — TELEPHONE ENCOUNTER
Last seen: 6/12/24  RTC: 3 months  Cancel: none  No-show: none  Next appt: none      Incoming refill from Patient via phone    Medication requested:   Pending Prescriptions:                       Disp   Refills    escitalopram (LEXAPRO) 20 MG tablet       90 tab*0            Sig: Take 1 tablet (20 mg) by mouth daily      From chart note:   Continue escitalopram 20mg daily     Medication unable to be refilled by RN due to criteria not met as indicated.                 []Eligibility - not seen in the last year              []Supervision - no future appointment              []Compliance - no shows, cancellations or lapse in therapy              []Verification - order discrepancy              []Controlled medication              []Medication not included in policy              [x]90-day supply request              [x]Other:  last visit note unsigned

## 2024-07-02 NOTE — TELEPHONE ENCOUNTER
Health Call Center    Phone Message    May a detailed message be left on voicemail: yes     Reason for Call: Medication Refill Request    Has the patient contacted the pharmacy for the refill? Yes   Name of medication being requested: Escitalopram 20mg   Provider who prescribed the medication: Dr. Barboza  Pharmacy:   [facility] - [place], [place] - [address] [place] [address]     Date medication is needed: 7/2/24      Patient called requesting a refill of their prescription of Escitalopram 20mg. The patient is completely out of their current prescription.    Action Taken: Message routed to:  Other: [facility] psychiatry nurse pool    Travel Screening: Not Applicable     Date of Service: 7/2/24

## 2024-07-03 ENCOUNTER — TELEPHONE (OUTPATIENT)
Dept: UROLOGY | Facility: CLINIC | Age: 79
End: 2024-07-03
Payer: COMMERCIAL

## 2024-07-03 NOTE — TELEPHONE ENCOUNTER
"Discussed with patient that he does not to come back to see us per 's recommendations.he does not need yearly PSA\"s anymore and can have his PMD do the early FREDDY's. He thought  was \"rude\" I told him we would be happy to see him but I didn't think any provider here would recommend that he had a PSA.  Radha Dixon, KODY   "

## 2024-07-03 NOTE — TELEPHONE ENCOUNTER
" Health Call Center    Phone Message    May a detailed message be left on voicemail: yes     Reason for Call: Other: Pt would like a call back to schedule apt for referral for PSA- does not want to see Dr. Adame again, states he was a good doctor but they were \" just not on the same page\"     Action Taken: Message routed to:  Other: uro    Travel Screening: Not Applicable     Date of Service:                                                                     "

## 2024-07-05 ENCOUNTER — TELEPHONE (OUTPATIENT)
Dept: BEHAVIORAL HEALTH | Facility: CLINIC | Age: 79
End: 2024-07-05
Payer: COMMERCIAL

## 2024-07-05 NOTE — TELEPHONE ENCOUNTER
Incoming fax from [facility] requesting a refill of the following prescription  escitalopram (LEXAPRO) 20 MG tablet 90 tablet 0 7/2/2024 -- No   Sig - Route: Take 1 tablet (20 mg) by mouth daily - Oral   Sent to pharmacy as: Escitalopram Oxalate 20 MG Oral Tablet (LEXAPRO)     Does not appear this is a patient of the [facility] and this prescription has been sent to the pharmacy on 07/02/2024 by Dr. Bee. No further action needed.     Ashlyn Tee RN on 7/5/2024 at 5:00 PM

## 2024-07-08 DIAGNOSIS — R73.03 PREDIABETES: ICD-10-CM

## 2024-07-08 DIAGNOSIS — I10 HYPERTENSION, ESSENTIAL: ICD-10-CM

## 2024-07-08 RX ORDER — METFORMIN HCL 500 MG
TABLET, EXTENDED RELEASE 24 HR ORAL
Qty: 90 TABLET | Refills: 1 | Status: SHIPPED | OUTPATIENT
Start: 2024-07-08

## 2024-07-08 RX ORDER — LOSARTAN POTASSIUM 50 MG/1
TABLET ORAL
Qty: 90 TABLET | Refills: 3 | Status: SHIPPED | OUTPATIENT
Start: 2024-07-08

## 2024-07-17 DIAGNOSIS — H35.3221 EXUDATIVE AGE-RELATED MACULAR DEGENERATION OF LEFT EYE WITH ACTIVE CHOROIDAL NEOVASCULARIZATION (H): Primary | ICD-10-CM

## 2024-07-22 DIAGNOSIS — K21.00 GASTRO-ESOPHAGEAL REFLUX DISEASE WITH ESOPHAGITIS: ICD-10-CM

## 2024-07-22 NOTE — TELEPHONE ENCOUNTER
Medication Question or Refill        What medication are you calling about (include dose and sig)?: omeprazole (PRILOSEC) 20 MG DR capsule     Preferred Pharmacy:    [facility] - [place], [place] - [address] [place] [address]  Phone: [phone] Fax: [phone]      Controlled Substance Agreement on file:   CSA -- Patient Level:    CSA: None found at the patient level.       Who prescribed the medication?: PCP    Do you need a refill? Yes    When did you use the medication last? N/A

## 2024-07-24 DIAGNOSIS — R35.0 URINE FREQUENCY: ICD-10-CM

## 2024-07-24 RX ORDER — TAMSULOSIN HYDROCHLORIDE 0.4 MG/1
CAPSULE ORAL
Qty: 90 CAPSULE | Refills: 3 | Status: SHIPPED | OUTPATIENT
Start: 2024-07-24

## 2024-08-04 ENCOUNTER — HEALTH MAINTENANCE LETTER (OUTPATIENT)
Age: 79
End: 2024-08-04

## 2024-08-13 DIAGNOSIS — M80.00XS OSTEOPOROSIS WITH CURRENT PATHOLOGICAL FRACTURE, UNSPECIFIED OSTEOPOROSIS TYPE, SEQUELA: ICD-10-CM

## 2024-08-14 RX ORDER — ALENDRONATE SODIUM 70 MG/1
TABLET ORAL
Qty: 12 TABLET | Refills: 3 | Status: SHIPPED | OUTPATIENT
Start: 2024-08-14

## 2024-08-15 DIAGNOSIS — E11.59 TYPE 2 DIABETES MELLITUS WITH OTHER CIRCULATORY COMPLICATIONS (H): Primary | ICD-10-CM

## 2024-08-19 ENCOUNTER — OFFICE VISIT (OUTPATIENT)
Dept: FAMILY MEDICINE | Facility: CLINIC | Age: 79
End: 2024-08-19
Payer: COMMERCIAL

## 2024-08-19 VITALS
BODY MASS INDEX: 26.63 KG/M2 | WEIGHT: 186 LBS | RESPIRATION RATE: 21 BRPM | DIASTOLIC BLOOD PRESSURE: 63 MMHG | TEMPERATURE: 97.9 F | HEART RATE: 95 BPM | HEIGHT: 70 IN | SYSTOLIC BLOOD PRESSURE: 95 MMHG | OXYGEN SATURATION: 95 %

## 2024-08-19 DIAGNOSIS — Z29.11 NEED FOR VACCINATION AGAINST RESPIRATORY SYNCYTIAL VIRUS: ICD-10-CM

## 2024-08-19 DIAGNOSIS — E11.59 TYPE 2 DIABETES MELLITUS WITH OTHER CIRCULATORY COMPLICATIONS (H): ICD-10-CM

## 2024-08-19 LAB
ALBUMIN SERPL BCG-MCNC: 4.3 G/DL (ref 3.5–5.2)
ALP SERPL-CCNC: 37 U/L (ref 40–150)
ALT SERPL W P-5'-P-CCNC: 19 U/L (ref 0–70)
ANION GAP SERPL CALCULATED.3IONS-SCNC: 9 MMOL/L (ref 7–15)
AST SERPL W P-5'-P-CCNC: 23 U/L (ref 0–45)
BILIRUB SERPL-MCNC: 0.4 MG/DL
BUN SERPL-MCNC: 14 MG/DL (ref 8–23)
CALCIUM SERPL-MCNC: 9.1 MG/DL (ref 8.8–10.4)
CHLORIDE SERPL-SCNC: 103 MMOL/L (ref 98–107)
CHOLEST SERPL-MCNC: 171 MG/DL
CREAT SERPL-MCNC: 0.91 MG/DL (ref 0.67–1.17)
CREAT UR-MCNC: 70.3 MG/DL
EGFRCR SERPLBLD CKD-EPI 2021: 87 ML/MIN/1.73M2
FASTING STATUS PATIENT QL REPORTED: ABNORMAL
FASTING STATUS PATIENT QL REPORTED: NORMAL
GLUCOSE SERPL-MCNC: 124 MG/DL (ref 70–99)
HBA1C MFR BLD: 6.3 % (ref 0–5.6)
HCO3 SERPL-SCNC: 25 MMOL/L (ref 22–29)
HDLC SERPL-MCNC: 76 MG/DL
LDLC SERPL CALC-MCNC: 76 MG/DL
MICROALBUMIN UR-MCNC: <12 MG/L
MICROALBUMIN/CREAT UR: NORMAL MG/G{CREAT}
NONHDLC SERPL-MCNC: 95 MG/DL
POTASSIUM SERPL-SCNC: 4.4 MMOL/L (ref 3.4–5.3)
PROT SERPL-MCNC: 7.1 G/DL (ref 6.4–8.3)
SODIUM SERPL-SCNC: 137 MMOL/L (ref 135–145)
TRIGL SERPL-MCNC: 96 MG/DL

## 2024-08-19 PROCEDURE — 82570 ASSAY OF URINE CREATININE: CPT | Performed by: FAMILY MEDICINE

## 2024-08-19 PROCEDURE — 36415 COLL VENOUS BLD VENIPUNCTURE: CPT | Performed by: FAMILY MEDICINE

## 2024-08-19 PROCEDURE — G2211 COMPLEX E/M VISIT ADD ON: HCPCS | Performed by: FAMILY MEDICINE

## 2024-08-19 PROCEDURE — 83036 HEMOGLOBIN GLYCOSYLATED A1C: CPT | Performed by: FAMILY MEDICINE

## 2024-08-19 PROCEDURE — 80053 COMPREHEN METABOLIC PANEL: CPT | Performed by: FAMILY MEDICINE

## 2024-08-19 PROCEDURE — 80061 LIPID PANEL: CPT | Performed by: FAMILY MEDICINE

## 2024-08-19 PROCEDURE — 99215 OFFICE O/P EST HI 40 MIN: CPT | Performed by: FAMILY MEDICINE

## 2024-08-19 PROCEDURE — 82043 UR ALBUMIN QUANTITATIVE: CPT | Performed by: FAMILY MEDICINE

## 2024-08-19 ASSESSMENT — PATIENT HEALTH QUESTIONNAIRE - PHQ9: SUM OF ALL RESPONSES TO PHQ QUESTIONS 1-9: 5

## 2024-08-19 NOTE — PROGRESS NOTES
"  Assessment & Plan     Type 2 diabetes mellitus with other circulatory complications (H)  Patient was encouraged to continue healthy lifestyle changes, continue current dose of metformin, monitor blood sugar every 4 to 6 months.  - Albumin Random Urine Quantitative with Creat Ratio  - Comprehensive metabolic panel  - Hemoglobin A1c  - Lipid panel reflex to direct LDL Fasting    Need for vaccination against respiratory syncytial virus  To be done at his local pharmacy.    Review of external notes as documented elsewhere in note  40 minutes spent by me on the date of the encounter doing chart review, review of outside records, review of test results, interpretation of tests, patient visit, and documentation The longitudinal plan of care for the diagnosis(es)/condition(s) as documented were addressed during this visit. Due to the added complexity in care, I will continue to support Gio in the subsequent management and with ongoing continuity of care.      BMI  Estimated body mass index is 26.63 kg/m  as calculated from the following:    Height as of this encounter: 1.78 m (5' 10.08\").    Weight as of this encounter: 84.4 kg (186 lb).   Weight management plan: Discussed healthy diet and exercise guidelines      Work on weight loss  Regular exercise    Subjective   Gio is a 79 year old, presenting for the following health issues:  Diabetes      8/19/2024    11:48 AM   Additional Questions   Roomed by M   Accompanied by self     History of Present Illness       Diabetes:   He presents for follow up of diabetes.  He is checking home blood glucose a few times a month.   He checks blood glucose after meals.  Blood glucose is sometimes over 200 and never under 70. He is aware of hypoglycemia symptoms including shakiness and dizziness.    He has no concerns regarding his diabetes at this time.   He is not experiencing numbness or burning in feet, excessive thirst, blurry vision, weight changes or redness, sores or blisters on " "feet.           He eats 2-3 servings of fruits and vegetables daily.He consumes 1 sweetened beverage(s) daily.He exercises with enough effort to increase his heart rate 10 to 19 minutes per day.  He exercises with enough effort to increase his heart rate 3 or less days per week.   He is taking medications regularly.     Following up on diabetes type 2, currently controlled with metformin  mg 1 tablet daily, random blood sugar usually around 130s at home.  No hypoglycemic episode.  Also on losartan 50 mg for hypertension.  Blood pressure is on the low side, no dizziness or headaches.  His psychiatrist discontinued the Vivitrol monthly injection and transitioned him to the naltrexone as needed.  Still following up with ophthalmologist.Memory lapses, but no significant change,Still able to do activities of daily living, driving but  less etc.      Review of Systems  Constitutional, HEENT, cardiovascular, pulmonary, gi and gu systems are negative, except as otherwise noted.      Objective    BP 95/63 (BP Location: Left arm, Patient Position: Sitting, Cuff Size: Adult Regular)   Pulse 95   Temp 97.9  F (36.6  C) (Temporal)   Resp 21   Ht 1.78 m (5' 10.08\")   Wt 84.4 kg (186 lb)   SpO2 95%   BMI 26.63 kg/m    Body mass index is 26.63 kg/m .  Physical Exam   GENERAL: alert and no distress  NECK: no adenopathy, no asymmetry, masses, or scars  RESP: lungs clear to auscultation - no rales, rhonchi or wheezes  CV: regular rate and rhythm, normal S1 S2, no S3 or S4, no murmur, click or rub, no peripheral edema  ABDOMEN: soft, nontender, no hepatosplenomegaly, no masses and bowel sounds normal  MS: no gross musculoskeletal defects noted, no edema  Diabetic foot exam: normal DP and PT pulses, no trophic changes or ulcerative lesions, and normal sensory exam    Results for orders placed or performed in visit on 08/19/24   Hemoglobin A1c     Status: Abnormal   Result Value Ref Range    Hemoglobin A1C 6.3 (H) 0.0 - 5.6 " %           Signed Electronically by: Amara Smith MD      Prior to immunization administration, verified patients identity using patient s name and date of birth. Please see Immunization Activity for additional information.     Screening Questionnaire for Adult Immunization    Are you sick today?   No   Do you have allergies to medications, food, a vaccine component or latex?   Yes   Have you ever had a serious reaction after receiving a vaccination?   No   Do you have a long-term health problem with heart, lung, kidney, or metabolic disease (e.g., diabetes), asthma, a blood disorder, no spleen, complement component deficiency, a cochlear implant, or a spinal fluid leak?  Are you on long-term aspirin therapy?   Yes   Do you have cancer, leukemia, HIV/AIDS, or any other immune system problem?   No   Do you have a parent, brother, or sister with an immune system problem?   No   In the past 3 months, have you taken medications that affect  your immune system, such as prednisone, other steroids, or anticancer drugs; drugs for the treatment of rheumatoid arthritis, Crohn s disease, or psoriasis; or have you had radiation treatments?   No   Have you had a seizure, or a brain or other nervous system problem?   No   During the past year, have you received a transfusion of blood or blood    products, or been given immune (gamma) globulin or antiviral drug?   No   For women: Are you pregnant or is there a chance you could become       pregnant during the next month?   No   Have you received any vaccinations in the past 4 weeks?   No     Immunization questionnaire was positive for at least one answer.  Notified .    This note was completed in part using a voice recognition software, any grammatical or context distortion are unintentional and inherent to the software.    Patient instructed to remain in clinic for 15 minutes afterwards, and to report any adverse reactions.     Screening performed by SEEMA Lugo MA on  8/19/2024 at 11:55 AM.

## 2024-09-11 ENCOUNTER — DOCUMENTATION ONLY (OUTPATIENT)
Dept: OTHER | Facility: CLINIC | Age: 79
End: 2024-09-11
Payer: COMMERCIAL

## 2024-09-18 ENCOUNTER — OFFICE VISIT (OUTPATIENT)
Dept: OTOLARYNGOLOGY | Facility: CLINIC | Age: 79
End: 2024-09-18
Payer: COMMERCIAL

## 2024-09-18 DIAGNOSIS — H61.23 BILATERAL IMPACTED CERUMEN: Primary | ICD-10-CM

## 2024-09-18 DIAGNOSIS — H90.3 SENSORINEURAL HEARING LOSS (SNHL) OF BOTH EARS: ICD-10-CM

## 2024-09-18 PROCEDURE — 69210 REMOVE IMPACTED EAR WAX UNI: CPT | Performed by: OTOLARYNGOLOGY

## 2024-09-18 NOTE — PROGRESS NOTES
FOLLOW UP VISIT NOTE    Patient presents with:  Cerumen Impaction: Last cleaning 12/2021. Last audio 6/2022. Ears feel full. R ear feels plugged and itching. Some drainage about 6 months ago. Sx on/off. Does not wear hearing aids and no concerns. Some ringing at times.        HISTORY OF PRESENT ILLNESS    Gio was seen in follow up after previous date for ear cleaning.  He instruments the ears regularly and uses oil drops.         REVIEW OF SYSTEMS    Review of Systems: a 10-system review is reviewed at this encounter.  See scanned document.       Allergies   Allergen Reactions    Aspirin Other (See Comments)     Avoids taking any aspirin besides his daily 81 mg aspirin regimen due to acid reflux/esophagus problem(s).    Nsaids (Non-Steroidal Anti-Inflammatory Drug) [Nsaids] Other (See Comments)     Avoids due to acid reflux/esophagus problem(s).    Plavix [Clopidogrel] Other (See Comments) and Muscle Pain (Myalgia)     Reaction(s): Bad bruising all over his body and leg cramps.    Statins-Hmg-Coa Reductase Inhibitors [Statins] Muscle Pain (Myalgia)     Per the patient, he tolerates rosuvastatin.           PHYSICAL EXAM:        HEAD: Normal appearance and symmetry:  No cutaneous lesions.      EARS:        RIGHT: extensive cerumen impaction   LEFT:   extensive cerumen impaction     CERUMEN IMPACTION REMOVAL    After obtaining verbal consent, and using the binocular microscope.  Cerumen impaction(s) were removed from the affected ear canal(s)  using a wire loops and/or suction.  The patient tolerated the procedure well without incident.    NOSE:    Dorsum:   straight       ORAL CAVITY/OROPHARYNX:    Lips:  Normal.     NECK:  Trachea:  midline     NEURO:   Alert and Oriented    GAIT AND STATION:  normal     RESPIRATORY:   Symmetry and Respiratory effort    PSYCH:   normal mood and affect    SKIN:  warm and dry         IMPRESSION:   Encounter Diagnoses   Name Primary?    Bilateral impacted cerumen Yes    Sensorineural  hearing loss (SNHL) of both ears          RECOMMENDATIONS:    Return visit 6 months  Do not instrument ears at home  Audiogram next vist.

## 2024-09-18 NOTE — LETTER
9/18/2024      Laurent Choi  [address]  [place] [address]      Dear Colleague,    Thank you for referring your patient, Laurent Choi, to the [facility]. Please see a copy of my visit note below.    FOLLOW UP VISIT NOTE    Patient presents with:  Cerumen Impaction: Last cleaning 12/2021. Last audio 6/2022. Ears feel full. R ear feels plugged and itching. Some drainage about 6 months ago. Sx on/off. Does not wear hearing aids and no concerns. Some ringing at times.        HISTORY OF PRESENT ILLNESS    Gio was seen in follow up after previous date for ear cleaning.  He instruments the ears regularly and uses oil drops.         REVIEW OF SYSTEMS    Review of Systems: a 10-system review is reviewed at this encounter.  See scanned document.       Allergies   Allergen Reactions     Aspirin Other (See Comments)     Avoids taking any aspirin besides his daily 81 mg aspirin regimen due to acid reflux/esophagus problem(s).     Nsaids (Non-Steroidal Anti-Inflammatory Drug) [Nsaids] Other (See Comments)     Avoids due to acid reflux/esophagus problem(s).     Plavix [Clopidogrel] Other (See Comments) and Muscle Pain (Myalgia)     Reaction(s): Bad bruising all over his body and leg cramps.     Statins-Hmg-Coa Reductase Inhibitors [Statins] Muscle Pain (Myalgia)     Per the patient, he tolerates rosuvastatin.           PHYSICAL EXAM:        HEAD: Normal appearance and symmetry:  No cutaneous lesions.      EARS:        RIGHT: extensive cerumen impaction   LEFT:   extensive cerumen impaction     CERUMEN IMPACTION REMOVAL    After obtaining verbal consent, and using the binocular microscope.  Cerumen impaction(s) were removed from the affected ear canal(s)  using a wire loops and/or suction.  The patient tolerated the procedure well without incident.    NOSE:    Dorsum:   straight       ORAL CAVITY/OROPHARYNX:    Lips:  Normal.     NECK:  Trachea:  midline     NEURO:   Alert  and Oriented    GAIT AND STATION:  normal     RESPIRATORY:   Symmetry and Respiratory effort    PSYCH:   normal mood and affect    SKIN:  warm and dry         IMPRESSION:   Encounter Diagnoses   Name Primary?     Bilateral impacted cerumen Yes     Sensorineural hearing loss (SNHL) of both ears          RECOMMENDATIONS:    Return visit 6 months  Do not instrument ears at home  Audiogram next vist.       Again, thank you for allowing me to participate in the care of your patient.        Sincerely,        Piyush Rasmussen MD

## 2024-09-29 DIAGNOSIS — F41.1 GAD (GENERALIZED ANXIETY DISORDER): ICD-10-CM

## 2024-09-29 DIAGNOSIS — F33.1 MODERATE EPISODE OF RECURRENT MAJOR DEPRESSIVE DISORDER (H): ICD-10-CM

## 2024-09-30 RX ORDER — ESCITALOPRAM OXALATE 20 MG/1
20 TABLET ORAL DAILY
Qty: 90 TABLET | Refills: 0 | OUTPATIENT
Start: 2024-09-30

## 2024-10-02 ENCOUNTER — OFFICE VISIT (OUTPATIENT)
Dept: PSYCHIATRY | Facility: CLINIC | Age: 79
End: 2024-10-02
Attending: PSYCHIATRY & NEUROLOGY
Payer: COMMERCIAL

## 2024-10-02 VITALS
HEART RATE: 85 BPM | DIASTOLIC BLOOD PRESSURE: 84 MMHG | WEIGHT: 190.4 LBS | SYSTOLIC BLOOD PRESSURE: 138 MMHG | BODY MASS INDEX: 27.26 KG/M2

## 2024-10-02 DIAGNOSIS — F10.21 ALCOHOL USE DISORDER, SEVERE, IN EARLY REMISSION (H): Primary | ICD-10-CM

## 2024-10-02 DIAGNOSIS — F33.40 RECURRENT MAJOR DEPRESSIVE DISORDER, IN REMISSION (H): ICD-10-CM

## 2024-10-02 PROCEDURE — G0463 HOSPITAL OUTPT CLINIC VISIT: HCPCS | Performed by: STUDENT IN AN ORGANIZED HEALTH CARE EDUCATION/TRAINING PROGRAM

## 2024-10-02 PROCEDURE — 90833 PSYTX W PT W E/M 30 MIN: CPT | Mod: GC | Performed by: STUDENT IN AN ORGANIZED HEALTH CARE EDUCATION/TRAINING PROGRAM

## 2024-10-02 PROCEDURE — 99214 OFFICE O/P EST MOD 30 MIN: CPT | Mod: GC | Performed by: STUDENT IN AN ORGANIZED HEALTH CARE EDUCATION/TRAINING PROGRAM

## 2024-10-02 RX ORDER — ESCITALOPRAM OXALATE 20 MG/1
20 TABLET ORAL DAILY
Qty: 30 TABLET | Refills: 1 | Status: SHIPPED | OUTPATIENT
Start: 2024-10-02

## 2024-10-02 RX ORDER — NALTREXONE HYDROCHLORIDE 50 MG/1
50 TABLET, FILM COATED ORAL DAILY
Qty: 30 TABLET | Refills: 1 | Status: SHIPPED | OUTPATIENT
Start: 2024-10-02

## 2024-10-02 ASSESSMENT — PAIN SCALES - GENERAL: PAINLEVEL: NO PAIN (0)

## 2024-10-02 ASSESSMENT — PATIENT HEALTH QUESTIONNAIRE - PHQ9
SUM OF ALL RESPONSES TO PHQ QUESTIONS 1-9: 5
SUM OF ALL RESPONSES TO PHQ QUESTIONS 1-9: 5

## 2024-10-02 NOTE — PROGRESS NOTES
----------------------------------------------------------------------------------------------------------  [facility]   Addiction Medicine Progress Note     ID                                Laurent Choi is a 78 year old  male with a history of alcohol use disorder, previously severe, in sustained remission, as well as major depressive disorder and generalized anxiety disorder. He has been following in the addiction clinic since 10/2018.     INTERVAL HISTORY                                  Overall he feels that he is doing well and his mood is positive. He has been working on a memoir which brings him geena and something to look forward to every day. 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 It has caused some anxiety and frustration as the work is intrusive, though he also recognizes this as good for the community. His car has broken down recently and he has decided to give up driving entirely which he feels somewhat indifferently about. He doesn't mind taking the bus.    In terms of his substance use, he did have return to drinking. He believes his last drink was approximately two weeks ago. He says the trigger to drink was wondering if he has been sober long enough to drink socially. He intended on having one beer, but was not able to control his urge to drink more. He recognizes this is a seasonal pattern, as he likes to drink during the summer months. His use pattern this time was drinking 2-6 beers per day for 10 days. Since his last drink he has had no other return to use, and denies any cravings or urges to return to drinking.    BRIEF SUMMARY OF SUBSTANCE USE                                                                Gio has been in detox over 40 times and treatment about 5 times. His first treatment was in 1980. He gave up daily drinking in late 1990's, then began to binge drink. In recent years, his pattern has been  "drinking 1-2 weeks, not eating most of that time, and getting very sick. He then will stop drinking, will be sober for 1-2 weeks and the cycle is continued. Alcohol has caused withdrawal and tolerance, drinking 6-10 beers and 1 pint a day when on a binge. Alcohol has affected relationships, has affected health with getting sick, not eating and getting depressed.     Last significant binge use was 9/2024, and prior to this 9/2023.      Treatment History:    First treatment in 1980. Last treatment at [facility] finished November 2018.      Substance Use Pharmacotherapies:   Tried Antabuse in late 1990's and he would stop taking and \"drink around it\"  Was on BARRETO naltrexone until sometime in 2023, and has had naltrexone PRN for use since.     BRIEF PSYCHIATRIC HISTORY     Previous diagnoses, history and diagnostic clarification:  Alcohol Use Disorder, severe, in sustained remission.   Major Depressive Disorder without suicidal ideation   Generalized Anxiety Disorder  R/o PTSD      PAST PSYCH MED TRIALS       Drug  Treatment Dates Max Dose (mg) Helpful Adverse Effects   Reason Discontinued   Prozac 1980s 80mg Yes  Stopped working   Zoloft, Paxil, Cymbalta, Effexor ?    ?   TCAs 1970s       Buproprion 1990    Anxiety   Mirtazapine  ?    Anxiety   Aripiprazole Summer 2019    Drooling   Gabapentin  Spring 2019  Yes       Current Medications     Current Outpatient Medications   Medication Instructions    Acetaminophen 325 mg, Oral, EVERY 8 HOURS PRN    alendronate (FOSAMAX) 70 mg, Oral, EVERY 7 DAYS    ASPIRIN PO 81 mg, Oral, DAILY    blood glucose (NO BRAND SPECIFIED) test strip Use to test blood sugar 2 times daily or as directed. To accompany: Blood Glucose Monitor Brands: per insurance.    blood glucose monitoring (NO BRAND SPECIFIED) meter device kit Use to test blood sugar 2 times daily or as directed. Preferred blood glucose meter OR supplies to accompany: Blood Glucose Monitor Brands: per insurance.    " calcium-vitamin D 500-125 MG-UNIT TABS 1 tablet, Oral, 2 TIMES DAILY    clotrimazole (LOTRIMIN) 1 % external cream Topical, 2 TIMES DAILY, Apply to rough patch of skin in front of right ear 2x daily for 10 days    DIPHENHYDRAMINE HCL PO 25 mg, Oral, DAILY PRN    escitalopram (LEXAPRO) 20 mg, Oral, DAILY    gabapentin (NEURONTIN) 300 mg, Oral, AT BEDTIME, Take 1 capsule by mouth at bedtime as needed.    losartan (COZAAR) 50 MG tablet TAKE ONE TABLET BY MOUTH ONCE DAILY    metFORMIN (GLUCOPHAGE XR) 500 MG 24 hr tablet TAKE ONE TABLET BY MOUTH ONCE DAILY WITH BREAKFAST    NITROGLYCERIN SL 0.4 mg, EVERY 5 MIN PRN    omeprazole (PRILOSEC) 20 MG DR capsule TAKE 1 CAPSULE BY MOUTH DAILY,  60 MINUTES BEFORE A MEAL.    rosuvastatin (CRESTOR) 40 mg, Oral, AT BEDTIME    tamsulosin (FLOMAX) 0.4 MG capsule TAKE ONE CAPSULE BY MOUTH ONCE DAILY WITH BREAKFAST. (NEED TO BE SEEN IN CLINIC FOR FURTHER REFILLS)    thin (NO BRAND SPECIFIED) lancets Use with lanceting device. To accompany: Blood Glucose Monitor Brands: per insurance.     MENTAL STATUS EXAM/WITHDRAWAL                                                              Withdrawal symptoms: None    Alertness: alert   Orientation: awake and alert  Appearance: adequately groomed  Behavior/Demeanor: cooperative and pleasant, with good  eye contact.  Speech: slowed  Psychomotor: normal or unremarkable    Mood:  description consistent with euthymia  Affect: full range and was congruent to speech content.  Thought Process/Associations: unremarkable   Thought Content: devoid of  suicidal ideation.   Perception: devoid of  auditory hallucinations and visual hallucinations  Insight: adequate.  Judgment: fair.    These cognitive functions grossly appear as described, but were not formally tested.    ASSESSMENT/PLAN                                                  Summation/Assessment:  78 year old male with history of severe alcohol use disorder as well as depression. Last use of alcohol  09/2024.     # Alcohol use disorder, severe, in early remission   Longstanding history with most recent pattern of intermittent low-volume use, 2-3 drinks once or twice monthly through summer 2023, most recently binge episode which made him quite sick in September of 2023, triggered by frustration around a meeting at his apartment. Further return to use September of 2024, triggered by the desire to see if he could handle socially drinking. Previously took naltrexone (BARRETO for months) with good effect, however the downside was significant negative impact on libido. Has since had PRN naltrexone, though has not used this in around a year. He has stopped going to AA meetings or other recovery programming. Daily naltrexone started again 10/2/2024. He is open to discussing BARRETO naltrexone at his next visit.  - Naltrexone 50mg PO daily  - Plan to resume AA meetings, goal is to arrange this today  - Hopes to find a new sponsor soon    # Major depressive disorder, single episode, partial remission  # RODRIGO  Mood positive today. Ongoing social interactions with his friends and increasingly his sister have been helpful for mood. Sleep study (2020), no JANNETTE or narcolepsy, does have some anatomic predisposition to airway resistance and sleeps with a large pillow. Sleep can be a negative trigger on his mood, and has been difficult for most of his life. Currently working on sleep hygiene goals as below.   - Continue escitalopram 20mg daily    - Goal: try to get to sleep before midnight consistently and to stop drinking caffeine after 6pm    RTC: 2 months     [place] PRESCRIPTION MONITORING PROGRAM [] was not checked today:  Will be checked at next visit     Chris Barksdale MD  Addiction Medicine Fellow  [facility]     Patient seen by and discussed with staff Dr. Zavaleta.    Psychiatry Individual Psychotherapy Note   Psychotherapy start time - 1035  Psychotherapy end time - 1055  Date last reviewed - 5/1/2024  Subjective: This  supportive psychotherapy session addressed issues related to goals of therapy and current psychosocial stressors.   Interactive complexity indicated? No  Plan: RTC in timeframe noted above  Psychotherapy services during this visit included myself and the patient.   Treatment Plan      SYMPTOMS; PROBLEMS   MEASURABLE GOALS;    FUNCTIONAL IMPROVEMENT / GAINS INTERVENTIONS DISCHARGE CRITERIA   Substance Use: alcohol    stay free of alcohol use  and go to AA meetings 4x before next meeting Supportive / psychodynamic marked symptom improvement         Answers submitted by the patient for this visit:  Patient Health Questionnaire (Submitted on 6/12/2024)  If you checked off any problems, how difficult have these problems made it for you to do your work, take care of things at home, or get along with other people?: Somewhat difficult  PHQ9 TOTAL SCORE: 9    Answers submitted by the patient for this visit:  Patient Health Questionnaire (Submitted on 10/2/2024)  PHQ9 TOTAL SCORE: 5

## 2024-10-02 NOTE — NURSING NOTE
Chief Complaint   Patient presents with    Recheck Medication     Recurrent major depressive disorder, in remission     - Freddie Bazzi, Visit Facilitator

## 2024-10-10 ENCOUNTER — OFFICE VISIT (OUTPATIENT)
Dept: OPHTHALMOLOGY | Facility: CLINIC | Age: 79
End: 2024-10-10
Payer: COMMERCIAL

## 2024-10-10 DIAGNOSIS — H35.3221 EXUDATIVE AGE-RELATED MACULAR DEGENERATION OF LEFT EYE WITH ACTIVE CHOROIDAL NEOVASCULARIZATION (H): ICD-10-CM

## 2024-10-10 PROCEDURE — 92134 CPTRZ OPH DX IMG PST SGM RTA: CPT

## 2024-10-10 PROCEDURE — G0463 HOSPITAL OUTPT CLINIC VISIT: HCPCS

## 2024-10-10 PROCEDURE — 99214 OFFICE O/P EST MOD 30 MIN: CPT

## 2024-10-10 ASSESSMENT — VISUAL ACUITY
OS_SC+: -2
OS_SC: 20/25
OD_SC: 2'/200E
METHOD: SNELLEN - LINEAR

## 2024-10-10 ASSESSMENT — SLIT LAMP EXAM - LIDS
COMMENTS: NORMAL
COMMENTS: NORMAL

## 2024-10-10 ASSESSMENT — REFRACTION_WEARINGRX
SPECS_TYPE: READERS
OD_CYLINDER: +1.50
OS_CYLINDER: +1.25
OS_SPHERE: +3.00
OD_AXIS: 169
OD_SPHERE: +3.00
OS_AXIS: 1772

## 2024-10-10 ASSESSMENT — TONOMETRY
OS_IOP_MMHG: 15
IOP_METHOD: TONOPEN
OD_IOP_MMHG: 13

## 2024-10-10 ASSESSMENT — EXTERNAL EXAM - RIGHT EYE: OD_EXAM: NORMAL

## 2024-10-10 ASSESSMENT — EXTERNAL EXAM - LEFT EYE: OS_EXAM: NORMAL

## 2024-10-10 NOTE — NURSING NOTE
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   DM type II                 Comments    Pt states no change in VA since last visit  States no flashes or floaters   No eye pain or tearing   LBS: 130 Last A1C: 6.3  Lab Results       Component                Value               Date                       A1C                      6.3                 08/19/2024                 A1C                      6.2                 03/21/2024                 A1C                      5.9                 11/21/2023                 A1C                      6.0                 08/21/2023           Felicita Fritz COT 2:18 PM October 10, 2024                           .

## 2024-10-10 NOTE — PROGRESS NOTES
CC: wet AMD    HPI: dong well, no change in vision     PMHx:   DM type II      Imaging:    OCT: 10/10/2024  Right eye: GA, central scar, SRHM  Left eye: GA, SRHM, trace intra retinal fluid/cyst SN to fovea    Retina Laser procedures:  none    Intravitreal injections:  OD: none  OS: Avastin    Assessment/ Plan: 10/10/2024       # Wet Age related macular degeneration left eye   - OCT is stable, trace IR cysts SN to fovea-stable over the past few months  - Vision is stable  - last injection was 16 weeks ago  - Hold off on injections today   - follow-up in 2 months with OCT macula OU     # Nuclear sclerotic cataract  Both eyes    - Not visually significant   - refract as needed   - no new prescription glasses issued   - monitor yearly     # DM type II  Last A1c is:  Lab Results   Component Value Date    A1C 6.2 03/21/2024    A1C 5.9 11/21/2023   Recommend adequate blood sugar control  Recommend follow-up every yearly    Follow-up in 8 weeks, DFE, OCT macula OU     Ivy Allen MD     Medical Retina  [facility]     Attending Physician Attestation:  Complete documentation of historical and exam elements from today's encounter can be found in the full encounter summary report (not reduplicated in this progress note).  I personally obtained the chief complaint(s) and history of present illness.  I confirmed and edited as necessary the review of systems, past medical/surgical history, family history, social history, and examination findings as documented by others; and I examined the patient myself.  I personally reviewed the relevant tests, images, and reports as documented above.  I formulated and edited as necessary the assessment and plan and discussed the findings and management plan with the patient and family. Ivy Allen MD

## 2024-10-14 ENCOUNTER — TRANSFERRED RECORDS (OUTPATIENT)
Dept: HEALTH INFORMATION MANAGEMENT | Facility: CLINIC | Age: 79
End: 2024-10-14
Payer: COMMERCIAL

## 2024-11-14 ENCOUNTER — PATIENT OUTREACH (OUTPATIENT)
Dept: CARE COORDINATION | Facility: CLINIC | Age: 79
End: 2024-11-14
Payer: COMMERCIAL

## 2024-11-25 NOTE — PROGRESS NOTES
----------------------------------------------------------------------------------------------------------  [facility], [place]   Addiction Medicine Progress Note     ID                                Laurent Choi is a 78 year old  male with a history of alcohol use disorder, major depressive disorder and generalized anxiety disorder. He has been following in the addiction clinic since 10/2018. He was previously in sustained remission, though had return to use 10/2024 and restarted naltrexone.    INTERVAL HISTORY                                  He reports he has been doing well since his last visit. He feels the naltrexone has been incredibly helpful, and he has had no cravings or return to use. His mood has been stable. No significant periods of depressed mood or anxiety. His sleep continues to be fairly poor, though there have been no recent changes. He naps during the day, and as a result will stay up late (12a-2a) and sleep until 10a-12p. He does use screens before bed as well. He has previously had a sleep study and was told he does not have sleep apnea.     BRIEF SUMMARY OF SUBSTANCE USE                                                                Gio has been in detox over 40 times and treatment about 5 times. His first treatment was in 1980. He gave up daily drinking in late 1990's, then began to binge drink. In recent years, his pattern has been drinking 1-2 weeks, not eating most of that time, and getting very sick. He then will stop drinking, will be sober for 1-2 weeks and the cycle is continued. Alcohol has caused withdrawal and tolerance, drinking 6-10 beers and 1 pint a day when on a binge. Alcohol has affected relationships, has affected health with getting sick, not eating and getting depressed.     Last significant binge use was 9/2024, and prior to this 9/2023.      Treatment History:    First treatment in 1980. Last treatment at [facility]  "finished November 2018.      Substance Use Pharmacotherapies:   Tried Antabuse in late 1990's and he would stop taking and \"drink around it\"  Was on BARRETO naltrexone until sometime in 2023, and has had naltrexone PRN for use since.     BRIEF PSYCHIATRIC HISTORY     Previous diagnoses, history and diagnostic clarification:  Alcohol Use Disorder, severe, in sustained remission.   Major Depressive Disorder without suicidal ideation   Generalized Anxiety Disorder  R/o PTSD      PAST PSYCH MED TRIALS       Drug  Treatment Dates Max Dose (mg) Helpful Adverse Effects   Reason Discontinued   Prozac 1980s 80mg Yes  Stopped working   Zoloft, Paxil, Cymbalta, Effexor ?    ?   TCAs 1970s       Buproprion 1990    Anxiety   Mirtazapine  ?    Anxiety   Aripiprazole Summer 2019    Drooling   Gabapentin  Spring 2019  Yes       Current Medications     Current Outpatient Medications   Medication Instructions    Acetaminophen 325 mg, Oral, EVERY 8 HOURS PRN    alendronate (FOSAMAX) 70 mg, Oral, EVERY 7 DAYS    ASPIRIN PO 81 mg, Oral, DAILY    blood glucose (NO BRAND SPECIFIED) test strip Use to test blood sugar 2 times daily or as directed. To accompany: Blood Glucose Monitor Brands: per insurance.    blood glucose monitoring (NO BRAND SPECIFIED) meter device kit Use to test blood sugar 2 times daily or as directed. Preferred blood glucose meter OR supplies to accompany: Blood Glucose Monitor Brands: per insurance.    calcium-vitamin D 500-125 MG-UNIT TABS 1 tablet, Oral, 2 TIMES DAILY    clotrimazole (LOTRIMIN) 1 % external cream Topical, 2 TIMES DAILY, Apply to rough patch of skin in front of right ear 2x daily for 10 days    DIPHENHYDRAMINE HCL PO 25 mg, Oral, DAILY PRN    escitalopram (LEXAPRO) 20 mg, Oral, DAILY    gabapentin (NEURONTIN) 300 mg, Oral, AT BEDTIME, Take 1 capsule by mouth at bedtime as needed.    losartan (COZAAR) 50 MG tablet TAKE ONE TABLET BY MOUTH ONCE DAILY    metFORMIN (GLUCOPHAGE XR) 500 MG 24 hr tablet TAKE " ONE TABLET BY MOUTH ONCE DAILY WITH BREAKFAST    NITROGLYCERIN SL 0.4 mg, EVERY 5 MIN PRN    omeprazole (PRILOSEC) 20 MG DR capsule TAKE 1 CAPSULE BY MOUTH DAILY,  60 MINUTES BEFORE A MEAL.    rosuvastatin (CRESTOR) 40 mg, Oral, AT BEDTIME    tamsulosin (FLOMAX) 0.4 MG capsule TAKE ONE CAPSULE BY MOUTH ONCE DAILY WITH BREAKFAST. (NEED TO BE SEEN IN CLINIC FOR FURTHER REFILLS)    thin (NO BRAND SPECIFIED) lancets Use with lanceting device. To accompany: Blood Glucose Monitor Brands: per insurance.     MENTAL STATUS EXAM/WITHDRAWAL                                                              Withdrawal symptoms: None    Alertness: alert   Orientation: awake and alert  Appearance: adequately groomed  Behavior/Demeanor: cooperative and pleasant, with good  eye contact.  Speech: slowed  Psychomotor: normal or unremarkable    Mood:  description consistent with euthymia  Affect: full range and was congruent to speech content.  Thought Process/Associations: unremarkable   Thought Content: devoid of  suicidal ideation.   Perception: devoid of  auditory hallucinations and visual hallucinations  Insight: adequate.  Judgment: fair.    These cognitive functions grossly appear as described, but were not formally tested.    ASSESSMENT/PLAN                                                  Summation/Assessment:  78 year old male with history of severe alcohol use disorder as well as depression. Last use of alcohol 09/2024.     # Alcohol use disorder, severe, in early remission   Longstanding history with most recent pattern of intermittent low-volume use, 2-3 drinks once or twice monthly through summer 2023, most recently binge episode which made him quite sick in September of 2023, triggered by frustration around a meeting at his apartment. Further return to use September of 2024, triggered by the desire to see if he could handle socially drinking. Previously took naltrexone (BARRETO for months) with good effect, however the downside  was significant negative impact on libido. Has since had PRN naltrexone, though has not used this in around a year. He has stopped going to AA meetings or other recovery programming. Daily naltrexone started again 10/2/2024. He is open to discussing BARRETO naltrexone at his next visit.  - Continue naltrexone 50mg PO daily; does not want BARRETO at this time due to perceived sexual side effects in the past  - Plan to resume AA meetings, goal is to arrange this today  - Hopes to find a new sponsor soon - has increased motivation for this given success with naltrexone    # Major depressive disorder, single episode, partial remission  # RODRIGO  #Sleep disorder, unspecified  Mood positive today. Ongoing social interactions with his friends and increasingly his sister have been helpful for mood. Sleep study (2020), no JANNETTE or narcolepsy, does have some anatomic predisposition to airway resistance and sleeps with a large pillow. Sleep can be a negative trigger on his mood, and has been difficult for most of his life. Currently working on sleep hygiene goals as below.   - Continue escitalopram 20mg daily    - Goal: try to get to sleep before midnight consistently and to stop drinking caffeine after 6pm, limit screen time  - Referral for CBT-I placed    RTC: 2 months     [place] PRESCRIPTION MONITORING PROGRAM [] was not checked today:  Will be checked at next visit     Chris Barksdale MD  Addiction Medicine Fellow  [facility]     Patient seen by and discussed with staff Dr. Zavaleta.    Psychiatry Individual Psychotherapy Note   Psychotherapy start time - 0930  Psychotherapy end time - 0948  Date last reviewed - 11/27/2024  Subjective: This supportive psychotherapy session addressed issues related to goals of therapy and current psychosocial stressors.   Interactive complexity indicated? No  Plan: RTC in timeframe noted above  Psychotherapy services during this visit included myself and the patient.   Treatment Plan       SYMPTOMS; PROBLEMS   MEASURABLE GOALS;    FUNCTIONAL IMPROVEMENT / GAINS INTERVENTIONS DISCHARGE CRITERIA   Substance Use: alcohol    stay free of alcohol use  and go to AA meetings 4x before next meeting Supportive / psychodynamic marked symptom improvement     Answers submitted by the patient for this visit:  Patient Health Questionnaire (Submitted on 11/27/2024)  If you checked off any problems, how difficult have these problems made it for you to do your work, take care of things at home, or get along with other people?: Somewhat difficult  PHQ9 TOTAL SCORE: 8

## 2024-11-27 ENCOUNTER — OFFICE VISIT (OUTPATIENT)
Dept: PSYCHIATRY | Facility: CLINIC | Age: 79
End: 2024-11-27
Attending: PSYCHIATRY & NEUROLOGY
Payer: COMMERCIAL

## 2024-11-27 VITALS
SYSTOLIC BLOOD PRESSURE: 103 MMHG | BODY MASS INDEX: 27.11 KG/M2 | DIASTOLIC BLOOD PRESSURE: 75 MMHG | HEART RATE: 92 BPM | WEIGHT: 189.4 LBS

## 2024-11-27 DIAGNOSIS — F10.21 ALCOHOL USE DISORDER, SEVERE, IN EARLY REMISSION (H): Primary | ICD-10-CM

## 2024-11-27 DIAGNOSIS — F33.40 RECURRENT MAJOR DEPRESSIVE DISORDER, IN REMISSION (H): ICD-10-CM

## 2024-11-27 DIAGNOSIS — F41.1 GAD (GENERALIZED ANXIETY DISORDER): ICD-10-CM

## 2024-11-27 PROCEDURE — G0463 HOSPITAL OUTPT CLINIC VISIT: HCPCS | Performed by: STUDENT IN AN ORGANIZED HEALTH CARE EDUCATION/TRAINING PROGRAM

## 2024-11-27 RX ORDER — NALTREXONE HYDROCHLORIDE 50 MG/1
50 TABLET, FILM COATED ORAL DAILY
Qty: 30 TABLET | Refills: 1 | Status: SHIPPED | OUTPATIENT
Start: 2024-11-27 | End: 2025-01-26

## 2024-11-27 ASSESSMENT — PATIENT HEALTH QUESTIONNAIRE - PHQ9
SUM OF ALL RESPONSES TO PHQ QUESTIONS 1-9: 8
10. IF YOU CHECKED OFF ANY PROBLEMS, HOW DIFFICULT HAVE THESE PROBLEMS MADE IT FOR YOU TO DO YOUR WORK, TAKE CARE OF THINGS AT HOME, OR GET ALONG WITH OTHER PEOPLE: SOMEWHAT DIFFICULT
SUM OF ALL RESPONSES TO PHQ QUESTIONS 1-9: 8

## 2024-11-27 ASSESSMENT — PAIN SCALES - GENERAL: PAINLEVEL_OUTOF10: NO PAIN (0)

## 2024-12-12 ENCOUNTER — OFFICE VISIT (OUTPATIENT)
Dept: OPHTHALMOLOGY | Facility: CLINIC | Age: 79
End: 2024-12-12
Payer: COMMERCIAL

## 2024-12-12 DIAGNOSIS — H35.3221 EXUDATIVE AGE-RELATED MACULAR DEGENERATION OF LEFT EYE WITH ACTIVE CHOROIDAL NEOVASCULARIZATION (H): ICD-10-CM

## 2024-12-12 DIAGNOSIS — H35.3221 EXUDATIVE AGE-RELATED MACULAR DEGENERATION OF LEFT EYE WITH ACTIVE CHOROIDAL NEOVASCULARIZATION (H): Primary | ICD-10-CM

## 2024-12-12 PROCEDURE — 250N000011 HC RX IP 250 OP 636: Performed by: OPHTHALMOLOGY

## 2024-12-12 PROCEDURE — 250N000011 HC RX IP 250 OP 636

## 2024-12-12 PROCEDURE — 92134 CPTRZ OPH DX IMG PST SGM RTA: CPT

## 2024-12-12 PROCEDURE — 67028 INJECTION EYE DRUG: CPT | Mod: LT

## 2024-12-12 RX ADMIN — Medication 1.25 MG: at 14:06

## 2024-12-12 ASSESSMENT — VISUAL ACUITY
OD_SC: 4/200E
OS_SC+: -1
OS_SC: 20/25
METHOD: SNELLEN - LINEAR

## 2024-12-12 ASSESSMENT — SLIT LAMP EXAM - LIDS
COMMENTS: NORMAL
COMMENTS: NORMAL

## 2024-12-12 ASSESSMENT — TONOMETRY
OS_IOP_MMHG: 15
IOP_METHOD: TONOPEN
OD_IOP_MMHG: 15

## 2024-12-12 ASSESSMENT — EXTERNAL EXAM - RIGHT EYE: OD_EXAM: NORMAL

## 2024-12-12 ASSESSMENT — EXTERNAL EXAM - LEFT EYE: OS_EXAM: NORMAL

## 2024-12-12 NOTE — NURSING NOTE
"Chief Complaints and History of Present Illnesses   Patient presents with    Follow Up     Exudative age-related macular degeneration of left eye with active choroidal neovascularization   DM 2     Chief Complaint(s) and History of Present Illness(es)       Follow Up              Comments: Exudative age-related macular degeneration of left eye with active choroidal neovascularization   DM 2              Comments    C/o tired eyes, \"switched readers better now\"  No flashes or floaters   No eye pain or redness  LBS: has not taken for a while   Last A1C: 6.3  Lab Results       Component                Value               Date                       A1C                      6.3                 08/19/2024                 A1C                      6.2                 03/21/2024                 A1C                      5.9                 11/21/2023                 A1C                      6.0                 08/21/2023                 A1C                      6.7                 05/18/2023                Felicita Fritz COT 12:56 PM December 12, 2024                        "

## 2024-12-12 NOTE — PROGRESS NOTES
CC: wet AMD    HPI: dong well, no change in vision     PMHx:   DM type II      Imaging:    OCT: 12/12/2024  Right eye: GA, central scar, SRHM, SN IRF   Left eye: GA, SRHM, trace intra retinal fluid/cyst SN to fovea    Retina Laser procedures:  none    Intravitreal injections:  OD: none  OS: Avastin    Assessment/ Plan: 12/12/2024       # Wet Age related macular degeneration left eye   - OCT is stable, trace IR cysts SN to fovea-stable over the past few months  - Vision is stable  - last injection was 16 weeks ago  - will give an injection today for SN SRF   - follow-up in 8 weeks with OCT macula OU     # Nuclear sclerotic cataract  Both eyes    - Not visually significant   - refract as needed   - no new prescription glasses issued   - monitor yearly     # DM type II  Last A1c is:  Lab Results   Component Value Date    A1C 6.2 03/21/2024    A1C 5.9 11/21/2023   Recommend adequate blood sugar control  Recommend follow-up every yearly    Follow-up in 8 weeks, DFE, OCT macula OU     Ivy Allen MD     Medical Retina  [facility]     Attending Physician Attestation:  Complete documentation of historical and exam elements from today's encounter can be found in the full encounter summary report (not reduplicated in this progress note).  I personally obtained the chief complaint(s) and history of present illness.  I confirmed and edited as necessary the review of systems, past medical/surgical history, family history, social history, and examination findings as documented by others; and I examined the patient myself.  I personally reviewed the relevant tests, images, and reports as documented above.  I formulated and edited as necessary the assessment and plan and discussed the findings and management plan with the patient and family. Ivy Allen MD

## 2024-12-16 ENCOUNTER — OFFICE VISIT (OUTPATIENT)
Dept: FAMILY MEDICINE | Facility: CLINIC | Age: 79
End: 2024-12-16
Payer: COMMERCIAL

## 2024-12-16 VITALS
TEMPERATURE: 97.5 F | BODY MASS INDEX: 26.92 KG/M2 | HEIGHT: 70 IN | DIASTOLIC BLOOD PRESSURE: 76 MMHG | WEIGHT: 188 LBS | OXYGEN SATURATION: 96 % | HEART RATE: 94 BPM | SYSTOLIC BLOOD PRESSURE: 127 MMHG | RESPIRATION RATE: 16 BRPM

## 2024-12-16 DIAGNOSIS — G47.09 OTHER INSOMNIA: ICD-10-CM

## 2024-12-16 DIAGNOSIS — Z29.11 NEED FOR VACCINATION AGAINST RESPIRATORY SYNCYTIAL VIRUS: ICD-10-CM

## 2024-12-16 DIAGNOSIS — E11.59 TYPE 2 DIABETES MELLITUS WITH OTHER CIRCULATORY COMPLICATIONS (H): Primary | ICD-10-CM

## 2024-12-16 DIAGNOSIS — F33.1 MAJOR DEPRESSIVE DISORDER, RECURRENT, MODERATE (H): ICD-10-CM

## 2024-12-16 DIAGNOSIS — F10.20 SEVERE ALCOHOL USE DISORDER (H): ICD-10-CM

## 2024-12-16 DIAGNOSIS — I10 HYPERTENSION, ESSENTIAL: ICD-10-CM

## 2024-12-16 PROBLEM — F33.40 RECURRENT MAJOR DEPRESSIVE DISORDER, IN REMISSION (H): Status: RESOLVED | Noted: 2018-09-24 | Resolved: 2024-12-16

## 2024-12-16 LAB
EST. AVERAGE GLUCOSE BLD GHB EST-MCNC: 131 MG/DL
HBA1C MFR BLD: 6.2 % (ref 0–5.6)

## 2024-12-16 PROCEDURE — 36415 COLL VENOUS BLD VENIPUNCTURE: CPT | Performed by: FAMILY MEDICINE

## 2024-12-16 PROCEDURE — 83036 HEMOGLOBIN GLYCOSYLATED A1C: CPT | Performed by: FAMILY MEDICINE

## 2024-12-16 PROCEDURE — 99214 OFFICE O/P EST MOD 30 MIN: CPT | Performed by: FAMILY MEDICINE

## 2024-12-16 RX ORDER — LOSARTAN POTASSIUM 50 MG/1
25 TABLET ORAL DAILY
Status: SHIPPED
Start: 2024-12-16

## 2024-12-16 ASSESSMENT — PAIN SCALES - GENERAL: PAINLEVEL_OUTOF10: NO PAIN (0)

## 2024-12-16 NOTE — PROGRESS NOTES
Assessment & Plan     Type 2 diabetes mellitus with other circulatory complications (H)  A1c is within goal, discussed healthy lifestyle changes, continue current management.  - Hemoglobin A1c  - blood glucose (NO BRAND SPECIFIED) test strip; Use to test blood sugar 2 times daily or as directed. To accompany: Blood Glucose Monitor Brands: per insurance.    Hypertension, essential  Sometime low blood pressure at home, decrease losartan to a total of 25 mg daily, continue to monitor blood pressure at home 1 year.  - losartan (COZAAR) 50 MG tablet; Take 0.5 tablets (25 mg) by mouth daily.    Other insomnia  Discussed sleep hygiene, discussed option to refer to psychologist for CBT, patient will let us know.    Need for vaccination against respiratory syncytial virus    - RSV vaccine, bivalent, ABRYSVO, injection; Inject 0.5 mLs into the muscle once for 1 dose. Pharmacist administered    Severe alcohol use disorder (H)  Sober for the past 10+ years.  Currently using naltrexone, followed by psychiatrist.    Major depressive disorder, recurrent, moderate (H)  Controlled with citalopram, no recent exacerbation.      Review of external notes as documented elsewhere in note  30 minutes spent by me on the date of the encounter doing chart review, review of outside records, review of test results, interpretation of tests, patient visit, and documentation The longitudinal plan of care for the diagnosis(es)/condition(s) as documented were addressed during this visit. Due to the added complexity in care, I will continue to support Gio in the subsequent management and with ongoing continuity of care.      Regular exercise    Subjective   Gio is a 79 year old, presenting for the following health issues:  Follow Up (labs)      12/16/2024    11:48 AM   Additional Questions   Roomed by zahida     History of Present Illness       Reason for visit:  Labs   He is taking medications regularly.   Following up on diabetes type 2, overall  "doing well, taking metformin once a day, no adverse reaction.  A1c is at 6.2% today, patient is also doing his own cooking mostly baking his own food.  Insomnia has been an issue, sleeping too much 8 to 10 hours, waking up with \"brain fog\", sometimes sleeping again about 2 to 3 hours waking up at night with insomnia.  Has tried melatonin with no improvement, occasionally uses Benadryl with symptoms control.  He quit smoking years ago.  Also quit drinking years ago.Currently off Vivitrol, using naltrexone, followed by psychiatrist.      Review of Systems  Constitutional, HEENT, cardiovascular, pulmonary, gi and gu systems are negative, except as otherwise noted.      Objective    /76 (BP Location: Left arm, Patient Position: Sitting, Cuff Size: Adult Regular)   Pulse 94   Temp 97.5  F (36.4  C) (Oral)   Resp 16   Ht 1.78 m (5' 10.08\")   Wt 85.3 kg (188 lb)   SpO2 96%   BMI 26.91 kg/m    Body mass index is 26.91 kg/m .  Physical Exam   GENERAL: alert and no distress  NECK: no adenopathy, no asymmetry, masses, or scars  RESP: lungs clear to auscultation - no rales, rhonchi or wheezes  CV: regular rate and rhythm, normal S1 S2, no S3 or S4, no murmur, click or rub, no peripheral edema  ABDOMEN: soft, nontender, no hepatosplenomegaly, no masses and bowel sounds normal  MS: no gross musculoskeletal defects noted, no edema  Diabetic foot exam: normal DP and PT pulses, no trophic changes or ulcerative lesions, and normal sensory exam    Results for orders placed or performed in visit on 12/16/24   Hemoglobin A1c     Status: Abnormal   Result Value Ref Range    Estimated Average Glucose 131 (H) <117 mg/dL    Hemoglobin A1C 6.2 (H) 0.0 - 5.6 %         Prior to immunization administration, verified patients identity using patient s name and date of birth. Please see Immunization Activity for additional information.     Screening Questionnaire for Adult Immunization    Are you sick today?   No   Do you have " allergies to medications, food, a vaccine component or latex?   Yes   Have you ever had a serious reaction after receiving a vaccination?   No   Do you have a long-term health problem with heart, lung, kidney, or metabolic disease (e.g., diabetes), asthma, a blood disorder, no spleen, complement component deficiency, a cochlear implant, or a spinal fluid leak?  Are you on long-term aspirin therapy?   Yes   Do you have cancer, leukemia, HIV/AIDS, or any other immune system problem?   No   Do you have a parent, brother, or sister with an immune system problem?   No   In the past 3 months, have you taken medications that affect  your immune system, such as prednisone, other steroids, or anticancer drugs; drugs for the treatment of rheumatoid arthritis, Crohn s disease, or psoriasis; or have you had radiation treatments?   No   Have you had a seizure, or a brain or other nervous system problem?   No   During the past year, have you received a transfusion of blood or blood    products, or been given immune (gamma) globulin or antiviral drug?   No   For women: Are you pregnant or is there a chance you could become       pregnant during the next month?   No   Have you received any vaccinations in the past 4 weeks?   No     Immunization questionnaire was positive for at least one answer.  Notified .      Patient instructed to remain in clinic for 15 minutes afterwards, and to report any adverse reactions.     Screening performed by Lisa Issa MA on 12/16/2024 at 12:01 PM.     This note was completed in part using a voice recognition software, any grammatical or context distortion are unintentional and inherent to the software.    Signed Electronically by: Amara Smith MD

## 2024-12-17 ENCOUNTER — MYC MEDICAL ADVICE (OUTPATIENT)
Dept: BEHAVIORAL HEALTH | Facility: CLINIC | Age: 79
End: 2024-12-17
Payer: COMMERCIAL

## 2024-12-17 ENCOUNTER — TELEPHONE (OUTPATIENT)
Dept: BEHAVIORAL HEALTH | Facility: CLINIC | Age: 79
End: 2024-12-17

## 2024-12-17 ENCOUNTER — VIRTUAL VISIT (OUTPATIENT)
Dept: BEHAVIORAL HEALTH | Facility: CLINIC | Age: 79
End: 2024-12-17
Attending: STUDENT IN AN ORGANIZED HEALTH CARE EDUCATION/TRAINING PROGRAM
Payer: COMMERCIAL

## 2024-12-17 DIAGNOSIS — F33.40 RECURRENT MAJOR DEPRESSIVE DISORDER, IN REMISSION (H): ICD-10-CM

## 2024-12-17 DIAGNOSIS — F41.1 GAD (GENERALIZED ANXIETY DISORDER): ICD-10-CM

## 2024-12-17 ASSESSMENT — PATIENT HEALTH QUESTIONNAIRE - PHQ9
SUM OF ALL RESPONSES TO PHQ QUESTIONS 1-9: 10
10. IF YOU CHECKED OFF ANY PROBLEMS, HOW DIFFICULT HAVE THESE PROBLEMS MADE IT FOR YOU TO DO YOUR WORK, TAKE CARE OF THINGS AT HOME, OR GET ALONG WITH OTHER PEOPLE: VERY DIFFICULT
SUM OF ALL RESPONSES TO PHQ QUESTIONS 1-9: 10

## 2024-12-17 NOTE — PROGRESS NOTES
Patient did not attend virtual session, this writer called patient to help problem solve and patient reported he was unable to hear this writer and unable to connect to the video visit. This writer provided the help line for [employer] for assistance and the mental health intake number via [employer] message so patient can reschedule session.

## 2024-12-17 NOTE — TELEPHONE ENCOUNTER
This writer made attempts to contact patient after he was unable to attend session due to technical difficulties. Provided help numbers to [facility] and attempted to call. Provided intake number to reschedule

## 2024-12-17 NOTE — TELEPHONE ENCOUNTER
Talked with patient to make sure he obtained help scheduling appointment and is able to sign into "[employer]. Pt has obtained another appointment and will be in person. No other questions from patient

## 2025-01-08 DIAGNOSIS — R73.03 PREDIABETES: ICD-10-CM

## 2025-01-08 DIAGNOSIS — K21.00 GASTRO-ESOPHAGEAL REFLUX DISEASE WITH ESOPHAGITIS: ICD-10-CM

## 2025-01-09 RX ORDER — METFORMIN HYDROCHLORIDE 500 MG/1
TABLET, EXTENDED RELEASE ORAL
Qty: 90 TABLET | Refills: 0 | Status: SHIPPED | OUTPATIENT
Start: 2025-01-09

## 2025-02-06 ENCOUNTER — OFFICE VISIT (OUTPATIENT)
Dept: OPHTHALMOLOGY | Facility: CLINIC | Age: 80
End: 2025-02-06
Payer: COMMERCIAL

## 2025-02-06 DIAGNOSIS — H35.3221 EXUDATIVE AGE-RELATED MACULAR DEGENERATION OF LEFT EYE WITH ACTIVE CHOROIDAL NEOVASCULARIZATION (H): Primary | ICD-10-CM

## 2025-02-06 PROCEDURE — 92134 CPTRZ OPH DX IMG PST SGM RTA: CPT

## 2025-02-06 PROCEDURE — 250N000011 HC RX IP 250 OP 636: Performed by: OPHTHALMOLOGY

## 2025-02-06 PROCEDURE — 67028 INJECTION EYE DRUG: CPT | Mod: LT

## 2025-02-06 PROCEDURE — G0463 HOSPITAL OUTPT CLINIC VISIT: HCPCS

## 2025-02-06 RX ADMIN — Medication 1.25 MG: at 13:42

## 2025-02-06 ASSESSMENT — TONOMETRY
OD_IOP_MMHG: 15
OS_IOP_MMHG: 15
IOP_METHOD: TONOPEN

## 2025-02-06 ASSESSMENT — REFRACTION_WEARINGRX
OD_CYLINDER: +1.50
OS_AXIS: 1772
OD_SPHERE: +3.00
OS_SPHERE: +3.00
OS_CYLINDER: +1.25
OD_AXIS: 169
SPECS_TYPE: READERS

## 2025-02-06 ASSESSMENT — SLIT LAMP EXAM - LIDS
COMMENTS: NORMAL
COMMENTS: NORMAL

## 2025-02-06 ASSESSMENT — VISUAL ACUITY
OS_SC: 20/30
OD_SC: 4/200E
OD_PH_SC: 20/500
OS_SC+: -1
METHOD: SNELLEN - LINEAR

## 2025-02-06 ASSESSMENT — EXTERNAL EXAM - LEFT EYE: OS_EXAM: NORMAL

## 2025-02-06 ASSESSMENT — EXTERNAL EXAM - RIGHT EYE: OD_EXAM: NORMAL

## 2025-02-06 NOTE — NURSING NOTE
Chief Complaints and History of Present Illnesses   Patient presents with    Follow Up     DM type II  Exudative age-related macular degeneration of left eye with active choroidal neovascularization      Chief Complaint(s) and History of Present Illness(es)       Follow Up              Comments: DM type II  Exudative age-related macular degeneration of left eye with active choroidal neovascularization               Comments    Pt states no change in VA since last visit  Floaters left eye , same as before   Npo flashes, eye pain or redness  LBS: has not been checking    Last A1C: 6.2  Lab Results       Component                Value               Date                       A1C                      6.2                 12/16/2024                 A1C                      6.3                 08/19/2024                 A1C                      6.2                 03/21/2024                 A1C                      5.9                 11/21/2023                 A1C                      6.0                 08/21/2023                Felicita Fritz COT 1:02 PM February 6, 2025

## 2025-02-06 NOTE — PROGRESS NOTES
CC: wet AMD    HPI: dong well, no change in vision     PMHx:   DM type II      Imaging:    OCT: 02/06/2025  Right eye: GA, central scar, SRHM, SN IRF   Left eye: GA, SRHM, trace intra retinal fluid/cyst SN to fovea, resolved SRF    Retina Laser procedures:  none    Intravitreal injections:  OD: none  OS: Avastin    Assessment/ Plan: 02/06/2025       # Wet Age related macular degeneration left eye   - OCT is stable, trace IR cysts SN to fovea-stable over the past few months. SRF resolved  - Vision is stable  - will give an injection today for SN SRF   - follow-up in 10 weeks with OCT macula OU     # Nuclear sclerotic cataract  Both eyes    - Not visually significant   - refract as needed   - no new prescription glasses issued   - monitor yearly     # DM type II  Last A1c is:  Lab Results   Component Value Date    A1C 6.2 03/21/2024    A1C 5.9 11/21/2023   Recommend adequate blood sugar control  Recommend follow-up every yearly    Follow-up in 10 weeks, DFE, OCT macula OU possible injection OS    Ivy Allen MD     Medical [facility]  [facility]     Attending Physician Attestation:  Complete documentation of historical and exam elements from today's encounter can be found in the full encounter summary report (not reduplicated in this progress note).  I personally obtained the chief complaint(s) and history of present illness.  I confirmed and edited as necessary the review of systems, past medical/surgical history, family history, social history, and examination findings as documented by others; and I examined the patient myself.  I personally reviewed the relevant tests, images, and reports as documented above.  I formulated and edited as necessary the assessment and plan and discussed the findings and management plan with the patient and family. Ivy Allen MD

## 2025-02-24 NOTE — PROGRESS NOTES
----------------------------------------------------------------------------------------------------------  [facility]   Addiction Medicine Progress Note     ID                                Laurent Choi is a 78 year old  male with a history of alcohol use disorder, major depressive disorder and generalized anxiety disorder. He has been following in the addiction clinic since 10/2018. He was previously in sustained remission, though had return to use 10/2024 and restarted naltrexone.    INTERVAL HISTORY                                  Sleep continues to be a struggle. He stays up late into the night due to difficulty falling asleep, sleeps late into the morning and is fairly groggy for several hours. He reflects that he has always been a night person, but things have gotten worse over the last year. He has not yet started CBT-I, but would like to try.    No return to use or cravings for alcohol. He has not yet started going to AA, plans to start going once the weather improves as he is worried about the cold, risk of falling on ice. It is a goal and he thinks he will start going once per week at least this spring. Naltrexone has controlled his cravings. He does not want the BARRETO at this time.    Mood is not great, and usually worsens every winter as he feels isolated from others. 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 He feels with the weather improving, spending more time with his sister, going to AA is going to boost his mood. Denies suicidal ideation. He feels safe, and would reach out to our clinic, his primary care doctor or 911 if his mood were to worsen or he developed suicidal ideation.    BRIEF SUMMARY OF SUBSTANCE USE                                                                Gio has been in detox over 40 times and treatment about 5 times. His first treatment was in 1980. He gave up daily drinking in late 1990's, then began to binge  "drink. In recent years, his pattern has been drinking 1-2 weeks, not eating most of that time, and getting very sick. He then will stop drinking, will be sober for 1-2 weeks and the cycle is continued. Alcohol has caused withdrawal and tolerance, drinking 6-10 beers and 1 pint a day when on a binge. Alcohol has affected relationships, has affected health with getting sick, not eating and getting depressed.     Last significant binge use was 9/2024, and prior to this 9/2023.      Treatment History:    First treatment in 1980. Last treatment at [facility] finished November 2018.      Substance Use Pharmacotherapies:   Tried Antabuse in late 1990's and he would stop taking and \"drink around it\"  Was on BARRETO naltrexone until sometime in 2023, and has had naltrexone PRN for use since.     BRIEF PSYCHIATRIC HISTORY     Previous diagnoses, history and diagnostic clarification:  Alcohol Use Disorder, severe, in sustained remission.   Major Depressive Disorder without suicidal ideation   Generalized Anxiety Disorder  R/o PTSD      PAST PSYCH MED TRIALS       Drug  Treatment Dates Max Dose (mg) Helpful Adverse Effects   Reason Discontinued   Prozac 1980s 80mg Yes  Stopped working   Zoloft, Paxil, Cymbalta, Effexor ?    ?   TCAs 1970s       Buproprion 1990    Anxiety   Mirtazapine  ?    Anxiety   Aripiprazole Summer 2019    Drooling   Gabapentin  Spring 2019  Yes       Current Medications     Current Outpatient Medications   Medication Instructions    Acetaminophen 325 mg, Oral, EVERY 8 HOURS PRN    alendronate (FOSAMAX) 70 mg, Oral, EVERY 7 DAYS    ASPIRIN PO 81 mg, Oral, DAILY    blood glucose (NO BRAND SPECIFIED) test strip Use to test blood sugar 2 times daily or as directed. To accompany: Blood Glucose Monitor Brands: per insurance.    blood glucose monitoring (NO BRAND SPECIFIED) meter device kit Use to test blood sugar 2 times daily or as directed. Preferred blood glucose meter OR supplies to accompany: Blood " Glucose Monitor Brands: per insurance.    calcium-vitamin D 500-125 MG-UNIT TABS 1 tablet, Oral, 2 TIMES DAILY    clotrimazole (LOTRIMIN) 1 % external cream Topical, 2 TIMES DAILY, Apply to rough patch of skin in front of right ear 2x daily for 10 days    DIPHENHYDRAMINE HCL PO 25 mg, Oral, DAILY PRN    escitalopram (LEXAPRO) 20 mg, Oral, DAILY    gabapentin (NEURONTIN) 300 mg, Oral, AT BEDTIME, Take 1 capsule by mouth at bedtime as needed.    losartan (COZAAR) 50 MG tablet TAKE ONE TABLET BY MOUTH ONCE DAILY    metFORMIN (GLUCOPHAGE XR) 500 MG 24 hr tablet TAKE ONE TABLET BY MOUTH ONCE DAILY WITH BREAKFAST    NITROGLYCERIN SL 0.4 mg, EVERY 5 MIN PRN    omeprazole (PRILOSEC) 20 MG DR capsule TAKE 1 CAPSULE BY MOUTH DAILY,  60 MINUTES BEFORE A MEAL.    rosuvastatin (CRESTOR) 40 mg, Oral, AT BEDTIME    tamsulosin (FLOMAX) 0.4 MG capsule TAKE ONE CAPSULE BY MOUTH ONCE DAILY WITH BREAKFAST. (NEED TO BE SEEN IN CLINIC FOR FURTHER REFILLS)    thin (NO BRAND SPECIFIED) lancets Use with lanceting device. To accompany: Blood Glucose Monitor Brands: per insurance.     MENTAL STATUS EXAM/WITHDRAWAL                                                              Withdrawal symptoms: None    Alertness: alert   Orientation: awake and alert  Appearance: adequately groomed  Behavior/Demeanor: cooperative and pleasant, with good  eye contact.  Speech: slowed  Psychomotor: normal or unremarkable    Mood:  description consistent with euthymia  Affect: full range and was congruent to speech content.  Thought Process/Associations: unremarkable   Thought Content: devoid of  suicidal ideation.   Perception: devoid of  auditory hallucinations and visual hallucinations  Insight: adequate.  Judgment: fair.    These cognitive functions grossly appear as described, but were not formally tested.    ASSESSMENT/PLAN                                                  Summation/Assessment:  78 year old male with history of severe alcohol use disorder as  well as depression. Last use of alcohol 09/2024.     # Alcohol use disorder, severe, in early remission   Longstanding history with most recent pattern of intermittent low-volume use, 2-3 drinks once or twice monthly through summer 2023, most recently binge episode which made him quite sick in September of 2023, triggered by frustration around a meeting at his apartment. Further return to use September of 2024, triggered by the desire to see if he could handle socially drinking. Previously took naltrexone (BARRETO for months) with good effect, however the downside was significant negative impact on libido. He has stopped going to AA meetings or other recovery programming. Daily naltrexone started again 10/2/2024 with good control of cravings.  - Continue naltrexone 50mg PO daily; does not want BARRETO at this time  - Plan to resume AA meetings  - Hopes to find a new sponsor soon - has increased motivation for this given success with naltrexone    # Major depressive disorder  # RODRIGO  # Sleep disorder, unspecified  Mood needs improvement. Tends to worsen during winter months. No suicidal ideation, and safety plan discussed. Ongoing social interactions with his friends and increasingly his sister have been helpful for mood. Sleep study (2020), no JANNETTE or narcolepsy, does have some anatomic predisposition to airway resistance and sleeps with a large pillow. Sleep can be a negative trigger on his mood, and has been difficult for most of his life. Currently working on sleep hygiene goals as below. Hopes to try CBT-I. Plan to augment escitalopram with mirtazapine for mood and sleep.  - Continue escitalopram 20mg daily    - Start mirtazapine 15mg at bedtime  - Goal: try to get to sleep before midnight consistently and to stop drinking caffeine after 12pm, limit screen time  - Referral for CBT-I placed    RTC: 1 months     [place] PRESCRIPTION MONITORING PROGRAM [] was checked today: no controlled substances.    Chris Barksdale,  MD  Addiction Medicine Fellow  [facility]     Patient seen by and discussed with staff Dr. Bee.    Psychiatry Individual Psychotherapy Note   Psychotherapy start time - 10:35am   Psychotherapy end time - 10:55am  Date last reviewed - 2/26/2025  Subjective: This supportive psychotherapy session addressed issues related to goals of therapy and current psychosocial stressors.   Interactive complexity indicated? No  Plan: RTC in timeframe noted above  Psychotherapy services during this visit included myself and the patient.   Treatment Plan      SYMPTOMS; PROBLEMS   MEASURABLE GOALS;    FUNCTIONAL IMPROVEMENT / GAINS INTERVENTIONS DISCHARGE CRITERIA   Substance Use: alcohol    stay free of alcohol use  and go to AA meetings 4x before next meeting Supportive / psychodynamic marked symptom improvement     The longitudinal plan of care for the diagnosis(es)/condition(s) as documented were addressed during this visit. Due to the added complexity in care, I will continue to support Gio in the subsequent management and with ongoing continuity of care.

## 2025-02-26 ENCOUNTER — OFFICE VISIT (OUTPATIENT)
Dept: PSYCHIATRY | Facility: CLINIC | Age: 80
End: 2025-02-26
Attending: PSYCHIATRY & NEUROLOGY
Payer: COMMERCIAL

## 2025-02-26 VITALS
WEIGHT: 189 LBS | SYSTOLIC BLOOD PRESSURE: 133 MMHG | BODY MASS INDEX: 27.06 KG/M2 | DIASTOLIC BLOOD PRESSURE: 84 MMHG | HEART RATE: 98 BPM

## 2025-02-26 DIAGNOSIS — F33.1 MODERATE EPISODE OF RECURRENT MAJOR DEPRESSIVE DISORDER (H): ICD-10-CM

## 2025-02-26 DIAGNOSIS — F33.40 RECURRENT MAJOR DEPRESSIVE DISORDER, IN REMISSION: ICD-10-CM

## 2025-02-26 DIAGNOSIS — F41.1 GAD (GENERALIZED ANXIETY DISORDER): ICD-10-CM

## 2025-02-26 DIAGNOSIS — F10.21 ALCOHOL USE DISORDER, SEVERE, IN EARLY REMISSION (H): Primary | ICD-10-CM

## 2025-02-26 PROCEDURE — 3075F SYST BP GE 130 - 139MM HG: CPT | Performed by: STUDENT IN AN ORGANIZED HEALTH CARE EDUCATION/TRAINING PROGRAM

## 2025-02-26 PROCEDURE — 99214 OFFICE O/P EST MOD 30 MIN: CPT | Mod: GC | Performed by: STUDENT IN AN ORGANIZED HEALTH CARE EDUCATION/TRAINING PROGRAM

## 2025-02-26 PROCEDURE — 90833 PSYTX W PT W E/M 30 MIN: CPT | Mod: U7 | Performed by: STUDENT IN AN ORGANIZED HEALTH CARE EDUCATION/TRAINING PROGRAM

## 2025-02-26 PROCEDURE — G0463 HOSPITAL OUTPT CLINIC VISIT: HCPCS | Performed by: STUDENT IN AN ORGANIZED HEALTH CARE EDUCATION/TRAINING PROGRAM

## 2025-02-26 PROCEDURE — 3079F DIAST BP 80-89 MM HG: CPT | Performed by: STUDENT IN AN ORGANIZED HEALTH CARE EDUCATION/TRAINING PROGRAM

## 2025-02-26 PROCEDURE — 1126F AMNT PAIN NOTED NONE PRSNT: CPT | Performed by: STUDENT IN AN ORGANIZED HEALTH CARE EDUCATION/TRAINING PROGRAM

## 2025-02-26 PROCEDURE — G2211 COMPLEX E/M VISIT ADD ON: HCPCS | Performed by: STUDENT IN AN ORGANIZED HEALTH CARE EDUCATION/TRAINING PROGRAM

## 2025-02-26 RX ORDER — NALTREXONE HYDROCHLORIDE 50 MG/1
50 TABLET, FILM COATED ORAL DAILY PRN
Qty: 30 TABLET | Refills: 1 | Status: SHIPPED | OUTPATIENT
Start: 2025-02-26

## 2025-02-26 RX ORDER — ESCITALOPRAM OXALATE 20 MG/1
20 TABLET ORAL DAILY
Qty: 30 TABLET | Refills: 0 | Status: SHIPPED | OUTPATIENT
Start: 2025-02-26

## 2025-02-26 RX ORDER — MIRTAZAPINE 15 MG/1
15 TABLET, FILM COATED ORAL AT BEDTIME
Qty: 30 TABLET | Refills: 0 | Status: SHIPPED | OUTPATIENT
Start: 2025-02-26

## 2025-02-26 ASSESSMENT — PAIN SCALES - GENERAL: PAINLEVEL_OUTOF10: NO PAIN (0)

## 2025-02-26 NOTE — NURSING NOTE
"Chief Complaint   Patient presents with    Recheck Medication     Moderate episode of recurrent major depressive disorder    Patient shared he feels he has gotten old over the last and is \"Ready to go to his eternal rest\" Message sent to update provider.     Onur Esparza LPN.     "

## 2025-03-19 ENCOUNTER — OFFICE VISIT (OUTPATIENT)
Dept: OTOLARYNGOLOGY | Facility: CLINIC | Age: 80
End: 2025-03-19
Payer: COMMERCIAL

## 2025-03-19 DIAGNOSIS — H61.23 BILATERAL IMPACTED CERUMEN: Primary | ICD-10-CM

## 2025-03-19 DIAGNOSIS — H90.3 SENSORINEURAL HEARING LOSS (SNHL) OF BOTH EARS: ICD-10-CM

## 2025-03-19 RX ORDER — RESPIRATORY SYNCYTIAL VIRUS VACCINE 120MCG/0.5
KIT INTRAMUSCULAR
COMMUNITY
Start: 2024-12-24

## 2025-03-19 NOTE — LETTER
3/19/2025      Laurent Choi  [address]  [place] [address]      Dear Colleague,    Thank you for referring your patient, Laurent Choi, to the [facility]. Please see a copy of my visit note below.        ENT FOLLOW UP VISIT NOTE    Patient presents with:  Follow Up:  6 mo ear cleaning. No other concerns.        HISTORY OF PRESENT ILLNESS    Gio was seen in follow up for ear cleaning followed by audiogram. No ear pain or discharge.   No hearing concerns.         ALLERGIES    Aspirin, Nsaids (non-steroidal anti-inflammatory drug) [nsaids], Plavix [clopidogrel], and Statins-hmg-coa reductase inhibitors [statins]    CURRENT MEDICATIONS      Current Outpatient Medications:      ABRYSVO injection, , Disp: , Rfl:      Acetaminophen 325 MG CAPS, Take 325 mg by mouth every 8 hours as needed, Disp: , Rfl:      alendronate (FOSAMAX) 70 MG tablet, TAKE ONE TABLET BY MOUTH EVERY 7 DAYS, Disp: 12 tablet, Rfl: 3     ASPIRIN PO, Take 81 mg by mouth daily, Disp: , Rfl:      blood glucose (NO BRAND SPECIFIED) test strip, Use to test blood sugar 2 times daily or as directed. To accompany: Blood Glucose Monitor Brands: per insurance., Disp: 100 strip, Rfl: 6     blood glucose monitoring (NO BRAND SPECIFIED) meter device kit, Use to test blood sugar 2 times daily or as directed. Preferred blood glucose meter OR supplies to accompany: Blood Glucose Monitor Brands: per insurance., Disp: 1 kit, Rfl: 0     calcium-vitamin D 500-125 MG-UNIT TABS, Take 1 tablet by mouth 2 times daily, Disp: , Rfl:      clotrimazole (LOTRIMIN) 1 % external cream, Apply topically 2 times daily Apply to rough patch of skin in front of right ear 2x daily for 10 days, Disp: 24 g, Rfl: 0     DIPHENHYDRAMINE HCL PO, Take 25 mg by mouth daily as needed, Disp: , Rfl:      escitalopram (LEXAPRO) 20 MG tablet, Take 1 tablet (20 mg) by mouth daily., Disp: 30 tablet, Rfl: 0     fluticasone (FLONASE) 50 MCG/ACT nasal  spray, Spray 1 spray into both nostrils daily, Disp: 16 g, Rfl: 4     losartan (COZAAR) 50 MG tablet, Take 0.5 tablets (25 mg) by mouth daily., Disp: , Rfl:      metFORMIN (GLUCOPHAGE XR) 500 MG 24 hr tablet, TAKE ONE TABLET BY MOUTH ONCE DAILY WITH BREAKFAST, Disp: 90 tablet, Rfl: 0     mirtazapine (REMERON) 15 MG tablet, Take 1 tablet (15 mg) by mouth at bedtime., Disp: 30 tablet, Rfl: 0     naltrexone (DEPADE/REVIA) 50 MG tablet, Take 1 tablet (50 mg) by mouth daily as needed (craving)., Disp: 30 tablet, Rfl: 1     NITROGLYCERIN SL, Take 0.4 mg by mouth every 5 minutes as needed., Disp: , Rfl:      omeprazole (PRILOSEC) 20 MG DR capsule, TAKE ONE CAPSULE BY MOUTH ONCE DAILY, 60 MINUTES BEFORE A MEAL, Disp: 90 capsule, Rfl: 0     rosuvastatin (CRESTOR) 40 MG tablet, TAKE ONE TABLET BY MOUTH EVERY NIGHT AT BEDTIME, Disp: 90 tablet, Rfl: 2     tamsulosin (FLOMAX) 0.4 MG capsule, TAKE ONE CAPSULE BY MOUTH ONCE DAILY WITH BREAKFAST., Disp: 90 capsule, Rfl: 3     thin (NO BRAND SPECIFIED) lancets, Use with lanceting device. To accompany: Blood Glucose Monitor Brands: per insurance., Disp: 100 each, Rfl: 6    Current Facility-Administered Medications:      bevacizumab (AVASTIN) intravitreal inj 1.25 mg, 1.25 mg, Intravitreal, Q28 Days, Bulmaro Ndiaye MD, 1.25 mg at 02/06/25 1342     PAST MEDICAL HISTORY    PAST MEDICAL HISTORY:   Past Medical History:   Diagnosis Date     Alcohol abuse      Arthritis      Chest pain      Clogged artery (heart)      GERD (gastroesophageal reflux disease)      Heart disease     Stints in heart due to cholesterol build up.      High cholesterol     Takes medications for this.      Hypertension      Hypertension      Macular degeneration     left eye is stable, right eye is legally blind.      Macular degeneration        PAST SURGICAL HISTORY    PAST SURGICAL HISTORY:   Past Surgical History:   Procedure Laterality Date     CARDIAC SURGERY       COLONOSCOPY      removal of  polyp     ENT SURGERY       HERNIA REPAIR         FAMILY  HISTORY    FAMILY HISTORY:   Family History   Problem Relation Age of Onset     Macular Degeneration Mother      Dementia Mother      Substance Abuse Father      Snoring Father      Anxiety Disorder Sister      Suicide Other      Depression No family hx of      Schizophrenia No family hx of      Bipolar Disorder No family hx of      Eleonora Disease No family hx of      Parkinsonism No family hx of      Autism Spectrum Disorder No family hx of      Diabetes No family hx of      Glaucoma No family hx of        SOCIAL HISTORY    SOCIAL HISTORY:   Social History     Tobacco Use     Smoking status: Former     Current packs/day: 0.00     Types: Cigarettes     Quit date:      Years since quittin.2     Passive exposure: Never     Smokeless tobacco: Never   Substance Use Topics     Alcohol use: Not Currently        PHYSICAL EXAM    HEAD: Normal appearance and symmetry:  No cutaneous lesions.      NECK:  supple     EARS:  Auricles normal without lesions       CERUMEN IMPACTION REMOVAL    Findings:  on both sides Cerumen Impaction(s)    After obtaining verbal consent, and using the binocular microscope.  Cerumen impaction(s) were removed from the affected ear canal(s)  using a wire loops and/or suction.  The patient tolerated the procedure well without incident.      EYES:  EOMI    CN VII/XII:  intact     NOSE:  patent        ORAL CAVITY/OROPHARYNX:     Lips:  Normal.  Tongue: normal, midline  Mucosa:   no lesions     NECK:  Trachea:  midline.        NEURO:   Alert and Oriented        RESPIRATORY:   Symmetry and Respiratory effort     PSYCH:  Normal mood and affect     SKIN:   warm and dry     ADUIOGRAM: cancelled by pateint    IMPRESSION:    Encounter Diagnoses   Name Primary?     Bilateral impacted cerumen Yes     Sensorineural hearing loss (SNHL) of both ears           RECOMMENDATIONS:    Return 6 months for cleaning followed by hearing evaluation.    Orders Placed This Encounter   Procedures     Remove Ira      [unfilled]       Again, thank you for allowing me to participate in the care of your patient.        Sincerely,        Piyush Rasmussen MD    Electronically signed

## 2025-03-19 NOTE — PROGRESS NOTES
ENT FOLLOW UP VISIT NOTE    Patient presents with:  Follow Up:  6 mo ear cleaning. No other concerns.        HISTORY OF PRESENT ILLNESS    Gio was seen in follow up for ear cleaning followed by audiogram. No ear pain or discharge.   No hearing concerns.         ALLERGIES    Aspirin, Nsaids (non-steroidal anti-inflammatory drug) [nsaids], Plavix [clopidogrel], and Statins-hmg-coa reductase inhibitors [statins]    CURRENT MEDICATIONS      Current Outpatient Medications:     ABRYSVO injection, , Disp: , Rfl:     Acetaminophen 325 MG CAPS, Take 325 mg by mouth every 8 hours as needed, Disp: , Rfl:     alendronate (FOSAMAX) 70 MG tablet, TAKE ONE TABLET BY MOUTH EVERY 7 DAYS, Disp: 12 tablet, Rfl: 3    ASPIRIN PO, Take 81 mg by mouth daily, Disp: , Rfl:     blood glucose (NO BRAND SPECIFIED) test strip, Use to test blood sugar 2 times daily or as directed. To accompany: Blood Glucose Monitor Brands: per insurance., Disp: 100 strip, Rfl: 6    blood glucose monitoring (NO BRAND SPECIFIED) meter device kit, Use to test blood sugar 2 times daily or as directed. Preferred blood glucose meter OR supplies to accompany: Blood Glucose Monitor Brands: per insurance., Disp: 1 kit, Rfl: 0    calcium-vitamin D 500-125 MG-UNIT TABS, Take 1 tablet by mouth 2 times daily, Disp: , Rfl:     clotrimazole (LOTRIMIN) 1 % external cream, Apply topically 2 times daily Apply to rough patch of skin in front of right ear 2x daily for 10 days, Disp: 24 g, Rfl: 0    DIPHENHYDRAMINE HCL PO, Take 25 mg by mouth daily as needed, Disp: , Rfl:     escitalopram (LEXAPRO) 20 MG tablet, Take 1 tablet (20 mg) by mouth daily., Disp: 30 tablet, Rfl: 0    fluticasone (FLONASE) 50 MCG/ACT nasal spray, Spray 1 spray into both nostrils daily, Disp: 16 g, Rfl: 4    losartan (COZAAR) 50 MG tablet, Take 0.5 tablets (25 mg) by mouth daily., Disp: , Rfl:     metFORMIN (GLUCOPHAGE XR) 500 MG 24 hr tablet, TAKE ONE TABLET BY MOUTH ONCE DAILY WITH BREAKFAST, Disp: 90  tablet, Rfl: 0    mirtazapine (REMERON) 15 MG tablet, Take 1 tablet (15 mg) by mouth at bedtime., Disp: 30 tablet, Rfl: 0    naltrexone (DEPADE/REVIA) 50 MG tablet, Take 1 tablet (50 mg) by mouth daily as needed (craving)., Disp: 30 tablet, Rfl: 1    NITROGLYCERIN SL, Take 0.4 mg by mouth every 5 minutes as needed., Disp: , Rfl:     omeprazole (PRILOSEC) 20 MG DR capsule, TAKE ONE CAPSULE BY MOUTH ONCE DAILY, 60 MINUTES BEFORE A MEAL, Disp: 90 capsule, Rfl: 0    rosuvastatin (CRESTOR) 40 MG tablet, TAKE ONE TABLET BY MOUTH EVERY NIGHT AT BEDTIME, Disp: 90 tablet, Rfl: 2    tamsulosin (FLOMAX) 0.4 MG capsule, TAKE ONE CAPSULE BY MOUTH ONCE DAILY WITH BREAKFAST., Disp: 90 capsule, Rfl: 3    thin (NO BRAND SPECIFIED) lancets, Use with lanceting device. To accompany: Blood Glucose Monitor Brands: per insurance., Disp: 100 each, Rfl: 6    Current Facility-Administered Medications:     bevacizumab (AVASTIN) intravitreal inj 1.25 mg, 1.25 mg, Intravitreal, Q28 Days, Bulmaro Ndiaye MD, 1.25 mg at 02/06/25 1342     PAST MEDICAL HISTORY    PAST MEDICAL HISTORY:   Past Medical History:   Diagnosis Date    Alcohol abuse     Arthritis     Chest pain     Clogged artery (heart)     GERD (gastroesophageal reflux disease)     Heart disease     Stints in heart due to cholesterol build up.     High cholesterol     Takes medications for this.     Hypertension     Hypertension     Macular degeneration     left eye is stable, right eye is legally blind.     Macular degeneration        PAST SURGICAL HISTORY    PAST SURGICAL HISTORY:   Past Surgical History:   Procedure Laterality Date    CARDIAC SURGERY      COLONOSCOPY      removal of polyp    ENT SURGERY      HERNIA REPAIR         FAMILY  HISTORY    FAMILY HISTORY:   Family History   Problem Relation Age of Onset    Macular Degeneration Mother     Dementia Mother     Substance Abuse Father     Snoring Father     Anxiety Disorder Sister     Suicide Other     Depression  No family hx of     Schizophrenia No family hx of     Bipolar Disorder No family hx of     Sycamore Disease No family hx of     Parkinsonism No family hx of     Autism Spectrum Disorder No family hx of     Diabetes No family hx of     Glaucoma No family hx of        SOCIAL HISTORY    SOCIAL HISTORY:   Social History     Tobacco Use    Smoking status: Former     Current packs/day: 0.00     Types: Cigarettes     Quit date:      Years since quittin.2     Passive exposure: Never    Smokeless tobacco: Never   Substance Use Topics    Alcohol use: Not Currently        PHYSICAL EXAM    HEAD: Normal appearance and symmetry:  No cutaneous lesions.      NECK:  supple     EARS:  Auricles normal without lesions       CERUMEN IMPACTION REMOVAL    Findings:  on both sides Cerumen Impaction(s)    After obtaining verbal consent, and using the binocular microscope.  Cerumen impaction(s) were removed from the affected ear canal(s)  using a wire loops and/or suction.  The patient tolerated the procedure well without incident.      EYES:  EOMI    CN VII/XII:  intact     NOSE:  patent        ORAL CAVITY/OROPHARYNX:     Lips:  Normal.  Tongue: normal, midline  Mucosa:   no lesions     NECK:  Trachea:  midline.        NEURO:   Alert and Oriented        RESPIRATORY:   Symmetry and Respiratory effort     PSYCH:  Normal mood and affect     SKIN:   warm and dry     ADUIOGRAM: cancelled by pateint    IMPRESSION:    Encounter Diagnoses   Name Primary?    Bilateral impacted cerumen Yes    Sensorineural hearing loss (SNHL) of both ears           RECOMMENDATIONS:    Return 6 months for cleaning followed by hearing evaluation.   Orders Placed This Encounter   Procedures    Remove Cerumen      [unfilled]

## 2025-03-22 ENCOUNTER — HEALTH MAINTENANCE LETTER (OUTPATIENT)
Age: 80
End: 2025-03-22

## 2025-03-26 ENCOUNTER — PATIENT OUTREACH (OUTPATIENT)
Dept: CARE COORDINATION | Facility: CLINIC | Age: 80
End: 2025-03-26
Payer: COMMERCIAL

## 2025-03-26 NOTE — PROGRESS NOTES
3/26/2025  Clinic Care Coordination Contact  Community Health Worker Initial Outreach    CHW Initial Information Gathering:  Referral Source: Self-patient/Caregiver  No PCP office visit in Past Year: No  CHW Additional Questions  If ED/Hospital discharge, follow-up appointment scheduled as recommended?: N/A  Medication changes made following ED/Hospital discharge?: N/A  MyChart active?: Yes  Patient sent Social Drivers of Health questionnaire?: No    Patient accepts CC: No, only need [employer] application. Patient will be sent Care Coordination introduction letter for future reference.     Called and spoke with patient regarding [email or web address] message.  Patient stated he needs to renew the financial assistance program  CHW verified if he needs [employer] application.  Patient confirmed [employer] application.  CHW offered to mail, email, or via BRIKA application  Patient preferred to email the application   [email or web address]   CHW email application to patient.    No other needs from [facility].  Closed    Marlys Medina  Community Health Worker  [facility]  Clinic Care Coordination  [email or web address]   Office: [phone]  Fax: [phone]

## 2025-04-02 ENCOUNTER — OFFICE VISIT (OUTPATIENT)
Dept: PSYCHIATRY | Facility: CLINIC | Age: 80
End: 2025-04-02
Attending: PSYCHIATRY & NEUROLOGY
Payer: COMMERCIAL

## 2025-04-02 VITALS
SYSTOLIC BLOOD PRESSURE: 101 MMHG | BODY MASS INDEX: 27.69 KG/M2 | WEIGHT: 193.4 LBS | DIASTOLIC BLOOD PRESSURE: 71 MMHG | HEART RATE: 87 BPM

## 2025-04-02 DIAGNOSIS — F33.1 MODERATE EPISODE OF RECURRENT MAJOR DEPRESSIVE DISORDER (H): ICD-10-CM

## 2025-04-02 DIAGNOSIS — F33.40 RECURRENT MAJOR DEPRESSIVE DISORDER, IN REMISSION: ICD-10-CM

## 2025-04-02 DIAGNOSIS — F41.1 GAD (GENERALIZED ANXIETY DISORDER): ICD-10-CM

## 2025-04-02 PROCEDURE — G0463 HOSPITAL OUTPT CLINIC VISIT: HCPCS | Performed by: STUDENT IN AN ORGANIZED HEALTH CARE EDUCATION/TRAINING PROGRAM

## 2025-04-02 RX ORDER — ESCITALOPRAM OXALATE 20 MG/1
20 TABLET ORAL DAILY
Qty: 30 TABLET | Refills: 1 | Status: SHIPPED | OUTPATIENT
Start: 2025-04-02

## 2025-04-02 RX ORDER — NALTREXONE HYDROCHLORIDE 50 MG/1
50 TABLET, FILM COATED ORAL DAILY
Qty: 30 TABLET | Refills: 1 | Status: SHIPPED | OUTPATIENT
Start: 2025-04-02

## 2025-04-02 RX ORDER — MIRTAZAPINE 15 MG/1
15 TABLET, FILM COATED ORAL AT BEDTIME
Qty: 30 TABLET | Refills: 1 | Status: SHIPPED | OUTPATIENT
Start: 2025-04-02

## 2025-04-02 ASSESSMENT — PAIN SCALES - GENERAL: PAINLEVEL_OUTOF10: NO PAIN (0)

## 2025-04-02 NOTE — PROGRESS NOTES
----------------------------------------------------------------------------------------------------------  [facility]   Addiction Medicine Progress Note     ID                                Laurent Choi is a 78 year old  male with a history of alcohol use disorder, major depressive disorder and generalized anxiety disorder. He has been following in the addiction clinic since 10/2018. He was previously in sustained remission, though had return to use 10/2024 and restarted naltrexone.    INTERVAL HISTORY                                  Sleep is remarkably improved after starting mirtazapine, he wakes up focused and with a clear mind. Mood is substantially improved as well, overall positive. He has noticed his appetite has increased some, but he has not yet gained weight and is not worried about it. No return to use, no cravings. Thinks he now has the energy and motivation to return to AA. Mindful that summer months and July 4 are triggers to drink for social reasons, and will call our clinic if he starts to have the urge to drink. 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    BRIEF SUMMARY OF SUBSTANCE USE                                                                Gio has been in detox over 40 times and treatment about 5 times. His first treatment was in 1980. He gave up daily drinking in late 1990's, then began to binge drink. In recent years, his pattern has been drinking 1-2 weeks, not eating most of that time, and getting very sick. He then will stop drinking, will be sober for 1-2 weeks and the cycle is continued. Alcohol has caused withdrawal and tolerance, drinking 6-10 beers and 1 pint a day when on a binge. Alcohol has affected relationships, has affected health with getting sick, not eating and getting depressed.     Last significant binge use was 9/2024, and prior to this 9/2023.      Treatment History:    First  "treatment in 1980. Last treatment at [facility] finished November 2018.      Substance Use Pharmacotherapies:   Tried Antabuse in late 1990's and he would stop taking and \"drink around it\"  Was on BARRETO naltrexone until sometime in 2023, and has had naltrexone PRN for use since.     BRIEF PSYCHIATRIC HISTORY     Previous diagnoses, history and diagnostic clarification:  Alcohol Use Disorder, severe, in sustained remission.   Major Depressive Disorder without suicidal ideation   Generalized Anxiety Disorder  R/o PTSD      PAST PSYCH MED TRIALS       Drug  Treatment Dates Max Dose (mg) Helpful Adverse Effects   Reason Discontinued   Prozac 1980s 80mg Yes  Stopped working   Zoloft, Paxil, Cymbalta, Effexor ?    ?   TCAs 1970s       Buproprion 1990    Anxiety   Mirtazapine  ?    Anxiety   Aripiprazole Summer 2019    Drooling   Gabapentin  Spring 2019  Yes       Current Medications     Current Outpatient Medications   Medication Instructions    Acetaminophen 325 mg, Oral, EVERY 8 HOURS PRN    alendronate (FOSAMAX) 70 mg, Oral, EVERY 7 DAYS    ASPIRIN PO 81 mg, Oral, DAILY    blood glucose (NO BRAND SPECIFIED) test strip Use to test blood sugar 2 times daily or as directed. To accompany: Blood Glucose Monitor Brands: per insurance.    blood glucose monitoring (NO BRAND SPECIFIED) meter device kit Use to test blood sugar 2 times daily or as directed. Preferred blood glucose meter OR supplies to accompany: Blood Glucose Monitor Brands: per insurance.    calcium-vitamin D 500-125 MG-UNIT TABS 1 tablet, Oral, 2 TIMES DAILY    clotrimazole (LOTRIMIN) 1 % external cream Topical, 2 TIMES DAILY, Apply to rough patch of skin in front of right ear 2x daily for 10 days    DIPHENHYDRAMINE HCL PO 25 mg, Oral, DAILY PRN    escitalopram (LEXAPRO) 20 mg, Oral, DAILY    gabapentin (NEURONTIN) 300 mg, Oral, AT BEDTIME, Take 1 capsule by mouth at bedtime as needed.    losartan (COZAAR) 50 MG tablet TAKE ONE TABLET BY MOUTH ONCE " DAILY    metFORMIN (GLUCOPHAGE XR) 500 MG 24 hr tablet TAKE ONE TABLET BY MOUTH ONCE DAILY WITH BREAKFAST    NITROGLYCERIN SL 0.4 mg, EVERY 5 MIN PRN    omeprazole (PRILOSEC) 20 MG DR capsule TAKE 1 CAPSULE BY MOUTH DAILY,  60 MINUTES BEFORE A MEAL.    rosuvastatin (CRESTOR) 40 mg, Oral, AT BEDTIME    tamsulosin (FLOMAX) 0.4 MG capsule TAKE ONE CAPSULE BY MOUTH ONCE DAILY WITH BREAKFAST. (NEED TO BE SEEN IN CLINIC FOR FURTHER REFILLS)    thin (NO BRAND SPECIFIED) lancets Use with lanceting device. To accompany: Blood Glucose Monitor Brands: per insurance.     MENTAL STATUS EXAM/WITHDRAWAL                                                              Withdrawal symptoms: None    Alertness: alert   Orientation: awake and alert  Appearance: adequately groomed  Behavior/Demeanor: cooperative and pleasant, with good  eye contact.  Speech: slowed  Psychomotor: normal or unremarkable    Mood:  description consistent with euthymia  Affect: full range and was congruent to speech content.  Thought Process/Associations: unremarkable   Thought Content: devoid of  suicidal ideation.   Perception: devoid of  auditory hallucinations and visual hallucinations  Insight: adequate.  Judgment: fair.    These cognitive functions grossly appear as described, but were not formally tested.    ASSESSMENT/PLAN                                                  Summation/Assessment:  78 year old male with history of severe alcohol use disorder as well as depression. Last use of alcohol 09/2024.     # Alcohol use disorder, severe, in early remission   Longstanding history with most recent pattern of intermittent low-volume use, 2-3 drinks once or twice monthly through summer 2023, most recently binge episode which made him quite sick in September of 2023, triggered by frustration around a meeting at his apartment. Further return to use September of 2024, triggered by the desire to see if he could handle socially drinking. Previously took  naltrexone (BARRETO for months) with good effect, however the downside was significant negative impact on libido. He has stopped going to AA meetings or other recovery programming. Daily naltrexone started again 10/2/2024 with good control of cravings.  - Continue naltrexone 50mg PO daily; does not want BARRETO at this time  - Plan to resume AA meetings  - Hopes to find a new sponsor soon - has increased motivation for this given success with naltrexone    # Major depressive disorder  # RODRIGO  # Sleep disorder, unspecified  Mood and sleep substantially improved after starting mirtazapine, now sleeping 8-10 hours per night and waking up refreshed, mood positive. Ongoing social interactions with his friends and increasingly his sister have been helpful for mood. Sleep study (2020), no JANNETTE or narcolepsy, does have some anatomic predisposition to airway resistance and sleeps with a large pillow. Sleep can be a negative trigger on his mood, and has been difficult for most of his life until recently.  - Continue escitalopram 20mg daily    - Continue mirtazapine 15mg at bedtime  - Goal: try to get to sleep before midnight consistently and to stop drinking caffeine after 12pm, limit screen time    RTC: 2 months     [place] PRESCRIPTION MONITORING PROGRAM [] was checked today: no controlled substances.    Chris Barksdale MD  Addiction Medicine Fellow  [facility]     Patient seen by and discussed with staff Dr. Zavaleta.    Psychiatry Individual Psychotherapy Note   Psychotherapy start time - 10:32am   Psychotherapy end time - 10:50am  Date last reviewed - 4/2/2025  Subjective: This supportive psychotherapy session addressed issues related to goals of therapy and current psychosocial stressors.   Interactive complexity indicated? No  Plan: RTC in timeframe noted above  Psychotherapy services during this visit included myself and the patient.   Treatment Plan      SYMPTOMS; PROBLEMS   MEASURABLE GOALS;    FUNCTIONAL IMPROVEMENT /  GAINS INTERVENTIONS DISCHARGE CRITERIA   Substance Use: alcohol    stay free of alcohol use  and go to AA meetings 4x before next meeting Supportive / psychodynamic marked symptom improvement     The longitudinal plan of care for the diagnosis(es)/condition(s) as documented were addressed during this visit. Due to the added complexity in care, I will continue to support Gio in the subsequent management and with ongoing continuity of care.

## 2025-04-04 NOTE — TELEPHONE ENCOUNTER
Called daughter to discuss scheduling LHC. Relayed the following instructions/information to them:     They know to expect a call from our  to schedule the procedure  They know to expect a call from cath lab staff the day before the procedure (or in the case of Monday procedures, on Friday) with more instructions and details for them.   They know they need to have someone accompany them home post-procedure   They know they will be expected to arrive 2 hrs before procedure time  On anticoagulation?: No  On SGLT-2 Inhibitors? (If indicated, instructed to hold for 3 days): none  GLP-1 agonists? none (If indicated, instructed to hold for 1 week)  Contrast allergy?: No (no contrast allergy listed)   Duker placed a call to finical specialist Cristy at [phone] who informed this writer patient is cleared to receive Vivitrol 380 mg IM every 28 days to be administered in clinic. Patient was cleared in January.     Deborah Blanchard LPN notified

## 2025-04-08 DIAGNOSIS — H35.3221 EXUDATIVE AGE-RELATED MACULAR DEGENERATION OF LEFT EYE WITH ACTIVE CHOROIDAL NEOVASCULARIZATION (H): Primary | ICD-10-CM

## 2025-04-09 DIAGNOSIS — E11.59 TYPE 2 DIABETES MELLITUS WITH OTHER CIRCULATORY COMPLICATIONS (H): Primary | ICD-10-CM

## 2025-04-12 ENCOUNTER — HEALTH MAINTENANCE LETTER (OUTPATIENT)
Age: 80
End: 2025-04-12

## 2025-04-13 DIAGNOSIS — R73.03 PREDIABETES: ICD-10-CM

## 2025-04-13 DIAGNOSIS — K21.00 GASTRO-ESOPHAGEAL REFLUX DISEASE WITH ESOPHAGITIS: ICD-10-CM

## 2025-04-14 RX ORDER — OMEPRAZOLE 20 MG/1
CAPSULE, DELAYED RELEASE ORAL
Qty: 90 CAPSULE | Refills: 1 | Status: SHIPPED | OUTPATIENT
Start: 2025-04-14

## 2025-04-14 RX ORDER — METFORMIN HYDROCHLORIDE 500 MG/1
TABLET, EXTENDED RELEASE ORAL
Qty: 90 TABLET | Refills: 0 | Status: SHIPPED | OUTPATIENT
Start: 2025-04-14

## 2025-04-16 ENCOUNTER — OFFICE VISIT (OUTPATIENT)
Dept: OPHTHALMOLOGY | Facility: CLINIC | Age: 80
End: 2025-04-16
Attending: STUDENT IN AN ORGANIZED HEALTH CARE EDUCATION/TRAINING PROGRAM
Payer: COMMERCIAL

## 2025-04-16 DIAGNOSIS — E11.9 DM TYPE 2 WITHOUT RETINOPATHY (H): ICD-10-CM

## 2025-04-16 DIAGNOSIS — H35.3221 EXUDATIVE AGE-RELATED MACULAR DEGENERATION OF LEFT EYE WITH ACTIVE CHOROIDAL NEOVASCULARIZATION (H): Primary | ICD-10-CM

## 2025-04-16 DIAGNOSIS — H25.13 NUCLEAR SENILE CATARACT OF BOTH EYES: ICD-10-CM

## 2025-04-16 PROCEDURE — 67028 INJECTION EYE DRUG: CPT | Mod: LT | Performed by: OPHTHALMOLOGY

## 2025-04-16 PROCEDURE — 250N000011 HC RX IP 250 OP 636: Performed by: OPHTHALMOLOGY

## 2025-04-16 PROCEDURE — 92134 CPTRZ OPH DX IMG PST SGM RTA: CPT | Performed by: OPHTHALMOLOGY

## 2025-04-16 PROCEDURE — G0463 HOSPITAL OUTPT CLINIC VISIT: HCPCS | Performed by: OPHTHALMOLOGY

## 2025-04-16 RX ADMIN — Medication 1.25 MG: at 12:37

## 2025-04-16 ASSESSMENT — EXTERNAL EXAM - LEFT EYE: OS_EXAM: NORMAL

## 2025-04-16 ASSESSMENT — TONOMETRY
IOP_METHOD: TONOPEN
OD_IOP_MMHG: 14
OS_IOP_MMHG: 14

## 2025-04-16 ASSESSMENT — SLIT LAMP EXAM - LIDS
COMMENTS: NORMAL
COMMENTS: NORMAL

## 2025-04-16 ASSESSMENT — EXTERNAL EXAM - RIGHT EYE: OD_EXAM: NORMAL

## 2025-04-16 ASSESSMENT — VISUAL ACUITY
OD_PH_SC: 20/500
OD_SC: 4/200E
OS_SC: 20/30
METHOD: SNELLEN - LINEAR
OS_SC+: -2

## 2025-04-16 NOTE — NURSING NOTE
Chief Complaints and History of Present Illnesses   Patient presents with    Follow Up      Wet Age related macular degeneration left eye '  DM type II     Chief Complaint(s) and History of Present Illness(es)       Follow Up              Comments:  Wet Age related macular degeneration left eye '  DM type II              Comments    Pt states no change in VA since last visit  No change in floaters   No flashes , eye  pain or tearing   LBS:   does not check     Last A1C: 6.2  Lab Results       Component                Value               Date                       A1C                      6.2                 12/16/2024                 A1C                      6.3                 08/19/2024                 A1C                      6.2                 03/21/2024                 A1C                      5.9                 11/21/2023                 A1C                      6.0                 08/21/2023        Felicita Fritz COT 10:18 AM April 16, 2025

## 2025-04-16 NOTE — PROGRESS NOTES
CC: wet AMD OU    HPI: dong well, no change in vision     PMHx:   DM type II      Imaging:    OCT: 04/16/2025  Right eye: GA, central scar, SRHM, SN IRF   Left eye: GA, SRHM, trace intra retinal fluid/cyst SN to fovea, resolved SRF    Retina Laser procedures:  none    Intravitreal injections:  OD: none  OS: Avastin started ~2010; last injection 2/6/25    Assessment/ Plan: 04/16/2025       # Wet Age related macular degeneration left eye   - OCT is stable, trace IR cysts SN to fovea-stable over the past few months. SRF resolved  - Vision is stable  - will give an injection today for SN SRF   - follow-up in 10 weeks with OCT macula OU     # Nuclear sclerotic cataract  Both eyes    - Not visually significant   - refract as needed    - monitor yearly     # DM type II  Last A1c is:  Lab Results   Component Value Date    A1C 6.2 12/16/2024    A1C 6.3 08/19/2024    A1C 6.2 03/21/2024    A1C 5.9 11/21/2023    A1C 6.0 08/21/2023     Recommend adequate blood sugar control  Recommend follow-up every yearly    Follow-up in 10-11 weeks, DFE, OCT macula OU possible injection left eye      Complete documentation of historical and exam elements from today's encounter can be found in the full encounter summary report (not reduplicated in this progress note). I personally obtained the chief complaint(s) and history of present illness.  I confirmed and edited as necessary the review of systems, past medical/surgical history, family history, social history, and examination findings as documented by others; and I examined the patient myself. I personally reviewed the relevant tests, images, and reports as documented above. I formulated and edited as necessary the assessment and plan and discussed the findings and management plan with the patient and family.    Misael Whaley MD, PhD

## 2025-05-05 SDOH — HEALTH STABILITY: PHYSICAL HEALTH: ON AVERAGE, HOW MANY MINUTES DO YOU ENGAGE IN EXERCISE AT THIS LEVEL?: 50 MIN

## 2025-05-05 SDOH — HEALTH STABILITY: PHYSICAL HEALTH: ON AVERAGE, HOW MANY DAYS PER WEEK DO YOU ENGAGE IN MODERATE TO STRENUOUS EXERCISE (LIKE A BRISK WALK)?: 1 DAY

## 2025-05-05 ASSESSMENT — SOCIAL DETERMINANTS OF HEALTH (SDOH): HOW OFTEN DO YOU GET TOGETHER WITH FRIENDS OR RELATIVES?: PATIENT DECLINED

## 2025-05-05 ASSESSMENT — PATIENT HEALTH QUESTIONNAIRE - PHQ9
10. IF YOU CHECKED OFF ANY PROBLEMS, HOW DIFFICULT HAVE THESE PROBLEMS MADE IT FOR YOU TO DO YOUR WORK, TAKE CARE OF THINGS AT HOME, OR GET ALONG WITH OTHER PEOPLE: SOMEWHAT DIFFICULT
SUM OF ALL RESPONSES TO PHQ QUESTIONS 1-9: 6
SUM OF ALL RESPONSES TO PHQ QUESTIONS 1-9: 6

## 2025-05-06 ENCOUNTER — OFFICE VISIT (OUTPATIENT)
Dept: FAMILY MEDICINE | Facility: CLINIC | Age: 80
End: 2025-05-06
Payer: COMMERCIAL

## 2025-05-06 VITALS
HEART RATE: 96 BPM | BODY MASS INDEX: 27.2 KG/M2 | DIASTOLIC BLOOD PRESSURE: 78 MMHG | TEMPERATURE: 97 F | RESPIRATION RATE: 18 BRPM | OXYGEN SATURATION: 94 % | WEIGHT: 190 LBS | HEIGHT: 70 IN | SYSTOLIC BLOOD PRESSURE: 125 MMHG

## 2025-05-06 DIAGNOSIS — E11.59 TYPE 2 DIABETES MELLITUS WITH OTHER CIRCULATORY COMPLICATIONS (H): ICD-10-CM

## 2025-05-06 DIAGNOSIS — F10.21 ALCOHOL USE DISORDER, SEVERE, IN EARLY REMISSION (H): ICD-10-CM

## 2025-05-06 DIAGNOSIS — Z00.00 ENCOUNTER FOR MEDICARE ANNUAL WELLNESS EXAM: Primary | ICD-10-CM

## 2025-05-06 DIAGNOSIS — J43.9 PULMONARY EMPHYSEMA, UNSPECIFIED EMPHYSEMA TYPE (H): ICD-10-CM

## 2025-05-06 DIAGNOSIS — M89.9 DISORDER OF BONE, UNSPECIFIED: ICD-10-CM

## 2025-05-06 DIAGNOSIS — I10 HYPERTENSION, ESSENTIAL: ICD-10-CM

## 2025-05-06 DIAGNOSIS — M80.00XS OSTEOPOROSIS WITH CURRENT PATHOLOGICAL FRACTURE, UNSPECIFIED OSTEOPOROSIS TYPE, SEQUELA: ICD-10-CM

## 2025-05-06 LAB
ALBUMIN SERPL BCG-MCNC: 4.3 G/DL (ref 3.5–5.2)
ALP SERPL-CCNC: 42 U/L (ref 40–150)
ALT SERPL W P-5'-P-CCNC: 17 U/L (ref 0–70)
ANION GAP SERPL CALCULATED.3IONS-SCNC: 10 MMOL/L (ref 7–15)
AST SERPL W P-5'-P-CCNC: 25 U/L (ref 0–45)
BILIRUB SERPL-MCNC: 0.4 MG/DL
BUN SERPL-MCNC: 17.3 MG/DL (ref 8–23)
CALCIUM SERPL-MCNC: 9.4 MG/DL (ref 8.8–10.4)
CHLORIDE SERPL-SCNC: 104 MMOL/L (ref 98–107)
CHOLEST SERPL-MCNC: 149 MG/DL
CREAT SERPL-MCNC: 1 MG/DL (ref 0.67–1.17)
CREAT UR-MCNC: 115 MG/DL
EGFRCR SERPLBLD CKD-EPI 2021: 77 ML/MIN/1.73M2
EST. AVERAGE GLUCOSE BLD GHB EST-MCNC: 134 MG/DL
FASTING STATUS PATIENT QL REPORTED: ABNORMAL
FASTING STATUS PATIENT QL REPORTED: NORMAL
GLUCOSE SERPL-MCNC: 138 MG/DL (ref 70–99)
HBA1C MFR BLD: 6.3 % (ref 0–5.6)
HCO3 SERPL-SCNC: 25 MMOL/L (ref 22–29)
HDLC SERPL-MCNC: 68 MG/DL
LDLC SERPL CALC-MCNC: 63 MG/DL
MICROALBUMIN UR-MCNC: <12 MG/L
MICROALBUMIN/CREAT UR: NORMAL MG/G{CREAT}
NONHDLC SERPL-MCNC: 81 MG/DL
POTASSIUM SERPL-SCNC: 3.9 MMOL/L (ref 3.4–5.3)
PROT SERPL-MCNC: 6.9 G/DL (ref 6.4–8.3)
SODIUM SERPL-SCNC: 139 MMOL/L (ref 135–145)
TRIGL SERPL-MCNC: 91 MG/DL

## 2025-05-06 RX ORDER — LOSARTAN POTASSIUM 25 MG/1
25 TABLET ORAL DAILY
Qty: 90 TABLET | Refills: 3 | Status: SHIPPED | OUTPATIENT
Start: 2025-05-06 | End: 2026-05-01

## 2025-05-06 RX ORDER — LOSARTAN POTASSIUM 50 MG/1
25 TABLET ORAL DAILY
Status: CANCELLED | OUTPATIENT
Start: 2025-05-06

## 2025-05-06 NOTE — PROGRESS NOTES
"Preventive Care Visit  [facility]  Amara Smith MD, Family Medicine  May 6, 2025      Assessment & Plan     Encounter for Medicare annual wellness exam      Hypertension, essential  Stable on current management, continue healthy lifestyle changes.  - losartan (COZAAR) 25 MG tablet; Take 1 tablet (25 mg) by mouth daily.    Type 2 diabetes mellitus with other circulatory complications (H)  Controlled metformin, A1c slightly up but still within goal, continue with healthy lifestyle changes.  - Hemoglobin A1c; Future  - Comprehensive metabolic panel  - Hemoglobin A1c  - Albumin Random Urine Quantitative with Creat Ratio  - Lipid panel reflex to direct LDL Fasting    Osteoporosis with current pathological fracture, unspecified osteoporosis type, sequela  On weekly Fosamax, discussed calcium and vitamin D supplementation.  - N-Telopeptide Cross-Linked Serum; Future  - N-Telopeptide Cross-Linked Serum    Disorder of bone, unspecified    - N-Telopeptide Cross-Linked Serum; Future  - N-Telopeptide Cross-Linked Serum    Pulmonary emphysema, unspecified emphysema type (H)  Last FEV1 FVC was around 67% a few years ago, just recently saw pulmonologist, rarely use albuterol as needed.  He got his flu, COVID RSV and pneumonia vaccine.    Alcohol use disorder, severe, in early remission (H)  Followed by addiction clinic, on naltrexone, Vivitrol injection, insomnia is managed with Remeron Mild depression controlled with Lexapro 20 mg daily.    Patient has been advised of split billing requirements and indicates understanding: Yes        BMI  Estimated body mass index is 27.2 kg/m  as calculated from the following:    Height as of this encounter: 1.78 m (5' 10.08\").    Weight as of this encounter: 86.2 kg (190 lb).   Weight management plan: Discussed healthy diet and exercise guidelines    Counseling  Appropriate preventive services were addressed with this patient via screening, questionnaire, or " discussion as appropriate for fall prevention, nutrition, physical activity, Tobacco-use cessation, social engagement, weight loss and cognition.  Checklist reviewing preventive services available has been given to the patient.  Reviewed patient's diet, addressing concerns and/or questions.   He is at risk for lack of exercise and has been provided with information to increase physical activity for the benefit of his well-being.   He is at risk for psychosocial distress and has been provided with information to reduce risk.   The patient's PHQ-9 score is consistent with mild depression. He was provided with information regarding depression.       Follow-up   No follow-ups on file.     Follow-up Visit   Expected date:  May 13, 2026 (Approximate)      Follow Up Appointment Details:     Follow-up with whom?: PCP    Follow-Up for what?: Medicare Wellness    Welcome or Annual?: Annual Wellness    How?: In Person                 Subjective   Gio is a 80 year old, presenting for the following:  Wellness Visit, Pain (Papt stated couple pain spot in his body ), and Recheck Medication (Losartin need refill )        5/6/2025    10:44 AM   Additional Questions   Roomed by Minoo Soriano      Sees ophthalmologist for macular degeneration.  Has seen ENT for hearing loss, was told to follow-up every year.  Hypertension is controlled with losartan 25 mg daily.  Mild diabetes type 2 on metformin.  Occasional musculoskeletal pain includes tailbone pain, left shoulder pain.  Pulmonologist for emphysema, currently doing albuterol only as needed, no recent flareup.  Advance Care Planning    Discussed advance care planning with patient; however, patient declined at this time.        5/5/2025   General Health   How would you rate your overall physical health? (!) FAIR   Feel stress (tense, anxious, or unable to sleep) To some extent   (!) STRESS CONCERN      5/5/2025   Nutrition   Diet: Diabetic         5/5/2025   Exercise   Days  per week of moderate/strenous exercise 1 day   Average minutes spent exercising at this level 50 min   (!) EXERCISE CONCERN      2025   Social Factors   Frequency of gathering with friends or relatives Patient declined   Worry food won't last until get money to buy more No   Food not last or not have enough money for food? No   Do you have housing? (Housing is defined as stable permanent housing and does not include staying outside in a car, in a tent, in an abandoned building, in an overnight shelter, or couch-surfing.) Yes   Are you worried about losing your housing? No   Lack of transportation? No   Unable to get utilities (heat,electricity)? No         2025   Fall Risk   Fallen 2 or more times in the past year? No   Trouble with walking or balance? No          2025   Activities of Daily Living- Home Safety   Needs help with the following daily activites None of the above   Safety concerns in the home None of the above         2025   Dental   Dentist two times every year? Yes         2025   Hearing Screening   Hearing concerns? None of the above         2025   Driving Risk Screening   Patient/family members have concerns about driving No         2025   General Alertness/Fatigue Screening   Have you been more tired than usual lately? No         2025   Urinary Incontinence Screening   Bothered by leaking urine in past 6 months No       Today's PHQ-9 Score:       2025     5:22 PM   PHQ-9 SCORE   PHQ-9 Total Score MyChart 6 (Mild depression)   PHQ-9 Total Score 6        Patient-reported         2025   Substance Use   Alcohol more than 3/day or more than 7/wk Not Applicable   Do you have a current opioid prescription? No   How severe/bad is pain from 1 to 10? 4/10   Do you use any other substances recreationally? No     Social History     Tobacco Use    Smoking status: Former     Current packs/day: 0.00     Types: Cigarettes     Quit date:      Years since quittin.3      Passive exposure: Never    Smokeless tobacco: Never   Vaping Use    Vaping status: Never Used   Substance Use Topics    Alcohol use: Not Currently    Drug use: Never       ASCVD Risk   The 10-year ASCVD risk score (Carol MURPHY, et al., 2019) is: 47.6%    Values used to calculate the score:      Age: 80 years      Sex: Male      Is Non- : No      Diabetic: Yes      Tobacco smoker: No      Systolic Blood Pressure: 125 mmHg      Is BP treated: Yes      HDL Cholesterol: 76 mg/dL      Total Cholesterol: 171 mg/dL    Fracture Risk Assessment Tool  Link to Frax Calculator  Use the information below to complete the Frax calculator  : 1945  Sex: male  Weight (kg): 86.2 kg (actual weight)  Height (cm): 178 cm  Previous Fragility Fracture:  Yes  History of parent with fractured hip:  Yes  Current Smoking:  No  Patient has been on glucocorticoids for more than 3 months (5mg/day or more): Yes  Rheumatoid Arthritis on Problem List:  No  Secondary Osteoporosis on Problem List:  No  Consumes 3 or more units of alcohol per day: Yes  Femoral Neck BMD (g/cm2)            Reviewed and updated as needed this visit by Provider                    Patient Active Problem List   Diagnosis    Alcohol use disorder, severe, in early remission (H)    Pulmonary emphysema, unspecified emphysema type (H)    Coronary artery disease involving native coronary artery of native heart with angina pectoris    Elevated prostate specific antigen less than 10 ng/ml    Compression fracture of T12 vertebra with routine healing    Type 2 diabetes mellitus with other circulatory complications (H)    Osteoporosis with current pathological fracture, unspecified osteoporosis type, sequela     Past Surgical History:   Procedure Laterality Date    CARDIAC SURGERY      COLONOSCOPY      removal of polyp    ENT SURGERY      HERNIA REPAIR         Social History     Tobacco Use    Smoking status: Former     Current packs/day: 0.00      Types: Cigarettes     Quit date:      Years since quittin.3     Passive exposure: Never    Smokeless tobacco: Never   Substance Use Topics    Alcohol use: Not Currently     Family History   Problem Relation Age of Onset    Macular Degeneration Mother     Dementia Mother     Substance Abuse Father     Snoring Father     Anxiety Disorder Sister     Suicide Other     Depression No family hx of     Schizophrenia No family hx of     Bipolar Disorder No family hx of     [place] Disease No family hx of     Parkinsonism No family hx of     Autism Spectrum Disorder No family hx of     Diabetes No family hx of     Glaucoma No family hx of          Current providers sharing in care for this patient include:  Patient Care Team:  Amara Smith MD as PCP - General  SpeckSelam wagoner MD as MD (Psychiatry)  Blaine Adame MD as MD (Urology)  Amara Smith MD as Assigned PCP  Joel Vasquez MD as MD (Dermatology)  Benny Carrasco MD as MD (Cardiovascular Disease)  Bacilio Delgadillo MD as MD (Dermatology)  Bulmaro Ndiaye MD as MD (Ophthalmology)  Piyush Rasmussen MD as MD (Otolaryngology)  Chris Barksdale MD as Fellow (Psychiatry)  Ivy De La Cruz MD as Assigned Surgical Provider  Brie Vital APRN CNP as Assigned Pulmonology Provider    The following health maintenance items are reviewed in Epic and correct as of today:  Health Maintenance   Topic Date Due    MEDICARE ANNUAL WELLNESS VISIT  2024    A1C  2025    COVID-19 Vaccine (8 - Moderna risk - season) 2025    DEPRESSION 6 MO INDEX REPEAT PHQ-9  2025    BMP  2025    LIPID  2025    MICROALBUMIN  2025    PHQ-9  2025    DIABETIC FOOT EXAM  2025    ANNUAL REVIEW OF HM ORDERS  2025    EYE EXAM  2026    FALL RISK ASSESSMENT  2026    DTAP/TDAP/TD IMMUNIZATION (3 - Td or Tdap) 06/15/2026    ADVANCE CARE PLANNING  2029    SPIROMETRY  Completed     "HEPATITIS C SCREENING  Completed    COPD ACTION PLAN  Completed    DEPRESSION ACTION PLAN  Completed    INFLUENZA VACCINE  Completed    Pneumococcal Vaccine: 50+ Years  Completed    ZOSTER IMMUNIZATION  Completed    RSV VACCINE  Completed    HPV IMMUNIZATION  Aged Out    MENINGITIS IMMUNIZATION  Aged Out    COLORECTAL CANCER SCREENING  Discontinued    LUNG CANCER SCREENING  Discontinued         Review of Systems  Constitutional, neuro, ENT, endocrine, pulmonary, cardiac, gastrointestinal, genitourinary, musculoskeletal, integument and psychiatric systems are negative, except as otherwise noted.     Objective    Exam  /78 (BP Location: Left arm, Patient Position: Sitting, Cuff Size: Adult Regular)   Pulse 96   Temp 97  F (36.1  C) (Temporal)   Resp 18   Ht 1.78 m (5' 10.08\")   Wt 86.2 kg (190 lb)   SpO2 94%   BMI 27.20 kg/m     Estimated body mass index is 27.2 kg/m  as calculated from the following:    Height as of this encounter: 1.78 m (5' 10.08\").    Weight as of this encounter: 86.2 kg (190 lb).    Physical Exam  GENERAL: alert and no distress  NECK: no adenopathy, no asymmetry, masses, or scars  RESP: lungs clear to auscultation - no rales, rhonchi or wheezes  CV: regular rate and rhythm, normal S1 S2, no S3 or S4, no murmur, click or rub, no peripheral edema  ABDOMEN: soft, nontender, no hepatosplenomegaly, no masses and bowel sounds normal  MS: no gross musculoskeletal defects noted, no edema  Diabetic foot exam: normal DP and PT pulses, no trophic changes or ulcerative lesions, and normal sensory exam        5/6/2025   Mini Cog   Clock Draw Score 2 Normal   3 Item Recall 3 objects recalled   Mini Cog Total Score 5              Signed Electronically by: Amara Smith MD  Prior to immunization administration, verified patients identity using patient s name and date of birth. Please see Immunization Activity for additional information.     Screening Questionnaire for Adult Immunization    Are you " sick today?   No   Do you have allergies to medications, food, a vaccine component or latex?   No   Have you ever had a serious reaction after receiving a vaccination?   No   Do you have a long-term health problem with heart, lung, kidney, or metabolic disease (e.g., diabetes), asthma, a blood disorder, no spleen, complement component deficiency, a cochlear implant, or a spinal fluid leak?  Are you on long-term aspirin therapy?   Yes   Do you have cancer, leukemia, HIV/AIDS, or any other immune system problem?   No   Do you have a parent, brother, or sister with an immune system problem?   No   In the past 3 months, have you taken medications that affect  your immune system, such as prednisone, other steroids, or anticancer drugs; drugs for the treatment of rheumatoid arthritis, Crohn s disease, or psoriasis; or have you had radiation treatments?   No   Have you had a seizure, or a brain or other nervous system problem?   No   During the past year, have you received a transfusion of blood or blood    products, or been given immune (gamma) globulin or antiviral drug?   No   For women: Are you pregnant or is there a chance you could become       pregnant during the next month?   No   Have you received any vaccinations in the past 4 weeks?   No     Immunization questionnaire was positive for at least one answer.  Notified DR Smith      Patient instructed to remain in clinic for 15 minutes afterwards, and to report any adverse reactions.     Screening performed by Minoo Cazares on 5/6/2025 at 10:50 AM.   Answers submitted by the patient for this visit:  Patient Health Questionnaire (Submitted on 5/5/2025)  If you checked off any problems, how difficult have these problems made it for you to do your work, take care of things at home, or get along with other people?: Somewhat difficult  PHQ9 TOTAL SCORE: 6

## 2025-05-06 NOTE — PATIENT INSTRUCTIONS
Patient Education   Preventive Care Advice   This is general advice given by our system to help you stay healthy. However, your care team may have specific advice just for you. Please talk to your care team about your preventive care needs.  Nutrition  Eat 5 or more servings of fruits and vegetables each day.  Try wheat bread, brown rice and whole grain pasta (instead of white bread, rice, and pasta).  Get enough calcium and vitamin D. Check the label on foods and aim for 100% of the RDA (recommended daily allowance).  Lifestyle  Exercise at least 150 minutes each week  (30 minutes a day, 5 days a week).  Do muscle strengthening activities 2 days a week. These help control your weight and prevent disease.  No smoking.  Wear sunscreen to prevent skin cancer.  Have a dental exam and cleaning every 6 months.  Yearly exams  See your health care team every year to talk about:  Any changes in your health.  Any medicines your care team has prescribed.  Preventive care, family planning, and ways to prevent chronic diseases.  Shots (vaccines)   HPV shots (up to age 26), if you've never had them before.  Hepatitis B shots (up to age 59), if you've never had them before.  COVID-19 shot: Get this shot when it's due.  Flu shot: Get a flu shot every year.  Tetanus shot: Get a tetanus shot every 10 years.  Pneumococcal, hepatitis A, and RSV shots: Ask your care team if you need these based on your risk.  Shingles shot (for age 50 and up)  General health tests  Diabetes screening:  Starting at age 35, Get screened for diabetes at least every 3 years.  If you are younger than age 35, ask your care team if you should be screened for diabetes.  Cholesterol test: At age 39, start having a cholesterol test every 5 years, or more often if advised.  Bone density scan (DEXA): At age 50, ask your care team if you should have this scan for osteoporosis (brittle bones).  Hepatitis C: Get tested at least once in your life.  STIs (sexually  transmitted infections)  Before age 24: Ask your care team if you should be screened for STIs.  After age 24: Get screened for STIs if you're at risk. You are at risk for STIs (including HIV) if:  You are sexually active with more than one person.  You don't use condoms every time.  You or a partner was diagnosed with a sexually transmitted infection.  If you are at risk for HIV, ask about PrEP medicine to prevent HIV.  Get tested for HIV at least once in your life, whether you are at risk for HIV or not.  Cancer screening tests  Cervical cancer screening: If you have a cervix, begin getting regular cervical cancer screening tests starting at age 21.  Breast cancer scan (mammogram): If you've ever had breasts, begin having regular mammograms starting at age 40. This is a scan to check for breast cancer.  Colon cancer screening: It is important to start screening for colon cancer at age 45.  Have a colonoscopy test every 10 years (or more often if you're at risk) Or, ask your provider about stool tests like a FIT test every year or Cologuard test every 3 years.  To learn more about your testing options, visit:   .  For help making a decision, visit:   https://bit.ly/kq32496.  Prostate cancer screening test: If you have a prostate, ask your care team if a prostate cancer screening test (PSA) at age 55 is right for you.  Lung cancer screening: If you are a current or former smoker ages 50 to 80, ask your care team if ongoing lung cancer screenings are right for you.  For informational purposes only. Not to replace the advice of your health care provider. Copyright   2023 [facility]. All rights reserved. Clinically reviewed by the [facility]. [employer] 190358 - REV 01/24.  Learning About Stress  What is stress?     Stress is your body's response to a hard situation. Your body can have a physical, emotional, or mental response. Stress is a fact of life for most people, and it  affects everyone differently. What causes stress for you may not be stressful for someone else.  A lot of things can cause stress. You may feel stress when you go on a job interview, take a test, or run a race. This kind of short-term stress is normal and even useful. It can help you if you need to work hard or react quickly. For example, stress can help you finish an important job on time.  Long-term stress is caused by ongoing stressful situations or events. Examples of long-term stress include long-term health problems, ongoing problems at work, or conflicts in your family. Long-term stress can harm your health.  How does stress affect your health?  When you are stressed, your body responds as though you are in danger. It makes hormones that speed up your heart, make you breathe faster, and give you a burst of energy. This is called the fight-or-flight stress response. If the stress is over quickly, your body goes back to normal and no harm is done.  But if stress happens too often or lasts too long, it can have bad effects. Long-term stress can make you more likely to get sick, and it can make symptoms of some diseases worse. If you tense up when you are stressed, you may develop neck, shoulder, or low back pain. Stress is linked to high blood pressure and heart disease.  Stress also harms your emotional health. It can make you weir, tense, or depressed. Your relationships may suffer, and you may not do well at work or school.  What can you do to manage stress?  You can try these things to help manage stress:   Do something active. Exercise or activity can help reduce stress. Walking is a great way to get started. Even everyday activities such as housecleaning or yard work can help.  Try yoga or joaquin chi. These techniques combine exercise and meditation. You may need some training at first to learn them.  Do something you enjoy. For example, listen to music or go to a movie. Practice your hobby or do volunteer  "work.  Meditate. This can help you relax, because you are not worrying about what happened before or what may happen in the future.  Do guided imagery. Imagine yourself in any setting that helps you feel calm. You can use online videos, books, or a teacher to guide you.  Do breathing exercises. For example:  From a standing position, bend forward from the waist with your knees slightly bent. Let your arms dangle close to the floor.  Breathe in slowly and deeply as you return to a standing position. Roll up slowly and lift your head last.  Hold your breath for just a few seconds in the standing position.  Breathe out slowly and bend forward from the waist.  Let your feelings out. Talk, laugh, cry, and express anger when you need to. Talking with supportive friends or family, a counselor, or a bobo leader about your feelings is a healthy way to relieve stress. Avoid discussing your feelings with people who make you feel worse.  Write. It may help to write about things that are bothering you. This helps you find out how much stress you feel and what is causing it. When you know this, you can find better ways to cope.  What can you do to prevent stress?  You might try some of these things to help prevent stress:  Manage your time. This helps you find time to do the things you want and need to do.  Get enough sleep. Your body recovers from the stresses of the day while you are sleeping.  Get support. Your family, friends, and community can make a difference in how you experience stress.  Limit your news feed. Avoid or limit time on social media or news that may make you feel stressed.  Do something active. Exercise or activity can help reduce stress. Walking is a great way to get started.  Where can you learn more?  Go to https://www.[employer].net/patiented  Enter N032 in the search box to learn more about \"Learning About Stress.\"  Current as of: October 24, 2024  Content Version: 14.4 2024-2025 [employer].   Care instructions adapted under license by your healthcare professional. If you have questions about a medical condition or this instruction, always ask your healthcare professional. [employer] disclaims any warranty or liability for your use of this information.    Learning About Depression Screening  What is depression screening?  Depression screening is a way to see if you have depression symptoms. It may be done by a doctor or counselor. It's often part of a routine checkup. That's because your mental health is just as important as your physical health.  Depression is a mental health condition that affects how you feel, think, and act. You may:  Have less energy.  Lose interest in your daily activities.  Feel sad and grouchy for a long time.  Depression is very common. It affects people of all ages.  Many things can lead to depression. Some people become depressed after they have a stroke or find out they have a major illness like cancer or heart disease. The death of a loved one or a breakup may lead to depression. It can run in families. Most experts believe that a combination of inherited genes and stressful life events can cause it.  What happens during screening?  You may be asked to fill out a form about your depression symptoms. You and the doctor will discuss your answers. The doctor may ask you more questions to learn more about how you think, act, and feel.  What happens after screening?  If you have symptoms of depression, your doctor will talk to you about your options.  Doctors usually treat depression with medicines or counseling. Often, combining the two works best. Many people don't get help because they think that they'll get over the depression on their own. But people with depression may not get better unless they get treatment.  The cause of depression is not well understood. There may be many factors involved. But if you have depression, it's not your fault.  A serious  "symptom of depression is thinking about death or suicide. If you or someone you care about talks about this or about feeling hopeless, get help right away.  It's important to know that depression can be treated. Medicine, counseling, and self-care may help.  Where can you learn more?  Go to https://www.[employer].net/patiented  Enter T185 in the search box to learn more about \"Learning About Depression Screening.\"  Current as of: July 31, 2024  Content Version: 14.4    5838-2460 [facility].   Care instructions adapted under license by your healthcare professional. If you have questions about a medical condition or this instruction, always ask your healthcare professional. [facility] disclaims any warranty or liability for your use of this information.       "

## 2025-05-08 LAB — COLLAGEN NTX SER-SCNC: 7.6 NM BCE

## 2025-06-04 ENCOUNTER — OFFICE VISIT (OUTPATIENT)
Dept: PSYCHIATRY | Facility: CLINIC | Age: 80
End: 2025-06-04
Attending: PSYCHIATRY & NEUROLOGY
Payer: COMMERCIAL

## 2025-06-04 VITALS
SYSTOLIC BLOOD PRESSURE: 105 MMHG | BODY MASS INDEX: 27.52 KG/M2 | DIASTOLIC BLOOD PRESSURE: 72 MMHG | WEIGHT: 192.2 LBS | HEART RATE: 80 BPM

## 2025-06-04 DIAGNOSIS — F41.1 GAD (GENERALIZED ANXIETY DISORDER): ICD-10-CM

## 2025-06-04 DIAGNOSIS — F33.1 MODERATE EPISODE OF RECURRENT MAJOR DEPRESSIVE DISORDER (H): ICD-10-CM

## 2025-06-04 DIAGNOSIS — F33.40 RECURRENT MAJOR DEPRESSIVE DISORDER, IN REMISSION: ICD-10-CM

## 2025-06-04 DIAGNOSIS — F10.21 ALCOHOL USE DISORDER, SEVERE, IN EARLY REMISSION (H): Primary | ICD-10-CM

## 2025-06-04 PROCEDURE — G0463 HOSPITAL OUTPT CLINIC VISIT: HCPCS | Performed by: STUDENT IN AN ORGANIZED HEALTH CARE EDUCATION/TRAINING PROGRAM

## 2025-06-04 RX ORDER — NALTREXONE HYDROCHLORIDE 50 MG/1
50 TABLET, FILM COATED ORAL DAILY
Qty: 30 TABLET | Refills: 1 | Status: SHIPPED | OUTPATIENT
Start: 2025-06-04 | End: 2025-06-04

## 2025-06-04 RX ORDER — ESCITALOPRAM OXALATE 20 MG/1
20 TABLET ORAL DAILY
Qty: 30 TABLET | Refills: 1 | Status: SHIPPED | OUTPATIENT
Start: 2025-06-04

## 2025-06-04 RX ORDER — NALTREXONE HYDROCHLORIDE 50 MG/1
100 TABLET, FILM COATED ORAL DAILY
Qty: 30 TABLET | Refills: 1 | Status: SHIPPED | OUTPATIENT
Start: 2025-06-04

## 2025-06-04 RX ORDER — MIRTAZAPINE 15 MG/1
15 TABLET, FILM COATED ORAL AT BEDTIME
Qty: 30 TABLET | Refills: 1 | Status: SHIPPED | OUTPATIENT
Start: 2025-06-04

## 2025-06-04 ASSESSMENT — PAIN SCALES - GENERAL: PAINLEVEL_OUTOF10: NO PAIN (0)

## 2025-06-04 NOTE — PROGRESS NOTES
----------------------------------------------------------------------------------------------------------  [facility]   Addiction Medicine Progress Note     ID                                Laurent Choi is a 78 year old  male with a history of alcohol use disorder, major depressive disorder and generalized anxiety disorder. He has been following in the addiction clinic since 10/2018. He was previously in sustained remission, though had return to use 10/2024 and restarted naltrexone.    INTERVAL HISTORY                                  Mood has been good, overall positive and stable since our last visit. Sleep continues to be going well. He is falling asleep easily, staying asleep most of the night, more restful during the day.     No return to use of drinking, but reflects that summer is always a difficult time of year and he has been having urges to have a beer. He has not missed any doses of naltrexone. The warm weather and holidays during the summer have always been triggers for use.    He plans to go to  today at 12:30pm, for his first meeting in years. He's hoping to make friends there also.    He is interested in starting individual therapy and asked for a referral.    BRIEF SUMMARY OF SUBSTANCE USE                                                                Gio has been in detox over 40 times and treatment about 5 times. His first treatment was in 1980. He gave up daily drinking in late 1990's, then began to binge drink. In recent years, his pattern has been drinking 1-2 weeks, not eating most of that time, and getting very sick. He then will stop drinking, will be sober for 1-2 weeks and the cycle is continued. Alcohol has caused withdrawal and tolerance, drinking 6-10 beers and 1 pint a day when on a binge. Alcohol has affected relationships, has affected health with getting sick, not eating and getting depressed.     Last significant binge use was  "9/2024, and prior to this 9/2023.      Treatment History:    First treatment in 1980. Last treatment at [facility] finished November 2018.      Substance Use Pharmacotherapies:   Tried Antabuse in late 1990's and he would stop taking and \"drink around it\"  Was on BARRETO naltrexone until sometime in 2023, and has had naltrexone PRN for use since.     BRIEF PSYCHIATRIC HISTORY     Previous diagnoses, history and diagnostic clarification:  Alcohol Use Disorder, severe, in sustained remission.   Major Depressive Disorder without suicidal ideation   Generalized Anxiety Disorder  R/o PTSD      PAST PSYCH MED TRIALS       Drug  Treatment Dates Max Dose (mg) Helpful Adverse Effects   Reason Discontinued   Prozac 1980s 80mg Yes  Stopped working   Zoloft, Paxil, Cymbalta, Effexor ?    ?   TCAs 1970s       Buproprion 1990    Anxiety   Mirtazapine  ?    Anxiety   Aripiprazole Summer 2019    Drooling   Gabapentin  Spring 2019  Yes       Current Medications     Current Outpatient Medications   Medication Instructions    Acetaminophen 325 mg, Oral, EVERY 8 HOURS PRN    alendronate (FOSAMAX) 70 mg, Oral, EVERY 7 DAYS    ASPIRIN PO 81 mg, Oral, DAILY    blood glucose (NO BRAND SPECIFIED) test strip Use to test blood sugar 2 times daily or as directed. To accompany: Blood Glucose Monitor Brands: per insurance.    blood glucose monitoring (NO BRAND SPECIFIED) meter device kit Use to test blood sugar 2 times daily or as directed. Preferred blood glucose meter OR supplies to accompany: Blood Glucose Monitor Brands: per insurance.    calcium-vitamin D 500-125 MG-UNIT TABS 1 tablet, Oral, 2 TIMES DAILY    clotrimazole (LOTRIMIN) 1 % external cream Topical, 2 TIMES DAILY, Apply to rough patch of skin in front of right ear 2x daily for 10 days    DIPHENHYDRAMINE HCL PO 25 mg, Oral, DAILY PRN    escitalopram (LEXAPRO) 20 mg, Oral, DAILY    gabapentin (NEURONTIN) 300 mg, Oral, AT BEDTIME, Take 1 capsule by mouth at bedtime as needed. "    losartan (COZAAR) 50 MG tablet TAKE ONE TABLET BY MOUTH ONCE DAILY    metFORMIN (GLUCOPHAGE XR) 500 MG 24 hr tablet TAKE ONE TABLET BY MOUTH ONCE DAILY WITH BREAKFAST    NITROGLYCERIN SL 0.4 mg, EVERY 5 MIN PRN    omeprazole (PRILOSEC) 20 MG DR capsule TAKE 1 CAPSULE BY MOUTH DAILY,  60 MINUTES BEFORE A MEAL.    rosuvastatin (CRESTOR) 40 mg, Oral, AT BEDTIME    tamsulosin (FLOMAX) 0.4 MG capsule TAKE ONE CAPSULE BY MOUTH ONCE DAILY WITH BREAKFAST. (NEED TO BE SEEN IN CLINIC FOR FURTHER REFILLS)    thin (NO BRAND SPECIFIED) lancets Use with lanceting device. To accompany: Blood Glucose Monitor Brands: per insurance.     MENTAL STATUS EXAM/WITHDRAWAL                                                              Withdrawal symptoms: None    Alertness: alert   Orientation: awake and alert  Appearance: adequately groomed  Behavior/Demeanor: cooperative and pleasant, with good  eye contact.  Speech: slowed  Psychomotor: normal or unremarkable    Mood:  description consistent with euthymia  Affect: full range and was congruent to speech content.  Thought Process/Associations: unremarkable   Thought Content: devoid of  suicidal ideation.   Perception: devoid of  auditory hallucinations and visual hallucinations  Insight: adequate.  Judgment: fair.    These cognitive functions grossly appear as described, but were not formally tested.    ASSESSMENT/PLAN                                                  Summation/Assessment:  78 year old male with history of severe alcohol use disorder as well as depression. Last use of alcohol 09/2024.     # Alcohol use disorder, severe, in early remission   Longstanding history with most recent pattern of intermittent low-volume use, 2-3 drinks once or twice monthly through summer 2023, most recently binge episode which made him quite sick in September of 2023, triggered by frustration around a meeting at his apartment. Further return to use September of 2024, triggered by the desire to  see if he could handle socially drinking. Previously took naltrexone (BARRETO for months) with good effect, however the downside was significant negative impact on libido. He has stopped going to AA meetings or other recovery programming. Daily naltrexone started again 10/2/2024 with good control of cravings, though increased cravings noted summer 2025 so increased naltrexone dose.  - Increase naltrexone to 100mg PO daily until our next visit; contemplative regarding Vivitrol  - Plan to resume AA meetings, first meeting today at 12:30pm  - Follow-up in 1 month, will call for sooner appointment if concerns    # Major depressive disorder  # RODRIGO  # Sleep disorder, unspecified  Mood and sleep substantially improved after starting mirtazapine, now sleeping 8-10 hours per night and waking up refreshed, mood positive. Ongoing social interactions with his friends and increasingly his sister have been helpful for mood. Sleep study (2020), no JANNETTE or narcolepsy, does have some anatomic predisposition to airway resistance and sleeps with a large pillow. Sleep can be a negative trigger on his mood, and has been difficult for most of his life until recently.  - Continue escitalopram 20mg daily    - Continue mirtazapine 15mg at bedtime  - Therapy referral placed    RTC: 1 month    [place] PRESCRIPTION MONITORING PROGRAM [] was checked today: no controlled substances.    Chris Barksdale MD  Addiction Medicine Fellow  [facility]     Patient seen by and discussed with staff Dr. Zavaleta.    Psychiatry Individual Psychotherapy Note   Psychotherapy start time - 10:35am  Psychotherapy end time - 10:52am  Date last reviewed - 6/4/2025  Subjective: This supportive psychotherapy session addressed issues related to goals of therapy and current psychosocial stressors.   Interactive complexity indicated? No  Plan: RTC in timeframe noted above  Psychotherapy services during this visit included myself and the patient.   Treatment Plan       SYMPTOMS; PROBLEMS   MEASURABLE GOALS;    FUNCTIONAL IMPROVEMENT / GAINS INTERVENTIONS DISCHARGE CRITERIA   Substance Use: alcohol    stay free of alcohol use  and go to AA meetings 4x before next meeting Supportive / psychodynamic marked symptom improvement     The longitudinal plan of care for the diagnosis(es)/condition(s) as documented were addressed during this visit. Due to the added complexity in care, I will continue to support Gio in the subsequent management and with ongoing continuity of care.

## 2025-06-04 NOTE — NURSING NOTE
Chief Complaint   Patient presents with    Recheck Medication     Recurrent major depressive disorder

## 2025-06-05 ENCOUNTER — PATIENT OUTREACH (OUTPATIENT)
Dept: CARE COORDINATION | Facility: CLINIC | Age: 80
End: 2025-06-05
Payer: COMMERCIAL

## 2025-06-09 ENCOUNTER — PATIENT OUTREACH (OUTPATIENT)
Dept: CARE COORDINATION | Facility: CLINIC | Age: 80
End: 2025-06-09
Payer: COMMERCIAL

## 2025-07-01 DIAGNOSIS — H35.3221 EXUDATIVE AGE-RELATED MACULAR DEGENERATION OF LEFT EYE WITH ACTIVE CHOROIDAL NEOVASCULARIZATION (H): Primary | ICD-10-CM

## 2025-07-07 DIAGNOSIS — R73.03 PREDIABETES: ICD-10-CM

## 2025-07-07 RX ORDER — METFORMIN HYDROCHLORIDE 500 MG/1
500 TABLET, EXTENDED RELEASE ORAL
Qty: 90 TABLET | Refills: 0 | Status: SHIPPED | OUTPATIENT
Start: 2025-07-07

## 2025-07-08 ENCOUNTER — OFFICE VISIT (OUTPATIENT)
Dept: OPHTHALMOLOGY | Facility: CLINIC | Age: 80
End: 2025-07-08
Attending: OPHTHALMOLOGY
Payer: COMMERCIAL

## 2025-07-08 DIAGNOSIS — H35.3290: ICD-10-CM

## 2025-07-08 DIAGNOSIS — H25.13 NUCLEAR SENILE CATARACT OF BOTH EYES: ICD-10-CM

## 2025-07-08 DIAGNOSIS — R35.0 URINE FREQUENCY: ICD-10-CM

## 2025-07-08 DIAGNOSIS — H35.3221 EXUDATIVE AGE-RELATED MACULAR DEGENERATION OF LEFT EYE WITH ACTIVE CHOROIDAL NEOVASCULARIZATION (H): Primary | ICD-10-CM

## 2025-07-08 DIAGNOSIS — E11.9 DM TYPE 2 WITHOUT RETINOPATHY (H): ICD-10-CM

## 2025-07-08 DIAGNOSIS — M80.00XS OSTEOPOROSIS WITH CURRENT PATHOLOGICAL FRACTURE, UNSPECIFIED OSTEOPOROSIS TYPE, SEQUELA: ICD-10-CM

## 2025-07-08 PROCEDURE — 92134 CPTRZ OPH DX IMG PST SGM RTA: CPT | Performed by: OPHTHALMOLOGY

## 2025-07-08 PROCEDURE — G0463 HOSPITAL OUTPT CLINIC VISIT: HCPCS | Performed by: OPHTHALMOLOGY

## 2025-07-08 PROCEDURE — 92014 COMPRE OPH EXAM EST PT 1/>: CPT | Mod: 25 | Performed by: OPHTHALMOLOGY

## 2025-07-08 PROCEDURE — 2023F DILAT RTA XM W/O RTNOPTHY: CPT | Performed by: OPHTHALMOLOGY

## 2025-07-08 PROCEDURE — 67028 INJECTION EYE DRUG: CPT | Mod: LT | Performed by: OPHTHALMOLOGY

## 2025-07-08 PROCEDURE — 250N000011 HC RX IP 250 OP 636: Performed by: OPHTHALMOLOGY

## 2025-07-08 RX ORDER — ALENDRONATE SODIUM 70 MG/1
TABLET ORAL
Qty: 12 TABLET | Refills: 3 | Status: SHIPPED | OUTPATIENT
Start: 2025-07-08

## 2025-07-08 RX ORDER — TAMSULOSIN HYDROCHLORIDE 0.4 MG/1
CAPSULE ORAL
Qty: 90 CAPSULE | Refills: 3 | Status: SHIPPED | OUTPATIENT
Start: 2025-07-08

## 2025-07-08 RX ADMIN — Medication 1.25 MG: at 11:07

## 2025-07-08 ASSESSMENT — SLIT LAMP EXAM - LIDS
COMMENTS: NORMAL
COMMENTS: NORMAL

## 2025-07-08 ASSESSMENT — VISUAL ACUITY
OS_SC: 20/30-
OD_SC: 2/200E
METHOD: SNELLEN - LINEAR

## 2025-07-08 ASSESSMENT — EXTERNAL EXAM - RIGHT EYE: OD_EXAM: NORMAL

## 2025-07-08 ASSESSMENT — EXTERNAL EXAM - LEFT EYE: OS_EXAM: NORMAL

## 2025-07-08 ASSESSMENT — TONOMETRY
OD_IOP_MMHG: 13
OS_IOP_MMHG: 12
IOP_METHOD: TONOPEN

## 2025-07-08 NOTE — PROGRESS NOTES
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 SRF resolved  - Vision is stable; last intravitreal avastin 4/16/25 (12 w)  - will give an injection today for SN CNV   - follow-up in 12 weeks with OCT macula OU     # disciform scar right eye   Extensive scars  Observe    # Nuclear sclerotic cataract  Both eyes    - Not visually significant   - refract as needed    - monitor yearly     # DM type II  Last A1c is:  Lab Results   Component Value Date    A1C 6.3 05/06/2025    A1C 6.2 12/16/2024    A1C 6.3 08/19/2024    A1C 6.2 03/21/2024    A1C 5.9 11/21/2023     Recommend adequate blood sugar control  Recommend follow-up every yearly    Follow-up in 11-12 weeks, VT (no dilation), OCT macula OU possible injection left eye      Complete documentation of historical and exam elements from today's encounter can be found in the full encounter summary report (not reduplicated in this progress note). I personally obtained the chief complaint(s) and history of present illness.  I confirmed and edited as necessary the review of systems, past medical/surgical history, family history, social history, and examination findings as documented by others; and I examined the patient myself. I personally reviewed the relevant tests, images, and reports as documented above. I formulated and edited as necessary the assessment and plan and discussed the findings and management plan with the patient and family.    Misael Whaley MD, PhD

## 2025-07-09 ENCOUNTER — OFFICE VISIT (OUTPATIENT)
Dept: PSYCHIATRY | Facility: CLINIC | Age: 80
End: 2025-07-09
Attending: PSYCHIATRY & NEUROLOGY
Payer: COMMERCIAL

## 2025-07-09 VITALS
DIASTOLIC BLOOD PRESSURE: 85 MMHG | HEART RATE: 76 BPM | TEMPERATURE: 97.9 F | SYSTOLIC BLOOD PRESSURE: 134 MMHG | WEIGHT: 192 LBS | BODY MASS INDEX: 27.49 KG/M2

## 2025-07-09 DIAGNOSIS — F33.40 RECURRENT MAJOR DEPRESSIVE DISORDER, IN REMISSION: ICD-10-CM

## 2025-07-09 DIAGNOSIS — F41.1 GAD (GENERALIZED ANXIETY DISORDER): ICD-10-CM

## 2025-07-09 DIAGNOSIS — F10.21 ALCOHOL USE DISORDER, SEVERE, IN EARLY REMISSION (H): ICD-10-CM

## 2025-07-09 DIAGNOSIS — F33.1 MODERATE EPISODE OF RECURRENT MAJOR DEPRESSIVE DISORDER (H): Primary | ICD-10-CM

## 2025-07-09 PROCEDURE — G0463 HOSPITAL OUTPT CLINIC VISIT: HCPCS | Performed by: STUDENT IN AN ORGANIZED HEALTH CARE EDUCATION/TRAINING PROGRAM

## 2025-07-09 RX ORDER — ESCITALOPRAM OXALATE 20 MG/1
20 TABLET ORAL DAILY
Qty: 30 TABLET | Refills: 1 | Status: SHIPPED | OUTPATIENT
Start: 2025-07-09

## 2025-07-09 RX ORDER — MIRTAZAPINE 15 MG/1
15 TABLET, FILM COATED ORAL AT BEDTIME
Qty: 30 TABLET | Refills: 1 | Status: SHIPPED | OUTPATIENT
Start: 2025-07-09

## 2025-07-09 RX ORDER — NALTREXONE HYDROCHLORIDE 50 MG/1
100 TABLET, FILM COATED ORAL DAILY
Qty: 30 TABLET | Refills: 1 | Status: SHIPPED | OUTPATIENT
Start: 2025-07-09

## 2025-07-09 ASSESSMENT — PAIN SCALES - GENERAL: PAINLEVEL_OUTOF10: NO PAIN (0)

## 2025-07-09 NOTE — PROGRESS NOTES
----------------------------------------------------------------------------------------------------------  [facility]   Addiction Medicine Progress Note     ID                                Laurent Choi is a 78 year old  male with a history of alcohol use disorder, major depressive disorder and generalized anxiety disorder. He has been following in the addiction clinic since 10/2018. He was previously in sustained remission, though had return to use 10/2024 and restarted naltrexone.    INTERVAL HISTORY                                  Mood has been good, overall positive and stable since our last visit. Sleep continues to be going well. He is falling asleep easily, staying asleep most of the night, more restful during the day.     Has been going to AA meetings, first visit was after our last visit. He is participating in the meetings and meeting people. He is hoping to find a sponsor in the future.  Going 1-2x/week currently, likes the social aspect and feels openly discussing his use disorder has been freeing.    Has noticed 100mg naltrexone helpful for cravings, denies cravings or urges since our last visit. He's hoping to continue this dose.    He has scheduled a therapy appointment for September, looking forward to that.    BRIEF SUMMARY OF SUBSTANCE USE                                                                Gio has been in detox over 40 times and treatment about 5 times. His first treatment was in 1980. He gave up daily drinking in late 1990's, then began to binge drink. In recent years, his pattern has been drinking 1-2 weeks, not eating most of that time, and getting very sick. He then will stop drinking, will be sober for 1-2 weeks and the cycle is continued. Alcohol has caused withdrawal and tolerance, drinking 6-10 beers and 1 pint a day when on a binge. Alcohol has affected relationships, has affected health with getting sick, not eating and  "getting depressed.     Last significant binge use was 9/2024, and prior to this 9/2023.      Treatment History:    First treatment in 1980. Last treatment at [facility] finished November 2018.      Substance Use Pharmacotherapies:   Tried Antabuse in late 1990's and he would stop taking and \"drink around it\"  Was on BARRETO naltrexone until sometime in 2023, and has had naltrexone PRN for use since.     BRIEF PSYCHIATRIC HISTORY     Previous diagnoses, history and diagnostic clarification:  Alcohol Use Disorder, severe, in sustained remission.   Major Depressive Disorder without suicidal ideation   Generalized Anxiety Disorder  R/o PTSD      PAST PSYCH MED TRIALS       Drug  Treatment Dates Max Dose (mg) Helpful Adverse Effects   Reason Discontinued   Prozac 1980s 80mg Yes  Stopped working   Zoloft, Paxil, Cymbalta, Effexor ?    ?   TCAs 1970s       Buproprion 1990    Anxiety   Mirtazapine  ?    Anxiety   Aripiprazole Summer 2019    Drooling   Gabapentin  Spring 2019  Yes       Current Medications     Current Outpatient Medications   Medication Instructions    Acetaminophen 325 mg, Oral, EVERY 8 HOURS PRN    alendronate (FOSAMAX) 70 mg, Oral, EVERY 7 DAYS    ASPIRIN PO 81 mg, Oral, DAILY    blood glucose (NO BRAND SPECIFIED) test strip Use to test blood sugar 2 times daily or as directed. To accompany: Blood Glucose Monitor Brands: per insurance.    blood glucose monitoring (NO BRAND SPECIFIED) meter device kit Use to test blood sugar 2 times daily or as directed. Preferred blood glucose meter OR supplies to accompany: Blood Glucose Monitor Brands: per insurance.    calcium-vitamin D 500-125 MG-UNIT TABS 1 tablet, Oral, 2 TIMES DAILY    clotrimazole (LOTRIMIN) 1 % external cream Topical, 2 TIMES DAILY, Apply to rough patch of skin in front of right ear 2x daily for 10 days    DIPHENHYDRAMINE HCL PO 25 mg, Oral, DAILY PRN    escitalopram (LEXAPRO) 20 mg, Oral, DAILY    gabapentin (NEURONTIN) 300 mg, Oral, AT " BEDTIME, Take 1 capsule by mouth at bedtime as needed.    losartan (COZAAR) 50 MG tablet TAKE ONE TABLET BY MOUTH ONCE DAILY    metFORMIN (GLUCOPHAGE XR) 500 MG 24 hr tablet TAKE ONE TABLET BY MOUTH ONCE DAILY WITH BREAKFAST    NITROGLYCERIN SL 0.4 mg, EVERY 5 MIN PRN    omeprazole (PRILOSEC) 20 MG DR capsule TAKE 1 CAPSULE BY MOUTH DAILY,  60 MINUTES BEFORE A MEAL.    rosuvastatin (CRESTOR) 40 mg, Oral, AT BEDTIME    tamsulosin (FLOMAX) 0.4 MG capsule TAKE ONE CAPSULE BY MOUTH ONCE DAILY WITH BREAKFAST. (NEED TO BE SEEN IN CLINIC FOR FURTHER REFILLS)    thin (NO BRAND SPECIFIED) lancets Use with lanceting device. To accompany: Blood Glucose Monitor Brands: per insurance.     MENTAL STATUS EXAM/WITHDRAWAL                                                              Withdrawal symptoms: None    Alertness: alert   Orientation: awake and alert  Appearance: adequately groomed  Behavior/Demeanor: cooperative and pleasant, with good  eye contact.  Speech: slowed  Psychomotor: normal or unremarkable    Mood:  description consistent with euthymia  Affect: full range and was congruent to speech content.  Thought Process/Associations: unremarkable   Thought Content: devoid of  suicidal ideation.   Perception: devoid of  auditory hallucinations and visual hallucinations  Insight: adequate.  Judgment: fair.    These cognitive functions grossly appear as described, but were not formally tested.    ASSESSMENT/PLAN                                                  Summation/Assessment:  78 year old male with history of severe alcohol use disorder as well as depression. Last use of alcohol 09/2024.     # Alcohol use disorder, severe, in early remission   Longstanding history with most recent pattern of intermittent low-volume use, 2-3 drinks once or twice monthly through summer 2023, most recently binge episode which made him quite sick in September of 2023, triggered by frustration around a meeting at his apartment. Further return  to use September of 2024, triggered by the desire to see if he could handle socially drinking. Previously took naltrexone (BARRETO for months) with good effect, however the downside was significant negative impact on libido. He has stopped going to AA meetings or other recovery programming. Daily naltrexone started again 10/2/2024 with good control of cravings, though increased cravings noted summer 2025 so increased naltrexone dose.  - Continue naltrexone 100mg PO daily  - Continue AA meetings at least weekly  - Follow-up in 1 month, will call for sooner appointment if concerns    # Major depressive disorder  # RODRIGO  # Sleep disorder, unspecified  Mood and sleep substantially improved after starting mirtazapine, now sleeping 8-10 hours per night and waking up refreshed, mood positive. Ongoing social interactions with his friends and increasingly his sister have been helpful for mood. Sleep study (2020), no JANNETTE or narcolepsy, does have some anatomic predisposition to airway resistance and sleeps with a large pillow. Sleep can be a negative trigger on his mood, and has been difficult for most of his life until recently.  - Continue escitalopram 20mg daily    - Continue mirtazapine 15mg at bedtime  - Individual therapy - appointment scheduled for 9/24/25    RTC: 1 month    [place] PRESCRIPTION MONITORING PROGRAM [] was checked today: no controlled substances.    Chris Barksdale MD  Addiction Medicine Fellow  [facility]     Patient seen by and discussed with staff Dr. Zavaleta.    Psychiatry Individual Psychotherapy Note   Psychotherapy start time - 10:35am  Psychotherapy end time - 10:55am  Date last reviewed - 7/9/2025  Subjective: This supportive psychotherapy session addressed issues related to goals of therapy and current psychosocial stressors.   Interactive complexity indicated? No  Plan: RTC in timeframe noted above  Psychotherapy services during this visit included myself and the patient.   Treatment Plan       SYMPTOMS; PROBLEMS   MEASURABLE GOALS;    FUNCTIONAL IMPROVEMENT / GAINS INTERVENTIONS DISCHARGE CRITERIA   Substance Use: alcohol    stay free of alcohol use  and go to AA meetings 4x before next meeting Supportive / psychodynamic marked symptom improvement     The longitudinal plan of care for the diagnosis(es)/condition(s) as documented were addressed during this visit. Due to the added complexity in care, I will continue to support Gio in the subsequent management and with ongoing continuity of care.

## 2025-07-09 NOTE — NURSING NOTE
Chief Complaint   Patient presents with    Recheck Medication     Alcohol use disorder, severe, in early remission     - August C., Visit Facilitator

## 2025-07-30 NOTE — PROGRESS NOTES
Today's Date: 7/31/2025     Patient seen at the request of No ref. provider found for an opinion and evaluation of tailbone pain.      HISTORY OF PRESENT ILLNESS:  Laurent Choi is a 80 year old male who presents with a chief complaint of tailbone pain.      He was seen today in the clinic. He describes tailbone pain which started in the fall 2024 without any known injury.  The pain has been been getting worse over time.  He is working on a book and sits his desk at the computer for most of the day.  Sitting aggravates the pain within 30 to 60 minutes.  He finds that if he leans forward slightly in the chair it helps to offload his tailbone and relieves the pain.  Once the pain starts, it persist throughout the day.  He has purchased 3 different chairs trying to control the pain.  He wears tight briefs and tight pants to try and lend support.  Sometimes he lowers his belt over his hips to try and relieve the pain.  He tried a donut pillow but it did not help.    He describes a history of flares of low back pain in the past, typically 1-2x/year.  The flares of pain can last for a number of days.  However, these prior flares of pain have affected the low back whereas this is more tailbone pain.  He does also relay a prior T12 compression fracture.    He denies lower extremity pain, numbness, tingling, or weakness.     Aggravating factors include: Sitting  Relieving factors include: Lying down, standing, walking, exercise  Today, he rates the pain 3/10.    He denies falls, weakness, bowel or bladder incontinence, saddle anesthesia, unintentional weight loss, fevers/chills, or night sweats.     To manage pain he uses Tylenol 2 times a day at most.  He also has a combination Tylenol/NSAID medication, but this aggravates his stomach.  He generally tries to avoid NSAIDs due to acid reflux/ulcer.  Occasionally he takes Aleve but finds that it causes indigestion.        PRIOR INJURIES/TREATMENT:   Ice/Heat: none  Brace:  tried donut pillow but didn't help  Physical Therapy:   No spine related PT      - Current Pain Medications -   tylenol  *naltrexone  *Escitalopram  *Mirtazapine    - Prior/Trialed Pain Medications -   Avoids NSAIDs, aspirin due to reflux/ulcer  Fluoxetine      Prior Procedures:  Date    Procedure   Improvement (%)  none              Prior Related Surgery: none   Other (acupuncture, OMT, CMM, TENS, DME, etc.): none    Specialists Seen - (with most recent, available notes and clinic visits reviewed)   1. none    IMAGING - reviewed   none    Review Of Systems:  I am responding to those symptoms which are directly relevant to the specific indication for my consultation. I recommend that the patient follow up with their primary or referring provider to pursue any other symptoms which may be of concern.       Medical History:  He has a past medical history of Alcohol abuse, Arthritis, Chest pain, Clogged artery (heart), GERD (gastroesophageal reflux disease), Heart disease, High cholesterol, Hypertension, Hypertension, Macular degeneration, and Macular degeneration.     He has a past surgical history that includes hernia repair; ENT surgery; colonoscopy; and Cardiac surgery.    Family History  His family history includes Anxiety Disorder in his sister; Dementia in his mother; Macular Degeneration in his mother; Snoring in his father; Substance Abuse in his father; Suicide in an other family member.     Social History:  Work: retired. Working on a book, sitting at a computer   Current living situation: Lives alone. Performs ADLs/IADLs independently.  He  reports that he quit smoking about 18 years ago. His smoking use included cigarettes. He has never been exposed to tobacco smoke. He has never used smokeless tobacco. He reports that he does not currently use alcohol. He reports that he does not use drugs.   Sober - 4-5 years        Current Medications:   He has a current medication list which includes the following  prescription(s): abrysvo, acetaminophen, alendronate, aspirin, blood glucose, blood glucose monitoring, calcium-vitamin d, clotrimazole, diphenhydramine hcl, escitalopram, fluticasone, losartan, metformin, mirtazapine, naltrexone, nitroglycerin, omeprazole, rosuvastatin, tamsulosin, and thin, and the following Facility-Administered Medications: bevacizumab and bevacizumab.     Allergies:    -- Aspirin -- Other (See Comments)    --  Avoids taking any aspirin besides his daily 81 mg             aspirin regimen due to acid reflux/esophagus             problem(s).   -- Nsaids (Non-Steroidal Anti-Inflammatory Drug) [Nsaids] -- Other (See Comments)    --  Avoids due to acid reflux/esophagus problem(s).   -- Plavix [Clopidogrel] -- Other (See Comments) and Muscle                            Pain (Myalgia)    --  Reaction(s): Bad bruising all over his body and             leg cramps.   -- Statins-Hmg-Coa Reductase Inhibitors [Statins] -- Muscle Pain (Myalgia)    --  Per the patient, he tolerates rosuvastatin.    PHYSICAL EXAMINATION:  /79 (BP Location: Right arm, Patient Position: Sitting, Cuff Size: Adult Regular)   Pulse 107   SpO2 93%    --CONSTITUTIONAL: Vital signs as above. No acute distress. The patient is well nourished and well groomed.  --PSYCHIATRIC: The patient is awake, alert, oriented to person, place, time and answering questions appropriately with clear speech. Appropriate mood and affect   --SKIN:  Posterior torso is clean, dry, intact without rashes.   --RESPIRATORY: Normal rhythm and effort. No abnormal accessory muscle breathing patterns noted.   --GROSS MOTOR: Easily arises from a seated position  --STANDING EXAMINATION: Gait is non-antalgic. Normal lumbar lordosis noted, no lateral shift.  --LOWER EXTREMITY MOTOR TESTING:  Plantar flexion left 5/5, right 5/5   Dorsiflexion left 5/5, right 5/5   Great toe MTP extension left 5/5, right 5/5  Knee flexion left 5/5, right 5/5  Knee extension left  5/5, right 5/5   Hip flexion left 5/5, right 5/5  --MUSCULOSKELETAL: Lumbar spine inspection reveals no evidence of deformity. Range of motion is not limited in lumbar flexion, extension, lateral rotation. No tenderness to palpation lumbar spine. Straight leg raise negative bilaterally. Facet loading maneuvers are negative bilaterally. + Midline pain with palpation of the distal sacrum/coccyx   --SACROILIAC JOINT: No pain with palpation of SI joint. Josie negative . Distraction negative bilaterally. Donnie's negative bilaterally. Gaenslen's negative bilaterally. Thigh thrust Test negative bilaterally. Sacroiliac Joint Compression Test negative bilaterally. Sacral Thrust/Yeoman's Test negative bilaterally.  --HIPS: Full range of motion bilaterally. FABERs positive on the right, but does not correlate with the pain he reports today.  Donnie's negative on the left.   No pain with logrolling. No pain with palpation of the greater trochanters.   --NEUROLOGIC: 2/4 patellar and achilles reflexes bilaterally.  Sensation to light touch is intact in the bilateral L4, L5, and S1 dermatomes. --VASCULAR:  Warm lower limbs bilaterally. There is no pitting edema of the bilateral lower extremities.       ASSESSMENT:  Laurent Choi is a pleasant 80 year old male who presents with coccydynia which began in the fall 2024 and has been worsening over time    Complicating comorbities include:  # History of alcohol use disorder, sober for 4 to 5 years  # osteoporosis on alendronate  # Type 2 DM with Hgb A1C 6.3% on 5/6/25  # history of T12 compression fracture          PLAN:  - Obtain x-rays of the sacrum/coccyx to rule out fracture  - Refer for PT.  Could also consider pelvic floor PT  - Pending the x-ray findings, he is interested in proceeding with sacrococcygeal joint and ligament injection given the ongoing level of pain since last fall  - RTC, TBD possibly for procedure pending imaging      Ready to learn, no apparent learning  barriers.  Education provided on treatment plan according to patient's preferred learning style.  Patient verbalizes understanding.   __________________________________  Alisha Rivas NP  Physical Medicine & Rehabilitation        39 minutes spent by me on the date of the encounter doing chart review, history and exam, documentation and further activities per the note

## 2025-07-31 ENCOUNTER — OFFICE VISIT (OUTPATIENT)
Dept: PHYSICAL MEDICINE AND REHAB | Facility: CLINIC | Age: 80
End: 2025-07-31
Payer: COMMERCIAL

## 2025-07-31 VITALS — OXYGEN SATURATION: 93 % | DIASTOLIC BLOOD PRESSURE: 79 MMHG | HEART RATE: 107 BPM | SYSTOLIC BLOOD PRESSURE: 120 MMHG

## 2025-07-31 DIAGNOSIS — M81.0 OSTEOPOROSIS, UNSPECIFIED OSTEOPOROSIS TYPE, UNSPECIFIED PATHOLOGICAL FRACTURE PRESENCE: ICD-10-CM

## 2025-07-31 DIAGNOSIS — M53.3 COCCYDYNIA: Primary | ICD-10-CM

## 2025-07-31 ASSESSMENT — PATIENT HEALTH QUESTIONNAIRE - PHQ9: SUM OF ALL RESPONSES TO PHQ QUESTIONS 1-9: 10

## 2025-07-31 NOTE — PATIENT INSTRUCTIONS
It was a pleasure seeing you in the clinic today.  As discussed, we made the following updates to your plan of care:       An XR order was added today.  I will send you a message about the x-ray result through [employer].     An order for physical therapy was added today. Physical therapy schedulers should call you in the next few days to schedule an appointment. You may also call [phone] to arrange an appointment at any of the [facility] outpatient physical therapy locations.  It will be very important for you to do the physical therapy exercises on a regular basis to decrease your pain and prevent future flares of pain.     Pending the X ray findings I will add a steroid injection for the tailbone pain with Dr Mayen      Thank you,  Alisha Rivas NP  Physical Medicine and Rehabilitation, Medical Spine  PM&R clinic Phone: [phone]  PM&R clinic Fax: [phone]

## 2025-07-31 NOTE — LETTER
7/31/2025       RE: Laurent Chio  [address]  [place] [address]     Dear Colleague,    Thank you for referring your patient, Laurent Choi, to the [facility] [place] at [facility]. Please see a copy of my visit note below.    Today's Date: 7/31/2025     Patient seen at the request of No ref. provider found for an opinion and evaluation of tailbone pain.      HISTORY OF PRESENT ILLNESS:  Laurent Choi is a 80 year old male who presents with a chief complaint of tailbone pain.      He was seen today in the clinic. He describes tailbone pain which started in the fall 2024 without any known injury.  The pain has been been getting worse over time.  He is working on a book and sits his desk at the computer for most of the day.  Sitting aggravates the pain within 30 to 60 minutes.  He finds that if he leans forward slightly in the chair it helps to offload his tailbone and relieves the pain.  Once the pain starts, it persist throughout the day.  He has purchased 3 different chairs trying to control the pain.  He wears tight briefs and tight pants to try and lend support.  Sometimes he lowers his belt over his hips to try and relieve the pain.  He tried a donut pillow but it did not help.    He describes a history of flares of low back pain in the past, typically 1-2x/year.  The flares of pain can last for a number of days.  However, these prior flares of pain have affected the low back whereas this is more tailbone pain.  He does also relay a prior T12 compression fracture.    He denies lower extremity pain, numbness, tingling, or weakness.     Aggravating factors include: Sitting  Relieving factors include: Lying down, standing, walking, exercise  Today, he rates the pain 3/10.    He denies falls, weakness, bowel or bladder incontinence, saddle anesthesia, unintentional weight loss,  fevers/chills, or night sweats.     To manage pain he uses Tylenol 2 times a day at most.  He also has a combination Tylenol/NSAID medication, but this aggravates his stomach.  He generally tries to avoid NSAIDs due to acid reflux/ulcer.  Occasionally he takes Aleve but finds that it causes indigestion.        PRIOR INJURIES/TREATMENT:   Ice/Heat: none  Brace: tried donut pillow but didn't help  Physical Therapy:   No spine related PT      - Current Pain Medications -   tylenol  *naltrexone  *Escitalopram  *Mirtazapine    - Prior/Trialed Pain Medications -   Avoids NSAIDs, aspirin due to reflux/ulcer  Fluoxetine      Prior Procedures:  Date    Procedure   Improvement (%)  none              Prior Related Surgery: none   Other (acupuncture, OMT, CMM, TENS, DME, etc.): none    Specialists Seen - (with most recent, available notes and clinic visits reviewed)   1. none    IMAGING - reviewed   none    Review Of Systems:  I am responding to those symptoms which are directly relevant to the specific indication for my consultation. I recommend that the patient follow up with their primary or referring provider to pursue any other symptoms which may be of concern.       Medical History:  He has a past medical history of Alcohol abuse, Arthritis, Chest pain, Clogged artery (heart), GERD (gastroesophageal reflux disease), Heart disease, High cholesterol, Hypertension, Hypertension, Macular degeneration, and Macular degeneration.     He has a past surgical history that includes hernia repair; ENT surgery; colonoscopy; and Cardiac surgery.    Family History  His family history includes Anxiety Disorder in his sister; Dementia in his mother; Macular Degeneration in his mother; Snoring in his father; Substance Abuse in his father; Suicide in an other family member.     Social History:  Work: retired. Working on a book, sitting at a computer   Current living situation: Lives alone. Performs ADLs/IADLs independently.  He  reports  that he quit smoking about 18 years ago. His smoking use included cigarettes. He has never been exposed to tobacco smoke. He has never used smokeless tobacco. He reports that he does not currently use alcohol. He reports that he does not use drugs.   Sober - 4-5 years        Current Medications:   He has a current medication list which includes the following prescription(s): abrysvo, acetaminophen, alendronate, aspirin, blood glucose, blood glucose monitoring, calcium-vitamin d, clotrimazole, diphenhydramine hcl, escitalopram, fluticasone, losartan, metformin, mirtazapine, naltrexone, nitroglycerin, omeprazole, rosuvastatin, tamsulosin, and thin, and the following Facility-Administered Medications: bevacizumab and bevacizumab.     Allergies:    -- Aspirin -- Other (See Comments)    --  Avoids taking any aspirin besides his daily 81 mg             aspirin regimen due to acid reflux/esophagus             problem(s).   -- Nsaids (Non-Steroidal Anti-Inflammatory Drug) [Nsaids] -- Other (See Comments)    --  Avoids due to acid reflux/esophagus problem(s).   -- Plavix [Clopidogrel] -- Other (See Comments) and Muscle                            Pain (Myalgia)    --  Reaction(s): Bad bruising all over his body and             leg cramps.   -- Statins-Hmg-Coa Reductase Inhibitors [Statins] -- Muscle Pain (Myalgia)    --  Per the patient, he tolerates rosuvastatin.    PHYSICAL EXAMINATION:  /79 (BP Location: Right arm, Patient Position: Sitting, Cuff Size: Adult Regular)   Pulse 107   SpO2 93%    --CONSTITUTIONAL: Vital signs as above. No acute distress. The patient is well nourished and well groomed.  --PSYCHIATRIC: The patient is awake, alert, oriented to person, place, time and answering questions appropriately with clear speech. Appropriate mood and affect   --SKIN:  Posterior torso is clean, dry, intact without rashes.   --RESPIRATORY: Normal rhythm and effort. No abnormal accessory muscle breathing patterns  noted.   --GROSS MOTOR: Easily arises from a seated position  --STANDING EXAMINATION: Gait is non-antalgic. Normal lumbar lordosis noted, no lateral shift.  --LOWER EXTREMITY MOTOR TESTING:  Plantar flexion left 5/5, right 5/5   Dorsiflexion left 5/5, right 5/5   Great toe MTP extension left 5/5, right 5/5  Knee flexion left 5/5, right 5/5  Knee extension left 5/5, right 5/5   Hip flexion left 5/5, right 5/5  --MUSCULOSKELETAL: Lumbar spine inspection reveals no evidence of deformity. Range of motion is not limited in lumbar flexion, extension, lateral rotation. No tenderness to palpation lumbar spine. Straight leg raise negative bilaterally. Facet loading maneuvers are negative bilaterally. + Midline pain with palpation of the distal sacrum/coccyx   --SACROILIAC JOINT: No pain with palpation of SI joint. Josie negative . Distraction negative bilaterally. Donnie's negative bilaterally. Gaenslen's negative bilaterally. Thigh thrust Test negative bilaterally. Sacroiliac Joint Compression Test negative bilaterally. Sacral Thrust/Yeoman's Test negative bilaterally.  --HIPS: Full range of motion bilaterally. FABERs positive on the right, but does not correlate with the pain he reports today.  Donnie's negative on the left.   No pain with logrolling. No pain with palpation of the greater trochanters.   --NEUROLOGIC: 2/4 patellar and achilles reflexes bilaterally.  Sensation to light touch is intact in the bilateral L4, L5, and S1 dermatomes. --VASCULAR:  Warm lower limbs bilaterally. There is no pitting edema of the bilateral lower extremities.       ASSESSMENT:  Laurent Choi is a pleasant 80 year old male who presents with coccydynia which began in the fall 2024 and has been worsening over time    Complicating comorbities include:  # History of alcohol use disorder, sober for 4 to 5 years  # osteoporosis on alendronate  # Type 2 DM with Hgb A1C 6.3% on 5/6/25  # history of T12 compression fracture          PLAN:  -  Obtain x-rays of the sacrum/coccyx to rule out fracture  - Refer for PT.  Could also consider pelvic floor PT  - Pending the x-ray findings, he is interested in proceeding with sacrococcygeal joint and ligament injection given the ongoing level of pain since last fall  - RTC, TBD possibly for procedure pending imaging      Ready to learn, no apparent learning barriers.  Education provided on treatment plan according to patient's preferred learning style.  Patient verbalizes understanding.   __________________________________  Alisha Rivas NP  Physical Medicine & Rehabilitation        39 minutes spent by me on the date of the encounter doing chart review, history and exam, documentation and further activities per the note       Again, thank you for allowing me to participate in the care of your patient.      Sincerely,    DAPHNIE Bro CNP

## 2025-08-11 ENCOUNTER — THERAPY VISIT (OUTPATIENT)
Dept: PHYSICAL THERAPY | Facility: CLINIC | Age: 80
End: 2025-08-11
Attending: NURSE PRACTITIONER
Payer: COMMERCIAL

## 2025-08-11 DIAGNOSIS — M81.0 OSTEOPOROSIS, UNSPECIFIED OSTEOPOROSIS TYPE, UNSPECIFIED PATHOLOGICAL FRACTURE PRESENCE: ICD-10-CM

## 2025-08-11 DIAGNOSIS — M53.3 COCCYDYNIA: ICD-10-CM

## 2025-08-11 PROCEDURE — 97110 THERAPEUTIC EXERCISES: CPT | Mod: GP

## 2025-08-11 PROCEDURE — 97161 PT EVAL LOW COMPLEX 20 MIN: CPT | Mod: GP

## 2025-08-11 PROCEDURE — 97530 THERAPEUTIC ACTIVITIES: CPT | Mod: GP

## 2025-08-16 ENCOUNTER — HEALTH MAINTENANCE LETTER (OUTPATIENT)
Age: 80
End: 2025-08-16

## 2025-08-27 DIAGNOSIS — M53.3 COCCYDYNIA: Primary | ICD-10-CM
